# Patient Record
Sex: MALE | Race: WHITE | Employment: OTHER | URBAN - METROPOLITAN AREA
[De-identification: names, ages, dates, MRNs, and addresses within clinical notes are randomized per-mention and may not be internally consistent; named-entity substitution may affect disease eponyms.]

---

## 2017-04-10 ENCOUNTER — TELEPHONE (OUTPATIENT)
Dept: PHARMACY | Facility: CLINIC | Age: 82
End: 2017-04-10

## 2018-04-27 ENCOUNTER — TELEPHONE (OUTPATIENT)
Dept: FAMILY MEDICINE CLINIC | Age: 83
End: 2018-04-27

## 2018-04-30 ENCOUNTER — TELEPHONE (OUTPATIENT)
Dept: FAMILY MEDICINE CLINIC | Age: 83
End: 2018-04-30

## 2019-03-20 ENCOUNTER — OFFICE VISIT (OUTPATIENT)
Dept: FAMILY MEDICINE CLINIC | Age: 84
End: 2019-03-20
Payer: MEDICARE

## 2019-03-20 VITALS
WEIGHT: 235.8 LBS | HEART RATE: 70 BPM | DIASTOLIC BLOOD PRESSURE: 70 MMHG | SYSTOLIC BLOOD PRESSURE: 156 MMHG | HEIGHT: 67 IN | BODY MASS INDEX: 37.01 KG/M2 | OXYGEN SATURATION: 96 %

## 2019-03-20 DIAGNOSIS — L97.509 TYPE 2 DIABETES MELLITUS WITH FOOT ULCER, WITHOUT LONG-TERM CURRENT USE OF INSULIN (HCC): ICD-10-CM

## 2019-03-20 DIAGNOSIS — E11.621 TYPE 2 DIABETES MELLITUS WITH FOOT ULCER, WITHOUT LONG-TERM CURRENT USE OF INSULIN (HCC): ICD-10-CM

## 2019-03-20 DIAGNOSIS — R03.0 ELEVATED BLOOD-PRESSURE READING WITHOUT DIAGNOSIS OF HYPERTENSION: ICD-10-CM

## 2019-03-20 DIAGNOSIS — R60.0 LOCALIZED EDEMA: Primary | ICD-10-CM

## 2019-03-20 DIAGNOSIS — L97.511 SKIN ULCER OF RIGHT FOOT, LIMITED TO BREAKDOWN OF SKIN (HCC): ICD-10-CM

## 2019-03-20 LAB
A/G RATIO: 1.3 (ref 1.1–2.2)
ALBUMIN SERPL-MCNC: 4.4 G/DL (ref 3.4–5)
ALP BLD-CCNC: 67 U/L (ref 40–129)
ALT SERPL-CCNC: 22 U/L (ref 10–40)
ANION GAP SERPL CALCULATED.3IONS-SCNC: 13 MMOL/L (ref 3–16)
AST SERPL-CCNC: 24 U/L (ref 15–37)
BILIRUB SERPL-MCNC: 0.3 MG/DL (ref 0–1)
BUN BLDV-MCNC: 14 MG/DL (ref 7–20)
CALCIUM SERPL-MCNC: 9.6 MG/DL (ref 8.3–10.6)
CHLORIDE BLD-SCNC: 101 MMOL/L (ref 99–110)
CHOLESTEROL, TOTAL: 181 MG/DL (ref 0–199)
CO2: 28 MMOL/L (ref 21–32)
CREAT SERPL-MCNC: 0.8 MG/DL (ref 0.8–1.3)
GFR AFRICAN AMERICAN: >60
GFR NON-AFRICAN AMERICAN: >60
GLOBULIN: 3.5 G/DL
GLUCOSE BLD-MCNC: 127 MG/DL (ref 70–99)
HDLC SERPL-MCNC: 32 MG/DL (ref 40–60)
LDL CHOLESTEROL CALCULATED: 105 MG/DL
POTASSIUM SERPL-SCNC: 4.5 MMOL/L (ref 3.5–5.1)
SODIUM BLD-SCNC: 142 MMOL/L (ref 136–145)
TOTAL PROTEIN: 7.9 G/DL (ref 6.4–8.2)
TRIGL SERPL-MCNC: 220 MG/DL (ref 0–150)
VLDLC SERPL CALC-MCNC: 44 MG/DL

## 2019-03-20 PROCEDURE — 36415 COLL VENOUS BLD VENIPUNCTURE: CPT | Performed by: FAMILY MEDICINE

## 2019-03-20 PROCEDURE — 99214 OFFICE O/P EST MOD 30 MIN: CPT | Performed by: FAMILY MEDICINE

## 2019-03-20 RX ORDER — FUROSEMIDE 20 MG/1
20 TABLET ORAL DAILY
COMMUNITY
End: 2019-04-15 | Stop reason: SDUPTHER

## 2019-03-20 RX ORDER — GLIPIZIDE 10 MG/1
10 TABLET ORAL
Qty: 180 TABLET | Refills: 1 | Status: CANCELLED | OUTPATIENT
Start: 2019-03-20

## 2019-03-20 ASSESSMENT — PATIENT HEALTH QUESTIONNAIRE - PHQ9
1. LITTLE INTEREST OR PLEASURE IN DOING THINGS: 0
2. FEELING DOWN, DEPRESSED OR HOPELESS: 0
SUM OF ALL RESPONSES TO PHQ QUESTIONS 1-9: 0
SUM OF ALL RESPONSES TO PHQ QUESTIONS 1-9: 0
SUM OF ALL RESPONSES TO PHQ9 QUESTIONS 1 & 2: 0

## 2019-03-21 LAB
ESTIMATED AVERAGE GLUCOSE: 188.6 MG/DL
HBA1C MFR BLD: 8.2 %

## 2019-03-21 ASSESSMENT — ENCOUNTER SYMPTOMS
EYES NEGATIVE: 1
RESPIRATORY NEGATIVE: 1

## 2019-03-25 RX ORDER — ATORVASTATIN CALCIUM 20 MG/1
20 TABLET, FILM COATED ORAL DAILY
Qty: 30 TABLET | Refills: 3 | Status: SHIPPED | OUTPATIENT
Start: 2019-03-25 | End: 2019-07-18 | Stop reason: SDUPTHER

## 2019-03-25 RX ORDER — LISINOPRIL 5 MG/1
5 TABLET ORAL DAILY
Qty: 30 TABLET | Refills: 3 | Status: SHIPPED | OUTPATIENT
Start: 2019-03-25 | End: 2019-07-18 | Stop reason: SDUPTHER

## 2019-04-15 DIAGNOSIS — R60.0 LOCALIZED EDEMA: ICD-10-CM

## 2019-04-15 RX ORDER — FUROSEMIDE 20 MG/1
20 TABLET ORAL DAILY
Qty: 30 TABLET | Refills: 0 | Status: SHIPPED | OUTPATIENT
Start: 2019-04-15 | End: 2019-04-24 | Stop reason: SDUPTHER

## 2019-04-24 ENCOUNTER — OFFICE VISIT (OUTPATIENT)
Dept: FAMILY MEDICINE CLINIC | Age: 84
End: 2019-04-24
Payer: MEDICARE

## 2019-04-24 VITALS
WEIGHT: 239 LBS | HEART RATE: 74 BPM | OXYGEN SATURATION: 96 % | DIASTOLIC BLOOD PRESSURE: 66 MMHG | BODY MASS INDEX: 37.43 KG/M2 | TEMPERATURE: 97.6 F | SYSTOLIC BLOOD PRESSURE: 130 MMHG

## 2019-04-24 DIAGNOSIS — I10 ESSENTIAL HYPERTENSION: Primary | ICD-10-CM

## 2019-04-24 DIAGNOSIS — R60.0 LOCALIZED EDEMA: ICD-10-CM

## 2019-04-24 DIAGNOSIS — Z23 NEED FOR PROPHYLACTIC VACCINATION AGAINST STREPTOCOCCUS PNEUMONIAE (PNEUMOCOCCUS): ICD-10-CM

## 2019-04-24 PROCEDURE — 99213 OFFICE O/P EST LOW 20 MIN: CPT | Performed by: FAMILY MEDICINE

## 2019-04-24 RX ORDER — FUROSEMIDE 20 MG/1
20 TABLET ORAL DAILY
Qty: 30 TABLET | Refills: 3 | Status: SHIPPED | OUTPATIENT
Start: 2019-04-24 | End: 2019-05-16 | Stop reason: SDUPTHER

## 2019-04-24 ASSESSMENT — ENCOUNTER SYMPTOMS: RESPIRATORY NEGATIVE: 1

## 2019-05-03 ENCOUNTER — TELEPHONE (OUTPATIENT)
Dept: FAMILY MEDICINE CLINIC | Age: 84
End: 2019-05-03

## 2019-05-03 ENCOUNTER — OFFICE VISIT (OUTPATIENT)
Dept: FAMILY MEDICINE CLINIC | Age: 84
End: 2019-05-03
Payer: MEDICARE

## 2019-05-03 DIAGNOSIS — R53.83 OTHER FATIGUE: Primary | ICD-10-CM

## 2019-05-03 PROCEDURE — 99213 OFFICE O/P EST LOW 20 MIN: CPT | Performed by: FAMILY MEDICINE

## 2019-05-03 NOTE — PROGRESS NOTES
Subjective:      Patient ID: Raj Rodriguez is a 81 y.o. male. HPI   Chief Complaint   Patient presents with    Fatigue     states her wife thinks he should be seen because he said one time that he was tired and he has never complained before so she made him come in      Chief complaint and present illness: 81-year-old white male who recently resumed medical care here after switching from the [facility] returns at my request because this examiner received a message from his wife that he was acting fatigued and she thought it was the furosemide. In reviewing the record at that time, the furosemide was not a new medication-he came from the [facility] system on. We had resumed some other medicines, to treat his diabetes. Patient presents today stating that he should've never told his wife that he was a little tired. It was on one occasion after he done some things. He says that otherwise he's been completely functional-doing whatever he wants to do it without any other symptoms to go along with the fatigue-no chest pain, no shortness of breath, no lightheadedness, no abdominal pain, no trouble with urine, no fever sweats chills or weight loss. He states that he has slowed down a little bit over the years but he is also 81. Review of Systems   Constitutional: Negative. Respiratory: Negative. Cardiovascular: Negative. Gastrointestinal: Negative. Endocrine: Negative. Genitourinary: Negative. Neurological: Negative. Psychiatric/Behavioral: Negative. background/entire past medical,social and family history obtained and reviewed/updated today   Objective:   Physical Exam   Constitutional:   Elderly, alert white male who answers all questions promptly and appropriately and accurately   HENT:   Head: Normocephalic. Eyes: Conjunctivae are normal. No scleral icterus. Cardiovascular: Normal rate. Pulmonary/Chest: Effort normal and breath sounds normal. He has no wheezes. He has no rales. Musculoskeletal: He exhibits edema (no change in edema from previous visit). He exhibits no tenderness. Neurological: He is alert. Skin: Skin is warm. No pallor. Psychiatric: He has a normal mood and affect. Nursing note and vitals reviewed. /62   Pulse 74   Wt 235 lb (106.6 kg)   SpO2 97%   BMI 36.81 kg/m²     Assessment:      Lissette Swartz was seen today for fatigue.     Diagnoses and all orders for this visit:    Other fatigue           Plan:        Patient Instructions   Please see me as previously arranged    If he should begin to experience persistent symptoms of fatigue or focal symptoms, please return           Melony Rock MA

## 2019-05-03 NOTE — TELEPHONE ENCOUNTER
I reviewed the chart. He was on furosemide when I first saw him a couple months ago.   I believe the best thing to do, is to have a office visit and evaluate

## 2019-05-03 NOTE — TELEPHONE ENCOUNTER
Patients spouse calling because they believe the patients medication is making him very tired and weak. She wasn't for sure on which medication it was, but states that it might be the furosemide. Wants to know if there is anything else than can be prescribed? Please advise.

## 2019-05-05 VITALS
OXYGEN SATURATION: 97 % | HEART RATE: 74 BPM | SYSTOLIC BLOOD PRESSURE: 136 MMHG | BODY MASS INDEX: 36.81 KG/M2 | WEIGHT: 235 LBS | DIASTOLIC BLOOD PRESSURE: 70 MMHG

## 2019-05-05 ASSESSMENT — ENCOUNTER SYMPTOMS
GASTROINTESTINAL NEGATIVE: 1
RESPIRATORY NEGATIVE: 1

## 2019-05-16 DIAGNOSIS — R60.0 LOCALIZED EDEMA: ICD-10-CM

## 2019-05-16 RX ORDER — FUROSEMIDE 20 MG/1
20 TABLET ORAL DAILY
Qty: 30 TABLET | Refills: 3 | Status: SHIPPED | OUTPATIENT
Start: 2019-05-16 | End: 2019-07-10 | Stop reason: SDUPTHER

## 2019-05-29 ENCOUNTER — OFFICE VISIT (OUTPATIENT)
Dept: FAMILY MEDICINE CLINIC | Age: 84
End: 2019-05-29
Payer: MEDICARE

## 2019-05-29 VITALS
OXYGEN SATURATION: 97 % | HEART RATE: 67 BPM | WEIGHT: 231 LBS | BODY MASS INDEX: 36.18 KG/M2 | DIASTOLIC BLOOD PRESSURE: 82 MMHG | SYSTOLIC BLOOD PRESSURE: 131 MMHG

## 2019-05-29 DIAGNOSIS — I83.811 VARICOSE VEINS OF RIGHT LOWER EXTREMITY WITH PAIN: Primary | ICD-10-CM

## 2019-05-29 PROCEDURE — 99213 OFFICE O/P EST LOW 20 MIN: CPT | Performed by: FAMILY MEDICINE

## 2019-06-06 DIAGNOSIS — L97.509 TYPE 2 DIABETES MELLITUS WITH FOOT ULCER, WITHOUT LONG-TERM CURRENT USE OF INSULIN (HCC): ICD-10-CM

## 2019-06-06 DIAGNOSIS — E11.621 TYPE 2 DIABETES MELLITUS WITH FOOT ULCER, WITHOUT LONG-TERM CURRENT USE OF INSULIN (HCC): ICD-10-CM

## 2019-06-06 RX ORDER — GLIPIZIDE 10 MG/1
10 TABLET ORAL
Qty: 180 TABLET | Refills: 0 | Status: SHIPPED | OUTPATIENT
Start: 2019-06-06 | End: 2019-07-18 | Stop reason: SDUPTHER

## 2019-06-06 NOTE — TELEPHONE ENCOUNTER
Refill Request for glipiZIDE (GLUCOTROL) 10 MG tablet     Last visit- 4/24/19    Told to follow up- 3 months    Pending appointment- None    Additional Comments -

## 2019-07-03 ENCOUNTER — TELEPHONE (OUTPATIENT)
Dept: FAMILY MEDICINE CLINIC | Age: 84
End: 2019-07-03

## 2019-07-10 DIAGNOSIS — R60.0 LOCALIZED EDEMA: ICD-10-CM

## 2019-07-10 RX ORDER — FUROSEMIDE 20 MG/1
20 TABLET ORAL DAILY
Qty: 30 TABLET | Refills: 0 | Status: SHIPPED | OUTPATIENT
Start: 2019-07-10 | End: 2019-07-18 | Stop reason: SDUPTHER

## 2019-07-11 ENCOUNTER — TELEPHONE (OUTPATIENT)
Dept: FAMILY MEDICINE CLINIC | Age: 84
End: 2019-07-11

## 2019-07-18 ENCOUNTER — OFFICE VISIT (OUTPATIENT)
Dept: FAMILY MEDICINE CLINIC | Age: 84
End: 2019-07-18
Payer: MEDICARE

## 2019-07-18 VITALS
HEIGHT: 68 IN | OXYGEN SATURATION: 97 % | SYSTOLIC BLOOD PRESSURE: 138 MMHG | DIASTOLIC BLOOD PRESSURE: 72 MMHG | WEIGHT: 234 LBS | HEART RATE: 71 BPM | BODY MASS INDEX: 35.46 KG/M2

## 2019-07-18 DIAGNOSIS — E11.621 TYPE 2 DIABETES MELLITUS WITH FOOT ULCER, WITHOUT LONG-TERM CURRENT USE OF INSULIN (HCC): ICD-10-CM

## 2019-07-18 DIAGNOSIS — I10 ESSENTIAL HYPERTENSION: Primary | ICD-10-CM

## 2019-07-18 DIAGNOSIS — E78.2 MIXED HYPERLIPIDEMIA: ICD-10-CM

## 2019-07-18 DIAGNOSIS — R60.0 LOCALIZED EDEMA: ICD-10-CM

## 2019-07-18 DIAGNOSIS — L97.509 TYPE 2 DIABETES MELLITUS WITH FOOT ULCER, WITHOUT LONG-TERM CURRENT USE OF INSULIN (HCC): ICD-10-CM

## 2019-07-18 LAB
ALBUMIN SERPL-MCNC: 4.4 G/DL (ref 3.4–5)
ANION GAP SERPL CALCULATED.3IONS-SCNC: 12 MMOL/L (ref 3–16)
BUN BLDV-MCNC: 17 MG/DL (ref 7–20)
CALCIUM SERPL-MCNC: 9.7 MG/DL (ref 8.3–10.6)
CHLORIDE BLD-SCNC: 98 MMOL/L (ref 99–110)
CHOLESTEROL, TOTAL: 165 MG/DL (ref 0–199)
CO2: 28 MMOL/L (ref 21–32)
CREAT SERPL-MCNC: 0.9 MG/DL (ref 0.8–1.3)
GFR AFRICAN AMERICAN: >60
GFR NON-AFRICAN AMERICAN: >60
GLUCOSE BLD-MCNC: 187 MG/DL (ref 70–99)
HDLC SERPL-MCNC: 27 MG/DL (ref 40–60)
LDL CHOLESTEROL CALCULATED: 80 MG/DL
PHOSPHORUS: 3.6 MG/DL (ref 2.5–4.9)
POTASSIUM SERPL-SCNC: 4.8 MMOL/L (ref 3.5–5.1)
SODIUM BLD-SCNC: 138 MMOL/L (ref 136–145)
TRIGL SERPL-MCNC: 289 MG/DL (ref 0–150)
VLDLC SERPL CALC-MCNC: 58 MG/DL

## 2019-07-18 PROCEDURE — 99214 OFFICE O/P EST MOD 30 MIN: CPT | Performed by: FAMILY MEDICINE

## 2019-07-18 PROCEDURE — 36415 COLL VENOUS BLD VENIPUNCTURE: CPT | Performed by: FAMILY MEDICINE

## 2019-07-18 RX ORDER — GLIPIZIDE 10 MG/1
10 TABLET ORAL
Qty: 60 TABLET | Refills: 5 | Status: SHIPPED | OUTPATIENT
Start: 2019-07-18 | End: 2019-09-23 | Stop reason: SDUPTHER

## 2019-07-18 RX ORDER — ATORVASTATIN CALCIUM 20 MG/1
20 TABLET, FILM COATED ORAL DAILY
Qty: 30 TABLET | Refills: 5 | Status: SHIPPED | OUTPATIENT
Start: 2019-07-18 | End: 2019-12-09 | Stop reason: SDUPTHER

## 2019-07-18 RX ORDER — FUROSEMIDE 20 MG/1
20 TABLET ORAL DAILY
Qty: 30 TABLET | Refills: 5 | Status: SHIPPED | OUTPATIENT
Start: 2019-07-18 | End: 2019-12-09 | Stop reason: SDUPTHER

## 2019-07-18 RX ORDER — LISINOPRIL 5 MG/1
5 TABLET ORAL DAILY
Qty: 30 TABLET | Refills: 5 | Status: SHIPPED | OUTPATIENT
Start: 2019-07-18 | End: 2019-12-09 | Stop reason: SDUPTHER

## 2019-07-18 ASSESSMENT — ENCOUNTER SYMPTOMS
RESPIRATORY NEGATIVE: 1
GASTROINTESTINAL NEGATIVE: 1

## 2019-07-19 LAB
ESTIMATED AVERAGE GLUCOSE: 194.4 MG/DL
HBA1C MFR BLD: 8.4 %

## 2019-09-23 DIAGNOSIS — L97.509 TYPE 2 DIABETES MELLITUS WITH FOOT ULCER, WITHOUT LONG-TERM CURRENT USE OF INSULIN (HCC): ICD-10-CM

## 2019-09-23 DIAGNOSIS — E11.621 TYPE 2 DIABETES MELLITUS WITH FOOT ULCER, WITHOUT LONG-TERM CURRENT USE OF INSULIN (HCC): ICD-10-CM

## 2019-09-23 RX ORDER — GLIPIZIDE 10 MG/1
10 TABLET ORAL
Qty: 60 TABLET | Refills: 1 | Status: SHIPPED | OUTPATIENT
Start: 2019-09-23 | End: 2019-12-05 | Stop reason: SDUPTHER

## 2019-09-25 ENCOUNTER — TELEPHONE (OUTPATIENT)
Dept: FAMILY MEDICINE CLINIC | Age: 84
End: 2019-09-25

## 2019-09-27 ENCOUNTER — OFFICE VISIT (OUTPATIENT)
Dept: FAMILY MEDICINE CLINIC | Age: 84
End: 2019-09-27
Payer: MEDICARE

## 2019-09-27 VITALS
WEIGHT: 241 LBS | HEIGHT: 68 IN | TEMPERATURE: 97.7 F | SYSTOLIC BLOOD PRESSURE: 138 MMHG | DIASTOLIC BLOOD PRESSURE: 68 MMHG | BODY MASS INDEX: 36.53 KG/M2 | OXYGEN SATURATION: 94 % | HEART RATE: 96 BPM

## 2019-09-27 DIAGNOSIS — R10.12 LEFT UPPER QUADRANT PAIN: Primary | ICD-10-CM

## 2019-09-27 LAB
A/G RATIO: 1.4 (ref 1.1–2.2)
ALBUMIN SERPL-MCNC: 4.4 G/DL (ref 3.4–5)
ALP BLD-CCNC: 71 U/L (ref 40–129)
ALT SERPL-CCNC: 13 U/L (ref 10–40)
ANION GAP SERPL CALCULATED.3IONS-SCNC: 13 MMOL/L (ref 3–16)
AST SERPL-CCNC: 16 U/L (ref 15–37)
BILIRUB SERPL-MCNC: 0.3 MG/DL (ref 0–1)
BILIRUBIN URINE: NEGATIVE
BLOOD, URINE: NEGATIVE
BUN BLDV-MCNC: 19 MG/DL (ref 7–20)
CALCIUM SERPL-MCNC: 9.8 MG/DL (ref 8.3–10.6)
CHLORIDE BLD-SCNC: 102 MMOL/L (ref 99–110)
CLARITY: CLEAR
CO2: 27 MMOL/L (ref 21–32)
COLOR: YELLOW
CREAT SERPL-MCNC: 0.8 MG/DL (ref 0.8–1.3)
EPITHELIAL CELLS, UA: 0 /HPF (ref 0–5)
GFR AFRICAN AMERICAN: >60
GFR NON-AFRICAN AMERICAN: >60
GLOBULIN: 3.1 G/DL
GLUCOSE BLD-MCNC: 98 MG/DL (ref 70–99)
GLUCOSE URINE: NEGATIVE MG/DL
HYALINE CASTS: 1 /LPF (ref 0–8)
KETONES, URINE: NEGATIVE MG/DL
LEUKOCYTE ESTERASE, URINE: NEGATIVE
MICROSCOPIC EXAMINATION: NORMAL
NITRITE, URINE: NEGATIVE
PH UA: 6.5 (ref 5–8)
POTASSIUM SERPL-SCNC: 5 MMOL/L (ref 3.5–5.1)
PROTEIN UA: NEGATIVE MG/DL
RBC UA: 0 /HPF (ref 0–4)
SODIUM BLD-SCNC: 142 MMOL/L (ref 136–145)
SPECIFIC GRAVITY UA: 1.01 (ref 1–1.03)
TOTAL PROTEIN: 7.5 G/DL (ref 6.4–8.2)
UROBILINOGEN, URINE: 1 E.U./DL
WBC UA: 0 /HPF (ref 0–5)

## 2019-09-27 PROCEDURE — 99214 OFFICE O/P EST MOD 30 MIN: CPT | Performed by: FAMILY MEDICINE

## 2019-09-27 PROCEDURE — 36415 COLL VENOUS BLD VENIPUNCTURE: CPT | Performed by: FAMILY MEDICINE
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
## 2019-09-29 ASSESSMENT — ENCOUNTER SYMPTOMS
CONSTIPATION: 0
ABDOMINAL PAIN: 1
ABDOMINAL DISTENTION: 1
NAUSEA: 0
ANAL BLEEDING: 0
DIARRHEA: 0
RECTAL PAIN: 0
RESPIRATORY NEGATIVE: 1
VOMITING: 0
BLOOD IN STOOL: 0

## 2019-09-30 LAB
ATYPICAL LYMPHOCYTE RELATIVE PERCENT: 24 % (ref 0–6)
BASOPHILS ABSOLUTE: 0 K/UL (ref 0–0.2)
BASOPHILS RELATIVE PERCENT: 0 %
EOSINOPHILS ABSOLUTE: 0.2 K/UL (ref 0–0.6)
EOSINOPHILS RELATIVE PERCENT: 2 %
HAIRY CELLS: PRESENT
HCT VFR BLD CALC: 34.5 % (ref 40.5–52.5)
HEMATOLOGY PATH CONSULT: NORMAL
HEMATOLOGY PATH CONSULT: YES
HEMOGLOBIN: 11.2 G/DL (ref 13.5–17.5)
LYMPHOCYTES ABSOLUTE: 5.9 K/UL (ref 1–5.1)
LYMPHOCYTES RELATIVE PERCENT: 34 %
MCH RBC QN AUTO: 30.4 PG (ref 26–34)
MCHC RBC AUTO-ENTMCNC: 32.6 G/DL (ref 31–36)
MCV RBC AUTO: 93.3 FL (ref 80–100)
METAMYELOCYTES RELATIVE PERCENT: 1 %
MONOCYTES ABSOLUTE: 0.3 K/UL (ref 0–1.3)
MONOCYTES RELATIVE PERCENT: 3 %
NEUTROPHILS ABSOLUTE: 3.8 K/UL (ref 1.7–7.7)
NEUTROPHILS RELATIVE PERCENT: 36 %
NUCLEATED RED BLOOD CELLS: 0 /100 WBC
NUCLEATED RED BLOOD CELLS: 0 /100 WBC
PDW BLD-RTO: 15.7 % (ref 12.4–15.4)
PLATELET # BLD: 194 K/UL (ref 135–450)
PLATELET SLIDE REVIEW: ADEQUATE
PMV BLD AUTO: 8.4 FL (ref 5–10.5)
RBC # BLD: 3.7 M/UL (ref 4.2–5.9)
SLIDE REVIEW: ABNORMAL
WBC # BLD: 10.2 K/UL (ref 4–11)

## 2019-10-01 ENCOUNTER — TELEPHONE (OUTPATIENT)
Dept: FAMILY MEDICINE CLINIC | Age: 84
End: 2019-10-01

## 2019-10-01 DIAGNOSIS — D72.9 ABNORMAL WHITE BLOOD CELL: Primary | ICD-10-CM

## 2019-10-02 ENCOUNTER — TELEPHONE (OUTPATIENT)
Dept: FAMILY MEDICINE CLINIC | Age: 84
End: 2019-10-02

## 2019-10-11 ENCOUNTER — TELEPHONE (OUTPATIENT)
Dept: FAMILY MEDICINE CLINIC | Age: 84
End: 2019-10-11

## 2019-10-16 ENCOUNTER — HOSPITAL ENCOUNTER (OUTPATIENT)
Dept: CT IMAGING | Age: 84
Discharge: HOME | End: 2019-10-16
Payer: MEDICARE

## 2019-10-16 DIAGNOSIS — D72.820 PERSISTENT LYMPHOCYTOSIS: ICD-10-CM

## 2019-10-16 PROCEDURE — 6360000004 HC RX CONTRAST MEDICATION: Performed by: FAMILY MEDICINE

## 2019-10-16 PROCEDURE — 74177 CT ABD & PELVIS W/CONTRAST: CPT

## 2019-10-16 RX ADMIN — IOHEXOL 50 ML: 240 INJECTION, SOLUTION INTRATHECAL; INTRAVASCULAR; INTRAVENOUS; ORAL at 16:54

## 2019-10-16 RX ADMIN — IOPAMIDOL 75 ML: 755 INJECTION, SOLUTION INTRAVENOUS at 16:54

## 2019-12-05 DIAGNOSIS — L97.509 TYPE 2 DIABETES MELLITUS WITH FOOT ULCER, WITHOUT LONG-TERM CURRENT USE OF INSULIN (HCC): ICD-10-CM

## 2019-12-05 DIAGNOSIS — E11.621 TYPE 2 DIABETES MELLITUS WITH FOOT ULCER, WITHOUT LONG-TERM CURRENT USE OF INSULIN (HCC): ICD-10-CM

## 2019-12-05 RX ORDER — GLIPIZIDE 10 MG/1
10 TABLET ORAL
Qty: 60 TABLET | Refills: 0 | Status: SHIPPED | OUTPATIENT
Start: 2019-12-05 | End: 2019-12-09 | Stop reason: SDUPTHER

## 2019-12-09 ENCOUNTER — OFFICE VISIT (OUTPATIENT)
Dept: FAMILY MEDICINE CLINIC | Age: 84
End: 2019-12-09
Payer: MEDICARE

## 2019-12-09 DIAGNOSIS — D72.9 ABNORMAL WHITE BLOOD CELL: ICD-10-CM

## 2019-12-09 DIAGNOSIS — E11.621 TYPE 2 DIABETES MELLITUS WITH FOOT ULCER, WITHOUT LONG-TERM CURRENT USE OF INSULIN (HCC): ICD-10-CM

## 2019-12-09 DIAGNOSIS — R60.0 LOCALIZED EDEMA: ICD-10-CM

## 2019-12-09 DIAGNOSIS — E78.2 MIXED HYPERLIPIDEMIA: ICD-10-CM

## 2019-12-09 DIAGNOSIS — L97.509 TYPE 2 DIABETES MELLITUS WITH FOOT ULCER, WITHOUT LONG-TERM CURRENT USE OF INSULIN (HCC): ICD-10-CM

## 2019-12-09 DIAGNOSIS — I10 ESSENTIAL HYPERTENSION: Primary | ICD-10-CM

## 2019-12-09 PROCEDURE — 36415 COLL VENOUS BLD VENIPUNCTURE: CPT | Performed by: FAMILY MEDICINE

## 2019-12-09 PROCEDURE — 99214 OFFICE O/P EST MOD 30 MIN: CPT | Performed by: FAMILY MEDICINE

## 2019-12-09 RX ORDER — GLIPIZIDE 10 MG/1
10 TABLET ORAL
Qty: 60 TABLET | Refills: 5 | Status: SHIPPED | OUTPATIENT
Start: 2019-12-09 | End: 2020-08-04

## 2019-12-09 RX ORDER — LISINOPRIL 5 MG/1
5 TABLET ORAL DAILY
Qty: 30 TABLET | Refills: 5 | Status: SHIPPED | OUTPATIENT
Start: 2019-12-09 | End: 2020-05-01

## 2019-12-09 RX ORDER — ATORVASTATIN CALCIUM 20 MG/1
20 TABLET, FILM COATED ORAL DAILY
Qty: 30 TABLET | Refills: 5 | Status: SHIPPED | OUTPATIENT
Start: 2019-12-09 | End: 2020-08-04 | Stop reason: SDUPTHER

## 2019-12-09 RX ORDER — FUROSEMIDE 20 MG/1
20 TABLET ORAL DAILY
Qty: 30 TABLET | Refills: 5 | Status: SHIPPED | OUTPATIENT
Start: 2019-12-09 | End: 2020-08-04 | Stop reason: SDUPTHER

## 2019-12-10 ENCOUNTER — TELEPHONE (OUTPATIENT)
Dept: FAMILY MEDICINE CLINIC | Age: 84
End: 2019-12-10

## 2019-12-10 VITALS
SYSTOLIC BLOOD PRESSURE: 140 MMHG | BODY MASS INDEX: 35.58 KG/M2 | HEART RATE: 98 BPM | DIASTOLIC BLOOD PRESSURE: 86 MMHG | OXYGEN SATURATION: 95 % | WEIGHT: 234 LBS

## 2019-12-10 LAB
ESTIMATED AVERAGE GLUCOSE: 185.8 MG/DL
HBA1C MFR BLD: 8.1 %

## 2019-12-10 ASSESSMENT — ENCOUNTER SYMPTOMS
RESPIRATORY NEGATIVE: 1
ABDOMINAL PAIN: 0

## 2020-05-01 RX ORDER — LISINOPRIL 5 MG/1
TABLET ORAL
Qty: 90 TABLET | Refills: 1 | Status: SHIPPED
Start: 2020-05-01 | End: 2020-09-22

## 2020-08-03 NOTE — TELEPHONE ENCOUNTER
Left msg for patient to return call need to know if he has new pcp or if he is going to stay here and see Thor Jackson.   If he is need to get appt set up and then we will send request to Dr Wilson Score

## 2020-08-04 RX ORDER — ATORVASTATIN CALCIUM 20 MG/1
20 TABLET, FILM COATED ORAL DAILY
Qty: 30 TABLET | Refills: 1 | Status: SHIPPED
Start: 2020-08-04 | End: 2020-09-22 | Stop reason: CLARIF

## 2020-08-04 RX ORDER — FUROSEMIDE 20 MG/1
20 TABLET ORAL DAILY
Qty: 30 TABLET | Refills: 1 | Status: SHIPPED
Start: 2020-08-04 | End: 2020-09-22

## 2020-08-04 RX ORDER — GLIPIZIDE 10 MG/1
TABLET ORAL
Qty: 60 TABLET | Refills: 1 | Status: SHIPPED | OUTPATIENT
Start: 2020-08-04 | End: 2020-10-12 | Stop reason: SDUPTHER

## 2020-09-22 ENCOUNTER — OFFICE VISIT (OUTPATIENT)
Dept: FAMILY MEDICINE CLINIC | Age: 85
End: 2020-09-22
Payer: MEDICARE

## 2020-09-22 VITALS
DIASTOLIC BLOOD PRESSURE: 82 MMHG | HEART RATE: 82 BPM | BODY MASS INDEX: 35.28 KG/M2 | OXYGEN SATURATION: 98 % | TEMPERATURE: 97.4 F | WEIGHT: 232 LBS | SYSTOLIC BLOOD PRESSURE: 138 MMHG

## 2020-09-22 PROCEDURE — G8510 SCR DEP NEG, NO PLAN REQD: HCPCS | Performed by: NURSE PRACTITIONER

## 2020-09-22 PROCEDURE — 3288F FALL RISK ASSESSMENT DOCD: CPT | Performed by: NURSE PRACTITIONER

## 2020-09-22 PROCEDURE — 99213 OFFICE O/P EST LOW 20 MIN: CPT | Performed by: NURSE PRACTITIONER

## 2020-09-22 RX ORDER — FUROSEMIDE 20 MG/1
20 TABLET ORAL DAILY
Qty: 30 TABLET | Refills: 5 | Status: CANCELLED | OUTPATIENT
Start: 2020-09-22

## 2020-09-22 RX ORDER — ATORVASTATIN CALCIUM 20 MG/1
20 TABLET, FILM COATED ORAL DAILY
Qty: 30 TABLET | Refills: 5 | Status: CANCELLED | OUTPATIENT
Start: 2020-09-22

## 2020-09-22 RX ORDER — BLOOD SUGAR DIAGNOSTIC
1 STRIP MISCELLANEOUS DAILY
Qty: 100 EACH | Refills: 5 | Status: SHIPPED | OUTPATIENT
Start: 2020-09-22 | End: 2022-03-07

## 2020-09-22 RX ORDER — BLOOD SUGAR DIAGNOSTIC
1 STRIP MISCELLANEOUS DAILY
COMMUNITY
End: 2020-09-22 | Stop reason: SDUPTHER

## 2020-09-22 RX ORDER — LISINOPRIL 5 MG/1
TABLET ORAL
Qty: 90 TABLET | Refills: 1 | Status: CANCELLED | OUTPATIENT
Start: 2020-09-22

## 2020-09-22 RX ORDER — GLIPIZIDE 10 MG/1
TABLET ORAL
Qty: 60 TABLET | Refills: 5 | Status: CANCELLED | OUTPATIENT
Start: 2020-09-22

## 2020-09-22 ASSESSMENT — ENCOUNTER SYMPTOMS: SHORTNESS OF BREATH: 0

## 2020-09-22 ASSESSMENT — PATIENT HEALTH QUESTIONNAIRE - PHQ9
SUM OF ALL RESPONSES TO PHQ QUESTIONS 1-9: 0
SUM OF ALL RESPONSES TO PHQ9 QUESTIONS 1 & 2: 0
1. LITTLE INTEREST OR PLEASURE IN DOING THINGS: 0
SUM OF ALL RESPONSES TO PHQ QUESTIONS 1-9: 0
2. FEELING DOWN, DEPRESSED OR HOPELESS: 0
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
## 2020-10-12 RX ORDER — GLIPIZIDE 10 MG/1
TABLET ORAL
Qty: 60 TABLET | Refills: 2 | Status: SHIPPED | OUTPATIENT
Start: 2020-10-12 | End: 2020-12-30

## 2020-10-12 NOTE — TELEPHONE ENCOUNTER
Glipizide 10 MG, [facility] [address]  [facility]      Last OV- 9/22/20 fu 3m    Next OV-12/21/20

## 2020-10-30 RX ORDER — LISINOPRIL 5 MG/1
TABLET ORAL
Qty: 90 TABLET | Refills: 0 | Status: SHIPPED | OUTPATIENT
Start: 2020-10-30 | End: 2021-01-28

## 2020-11-24 ENCOUNTER — OFFICE VISIT (OUTPATIENT)
Dept: FAMILY MEDICINE CLINIC | Age: 85
End: 2020-11-24
Payer: MEDICARE

## 2020-11-24 VITALS
SYSTOLIC BLOOD PRESSURE: 138 MMHG | DIASTOLIC BLOOD PRESSURE: 60 MMHG | BODY MASS INDEX: 35.88 KG/M2 | TEMPERATURE: 97.3 F | HEART RATE: 73 BPM | WEIGHT: 236 LBS | OXYGEN SATURATION: 98 %

## 2020-11-24 PROBLEM — C85.90 LYMPHOMA (HCC): Status: ACTIVE | Noted: 2020-11-24

## 2020-11-24 LAB — HBA1C MFR BLD: 8.2 %

## 2020-11-24 PROCEDURE — 3052F HG A1C>EQUAL 8.0%<EQUAL 9.0%: CPT | Performed by: NURSE PRACTITIONER

## 2020-11-24 PROCEDURE — 99214 OFFICE O/P EST MOD 30 MIN: CPT | Performed by: NURSE PRACTITIONER

## 2020-11-24 PROCEDURE — 83036 HEMOGLOBIN GLYCOSYLATED A1C: CPT | Performed by: NURSE PRACTITIONER

## 2020-11-24 ASSESSMENT — ENCOUNTER SYMPTOMS
BACK PAIN: 1
SHORTNESS OF BREATH: 0

## 2020-11-24 NOTE — PROGRESS NOTES
11/24/2020    Chief Complaint   Patient presents with    Hip Pain     he fell on a toy and landed on the left side but he said it is better and his wife wanted him to be seen     Foot Ulcer     right foot , Sheyla Shelton wanted to look at it i       Guero Fowler is a 81 y.o. male, presents today for:    HPI   Recent Fall: About 7 days ago was helping wife, who has double AKA, transfer to wheelchair to easy chair when he turned to the side and felt \"pulling\" sensation on right lumbar spine. Initially had difficulty walking and pain with movement. Went to chiropractor who adjusted his back which mostly resolved symptoms. Continues to have mild pain with certain movements. Has not been taking any NSAIDs  DM with chronic venous foot ulcer: Recent improvement in ulcer with Iodosorb. Planning to have Vascular procedure when foot ulcer heals. However, pt has to continue to walk due to his co-morbid issues. No hypoglycemic episodes. No other foot/ eye issues. Taking medications as prescribed. Annual labs due next visit. Review of Systems   Constitutional: Negative. Eyes: Negative for visual disturbance. Respiratory: Negative for shortness of breath. Cardiovascular: Negative for chest pain. Endocrine: Negative for polydipsia, polyphagia and polyuria. Genitourinary: Negative. Musculoskeletal: Positive for back pain (resolving lumbar back pain). Negative for myalgias. Skin:        Chronic right foot skin ulcer         Current Outpatient Medications on File Prior to Visit   Medication Sig Dispense Refill    lisinopril (PRINIVIL;ZESTRIL) 5 MG tablet TAKE ONE TABLET BY MOUTH DAILY 90 tablet 0    glipiZIDE (GLUCOTROL) 10 MG tablet TAKE ONE TABLET BY MOUTH TWICE A DAY BEFORE MEALS 60 tablet 2    blood glucose test strips (ACCU-CHEK DIXON PLUS) strip 1 each by In Vitro route daily As needed.  100 each 5    metFORMIN (GLUCOPHAGE) 1000 MG tablet TAKE ONE TABLET BY MOUTH TWICE A DAY WITH MEALS 60 4+ Edema present. Left lower le+ Edema present. Comments: Lateral lower extremity edema, chronic, 3+ on the right 2+ on the left, skin is red due to chronic circulator changes   Skin:     Comments: Chronic ulcer right great toe   Neurological:      General: No focal deficit present. Mental Status: He is alert. Comments: Easily distracted during interview   Psychiatric:         Mood and Affect: Mood normal.             ASSESSMENT/PLAN:  1. Lumbar strain, initial encounter  Appears to be resolving with chiropractic care    2. Type 2 diabetes mellitus with foot ulcer, without long-term current use of insulin ([facility])  A1C 8.2% in office today  Continue Glyburide/ metformin  Continue intermittently checking BS  Encouraged heart healthy, low sugar diet  Refuses all vaccinations  Eye exam UTD  - POCT glycosylated hemoglobin (Hb A1C)  - Comprehensive Metabolic Panel; Future  - Lipid Panel; Future  - Hemoglobin A1C; Future    3. Skin ulcer of right foot, limited to breakdown of skin ([facility] [address]  Continue Iodosorb per Wound  Continue care with Vascular surgeon for lymphedema    4. Acquired lymphedema of lower extremity  Encouraged leg elevated, compression socks  Continue care with Vascular surgery    5. Lymphoma, unspecified body region, unspecified lymphoma type [facility])  Will attempt to obtain noted from Dr. Oracio Keene to determine when f/u needed and next steps. NO recent notes on file. - CBC Auto Differential; Future          Return in about 3 months (around 2021) for AWV/ HTN/ DM. Patient's questions answered and concerns addressed. Patient agrees to plan of care.           Electronically signed by MALATHI Graff CNP on 2020 at 3:02 PM

## 2020-11-30 ENCOUNTER — TELEPHONE (OUTPATIENT)
Dept: FAMILY MEDICINE CLINIC | Age: 85
End: 2020-11-30

## 2020-11-30 NOTE — TELEPHONE ENCOUNTER
I called Dr Demetris Chung office to see when the last time it was that he was seen, they said it was last Nov 2019 and he does not have any upcoming appts. They will fax over ov note.   You may want to ask Sharri Court why this does not bridge over

## 2020-11-30 NOTE — TELEPHONE ENCOUNTER
----- Message from MALATHI Zuniga - CNP sent at 11/24/2020  3:06 PM EST -----  Regarding: Oncology notes- Dr. Rhonda Tavarez- status? Do we need to talk to [employer] about this?

## 2020-12-14 NOTE — TELEPHONE ENCOUNTER
Future appt scheduled 12/21/2020          Last appt 11/24/2020      Last Written 08/04/2020    metFORMIN (GLUCOPHAGE) 1000 MG tablet  #60  1 RF

## 2020-12-30 RX ORDER — GLIPIZIDE 10 MG/1
TABLET ORAL
Qty: 60 TABLET | Refills: 1 | Status: SHIPPED | OUTPATIENT
Start: 2020-12-30 | End: 2021-01-21 | Stop reason: SDUPTHER

## 2021-01-11 ENCOUNTER — TELEPHONE (OUTPATIENT)
Dept: FAMILY MEDICINE CLINIC | Age: 86
End: 2021-01-11

## 2021-01-11 DIAGNOSIS — L97.509 TYPE 2 DIABETES MELLITUS WITH FOOT ULCER, WITHOUT LONG-TERM CURRENT USE OF INSULIN (HCC): ICD-10-CM

## 2021-01-11 DIAGNOSIS — E11.621 TYPE 2 DIABETES MELLITUS WITH FOOT ULCER, WITHOUT LONG-TERM CURRENT USE OF INSULIN (HCC): ICD-10-CM

## 2021-01-11 NOTE — TELEPHONE ENCOUNTER
Spoke to [facility] to make sure that they got the new rx's for the metformin   I told the patients wife that we sent it to [facility], patient informed wife he will not pay out of pocket for it.   States he is just not going to take it till he can get the refill on 1/24

## 2021-01-11 NOTE — TELEPHONE ENCOUNTER
Pharmacy stated they gave him a 40 day supply on 12/14, which they were confused why you did 40 day instead of 30, but they filled it like it was ordered. I spoke to the patients wife and she was very adament that her  did not lose any and did not take any extra. So the patient wants you to order the amount he needs and he will pay out of pocket for it. So he needs 20 pills. Is it ok to order the 20 pills for him.  They say the pharmacy had to mess this up because patient is too carefull to mess it up

## 2021-01-11 NOTE — TELEPHONE ENCOUNTER
Sent in 2 scripts. One for 10 day supply. One to replace 40 day supply of meds due to error. Please notify patient and pharmacy.   Thanks

## 2021-01-21 DIAGNOSIS — E11.621 TYPE 2 DIABETES MELLITUS WITH FOOT ULCER, WITHOUT LONG-TERM CURRENT USE OF INSULIN (HCC): ICD-10-CM

## 2021-01-21 DIAGNOSIS — L97.509 TYPE 2 DIABETES MELLITUS WITH FOOT ULCER, WITHOUT LONG-TERM CURRENT USE OF INSULIN (HCC): ICD-10-CM

## 2021-01-21 RX ORDER — GLIPIZIDE 10 MG/1
10 TABLET ORAL
Qty: 60 TABLET | Refills: 2 | Status: ON HOLD | OUTPATIENT
Start: 2021-01-21 | End: 2021-02-24 | Stop reason: SDUPTHER

## 2021-01-28 DIAGNOSIS — I10 ESSENTIAL HYPERTENSION: ICD-10-CM

## 2021-01-28 RX ORDER — LISINOPRIL 5 MG/1
TABLET ORAL
Qty: 90 TABLET | Refills: 0 | Status: ON HOLD | OUTPATIENT
Start: 2021-01-28 | End: 2021-02-24 | Stop reason: SDUPTHER

## 2021-02-11 ENCOUNTER — TELEPHONE (OUTPATIENT)
Dept: FAMILY MEDICINE CLINIC | Age: 86
End: 2021-02-11

## 2021-02-11 NOTE — TELEPHONE ENCOUNTER
----- Message from Ignacio Concepcion sent at 2/11/2021 11:45 AM EST -----  Subject: Message to Provider    QUESTIONS  Information for Provider? pt would like to know the name of dr who took a   vein out of his leg.  ---------------------------------------------------------------------------  --------------  CALL BACK INFO  What is the best way for the office to contact you? OK to leave message on   voicemail  Preferred Call Back Phone Number? [phone]  ---------------------------------------------------------------------------  --------------  SCRIPT ANSWERS  Relationship to Patient?  Self

## 2021-02-16 ENCOUNTER — HOSPITAL ENCOUNTER (OUTPATIENT)
Dept: WOUND CARE | Age: 86
Discharge: HOME | End: 2021-02-16
Payer: MEDICARE

## 2021-02-18 ENCOUNTER — HOSPITAL ENCOUNTER (INPATIENT)
Age: 86
LOS: 6 days | Discharge: HOME | DRG: 616 | End: 2021-02-24
Attending: INTERNAL MEDICINE | Admitting: INTERNAL MEDICINE
Payer: MEDICARE

## 2021-02-18 ENCOUNTER — HOSPITAL ENCOUNTER (OUTPATIENT)
Dept: WOUND CARE | Age: 86
Discharge: HOME | DRG: 616 | End: 2021-02-18
Payer: MEDICARE

## 2021-02-18 ENCOUNTER — APPOINTMENT (OUTPATIENT)
Dept: GENERAL RADIOLOGY | Age: 86
DRG: 616 | End: 2021-02-18
Attending: INTERNAL MEDICINE
Payer: MEDICARE

## 2021-02-18 ENCOUNTER — APPOINTMENT (OUTPATIENT)
Dept: MRI IMAGING | Age: 86
DRG: 616 | End: 2021-02-18
Attending: INTERNAL MEDICINE
Payer: MEDICARE

## 2021-02-18 VITALS
BODY MASS INDEX: 34.25 KG/M2 | HEART RATE: 91 BPM | RESPIRATION RATE: 18 BRPM | TEMPERATURE: 98.3 F | HEIGHT: 68 IN | WEIGHT: 226 LBS | DIASTOLIC BLOOD PRESSURE: 67 MMHG | SYSTOLIC BLOOD PRESSURE: 147 MMHG

## 2021-02-18 DIAGNOSIS — E11.621 TYPE 2 DIABETES MELLITUS WITH FOOT ULCER, WITHOUT LONG-TERM CURRENT USE OF INSULIN (HCC): ICD-10-CM

## 2021-02-18 DIAGNOSIS — I10 ESSENTIAL HYPERTENSION: ICD-10-CM

## 2021-02-18 DIAGNOSIS — L97.509 TYPE 2 DIABETES MELLITUS WITH FOOT ULCER, WITHOUT LONG-TERM CURRENT USE OF INSULIN (HCC): ICD-10-CM

## 2021-02-18 PROBLEM — I96 NECROTIC TOES (HCC): Status: ACTIVE | Noted: 2021-02-18

## 2021-02-18 LAB
A/G RATIO: 1 (ref 1.1–2.2)
ALBUMIN SERPL-MCNC: 3.5 G/DL (ref 3.4–5)
ALP BLD-CCNC: 89 U/L (ref 40–129)
ALT SERPL-CCNC: 12 U/L (ref 10–40)
ANION GAP SERPL CALCULATED.3IONS-SCNC: 8 MMOL/L (ref 3–16)
ANISOCYTOSIS: ABNORMAL
AST SERPL-CCNC: 14 U/L (ref 15–37)
ATYPICAL LYMPHOCYTE RELATIVE PERCENT: 7 % (ref 0–6)
BASOPHILS ABSOLUTE: 0 K/UL (ref 0–0.2)
BASOPHILS RELATIVE PERCENT: 0 %
BILIRUB SERPL-MCNC: 0.3 MG/DL (ref 0–1)
BUN BLDV-MCNC: 20 MG/DL (ref 7–20)
C-REACTIVE PROTEIN: 191.1 MG/L (ref 0–5.1)
CALCIUM SERPL-MCNC: 9.3 MG/DL (ref 8.3–10.6)
CHLORIDE BLD-SCNC: 99 MMOL/L (ref 99–110)
CO2: 29 MMOL/L (ref 21–32)
CREAT SERPL-MCNC: 1.1 MG/DL (ref 0.8–1.3)
EOSINOPHILS ABSOLUTE: 0.1 K/UL (ref 0–0.6)
EOSINOPHILS RELATIVE PERCENT: 1 %
GFR AFRICAN AMERICAN: >60
GFR NON-AFRICAN AMERICAN: >60
GLOBULIN: 3.6 G/DL
GLUCOSE BLD-MCNC: 115 MG/DL (ref 70–99)
GLUCOSE BLD-MCNC: 181 MG/DL (ref 70–99)
GLUCOSE BLD-MCNC: 98 MG/DL (ref 70–99)
HCT VFR BLD CALC: 30.9 % (ref 40.5–52.5)
HEMATOLOGY PATH CONSULT: YES
HEMOGLOBIN: 10.1 G/DL (ref 13.5–17.5)
LYMPHOCYTES ABSOLUTE: 5.3 K/UL (ref 1–5.1)
LYMPHOCYTES RELATIVE PERCENT: 29 %
MCH RBC QN AUTO: 31.6 PG (ref 26–34)
MCHC RBC AUTO-ENTMCNC: 32.9 G/DL (ref 31–36)
MCV RBC AUTO: 96.2 FL (ref 80–100)
MONOCYTES ABSOLUTE: 1 K/UL (ref 0–1.3)
MONOCYTES RELATIVE PERCENT: 7 %
NEUTROPHILS ABSOLUTE: 8.3 K/UL (ref 1.7–7.7)
NEUTROPHILS RELATIVE PERCENT: 56 %
PDW BLD-RTO: 13.2 % (ref 12.4–15.4)
PERFORMED ON: ABNORMAL
PERFORMED ON: NORMAL
PLATELET # BLD: 230 K/UL (ref 135–450)
PLATELET SLIDE REVIEW: ADEQUATE
PMV BLD AUTO: 7.7 FL (ref 5–10.5)
POTASSIUM REFLEX MAGNESIUM: 4.7 MMOL/L (ref 3.5–5.1)
RBC # BLD: 3.21 M/UL (ref 4.2–5.9)
SARS-COV-2, NAAT: NOT DETECTED
SEDIMENTATION RATE, ERYTHROCYTE: 109 MM/HR (ref 0–20)
SLIDE REVIEW: ABNORMAL
SODIUM BLD-SCNC: 136 MMOL/L (ref 136–145)
TOTAL PROTEIN: 7.1 G/DL (ref 6.4–8.2)
WBC # BLD: 14.8 K/UL (ref 4–11)

## 2021-02-18 PROCEDURE — 36415 COLL VENOUS BLD VENIPUNCTURE: CPT

## 2021-02-18 PROCEDURE — 99213 OFFICE O/P EST LOW 20 MIN: CPT

## 2021-02-18 PROCEDURE — 73620 X-RAY EXAM OF FOOT: CPT

## 2021-02-18 PROCEDURE — 6360000002 HC RX W HCPCS: Performed by: INTERNAL MEDICINE

## 2021-02-18 PROCEDURE — 2060000000 HC ICU INTERMEDIATE R&B

## 2021-02-18 PROCEDURE — 87186 SC STD MICRODIL/AGAR DIL: CPT

## 2021-02-18 PROCEDURE — 2580000003 HC RX 258: Performed by: INTERNAL MEDICINE

## 2021-02-18 PROCEDURE — 87077 CULTURE AEROBIC IDENTIFY: CPT

## 2021-02-18 PROCEDURE — 2580000003 HC RX 258

## 2021-02-18 PROCEDURE — 85652 RBC SED RATE AUTOMATED: CPT

## 2021-02-18 PROCEDURE — 85025 COMPLETE CBC W/AUTO DIFF WBC: CPT

## 2021-02-18 PROCEDURE — 99221 1ST HOSP IP/OBS SF/LOW 40: CPT | Performed by: NURSE PRACTITIONER

## 2021-02-18 PROCEDURE — 87205 SMEAR GRAM STAIN: CPT

## 2021-02-18 PROCEDURE — 87536 HIV-1 QUANT&REVRSE TRNSCRPJ: CPT

## 2021-02-18 PROCEDURE — 86140 C-REACTIVE PROTEIN: CPT

## 2021-02-18 PROCEDURE — 80053 COMPREHEN METABOLIC PANEL: CPT

## 2021-02-18 PROCEDURE — 87070 CULTURE OTHR SPECIMN AEROBIC: CPT

## 2021-02-18 PROCEDURE — 73718 MRI LOWER EXTREMITY W/O DYE: CPT

## 2021-02-18 PROCEDURE — 11043 DBRDMT MUSC&/FSCA 1ST 20/<: CPT

## 2021-02-18 PROCEDURE — 83036 HEMOGLOBIN GLYCOSYLATED A1C: CPT

## 2021-02-18 PROCEDURE — 6370000000 HC RX 637 (ALT 250 FOR IP): Performed by: INTERNAL MEDICINE

## 2021-02-18 RX ORDER — LISINOPRIL 5 MG/1
5 TABLET ORAL DAILY
Status: DISCONTINUED | OUTPATIENT
Start: 2021-02-18 | End: 2021-02-24

## 2021-02-18 RX ORDER — DEXTROSE MONOHYDRATE 50 MG/ML
100 INJECTION, SOLUTION INTRAVENOUS PRN
Status: DISCONTINUED | OUTPATIENT
Start: 2021-02-18 | End: 2021-02-24 | Stop reason: HOSPADM

## 2021-02-18 RX ORDER — SODIUM CHLORIDE 0.9 % (FLUSH) 0.9 %
10 SYRINGE (ML) INJECTION EVERY 12 HOURS SCHEDULED
Status: DISCONTINUED | OUTPATIENT
Start: 2021-02-18 | End: 2021-02-24 | Stop reason: HOSPADM

## 2021-02-18 RX ORDER — ONDANSETRON 2 MG/ML
4 INJECTION INTRAMUSCULAR; INTRAVENOUS EVERY 6 HOURS PRN
Status: DISCONTINUED | OUTPATIENT
Start: 2021-02-18 | End: 2021-02-24 | Stop reason: HOSPADM

## 2021-02-18 RX ORDER — ACETAMINOPHEN 650 MG/1
650 SUPPOSITORY RECTAL EVERY 6 HOURS PRN
Status: DISCONTINUED | OUTPATIENT
Start: 2021-02-18 | End: 2021-02-24 | Stop reason: HOSPADM

## 2021-02-18 RX ORDER — POLYETHYLENE GLYCOL 3350 17 G/17G
17 POWDER, FOR SOLUTION ORAL DAILY PRN
Status: DISCONTINUED | OUTPATIENT
Start: 2021-02-18 | End: 2021-02-24 | Stop reason: HOSPADM

## 2021-02-18 RX ORDER — POTASSIUM CHLORIDE 20 MEQ/1
40 TABLET, EXTENDED RELEASE ORAL PRN
Status: DISCONTINUED | OUTPATIENT
Start: 2021-02-18 | End: 2021-02-24 | Stop reason: HOSPADM

## 2021-02-18 RX ORDER — LIDOCAINE 40 MG/G
CREAM TOPICAL ONCE
Status: DISCONTINUED | OUTPATIENT
Start: 2021-02-18 | End: 2021-02-19 | Stop reason: HOSPADM

## 2021-02-18 RX ORDER — SODIUM CHLORIDE 0.9 % (FLUSH) 0.9 %
10 SYRINGE (ML) INJECTION PRN
Status: DISCONTINUED | OUTPATIENT
Start: 2021-02-18 | End: 2021-02-24 | Stop reason: HOSPADM

## 2021-02-18 RX ORDER — POTASSIUM CHLORIDE 7.45 MG/ML
10 INJECTION INTRAVENOUS PRN
Status: DISCONTINUED | OUTPATIENT
Start: 2021-02-18 | End: 2021-02-24 | Stop reason: HOSPADM

## 2021-02-18 RX ORDER — MAGNESIUM SULFATE IN WATER 40 MG/ML
2 INJECTION, SOLUTION INTRAVENOUS PRN
Status: DISCONTINUED | OUTPATIENT
Start: 2021-02-18 | End: 2021-02-24 | Stop reason: HOSPADM

## 2021-02-18 RX ORDER — FERROUS SULFATE 325(65) MG
325 TABLET ORAL
Status: DISCONTINUED | OUTPATIENT
Start: 2021-02-19 | End: 2021-02-24 | Stop reason: HOSPADM

## 2021-02-18 RX ORDER — DEXTROSE MONOHYDRATE 25 G/50ML
12.5 INJECTION, SOLUTION INTRAVENOUS PRN
Status: DISCONTINUED | OUTPATIENT
Start: 2021-02-18 | End: 2021-02-24 | Stop reason: HOSPADM

## 2021-02-18 RX ORDER — SODIUM CHLORIDE 9 MG/ML
INJECTION, SOLUTION INTRAVENOUS CONTINUOUS
Status: DISCONTINUED | OUTPATIENT
Start: 2021-02-18 | End: 2021-02-21

## 2021-02-18 RX ORDER — GLIPIZIDE 5 MG/1
10 TABLET ORAL
Status: DISCONTINUED | OUTPATIENT
Start: 2021-02-18 | End: 2021-02-19

## 2021-02-18 RX ORDER — FERROUS SULFATE 325(65) MG
325 TABLET ORAL
COMMUNITY
End: 2022-09-29 | Stop reason: CLARIF

## 2021-02-18 RX ORDER — ACETAMINOPHEN 325 MG/1
650 TABLET ORAL EVERY 6 HOURS PRN
Status: DISCONTINUED | OUTPATIENT
Start: 2021-02-18 | End: 2021-02-24 | Stop reason: HOSPADM

## 2021-02-18 RX ORDER — SODIUM CHLORIDE 9 MG/ML
INJECTION, SOLUTION INTRAVENOUS
Status: COMPLETED
Start: 2021-02-18 | End: 2021-02-18

## 2021-02-18 RX ORDER — PROMETHAZINE HYDROCHLORIDE 25 MG/1
12.5 TABLET ORAL EVERY 6 HOURS PRN
Status: DISCONTINUED | OUTPATIENT
Start: 2021-02-18 | End: 2021-02-24 | Stop reason: HOSPADM

## 2021-02-18 RX ORDER — NICOTINE POLACRILEX 4 MG
15 LOZENGE BUCCAL PRN
Status: DISCONTINUED | OUTPATIENT
Start: 2021-02-18 | End: 2021-02-24 | Stop reason: HOSPADM

## 2021-02-18 RX ADMIN — GLIPIZIDE 10 MG: 5 TABLET ORAL at 17:05

## 2021-02-18 RX ADMIN — SODIUM CHLORIDE: 9 INJECTION, SOLUTION INTRAVENOUS at 21:59

## 2021-02-18 RX ADMIN — SODIUM CHLORIDE 250 ML: 9 INJECTION, SOLUTION INTRAVENOUS at 14:07

## 2021-02-18 RX ADMIN — METFORMIN HYDROCHLORIDE 1000 MG: 500 TABLET ORAL at 17:05

## 2021-02-18 RX ADMIN — PIPERACILLIN SODIUM AND TAZOBACTAM SODIUM 3375 MG: 3; .375 INJECTION, POWDER, LYOPHILIZED, FOR SOLUTION INTRAVENOUS at 14:07

## 2021-02-18 RX ADMIN — Medication 1500 MG: at 17:05

## 2021-02-18 RX ADMIN — PIPERACILLIN SODIUM AND TAZOBACTAM SODIUM 3375 MG: 3; .375 INJECTION, POWDER, LYOPHILIZED, FOR SOLUTION INTRAVENOUS at 21:59

## 2021-02-18 RX ADMIN — SODIUM CHLORIDE: 9 INJECTION, SOLUTION INTRAVENOUS at 14:07

## 2021-02-18 ASSESSMENT — PAIN SCALES - GENERAL: PAINLEVEL_OUTOF10: 0

## 2021-02-18 NOTE — PLAN OF CARE
Patient and son here today for new returning wound care visit. Patient is a reported diabetic. At the beginning of this month, his daughter was washing his feet and noticed a right 4th toe wound with odor present. Also plantar surface wound present. Patient will have Betadine to toe wound, Gentamicin to foot wound, he will be directly admitted to [facility] today for surgery per Dr. Kenadll Book tomorrow. F/u x 1 week, lunch provided during wait for bed. MD orders/D/C instructions reviewed with patient, all questions answered; copy of instructions given to patient  Patient gives his verbal permission for our wound care staff to discuss his PMI with his son, Marbella Zuniga.
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
## 2021-02-18 NOTE — PROGRESS NOTES
Call to [phone], spoke with nurse Lupe. Requested a 2west bed for diabetic foot wound of right plantar foot and right 4th toe.   1140am:  2nd call to call center, spoke with nurse Elidia. Dr. Beto Jansen has spoken to hospitalist.  She will phone Thayne to check on the status of this request.  She phones back, patient to go to room 234-1. Call to [facility], spoke with nurse Neida Jesus. Report given to her. Transport informed of need for transfer.

## 2021-02-18 NOTE — PROGRESS NOTES
Called 2 times and left a voice message for the consult  For this patient at [phone] for dr Gautam Hippo

## 2021-02-18 NOTE — PROGRESS NOTES
Consult has been called to Dr. Elis Coronado  on 2/18/21. Spoke with lvm on  [employer] phone .  2:34 PM    Damien Galvan  2/18/2021

## 2021-02-19 ENCOUNTER — ANESTHESIA EVENT (OUTPATIENT)
Dept: OPERATING ROOM | Age: 86
DRG: 616 | End: 2021-02-19
Payer: MEDICARE

## 2021-02-19 ENCOUNTER — ANESTHESIA (OUTPATIENT)
Dept: OPERATING ROOM | Age: 86
DRG: 616 | End: 2021-02-19
Payer: MEDICARE

## 2021-02-19 VITALS
DIASTOLIC BLOOD PRESSURE: 70 MMHG | SYSTOLIC BLOOD PRESSURE: 148 MMHG | OXYGEN SATURATION: 100 % | RESPIRATION RATE: 22 BRPM

## 2021-02-19 LAB
A/G RATIO: 0.8 (ref 1.1–2.2)
ALBUMIN SERPL-MCNC: 3 G/DL (ref 3.4–5)
ALP BLD-CCNC: 90 U/L (ref 40–129)
ALT SERPL-CCNC: 14 U/L (ref 10–40)
ANION GAP SERPL CALCULATED.3IONS-SCNC: 9 MMOL/L (ref 3–16)
AST SERPL-CCNC: 17 U/L (ref 15–37)
BASOPHILS ABSOLUTE: 0 K/UL (ref 0–0.2)
BASOPHILS RELATIVE PERCENT: 0 %
BILIRUB SERPL-MCNC: 0.3 MG/DL (ref 0–1)
BUN BLDV-MCNC: 16 MG/DL (ref 7–20)
CALCIUM SERPL-MCNC: 8.6 MG/DL (ref 8.3–10.6)
CHLORIDE BLD-SCNC: 105 MMOL/L (ref 99–110)
CO2: 25 MMOL/L (ref 21–32)
CREAT SERPL-MCNC: 0.9 MG/DL (ref 0.8–1.3)
EOSINOPHILS ABSOLUTE: 0.4 K/UL (ref 0–0.6)
EOSINOPHILS RELATIVE PERCENT: 3 %
ESTIMATED AVERAGE GLUCOSE: 188.6 MG/DL
GFR AFRICAN AMERICAN: >60
GFR NON-AFRICAN AMERICAN: >60
GLOBULIN: 3.8 G/DL
GLUCOSE BLD-MCNC: 112 MG/DL (ref 70–99)
GLUCOSE BLD-MCNC: 172 MG/DL (ref 70–99)
GLUCOSE BLD-MCNC: 83 MG/DL (ref 70–99)
GLUCOSE BLD-MCNC: 84 MG/DL (ref 70–99)
GLUCOSE BLD-MCNC: 90 MG/DL (ref 70–99)
GLUCOSE BLD-MCNC: 96 MG/DL (ref 70–99)
GLUCOSE BLD-MCNC: 96 MG/DL (ref 70–99)
HBA1C MFR BLD: 8.2 %
HCT VFR BLD CALC: 27.8 % (ref 40.5–52.5)
HEMATOLOGY PATH CONSULT: NO
HEMATOLOGY PATH CONSULT: NORMAL
HEMOGLOBIN: 9.1 G/DL (ref 13.5–17.5)
LACTIC ACID: 0.6 MMOL/L (ref 0.4–2)
LYMPHOCYTES ABSOLUTE: 4.6 K/UL (ref 1–5.1)
LYMPHOCYTES RELATIVE PERCENT: 36 %
MCH RBC QN AUTO: 31.7 PG (ref 26–34)
MCHC RBC AUTO-ENTMCNC: 32.9 G/DL (ref 31–36)
MCV RBC AUTO: 96.4 FL (ref 80–100)
MONOCYTES ABSOLUTE: 0.3 K/UL (ref 0–1.3)
MONOCYTES RELATIVE PERCENT: 2 %
NEUTROPHILS ABSOLUTE: 7.6 K/UL (ref 1.7–7.7)
NEUTROPHILS RELATIVE PERCENT: 59 %
PDW BLD-RTO: 13.1 % (ref 12.4–15.4)
PERFORMED ON: ABNORMAL
PERFORMED ON: ABNORMAL
PERFORMED ON: NORMAL
PLATELET # BLD: 240 K/UL (ref 135–450)
PLATELET SLIDE REVIEW: ADEQUATE
PMV BLD AUTO: 8.2 FL (ref 5–10.5)
POTASSIUM REFLEX MAGNESIUM: 3.9 MMOL/L (ref 3.5–5.1)
RBC # BLD: 2.88 M/UL (ref 4.2–5.9)
SLIDE REVIEW: ABNORMAL
SODIUM BLD-SCNC: 139 MMOL/L (ref 136–145)
TOTAL PROTEIN: 6.8 G/DL (ref 6.4–8.2)
WBC # BLD: 12.8 K/UL (ref 4–11)

## 2021-02-19 PROCEDURE — 87077 CULTURE AEROBIC IDENTIFY: CPT

## 2021-02-19 PROCEDURE — 6360000002 HC RX W HCPCS: Performed by: NURSE ANESTHETIST, CERTIFIED REGISTERED

## 2021-02-19 PROCEDURE — 2580000003 HC RX 258: Performed by: INTERNAL MEDICINE

## 2021-02-19 PROCEDURE — 88311 DECALCIFY TISSUE: CPT

## 2021-02-19 PROCEDURE — 87186 SC STD MICRODIL/AGAR DIL: CPT

## 2021-02-19 PROCEDURE — 7100000011 HC PHASE II RECOVERY - ADDTL 15 MIN: Performed by: PODIATRIST

## 2021-02-19 PROCEDURE — 87116 MYCOBACTERIA CULTURE: CPT

## 2021-02-19 PROCEDURE — 87206 SMEAR FLUORESCENT/ACID STAI: CPT

## 2021-02-19 PROCEDURE — 80053 COMPREHEN METABOLIC PANEL: CPT

## 2021-02-19 PROCEDURE — 6370000000 HC RX 637 (ALT 250 FOR IP): Performed by: INTERNAL MEDICINE

## 2021-02-19 PROCEDURE — 2500000003 HC RX 250 WO HCPCS: Performed by: PODIATRIST

## 2021-02-19 PROCEDURE — 83605 ASSAY OF LACTIC ACID: CPT

## 2021-02-19 PROCEDURE — 87070 CULTURE OTHR SPECIMN AEROBIC: CPT

## 2021-02-19 PROCEDURE — 87015 SPECIMEN INFECT AGNT CONCNTJ: CPT

## 2021-02-19 PROCEDURE — 36415 COLL VENOUS BLD VENIPUNCTURE: CPT

## 2021-02-19 PROCEDURE — 87075 CULTR BACTERIA EXCEPT BLOOD: CPT

## 2021-02-19 PROCEDURE — 7100000010 HC PHASE II RECOVERY - FIRST 15 MIN: Performed by: PODIATRIST

## 2021-02-19 PROCEDURE — 3600000002 HC SURGERY LEVEL 2 BASE: Performed by: PODIATRIST

## 2021-02-19 PROCEDURE — 6360000002 HC RX W HCPCS: Performed by: INTERNAL MEDICINE

## 2021-02-19 PROCEDURE — 2580000003 HC RX 258: Performed by: NURSE ANESTHETIST, CERTIFIED REGISTERED

## 2021-02-19 PROCEDURE — 3700000001 HC ADD 15 MINUTES (ANESTHESIA): Performed by: PODIATRIST

## 2021-02-19 PROCEDURE — 87102 FUNGUS ISOLATION CULTURE: CPT

## 2021-02-19 PROCEDURE — 99232 SBSQ HOSP IP/OBS MODERATE 35: CPT | Performed by: INTERNAL MEDICINE

## 2021-02-19 PROCEDURE — 87205 SMEAR GRAM STAIN: CPT

## 2021-02-19 PROCEDURE — 85025 COMPLETE CBC W/AUTO DIFF WBC: CPT

## 2021-02-19 PROCEDURE — 2060000000 HC ICU INTERMEDIATE R&B

## 2021-02-19 PROCEDURE — 3600000012 HC SURGERY LEVEL 2 ADDTL 15MIN: Performed by: PODIATRIST

## 2021-02-19 PROCEDURE — 3700000000 HC ANESTHESIA ATTENDED CARE: Performed by: PODIATRIST

## 2021-02-19 PROCEDURE — 88305 TISSUE EXAM BY PATHOLOGIST: CPT

## 2021-02-19 PROCEDURE — 87040 BLOOD CULTURE FOR BACTERIA: CPT

## 2021-02-19 PROCEDURE — 2709999900 HC NON-CHARGEABLE SUPPLY: Performed by: PODIATRIST

## 2021-02-19 PROCEDURE — 2580000003 HC RX 258: Performed by: PODIATRIST

## 2021-02-19 PROCEDURE — 0Y6V0Z1 DETACHMENT AT RIGHT 4TH TOE, HIGH, OPEN APPROACH: ICD-10-PCS | Performed by: PODIATRIST

## 2021-02-19 RX ORDER — HYDRALAZINE HYDROCHLORIDE 20 MG/ML
5 INJECTION INTRAMUSCULAR; INTRAVENOUS EVERY 10 MIN PRN
Status: DISCONTINUED | OUTPATIENT
Start: 2021-02-19 | End: 2021-02-19

## 2021-02-19 RX ORDER — FENTANYL CITRATE 50 UG/ML
INJECTION, SOLUTION INTRAMUSCULAR; INTRAVENOUS PRN
Status: DISCONTINUED | OUTPATIENT
Start: 2021-02-19 | End: 2021-02-19 | Stop reason: SDUPTHER

## 2021-02-19 RX ORDER — DIPHENHYDRAMINE HYDROCHLORIDE 50 MG/ML
12.5 INJECTION INTRAMUSCULAR; INTRAVENOUS
Status: DISCONTINUED | OUTPATIENT
Start: 2021-02-19 | End: 2021-02-19

## 2021-02-19 RX ORDER — ONDANSETRON 2 MG/ML
4 INJECTION INTRAMUSCULAR; INTRAVENOUS PRN
Status: DISCONTINUED | OUTPATIENT
Start: 2021-02-19 | End: 2021-02-19

## 2021-02-19 RX ORDER — MORPHINE SULFATE 10 MG/ML
2 INJECTION, SOLUTION INTRAMUSCULAR; INTRAVENOUS EVERY 5 MIN PRN
Status: DISCONTINUED | OUTPATIENT
Start: 2021-02-19 | End: 2021-02-19

## 2021-02-19 RX ORDER — MEPERIDINE HYDROCHLORIDE 25 MG/ML
12.5 INJECTION INTRAMUSCULAR; INTRAVENOUS; SUBCUTANEOUS EVERY 5 MIN PRN
Status: DISCONTINUED | OUTPATIENT
Start: 2021-02-19 | End: 2021-02-19

## 2021-02-19 RX ORDER — PROMETHAZINE HYDROCHLORIDE 25 MG/ML
6.25 INJECTION, SOLUTION INTRAMUSCULAR; INTRAVENOUS
Status: DISCONTINUED | OUTPATIENT
Start: 2021-02-19 | End: 2021-02-19

## 2021-02-19 RX ORDER — OXYCODONE HYDROCHLORIDE AND ACETAMINOPHEN 5; 325 MG/1; MG/1
2 TABLET ORAL PRN
Status: DISCONTINUED | OUTPATIENT
Start: 2021-02-19 | End: 2021-02-19

## 2021-02-19 RX ORDER — MORPHINE SULFATE 10 MG/ML
1 INJECTION, SOLUTION INTRAMUSCULAR; INTRAVENOUS EVERY 5 MIN PRN
Status: DISCONTINUED | OUTPATIENT
Start: 2021-02-19 | End: 2021-02-19

## 2021-02-19 RX ORDER — MAGNESIUM HYDROXIDE 1200 MG/15ML
LIQUID ORAL CONTINUOUS PRN
Status: COMPLETED | OUTPATIENT
Start: 2021-02-19 | End: 2021-02-19

## 2021-02-19 RX ORDER — PROPOFOL 10 MG/ML
INJECTION, EMULSION INTRAVENOUS PRN
Status: DISCONTINUED | OUTPATIENT
Start: 2021-02-19 | End: 2021-02-19 | Stop reason: SDUPTHER

## 2021-02-19 RX ORDER — LABETALOL HYDROCHLORIDE 5 MG/ML
5 INJECTION, SOLUTION INTRAVENOUS EVERY 10 MIN PRN
Status: DISCONTINUED | OUTPATIENT
Start: 2021-02-19 | End: 2021-02-19

## 2021-02-19 RX ORDER — OXYCODONE HYDROCHLORIDE AND ACETAMINOPHEN 5; 325 MG/1; MG/1
1 TABLET ORAL PRN
Status: DISCONTINUED | OUTPATIENT
Start: 2021-02-19 | End: 2021-02-19

## 2021-02-19 RX ORDER — SODIUM CHLORIDE, SODIUM LACTATE, POTASSIUM CHLORIDE, CALCIUM CHLORIDE 600; 310; 30; 20 MG/100ML; MG/100ML; MG/100ML; MG/100ML
INJECTION, SOLUTION INTRAVENOUS CONTINUOUS PRN
Status: DISCONTINUED | OUTPATIENT
Start: 2021-02-19 | End: 2021-02-19 | Stop reason: SDUPTHER

## 2021-02-19 RX ADMIN — PIPERACILLIN SODIUM AND TAZOBACTAM SODIUM 3375 MG: 3; .375 INJECTION, POWDER, LYOPHILIZED, FOR SOLUTION INTRAVENOUS at 05:42

## 2021-02-19 RX ADMIN — SODIUM CHLORIDE: 9 INJECTION, SOLUTION INTRAVENOUS at 21:06

## 2021-02-19 RX ADMIN — SODIUM CHLORIDE, POTASSIUM CHLORIDE, SODIUM LACTATE AND CALCIUM CHLORIDE: 600; 310; 30; 20 INJECTION, SOLUTION INTRAVENOUS at 13:50

## 2021-02-19 RX ADMIN — PIPERACILLIN SODIUM AND TAZOBACTAM SODIUM 3375 MG: 3; .375 INJECTION, POWDER, LYOPHILIZED, FOR SOLUTION INTRAVENOUS at 12:52

## 2021-02-19 RX ADMIN — Medication 1500 MG: at 16:26

## 2021-02-19 RX ADMIN — PROPOFOL 40 MG: 10 INJECTION, EMULSION INTRAVENOUS at 13:54

## 2021-02-19 RX ADMIN — ACETAMINOPHEN 650 MG: 325 TABLET ORAL at 19:34

## 2021-02-19 RX ADMIN — FENTANYL CITRATE 25 MCG: 50 INJECTION INTRAMUSCULAR; INTRAVENOUS at 13:54

## 2021-02-19 RX ADMIN — PIPERACILLIN SODIUM AND TAZOBACTAM SODIUM 3375 MG: 3; .375 INJECTION, POWDER, LYOPHILIZED, FOR SOLUTION INTRAVENOUS at 21:00

## 2021-02-19 ASSESSMENT — PULMONARY FUNCTION TESTS
PIF_VALUE: 0

## 2021-02-19 ASSESSMENT — PAIN SCALES - GENERAL
PAINLEVEL_OUTOF10: 0

## 2021-02-19 NOTE — PROGRESS NOTES
Patient scored high on the kelly fall risk scale. Interventions taken; verified bed alarm is activated, yellow socks placed on patient, and stay with me sign posted outside patients room.

## 2021-02-19 NOTE — FLOWSHEET NOTE
Zosyn infusing.        02/19/21 0545   Vital Signs   Temp 97.7 °F (36.5 °C)   Temp Source Oral   Pulse 75   Heart Rate Source Monitor   Resp 17   BP (!) 107/52   BP Location Left upper arm   Level of Consciousness Alert (0)   MEWS Score 1   Patient Currently in Pain Denies   Pain Assessment   Pain Assessment 0-10   Pain Level 0   Oxygen Therapy   SpO2 95 %   O2 Device None (Room air)

## 2021-02-19 NOTE — OP NOTE
Operative Note  Patient: Faye Garcia  YOB: 1935  MRN: [id number]    Date of Procedure: 2/19/2021    Pre-Op Diagnosis: DIABETIC FOOT INFECTION, wet gangrene    Post-Op Diagnosis: Same       Procedure(s):  RIGHT FOOT INCISION AND DRAINAGE WITH FOURTH TOE AMPUTATION    Surgeon(s):  Yamilex Cho DPM    Assistant:  Surgical Assistant: Glen Escobedo    Anesthesia: General    Estimated Blood Loss (mL): Minimal    Complications: Other: Moderate necrosis of the plantar foot pad sub fourth MTPJ    Specimens:   ID Type Source Tests Collected by Time Destination   1 :  Specimen Toe CULTURE, SURGICAL Yamilex Cho, YANG 2/19/2021 1412    2 :  Specimen Toe CULTURE, FUNGUS, CULTURE, SURGICAL, CULTURE WITH SMEAR, ACID FAST BACILLIUS Yamilex Cho DPM 2/19/2021 1417    A :  Specimen Toe SURGICAL PATHOLOGY Yamilex Cho, YANG 2/19/2021 1415        Implants:  * No implants in log *      Drains: * No LDAs found *    Findings: Fetid necrosis right foot subfourth MTPJ  Indication for Procedure: This is a 81year old Male here for an non-elective procedure due to a foot infection of the right foot. The anticipated procedure, expected post-operative course and all benefits, risks, and complications were explained to the patient in detail. The patient's questions were answered to their satisfaction. Informed and written consent were obtained and placed in the chart. Procedure:  Under mild sedation the patient was brought back to the OR and placed on the operating table in the supine position. Following the induction of IV anesthesia a local anesthetic block was then infiltrated proximal to and circumferentially around the planned operative area. The right extremity was then scrubbed prepped and draped. Details of procedure #1: Incision and drainage to bone with fourth toe amputation  At this time attention was directed to the right foot at the level of the fourth MTPJ.   A 4 cm linear incision was made from the distal shaft of the fourth metatarsal to the base of the fourth toe. Next a circumferential incision was made around the fourth toe just proximal to the necrotic wound. Next using a scissor the toe was amputated through the pathologic fracture site. Next attention was directed to removing the base of the proximal phalanx. Using a 15 blade and a curved Metzenbaum the base of the proximal phalanx was sharply excised at the joint capsule. The toe and bone was sent for pathology and culture. Next the site was pulse lavaged with 3000 cc of normal saline. The site was then cultured for aerobic, anaerobic, acid-fast and fungal elements. Next the site was reinspected and further necrosis was noted plantar to the fourth metatarsal head. Using curved Metzenbaums and pickups the necrotic tissue was sharply excised. The site was then copiously irrigated. Next the site was packed open using half-inch iodoform. The site was then dressed with 4 x 4's, Kerlix, Ace bandage. The patient tolerated the procedure and anesthesia well and was transported from the operating room to the PACU with vital signs stable and vascular status intact to all digits  of the patient's right lower extremity. Following a short period of post-operative monitoring, the patient will be readmitted to the hospital.  He will need further debridement early next week.   Electronically signed by Cherry Casiano DPM on 2/19/2021 at 2:41 PM
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
## 2021-02-19 NOTE — PROGRESS NOTES
Vancomycin Day: 2    Patient's labs, cultures, vitals, and vancomycin regimen reviewed. No changes today.   Trough due on 21st at [address] 2/19/202112:58 PM  .

## 2021-02-19 NOTE — ANESTHESIA POSTPROCEDURE EVALUATION
Department of Anesthesiology  Postprocedure Note    Patient: Christian Mendez  MRN: [id number]  YOB: 1935  Date of evaluation: 2/19/2021  Time:  2:52 PM     Procedure Summary     Date: 02/19/21 Room / Location: [address] [place] [address] / [facility]    Anesthesia Start: 1350 Anesthesia Stop: 1690    Procedure: RIGHT FOOT INCISION AND DRAINAGE WITH FOURTH TOE AMPUTATION (Right Foot) Diagnosis: (DIABETIC FOOT INFECTION)    Surgeons: Sarah Barreto DPM Responsible Provider: Karolyn Daniel MD    Anesthesia Type: MAC ASA Status: 3          Anesthesia Type: MAC    Opal Phase I:      Opal Phase II:      Last vitals: Reviewed and per EMR flowsheets.        Anesthesia Post Evaluation    Patient location during evaluation: PACU  Patient participation: complete - patient participated  Level of consciousness: awake and alert  Pain score: 0  Airway patency: patent  Nausea & Vomiting: no nausea and no vomiting  Complications: no  Cardiovascular status: blood pressure returned to baseline  Respiratory status: acceptable  Hydration status: stable

## 2021-02-19 NOTE — FLOWSHEET NOTE
02/19/21 0845   Vital Signs   Temp 98.4 °F (36.9 °C)   Temp Source Oral   Pulse 74   Heart Rate Source Radial   Resp 18   /68   BP Location Left upper arm   Patient Position Semi fowlers   Level of Consciousness Alert (0)   MEWS Score 1   Patient Currently in Pain Denies   Oxygen Therapy   SpO2 94 %   O2 Device None (Room air)      AM assessment completed. See flowsheet. Patient is currently NPO for surgery today. Dressing intact with old drainage to R foot. Odor noted. Patient to have surgery to R foot wound this afternoon. No c/o nausea. No c/o pain/discomfort at this time. Bed in lowest position and locked. Call light in reach.

## 2021-02-19 NOTE — BRIEF OP NOTE
Brief Postoperative Note      Patient: Malorie Jonas  YOB: 1935  MRN: [id number]    Date of Procedure: 2/19/2021    Pre-Op Diagnosis: DIABETIC FOOT INFECTION, wet gangrene    Post-Op Diagnosis: Same       Procedure(s):  RIGHT FOOT INCISION AND DRAINAGE WITH FOURTH TOE AMPUTATION    Surgeon(s):  Cooper Rangel DPM    Assistant:  Surgical Assistant: Isidoro Molina    Anesthesia: General    Estimated Blood Loss (mL): Minimal    Complications:  Other: Moderate necrosis of the plantar foot pad sub fourth MTPJ    Specimens:   ID Type Source Tests Collected by Time Destination   1 :  Specimen Toe CULTURE, SURGICAL Cooper Rangel DPM 2/19/2021 1412    2 :  Specimen Toe CULTURE, FUNGUS, CULTURE, SURGICAL, CULTURE WITH SMEAR, ACID FAST BACILLIUS Cooper Rangel DPM 2/19/2021 1417    A :  Specimen Toe SURGICAL PATHOLOGY Cooper Rangel DPM 2/19/2021 1415        Implants:  * No implants in log *      Drains: * No LDAs found *    Findings: Fetid necrosis right foot subfourth MTPJ    Electronically signed by Cooper Rangel DPM on 2/19/2021 at 2:40 PM

## 2021-02-19 NOTE — FLOWSHEET NOTE
02/19/21 1730   Vital Signs   Temp 98.6 °F (37 °C)   Temp Source Axillary   Pulse 102  (patient eating)   Heart Rate Source Monitor   Resp 18   BP (!) 170/80   BP Location Left upper arm   Patient Position Semi fowlers   Level of Consciousness Alert (0)   MEWS Score 2   Patient Currently in Pain Denies     MD notified of elevated BP.

## 2021-02-19 NOTE — PROGRESS NOTES
Pt A/Ox4. VSS. BS 98, humalog not given. Pt unlabored, respirations even & easy. No distress noted. Shift assessment complete. See flowsheet. PM meds given. See MAR. Denies needs at this time. Bed in low position. Call light within reach. Will continue to monitor.

## 2021-02-19 NOTE — PROGRESS NOTES
Patient sipping soda. Dressing to right foot intact with brisk capillary refill noted. Right foot elevated.   Waiting for transport to take patient to room

## 2021-02-19 NOTE — PROGRESS NOTES
RN asked patient if he had the flu vaccine. Patient denies and states that he does not ever get the flu vaccine and he does not want it at this time.

## 2021-02-20 LAB
A/G RATIO: 0.8 (ref 1.1–2.2)
ALBUMIN SERPL-MCNC: 3 G/DL (ref 3.4–5)
ALP BLD-CCNC: 111 U/L (ref 40–129)
ALT SERPL-CCNC: 15 U/L (ref 10–40)
ANION GAP SERPL CALCULATED.3IONS-SCNC: 10 MMOL/L (ref 3–16)
AST SERPL-CCNC: 17 U/L (ref 15–37)
BASOPHILS ABSOLUTE: 0.1 K/UL (ref 0–0.2)
BASOPHILS RELATIVE PERCENT: 1 %
BILIRUB SERPL-MCNC: 0.3 MG/DL (ref 0–1)
BUN BLDV-MCNC: 11 MG/DL (ref 7–20)
CALCIUM SERPL-MCNC: 8.4 MG/DL (ref 8.3–10.6)
CHLORIDE BLD-SCNC: 102 MMOL/L (ref 99–110)
CO2: 24 MMOL/L (ref 21–32)
CREAT SERPL-MCNC: 0.8 MG/DL (ref 0.8–1.3)
EOSINOPHILS ABSOLUTE: 0.6 K/UL (ref 0–0.6)
EOSINOPHILS RELATIVE PERCENT: 5 %
GFR AFRICAN AMERICAN: >60
GFR NON-AFRICAN AMERICAN: >60
GLOBULIN: 3.8 G/DL
GLUCOSE BLD-MCNC: 115 MG/DL (ref 70–99)
GLUCOSE BLD-MCNC: 121 MG/DL (ref 70–99)
GLUCOSE BLD-MCNC: 133 MG/DL (ref 70–99)
GLUCOSE BLD-MCNC: 155 MG/DL (ref 70–99)
GLUCOSE BLD-MCNC: 158 MG/DL (ref 70–99)
HCT VFR BLD CALC: 28.1 % (ref 40.5–52.5)
HEMATOLOGY PATH CONSULT: NO
HEMOGLOBIN: 9.3 G/DL (ref 13.5–17.5)
LYMPHOCYTES ABSOLUTE: 4.6 K/UL (ref 1–5.1)
LYMPHOCYTES RELATIVE PERCENT: 42 %
MCH RBC QN AUTO: 31.8 PG (ref 26–34)
MCHC RBC AUTO-ENTMCNC: 33.2 G/DL (ref 31–36)
MCV RBC AUTO: 95.8 FL (ref 80–100)
MONOCYTES ABSOLUTE: 0.1 K/UL (ref 0–1.3)
MONOCYTES RELATIVE PERCENT: 1 %
NEUTROPHILS ABSOLUTE: 5.6 K/UL (ref 1.7–7.7)
NEUTROPHILS RELATIVE PERCENT: 51 %
PDW BLD-RTO: 13.4 % (ref 12.4–15.4)
PERFORMED ON: ABNORMAL
PLATELET # BLD: 266 K/UL (ref 135–450)
PLATELET SLIDE REVIEW: ADEQUATE
PMV BLD AUTO: 7.4 FL (ref 5–10.5)
POTASSIUM REFLEX MAGNESIUM: 3.9 MMOL/L (ref 3.5–5.1)
RBC # BLD: 2.93 M/UL (ref 4.2–5.9)
SLIDE REVIEW: ABNORMAL
SODIUM BLD-SCNC: 136 MMOL/L (ref 136–145)
TOTAL PROTEIN: 6.8 G/DL (ref 6.4–8.2)
WBC # BLD: 11 K/UL (ref 4–11)

## 2021-02-20 PROCEDURE — 36415 COLL VENOUS BLD VENIPUNCTURE: CPT

## 2021-02-20 PROCEDURE — 97166 OT EVAL MOD COMPLEX 45 MIN: CPT

## 2021-02-20 PROCEDURE — 99232 SBSQ HOSP IP/OBS MODERATE 35: CPT | Performed by: INTERNAL MEDICINE

## 2021-02-20 PROCEDURE — 2580000003 HC RX 258: Performed by: INTERNAL MEDICINE

## 2021-02-20 PROCEDURE — 6360000002 HC RX W HCPCS: Performed by: INTERNAL MEDICINE

## 2021-02-20 PROCEDURE — 2060000000 HC ICU INTERMEDIATE R&B

## 2021-02-20 PROCEDURE — 97535 SELF CARE MNGMENT TRAINING: CPT

## 2021-02-20 PROCEDURE — 6370000000 HC RX 637 (ALT 250 FOR IP): Performed by: INTERNAL MEDICINE

## 2021-02-20 PROCEDURE — 85025 COMPLETE CBC W/AUTO DIFF WBC: CPT

## 2021-02-20 PROCEDURE — 97530 THERAPEUTIC ACTIVITIES: CPT

## 2021-02-20 PROCEDURE — 97162 PT EVAL MOD COMPLEX 30 MIN: CPT

## 2021-02-20 PROCEDURE — 6360000002 HC RX W HCPCS: Performed by: PODIATRIST

## 2021-02-20 PROCEDURE — 80053 COMPREHEN METABOLIC PANEL: CPT

## 2021-02-20 RX ORDER — GLIPIZIDE 5 MG/1
5 TABLET ORAL
Status: DISCONTINUED | OUTPATIENT
Start: 2021-02-20 | End: 2021-02-24 | Stop reason: HOSPADM

## 2021-02-20 RX ADMIN — FERROUS SULFATE TAB 325 MG (65 MG ELEMENTAL FE) 325 MG: 325 (65 FE) TAB at 08:26

## 2021-02-20 RX ADMIN — LISINOPRIL 5 MG: 5 TABLET ORAL at 08:27

## 2021-02-20 RX ADMIN — Medication 1500 MG: at 16:32

## 2021-02-20 RX ADMIN — GLIPIZIDE 5 MG: 5 TABLET ORAL at 13:20

## 2021-02-20 RX ADMIN — PIPERACILLIN SODIUM AND TAZOBACTAM SODIUM 3375 MG: 3; .375 INJECTION, POWDER, LYOPHILIZED, FOR SOLUTION INTRAVENOUS at 22:19

## 2021-02-20 RX ADMIN — SODIUM CHLORIDE: 9 INJECTION, SOLUTION INTRAVENOUS at 12:12

## 2021-02-20 RX ADMIN — ACETAMINOPHEN 650 MG: 325 TABLET ORAL at 18:31

## 2021-02-20 RX ADMIN — ENOXAPARIN SODIUM 40 MG: 40 INJECTION SUBCUTANEOUS at 12:07

## 2021-02-20 RX ADMIN — METFORMIN HYDROCHLORIDE 500 MG: 500 TABLET ORAL at 18:31

## 2021-02-20 RX ADMIN — PIPERACILLIN SODIUM AND TAZOBACTAM SODIUM 3375 MG: 3; .375 INJECTION, POWDER, LYOPHILIZED, FOR SOLUTION INTRAVENOUS at 15:00

## 2021-02-20 RX ADMIN — PIPERACILLIN SODIUM AND TAZOBACTAM SODIUM 3375 MG: 3; .375 INJECTION, POWDER, LYOPHILIZED, FOR SOLUTION INTRAVENOUS at 05:59

## 2021-02-20 ASSESSMENT — PAIN SCALES - GENERAL
PAINLEVEL_OUTOF10: 0

## 2021-02-20 NOTE — PROGRESS NOTES
Podiatric Surgery      Subjective:     Patient seen at bedside this morning. He denies any current pain. He admits he is struggling with nonweightbearing.       Objective:   Scheduled Meds:   lisinopril  5 mg Oral Daily    ferrous sulfate  325 mg Oral Daily with breakfast    insulin lispro  0-6 Units Subcutaneous TID WC    insulin lispro  0-3 Units Subcutaneous Nightly    sodium chloride flush  10 mL Intravenous 2 times per day    [Held by provider] enoxaparin  40 mg Subcutaneous Daily    piperacillin-tazobactam  3,375 mg Intravenous Q8H    vancomycin  1,500 mg Intravenous Q24H     Continuous Infusions:   dextrose      sodium chloride 75 mL/hr at 02/19/21 2106     PRN Meds:.glucose, dextrose, glucagon (rDNA), dextrose, sodium chloride flush, potassium chloride **OR** potassium alternative oral replacement **OR** potassium chloride, magnesium sulfate, promethazine **OR** ondansetron, polyethylene glycol, acetaminophen **OR** acetaminophen    CBC with Differential:    Lab Results   Component Value Date    WBC 11.0 02/20/2021    RBC 2.93 02/20/2021    HGB 9.3 02/20/2021    HCT 28.1 02/20/2021     02/20/2021    MCV 95.8 02/20/2021    MCH 31.8 02/20/2021    MCHC 33.2 02/20/2021    RDW 13.4 02/20/2021    NRBC 0 09/27/2019    NRBC 0 09/27/2019    SEGSPCT 58.0 07/28/2011    BANDSPCT 1 07/15/2016    METASPCT 1 09/27/2019    LYMPHOPCT 42.0 02/20/2021    MONOPCT 1.0 02/20/2021    EOSPCT 3.0 07/28/2011    BASOPCT 1.0 02/20/2021    MONOSABS 0.1 02/20/2021    LYMPHSABS 4.6 02/20/2021    EOSABS 0.6 02/20/2021    BASOSABS 0.1 02/20/2021    DIFFTYPE Manual 07/28/2011     BMP:    Lab Results   Component Value Date     02/20/2021    K 3.9 02/20/2021     02/20/2021    CO2 24 02/20/2021    BUN 11 02/20/2021    LABALBU 3.0 02/20/2021    CREATININE 0.8 02/20/2021    CALCIUM 8.4 02/20/2021    GFRAA >60 02/20/2021    GFRAA >60 07/28/2011    LABGLOM >60 02/20/2021    GLUCOSE 121 02/20/2021     HgBA1c:    Lab Results   Component Value Date    LABA1C 8.2 02/18/2021       VITALS:  /60   Pulse 77   Temp 97.7 °F (36.5 °C) (Axillary)   Resp 18   Wt 222 lb 8 oz (100.9 kg) Comment: Bed scale  SpO2 95%   BMI 33.83 kg/m²   24HR INTAKE/OUTPUT:    Intake/Output Summary (Last 24 hours) at 2/20/2021 1106  Last data filed at 2/20/2021 1032  Gross per 24 hour   Intake 1599 ml   Output 1250 ml   Net 349 ml       Surgical site clean with no malodor. Some demarcation of tissue on the plantar aspect of the foot just distal to the fourth metatarsal head is occurring. Assessment:     Active Problems:    Controlled type 2 diabetes mellitus with complication, without long-term current use of insulin (HCC)    Hypertension, essential    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HCC)    Diabetic ulcer of toe 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 mellitus, with fat layer exposed ([address]  Resolved Problems:    * No resolved hospital problems.  *      Plan:   -  Dressing : Daily dressing changes with iodoform packing and dry sterile dressing  -  PWB heel weightbearing with transfers into the bathroom  -  Continue antibiotics   -He will need further surgical debridement and delayed primary closure possibly Tuesday we need to await demarcation of the plantar tissues to plan for appropriate closure      Majo Vines DPM

## 2021-02-20 NOTE — FLOWSHEET NOTE
02/20/21 0830   Vital Signs   Temp 97.7 °F (36.5 °C)   Temp Source Axillary   Pulse 77   Heart Rate Source Monitor   Resp 18   /60   BP Location Left upper arm   Patient Position Semi fowlers   Level of Consciousness Alert (0)   MEWS Score 1   Patient Currently in Pain Denies   Pain Assessment   Pain Assessment 0-10   Pain Level 0   Oxygen Therapy   SpO2 95 %   O2 Device None (Room air)     AM assessment completed. See flowsheet. Medications taken without difficulty. No c/o pain or discomfort. BS active x4. Patient educated on importance of following Non-weight bearing to R foot. Patient verbalizes understanding. ACE bandage to R LE in c/d/i. RN assisted patient with urinal and he is voiding without difficulty. PT and OT in to assess patient at this time.

## 2021-02-20 NOTE — PROGRESS NOTES
IV to L forearm has infiltrated. Angiocath removed without difficulty. Skin bruised and swollen around insertion site. Ice pack offered and patient refused. Charge nurse notified and attempted x2. Clinical nurse notified and attempted x1 with 20g angiocath to R AC with success. Positive blood return and flushes easily. Patient tolerated well.

## 2021-02-20 NOTE — PROGRESS NOTES
Progress Note    Admit Date:  2/18/2021    Admitted for R foot cellulitis and osteomyelitis    S.p right 4th toe amputation       Subjective:  Mr. Gui Travis denies any acute complaints. Pain is well controlled   Family in room  Pt very [place]    Objective:   Patient Vitals for the past 4 hrs:   BP Temp Temp src Pulse Resp SpO2   02/20/21 0830 129/60 97.7 °F (36.5 °C) Axillary 77 18 95 %        Intake/Output Summary (Last 24 hours) at 2/20/2021 1153  Last data filed at 2/20/2021 1032  Gross per 24 hour   Intake 1599 ml   Output 1250 ml   Net 349 ml     Physical Exam:    General:elderly male, severely [place]    Awake, alert and oriented. Appears to be not in any distress  Mucous Membranes:  Pink , anicteric  Neck: No JVD, no carotid bruit, no thyromegaly  Chest:  Clear to auscultation bilaterally, no added sounds  Cardiovascular:  RRR S1S2 heard, +ASM  Abdomen:  Soft, undistended, non tender, no organomegaly, BS present  Extremities  - right foot in dressing, see podiatry notes  Chronic lymphedema 1 + to right LE    No edema or cyanosis.  Distal pulses well felt  Neurological : grossly normal              Scheduled Meds:   enoxaparin  40 mg Subcutaneous Daily    lisinopril  5 mg Oral Daily    ferrous sulfate  325 mg Oral Daily with breakfast    insulin lispro  0-6 Units Subcutaneous TID WC    insulin lispro  0-3 Units Subcutaneous Nightly    sodium chloride flush  10 mL Intravenous 2 times per day    piperacillin-tazobactam  3,375 mg Intravenous Q8H    vancomycin  1,500 mg Intravenous Q24H       Continuous Infusions:   dextrose      sodium chloride 75 mL/hr at 02/19/21 2106       PRN Meds:  glucose, dextrose, glucagon (rDNA), dextrose, sodium chloride flush, potassium chloride **OR** potassium alternative oral replacement **OR** potassium chloride, magnesium sulfate, promethazine **OR** ondansetron, polyethylene glycol, acetaminophen **OR** acetaminophen      Data:  CBC:   Recent Labs     02/18/21  1347 02/19/21  0541 02/20/21  0553   WBC 14.8* 12.8* 11.0   HGB 10.1* 9.1* 9.3*   HCT 30.9* 27.8* 28.1*   MCV 96.2 96.4 95.8    240 266     BMP:   Recent Labs     02/18/21  1347 02/19/21  0541 02/20/21  0553    139 136   K 4.7 3.9 3.9   CL 99 105 102   CO2 29 25 24   BUN 20 16 11   CREATININE 1.1 0.9 0.8     LIVER PROFILE:   Recent Labs     02/18/21  1347 02/19/21  0541 02/20/21  0553   AST 14* 17 17   ALT 12 14 15   BILITOT 0.3 0.3 0.3   ALKPHOS 89 90 111     CULTURES  SARS-CoV-2, NAAT Not Detected      Blood Cx: pending      RADIOLOGY  MRI FOOT RIGHT WO CONTRAST   Final Result   1. Diffuse subcutaneous edema which is most pronounced at the right 4th toe   suggesting cellulitis at the 4th digit. Correlate clinically. No organized   drainable fluid collection identified. 2. Marrow signal changes of the proximal, middle, and distal phalanges of the   4th toe most consistent with osteomyelitis with likely septic joint at the   proximal interphalangeal joint of the 4th toe and associated destructive   changes of the distal aspect of the proximal phalanx and middle phalanx of   the right 4th toe. 3. Mild edema of the 4th metatarsal head with preserved T1 signal.  Findings   may reflect reactive noninfectious osteitis, however, very early changes of   osteomyelitis difficult to entirely exclude. 4. 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 XR FOOT RIGHT (2 VIEWS)   Final Result   Moderate destructive change most likely representing osteomyelitis involving   the 4th digit with fragmentation and extensive soft tissue edema. Assessment/Plan:    Necrotic toe 4th digit right foot, wet gangrene  Diabetic foot ulcer   - admitted to med-surg.    - Podiatry consulted. - Esha Wilson#3  - MRI foot with R 4th digit with cellulitis, osteo and likely septic joint  - s/p  4th digit amputation right foot. - repeat I and D next week       DM type 2  - monitor glucose.   - resume home oral Rx   - SSI coverage.      Hypertension  - BP stable. Continue Lisinopril.      Iron deficiency anemia  - on ferrous sulfate.     Obesity  - Complicating assessment and treatment. Placing patient at risk for multiple co-morbidities as well as early death and contributing to the patient's presentation.   - Counseled on weight loss.     DVT Prophylaxis: Lovenox   Diet: DIET CARB CONTROL; Carb Control: 4 carbs/meal (approximate 1800 kcals/day)  Code Status: Full Code      Nikki Panda MD 2/20/2021 11:53 AM

## 2021-02-20 NOTE — PROGRESS NOTES
IV to RAC noted to be leaking. RN removed RAC IV without difficulty. Patient tolerated well. 20g angiocath inserted to L posterior forearm x1 attempt with success. Flushes easily. Patient tolerated well.

## 2021-02-20 NOTE — PROGRESS NOTES
Physician Progress Note      PATIENT:               Shalini Law  CSN #:                  [id number]  :                       1935  ADMIT DATE:       2021 12:41 PM  [address] DATE:  RESPONDING  PROVIDER #:        Kevin Germain MD          QUERY TEXT:    Pt admitted with DM Necrotic toe 4th digit right foot, wet gangrene. Pt noted   to have elevated leuks and CRP. If possible, please document in the progress   notes and discharge summary if you are evaluating and /or treating any of the   following: The medical record reflects the following:  Risk Factors: advanced age, DM, htn, gangrene  Clinical Indicators: wbc-14.8, CRP-191.1, P-94, He had a fever yesterday  Treatment: blood cultures, lactic, crp, vancomycin, Zosyn, podiatry   consult-plan for OR    Thank you for your assistance  Marianela Sullivan RN,BSN,CCDS,CRCR  Options provided:  -- Sepsis, present on admission  -- Sepsis, present on admission, now resolved  -- Sepsis, not present on admission  -- No Sepsis  -- Other - I will add my own diagnosis  -- Disagree - Not applicable / Not valid  -- Disagree - Clinically unable to determine / Unknown  -- Refer to Clinical Documentation Reviewer    PROVIDER RESPONSE TEXT:    This patient has sepsis that was not present on admission.     Query created by: Soni Porter on 2021 11:23 AM      Electronically signed by:  Kevin Germain MD 2021 11:52 AM
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
## 2021-02-20 NOTE — PROGRESS NOTES
PM assessment completed, see flow sheet. Pt is alert and oriented. Vital signs are stable. At beginning of shift patient required tylenol for fever of 101.6, tylenol did resolve the fever. Respirations are even & easy. No complaints voiced. Surgical dressing to right foot is clean, dry and intact. SR up x 2, and bed in low position. Call light is within reach.

## 2021-02-20 NOTE — PROGRESS NOTES
Patient rested throughout the night. Patient has stated that he wants to get out of his bed and apply weight to his foot, patient was educated that it would be best to wait until PT was able to work with him and the  Had orders for him to be NWB to his right foot. Call light within reach, bed in lowest position.

## 2021-02-20 NOTE — PLAN OF CARE
Problem: Falls - Risk of:  Goal: Will remain free from falls  Description: Will remain free from falls  2/20/2021 0024 by Willie Guzmán RN  Outcome: Ongoing  2/19/2021 1114 by Major Finnegan RN  Outcome: Ongoing  Goal: Absence of physical injury  Description: Absence of physical injury  2/20/2021 0024 by Willie Guzmán RN  Outcome: Ongoing  2/19/2021 1114 by Major Finnegan RN  Outcome: Ongoing     Problem: Skin Integrity:  Goal: Will show no infection signs and symptoms  Description: Will show no infection signs and symptoms  Outcome: Ongoing  Goal: Absence of new skin breakdown  Description: Absence of new skin breakdown  Outcome: Ongoing

## 2021-02-20 NOTE — PROGRESS NOTES
Vancomycin Day: 3    Patient's labs, cultures, vitals, and vancomycin regimen reviewed. No changes today. Nikki Dunbar, Pharm. D. 2/20/2021 6:43 AM

## 2021-02-21 ENCOUNTER — APPOINTMENT (OUTPATIENT)
Dept: CT IMAGING | Age: 86
DRG: 616 | End: 2021-02-21
Attending: INTERNAL MEDICINE
Payer: MEDICARE

## 2021-02-21 LAB
A/G RATIO: 0.9 (ref 1.1–2.2)
ALBUMIN SERPL-MCNC: 3.1 G/DL (ref 3.4–5)
ALP BLD-CCNC: 130 U/L (ref 40–129)
ALT SERPL-CCNC: 17 U/L (ref 10–40)
ANION GAP SERPL CALCULATED.3IONS-SCNC: 7 MMOL/L (ref 3–16)
ANISOCYTOSIS: ABNORMAL
AST SERPL-CCNC: 18 U/L (ref 15–37)
BASOPHILS ABSOLUTE: 0.1 K/UL (ref 0–0.2)
BASOPHILS RELATIVE PERCENT: 1 %
BILIRUB SERPL-MCNC: <0.2 MG/DL (ref 0–1)
BUN BLDV-MCNC: 10 MG/DL (ref 7–20)
CALCIUM SERPL-MCNC: 8.7 MG/DL (ref 8.3–10.6)
CHLORIDE BLD-SCNC: 105 MMOL/L (ref 99–110)
CO2: 27 MMOL/L (ref 21–32)
CREAT SERPL-MCNC: 0.8 MG/DL (ref 0.8–1.3)
EOSINOPHILS ABSOLUTE: 0.4 K/UL (ref 0–0.6)
EOSINOPHILS RELATIVE PERCENT: 4 %
GFR AFRICAN AMERICAN: >60
GFR NON-AFRICAN AMERICAN: >60
GLOBULIN: 3.6 G/DL
GLUCOSE BLD-MCNC: 104 MG/DL (ref 70–99)
GLUCOSE BLD-MCNC: 138 MG/DL (ref 70–99)
GLUCOSE BLD-MCNC: 166 MG/DL (ref 70–99)
GLUCOSE BLD-MCNC: 93 MG/DL (ref 70–99)
GLUCOSE BLD-MCNC: 98 MG/DL (ref 70–99)
GLUCOSE BLD-MCNC: 99 MG/DL (ref 70–99)
GRAM STAIN RESULT: ABNORMAL
HCT VFR BLD CALC: 29.5 % (ref 40.5–52.5)
HEMATOLOGY PATH CONSULT: NO
HEMOGLOBIN: 9.9 G/DL (ref 13.5–17.5)
LYMPHOCYTES ABSOLUTE: 2.6 K/UL (ref 1–5.1)
LYMPHOCYTES RELATIVE PERCENT: 29 %
MCH RBC QN AUTO: 32.2 PG (ref 26–34)
MCHC RBC AUTO-ENTMCNC: 33.7 G/DL (ref 31–36)
MCV RBC AUTO: 95.4 FL (ref 80–100)
MONOCYTES ABSOLUTE: 0.5 K/UL (ref 0–1.3)
MONOCYTES RELATIVE PERCENT: 5 %
NEUTROPHILS ABSOLUTE: 5.6 K/UL (ref 1.7–7.7)
NEUTROPHILS RELATIVE PERCENT: 61 %
ORGANISM: ABNORMAL
PDW BLD-RTO: 13.3 % (ref 12.4–15.4)
PERFORMED ON: ABNORMAL
PERFORMED ON: ABNORMAL
PERFORMED ON: NORMAL
PLATELET # BLD: 291 K/UL (ref 135–450)
PLATELET SLIDE REVIEW: ADEQUATE
PMV BLD AUTO: 7.7 FL (ref 5–10.5)
POTASSIUM REFLEX MAGNESIUM: 4 MMOL/L (ref 3.5–5.1)
RBC # BLD: 3.09 M/UL (ref 4.2–5.9)
SLIDE REVIEW: ABNORMAL
SODIUM BLD-SCNC: 139 MMOL/L (ref 136–145)
TOTAL PROTEIN: 6.7 G/DL (ref 6.4–8.2)
VANCOMYCIN TROUGH: 7.8 UG/ML (ref 10–20)
WBC # BLD: 9.1 K/UL (ref 4–11)
WOUND/ABSCESS: ABNORMAL
WOUND/ABSCESS: ABNORMAL

## 2021-02-21 PROCEDURE — 2580000003 HC RX 258: Performed by: INTERNAL MEDICINE

## 2021-02-21 PROCEDURE — 6360000002 HC RX W HCPCS: Performed by: INTERNAL MEDICINE

## 2021-02-21 PROCEDURE — 85025 COMPLETE CBC W/AUTO DIFF WBC: CPT

## 2021-02-21 PROCEDURE — 6370000000 HC RX 637 (ALT 250 FOR IP): Performed by: INTERNAL MEDICINE

## 2021-02-21 PROCEDURE — 36415 COLL VENOUS BLD VENIPUNCTURE: CPT

## 2021-02-21 PROCEDURE — 6360000002 HC RX W HCPCS: Performed by: PODIATRIST

## 2021-02-21 PROCEDURE — 80202 ASSAY OF VANCOMYCIN: CPT

## 2021-02-21 PROCEDURE — 1200000000 HC SEMI PRIVATE

## 2021-02-21 PROCEDURE — 70450 CT HEAD/BRAIN W/O DYE: CPT

## 2021-02-21 PROCEDURE — 97116 GAIT TRAINING THERAPY: CPT

## 2021-02-21 PROCEDURE — 99232 SBSQ HOSP IP/OBS MODERATE 35: CPT | Performed by: INTERNAL MEDICINE

## 2021-02-21 PROCEDURE — 97530 THERAPEUTIC ACTIVITIES: CPT

## 2021-02-21 PROCEDURE — 97535 SELF CARE MNGMENT TRAINING: CPT

## 2021-02-21 PROCEDURE — 80053 COMPREHEN METABOLIC PANEL: CPT

## 2021-02-21 RX ORDER — LIDOCAINE 4 G/G
1 PATCH TOPICAL DAILY
Status: DISCONTINUED | OUTPATIENT
Start: 2021-02-21 | End: 2021-02-24 | Stop reason: HOSPADM

## 2021-02-21 RX ORDER — SODIUM CHLORIDE 9 MG/ML
INJECTION, SOLUTION INTRAVENOUS
Status: DISPENSED
Start: 2021-02-21 | End: 2021-02-22

## 2021-02-21 RX ADMIN — PIPERACILLIN SODIUM AND TAZOBACTAM SODIUM 3375 MG: 3; .375 INJECTION, POWDER, LYOPHILIZED, FOR SOLUTION INTRAVENOUS at 06:12

## 2021-02-21 RX ADMIN — Medication 10 ML: at 23:59

## 2021-02-21 RX ADMIN — VANCOMYCIN HYDROCHLORIDE 1750 MG: 10 INJECTION, POWDER, LYOPHILIZED, FOR SOLUTION INTRAVENOUS at 17:58

## 2021-02-21 RX ADMIN — PIPERACILLIN SODIUM AND TAZOBACTAM SODIUM 3375 MG: 3; .375 INJECTION, POWDER, LYOPHILIZED, FOR SOLUTION INTRAVENOUS at 13:18

## 2021-02-21 RX ADMIN — METFORMIN HYDROCHLORIDE 500 MG: 500 TABLET ORAL at 08:35

## 2021-02-21 RX ADMIN — GLIPIZIDE 5 MG: 5 TABLET ORAL at 06:24

## 2021-02-21 RX ADMIN — ENOXAPARIN SODIUM 40 MG: 40 INJECTION SUBCUTANEOUS at 08:35

## 2021-02-21 RX ADMIN — PIPERACILLIN SODIUM AND TAZOBACTAM SODIUM 3375 MG: 3; .375 INJECTION, POWDER, LYOPHILIZED, FOR SOLUTION INTRAVENOUS at 23:59

## 2021-02-21 RX ADMIN — FERROUS SULFATE TAB 325 MG (65 MG ELEMENTAL FE) 325 MG: 325 (65 FE) TAB at 08:35

## 2021-02-21 RX ADMIN — POLYETHYLENE GLYCOL (3350) 17 G: 17 POWDER, FOR SOLUTION ORAL at 08:36

## 2021-02-21 RX ADMIN — METFORMIN HYDROCHLORIDE 500 MG: 500 TABLET ORAL at 17:14

## 2021-02-21 RX ADMIN — LISINOPRIL 5 MG: 5 TABLET ORAL at 08:35

## 2021-02-21 ASSESSMENT — PAIN SCALES - GENERAL
PAINLEVEL_OUTOF10: 0
PAINLEVEL_OUTOF10: 0

## 2021-02-21 NOTE — PROGRESS NOTES
Podiatric Surgery      Subjective:     Patient seen at bedside this afternoon. He denies any current pain. He admits he is struggling with nonweightbearing. He had a fall last night. Negative CT head.        Objective:   Scheduled Meds:   lidocaine  1 patch Transdermal Daily    enoxaparin  40 mg Subcutaneous Daily    metFORMIN  500 mg Oral BID WC    glipiZIDE  5 mg Oral QAM AC    lisinopril  5 mg Oral Daily    ferrous sulfate  325 mg Oral Daily with breakfast    insulin lispro  0-6 Units Subcutaneous TID WC    insulin lispro  0-3 Units Subcutaneous Nightly    sodium chloride flush  10 mL Intravenous 2 times per day    piperacillin-tazobactam  3,375 mg Intravenous Q8H    vancomycin  1,500 mg Intravenous Q24H     Continuous Infusions:   sodium chloride      dextrose       PRN Meds:.glucose, dextrose, glucagon (rDNA), dextrose, sodium chloride flush, potassium chloride **OR** potassium alternative oral replacement **OR** potassium chloride, magnesium sulfate, promethazine **OR** ondansetron, polyethylene glycol, acetaminophen **OR** acetaminophen    CBC with Differential:    Lab Results   Component Value Date    WBC 9.1 02/21/2021    RBC 3.09 02/21/2021    HGB 9.9 02/21/2021    HCT 29.5 02/21/2021     02/21/2021    MCV 95.4 02/21/2021    MCH 32.2 02/21/2021    MCHC 33.7 02/21/2021    RDW 13.3 02/21/2021    NRBC 0 09/27/2019    NRBC 0 09/27/2019    SEGSPCT 58.0 07/28/2011    BANDSPCT 1 07/15/2016    METASPCT 1 09/27/2019    LYMPHOPCT 29.0 02/21/2021    MONOPCT 5.0 02/21/2021    EOSPCT 3.0 07/28/2011    BASOPCT 1.0 02/21/2021    MONOSABS 0.5 02/21/2021    LYMPHSABS 2.6 02/21/2021    EOSABS 0.4 02/21/2021    BASOSABS 0.1 02/21/2021    DIFFTYPE Manual 07/28/2011     BMP:    Lab Results   Component Value Date     02/21/2021    K 4.0 02/21/2021     02/21/2021    CO2 27 02/21/2021    BUN 10 02/21/2021    LABALBU 3.1 02/21/2021    CREATININE 0.8 02/21/2021    CALCIUM 8.7 02/21/2021    GFRAA >60 02/21/2021    GFRAA >60 07/28/2011    LABGLOM >60 02/21/2021    GLUCOSE 104 02/21/2021     HgBA1c:    Lab Results   Component Value Date    LABA1C 8.2 02/18/2021       VITALS:  BP (!) 140/72   Pulse 72   Temp 97.2 °F (36.2 °C) (Oral)   Resp 18   Wt 223 lb 12.8 oz (101.5 kg)   SpO2 95%   BMI 34.03 kg/m²   24HR INTAKE/OUTPUT:      Intake/Output Summary (Last 24 hours) at 2/21/2021 1354  Last data filed at 2/21/2021 1150  Gross per 24 hour   Intake 3505 ml   Output 1000 ml   Net 2505 ml       Surgical site clean with no malodor. Some demarcation of tissue on the plantar aspect of the foot just distal to the fourth metatarsal head is occurring. Assessment:     Active Problems:    Controlled type 2 diabetes mellitus with complication, without long-term current use of insulin (HCC)    Hypertension, essential    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HCC)    Diabetic ulcer of toe 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 mellitus, with fat layer exposed ([address]  Resolved Problems:    * No resolved hospital problems.  *      Plan:   -  Dressing : Daily dressing changes with iodoform packing and dry sterile dressing  -  PWB heel weightbearing with transfers into the bathroom  -  Continue antibiotics   -He will need further surgical debridement and delayed primary closure possibly Tuesday we need to await demarcation of the plantar tissues to plan for appropriate closure      Freddie Lopez DPM

## 2021-02-21 NOTE — FLOWSHEET NOTE
02/21/21 0702   Vital Signs   Temp 97.6 °F (36.4 °C)   Temp Source Oral   Pulse 74   Heart Rate Source Monitor   Resp 18   /67   BP Location Left upper arm   Patient Position Semi fowlers   Level of Consciousness Alert (0)   MEWS Score 1   Patient Currently in Pain Denies   Oxygen Therapy   SpO2 94 %   O2 Device None (Room air)     AM assessment complete. See flowsheet. Dressing to R arm in c/d/i. IV fluids infusing per order. Telesitter placed in room. Bed alarm on. Patient c/o pain to R shoulder. Dr. Miguel Suarez notified. New order received for Lidocaine Patch. Patient aware. Bed locked and in low position. Call light in reach.

## 2021-02-21 NOTE — PROGRESS NOTES
Mobility:   Supine to Sit:  Not Tested  Sit to Supine:  Supervision  Rolling:           Not Tested  Scooting:        Min A of 2 to [facility]     Transfer Training:   Sit to stand:   SBA  Stand to sit:  SBA  Standard toilet:   SBA - great strength pulling on stedy   Toilet to bed:   Total assist via stedy     Activity Tolerance   Pt completed therapy session with No adverse symptoms noted w/activity    ADL Training:   Lower body dressing: Mod A - Pt unable to thread depends in stedy frame. Did pull up from knees in standing, with R unilateral support on frame. Toileting:   SBA - Completed iliana care in stedy frame. Grooming/Hygiene:  Independent to clean hands with wipes and hand  in the bed. Therapeutic Exercise:   N/A    Patient Education:   Role of OT  Recommendations for DC    Positioning Needs: In bed, call light and needs in reach. Alarm Set    Family Present:  Yes - Son arrived at conclusion of the session. Assessment: Pt with good progress, meeting 1 goal this date. Pt may benefit from continued skilled occupational therapy while in the hospital and upon d/c in order to progress to a safe and more indpt level of functioning. Pt is not agreeable to SNF at this time and would like to continue working toward a safe d/c home. Will continue to educate and assess as pt progresses. Pt is due to OR for RLE closure on Tuesday (2/23/2021). GOALS  To be met in 3 Visits:  1). Bed to chair/BSC: Supervision     To be met in 5 Visits:  1). Supine to/from Sit:             Supervision  (Goal met 2/21/2021)    New goal:  Indpt   2). Upper Body Bathing:         Supervision  3). Lower Body Bathing:         Min A  4). Upper Body Dressing:       Supervision  5). Lower Body Dressing:       Min A  6).  Pt to demonstrate UE exs x 15 reps with minimal cues      Plan: cont with POC      Tushar Erm, MOT, OTR/L   [id number]       If patient discharges from this facility prior to next visit, this note will serve as

## 2021-02-21 NOTE — PROGRESS NOTES
Inpatient Physical Therapy Daily Treatment Note    Unit: [address]  Date:  2/21/2021  Patient Name:    Chemo Denise  Admitting diagnosis:  Necrotic toes [facility]) Madina Al  Admit Date:  2/18/2021  Precautions/Restrictions:  Fall risk, Bed/chair alarm, Lines -IV, [place] (hard of hearing) and WB Restrictions (Heel WB R foot), [place]-bilat hearing aids, telesitter      Discharge Recommendations: SNF  DME needs for discharge: defer to facility       Therapy recommendation for EMS Transport: can transport by wheelchair    Therapy recommendations for staff:   Assist of 1 with use of BENTLEY STEDY and gait belt for all transfers to/from [facility]  to/from chair, heel WB R in post-op shoe    History of Present Illness: H&P, 2/18/2021, Johnnie CRUZN-CNP:   Alphonse.Gut y. o. male with DM type 2, hyperlipidemia who presents to [facility] the [facility] for infection to 4th digit on right foot.  He states ongoing for a month.  He f/w Dr. Blayne Soriano and the [facility]. Judy Sutherland had a fever yesterday.  Admitted to med-surg.  Podiatry consulted. 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 Cleared by Dr. Blayne Soriano and RN to participate. WB status changed to heel WB (order reads FWB in orders, heel WB in note). RN and patient think patient may have been sleep walking    Home Health S4 Level Recommendation: NA  AM-PAC Mobility Score   AM-PAC Inpatient Mobility Raw Score : 13       Treatment Time:  13:50-14:35  Treatment number: 2  Timed Code Treatment Minutes: 45 minutes  Total Treatment Minutes:  45  minutes    Cognition    A&O orientation not directly assessed. Able to follow 2 step commands, very [place]    Subjective  Patient lying supine in bed with MD and RN x2 present   Pt agreeable to this PT tx.      Pain   No    Bed Mobility   Supine to Sit:    SBA, HOB elevated, used handrail  Sit to Supine:   Not Tested  Rolling:   Not Tested  Scooting:   Independent    Transfer Training  Post op shoes obtained  And  donned for bilat feet    Sit to stand:   Min A , VC for hand placement, from EOB, and from chair x 3  Stand to sit:   Min A ,VC for safety and hand placement to chair x 3  Bed to Chair:   Min A  with use of gait belt and rolling walker (RW), added waffle cushion for comfort    Gait Training gait completed as indicated below  Distance:      3ft  Deviations (firm surface/linoleum):  decreased abimael  Assistive Device Used:    gait belt and rolling walker (RW)  Level of Assist:    Min A   Comment: freq VC to maintain heel WB    Stair Training deferred, pt does not have stairs in the home environment    Therapeutic Exercise Shaquille deferred secondary to treatment focus on functional mobility    Balance  Sitting:  Normal; Supervision  Comments:     Standing: Fair ; CGA  Comments: with RW    Patient Education      Role of PT, POC, Discharge recommendations, DC recommendations, safety awareness, transfer techniques and calling for assist with mobility. Positioning Needs       Pt reclined in chair, alarm set, positioned in proper neutral alignment and pressure relief provided. Call light provided and all needs within reach  Patient was able to recline chair independently per protocol     Activity Tolerance   Pt completed therapy session with No adverse symptoms noted w/activity. Other  Per Dr. Elis Coronado, patient  May return to OR for further debridement this week. Assessment :  Patient continues to have difficulty maintaning WB for transfers. Continue to recommend skilled PT intervention while in acute care to improve mobility and reinforce safety.   Recommending SNF upon discharge as patient functioning well below baseline, demonstrates good rehab potential and unable to return home due to burden of care beyond caregiver ability and home environment not conducive to patient recovery. Goals : To be met in 3 visits:  1). Independent with LE Ex x 10 reps     To be met in 6 visits:  1). Supine to/from sit: Supervision  2). Sit to/from stand: SBA  3). Bed to chair: Min A   4). Gait: to be determined  5). Tolerate B LE exercises 3 sets of 10-15 reps    Plan   Continue with plan of care. Signature: Efrain Kitchen, PT #[id number]    If patient discharges from this facility prior to next visit, this note will serve as the Discharge Summary.

## 2021-02-21 NOTE — PROGRESS NOTES
Patient had a fall, although patients bed alarm was on and working and patient had been educated to call out to staff before trying to get up. Patient did have a head CT completed and  Was notified. Dr. Luke Half stated to keep a close eye on the skin tear and to make sure bleeding is under control.

## 2021-02-21 NOTE — PROGRESS NOTES
Patient scored high on the kelly fall risk scale and had a fall over night. The following interventions were put into place; Bed alarm is activated,Tele- sitter, placed in pt's room and stay with me sign and alarm sign posted outside patients room.  Patient educated on using call light before getting out of bed

## 2021-02-21 NOTE — PROGRESS NOTES
PM assessment completed. See flowsheet No c/o pain or discomfort. ACE bandage to R LE in c/d/i. Patient is voiding without difficulty. No complaints at this time. Call light within reach, chair alarm in use.

## 2021-02-21 NOTE — PROGRESS NOTES
Progress Note    Admit Date:  2/18/2021    Admitted for R foot cellulitis and osteomyelitis    S.p right 4th toe amputation       Subjective:  Mr. Erika Bolaños denies any acute complaints. Very [place]    Pain is well controlled   Oriented today but unsure why his room was moved last night and now has tele sitter    Pt very [place]    Objective:   Patient Vitals for the past 4 hrs:   BP Temp Temp src Pulse Resp SpO2 Weight   02/21/21 0702 136/67 97.6 °F (36.4 °C) Oral 74 18 94 % --   02/21/21 0600 -- -- -- -- -- -- 223 lb 12.8 oz (101.5 kg)   02/21/21 0500 133/62 98.4 °F (36.9 °C) Oral 77 18 94 % --        Intake/Output Summary (Last 24 hours) at 2/21/2021 0859  Last data filed at 2/20/2021 1859  Gross per 24 hour   Intake 2469 ml   Output 400 ml   Net 2069 ml     Physical Exam:    General:elderly male, severely [place]    Awake, alert and oriented. Appears to be not in any distress  Mucous Membranes:  Pink , anicteric  Neck: No JVD, no carotid bruit, no thyromegaly  Chest:  Clear to auscultation bilaterally, no added sounds  Cardiovascular:  RRR S1S2 heard, +ASM  Abdomen:  Soft, undistended, non tender, no organomegaly, BS present  Extremities  - right foot in dressing, see podiatry notes  Chronic lymphedema 1 + to right LE    No edema or cyanosis.  Distal pulses well felt  Neurological : grossly normal              Scheduled Meds:   enoxaparin  40 mg Subcutaneous Daily    metFORMIN  500 mg Oral BID WC    glipiZIDE  5 mg Oral QAM AC    lisinopril  5 mg Oral Daily    ferrous sulfate  325 mg Oral Daily with breakfast    insulin lispro  0-6 Units Subcutaneous TID WC    insulin lispro  0-3 Units Subcutaneous Nightly    sodium chloride flush  10 mL Intravenous 2 times per day    piperacillin-tazobactam  3,375 mg Intravenous Q8H    vancomycin  1,500 mg Intravenous Q24H       Continuous Infusions:   dextrose      sodium chloride 75 mL/hr at 02/20/21 1212       PRN Meds:  glucose, dextrose, glucagon (rDNA), dextrose, sodium chloride flush, potassium chloride **OR** potassium alternative oral replacement **OR** potassium chloride, magnesium sulfate, promethazine **OR** ondansetron, polyethylene glycol, acetaminophen **OR** acetaminophen      Data:  CBC:   Recent Labs     02/19/21  0541 02/20/21  0553 02/21/21  0634   WBC 12.8* 11.0 9.1   HGB 9.1* 9.3* 9.9*   HCT 27.8* 28.1* 29.5*   MCV 96.4 95.8 95.4    266 291     BMP:   Recent Labs     02/19/21  0541 02/20/21  0553 02/21/21  0634    136 139   K 3.9 3.9 4.0    102 105   CO2 25 24 27   BUN 16 11 10   CREATININE 0.9 0.8 0.8     LIVER PROFILE:   Recent Labs     02/19/21  0541 02/20/21  0553 02/21/21  0634   AST 17 17 18   ALT 14 15 17   BILITOT 0.3 0.3 <0.2   ALKPHOS 90 111 130*     CULTURES  SARS-CoV-2, NAAT Not Detected      Blood Cx: pending      RADIOLOGY  CT HEAD WO CONTRAST   Final Result   Mild atrophy and mild chronic microischemic changes scattered in the deep   white matter with no acute abnormality seen. MRI FOOT RIGHT WO CONTRAST   Final Result   1. Diffuse subcutaneous edema which is most pronounced at the right 4th toe   suggesting cellulitis at the 4th digit. Correlate clinically. No organized   drainable fluid collection identified. 2. Marrow signal changes of the proximal, middle, and distal phalanges of the   4th toe most consistent with osteomyelitis with likely septic joint at the   proximal interphalangeal joint of the 4th toe and associated destructive   changes of the distal aspect of the proximal phalanx and middle phalanx of   the right 4th toe. 3. Mild edema of the 4th metatarsal head with preserved T1 signal.  Findings   may reflect reactive noninfectious osteitis, however, very early changes of   osteomyelitis difficult to entirely exclude. 4. 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          XR FOOT RIGHT (2 VIEWS)   Final Result   Moderate destructive change most likely representing

## 2021-02-21 NOTE — PLAN OF CARE
Problem: Falls - Risk of:  Goal: Will remain free from falls  Description: Will remain free from falls  2/21/2021 1209 by Galindo Mendez RN  Outcome: Ongoing  2/20/2021 2246 by Andie Daniel RN  Outcome: Ongoing  Goal: Absence of physical injury  Description: Absence of physical injury  2/21/2021 1209 by Galindo Mednez RN  Outcome: Ongoing  2/20/2021 2246 by Andie Daniel RN  Outcome: Ongoing     Problem: Skin Integrity:  Goal: Will show no infection signs and symptoms  Description: Will show no infection signs and symptoms  2/21/2021 1209 by Galindo Mendez RN  Outcome: Ongoing  2/20/2021 2246 by Andie Daniel RN  Outcome: Ongoing  Goal: Absence of new skin breakdown  Description: Absence of new skin breakdown  2/21/2021 1209 by Galindo Mendez RN  Outcome: Ongoing  2/20/2021 2246 by Andie Daniel RN  Outcome: Ongoing

## 2021-02-21 NOTE — PLAN OF CARE
Problem: Falls - Risk of:  Goal: Will remain free from falls  Description: Will remain free from falls  2/20/2021 2246 by Jazzy Rooney RN  Outcome: Ongoing  2/20/2021 0913 by Emiliano Miller RN  Outcome: Ongoing  2/20/2021 0912 by Emiliano Miller RN  Outcome: Ongoing  Goal: Absence of physical injury  Description: Absence of physical injury  2/20/2021 2246 by Jazzy Rooney RN  Outcome: Ongoing  2/20/2021 0913 by Emiliano Miller RN  Outcome: Ongoing  2/20/2021 0912 by Emiliano Miller RN  Outcome: Ongoing     Problem: Skin Integrity:  Goal: Will show no infection signs and symptoms  Description: Will show no infection signs and symptoms  2/20/2021 2246 by Jazzy Rooney RN  Outcome: Ongoing  2/20/2021 0913 by Emiliano Miller RN  Outcome: Ongoing  2/20/2021 0912 by Emiliano Miller RN  Outcome: Ongoing  Goal: Absence of new skin breakdown  Description: Absence of new skin breakdown  2/20/2021 2246 by Jazzy Rooney RN  Outcome: Ongoing  2/20/2021 0913 by Emiliano Miller RN  Outcome: Ongoing  2/20/2021 0912 by Emiliano Miller RN  Outcome: Ongoing

## 2021-02-22 ENCOUNTER — ANESTHESIA EVENT (OUTPATIENT)
Dept: OPERATING ROOM | Age: 86
DRG: 616 | End: 2021-02-22
Payer: MEDICARE

## 2021-02-22 LAB
ANION GAP SERPL CALCULATED.3IONS-SCNC: 8 MMOL/L (ref 3–16)
BUN BLDV-MCNC: 10 MG/DL (ref 7–20)
CALCIUM SERPL-MCNC: 8.9 MG/DL (ref 8.3–10.6)
CHLORIDE BLD-SCNC: 105 MMOL/L (ref 99–110)
CO2: 27 MMOL/L (ref 21–32)
CREAT SERPL-MCNC: 0.8 MG/DL (ref 0.8–1.3)
EKG ATRIAL RATE: 68 BPM
EKG DIAGNOSIS: NORMAL
EKG P AXIS: 37 DEGREES
EKG P-R INTERVAL: 176 MS
EKG Q-T INTERVAL: 456 MS
EKG QRS DURATION: 150 MS
EKG QTC CALCULATION (BAZETT): 484 MS
EKG R AXIS: -16 DEGREES
EKG T AXIS: 40 DEGREES
EKG VENTRICULAR RATE: 68 BPM
GFR AFRICAN AMERICAN: >60
GFR NON-AFRICAN AMERICAN: >60
GLUCOSE BLD-MCNC: 117 MG/DL (ref 70–99)
GLUCOSE BLD-MCNC: 120 MG/DL (ref 70–99)
GLUCOSE BLD-MCNC: 121 MG/DL (ref 70–99)
GLUCOSE BLD-MCNC: 127 MG/DL (ref 70–99)
GLUCOSE BLD-MCNC: 134 MG/DL (ref 70–99)
GLUCOSE BLD-MCNC: 98 MG/DL (ref 70–99)
PERFORMED ON: ABNORMAL
PERFORMED ON: NORMAL
PERFORMED ON: NORMAL
POTASSIUM SERPL-SCNC: 3.9 MMOL/L (ref 3.5–5.1)
SODIUM BLD-SCNC: 140 MMOL/L (ref 136–145)

## 2021-02-22 PROCEDURE — 97530 THERAPEUTIC ACTIVITIES: CPT

## 2021-02-22 PROCEDURE — 6370000000 HC RX 637 (ALT 250 FOR IP): Performed by: INTERNAL MEDICINE

## 2021-02-22 PROCEDURE — 2580000003 HC RX 258: Performed by: INTERNAL MEDICINE

## 2021-02-22 PROCEDURE — 97110 THERAPEUTIC EXERCISES: CPT

## 2021-02-22 PROCEDURE — 6360000002 HC RX W HCPCS: Performed by: INTERNAL MEDICINE

## 2021-02-22 PROCEDURE — 36415 COLL VENOUS BLD VENIPUNCTURE: CPT

## 2021-02-22 PROCEDURE — 93005 ELECTROCARDIOGRAM TRACING: CPT | Performed by: ANESTHESIOLOGY

## 2021-02-22 PROCEDURE — 6360000002 HC RX W HCPCS: Performed by: PODIATRIST

## 2021-02-22 PROCEDURE — 99232 SBSQ HOSP IP/OBS MODERATE 35: CPT | Performed by: INTERNAL MEDICINE

## 2021-02-22 PROCEDURE — 97116 GAIT TRAINING THERAPY: CPT

## 2021-02-22 PROCEDURE — 93010 ELECTROCARDIOGRAM REPORT: CPT | Performed by: INTERNAL MEDICINE

## 2021-02-22 PROCEDURE — 97535 SELF CARE MNGMENT TRAINING: CPT

## 2021-02-22 PROCEDURE — 1200000000 HC SEMI PRIVATE

## 2021-02-22 PROCEDURE — 80048 BASIC METABOLIC PNL TOTAL CA: CPT

## 2021-02-22 RX ADMIN — METFORMIN HYDROCHLORIDE 500 MG: 500 TABLET ORAL at 16:52

## 2021-02-22 RX ADMIN — METFORMIN HYDROCHLORIDE 500 MG: 500 TABLET ORAL at 08:32

## 2021-02-22 RX ADMIN — ENOXAPARIN SODIUM 40 MG: 40 INJECTION SUBCUTANEOUS at 08:32

## 2021-02-22 RX ADMIN — LISINOPRIL 5 MG: 5 TABLET ORAL at 08:32

## 2021-02-22 RX ADMIN — GLIPIZIDE 5 MG: 5 TABLET ORAL at 06:18

## 2021-02-22 RX ADMIN — FERROUS SULFATE TAB 325 MG (65 MG ELEMENTAL FE) 325 MG: 325 (65 FE) TAB at 08:32

## 2021-02-22 RX ADMIN — Medication 10 ML: at 08:32

## 2021-02-22 RX ADMIN — Medication 10 ML: at 19:53

## 2021-02-22 RX ADMIN — CEFTRIAXONE SODIUM 1000 MG: 1 INJECTION, POWDER, FOR SOLUTION INTRAMUSCULAR; INTRAVENOUS at 10:14

## 2021-02-22 RX ADMIN — PIPERACILLIN SODIUM AND TAZOBACTAM SODIUM 3375 MG: 3; .375 INJECTION, POWDER, LYOPHILIZED, FOR SOLUTION INTRAVENOUS at 08:32

## 2021-02-22 NOTE — ADT AUTH CERT
SAM KEE RECOMMENDATION by Melida Rucker RN       Review Status Review Entered   In Primary 2021 09:27      Criteria Review   e recommend that the following pt's current hospitalization under INPATIENT  status  is APPROPRIATE  .         Name: Lian Real   : 1935   CSN: [id number]   INSURANCE: [employer]           Clinical summary           ASSESSMENT/PLAN:  Necrotic toe 4th digit right foot, wet gangrene  - admitted to med-surg. Podiatry consulted. - Esha Wilson#1  - OR tomorrow afternoon for 4th digit amputation right foot.      DM type 2  - monitor glucose. - continue Metformin, Glipizide  - SSI coverage.      Hypertension  - BP stable. Continue Lisinopril.      Iron deficiency anemia  - on ferrous sulfate.   Vitals                           vss  Labs and Imaging       EXAMINATION:   MRI OF THE RIGHT FOOT WITHOUT CONTRAST, 2021 3:51 pm      TECHNIQUE:   Multiplanar multisequence MRI of the right foot was performed without the   administration of intravenous contrast.      COMPARISON:   Right foot radiograph same day      HISTORY:   ORDERING SYSTEM PROVIDED HISTORY: diabetic foot infection   TECHNOLOGIST PROVIDED HISTORY:   Evaluate for osteomyelitis of the fourth metatarsal and abscess   Reason for exam:->diabetic foot infection   What is the area of interest?->Forefoot   Reason for Exam: Evaluate for osteomyelitis of the fourth metatarsal and   abscess   Acuity: Acute   Type of Exam: Initial      FINDINGS:   LISFRANC JOINT:  Lisfranc ligament is visualized and is intact.  The   alignment of the tarsal-metatarsal joint is anatomic.      BONE MARROW: Prominent marrow edema and confluent decreased T1 signal present   throughout the proximal, middle, and distal phalanges of the 4th toe with   associated destructive change of the middle phalanx of the 4th toe and distal   aspect of the proximal 4th phalanx consistent with osteomyelitis.  Mild edema   of the 4th metatarsal head with [facility]   CELL : [phone]      Wound and Skin Management [facility] - Care Day 2 (2/19/2021) by Bonita Fortune LPN       Review Status Review Entered   Completed 2/19/2021 16:42      Criteria Review      Care Day: 2 Care Date: 2/19/2021 Level of Care: Inpatient Floor    Guideline Day 2    Clinical Status    ( ) No ICU or intermediate care needs    2/19/2021 4:42 PM EST by Prakash Martinez      OR 2/19 afternoon for 4th digit amputation right foot    Interventions    (X) Inpatient interventions continue    2/19/2021 4:42 PM EST by Prakash Martinez      IV Zosyn  IV Vacomycin  IV NS 75mL/hr    Milestone   Additional Notes   2/19/2021   Vitals: 167/73 87 18 96.7 98% RA      Brief Postoperative Note          Date of Procedure: 2/19/2021       Pre-Op Diagnosis: DIABETIC FOOT INFECTION, wet gangrene       Post-Op Diagnosis: Same         Procedure(s):   RIGHT FOOT INCISION AND DRAINAGE WITH FOURTH TOE AMPUTATION       Surgeon(s):   Shree Puente DPM       Assistant:   Surgical Assistant: Saurav Segundo       Anesthesia: General       Estimated Blood Loss (mL): Minimal       Complications: Other: Moderate necrosis of the plantar foot pad sub fourth MTPJ      IM Progress Note   Admitted for R foot cellulitis and osteomyelitis       Subjective:   Mr. Mary Jerez denies any acute complaints. Pain is well controlled        Physical Exam:   Gen: Obese, elderly male, No distress. Alert. Eyes: No conjunctival injection. ENT: No discharge. Pharynx clear. [place]    Neck: Trachea midline. Resp: No accessory muscle use. No crackles. No wheezes. No rhonchi. CV: Regular rate. Regular rhythm. No murmur.  No rub. ++ edema. GI: Non-tender. Non-distended.  Normal bowel sounds. No hernia. Skin: Warm and dry. Dressing over R foot, did not remove for evaluation of wounds today    M/S: significant edema of RLE > LLE, dressing place over R foot    Neuro: Awake.  Grossly nonfocal , [place], generalized weakness    Psych:  No anxiety or agitation.       Assessment/Plan:   Necrotic toe 4th digit right foot, wet gangrene   - admitted to med-surg.     - Podiatry consulted. - Yon Toussaint D#2   - MRI foot with R 4th digit with cellulitis, osteo and likely septic joint   - OR 2/19 afternoon for 4th digit amputation right foot.        DM type 2   - monitor glucose. - Hold home oral Rx    - SSI coverage.        Hypertension   - BP stable. Continue Lisinopril.        Iron deficiency anemia   - on ferrous sulfate.       Obesity   - Complicating assessment and treatment. Placing patient at risk for multiple co-morbidities as well as early death and contributing to the patient's presentation.    - Counseled on weight loss.       DVT Prophylaxis: Lovenox    Diet: Diet NPO, After Midnight   Code Status: Full Code      Labs:    Albumin: 3.0 (L)   Albumin/Globulin Ratio: 0.8 (L)   WBC: 12.8 (H)   RBC: 2.88 (L)   Hemoglobin Quant: 9.1 (L)   Hematocrit: 27.8 (L)      Scheduled Meds:   ·  lisinopril, 5 mg, Oral, Daily   ·  ferrous sulfate, 325 mg, Oral, Daily with breakfast   ·  insulin lispro, 0-6 Units, Subcutaneous, TID WC   ·  insulin lispro, 0-3 Units, Subcutaneous, Nightly   ·  sodium chloride flush, 10 mL, Intravenous, 2 times per day   ·  [Held by provider] enoxaparin, 40 mg, Subcutaneous, Daily   ·  piperacillin-tazobactam, 3,375 mg, Intravenous, Q8H   ·  vancomycin, 1,500 mg, Intravenous, Q24H         Continuous Infusions:·  dextrose   ·  sodium chloride Last Rate: 75 mL/hr at 02/18/21 9263

## 2021-02-22 NOTE — PROGRESS NOTES
Removed dressing on LLE, cleansed, repacked and dressed. Pt tolerated well, no pain noted. Changed linens.

## 2021-02-22 NOTE — FLOWSHEET NOTE
02/21/21 2350   Vital Signs   Temp 97.4 °F (36.3 °C)   Temp Source Oral   Pulse 69   Heart Rate Source Monitor   Resp 18   /69   BP Location Left upper arm   Patient Position Semi fowlers   Level of Consciousness Alert (0)   MEWS Score 1   Patient Currently in Pain Denies   Pain Assessment   Pain Assessment 0-10   Pain Level 0   Oxygen Therapy   SpO2 93 %   O2 Device None (Room air)     Pt A/Ox4. VSS. Pt unlabored, respirations even & easy. No distress noted. Shift assessment complete. See flowsheet. PM meds given. See MAR. IV zosyn infusing. Removed R arm drsg, cleansed skin tears with normal saline. Applied new drsg using xeroform, 4x4's and kerlix. Pt tolerated well. Denies needs at this time. Bed in low position. Bed alarm on. Call light within reach. Will continue to monitor.

## 2021-02-22 NOTE — PROGRESS NOTES
Progress Note    Admit Date:  2/18/2021    Admitted for R foot cellulitis and osteomyelitis    S.p right 4th toe amputation       Subjective:  Mr. Kimani Mckenna denies any acute complaints. Very [place]    Pain is well controlled   Oriented today but unsure why his room was moved last night and now has tele sitter    Pt very [place]    Objective:   Patient Vitals for the past 4 hrs:   BP Temp Temp src Pulse Resp SpO2   02/22/21 0815 (!) 145/66 97.4 °F (36.3 °C) Oral 86 18 93 %        Intake/Output Summary (Last 24 hours) at 2/22/2021 0845  Last data filed at 2/22/2021 5994  Gross per 24 hour   Intake 1374 ml   Output 2150 ml   Net -776 ml     Physical Exam:    General:elderly male, severely [place]    Awake, alert and oriented. Appears to be not in any distress  Mucous Membranes:  Pink , anicteric  Neck: No JVD, no carotid bruit, no thyromegaly  Chest:  Clear to auscultation bilaterally, no added sounds  Cardiovascular:  RRR S1S2 heard, +ASM  Abdomen:  Soft, undistended, non tender, no organomegaly, BS present  Extremities  - right foot in dressing, see podiatry notes  Chronic lymphedema 1 + to right LE    No edema or cyanosis.  Distal pulses well felt  Neurological : grossly normal              Scheduled Meds:   lidocaine  1 patch Transdermal Daily    vancomycin  1,750 mg Intravenous Q24H    enoxaparin  40 mg Subcutaneous Daily    metFORMIN  500 mg Oral BID WC    glipiZIDE  5 mg Oral QAM AC    lisinopril  5 mg Oral Daily    ferrous sulfate  325 mg Oral Daily with breakfast    insulin lispro  0-6 Units Subcutaneous TID WC    insulin lispro  0-3 Units Subcutaneous Nightly    sodium chloride flush  10 mL Intravenous 2 times per day    piperacillin-tazobactam  3,375 mg Intravenous Q8H       Continuous Infusions:   dextrose         PRN Meds:  glucose, dextrose, glucagon (rDNA), dextrose, sodium chloride flush, potassium chloride **OR** potassium alternative oral replacement **OR** potassium chloride, magnesium sulfate, gangrene  Diabetic foot ulcer   Osteomyelitis toe  - admitted to med-surg.    - Podiatry consulted. - Esha Wilson D#4  - MRI foot with R 4th digit with cellulitis, osteo and likely septic joint  - s/p  4th digit amputation right foot POD 3  - repeat I and D  And debridement this week  , wound culture- strep anginosus   Change abx to rocephin as it is the preferred agent for this organism     DM type 2  - monitor glucose. - resume home oral Rx   - SSI coverage.      Hypertension  - BP stable. Continue Lisinopril.      Iron deficiency anemia  - on ferrous sulfate.     Obesity  - Complicating assessment and treatment. Placing patient at risk for multiple co-morbidities as well as early death and contributing to the patient's presentation.   - Counseled on weight loss.     DVT Prophylaxis: Lovenox   Diet: DIET CARB CONTROL; Carb Control: 4 carbs/meal (approximate 1800 kcals/day)  Code Status: Full Code     Fall precautions      Fartun Nicole MD 2/22/2021 8:45 AM
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
## 2021-02-22 NOTE — PROGRESS NOTES
Occupational Therapy Daily Treatment Note    Unit: [facility]  Date:  2/22/2021  Patient Name:    Jag Ledbetter  Admitting diagnosis:  Necrotic toes [facility]) Althea Amato  Admit Date:  2/18/2021  Precautions/Restrictions:  fall risk, IV, bed/chair alarm, WB restrictions (Heel WB, RLE) , telemetry, telesitter and [place] (hard of hearing)        Discharge Recommendations: SNF  DME needs for discharge: defer to facility       Therapy recommendations for staff:   Assist of 1 (contact guard assist) with use of rolling walker (RW) for all ambulation to/from bathroom    AM-PAC Score: AM-PAC Inpatient Daily Activity Raw Score: 18  Home Health S4 Level: NA       Treatment Time: 8:43- 9:15  Treatment number:  3  Total Treatment Time:  32 minutes    History of Present Illness: H&P, 2/18/2021, Johnnie APRN-CNP:   Adriana.Angel[age] y. o. male with DM type 2, hyperlipidemia who presents to [facility] the [facility] for infection to 4th digit on right foot.  He states ongoing for a month.  He f/w Dr. Christie Carmen and the [facility]. Brooke Foss had a fever yesterday.  Admitted to med-surg.  Podiatry consulted. 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 Cleared by Dr. Christie Carmen and RN to participate. WB status changed to heel WB (order reads FWB in orders, heel WB in note). RN and patient think patient may have been sleep walking    Subjective:  Pt in STEDY transferring to bathroom with staff upon therapist arrival. Pt agreeable to work with therapy this date. Pt brought up fall overnight with writer. Reports he is just not sure what he was doing. That all he really remembers is he had the urge to pee but was not fully awake. States he does not remember standing up. Pt states he has many falls previously, \"I am a good jayant. \"     Pain   None reported throughout session     Bed Mobility:   Supine to Sit:  Supervision  Sit to Supine:  Not Tested  Rolling:           Not Tested  Scooting:        Supervision to [facility]     Transfer Training:   Sit to stand:   SBA  Stand to sit:  SBA  Standard toilet:   CGA  Bed to toilet to chair:  CGA with RW     Activity Tolerance   Pt completed therapy session with No adverse symptoms noted w/activity    ADL Training:   Lower body dressing: Mod A to don post-op boots   Toileting:   SBA   Grooming/Hygiene:  Independent to clean hands with wipes and hand  in the bed. Therapeutic Exercise:   N/A    Patient Education:   Role of OT  Recommendations for DC    Positioning Needs:   Up in chair, call light and needs in reach. Alarm Set    Family Present:  No    Assessment: Pt with good progress. Pt may benefit from continued skilled occupational therapy while in the hospital and upon d/c in order to progress to a safe and more indpt level of functioning. GOALS  To be met in 3 Visits:  1). Bed to chair/BSC: Supervision     To be met in 5 Visits:  1). Supine to/from Sit:             Supervision  (Goal met 2/21/2021)    New goal:  Indpt   2). Upper Body Bathing:         Supervision  3). Lower Body Bathing:         Min A  4). Upper Body Dressing:       Supervision  5). Lower Body Dressing:       Min A  6).  Pt to demonstrate UE exs x 15 reps with minimal cues      Plan: cont with [facility], OTR/L #[id number]        If patient discharges from this facility prior to next visit, this note will serve as the Discharge Summary

## 2021-02-22 NOTE — PLAN OF CARE
Problem: Falls - Risk of:  Goal: Will remain free from falls  Description: Will remain free from falls  2/22/2021 1005 by Kanchan Wilde RN  Outcome: Ongoing     Problem: Skin Integrity:  Goal: Will show no infection signs and symptoms  Description: Will show no infection signs and symptoms  2/22/2021 1005 by Kanchan Wilde RN  Outcome: Ongoing

## 2021-02-22 NOTE — FLOWSHEET NOTE
02/22/21 0815   Vital Signs   Temp 97.4 °F (36.3 °C)   Temp Source Oral   Pulse 86   Heart Rate Source Monitor   Resp 18   BP (!) 145/66   BP Location Left upper arm   Patient Position Semi fowlers   Level of Consciousness Alert (0)   MEWS Score 1   Oxygen Therapy   SpO2 93 %   O2 Device None (Room air)   AM assessment completed, see flow sheet. Pt is alert and oriented. BP elevated. Respirations are even & easy. No complaints voiced. Pt denies needs at this time. SR up x 2, and bed in low position. Call light is within reach.

## 2021-02-23 ENCOUNTER — ANESTHESIA (OUTPATIENT)
Dept: OPERATING ROOM | Age: 86
DRG: 616 | End: 2021-02-23
Payer: MEDICARE

## 2021-02-23 VITALS — OXYGEN SATURATION: 100 % | SYSTOLIC BLOOD PRESSURE: 129 MMHG | DIASTOLIC BLOOD PRESSURE: 60 MMHG

## 2021-02-23 LAB
ANION GAP SERPL CALCULATED.3IONS-SCNC: 9 MMOL/L (ref 3–16)
BLOOD CULTURE, ROUTINE: NORMAL
BUN BLDV-MCNC: 11 MG/DL (ref 7–20)
CALCIUM SERPL-MCNC: 9.2 MG/DL (ref 8.3–10.6)
CHLORIDE BLD-SCNC: 104 MMOL/L (ref 99–110)
CO2: 24 MMOL/L (ref 21–32)
CREAT SERPL-MCNC: 0.7 MG/DL (ref 0.8–1.3)
CULTURE, BLOOD 2: NORMAL
GFR AFRICAN AMERICAN: >60
GFR NON-AFRICAN AMERICAN: >60
GLUCOSE BLD-MCNC: 114 MG/DL (ref 70–99)
GLUCOSE BLD-MCNC: 119 MG/DL (ref 70–99)
GLUCOSE BLD-MCNC: 127 MG/DL (ref 70–99)
GLUCOSE BLD-MCNC: 132 MG/DL (ref 70–99)
GLUCOSE BLD-MCNC: 132 MG/DL (ref 70–99)
GLUCOSE BLD-MCNC: 154 MG/DL (ref 70–99)
GLUCOSE BLD-MCNC: 157 MG/DL (ref 70–99)
PERFORMED ON: ABNORMAL
POTASSIUM SERPL-SCNC: 4.6 MMOL/L (ref 3.5–5.1)
SODIUM BLD-SCNC: 137 MMOL/L (ref 136–145)

## 2021-02-23 PROCEDURE — 3700000001 HC ADD 15 MINUTES (ANESTHESIA): Performed by: PODIATRIST

## 2021-02-23 PROCEDURE — 36415 COLL VENOUS BLD VENIPUNCTURE: CPT

## 2021-02-23 PROCEDURE — 7100000011 HC PHASE II RECOVERY - ADDTL 15 MIN: Performed by: PODIATRIST

## 2021-02-23 PROCEDURE — 97116 GAIT TRAINING THERAPY: CPT

## 2021-02-23 PROCEDURE — 2580000003 HC RX 258: Performed by: INTERNAL MEDICINE

## 2021-02-23 PROCEDURE — 97530 THERAPEUTIC ACTIVITIES: CPT

## 2021-02-23 PROCEDURE — 87116 MYCOBACTERIA CULTURE: CPT

## 2021-02-23 PROCEDURE — 87102 FUNGUS ISOLATION CULTURE: CPT

## 2021-02-23 PROCEDURE — 87205 SMEAR GRAM STAIN: CPT

## 2021-02-23 PROCEDURE — 2500000003 HC RX 250 WO HCPCS: Performed by: PODIATRIST

## 2021-02-23 PROCEDURE — 2580000003 HC RX 258: Performed by: ANESTHESIOLOGY

## 2021-02-23 PROCEDURE — 87206 SMEAR FLUORESCENT/ACID STAI: CPT

## 2021-02-23 PROCEDURE — 1200000000 HC SEMI PRIVATE

## 2021-02-23 PROCEDURE — 88311 DECALCIFY TISSUE: CPT

## 2021-02-23 PROCEDURE — 0QBN0ZZ EXCISION OF RIGHT METATARSAL, OPEN APPROACH: ICD-10-PCS | Performed by: PODIATRIST

## 2021-02-23 PROCEDURE — 6370000000 HC RX 637 (ALT 250 FOR IP): Performed by: INTERNAL MEDICINE

## 2021-02-23 PROCEDURE — 87070 CULTURE OTHR SPECIMN AEROBIC: CPT

## 2021-02-23 PROCEDURE — 2709999900 HC NON-CHARGEABLE SUPPLY: Performed by: PODIATRIST

## 2021-02-23 PROCEDURE — 3700000000 HC ANESTHESIA ATTENDED CARE: Performed by: PODIATRIST

## 2021-02-23 PROCEDURE — 6360000002 HC RX W HCPCS: Performed by: NURSE ANESTHETIST, CERTIFIED REGISTERED

## 2021-02-23 PROCEDURE — 87015 SPECIMEN INFECT AGNT CONCNTJ: CPT

## 2021-02-23 PROCEDURE — 80048 BASIC METABOLIC PNL TOTAL CA: CPT

## 2021-02-23 PROCEDURE — 88305 TISSUE EXAM BY PATHOLOGIST: CPT

## 2021-02-23 PROCEDURE — 6360000002 HC RX W HCPCS: Performed by: INTERNAL MEDICINE

## 2021-02-23 PROCEDURE — 2500000003 HC RX 250 WO HCPCS: Performed by: ANESTHESIOLOGY

## 2021-02-23 PROCEDURE — 7100000010 HC PHASE II RECOVERY - FIRST 15 MIN: Performed by: PODIATRIST

## 2021-02-23 PROCEDURE — 3600000002 HC SURGERY LEVEL 2 BASE: Performed by: PODIATRIST

## 2021-02-23 PROCEDURE — 3600000012 HC SURGERY LEVEL 2 ADDTL 15MIN: Performed by: PODIATRIST

## 2021-02-23 PROCEDURE — 99232 SBSQ HOSP IP/OBS MODERATE 35: CPT | Performed by: INTERNAL MEDICINE

## 2021-02-23 RX ORDER — SODIUM CHLORIDE 9 MG/ML
INJECTION, SOLUTION INTRAVENOUS
Status: DISPENSED
Start: 2021-02-23 | End: 2021-02-23

## 2021-02-23 RX ORDER — MORPHINE SULFATE 10 MG/ML
2 INJECTION, SOLUTION INTRAMUSCULAR; INTRAVENOUS EVERY 5 MIN PRN
Status: DISCONTINUED | OUTPATIENT
Start: 2021-02-23 | End: 2021-02-23 | Stop reason: HOSPADM

## 2021-02-23 RX ORDER — PROPOFOL 10 MG/ML
INJECTION, EMULSION INTRAVENOUS CONTINUOUS PRN
Status: DISCONTINUED | OUTPATIENT
Start: 2021-02-23 | End: 2021-02-23 | Stop reason: SDUPTHER

## 2021-02-23 RX ORDER — ONDANSETRON 2 MG/ML
4 INJECTION INTRAMUSCULAR; INTRAVENOUS PRN
Status: DISCONTINUED | OUTPATIENT
Start: 2021-02-23 | End: 2021-02-23 | Stop reason: HOSPADM

## 2021-02-23 RX ORDER — LABETALOL HYDROCHLORIDE 5 MG/ML
5 INJECTION, SOLUTION INTRAVENOUS EVERY 10 MIN PRN
Status: DISCONTINUED | OUTPATIENT
Start: 2021-02-23 | End: 2021-02-23 | Stop reason: HOSPADM

## 2021-02-23 RX ORDER — OXYCODONE HYDROCHLORIDE AND ACETAMINOPHEN 5; 325 MG/1; MG/1
1 TABLET ORAL PRN
Status: DISCONTINUED | OUTPATIENT
Start: 2021-02-23 | End: 2021-02-23 | Stop reason: HOSPADM

## 2021-02-23 RX ORDER — MEPERIDINE HYDROCHLORIDE 25 MG/ML
12.5 INJECTION INTRAMUSCULAR; INTRAVENOUS; SUBCUTANEOUS EVERY 5 MIN PRN
Status: DISCONTINUED | OUTPATIENT
Start: 2021-02-23 | End: 2021-02-23 | Stop reason: HOSPADM

## 2021-02-23 RX ORDER — CEFTRIAXONE 1 G/1
INJECTION, POWDER, FOR SOLUTION INTRAMUSCULAR; INTRAVENOUS
Status: DISPENSED
Start: 2021-02-23 | End: 2021-02-23

## 2021-02-23 RX ORDER — OXYCODONE HYDROCHLORIDE AND ACETAMINOPHEN 5; 325 MG/1; MG/1
2 TABLET ORAL PRN
Status: DISCONTINUED | OUTPATIENT
Start: 2021-02-23 | End: 2021-02-23 | Stop reason: HOSPADM

## 2021-02-23 RX ORDER — SODIUM CHLORIDE 0.9 % (FLUSH) 0.9 %
10 SYRINGE (ML) INJECTION EVERY 12 HOURS SCHEDULED
Status: DISCONTINUED | OUTPATIENT
Start: 2021-02-23 | End: 2021-02-23 | Stop reason: HOSPADM

## 2021-02-23 RX ORDER — SODIUM CHLORIDE 0.9 % (FLUSH) 0.9 %
10 SYRINGE (ML) INJECTION PRN
Status: DISCONTINUED | OUTPATIENT
Start: 2021-02-23 | End: 2021-02-23 | Stop reason: HOSPADM

## 2021-02-23 RX ORDER — SODIUM CHLORIDE, SODIUM LACTATE, POTASSIUM CHLORIDE, CALCIUM CHLORIDE 600; 310; 30; 20 MG/100ML; MG/100ML; MG/100ML; MG/100ML
INJECTION, SOLUTION INTRAVENOUS CONTINUOUS
Status: DISCONTINUED | OUTPATIENT
Start: 2021-02-23 | End: 2021-02-24

## 2021-02-23 RX ORDER — MORPHINE SULFATE 10 MG/ML
1 INJECTION, SOLUTION INTRAMUSCULAR; INTRAVENOUS EVERY 5 MIN PRN
Status: DISCONTINUED | OUTPATIENT
Start: 2021-02-23 | End: 2021-02-23 | Stop reason: HOSPADM

## 2021-02-23 RX ORDER — DIPHENHYDRAMINE HYDROCHLORIDE 50 MG/ML
12.5 INJECTION INTRAMUSCULAR; INTRAVENOUS
Status: DISCONTINUED | OUTPATIENT
Start: 2021-02-23 | End: 2021-02-23 | Stop reason: HOSPADM

## 2021-02-23 RX ORDER — HYDRALAZINE HYDROCHLORIDE 20 MG/ML
5 INJECTION INTRAMUSCULAR; INTRAVENOUS EVERY 10 MIN PRN
Status: DISCONTINUED | OUTPATIENT
Start: 2021-02-23 | End: 2021-02-23 | Stop reason: HOSPADM

## 2021-02-23 RX ORDER — PROMETHAZINE HYDROCHLORIDE 25 MG/ML
6.25 INJECTION, SOLUTION INTRAMUSCULAR; INTRAVENOUS
Status: DISCONTINUED | OUTPATIENT
Start: 2021-02-23 | End: 2021-02-23 | Stop reason: HOSPADM

## 2021-02-23 RX ADMIN — Medication 10 ML: at 19:56

## 2021-02-23 RX ADMIN — METFORMIN HYDROCHLORIDE 500 MG: 500 TABLET ORAL at 17:20

## 2021-02-23 RX ADMIN — CEFTRIAXONE SODIUM 1 G: 1 INJECTION, POWDER, FOR SOLUTION INTRAMUSCULAR; INTRAVENOUS at 08:30

## 2021-02-23 RX ADMIN — FAMOTIDINE 20 MG: 10 INJECTION, SOLUTION INTRAVENOUS at 08:06

## 2021-02-23 RX ADMIN — PROPOFOL 100 MCG/KG/MIN: 10 INJECTION, EMULSION INTRAVENOUS at 08:31

## 2021-02-23 RX ADMIN — LISINOPRIL 5 MG: 5 TABLET ORAL at 07:35

## 2021-02-23 RX ADMIN — SODIUM CHLORIDE, POTASSIUM CHLORIDE, SODIUM LACTATE AND CALCIUM CHLORIDE: 600; 310; 30; 20 INJECTION, SOLUTION INTRAVENOUS at 08:29

## 2021-02-23 RX ADMIN — SODIUM CHLORIDE, POTASSIUM CHLORIDE, SODIUM LACTATE AND CALCIUM CHLORIDE: 600; 310; 30; 20 INJECTION, SOLUTION INTRAVENOUS at 19:55

## 2021-02-23 ASSESSMENT — PAIN SCALES - GENERAL
PAINLEVEL_OUTOF10: 0

## 2021-02-23 ASSESSMENT — PULMONARY FUNCTION TESTS
PIF_VALUE: 0
PIF_VALUE: 1

## 2021-02-23 NOTE — PROGRESS NOTES
Inpatient Physical Therapy Daily Treatment Note    Unit: [address]  Date:  2/23/2021  Patient Name:    Matias Choudhary  Admitting diagnosis:  Necrotic toes [facility]) Luis Gannon  Admit Date:  2/18/2021  Precautions/Restrictions:  Fall risk, Bed/chair alarm, Lines -IV, [place] (hard of hearing with bilateral hearing aids), Telemetry, WB Restrictions (heel touch weigth bearing during transfers with boot on the R foot)  S/P RIGHT FOOT INCISION AND DRAINAGE WITH FOURTH TOE AMPUTATION (2/19/2021)  RIGHT FOOT INCISION AND DRAINAGE WITH DELAYED CLOSURE WITH PARTIAL METATARSAL RESECTION (2/23/2021)    Discharge Recommendations: Home 24 hr assist and with home PT   DME needs for discharge: RW       Therapy recommendation for EMS Transport: can transport by wheelchair    Therapy recommendations for staff:   Assist of 1 with use of rolling walker (RW) and bilateral feet boots with heel weight bearing on the RLE for all transfers and ambulation to/from chair  to/from bathroom    History of Present Illness: H&P, 2/18/2021, Johnnie CRUZN-CNP:   \" 23 y. o. male with DM type 2, hyperlipidemia who presents to [facility] the [address] [place] [address] for infection to 4th digit on right foot.  He states ongoing for a month.  He f/w Dr. Helena Kelley and the [address] [place] [address]. Sunni Vogel had a fever yesterday.  Admitted to med-surg.  Podiatry consulted. \"   PMHx: abscess of toe of R foot, DM, hyperlipidemia, ulcer of other part of foot   2/19     OR: R foot incision and drainage with 4th toe amputation   2/20-fell overnight, head CT negative, skin tear on R upper arm currently dressed. Cleared by Dr. Helena Kelley and RN to participate. WB status changed to heel WB (order reads FWB in orders, heel WB in note).  RN and patient think patient may have been sleep walking  2/23: R foot incision and drainage with delayed closure with partial metatarsal resection    Home Health S4 Level Recommendation: Level 3 Safety  AM-PAC Mobility Score   AM-PAC Inpatient Mobility Raw Score : 19  AM-PAC Inpatient

## 2021-02-23 NOTE — ANESTHESIA PRE PROCEDURE
Department of Anesthesiology  Preprocedure Note       Name:  Ahmet Sickle   Age:  81 y.o.  :  1935                                          MRN:  [id number]         Date:  2021      Surgeon: Jaydon Cobos):  Shree Puente DPM    Procedure: Procedure(s):  RIGHT FOOT INCISION AND DRAINAGE WITH DELAYED CLOSURE WITH PARTIAL METATARSAL RESECTION    Medications prior to admission:   Prior to Admission medications    Medication Sig Start Date End Date Taking? Authorizing Provider   ferrous sulfate (IRON 325) 325 (65 Fe) MG tablet Take 325 mg by mouth daily (with breakfast)   Yes Historical Provider, MD   lisinopril (PRINIVIL;ZESTRIL) 5 MG tablet TAKE ONE TABLET BY MOUTH DAILY 21  Yes MALATHI Plaza CNP   glipiZIDE (GLUCOTROL) 10 MG tablet Take 1 tablet by mouth 2 times daily (before meals) 21  Yes MALATHI Plaza CNP   metFORMIN (GLUCOPHAGE) 1000 MG tablet Take 1 tablet by mouth 2 times daily (with meals) For diabetes 21  Yes MALATHI Plaza CNP   Multiple Vitamins-Minerals (MULTIVITAMIN PO) Take 1 tablet by mouth daily. Yes Historical Provider, MD   blood glucose test strips (ACCU-CHEK IDXON PLUS) strip 1 each by In Vitro route daily As needed.  20   MALATHI Plaza CNP       Current medications:    Current Facility-Administered Medications   Medication Dose Route Frequency Provider Last Rate Last Admin    lactated ringers infusion   Intravenous Continuous Jessica George MD        sodium chloride flush 0.9 % injection 10 mL  10 mL Intravenous 2 times per day Jessica George MD        sodium chloride flush 0.9 % injection 10 mL  10 mL Intravenous PRN Jessica George MD        cefTRIAXone (ROCEPHIN) 1000 mg IVPB in 50 mL D5W minibag  1,000 mg Intravenous Q24H Obdulio Quinn MD   Stopped at 21 1117    [START ON 2021] enoxaparin (LOVENOX) injection 40 mg  40 mg Subcutaneous Daily Shree Puente DPM Smokeless tobacco: Never Used    Tobacco comment: Quit over 30 years ago   Substance Use Topics    Alcohol use: No     Alcohol/week: 0.0 standard drinks                                Counseling given: Not Answered  Comment: Quit over 30 years ago      Vital Signs (Current):   Vitals:    02/22/21 0815 02/22/21 1345 02/22/21 1932 02/23/21 0414   BP: (!) 145/66 (!) 145/69 (!) 144/76 139/68   Pulse: 86 74 74 71   Resp: 18 18 16 16   Temp: 97.4 °F (36.3 °C) 97.3 °F (36.3 °C) 98.8 °F (37.1 °C) 97 °F (36.1 °C)   TempSrc: Oral Oral Axillary Oral   SpO2: 93% 96% 96% 93%   Weight:                                                  BP Readings from Last 3 Encounters:   02/23/21 139/68   02/19/21 (!) 148/70   02/18/21 (!) 147/67       NPO Status: Time of last liquid consumption: 1700                        Time of last solid consumption: 1700                        Date of last liquid consumption: 02/22/21                        Date of last solid food consumption: 02/22/21    BMI:   Wt Readings from Last 3 Encounters:   02/22/21 221 lb 11.2 oz (100.6 kg)   02/18/21 226 lb (102.5 kg)   11/24/20 236 lb (107 kg)     Body mass index is 33.71 kg/m².     CBC:   Lab Results   Component Value Date    WBC 9.1 02/21/2021    RBC 3.09 02/21/2021    HGB 9.9 02/21/2021    HCT 29.5 02/21/2021    MCV 95.4 02/21/2021    RDW 13.3 02/21/2021     02/21/2021       CMP:   Lab Results   Component Value Date     02/23/2021    K 4.6 02/23/2021    K 4.0 02/21/2021     02/23/2021    CO2 24 02/23/2021    BUN 11 02/23/2021    CREATININE 0.7 02/23/2021    GFRAA >60 02/23/2021    GFRAA >60 07/28/2011    AGRATIO 0.9 02/21/2021    LABGLOM >60 02/23/2021    GLUCOSE 132 02/23/2021    PROT 6.7 02/21/2021    PROT 7.3 07/27/2011    CALCIUM 9.2 02/23/2021    BILITOT <0.2 02/21/2021    ALKPHOS 130 02/21/2021    AST 18 02/21/2021    ALT 17 02/21/2021       POC Tests:   Recent Labs     02/23/21  0745   POCGLU 127*       Coags:   Lab Results Component Value Date    PROTIME 11.4 07/14/2016    INR 1.00 07/14/2016    APTT 32.6 07/14/2016       HCG (If Applicable): No results found for: PREGTESTUR, PREGSERUM, HCG, HCGQUANT     ABGs: No results found for: PHART, PO2ART, CLN3IES, NNN4JWA, BEART, M9ONNTJS     Type & Screen (If Applicable):  No results found for: LABABO, LABRH    Drug/Infectious Status (If Applicable):  No results found for: HIV, HEPCAB    COVID-19 Screening (If Applicable):   Lab Results   Component Value Date    COVID19 Not Detected 02/18/2021         Anesthesia Evaluation  Patient summary reviewed and Nursing notes reviewed  Airway: Mallampati: I  TM distance: >3 FB   Neck ROM: full  Mouth opening: > = 3 FB Dental:    (+) edentulous      Pulmonary:Negative Pulmonary ROS and normal exam  breath sounds clear to auscultation                             Cardiovascular:    (+) hypertension:, CAD:, murmur, hyperlipidemia        Rhythm: regular  Rate: normal                    Neuro/Psych:   Negative Neuro/Psych ROS              GI/Hepatic/Renal: Neg GI/Hepatic/Renal ROS            Endo/Other:    (+) DiabetesType II DM, , malignancy/cancer. Abdominal:   (+) obese,         Vascular:   + PVD, aortic or cerebral, . Anesthesia Plan      MAC     ASA 3       Induction: intravenous. Anesthetic plan and risks discussed with patient. Plan discussed with CRNA.                   Marla Funez MD   2/23/2021

## 2021-02-23 NOTE — PROGRESS NOTES
Progress Note    Admit Date:  2/18/2021    Admitted for R foot cellulitis and osteomyelitis    S.p right 4th toe amputation       Subjective:  Mr. Chloe Goldman denies any acute complaints. Very [place]    Pain is well controlled     Pt very [place]    Objective:   Patient Vitals for the past 4 hrs:   BP Temp Temp src Pulse Resp SpO2   02/23/21 0414 139/68 97 °F (36.1 °C) Oral 71 16 93 %        Intake/Output Summary (Last 24 hours) at 2/23/2021 0740  Last data filed at 2/23/2021 0684  Gross per 24 hour   Intake 300 ml   Output 850 ml   Net -550 ml     Physical Exam:    General:elderly male, severely [place]    Awake, alert and oriented. Appears to be not in any distress  Mucous Membranes:  Pink , anicteric  Neck: No JVD, no carotid bruit, no thyromegaly  Chest:  Clear to auscultation bilaterally, no added sounds  Cardiovascular:  RRR S1S2 heard, +ASM  Abdomen:  Soft, undistended, non tender, no organomegaly, BS present  Extremities  - right foot in dressing, see podiatry notes  Chronic lymphedema 1 + to right LE    No edema or cyanosis.  Distal pulses well felt  Neurological : grossly normal              Scheduled Meds:   cefTRIAXone (ROCEPHIN) IV  1,000 mg Intravenous Q24H    [START ON 2/24/2021] enoxaparin  40 mg Subcutaneous Daily    lidocaine  1 patch Transdermal Daily    metFORMIN  500 mg Oral BID WC    glipiZIDE  5 mg Oral QAM AC    lisinopril  5 mg Oral Daily    ferrous sulfate  325 mg Oral Daily with breakfast    insulin lispro  0-6 Units Subcutaneous TID WC    insulin lispro  0-3 Units Subcutaneous Nightly    sodium chloride flush  10 mL Intravenous 2 times per day       Continuous Infusions:   dextrose         PRN Meds:  glucose, dextrose, glucagon (rDNA), dextrose, sodium chloride flush, potassium chloride **OR** potassium alternative oral replacement **OR** potassium chloride, magnesium sulfate, promethazine **OR** ondansetron, polyethylene glycol, acetaminophen **OR** acetaminophen      Data:  CBC:   Recent Labs 02/21/21  0634   WBC 9.1   HGB 9.9*   HCT 29.5*   MCV 95.4        BMP:   Recent Labs     02/21/21  0634 02/22/21  0555 02/23/21  0550    140 137   K 4.0 3.9 4.6    105 104   CO2 27 27 24   BUN 10 10 11   CREATININE 0.8 0.8 0.7*     LIVER PROFILE:   Recent Labs     02/21/21  0634   AST 18   ALT 17   BILITOT <0.2   ALKPHOS 130*     CULTURES  SARS-CoV-2, NAAT Not Detected      Blood Cx: pending      RADIOLOGY  CT HEAD WO CONTRAST   Final Result   Mild atrophy and mild chronic microischemic changes scattered in the deep   white matter with no acute abnormality seen. MRI FOOT RIGHT WO CONTRAST   Final Result   1. Diffuse subcutaneous edema which is most pronounced at the right 4th toe   suggesting cellulitis at the 4th digit. Correlate clinically. No organized   drainable fluid collection identified. 2. Marrow signal changes of the proximal, middle, and distal phalanges of the   4th toe most consistent with osteomyelitis with likely septic joint at the   proximal interphalangeal joint of the 4th toe and associated destructive   changes of the distal aspect of the proximal phalanx and middle phalanx of   the right 4th toe. 3. Mild edema of the 4th metatarsal head with preserved T1 signal.  Findings   may reflect reactive noninfectious osteitis, however, very early changes of   osteomyelitis difficult to entirely exclude. 4. 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 XR FOOT RIGHT (2 VIEWS)   Final Result   Moderate destructive change most likely representing osteomyelitis involving   the 4th digit with fragmentation and extensive soft tissue edema. Wound cx - strep anginosus      Assessment/Plan:    Necrotic toe 4th digit right foot, wet gangrene  Diabetic foot ulcer   Osteomyelitis toe  - admitted to med-surg.    - Podiatry consulted.    - recd 4 days of Vanc, Zosyn  - MRI foot with R 4th digit with cellulitis, osteo and likely septic joint  - s/p  4th digit amputation right foot POD 4  - repeat I and D  And debridement today   wound culture- strep anginosus   Change abx to rocephin day 2 as it is the preferred agent for this organism     DM type 2  - monitor glucose. - resume home oral Rx   - SSI coverage.      Hypertension  - BP stable. Continue Lisinopril.      Iron deficiency anemia  - on ferrous sulfate.     Obesity  - Complicating assessment and treatment. Placing patient at risk for multiple co-morbidities as well as early death and contributing to the patient's presentation.   - Counseled on weight loss.     DVT Prophylaxis: Lovenox   Diet: Diet NPO, After Midnight  Code Status: Full Code         Zoltan Rees MD 2/23/2021 7:40 AM

## 2021-02-23 NOTE — PROGRESS NOTES
Shift assessment complete. See doc flow. Nightly medications given see MAR. Patient resting in bed comfortably, no concerns noted at this time. Dressing intact to rt foot. . No insulin coverage required at this time. Dressing to RUE reinforced. Call light and bedside table within easy reach. Will continue to monitor.

## 2021-02-23 NOTE — ADT AUTH CERT
Wound and Skin Management GRG - Care Day 3 (2/20/2021) by Sheila Lam RN       Review Status Review Entered   Completed 2/23/2021 12:45      Criteria Review      Care Day: 3 Care Date: 2/20/2021 Level of Care: Inpatient Floor    Guideline Day 2    Level Of Care    (X) Floor    2/23/2021 12:45 PM EST by Cyrli Bean      med surg    Clinical Status    (X) * No ICU or intermediate care needs    2/23/2021 12:45 PM EST by Cyril Bean      med surg    Interventions    (X) Inpatient interventions continue    2/23/2021 12:45 PM EST by Cyril Bean      iv zosyn 3.375 mg q 8 ,    * Milestone   Additional Notes   2-20-21      98.7  18  74  115/50  98 % ra       Labs stable        iv zosyn 3.375 mg q 8      Per phy- General:elderly male, severely [facility]     Awake, alert and oriented. Appears to be not in any distress   Mucous Membranes:  Pink , anicteric   Neck: No JVD, no carotid bruit, no thyromegaly   Chest:  Clear to auscultation bilaterally, no added sounds   Cardiovascular:  RRR S1S2 heard, +ASM   Abdomen:  Soft, undistended, non tender, no organomegaly, BS present   Extremities  - right foot in dressing, see podiatry notes   Chronic lymphedema 1 + to right LE     No edema or cyanosis. Distal pulses well felt   Neurological : grossly normal   Assessment/Plan:       Necrotic toe 4th digit right foot, wet gangrene   Diabetic foot ulcer    - admitted to med-surg.     - Podiatry consulted. - Esha Wilson D#3   - MRI foot with R 4th digit with cellulitis, osteo and likely septic joint   - s/p  4th digit amputation right foot. - repeat I and D next week         DM type 2   - monitor glucose. - resume home oral Rx    - SSI coverage.        Hypertension   - BP stable. Continue Lisinopril.        Iron deficiency anemia   - on ferrous sulfate.       Obesity   - Complicating assessment and treatment.  Placing patient at risk for multiple co-morbidities as well as early death and contributing to the patient's presentation.    - Counseled on weight loss.       DVT Prophylaxis: Lovenox    Diet: DIET CARB CONTROL; Carb Control: 4 carbs/meal (approximate 1800 kcals/day)   Code Status: Full Code   Assessment:       Active Problems:     Controlled type 2 diabetes mellitus with complication, without long-term current use of insulin (HCC)     Hypertension, essential     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HCC)     Diabetic ulcer of toe 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 mellitus, with fat layer exposed ([address]   Resolved Problems:     * No resolved hospital problems. *           Plan:   -  Dressing : Daily dressing changes with iodoform packing and dry sterile dressing   -  PWB heel weightbearing with transfers into the bathroom   -  Continue antibiotics    -He will need further surgical debridement and delayed primary closure possibly Tuesday we need to await demarcation of the plantar tissues to plan for appropriate closure              SLR- PA RECOMMENDATION by Rk Burdick RN       Review Status Review Entered   In Primary 2021 09:27      Criteria Review   e recommend that the following pt's current hospitalization under INPATIENT  status  is APPROPRIATE  .         Name: Trinh Jennings   : 1935   CSN: [id number]   INSURANCE: [employer]           Clinical summary           ASSESSMENT/PLAN:  Necrotic toe 4th digit right foot, wet gangrene  - admitted to med-surg. Podiatry consulted. - Esha Wilson D#1  - OR tomorrow afternoon for 4th digit amputation right foot.      DM type 2  - monitor glucose. - continue Metformin, Glipizide  - SSI coverage.      Hypertension  - BP stable. Continue Lisinopril.      Iron deficiency anemia  - on ferrous sulfate.   Vitals                           vss  Labs and Imaging       EXAMINATION:   MRI OF THE RIGHT FOOT WITHOUT CONTRAST, 2021 3:51 pm      TECHNIQUE:   Multiplanar multisequence MRI of the right foot was performed without the   administration of intravenous contrast.    COMPARISON:   Right foot radiograph same day      HISTORY:   ORDERING SYSTEM PROVIDED HISTORY: diabetic foot infection   TECHNOLOGIST PROVIDED HISTORY:   Evaluate for osteomyelitis of the fourth metatarsal and abscess   Reason for exam:->diabetic foot infection   What is the area of interest?->Forefoot   Reason for Exam: Evaluate for osteomyelitis of the fourth metatarsal and   abscess   Acuity: Acute   Type of Exam: Initial      FINDINGS:   LISFRANC JOINT:  Lisfranc ligament is visualized and is intact.  The   alignment of the tarsal-metatarsal joint is anatomic.      BONE MARROW: Prominent marrow edema and confluent decreased T1 signal present   throughout the proximal, middle, and distal phalanges of the 4th toe with   associated destructive change of the middle phalanx of the 4th toe and distal   aspect of the proximal 4th phalanx consistent with osteomyelitis.  Mild edema   of the 4th metatarsal head with grossly preserved T1 signal.  Findings may   reflect reactive noninfectious osteitis, however, very early changes of   osteomyelitis difficult to exclude.  No suspicious marrow occupying lesion. Degenerative marrow signal changes of the midfoot articulations.      JOINTS: Indistinct appearance of the proximal interphalangeal joint of the   4th toe with likely joint effusion present and septic joint given surrounding   soft tissue and osseous findings.  Mild-to-moderate osteoarthritic change of   the 1st MTP joint.  Moderate to severe degenerative changes of the midfoot   articulations.  Associated subchondral sclerosis and subchondral cystic   change throughout the midfoot.      SOFT TISSUES: Diffuse subcutaneous edema of the soft tissues with more   prominent edema of the 4th toe.  No organized drainable fluid collection   identified.      TENDONS: The flexor and extensor tendons appear grossly intact within limits   of the exam.   Impression:   1.  Diffuse subcutaneous edema which is most pronounced at the right 4th toe   suggesting cellulitis at the 4th digit.  Correlate clinically.  No organized   drainable fluid collection identified. 2. Marrow signal changes of the proximal, middle, and distal phalanges of the   4th toe most consistent with osteomyelitis with likely septic joint at the   proximal interphalangeal joint of the 4th toe and associated destructive   changes of the distal aspect of the proximal phalanx and middle phalanx of   the right 4th toe. 3. Mild edema of the 4th metatarsal head with preserved T1 signal.  Findings   may reflect reactive noninfectious osteitis, however, very early changes of   osteomyelitis difficult to entirely exclude.    4. 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         MCG criteria applies   yes,   Comments       Inpt kristofer        This chart was reviewed at 8:20 AM 2/20/2021     [address] [place] [address]   CELL : [phone]      Wound and Skin Management GRG - Care Day 2 (2/19/2021) by Nikita French LPN       Review Status Review Entered   Completed 2/19/2021 16:42      Criteria Review      Care Day: 2 Care Date: 2/19/2021 Level of Care: Inpatient Floor    Guideline Day 2    Clinical Status    ( )  No ICU or intermediate care needs    2/19/2021 4:42 PM EST by Marisol Coates      OR 2/19 afternoon for 4th digit amputation right foot    Interventions    (X) Inpatient interventions continue    2/19/2021 4:42 PM EST by Marisol Coates      IV Zosyn  IV Vacomycin  IV NS 75mL/hr     Milestone   Additional Notes   2/19/2021   Vitals: 167/73 87 18 96.7 98% RA      Brief Postoperative Note          Date of Procedure: 2/19/2021       Pre-Op Diagnosis: DIABETIC FOOT INFECTION, wet gangrene       Post-Op Diagnosis: Same         Procedure(s):   RIGHT FOOT INCISION AND DRAINAGE WITH FOURTH TOE AMPUTATION       Surgeon(s):   Clarisse Pedraza DPM       Assistant:   Surgical Assistant: Humera Chen     Anesthesia: General       Estimated Blood Loss (mL): Minimal       Complications: Other: Moderate necrosis of the plantar foot pad sub fourth MTPJ      IM Progress Note   Admitted for R foot cellulitis and osteomyelitis       Subjective:   Mr. Nitin Meneses denies any acute complaints. Pain is well controlled        Physical Exam:   Gen: Obese, elderly male, No distress. Alert. Eyes: No conjunctival injection. ENT: No discharge. Pharynx clear. [place]    Neck: Trachea midline. Resp: No accessory muscle use. No crackles. No wheezes. No rhonchi. CV: Regular rate. Regular rhythm. No murmur.  No rub. ++ edema. GI: Non-tender. Non-distended.  Normal bowel sounds. No hernia. Skin: Warm and dry. Dressing over R foot, did not remove for evaluation of wounds today    M/S: significant edema of RLE > LLE, dressing place over R foot    Neuro: Awake. Grossly nonfocal , [place], generalized weakness    Psych:  No anxiety or agitation.       Assessment/Plan:   Necrotic toe 4th digit right foot, wet gangrene   - admitted to med-surg.     - Podiatry consulted. - Juan F RODARTE#2   - MRI foot with R 4th digit with cellulitis, osteo and likely septic joint   - OR 2/19 afternoon for 4th digit amputation right foot.        DM type 2   - monitor glucose. - Hold home oral Rx    - SSI coverage.        Hypertension   - BP stable. Continue Lisinopril.        Iron deficiency anemia   - on ferrous sulfate.       Obesity   - Complicating assessment and treatment. Placing patient at risk for multiple co-morbidities as well as early death and contributing to the patient's presentation.    - Counseled on weight loss.       DVT Prophylaxis: Lovenox    Diet: Diet NPO, After Midnight   Code Status: Full Code      Labs:    Albumin: 3.0 (L)   Albumin/Globulin Ratio: 0.8 (L)   WBC: 12.8 (H)   RBC: 2.88 (L)   Hemoglobin Quant: 9.1 (L)   Hematocrit: 27.8 (L)      Scheduled Meds:   ·  lisinopril, 5 mg, Oral, Daily   ·  ferrous sulfate, 325 mg, Oral,

## 2021-02-23 NOTE — BRIEF OP NOTE
Brief Postoperative Note      Patient: Marce Vick  YOB: 1935  MRN: [id number]    Date of Procedure: 2/23/2021    Pre-Op Diagnosis: Diabetic foot infection with osteomyelitis    Post-Op Diagnosis: Same       Procedure(s):  RIGHT FOOT INCISION AND DRAINAGE WITH DELAYED CLOSURE WITH PARTIAL METATARSAL RESECTION    Surgeon(s):  May Litten, DPM    Assistant:  Surgical Assistant: Jarret Gardner    Anesthesia: Monitor Anesthesia Care    Estimated Blood Loss (mL): Minimal    Complications: None    Specimens:   ID Type Source Tests Collected by Time Destination   1 : right foot  Body Fluid Fluid BODY FLUID CELL COUNT WITH DIFFERENTIAL, CULTURE, FUNGUS, CULTURE, BODY FLUID, CULTURE WITH SMEAR, ACID FAST [address], [place] 2/23/2021 1848    2 : right foot Bone Bone CULTURE, SURGICAL May Litten, DPM 2/23/2021 1424    A : right foot  Bone Bone SURGICAL PATHOLOGY May Litten, DPM 2/23/2021 0835        Implants:  * No implants in log *      Drains: * No LDAs found *    Findings: No new necrotic tissue all remaining nonviable tissue sharply excised distal aspect of the fourth metatarsal sent to pathology for evaluation and microbiology    Electronically signed by May Litten, DPM on 2/23/2021 at 9:31 AM

## 2021-02-23 NOTE — OP NOTE
Operative Note      Patient: Jazzy Wang  YOB: 1935  MRN: [id number]    Date of Procedure: 2/23/2021    Pre-Op Diagnosis: Diabetic foot infection with osteomyelitis    Post-Op Diagnosis: Same       Procedure(s):  RIGHT FOOT INCISION AND DRAINAGE WITH DELAYED CLOSURE WITH PARTIAL METATARSAL RESECTION    Surgeon(s):  Abi Mann DPM    Assistant:  Surgical Assistant: Melina Styles    Anesthesia: Monitor Anesthesia Care    Estimated Blood Loss (mL): Minimal    Complications: None    Specimens:   ID Type Source Tests Collected by Time Destination   1 : right foot  Body Fluid Fluid BODY FLUID CELL COUNT WITH DIFFERENTIAL, CULTURE, FUNGUS, CULTURE, BODY FLUID, CULTURE WITH SMEAR, ACID FAST [facility], [place] 2/23/2021 0889    2 : right foot Bone Bone CULTURE, SURGICAL Abi Mann DPM 2/23/2021 9120    A : right foot  Bone Bone SURGICAL PATHOLOGY Abi Mann DPM 2/23/2021 0835        Implants:  * No implants in log *      Drains: * No LDAs found *    Findings: No new necrotic tissue all remaining nonviable tissue sharply excised distal aspect of the fourth metatarsal sent to pathology for evaluation and microbiology. Indication for Procedure: This is a 81year old Male here for an non-elective procedure due to a foot infection of the right foot. The anticipated procedure, expected post-operative course and all benefits, risks, and complications were explained to the patient in detail. The patient's questions were answered to their satisfaction. Informed and written consent were obtained and placed in the chart. Procedure:  Under mild sedation the patient was brought back to the OR and placed on the operating table in the supine position. Following the induction of IV anesthesia a local anesthetic block was then infiltrated proximal to and circumferentially around the planned operative area. The right extremity was then scrubbed prepped and draped.     Details of procedure #1 and 2: Delayed primary closure with partial metatarsal resection right foot fourth metatarsal  At this time attention was directed to the previous surgical site. Using a curette, pickups and scissors all nonviable tissue was sharply excised. Next using a bone saw the distal aspect of the fourth metatarsal was transected in a dorsal to plantar fashion. The bone was then sent for pathology and culture. Next attention was directed to the surgical site and any remaining nonviable or necrotic tissue was sharply removed that was beneath metatarsal head. The site was then pulse lavaged with 3000 cc of normal saline. The site was then reexamined for any remaining necrotic tissue and was noted to be clean. The bone edge was hand rasped to remove any sharp edges. The site was then flushed with 50 cc of normal saline. Next the skin edges were unrolled and excess tissue was sharply excised. The site was then sutured closed using simple and double simple 3-0 nylon sutures. The site was then dressed with Betadine ointment, 4 x 4's, Kerlix and Ace bandage. The patient tolerated the procedure and anesthesia well and was transported from the operating room to the PACU with vital signs stable and vascular status intact to all digits  of the patient's right lower extremity. Following a short period of post-operative monitoring, the patient will be readmitted to the hospital. The patient is to follow-up with  Dr. Kalpesh Canseco in the wound care center within 1 week after discharge. The patient is to keep dressing clean, dry and intact at all times. Should the patient develop any post-op complications or have any questions prior to the first post-operative visit they can contact  at the number provided in the chart.       Electronically signed by Francisca Murphy DPM on 2/23/2021 at 9:31 AM

## 2021-02-23 NOTE — PROGRESS NOTES
Occupational Therapy Daily Treatment Note    Unit: [facility]  Date:  2/23/2021  Patient Name:    Vivien Blair  Admitting diagnosis:  Necrotic toes [facility]) Rashmi Jaquez  Admit Date:  2/18/2021  Precautions/Restrictions:  fall risk, IV, bed/chair alarm, WB restrictions (Heel WB, RLE) , telemetry, telesitter and [place] (hard of hearing)        Discharge Recommendations: Home with 24/7 assist and home therapy  DME needs for discharge: RW       Therapy recommendations for staff:   Assist of 1 (contact guard assist) with use of rolling walker (RW) for all ambulation to/from bathroom    AM-PAC Score: AM-PAC Inpatient Daily Activity Raw Score: 18  Home Health S4 Level: NA       Treatment Time: 14:42-15:10  Treatment number:  4  Total Treatment Time: 28 minutes    History of Present Illness: H&P, 2/18/2021, Johnnie APRN-CNP:   Sabrina.Umberto y. o. male with DM type 2, hyperlipidemia who presents to [facility] the [facility] for infection to 4th digit on right foot.  He states ongoing for a month.  He f/w Dr. Payam Park and the [facility]. Jessica Wick had a fever yesterday.  Admitted to med-surg.  Podiatry consulted. 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 Cleared by Dr. Payam Park and RN to participate. WB status changed to heel WB (order reads FWB in orders, heel WB in note).  RN and patient think patient may have been sleep walking    2/23: RIGHT FOOT INCISION AND DRAINAGE WITH DELAYED CLOSURE WITH PARTIAL METATARSAL RESECTION      Subjective:  Patient sitting on side of bed and using urinal upon therapist arrival.     Pain   None reported throughout session     Bed Mobility:   Supine to Sit:  Not Tested  Sit to Supine:  Not Tested  Rolling:           Not Tested  Scooting:        SBA     Transfer Training:   Sit to stand:   SBA  Stand to sit:  SBA  Standard toilet:   Not Tested  Bed to to bathroom to chair:  CGA with RW     Activity Tolerance   Pt completed therapy session with No adverse symptoms noted w/activity    ADL Training:   Lower body dressing:  Not Tested   Toileting:   SBA    Grooming/Hygiene:  Independent to clean hands with wipes and hand  in the bed. Therapeutic Exercise:   N/A    Patient Education:   Role of OT  Recommendations for DC    Positioning Needs:   Up in chair, call light and needs in reach. Alarm Set    Family Present:  No    Assessment: Pt with good progress. Pt may benefit from continued skilled occupational therapy while in the hospital and upon d/c in order to progress to a safe and more indpt level of functioning. GOALS  To be met in 3 Visits:  1). Bed to chair/BSC: Supervision     To be met in 5 Visits:  1). Supine to/from Sit:             Supervision  (Goal met 2/21/2021)    New goal:  Indpt   2). Upper Body Bathing:         Supervision  3). Lower Body Bathing:         Min A  4). Upper Body Dressing:       Supervision  5). Lower Body Dressing:       Min A  6).  Pt to demonstrate UE exs x 15 reps with minimal cues      Plan: cont with [facility], OTR/L #[id number]        If patient discharges from this facility prior to next visit, this note will serve as the Discharge Summary

## 2021-02-23 NOTE — PROGRESS NOTES
AM assessment done at this time. Alert and oriented. Medications held due to surgery today. Needs are met. No pain noted at this time. Finger stick done; no coverage needed. In bed, call light within reach.

## 2021-02-23 NOTE — ANESTHESIA POSTPROCEDURE EVALUATION
Department of Anesthesiology  Postprocedure Note    Patient: Vivien Blair  MRN: [id number]  YOB: 1935  Date of evaluation: 2/23/2021  Time:  10:48 AM     Procedure Summary     Date: 02/23/21 Room / Location: [facility]    Anesthesia Start: 6701 Anesthesia Stop: 8033    Procedure: RIGHT FOOT INCISION AND DRAINAGE WITH DELAYED CLOSURE WITH PARTIAL METATARSAL RESECTION (Right Foot) Diagnosis: (X)    Surgeons: Clarisse Pedraza DPM Responsible Provider: King Harris MD    Anesthesia Type: MAC ASA Status: 3          Anesthesia Type: MAC    Opal Phase I:      Opal Phase II: Opal Score: 10    Last vitals: Reviewed and per EMR flowsheets.        Anesthesia Post Evaluation    Patient location during evaluation: PACU  Patient participation: complete - patient participated  Level of consciousness: awake and alert  Pain score: 0  Airway patency: patent  Nausea & Vomiting: no nausea and no vomiting  Complications: no  Cardiovascular status: blood pressure returned to baseline  Respiratory status: acceptable  Hydration status: stable

## 2021-02-24 VITALS
OXYGEN SATURATION: 96 % | WEIGHT: 221.7 LBS | DIASTOLIC BLOOD PRESSURE: 80 MMHG | RESPIRATION RATE: 16 BRPM | TEMPERATURE: 97.1 F | BODY MASS INDEX: 33.71 KG/M2 | HEART RATE: 73 BPM | SYSTOLIC BLOOD PRESSURE: 173 MMHG

## 2021-02-24 LAB
ANAEROBIC CULTURE: ABNORMAL
ANION GAP SERPL CALCULATED.3IONS-SCNC: 8 MMOL/L (ref 3–16)
BUN BLDV-MCNC: 9 MG/DL (ref 7–20)
CALCIUM SERPL-MCNC: 9.4 MG/DL (ref 8.3–10.6)
CHLORIDE BLD-SCNC: 106 MMOL/L (ref 99–110)
CO2: 27 MMOL/L (ref 21–32)
CREAT SERPL-MCNC: 0.6 MG/DL (ref 0.8–1.3)
CULTURE SURGICAL: ABNORMAL
GFR AFRICAN AMERICAN: >60
GFR NON-AFRICAN AMERICAN: >60
GLUCOSE BLD-MCNC: 121 MG/DL (ref 70–99)
GLUCOSE BLD-MCNC: 126 MG/DL (ref 70–99)
GLUCOSE BLD-MCNC: 130 MG/DL (ref 70–99)
GRAM STAIN RESULT: ABNORMAL
ORGANISM: ABNORMAL
PERFORMED ON: ABNORMAL
PERFORMED ON: ABNORMAL
POTASSIUM SERPL-SCNC: 3.9 MMOL/L (ref 3.5–5.1)
SODIUM BLD-SCNC: 141 MMOL/L (ref 136–145)

## 2021-02-24 PROCEDURE — 6370000000 HC RX 637 (ALT 250 FOR IP): Performed by: INTERNAL MEDICINE

## 2021-02-24 PROCEDURE — 99239 HOSP IP/OBS DSCHRG MGMT >30: CPT | Performed by: INTERNAL MEDICINE

## 2021-02-24 PROCEDURE — 36415 COLL VENOUS BLD VENIPUNCTURE: CPT

## 2021-02-24 PROCEDURE — 97530 THERAPEUTIC ACTIVITIES: CPT

## 2021-02-24 PROCEDURE — 6360000002 HC RX W HCPCS: Performed by: INTERNAL MEDICINE

## 2021-02-24 PROCEDURE — 6360000002 HC RX W HCPCS: Performed by: PODIATRIST

## 2021-02-24 PROCEDURE — 2580000003 HC RX 258: Performed by: INTERNAL MEDICINE

## 2021-02-24 PROCEDURE — 97535 SELF CARE MNGMENT TRAINING: CPT

## 2021-02-24 PROCEDURE — 80048 BASIC METABOLIC PNL TOTAL CA: CPT

## 2021-02-24 RX ORDER — LISINOPRIL 10 MG/1
10 TABLET ORAL DAILY
Qty: 30 TABLET | Refills: 0 | Status: SHIPPED | OUTPATIENT
Start: 2021-02-24 | End: 2021-03-25 | Stop reason: SDUPTHER

## 2021-02-24 RX ORDER — LISINOPRIL 10 MG/1
10 TABLET ORAL ONCE
Status: COMPLETED | OUTPATIENT
Start: 2021-02-24 | End: 2021-02-24

## 2021-02-24 RX ORDER — GLIPIZIDE 5 MG/1
5 TABLET ORAL
Qty: 60 TABLET | Refills: 0 | Status: SHIPPED | OUTPATIENT
Start: 2021-02-24 | End: 2021-03-25 | Stop reason: SDUPTHER

## 2021-02-24 RX ORDER — CEFDINIR 300 MG/1
300 CAPSULE ORAL 2 TIMES DAILY
Qty: 14 CAPSULE | Refills: 0 | Status: SHIPPED | OUTPATIENT
Start: 2021-02-24 | End: 2021-03-09

## 2021-02-24 RX ADMIN — ENOXAPARIN SODIUM 40 MG: 40 INJECTION SUBCUTANEOUS at 08:08

## 2021-02-24 RX ADMIN — ACETAMINOPHEN 650 MG: 325 TABLET ORAL at 02:02

## 2021-02-24 RX ADMIN — CEFTRIAXONE SODIUM 1000 MG: 1 INJECTION, POWDER, FOR SOLUTION INTRAMUSCULAR; INTRAVENOUS at 11:14

## 2021-02-24 RX ADMIN — LISINOPRIL 10 MG: 10 TABLET ORAL at 09:33

## 2021-02-24 RX ADMIN — FERROUS SULFATE TAB 325 MG (65 MG ELEMENTAL FE) 325 MG: 325 (65 FE) TAB at 08:09

## 2021-02-24 RX ADMIN — GLIPIZIDE 5 MG: 5 TABLET ORAL at 08:16

## 2021-02-24 RX ADMIN — METFORMIN HYDROCHLORIDE 500 MG: 500 TABLET ORAL at 08:09

## 2021-02-24 RX ADMIN — LISINOPRIL 5 MG: 5 TABLET ORAL at 08:09

## 2021-02-24 ASSESSMENT — PAIN DESCRIPTION - PAIN TYPE: TYPE: CHRONIC PAIN

## 2021-02-24 ASSESSMENT — PAIN SCALES - GENERAL: PAINLEVEL_OUTOF10: 6

## 2021-02-24 NOTE — PROGRESS NOTES
report given and pt care transferred to [facility]. Pt denies needs at this time. Call light within reach.

## 2021-02-24 NOTE — PROGRESS NOTES
Discharge instructions gone over at this time with patient and son. Questions answered. Verbalizes understanding. Prescriptions sent to nina montalvo [facility]; cefdinir, glipizide, lisinopril. Transport placed for pt.

## 2021-02-24 NOTE — CARE COORDINATION
[facility]  Notified [facility] of pt plan to dc home today. Per Michelle ANTONIO, no wound care orders. Pt to follow up with podiatry, Dr. Cruzito Carpenter, 3/2. Attempt to follow up with pt and no answer in room. Telephone call to home and spoke with pt's spouse, Neymar Awan. Agreeable to [facility] for SN, PT/OT. Verified demographics. Sent referral with orders to [address] [place] [address] for [facility].     Electronically signed by Galindo Shea RN on 2/24/2021 at 1:09 PM
DISCHARGE ORDER  Date/Time 2021 10:37 AM  Completed by: Alessia Moffett, Case Management    Patient Name: Faye Garcia      : 1935  Admitting Diagnosis: Necrotic toes ([address] Dillon Fuelling order Date and Status: 21 inpt  (verify MD's last order for status of admission)      Noted discharge order. If applicable PT/OT recommendation at Discharge: home 24 h assist and home therapy  DME recommendation by PT/OT:RW  Confirmed discharge plan  : Yes  with whom patient  If pt confirmed DC plan does family need to be contacted by CM No   Discharge Plan: Order for dc noted. Spoke with pt at bedside and plan remains for home with wife and gianna. States will have  assist.  Discussed [facility] and pt is agreeable and chooses [facility]. Spoke with Collin Padilla at [facility] who will arrange for [facility] needs. Noted PT recommendation for RW pt declines stating has one at home already. Chart reviewed and no other dc needs identified. Reviewed chart. Role of discharge planner explained and patient verbalized understanding. Discharge order is noted. Has Home O2 in place on admit:  No  Informed of need to bring portable home O2 tank on day of discharge for nursing to connect prior to leaving:   Not Indicated  Verbalized agreement/Understanding:   Not Indicated  Pt is being d/c'd to home today. Pt's O2 sats are 95% on RA. Discharge timeout done with  Nsg, CM and pt. All discharge needs and concerns addressed.
[facility]    Referral received from CM to follow for home care services. I will follow for needs, and speak with patient to verify demos.       [facility] approved            Danny Padilla  Work mobile: [phone]  [facility] office: [phone]
· Fax: Other Community Services: (ex:PT/OT,Mental Health,Wound Clinic, Cardio/Pul [address]) Active [facility] [address]    DISCHARGE PLAN: Explained Case Management role/services. Reviewed chart and met with pt at bedside. Role of CM explained.  lives home with wife and is IPTA with all care. [facility] is active with [address] [facility]. Noted pt will go to surgery today for toe amputation. Will follow for poss HHC needs at [facility].

## 2021-02-24 NOTE — PROGRESS NOTES
AM assessment done at this time. Alert and oriented. No needs noted. Denies pain. Medications given; refer to mar. Vitals done; stable. Right foot wrapped, blood return noted in toes. In bed, call light within reach.

## 2021-02-24 NOTE — PROGRESS NOTES
Patient c/o chronic rt hip pain 6/10 from laying in bed. Patient repositioned. PRN Tylenol given. Call light within reach.

## 2021-02-24 NOTE — PROGRESS NOTES
Occupational Therapy Daily Treatment Note    [facility]  Date:  2/24/2021  Patient Name:    Valerio Perez  Admitting diagnosis:  Necrotic toes [facility]) Hue Skinner  Admit Date:  2/18/2021  Precautions/Restrictions:  fall risk, IV, bed/chair alarm, WB restrictions (Heel WB, RLE) , telemetry, telesitter and [place] (hard of hearing)        Discharge Recommendations: Home with 24/7 assist and home therapy  DME needs for discharge: RW (patient declined, wants to use his 3 wheeled walker)       Therapy recommendations for staff:   Assist of 1 (contact guard assist) with use of rolling walker (RW) for all ambulation to/from bathroom    AM-PAC Score: AM-PAC Inpatient Daily Activity Raw Score: 18  Home Health S4 Level: NA       Treatment Time: 2624-4961  Treatment number:  5  Total Treatment Time:  60 minutes    History of Present Illness: H&P, 2/18/2021, Johnnie APRN-CNP:   Nicole danielle. o. male with DM type 2, hyperlipidemia who presents to [facility] the [facility] for infection to 4th digit on right foot.  He states ongoing for a month.  He f/w Dr. Mario Duke and the [facility]. Brandon Marley had a fever yesterday.  Admitted to med-surg.  Podiatry consulted. 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 Cleared by Dr. Mario Duke and RN to participate. WB status changed to heel WB (order reads FWB in orders, heel WB in note). RN and patient think patient may have been sleep walking    2/23: RIGHT FOOT INCISION AND DRAINAGE WITH DELAYED CLOSURE WITH PARTIAL METATARSAL RESECTION    Subjective:  Patient up in bathroom with PCA upon arrival, agreeable to therapy.     Pain   None reported throughout session     Bed Mobility:   Supine to Sit:  Not Tested  Sit to Supine:  Not Tested  Rolling:           Not Tested  Scooting:        SBA seated scoot    Transfer Training:   Sit to stand:   SBA from chair, toilet  Stand to sit:  SBA   Standard toilet:   CGA and cues for hand placement  Bed to to bathroom to chair:  SBA with RW and with personal 3 wheeled walker    Demonstrates good understanding of use of brakes on 3 wheeled walker for safety. Education provided regarding increased safety allowed by RW use (reduced risk of bumping R toes, increased ability to bear weight through UEs during foot healing) but patient declined. Prefers to use three wheeled walker. Activity Tolerance   Pt completed therapy session with No adverse symptoms noted w/activity   Returned to bathroom x2 to move bowels this session. ADL Training:   Lower body dressing:  Not Tested   Toileting:   SBA  Seated hygiene  Grooming/Hygiene:  Independent to clean hands with wipes and hand      Therapeutic Exercise:   N/A    Patient Education:   Role of OT  Recommendations for DC  Safe RW use/hand placement    Positioning Needs:   Up in chair, call light and needs in reach. Alarm Set    Family Present:  No    Assessment: Pt with good progress. Reports family will provide 24 hour assist/supervision at d/c. Pt may benefit from continued skilled occupational therapy while in the hospital and upon d/c in order to progress to a safe and more indpt level of functioning. GOALS  To be met in 3 Visits:  1). Bed to chair/BSC: Supervision     To be met in 5 Visits:  1). Supine to/from Sit:             Supervision  (Goal met 2/21/2021)    New goal:  Indpt   2). Upper Body Bathing:         Supervision  3). Lower Body Bathing:         Min A  4). Upper Body Dressing:       Supervision  5). Lower Body Dressing:       Min A  6).  Pt to demonstrate UE exs x 15 reps with minimal cues      Plan: cont with [facility], OTR/L 4030          If patient discharges from this facility prior to next visit, this note will serve as the Discharge Summary
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
## 2021-02-24 NOTE — PROGRESS NOTES
Progress Note    Admit Date:  2/18/2021    Admitted for R foot cellulitis and osteomyelitis    S.p right 4th toe amputation       Subjective:  Mr. Daryl Alex denies any acute complaints. Very [place]    Pain is well controlled     Pt very [place]    Objective:   Patient Vitals for the past 4 hrs:   BP Temp Temp src Pulse Resp SpO2   02/24/21 0800 (!) 173/80 97.1 °F (36.2 °C) Oral 73 16 96 %   02/24/21 0722 (!) 170/74 97.3 °F (36.3 °C) Oral 69 16 95 %        Intake/Output Summary (Last 24 hours) at 2/24/2021 0857  Last data filed at 2/24/2021 0203  Gross per 24 hour   Intake 580 ml   Output 2100 ml   Net -1520 ml     Physical Exam:    General:elderly male, severely [place]    Awake, alert and oriented. Appears to be not in any distress  Mucous Membranes:  Pink , anicteric  Neck: No JVD, no carotid bruit, no thyromegaly  Chest:  Clear to auscultation bilaterally, no added sounds  Cardiovascular:  RRR S1S2 heard, +ASM  Abdomen:  Soft, undistended, non tender, no organomegaly, BS present  Extremities  - right foot in dressing, see podiatry notes  Chronic lymphedema 1 + to right LE    No edema or cyanosis.  Distal pulses well felt  Neurological : grossly normal              Scheduled Meds:   cefTRIAXone (ROCEPHIN) IV  1,000 mg Intravenous Q24H    enoxaparin  40 mg Subcutaneous Daily    lidocaine  1 patch Transdermal Daily    metFORMIN  500 mg Oral BID WC    glipiZIDE  5 mg Oral QAM AC    lisinopril  5 mg Oral Daily    ferrous sulfate  325 mg Oral Daily with breakfast    insulin lispro  0-6 Units Subcutaneous TID WC    insulin lispro  0-3 Units Subcutaneous Nightly    sodium chloride flush  10 mL Intravenous 2 times per day       Continuous Infusions:   dextrose         PRN Meds:  glucose, dextrose, glucagon (rDNA), dextrose, sodium chloride flush, potassium chloride **OR** potassium alternative oral replacement **OR** potassium chloride, magnesium sulfate, promethazine **OR** ondansetron, polyethylene glycol, acetaminophen **OR** acetaminophen      Data:  CBC:   No results for input(s): WBC, HGB, HCT, MCV, PLT in the last 72 hours. BMP:   Recent Labs     02/22/21  0555 02/23/21  0550 02/24/21  0624    137 141   K 3.9 4.6 3.9    104 106   CO2 27 24 27   BUN 10 11 9   CREATININE 0.8 0.7* 0.6*     LIVER PROFILE:   No results for input(s): AST, ALT, LIPASE, BILIDIR, BILITOT, ALKPHOS in the last 72 hours. Invalid input(s): AMYLASE,  ALB  CULTURES  SARS-CoV-2, NAAT Not Detected      Blood Cx: pending      RADIOLOGY  CT HEAD WO CONTRAST   Final Result   Mild atrophy and mild chronic microischemic changes scattered in the deep   white matter with no acute abnormality seen. MRI FOOT RIGHT WO CONTRAST   Final Result   1. Diffuse subcutaneous edema which is most pronounced at the right 4th toe   suggesting cellulitis at the 4th digit. Correlate clinically. No organized   drainable fluid collection identified. 2. Marrow signal changes of the proximal, middle, and distal phalanges of the   4th toe most consistent with osteomyelitis with likely septic joint at the   proximal interphalangeal joint of the 4th toe and associated destructive   changes of the distal aspect of the proximal phalanx and middle phalanx of   the right 4th toe. 3. Mild edema of the 4th metatarsal head with preserved T1 signal.  Findings   may reflect reactive noninfectious osteitis, however, very early changes of   osteomyelitis difficult to entirely exclude. 4. 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 XR FOOT RIGHT (2 VIEWS)   Final Result   Moderate destructive change most likely representing osteomyelitis involving   the 4th digit with fragmentation and extensive soft tissue edema. Wound cx - strep anginosus      Assessment/Plan:    Necrotic toe 4th digit right foot, wet gangrene  Diabetic foot ulcer   Osteomyelitis toe  - admitted to med-surg.    - Podiatry consulted.    - recd 4

## 2021-02-24 NOTE — DISCHARGE SUMMARY
General:elderly male, severely [place]    Awake, alert and oriented. Appears to be not in any distress  Mucous Membranes:  Pink , anicteric  Neck: No JVD, no carotid bruit, no thyromegaly  Chest:  Clear to auscultation bilaterally, no added sounds  Cardiovascular:  RRR S1S2 heard, +ASM  Abdomen:  Soft, undistended, non tender, no organomegaly, BS present  Extremities  - right foot in dressing, see podiatry notes  Chronic lymphedema 1 + to right LE    No edema or cyanosis. Distal pulses well felt  Neurological : grossly normal    BMP:   Recent Labs     02/22/21  0555 02/23/21  0550 02/24/21  0624    137 141   K 3.9 4.6 3.9    104 106   CO2 27 24 27   BUN 10 11 9   CREATININE 0.8 0.7* 0.6*     CULTURES    Acid Fast Cx: No AFB     Fungal cx: pending    Blood cx x2: NGTD     Surgical cx: moderate growth Strep anginosus    RAPID COVID: Not detected    RADIOLOGY    CT HEAD WO CONTRAST   Final Result   Mild atrophy and mild chronic microischemic changes scattered in the deep   white matter with no acute abnormality seen. MRI FOOT RIGHT WO CONTRAST   Final Result   1. Diffuse subcutaneous edema which is most pronounced at the right 4th toe   suggesting cellulitis at the 4th digit. Correlate clinically. No organized   drainable fluid collection identified. 2. Marrow signal changes of the proximal, middle, and distal phalanges of the   4th toe most consistent with osteomyelitis with likely septic joint at the   proximal interphalangeal joint of the 4th toe and associated destructive   changes of the distal aspect of the proximal phalanx and middle phalanx of   the right 4th toe. 3. Mild edema of the 4th metatarsal head with preserved T1 signal.  Findings   may reflect reactive noninfectious osteitis, however, very early changes of   osteomyelitis difficult to entirely exclude. 4. 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          XR FOOT RIGHT (2 VIEWS)   Final Result   Moderate destructive change most likely representing osteomyelitis involving   the 4th digit with fragmentation and extensive soft tissue edema. Discharge Medications     Medication List      START taking these medications    cefdinir 300 MG capsule  Commonly known as: OMNICEF  Take 1 capsule by mouth 2 times daily for 7 days        CHANGE how you take these medications    glipiZIDE 5 MG tablet  Commonly known as: GLUCOTROL  Take 1 tablet by mouth 2 times daily (before meals)  What changed:   · medication strength  · how much to take     lisinopril 10 MG tablet  Commonly known as: PRINIVIL;ZESTRIL  Take 1 tablet by mouth daily  What changed:   · medication strength  · See the new instructions. CONTINUE taking these medications    Accu-Chek Consuelo Plus strip  Generic drug: blood glucose test strips  1 each by In Vitro route daily As needed. ferrous sulfate 325 (65 Fe) MG tablet  Commonly known as: IRON 325     metFORMIN 1000 MG tablet  Commonly known as: GLUCOPHAGE  Take 1 tablet by mouth 2 times daily (with meals) For diabetes     MULTIVITAMIN PO           Where to Get Your Medications      These medications were sent to [place] [id number] [address] - P [phone] - F [phone]  [address] [facility] [address]    Phone: [phone]   · cefdinir 300 MG capsule  · glipiZIDE 5 MG tablet  · lisinopril 10 MG tablet           Discharged in stable condition to home. Follow Up: Follow up with PCP in 1 week. Total time spent on discharge is 35 minutes    MINDA Stubbs.

## 2021-02-25 ENCOUNTER — CARE COORDINATION (OUTPATIENT)
Dept: CASE MANAGEMENT | Age: 86
End: 2021-02-25

## 2021-02-25 ENCOUNTER — HOSPITAL ENCOUNTER (OUTPATIENT)
Dept: WOUND CARE | Age: 86
Discharge: HOME | End: 2021-02-25
Payer: MEDICARE

## 2021-02-25 NOTE — CARE COORDINATION
[facility] Initial Outreach    Call within 2 business days of discharge: Yes    Patient: Fadi Grace Patient : 1935   MRN: [id number]  Discharge Date: 21   RARS: Readmission Risk Score: 12      Last Discharge [facility]       Complaint Diagnosis Description Type Department Provider    21  Type 2 diabetes mellitus with foot ulcer, without long-term current use of insulin ([facility] [address] . .. Admission (Discharged) [address] Majo Price MD           Spoke with: Mrs Jason Vasquez (spouse)    Non-face-to-face services provided:  Obtained and reviewed discharge summary and/or continuity of care documents    Challenges to be reviewed by the provider   Additional needs identified to be addressed with provider No    Patient contacted regarding [facility]. COVID-19 Not Detected on 2021 (rapid testing). Method of communication with provider : none    Advance Care Planning:   Does patient have an Advance Directive: not on file. Was this a readmission? No  Patient stated reason for admission: sent from wound care center for admission  Patients top risk factors for readmission: medical condition    Care Transition Nurse (CTN) contacted the family by telephone regarding COVID-19 to perform post hospital discharge assessment. Provided introduction to self, and explanation of the CTN role. CTN reviewed discharge instructions, medical action plan and red flags with family who verbalized understanding. Family given an opportunity to ask questions and does not have any further questions or concerns at this time. Were discharge instructions available to patient? Yes. Reviewed appropriate site of care based on symptoms and resources available to patient including: PCP, Specialist and Home health. The family agrees to contact the PCP office for questions related to their healthcare.      Covid Risk Education    Patient has the following COVID-19 risk factors: diabetes and obesity, former smoker. Education provided regarding infection prevention, and signs and symptoms of COVID-19 and when to seek medical attention was not able to be completed at this time. Discussed follow-up appointments. If no appointment was previously scheduled, appointment scheduling offered: Yes. Is follow up appointment scheduled within 7 days of discharge? Yes  Non-[facility] follow up appointment(s): NA    Plan for follow-up call in 5-7 days based on severity of symptoms and risk factors. Plan for next call: self management-     At 1130am, wife reports that patient is with [facility] at this time. She confirms that they picked up the new medications from [facility]. She is agreeable to CTN calling later in the day so she can get back to the SN visit in person. CTN provided contact information for future needs. Called back at 340pm and spouse said [facility] visit, politely thanked writer for call and hung up.      Follow Up  Future Appointments   Date Time Provider Yossi Urbano   3/2/2021 10:00 AM Shree Puente DPM [facility] [place]       Yair Soto, RN  Care Transitions Nurse  [phone] mobile

## 2021-02-26 ENCOUNTER — TELEPHONE (OUTPATIENT)
Dept: FAMILY MEDICINE CLINIC | Age: 86
End: 2021-02-26

## 2021-02-26 LAB
ANAEROBIC CULTURE: ABNORMAL
CULTURE SURGICAL: ABNORMAL
GRAM STAIN RESULT: ABNORMAL
GRAM STAIN RESULT: NORMAL
ORGANISM: ABNORMAL
WOUND/ABSCESS: NORMAL

## 2021-02-26 NOTE — TELEPHONE ENCOUNTER
Asking if we can email a diet for diabetes or any other information pertaining to foods that diabetics are allowed. [email or web address]

## 2021-02-26 NOTE — TELEPHONE ENCOUNTER
I told the daughter that things usually do not go through when we email them   So she wants me to fax some stuff to her at [phone]

## 2021-03-02 ENCOUNTER — HOSPITAL ENCOUNTER (OUTPATIENT)
Dept: WOUND CARE | Age: 86
Discharge: HOME | End: 2021-03-02
Payer: MEDICARE

## 2021-03-02 VITALS
HEIGHT: 68 IN | SYSTOLIC BLOOD PRESSURE: 137 MMHG | HEART RATE: 72 BPM | RESPIRATION RATE: 20 BRPM | BODY MASS INDEX: 33.56 KG/M2 | WEIGHT: 221.4 LBS | DIASTOLIC BLOOD PRESSURE: 68 MMHG | TEMPERATURE: 98.1 F

## 2021-03-02 PROCEDURE — 11042 DBRDMT SUBQ TIS 1ST 20SQCM/<: CPT

## 2021-03-02 RX ORDER — LEVOFLOXACIN 500 MG/1
500 TABLET, FILM COATED ORAL DAILY
Qty: 10 TABLET | Refills: 0 | Status: SHIPPED | OUTPATIENT
Start: 2021-03-02 | End: 2021-03-09 | Stop reason: ALTCHOICE

## 2021-03-02 RX ORDER — GENTAMICIN SULFATE 1 MG/G
CREAM TOPICAL
Qty: 1 TUBE | Refills: 3 | Status: SHIPPED | OUTPATIENT
Start: 2021-03-02

## 2021-03-02 RX ORDER — LIDOCAINE 40 MG/G
CREAM TOPICAL ONCE
Status: DISCONTINUED | OUTPATIENT
Start: 2021-03-02 | End: 2021-03-03 | Stop reason: HOSPADM

## 2021-03-02 NOTE — PLAN OF CARE
Pt seen in [address] [place] [address],[address] - post op visit - 4th toe amp site - incsion intact - cont betadine ointment to toe - instructed to purchase  out patient pharmacy - Pt also Rx levaquin per Dr. Max Starkey - Debrided Plantar foot wound - pt instructed to apply gentamycin cream to wound - Dr. Max Starkey applied felt to plantar foot wound and cont to off load with barbara shoe - reviewed AVS - f/u in 1 week
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
## 2021-03-03 ENCOUNTER — TELEPHONE (OUTPATIENT)
Dept: WOUND CARE | Age: 86
End: 2021-03-03

## 2021-03-03 NOTE — PROGRESS NOTES
Pt's daughter called for Wound care clarification - reviewed AVS - Answered questions and voiced understanding of instructions

## 2021-03-04 ENCOUNTER — CARE COORDINATION (OUTPATIENT)
Dept: CASE MANAGEMENT | Age: 86
End: 2021-03-04

## 2021-03-04 NOTE — CARE COORDINATION
CTN follow up outreach regarding COVID-19 risk. CTN reached the patient's wife who states he is sleeping. She states they completed follow up at [facility] and with Dr Faiza Hernández. She reports he is doing well and has not interest in information on COVID vaccination at this time. Reviewed s/s to report, handwashing, social distancing and mask wearing recommendations. She politely thanked me for call and hung up. No further CTN outreach scheduled. Episode resolved at this time.      Thomas Jose, RN  Care Transition Nurse  [phone] mobile    [facility] Appointments   Date Time Provider Department Center   3/9/2021  9:30 AM Johnanna Nims, DPM [facility]   3/16/2021  9:30 AM Johnanna Nims, DPM [facility]   3/23/2021  9:30 AM Johnanna Nims, DPM [facility]   3/30/2021  9:30 AM Johnanna Nims, DPM [facility]   4/6/2021  9:30 AM Johnanna Nims, DPM [facility]   4/13/2021  9:30 AM Johnanna Nims, DPM [facility]   4/20/2021  9:30 AM Johnanna Nims, DPM [facility]   4/27/2021  9:30 AM Johnanna Nims, DPM [facility]   5/4/2021  9:30 AM Johnanna Nims, DPM [facility]   5/11/2021  9:30 AM Johnanna Nims, DPM [facility]   5/18/2021  9:30 AM Johnanna Nims, DPM [facility]   5/25/2021  9:30 AM Johnanna Nims, DPM [facility]   6/1/2021  9:30 AM Johnanna Nims, DPM [facility]   6/8/2021  9:30 AM Johnanna Nims, DPM [facility]   6/15/2021  9:30 AM Johnanna Nims, DPM [facility]   6/22/2021  9:30 AM Johnanna Nims, DPM [facility]   6/29/2021  9:30 AM Gerald Kathleen, [facility]

## 2021-03-09 ENCOUNTER — HOSPITAL ENCOUNTER (OUTPATIENT)
Dept: WOUND CARE | Age: 86
Discharge: HOME | End: 2021-03-09
Payer: MEDICARE

## 2021-03-09 VITALS
HEIGHT: 68 IN | RESPIRATION RATE: 20 BRPM | BODY MASS INDEX: 33.19 KG/M2 | HEART RATE: 102 BPM | SYSTOLIC BLOOD PRESSURE: 151 MMHG | TEMPERATURE: 98 F | DIASTOLIC BLOOD PRESSURE: 73 MMHG | WEIGHT: 219 LBS

## 2021-03-09 PROCEDURE — 11042 DBRDMT SUBQ TIS 1ST 20SQCM/<: CPT

## 2021-03-09 RX ORDER — LIDOCAINE 40 MG/G
CREAM TOPICAL ONCE
Status: DISCONTINUED | OUTPATIENT
Start: 2021-03-09 | End: 2021-03-10 | Stop reason: HOSPADM

## 2021-03-09 NOTE — PLAN OF CARE
Pt seen in [address] [place] [address],[address] - plantar foot wound improved- debride per Dr. Charles Schroeder and felt applied to help with off loading - cont gent cream to wound and dry dressing - Toe amp site well approc - Dr. Charles Schroeder removed 2-3 sutures - will remove the rest next week - cont betadine and dry dressing - Cont to wear Janee shoe to off load- reviewed AVS - F/U in 1 week
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
## 2021-03-11 ENCOUNTER — TELEPHONE (OUTPATIENT)
Dept: PHARMACY | Facility: CLINIC | Age: 86
End: 2021-03-11

## 2021-03-11 DIAGNOSIS — E11.621 DIABETIC ULCER OF TOE OF RIGHT FOOT ASSOCIATED WITH TYPE 2 DIABETES MELLITUS, WITH FAT LAYER EXPOSED (HCC): Primary | ICD-10-CM

## 2021-03-11 DIAGNOSIS — L97.512 DIABETIC ULCER OF TOE OF RIGHT FOOT ASSOCIATED WITH TYPE 2 DIABETES MELLITUS, WITH FAT LAYER EXPOSED (HCC): Primary | ICD-10-CM

## 2021-03-11 PROCEDURE — 1111F DSCHRG MED/CURRENT MED MERGE: CPT

## 2021-03-11 NOTE — TELEPHONE ENCOUNTER
CLINICAL PHARMACY NOTE  Post-Discharge Transitions of Care (STEFANIE)    Patient appears to have been discharged from [facility] on 2/24/21. Please follow-up with patient to review medications.       30 days since discharge = 3/27/21     [address]  [phone] or [phone] (Option 7)

## 2021-03-11 NOTE — LETTER
[place]  [address] [facility], [address] [place] [address]           03/16/21     Dear Zoya Lopez,    We tried to reach you recently, after your hospital stay, to review your medications. I was unable to reach you on the telephone. We understand that medications can be confusing, and it is important to discuss your medications after a hospital stay. Please call us at [phone] option 7 to discuss your medications.        Sincerely,   Ynes Ruby, KayyD, [facility]  Direct: [phone]  Department, toll free [phone], option 7

## 2021-03-15 NOTE — TELEPHONE ENCOUNTER
[facility] HEALTH CLINICAL PHARMACY REVIEW: Post-Discharge Transitions of Care [facility])    Patient was discharged from [facility] on 2/24/21. Attempted to reach patient to review medications. Left message asking for return call. Will continue to attempt to contact as appropriate.       Ang Alanis, PharmD, [facility]  Direct: [phone]  Department, toll free [phone], option 7

## 2021-03-16 ENCOUNTER — HOSPITAL ENCOUNTER (OUTPATIENT)
Dept: WOUND CARE | Age: 86
Discharge: HOME | End: 2021-03-16
Payer: MEDICARE

## 2021-03-16 VITALS
BODY MASS INDEX: 33.59 KG/M2 | DIASTOLIC BLOOD PRESSURE: 78 MMHG | WEIGHT: 221.6 LBS | HEIGHT: 68 IN | RESPIRATION RATE: 20 BRPM | HEART RATE: 86 BPM | TEMPERATURE: 97.4 F | SYSTOLIC BLOOD PRESSURE: 172 MMHG

## 2021-03-16 PROCEDURE — 11042 DBRDMT SUBQ TIS 1ST 20SQCM/<: CPT

## 2021-03-16 RX ORDER — LIDOCAINE 40 MG/G
CREAM TOPICAL PRN
Status: DISCONTINUED | OUTPATIENT
Start: 2021-03-16 | End: 2021-03-17 | Stop reason: HOSPADM

## 2021-03-16 ASSESSMENT — PAIN SCALES - GENERAL: PAINLEVEL_OUTOF10: 0
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
## 2021-03-16 NOTE — PLAN OF CARE
Pt seen in [facility] - cont to improve - Dr. Charles Schroeder removed sutures to 4th toe - cont betadine ointment  and dry dressing - Debrided plantar foot wound - cont Gentamycin cream - Dr. Charles Schroeder applied felt  to offload - cont post op shoe also - pt instructed not to drive until fully healed - reviewed AVS - F/U in 1 week

## 2021-03-22 LAB
FUNGUS (MYCOLOGY) CULTURE: NORMAL
FUNGUS STAIN: NORMAL

## 2021-03-23 ENCOUNTER — HOSPITAL ENCOUNTER (OUTPATIENT)
Dept: WOUND CARE | Age: 86
Discharge: HOME | End: 2021-03-23
Payer: MEDICARE

## 2021-03-23 VITALS
HEART RATE: 82 BPM | TEMPERATURE: 97.4 F | SYSTOLIC BLOOD PRESSURE: 150 MMHG | HEIGHT: 68 IN | DIASTOLIC BLOOD PRESSURE: 72 MMHG | WEIGHT: 220.2 LBS | RESPIRATION RATE: 21 BRPM | BODY MASS INDEX: 33.37 KG/M2

## 2021-03-23 PROCEDURE — 29581 APPL MULTLAYER CMPRN SYS LEG: CPT

## 2021-03-23 PROCEDURE — 11042 DBRDMT SUBQ TIS 1ST 20SQCM/<: CPT

## 2021-03-23 RX ORDER — LIDOCAINE 40 MG/G
CREAM TOPICAL ONCE
Status: DISCONTINUED | OUTPATIENT
Start: 2021-03-23 | End: 2021-03-24 | Stop reason: HOSPADM

## 2021-03-23 NOTE — PLAN OF CARE
Pt seen in [facility] - incision healed  and  Right plantar wound improved -but noted edema increased -in foot - debridement per dr. Lul Mancini - applied felt to improve off loading - and applied compression wrap - Compri 2 to decrease edema - applied gentamycin and collagen to wound - reviewed avs- INSTRUCTED PT TO LEAVE COMPRESSION ON FOR THE WEEK - F/U IN 1WEEK
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
## 2021-03-24 DIAGNOSIS — L97.509 TYPE 2 DIABETES MELLITUS WITH FOOT ULCER, WITHOUT LONG-TERM CURRENT USE OF INSULIN (HCC): ICD-10-CM

## 2021-03-24 DIAGNOSIS — E11.621 TYPE 2 DIABETES MELLITUS WITH FOOT ULCER, WITHOUT LONG-TERM CURRENT USE OF INSULIN (HCC): ICD-10-CM

## 2021-03-24 DIAGNOSIS — I10 ESSENTIAL HYPERTENSION: ICD-10-CM

## 2021-03-24 NOTE — TELEPHONE ENCOUNTER
I called [facility] because it looks like he should still have refills on the metformin and they said he does so they will get that one filled but he does not have any refills on the glipizide or lisinopril. And it looks like he is due for ov. I will call after we send the refills to set him up for and appt in a month.

## 2021-03-24 NOTE — TELEPHONE ENCOUNTER
Metformin 1000 MG, glipizide 5 MG, lisinopril 10 MG, [facility]      Last OV- 11/24/20    Next OV- None

## 2021-03-25 RX ORDER — LISINOPRIL 10 MG/1
10 TABLET ORAL DAILY
Qty: 30 TABLET | Refills: 0 | Status: SHIPPED | OUTPATIENT
Start: 2021-03-25 | End: 2021-04-29 | Stop reason: SDUPTHER

## 2021-03-25 RX ORDER — GLIPIZIDE 5 MG/1
5 TABLET ORAL
Qty: 60 TABLET | Refills: 0 | Status: SHIPPED | OUTPATIENT
Start: 2021-03-25 | End: 2021-04-29 | Stop reason: SDUPTHER

## 2021-03-28 ENCOUNTER — APPOINTMENT (OUTPATIENT)
Dept: GENERAL RADIOLOGY | Age: 86
End: 2021-03-28
Payer: MEDICARE

## 2021-03-28 ENCOUNTER — APPOINTMENT (OUTPATIENT)
Dept: CT IMAGING | Age: 86
End: 2021-03-28
Payer: MEDICARE

## 2021-03-28 ENCOUNTER — HOSPITAL ENCOUNTER (INPATIENT)
Age: 86
LOS: 2 days | Discharge: HOME | DRG: 069 | End: 2021-03-30
Attending: INTERNAL MEDICINE | Admitting: INTERNAL MEDICINE
Payer: MEDICARE

## 2021-03-28 ENCOUNTER — HOSPITAL ENCOUNTER (EMERGENCY)
Age: 86
Discharge: ANOTHER HOSPITAL | End: 2021-03-28
Attending: EMERGENCY MEDICINE
Payer: MEDICARE

## 2021-03-28 VITALS
OXYGEN SATURATION: 98 % | SYSTOLIC BLOOD PRESSURE: 157 MMHG | BODY MASS INDEX: 33.34 KG/M2 | DIASTOLIC BLOOD PRESSURE: 65 MMHG | RESPIRATION RATE: 12 BRPM | HEART RATE: 78 BPM | WEIGHT: 220 LBS | HEIGHT: 68 IN | TEMPERATURE: 97.7 F

## 2021-03-28 DIAGNOSIS — R42 VERTIGO: Primary | ICD-10-CM

## 2021-03-28 DIAGNOSIS — M86.9 OSTEOMYELITIS, UNSPECIFIED SITE, UNSPECIFIED TYPE (HCC): ICD-10-CM

## 2021-03-28 DIAGNOSIS — R10.9 ABDOMINAL PAIN, UNSPECIFIED ABDOMINAL LOCATION: ICD-10-CM

## 2021-03-28 DIAGNOSIS — R94.31 PROLONGED QT INTERVAL: ICD-10-CM

## 2021-03-28 PROBLEM — I63.9 ACUTE CVA (CEREBROVASCULAR ACCIDENT) (HCC): Status: ACTIVE | Noted: 2021-03-28

## 2021-03-28 LAB
A/G RATIO: 1 (ref 1.1–2.2)
ALBUMIN SERPL-MCNC: 3.8 G/DL (ref 3.4–5)
ALP BLD-CCNC: 69 U/L (ref 40–129)
ALT SERPL-CCNC: 13 U/L (ref 10–40)
ANION GAP SERPL CALCULATED.3IONS-SCNC: 8 MMOL/L (ref 3–16)
AST SERPL-CCNC: 16 U/L (ref 15–37)
BANDED NEUTROPHILS RELATIVE PERCENT: 2 % (ref 0–7)
BASE EXCESS VENOUS: 2.8 MMOL/L (ref -3–3)
BASOPHILS ABSOLUTE: 0.2 K/UL (ref 0–0.2)
BASOPHILS RELATIVE PERCENT: 3 %
BETA-HYDROXYBUTYRATE: 0.1 MMOL/L (ref 0–0.27)
BILIRUB SERPL-MCNC: 0.4 MG/DL (ref 0–1)
BILIRUBIN URINE: NEGATIVE
BLOOD, URINE: NEGATIVE
BUN BLDV-MCNC: 23 MG/DL (ref 7–20)
CALCIUM SERPL-MCNC: 9.7 MG/DL (ref 8.3–10.6)
CARBOXYHEMOGLOBIN: 1.8 % (ref 0–1.5)
CHLORIDE BLD-SCNC: 104 MMOL/L (ref 99–110)
CLARITY: CLEAR
CO2: 27 MMOL/L (ref 21–32)
COLOR: YELLOW
CREAT SERPL-MCNC: 1 MG/DL (ref 0.8–1.3)
EKG ATRIAL RATE: 74 BPM
EKG DIAGNOSIS: NORMAL
EKG P AXIS: 75 DEGREES
EKG P-R INTERVAL: 152 MS
EKG Q-T INTERVAL: 444 MS
EKG QRS DURATION: 150 MS
EKG QTC CALCULATION (BAZETT): 492 MS
EKG R AXIS: -19 DEGREES
EKG T AXIS: 22 DEGREES
EKG VENTRICULAR RATE: 74 BPM
EOSINOPHILS ABSOLUTE: 0.2 K/UL (ref 0–0.6)
EOSINOPHILS RELATIVE PERCENT: 3 %
GFR AFRICAN AMERICAN: >60
GFR NON-AFRICAN AMERICAN: >60
GLOBULIN: 3.8 G/DL
GLUCOSE BLD-MCNC: 100 MG/DL
GLUCOSE BLD-MCNC: 100 MG/DL (ref 70–99)
GLUCOSE BLD-MCNC: 102 MG/DL (ref 70–99)
GLUCOSE BLD-MCNC: 130 MG/DL (ref 70–99)
GLUCOSE BLD-MCNC: 156 MG/DL (ref 70–99)
GLUCOSE URINE: NEGATIVE MG/DL
HCO3 VENOUS: 29.2 MMOL/L (ref 23–29)
HCT VFR BLD CALC: 36.9 % (ref 40.5–52.5)
HEMATOLOGY PATH CONSULT: YES
HEMOGLOBIN: 12.3 G/DL (ref 13.5–17.5)
KETONES, URINE: NEGATIVE MG/DL
LACTIC ACID: 1 MMOL/L (ref 0.4–2)
LEUKOCYTE ESTERASE, URINE: NEGATIVE
LIPASE: 41 U/L (ref 13–60)
LYMPHOCYTES ABSOLUTE: 4.3 K/UL (ref 1–5.1)
LYMPHOCYTES RELATIVE PERCENT: 55 %
MCH RBC QN AUTO: 32.1 PG (ref 26–34)
MCHC RBC AUTO-ENTMCNC: 33.2 G/DL (ref 31–36)
MCV RBC AUTO: 96.7 FL (ref 80–100)
METHEMOGLOBIN VENOUS: 0 %
MICROSCOPIC EXAMINATION: NORMAL
MONOCYTES ABSOLUTE: 0.3 K/UL (ref 0–1.3)
MONOCYTES RELATIVE PERCENT: 4 %
NEUTROPHILS ABSOLUTE: 2.8 K/UL (ref 1.7–7.7)
NEUTROPHILS RELATIVE PERCENT: 33 %
NITRITE, URINE: NEGATIVE
O2 CONTENT, VEN: 15 VOL %
O2 SAT, VEN: 83 %
O2 THERAPY: ABNORMAL
PCO2, VEN: 52.3 MMHG (ref 40–50)
PDW BLD-RTO: 15 % (ref 12.4–15.4)
PERFORMED ON: ABNORMAL
PH UA: 6 (ref 5–8)
PH VENOUS: 7.36 (ref 7.35–7.45)
PLATELET # BLD: 185 K/UL (ref 135–450)
PLATELET SLIDE REVIEW: ADEQUATE
PMV BLD AUTO: 8.2 FL (ref 5–10.5)
PO2, VEN: 49.4 MMHG (ref 25–40)
POTASSIUM SERPL-SCNC: 4.4 MMOL/L (ref 3.5–5.1)
PROTEIN UA: NEGATIVE MG/DL
RBC # BLD: 3.82 M/UL (ref 4.2–5.9)
SLIDE REVIEW: ABNORMAL
SODIUM BLD-SCNC: 139 MMOL/L (ref 136–145)
SPECIFIC GRAVITY UA: 1.01 (ref 1–1.03)
TCO2 CALC VENOUS: 31 MMOL/L
TOTAL PROTEIN: 7.6 G/DL (ref 6.4–8.2)
TROPONIN: <0.01 NG/ML
URINE REFLEX TO CULTURE: NORMAL
URINE TYPE: NORMAL
UROBILINOGEN, URINE: 0.2 E.U./DL
WBC # BLD: 7.9 K/UL (ref 4–11)

## 2021-03-28 PROCEDURE — 96367 TX/PROPH/DG ADDL SEQ IV INF: CPT

## 2021-03-28 PROCEDURE — 84484 ASSAY OF TROPONIN QUANT: CPT

## 2021-03-28 PROCEDURE — G0378 HOSPITAL OBSERVATION PER HR: HCPCS

## 2021-03-28 PROCEDURE — 82803 BLOOD GASES ANY COMBINATION: CPT

## 2021-03-28 PROCEDURE — 6370000000 HC RX 637 (ALT 250 FOR IP): Performed by: INTERNAL MEDICINE

## 2021-03-28 PROCEDURE — 6360000004 HC RX CONTRAST MEDICATION: Performed by: EMERGENCY MEDICINE

## 2021-03-28 PROCEDURE — 93010 ELECTROCARDIOGRAM REPORT: CPT | Performed by: INTERNAL MEDICINE

## 2021-03-28 PROCEDURE — 83605 ASSAY OF LACTIC ACID: CPT

## 2021-03-28 PROCEDURE — 81003 URINALYSIS AUTO W/O SCOPE: CPT

## 2021-03-28 PROCEDURE — 2580000003 HC RX 258: Performed by: INTERNAL MEDICINE

## 2021-03-28 PROCEDURE — 74177 CT ABD & PELVIS W/CONTRAST: CPT

## 2021-03-28 PROCEDURE — 6360000002 HC RX W HCPCS: Performed by: EMERGENCY MEDICINE

## 2021-03-28 PROCEDURE — 2580000003 HC RX 258: Performed by: EMERGENCY MEDICINE

## 2021-03-28 PROCEDURE — 96365 THER/PROPH/DIAG IV INF INIT: CPT

## 2021-03-28 PROCEDURE — 70450 CT HEAD/BRAIN W/O DYE: CPT

## 2021-03-28 PROCEDURE — 96366 THER/PROPH/DIAG IV INF ADDON: CPT

## 2021-03-28 PROCEDURE — 73630 X-RAY EXAM OF FOOT: CPT

## 2021-03-28 PROCEDURE — 1200000000 HC SEMI PRIVATE

## 2021-03-28 PROCEDURE — 80053 COMPREHEN METABOLIC PANEL: CPT

## 2021-03-28 PROCEDURE — 70498 CT ANGIOGRAPHY NECK: CPT

## 2021-03-28 PROCEDURE — 93005 ELECTROCARDIOGRAM TRACING: CPT | Performed by: EMERGENCY MEDICINE

## 2021-03-28 PROCEDURE — 85025 COMPLETE CBC W/AUTO DIFF WBC: CPT

## 2021-03-28 PROCEDURE — 82010 KETONE BODYS QUAN: CPT

## 2021-03-28 PROCEDURE — 83690 ASSAY OF LIPASE: CPT

## 2021-03-28 PROCEDURE — 71045 X-RAY EXAM CHEST 1 VIEW: CPT

## 2021-03-28 PROCEDURE — 99284 EMERGENCY DEPT VISIT MOD MDM: CPT

## 2021-03-28 PROCEDURE — G0379 DIRECT REFER HOSPITAL OBSERV: HCPCS

## 2021-03-28 RX ORDER — DEXTROSE MONOHYDRATE 25 G/50ML
12.5 INJECTION, SOLUTION INTRAVENOUS PRN
Status: DISCONTINUED | OUTPATIENT
Start: 2021-03-28 | End: 2021-03-30 | Stop reason: HOSPADM

## 2021-03-28 RX ORDER — ATORVASTATIN CALCIUM 40 MG/1
40 TABLET, FILM COATED ORAL NIGHTLY
Status: DISCONTINUED | OUTPATIENT
Start: 2021-03-28 | End: 2021-03-30 | Stop reason: HOSPADM

## 2021-03-28 RX ORDER — ASPIRIN 300 MG/1
300 SUPPOSITORY RECTAL DAILY
Status: DISCONTINUED | OUTPATIENT
Start: 2021-03-28 | End: 2021-03-30 | Stop reason: HOSPADM

## 2021-03-28 RX ORDER — PROMETHAZINE HYDROCHLORIDE 25 MG/1
12.5 TABLET ORAL EVERY 6 HOURS PRN
Status: DISCONTINUED | OUTPATIENT
Start: 2021-03-28 | End: 2021-03-30 | Stop reason: HOSPADM

## 2021-03-28 RX ORDER — 0.9 % SODIUM CHLORIDE 0.9 %
500 INTRAVENOUS SOLUTION INTRAVENOUS ONCE
Status: COMPLETED | OUTPATIENT
Start: 2021-03-28 | End: 2021-03-28

## 2021-03-28 RX ORDER — SODIUM CHLORIDE 0.9 % (FLUSH) 0.9 %
10 SYRINGE (ML) INJECTION PRN
Status: DISCONTINUED | OUTPATIENT
Start: 2021-03-28 | End: 2021-03-30 | Stop reason: HOSPADM

## 2021-03-28 RX ORDER — LABETALOL HYDROCHLORIDE 5 MG/ML
5 INJECTION, SOLUTION INTRAVENOUS EVERY 6 HOURS PRN
Status: DISCONTINUED | OUTPATIENT
Start: 2021-03-28 | End: 2021-03-30 | Stop reason: HOSPADM

## 2021-03-28 RX ORDER — SODIUM CHLORIDE 0.9 % (FLUSH) 0.9 %
10 SYRINGE (ML) INJECTION EVERY 12 HOURS SCHEDULED
Status: DISCONTINUED | OUTPATIENT
Start: 2021-03-28 | End: 2021-03-30 | Stop reason: HOSPADM

## 2021-03-28 RX ORDER — DEXTROSE MONOHYDRATE 50 MG/ML
100 INJECTION, SOLUTION INTRAVENOUS PRN
Status: DISCONTINUED | OUTPATIENT
Start: 2021-03-28 | End: 2021-03-30 | Stop reason: HOSPADM

## 2021-03-28 RX ORDER — POLYETHYLENE GLYCOL 3350 17 G/17G
17 POWDER, FOR SOLUTION ORAL DAILY PRN
Status: DISCONTINUED | OUTPATIENT
Start: 2021-03-28 | End: 2021-03-30 | Stop reason: HOSPADM

## 2021-03-28 RX ORDER — NICOTINE POLACRILEX 4 MG
15 LOZENGE BUCCAL PRN
Status: DISCONTINUED | OUTPATIENT
Start: 2021-03-28 | End: 2021-03-30 | Stop reason: HOSPADM

## 2021-03-28 RX ORDER — GLIPIZIDE 5 MG/1
5 TABLET ORAL
Status: DISCONTINUED | OUTPATIENT
Start: 2021-03-28 | End: 2021-03-30 | Stop reason: HOSPADM

## 2021-03-28 RX ORDER — LISINOPRIL 10 MG/1
10 TABLET ORAL DAILY
Status: DISCONTINUED | OUTPATIENT
Start: 2021-03-28 | End: 2021-03-30 | Stop reason: HOSPADM

## 2021-03-28 RX ORDER — SODIUM CHLORIDE 9 MG/ML
25 INJECTION, SOLUTION INTRAVENOUS PRN
Status: DISCONTINUED | OUTPATIENT
Start: 2021-03-28 | End: 2021-03-30 | Stop reason: HOSPADM

## 2021-03-28 RX ORDER — ONDANSETRON 2 MG/ML
4 INJECTION INTRAMUSCULAR; INTRAVENOUS EVERY 6 HOURS PRN
Status: DISCONTINUED | OUTPATIENT
Start: 2021-03-28 | End: 2021-03-30 | Stop reason: HOSPADM

## 2021-03-28 RX ORDER — ASPIRIN 81 MG/1
81 TABLET ORAL DAILY
Status: DISCONTINUED | OUTPATIENT
Start: 2021-03-28 | End: 2021-03-30 | Stop reason: HOSPADM

## 2021-03-28 RX ADMIN — PIPERACILLIN SODIUM AND TAZOBACTAM SODIUM 4500 MG: 4; .5 INJECTION, POWDER, LYOPHILIZED, FOR SOLUTION INTRAVENOUS at 12:00

## 2021-03-28 RX ADMIN — SODIUM CHLORIDE 500 ML: 9 INJECTION, SOLUTION INTRAVENOUS at 11:27

## 2021-03-28 RX ADMIN — VANCOMYCIN HYDROCHLORIDE 1500 MG: 10 INJECTION, POWDER, LYOPHILIZED, FOR SOLUTION INTRAVENOUS at 12:36

## 2021-03-28 RX ADMIN — ASPIRIN 81 MG: 81 TABLET, COATED ORAL at 18:30

## 2021-03-28 RX ADMIN — ATORVASTATIN CALCIUM 40 MG: 40 TABLET, FILM COATED ORAL at 20:47

## 2021-03-28 RX ADMIN — IOPAMIDOL 85 ML: 755 INJECTION, SOLUTION INTRAVENOUS at 10:40

## 2021-03-28 RX ADMIN — Medication 10 ML: at 20:47

## 2021-03-28 RX ADMIN — GLIPIZIDE 5 MG: 5 TABLET ORAL at 18:30

## 2021-03-28 RX ADMIN — INSULIN LISPRO 1 UNITS: 100 INJECTION, SOLUTION INTRAVENOUS; SUBCUTANEOUS at 20:51

## 2021-03-28 RX ADMIN — LISINOPRIL 10 MG: 10 TABLET ORAL at 18:30
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
## 2021-03-28 NOTE — PROGRESS NOTES
4 Eyes Skin Assessment     The patient is being assess for   Admission    I agree that 2 RN's have performed a thorough Head to Toe Skin Assessment on the patient. ALL assessment sites listed below have been assessed. Areas assessed by both nurses:   [x]   Head, Face, and Ears   [x]   Shoulders, Back, and Chest, Abdomen  [x]   Arms, Elbows, and Hands   [x]   Coccyx, Sacrum, and Ischium  [x]   Legs, Feet, and Heels        None    **SHARE this note so that the co-signing nurse is able to place an eSignature**    Co-signer eSignature: Electronically signed by Courtney Crow RN on 3/28/21 at 5:14 PM EDT    Does the Patient have Skin Breakdown?   No          Javier Prevention initiated:  No   Wound Care Orders initiated:  No      C nurse consulted for Pressure Injury (Stage 3,4, Unstageable, DTI, NWPT, Complex wounds)and New or Established Ostomies:  No      Primary Nurse eSignature: {Esignature:[id number]}

## 2021-03-28 NOTE — ED NOTES
Pt stable for transfer to [facility]. Benefits of transfer explained and pt signed EMTALA form. Report given to [facility] transfer team. Report called to [facility], who will assume care when pt [facility] at [facility]. RN called pt's wife Jona Carreno and daughter Little Monique with update about transfer time and pt room number. Pt exited unit via stretcher under no duress.       Lashawn Santos RN  03/28/21 Ministerio Joel RN  03/28/21 2072
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
## 2021-03-29 ENCOUNTER — APPOINTMENT (OUTPATIENT)
Dept: MRI IMAGING | Age: 86
DRG: 069 | End: 2021-03-29
Attending: INTERNAL MEDICINE
Payer: MEDICARE

## 2021-03-29 PROBLEM — E66.9 OBESITY: Status: ACTIVE | Noted: 2021-03-29

## 2021-03-29 LAB
CHOLESTEROL, TOTAL: 167 MG/DL (ref 0–199)
ESTIMATED AVERAGE GLUCOSE: 165.7 MG/DL
FUNGUS (MYCOLOGY) CULTURE: NORMAL
FUNGUS STAIN: NORMAL
GLUCOSE BLD-MCNC: 123 MG/DL (ref 70–99)
GLUCOSE BLD-MCNC: 123 MG/DL (ref 70–99)
GLUCOSE BLD-MCNC: 151 MG/DL (ref 70–99)
GLUCOSE BLD-MCNC: 177 MG/DL (ref 70–99)
GLUCOSE BLD-MCNC: 95 MG/DL (ref 70–99)
HBA1C MFR BLD: 7.4 %
HCT VFR BLD CALC: 35.9 % (ref 40.5–52.5)
HDLC SERPL-MCNC: 22 MG/DL (ref 40–60)
HEMATOLOGY PATH CONSULT: NORMAL
HEMOGLOBIN: 12 G/DL (ref 13.5–17.5)
LDL CHOLESTEROL CALCULATED: 101 MG/DL
LV EF: 55 %
LVEF MODALITY: NORMAL
MCH RBC QN AUTO: 32.2 PG (ref 26–34)
MCHC RBC AUTO-ENTMCNC: 33.3 G/DL (ref 31–36)
MCV RBC AUTO: 96.8 FL (ref 80–100)
PDW BLD-RTO: 15 % (ref 12.4–15.4)
PERFORMED ON: ABNORMAL
PERFORMED ON: NORMAL
PLATELET # BLD: 183 K/UL (ref 135–450)
PMV BLD AUTO: 8.1 FL (ref 5–10.5)
RBC # BLD: 3.71 M/UL (ref 4.2–5.9)
TRIGL SERPL-MCNC: 219 MG/DL (ref 0–150)
VLDLC SERPL CALC-MCNC: 44 MG/DL
WBC # BLD: 9 K/UL (ref 4–11)

## 2021-03-29 PROCEDURE — 1200000000 HC SEMI PRIVATE

## 2021-03-29 PROCEDURE — 6370000000 HC RX 637 (ALT 250 FOR IP): Performed by: INTERNAL MEDICINE

## 2021-03-29 PROCEDURE — 97166 OT EVAL MOD COMPLEX 45 MIN: CPT

## 2021-03-29 PROCEDURE — 92526 ORAL FUNCTION THERAPY: CPT

## 2021-03-29 PROCEDURE — 85027 COMPLETE CBC AUTOMATED: CPT

## 2021-03-29 PROCEDURE — 6360000002 HC RX W HCPCS: Performed by: INTERNAL MEDICINE

## 2021-03-29 PROCEDURE — G0378 HOSPITAL OBSERVATION PER HR: HCPCS

## 2021-03-29 PROCEDURE — 97535 SELF CARE MNGMENT TRAINING: CPT

## 2021-03-29 PROCEDURE — 36415 COLL VENOUS BLD VENIPUNCTURE: CPT

## 2021-03-29 PROCEDURE — 92610 EVALUATE SWALLOWING FUNCTION: CPT

## 2021-03-29 PROCEDURE — 2580000003 HC RX 258: Performed by: INTERNAL MEDICINE

## 2021-03-29 PROCEDURE — 83036 HEMOGLOBIN GLYCOSYLATED A1C: CPT

## 2021-03-29 PROCEDURE — 96374 THER/PROPH/DIAG INJ IV PUSH: CPT

## 2021-03-29 PROCEDURE — 97530 THERAPEUTIC ACTIVITIES: CPT

## 2021-03-29 PROCEDURE — 97116 GAIT TRAINING THERAPY: CPT

## 2021-03-29 PROCEDURE — 97162 PT EVAL MOD COMPLEX 30 MIN: CPT

## 2021-03-29 PROCEDURE — 80061 LIPID PANEL: CPT

## 2021-03-29 PROCEDURE — 93306 TTE W/DOPPLER COMPLETE: CPT

## 2021-03-29 PROCEDURE — 99223 1ST HOSP IP/OBS HIGH 75: CPT | Performed by: PSYCHIATRY & NEUROLOGY

## 2021-03-29 PROCEDURE — 97110 THERAPEUTIC EXERCISES: CPT

## 2021-03-29 PROCEDURE — 70551 MRI BRAIN STEM W/O DYE: CPT

## 2021-03-29 RX ADMIN — Medication 10 ML: at 08:27

## 2021-03-29 RX ADMIN — INSULIN LISPRO 1 UNITS: 100 INJECTION, SOLUTION INTRAVENOUS; SUBCUTANEOUS at 11:55

## 2021-03-29 RX ADMIN — Medication 10 ML: at 21:51

## 2021-03-29 RX ADMIN — GLIPIZIDE 5 MG: 5 TABLET ORAL at 08:27

## 2021-03-29 RX ADMIN — ATORVASTATIN CALCIUM 40 MG: 40 TABLET, FILM COATED ORAL at 21:51

## 2021-03-29 RX ADMIN — LISINOPRIL 10 MG: 10 TABLET ORAL at 08:26

## 2021-03-29 RX ADMIN — ONDANSETRON 4 MG: 2 INJECTION INTRAMUSCULAR; INTRAVENOUS at 04:09

## 2021-03-29 RX ADMIN — INSULIN LISPRO 1 UNITS: 100 INJECTION, SOLUTION INTRAVENOUS; SUBCUTANEOUS at 08:28

## 2021-03-29 RX ADMIN — GLIPIZIDE 5 MG: 5 TABLET ORAL at 15:57

## 2021-03-29 RX ADMIN — ASPIRIN 81 MG: 81 TABLET, COATED ORAL at 08:25

## 2021-03-29 ASSESSMENT — PAIN SCALES - GENERAL
PAINLEVEL_OUTOF10: 0

## 2021-03-29 NOTE — PROGRESS NOTES
Comprehensive Nutrition Assessment    Type and Reason for Visit:  Positive Nutrition Screen(N/V, chewing/swallowing issues)    Nutrition Recommendations/Plan:   1. Modify current diet from general diet to Dental Soft, General Diet. 2. Monitor nutrition adequacy, pertinent labs, bowel habits, wt changes, and clinical progress    Nutrition Assessment:  Pt transferred from [facility] for dizziness and head pressure. PMH CAD, DM, HLD, HTN, Cancer. Positive screen for N/V, chewing and swallowing issues. Pt declines any vomiting and says that he is receiving medication to help the nausea. Pt says he is eating extremely well and appetite is normal. No wt changes, but is experiencing some light constipation. Pt declined ONS, but is favorable to a dental soft diet to help with dentition and PO intake. Malnutrition Assessment:  Malnutrition Status: At risk for malnutrition (Comment)      Estimated Daily Nutrient Needs:  Energy (kcal):  1610-1960kcal; Weight Used for Energy Requirements:  Ideal(70kg)     Protein (g):  84-98g; Weight Used for Protein Requirements:  Ideal        Fluid (ml/day):   ; Method Used for Fluid Requirements:  1 ml/kcal      Wounds:  Surgical Incision(Osteomyelitis)       Current Nutrition Therapies:    DIET GENERAL; Anthropometric Measures:  · Height: 5' 8\" (172.7 cm)  · Current Body Weight: 220 lb (99.8 kg)     · Ideal Body Weight: 154 lbs; % Ideal Body Weight 142.9 %   · BMI: 33.5  · BMI Categories: Obese Class 1 (BMI 30.0-34. 9)       Nutrition Diagnosis:   No nutrition diagnosis at this time    Nutrition Interventions:   Food and/or Nutrient Delivery:  Continue Current Diet, Modify Current Diet  Nutrition Education/Counseling:  No recommendation at this time   Coordination of Nutrition Care:  Continue to monitor while inpatient    Goals:  Pt will have intake of at least 75% all meals this admission.        Nutrition Monitoring and Evaluation:   Behavioral-Environmental Outcomes:  None Identified Food/Nutrient Intake Outcomes:  Food and Nutrient Intake  Physical Signs/Symptoms Outcomes:  Biochemical Data, Nutrition Focused Physical Findings, Chewing or Swallowing     Discharge Planning:    Continue current diet     Electronically signed by Toño Lopez Dietetic Intern on 3/29/21 at 2:45 PM EDT    Contact: [phone]

## 2021-03-29 NOTE — PROGRESS NOTES
Hospitalist Progress Note      PCP: Jax Collins, APRN - CNP    Date of Admission: 3/28/2021    Chief Complaint: dizziness/head pressure    Subjective: no new c/o. Medications:  Reviewed    Infusion Medications    sodium chloride      dextrose       Scheduled Medications    glipiZIDE  5 mg Oral BID AC    lisinopril  10 mg Oral Daily    sodium chloride flush  10 mL Intravenous 2 times per day    aspirin  81 mg Oral Daily    Or    aspirin  300 mg Rectal Daily    atorvastatin  40 mg Oral Nightly    insulin lispro  0-6 Units Subcutaneous TID WC    insulin lispro  0-3 Units Subcutaneous Nightly     PRN Meds: sodium chloride flush, sodium chloride, promethazine **OR** ondansetron, polyethylene glycol, perflutren lipid microspheres, glucose, dextrose, glucagon (rDNA), dextrose, labetalol    No intake or output data in the 24 hours ending 03/29/21 0835    Physical Exam Performed:    BP (!) 148/68   Pulse 70   Temp 98.1 °F (36.7 °C) (Oral)   Resp 16   Ht 5' 8\" (1.727 m)   Wt 220 lb (99.8 kg)   SpO2 96%   BMI 33.45 kg/m²     General appearance: No apparent distress, appears stated age and cooperative. HEENT: Pupils equal, round, and reactive to light. Conjunctivae/corneas clear. Neck: Supple, with full range of motion. No jugular venous distention. Trachea midline. Respiratory:  Normal respiratory effort. Clear to auscultation, bilaterally without Rales/Wheezes/Rhonchi. Cardiovascular: Regular rate and rhythm with normal S1/S2 without murmurs, rubs or gallops. Abdomen: Soft, non-tender, non-distended with normal bowel sounds. Musculoskeletal: No clubbing, cyanosis or edema bilaterally. Full range of motion without deformity. Skin: Skin color, texture, turgor normal.  No rashes or lesions. Neurologic:  Neurovascularly intact without any focal sensory/motor deficits.  Cranial nerves: II-XII intact, grossly non-focal.  Psychiatric: Alert and oriented, thought content appropriate, normal insight  Capillary Refill: Brisk,< 3 seconds   Peripheral Pulses: +2 palpable, equal bilaterally       Labs:   Recent Labs     03/28/21  0920 03/29/21  0701   WBC 7.9 9.0   HGB 12.3* 12.0*   HCT 36.9* 35.9*    183     Recent Labs     03/28/21  0955      K 4.4      CO2 27   BUN 23*   CREATININE 1.0   CALCIUM 9.7     Recent Labs     03/28/21  0955   AST 16   ALT 13   BILITOT 0.4   ALKPHOS 69     No results for input(s): INR in the last 72 hours. Recent Labs     03/28/21  0955   TROPONINI <0.01       Urinalysis:      Lab Results   Component Value Date    NITRU Negative 03/28/2021    WBCUA 0 09/27/2019    RBCUA 0 09/27/2019    BLOODU Negative 03/28/2021    SPECGRAV 1.015 03/28/2021    GLUCOSEU Negative 03/28/2021       Consults:    IP CONSULT TO NEUROLOGY      Assessment/Plan:    Active Hospital Problems    Diagnosis    Hyperlipemia [E78.5]     Priority: Medium    Obesity [E66.9]    Acute CVA (cerebrovascular accident) ([facility] [address] [I63.9]    Hypertension, essential [I10]    DM (diabetes mellitus) ([facility] [address] [E11.9]       CVA - acute thromboembolic likely. MRI ordered and pending. Echo ordered and pending. Neurology consulted and appreciated in advance. Continue ASA/Statin for 2nd prevention and risk reduction. Patient has been evaluated in 2018 for similar symptoms, seen Dr. Rupa Lundberg at [facility].  Thoughts at that time dizziness was 2nd to vertebrobasilar insufficiency, flow related versus TIA. HTN - w/out known CAD and no evidence of active signs/sxs of ischemia/failure. Currently controlled on home meds w/ vitals reviewed and documented as above. HyperLipidemia - controlled on home Statin. Continue, w/ f/u and med adjustment w/ PCP    DM2 - controlled on home oral antiGlycemics - Metformin held/Glipizide continued. Follow FSBS/SSI low regimen. Last HbA1c 8.2% dated Feb 2021. Anticipate resuming/continuing home regimen at discharge. Obesity -  With Body mass index is 33.45 kg/m². Complicating assessment and treatment. Placing patient at risk for multiple co-morbidities as well as early death and contributing to the patient's presentation. Counseled on weight loss.       Rt foot: ? Osteo 4th metatarsal   Osteomyelitis and bone destruction changes at the fourth metatarsal noted in February on an MRI and plan xray. 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       Patient being seen by PDM, Dr. Adilia Choudhary. He is being seen status post a fourth toe amputation and osteomyelitis diagnosed in February. He had office visits on March 2, March 9, March 16 and March 23. Following the right fourth toe amputation patient completed a 7-day course of Omnicef. Next appointment Tuesday, March 30  [address] [facility] started in ED  Right foot clinical exam is unremarkable with no evidence of cellulitis, erythema or pain. Laboratory studies do not reveal any evidence of leukocytosis or lactic acidosis. X-ray is also negative for any evidence of soft tissue gas or fluid collection. I do not feel at this point in time there is no clinical indication of acute osteomyelitis. I believe that the x-rays that have been obtained are evidence of a chronic scenario from February. The patient has been treated. I do not feel that broad-spectrum IV antibiotics are indicated. I feel that this can be followed and addressed by the podiatrist on Tuesday. If the patient's hospital stay is going to be continued beyond that point we could certainly consult Dr. Marnell Krabbe for recommendations regarding any additional antibiotic therapy.       DVT Prophylaxis: Not ordered. Diet: DIET GENERAL;  Code Status: Prior      PT/OT Eval Status: ordered and pending. Dispo - possibly Tues/Wed 30/31 March pending clinical course and subspecialty recs.      Opal Ryan MD

## 2021-03-29 NOTE — PLAN OF CARE
Bed in lowest position, wheels locked, 2/4 side rails up, nonskid footwear on. Bed/ chair check alarm in place, call light within reach. Pt instructed to call out when needing assistance. Pt stated understanding. Nurse will continue to monitor.         Problem: Falls - Risk of:  Goal: Will remain free from falls  Description: Will remain free from falls  Outcome: Ongoing  Goal: Absence of physical injury  Description: Absence of physical injury  Outcome: Ongoing

## 2021-03-29 NOTE — PROGRESS NOTES
history (02/19/2021); Foot Debridement (Right, 2/19/2021); other surgical history; and Foot Debridement (Right, 2/23/2021). Restrictions  Restrictions/Precautions  Restrictions/Precautions: General Precautions, Up as Tolerated  Required Braces or Orthoses?: Yes  Position Activity Restriction  Other position/activity restrictions: Pt has been wearing R surgical shoe d/t partial metatarsal resection since Feb 2021. Also wears one on L side for even \"footing\".     Subjective   General  Chart Reviewed: Yes  Patient assessed for rehabilitation services?: Yes  Additional Pertinent Hx: New onset of dizziness and ataxia, nystagmus  Family / Caregiver Present: No  Referring Practitioner: EMILY Trejo  Diagnosis: acute CVA  Subjective  Subjective: no c/o dizziness during OT session  General Comment  Comments: RN approved therapy  Vital Signs  Temp: 97.7 °F (36.5 °C)  Temp Source: Oral  Pulse: 69  Heart Rate Source: Monitor  Resp: 16  BP: 123/69  BP Location: Right upper arm  MAP (mmHg): 87  Patient Position: Semi fowlers  Height and Weight  Height: 5' 8\" (172.7 cm)  Oxygen Therapy  SpO2: 99 %  O2 Device: None (Room air)  Social/Functional History  Social/Functional History  Lives With: Spouse(wife in WC)  Type of Home: House  Home Layout: One level  Home Access: Ramped entrance  Bathroom Shower/Tub: Tub/Shower unit  Bathroom Equipment: Grab bars in shower, Tub transfer bench(tub transfer bench for wife, pt does not use it)  Home Equipment: (3 wheeled walker)  ADL Assistance: [address] [place] [address] Avenue: Independent  Homemaking Responsibilities: Yes  Meal Prep Responsibility: (shares with wife)  Laundry Responsibility: Secondary  Cleaning Responsibility: (shares with wife)  Shopping Responsibility: No(daughter takes wife)  Ambulation Assistance: Independent  Transfer Assistance: Independent  Active : Yes  Occupation: Retired  Type of occupation: [employer],   Leisure & Hobbies: hunt and fish Objective   Vision: Impaired  Vision Exceptions: Wears glasses for reading;Wears glasses for distance  Hearing: Exceptions to [facility]  Hearing Exceptions: Hard of hearing/hearing concerns;Bilateral hearing aid    Orientation  Overall Orientation Status: Within Functional Limits     Balance  Sitting Balance: Supervision  Standing Balance: Contact guard assistance(rw)  Functional Mobility  Functional - Mobility Device: Rolling Walker  Activity: To/from bathroom  Assist Level: Contact guard assistance  Toilet Transfers  Toilet - Technique: Ambulating(RW)  Equipment Used: Grab bars  Toilet Transfer: Contact guard assistance  ADL  Feeding: Independent  Grooming: Contact guard assistance(wash hands at sink)  LE Dressing: Minimal assistance(for L surgical shoe (pt donned R surgical shoe with increased time))  Toileting: Contact guard assistance(vc's to pull up underwear in back)  Tone RUE  RUE Tone: Normotonic  Tone LUE  LUE Tone: Normotonic  Coordination  Movements Are Fluid And Coordinated: Yes     Bed mobility  Supine to Sit: Stand by assistance(HOB elevated)  Transfers  Stand Pivot Transfers: Contact guard assistance(RW)  Sit to stand: Stand by assistance  Stand to sit: Contact guard assistance     Cognition  Overall Cognitive Status: Exceptions  Arousal/Alertness: Appropriate responses to stimuli  Following Commands:  Follows one step commands consistently  Attention Span: Attends with cues to redirect  Insights: Decreased awareness of deficits  Cognition Comment: vc's for attention       LUE AROM (degrees)  LUE AROM : WFL  RUE AROM (degrees)  RUE AROM : WFL  LUE Strength  Gross LUE Strength: WFL  RUE Strength  Gross RUE Strength: WFL      Plan   Plan  Times per week: 3-5x  Current Treatment Recommendations: Self-Care / ADL, Functional Mobility Training, Balance Training, Endurance Training, Safety Education & Training, Equipment Evaluation, Education, & procurement    AM-PAC Score      AM-PAC Inpatient Daily Activity Raw Score: 20 (03/29/21 1319)  AM-PAC Inpatient ADL T-Scale Score : 42.03 (03/29/21 1319)  ADL Inpatient CMS 0-100% Score: 38.32 (03/29/21 1319)  ADL Inpatient CMS G-Code Modifier : CJ (03/29/21 1319)    Goals  Short term goals  Time Frame for Short term goals: 1 week (4/5) unless noted  Short term goal 1: Perform functional transfers with supervision with RW  Short term goal 2: Perform bathroom mobility with supervision with RW  Short term goal 3: Stand at sink for 2 ADL tasks with SBA by 3/31  Patient Goals   Patient goals : \"Be able to walk and drive\"     Therapy Time   Individual Concurrent Group Co-treatment   Time In 1143         Time Out 1217         Minutes 34         Timed Code Treatment Minutes: 24 Minutes(10 min eval)    If pt is discharged prior to next OT session, this note will serve as the discharge summary.   Adeline Medina OT
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
## 2021-03-29 NOTE — PLAN OF CARE
Problem: Nutrition  Intervention: Swallowing evaluation  Note: SLP completed evaluation. Please refer to notes in EMR. Intervention: Aspiration precautions  Note: SLP completed evaluation. Please refer to notes in EMR.        Marely Ribeiro MA [address] [facility]  Speech Language Pathologist

## 2021-03-29 NOTE — PROGRESS NOTES
Speech Language Pathology  Facility/Department: [facility] C5 - MED SURG/ORTHO   CLINICAL BEDSIDE SWALLOW EVALUATION    Recommended Diet   Diet Solids Recommendation: Regular  Liquid Consistency Recommendation: Thin  Recommended Form of Meds: PO  Risk Management: Small bites/sips, slow rate, alternate liquids and solids, sit fully upright    NAME: Taj Sylvester  : 1935  MRN: [id number]    ADMISSION DATE: 3/28/2021  ADMITTING DIAGNOSIS: has Hyperlipemia; DM (diabetes mellitus) ([address] Vertigo, benign paroxysmal; Hypertension, essential; Syncope; Lymphoma ([address] 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[address] Diabetic ulcer of toe 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 mellitus, with fat layer exposed ([address] Acute CVA (cerebrovascular accident) ([address] and Obesity on their problem list.  ONSET DATE: 2021    Recent Chest Xray/CT of Chest: 2021  Impression   No acute process.       Bibasilar hypoaeration       Date of Eval: 3/29/2021  Evaluating Therapist: Dee Abbasi    Current Diet level:  Current Diet : Regular  Current Liquid Diet : Thin    Primary Complaint  Patient Complaint: Per MD H&P: \"The patient is a 81 y.o. male who presents to [facility] with PMHx: DM, HLD, HTN, CAD, CVA, dizziness, chronic right foot ulcer. Patient presented to the emergency department at [facility] today for reports of dizziness and unsteady gait. He had woke up in the middle the night to go to the bathroom, and while abusing the bathroom he turned to go back to bed and felt this unsteadiness. He described it as a spinning sensation and needing to hold onto the walls or his assist device in order to return to bed  He denied difficulty speaking, seeing. He denied chest pain, nausea, near syncope. Denied any numbness or tingling of the upper or lower extremities. Patient wears hearing aids. He is [employer]  .  He reports that he has had vertigo in the past but to him \"this was different than what he is ever experienced in the past.\"    Pain:  Pain Assessment  Pain Assessment: 0-10  Pain Level: 0     Vitals/labs:   SpO2: 99% on RA  RR: 16  BP: 168/74  HR: 66    Reason for Referral  Charles Vegas was referred for a bedside swallow evaluation to assess the efficiency of his swallow function, identify signs and symptoms of aspiration and make recommendations regarding safe dietary consistencies, effective compensatory strategies, and safe eating environment. Impression  Dysphagia Diagnosis: Mild oral stage dysphagia;Swallow function appears grossly intact  Dysphagia Outcome Severity Scale: Level 6: Within functional limits/Modified independence     Dysphagia Impression : Pt is a 81year old male admitted to [facility] on 03/28/2021 due to dizziness and unsteady gait. PMHx includes DM, HTL, HTN, CVA, dizziness. Pt has not previously been seen by . Pt currently on room air. RN ok'd entry of SLP. Pt alert and cooperative, agreeable to BSE. When asked about previous s/s of dysphagia, Pt reported that about 2 week ago, he had a cough with PO but has not resolved. Pt also with complaints of occ globus sensation. Oral mech exam indicated generalized oral weakness. Pt is edentulous, but does have top and bottom dentures at home that he typically does wear for PO. Thin liquid via cup and straw, puree, and regular solid presented. Slow and effortful mastication with regular solids (likely due to pt not having dentures), with decreased A-P propulsion. Pt able to clear and tolerate regular solid with increased time. Pt with adequate mastication, A-P propulsion, and oral clearance. Timely trigger of pharyngeal swallow, suspect aduquate laryngeal elevation upon palpation of anterior neck. No overt s/s of aspiration observed - no coughing, throat clearing, or wet vocal quality. SLP recommends regular solids, thin liquids, meds PO. Pt did not have dentures, so would benefit from having for PO intake.  Pt tolerated regular solids without dentures for BSE. SLP edu pt re: Safe swallow strategies - pt verbalized understanding. SLP to continue to follow for diet tolerance monitoring. Hold PO and contact SLP if s/s of aspiration develop. Treatment Plan  Requires SLP Intervention: Yes  Duration/Frequency of Treatment: 2-4x/week  D/C Recommendations: To be determined       Recommended Diet and Intervention  Diet Solids Recommendation: Regular  Liquid Consistency Recommendation: Thin  Recommended Form of Meds: PO  Risk Management: Small bites/sips, slow rate, alternate liquids and solids, sit fully upright  Recommendations: Dysphagia treatment  Therapeutic Interventions: Diet tolerance monitoring;Patient/Family education    Compensatory Swallowing Strategies  Compensatory Swallowing Strategies: Alternate solids and liquids;Eat/Feed slowly;Upright as possible for all oral intake;Remain upright for 30-45 minutes after meals;Small bites/sips    Treatment/Goals  Short-term Goals  Timeframe for Short-term Goals: 5 days (04/02/2021)  Long-term Goals  Timeframe for Long-term Goals: 7 days (04/05/2021)  Goal 1: Pt will tolerate safest and least restrictive diet with no overt s/s of aspiration  Dysphagia Goals: The patient will tolerate recommended diet without observed clinical signs of aspiration; The patient/caregiver will demonstrate understanding of compensatory strategies for improved swallowing safety. General  Chart Reviewed: Yes  Comments: Chart reviewed for completion of BSE. Subjective  Subjective: RN ok'd entry of SLP  Behavior/Cognition: Alert; Cooperative;Pleasant mood  Respiratory Status: Room air  O2 Device: None (Room air)  Communication Observation: Functional  Follows Directions: Simple  Dentition: Edentulous  Patient Positioning: Upright in bed  Baseline Vocal Quality: Normal  Volitional Cough: Strong  Prior Dysphagia History: None per EMR. Pt reports \"things getting stuck\"  Consistencies Administered: Reg solid; Dysphagia Pureed (Dysphagia I); Thin - cup; Thin - straw    Vision/Hearing  Vision  Vision: Impaired  Vision Exceptions: Wears glasses for reading;Wears glasses for distance  Hearing  Hearing: Exceptions to [facility]  Hearing Exceptions: Hard of hearing/hearing concerns;Bilateral hearing aid    Oral Motor Deficits  Oral/Motor  Oral Motor: Exceptions to [facility]  Labial ROM: Reduced left; Reduced right  Labial Strength: Reduced  Labial Coordination: Reduced  Lingual ROM: Reduced left; Reduced right  Lingual Strength: Reduced  Lingual Coordination: Reduced    Oral Phase Dysfunction  Oral Phase  Oral Phase: Exceptions  Oral Phase Dysfunction  Impaired Mastication: Reg Solid  Decreased Anterior to Posterior Transit: Reg solid  Oral Phase  Oral Phase - Comment: Slow and effortful mastication with regular solids (likely due to pt not having dentures), with decreased A-P propulsion. Pt able to clear and tolerate regular solid with increased time. Indicators of Pharyngeal Phase Dysfunction   Pharyngeal Phase  Pharyngeal Phase: WNL  Pharyngeal Phase   Pharyngeal: Timely trigger of pharyngeal swallow, suspect aduquate laryngeal elevation upon palpation of anterior neck. No overt s/s of aspiration observed. Prognosis  Prognosis  Prognosis for safe diet advancement: good  Individuals consulted  Consulted and agree with results and recommendations: Patient    Education  Patient Education: SLP edu pt re: role of SLP, rationale of BSE, risk of aspiration, safe swallow strategies, diet recommendation, POC  Patient Education Response: Verbalizes understanding  Safety Devices in place: Yes  Type of devices: All fall risk precautions in place; Left in bed;Bed alarm in place;Call light within reach       Therapy Time  SLP Individual Minutes  Time In: 0211  Time Out: Chris Lundberg  Minutes: [address] Betty Christianson  Speech Language Pathologist

## 2021-03-29 NOTE — PROGRESS NOTES
esophagus; skin biopsy; other surgical history (02/19/2021); Foot Debridement (Right, 2/19/2021); other surgical history; and Foot Debridement (Right, 2/23/2021). Restrictions  Restrictions/Precautions  Restrictions/Precautions: General Precautions, Up as Tolerated  Required Braces or Orthoses?: Yes  Position Activity Restriction  Other position/activity restrictions: Pt has been wearing R surgical shoe d/t partial metatarsal resection since Feb 2021. Also wears one on L side for even \"footing\".   Vision/Hearing  Vision: Impaired(no nystagmus noted)  Vision Exceptions: Wears glasses for reading;Wears glasses for distance  Hearing: Exceptions to [facility]  Hearing Exceptions: Hard of hearing/hearing concerns;Bilateral hearing aid     Subjective  General  Chart Reviewed: Yes  Patient assessed for rehabilitation services?: Yes  Response To Previous Treatment: Not applicable  Family / Caregiver Present: No  Referring Practitioner: Janis Estrella MD  Referral Date : 03/28/21  Diagnosis: acute CVA  Follows Commands: Within Functional Limits  General Comment  Comments: Pt resting in bed upon entry, RN cleared pt for therapy  Subjective  Subjective: PT agreeable to PT  Pain Screening  Patient Currently in Pain: Denies  Vital Signs  Patient Currently in Pain: Denies  Pre Treatment Pain Screening  Intervention List: Patient able to continue with treatment    Orientation  Orientation  Overall Orientation Status: Within Normal Limits  Social/Functional History  Social/Functional History  Lives With: Spouse(wife in [facility])  Type of Home: House  Home Layout: One level  Home Access: Ramped entrance  Bathroom Shower/Tub: Tub/Shower unit  Bathroom Equipment: Grab bars in shower, Tub transfer bench(tub transfer bench for wife, pt does not use it)  Home Equipment: (3 wheeled walker)  ADL Assistance: Independent  Homemaking Assistance: Independent  Homemaking Responsibilities: Yes  Meal Prep Responsibility: (shares with wife)  Laundry Responsibility: Secondary  Cleaning Responsibility: (shares with wife)  Shopping Responsibility: No(daughter takes wife)  Ambulation Assistance: Independent  Transfer Assistance: Independent  Active : Yes  Occupation: Retired  Type of occupation: [employer],   Leisure & Hobbies: hunt and fish         Objective          AROM RLE (degrees)  RLE AROM: WFL  AROM LLE (degrees)  LLE AROM : WFL  Strength RLE  Strength RLE: WNL  Strength LLE  Strength LLE: WNL        Bed mobility  Supine to Sit: Stand by assistance(HOB elevated)  Sit to Supine: Unable to assess(up in chair at EOS, lunch arrived)  Scooting: Stand by assistance(to EOB)  Transfers  Sit to Stand: Stand by assistance  Stand to sit: Stand by assistance  Bed to Chair: Contact guard assistance(without AD)  Ambulation  Ambulation?: Yes  Ambulation 1  Surface: level tile  Device: Rolling Walker  Assistance: Contact guard assistance  Quality of Gait: normal abimael and pace, mildly unsteady with no LOB using RW  Distance: 80 ft plus 10 ft, hallway, then bathroom, then to chair in room     Balance  Posture: Fair  Sitting - Static: Good  Sitting - Dynamic: Good  Standing - Static: Fair  Standing - Dynamic: Fair;+  Comments: Dynamic balance with RW at CGA, static standing balance without AD, pt demonstrated forward/backward postural sway, in quick ataxic fashion, as if attempting to find his balance constantly from forward to backward  Exercises  Straight Leg Raise: x 10 B  Quad Sets: x 10 B  Heelslides: x 10 B  Gluteal Sets: x 10 B  Knee Short Arc Quad: x 10 B  Ankle Pumps: x 10 B     Plan   Plan  Times per week: 3-5  Current Treatment Recommendations: Strengthening, Neuromuscular Re-education, Home Exercise Program, ROM, Balance Training, Endurance Training, Functional Mobility Training, Transfer Training, Gait Training  Safety Devices  Type of devices:  All fall risk precautions in place, Left in chair, Call light within reach, Chair alarm in place, Nurse notified, Gait belt, Patient at risk for falls      AM-PAC Score     AM-PAC Inpatient Mobility without Stair Climbing Raw Score : 17 (03/29/21 1549)  AM-PAC Inpatient without Stair Climbing T-Scale Score : 48.47 (03/29/21 1549)  Mobility Inpatient CMS 0-100% Score: 32.72 (03/29/21 1549)  Mobility Inpatient without Stair CMS G-Code Modifier : Jo Ann Kirk (03/29/21 1549)       Goals  Short term goals  Time Frame for Short term goals: 4/4/21 unless noted  Short term goal 1: Pt will perform bed mobility with mod I by 4/3/21  Short term goal 2: Pt will perform transfers with supervision  Short term goal 3: Pt will ambulate 100 ft with RW and SBA  Patient Goals   Patient goals : \"to go home\"       Therapy Time   Individual Concurrent Group Co-treatment   Time In 1143         Time Out 1218         Minutes 35         Timed Code Treatment Minutes: 25 Minutes    If pt is discharged prior to next therapy session, this note will serve as discharge summary.     Orpah Schirmer, PT

## 2021-03-30 ENCOUNTER — HOSPITAL ENCOUNTER (OUTPATIENT)
Dept: WOUND CARE | Age: 86
Discharge: HOME | End: 2021-03-30
Payer: MEDICARE

## 2021-03-30 VITALS
BODY MASS INDEX: 33.34 KG/M2 | RESPIRATION RATE: 18 BRPM | HEART RATE: 76 BPM | OXYGEN SATURATION: 97 % | SYSTOLIC BLOOD PRESSURE: 127 MMHG | TEMPERATURE: 97.6 F | DIASTOLIC BLOOD PRESSURE: 75 MMHG | WEIGHT: 220 LBS | HEIGHT: 68 IN

## 2021-03-30 LAB
ALBUMIN SERPL-MCNC: 4 G/DL (ref 3.4–5)
ANION GAP SERPL CALCULATED.3IONS-SCNC: 7 MMOL/L (ref 3–16)
BUN BLDV-MCNC: 23 MG/DL (ref 7–20)
CALCIUM SERPL-MCNC: 9.6 MG/DL (ref 8.3–10.6)
CHLORIDE BLD-SCNC: 103 MMOL/L (ref 99–110)
CO2: 27 MMOL/L (ref 21–32)
CREAT SERPL-MCNC: 0.9 MG/DL (ref 0.8–1.3)
GFR AFRICAN AMERICAN: >60
GFR NON-AFRICAN AMERICAN: >60
GLUCOSE BLD-MCNC: 124 MG/DL (ref 70–99)
GLUCOSE BLD-MCNC: 127 MG/DL (ref 70–99)
GLUCOSE BLD-MCNC: 161 MG/DL (ref 70–99)
HCT VFR BLD CALC: 37 % (ref 40.5–52.5)
HEMOGLOBIN: 12.3 G/DL (ref 13.5–17.5)
MCH RBC QN AUTO: 32.1 PG (ref 26–34)
MCHC RBC AUTO-ENTMCNC: 33.4 G/DL (ref 31–36)
MCV RBC AUTO: 96.2 FL (ref 80–100)
PDW BLD-RTO: 15.1 % (ref 12.4–15.4)
PERFORMED ON: ABNORMAL
PERFORMED ON: ABNORMAL
PHOSPHORUS: 3.7 MG/DL (ref 2.5–4.9)
PLATELET # BLD: 190 K/UL (ref 135–450)
PMV BLD AUTO: 7.9 FL (ref 5–10.5)
POTASSIUM SERPL-SCNC: 4.5 MMOL/L (ref 3.5–5.1)
RBC # BLD: 3.84 M/UL (ref 4.2–5.9)
SODIUM BLD-SCNC: 137 MMOL/L (ref 136–145)
WBC # BLD: 10.7 K/UL (ref 4–11)

## 2021-03-30 PROCEDURE — 97110 THERAPEUTIC EXERCISES: CPT

## 2021-03-30 PROCEDURE — 99232 SBSQ HOSP IP/OBS MODERATE 35: CPT | Performed by: PSYCHIATRY & NEUROLOGY

## 2021-03-30 PROCEDURE — 80069 RENAL FUNCTION PANEL: CPT

## 2021-03-30 PROCEDURE — 36415 COLL VENOUS BLD VENIPUNCTURE: CPT

## 2021-03-30 PROCEDURE — 6370000000 HC RX 637 (ALT 250 FOR IP): Performed by: INTERNAL MEDICINE

## 2021-03-30 PROCEDURE — 85027 COMPLETE CBC AUTOMATED: CPT

## 2021-03-30 PROCEDURE — G0378 HOSPITAL OBSERVATION PER HR: HCPCS

## 2021-03-30 PROCEDURE — 97116 GAIT TRAINING THERAPY: CPT

## 2021-03-30 PROCEDURE — 97530 THERAPEUTIC ACTIVITIES: CPT

## 2021-03-30 PROCEDURE — 2580000003 HC RX 258: Performed by: INTERNAL MEDICINE

## 2021-03-30 PROCEDURE — 97535 SELF CARE MNGMENT TRAINING: CPT

## 2021-03-30 RX ORDER — ASPIRIN 81 MG/1
81 TABLET ORAL DAILY
Qty: 30 TABLET | Refills: 0 | Status: SHIPPED | OUTPATIENT
Start: 2021-03-31 | End: 2022-04-15 | Stop reason: CLARIF

## 2021-03-30 RX ADMIN — ASPIRIN 81 MG: 81 TABLET, COATED ORAL at 07:54

## 2021-03-30 RX ADMIN — Medication 10 ML: at 07:54

## 2021-03-30 RX ADMIN — INSULIN LISPRO 1 UNITS: 100 INJECTION, SOLUTION INTRAVENOUS; SUBCUTANEOUS at 11:45

## 2021-03-30 RX ADMIN — GLIPIZIDE 5 MG: 5 TABLET ORAL at 07:54

## 2021-03-30 RX ADMIN — LISINOPRIL 10 MG: 10 TABLET ORAL at 07:54

## 2021-03-30 ASSESSMENT — PAIN SCALES - GENERAL: PAINLEVEL_OUTOF10: 0

## 2021-03-30 NOTE — DISCHARGE INSTR - COC
Immunization History:   Immunization History   Administered Date(s) Administered    Influenza, High Dose (Fluzone 65 yrs and older) 09/30/2016    Pneumococcal Polysaccharide ([id number]) 06/29/2015    Tdap (Boostrix, Adacel) 06/29/2015       Active Problems:  Patient Active Problem List   Diagnosis Code    Dyslipidemia E78.5    DM (diabetes mellitus), type 2, uncontrolled with complications ([facility] F16.0, E11.65    Vertigo, benign paroxysmal H81.10    HTN (hypertension), benign I10    Syncope R55    Lymphoma ([facility] C85.90    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[facility] I96    Diabetic ulcer of toe 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 mellitus, with fat layer exposed ([facility] E11.621, L97.512    Acute CVA (cerebrovascular accident) ([facility] I63.9    Obesity E66.9    Dizziness R42       Isolation/Infection:   Isolation            No Isolation          Patient Infection Status       Infection Onset Added Last Indicated Last Indicated By Review Planned Expiration Resolved Resolved By    None active    Resolved    COVID-19 Rule Out 02/18/21 02/18/21 02/18/21 COVID-19, Rapid (Ordered)   02/18/21 Rule-Out Test Resulted            Nurse Assessment:  Last Vital Signs: /75   Pulse 76   Temp 97.6 °F (36.4 °C) (Oral)   Resp 18   Ht 5' 8\" (1.727 m)   Wt 220 lb (99.8 kg)   SpO2 97%   BMI 33.45 kg/m²     Last documented pain score (0-10 scale): Pain Level: 0  Last Weight:   Wt Readings from Last 1 Encounters:   03/28/21 220 lb (99.8 kg)     Mental Status:  oriented and alert    IV Access:  - None    Nursing Mobility/ADLs:  Walking   Independent  Transfer  Independent  Bathing  Independent  Dressing  Independent  Toileting  Independent  Feeding  Independent  Med Admin  Independent  Med Delivery   none    Wound Care Documentation and Therapy:  Diabetic Ulcer 07/14/16 Foot Plantar Mcgrath, 1 x 1cm with 0.2 cm depth (Active)   Number of days: 1719       Wound 02/18/21 #1, right plantar foot, DFU, mehta 1, onset 2/1/2021 (Active)   Wound Image   03/09/21 0944   Wound Etiology Diabetic Ambrose 1 03/30/21 0754   Dressing Status Clean;Dry; Intact 03/29/21 1910   Wound Cleansed Irrigated with saline; Soap and water 03/23/21 0942   Dressing/Treatment Foam 03/30/21 0754   Offloading for Diabetic Foot Ulcers Felt or foam 03/30/21 0754   Wound Length (cm) 0.2 cm 03/23/21 0942   Wound Width (cm) 0.6 cm 03/23/21 0942   Wound Depth (cm) 0.2 cm 03/23/21 0942   Wound Surface Area (cm^2) 0.12 cm^2 03/23/21 0942   Change in Wound Size % (l*w) 89.74 03/23/21 0942   Wound Volume (cm^3) 0.02 cm^3 03/23/21 0942   Wound Healing % 94 03/23/21 0942   Post-Procedure Length (cm) 0.2 cm 03/23/21 1036   Post-Procedure Width (cm) 0.6 cm 03/23/21 1036   Post-Procedure Depth (cm) 0.2 cm 03/23/21 1036   Post-Procedure Surface Area (cm^2) 0.12 cm^2 03/23/21 1036   Post-Procedure Volume (cm^3) 0.02 cm^3 03/23/21 1036   Wound Assessment Pink/red 03/30/21 0754   Drainage Amount Scant 03/30/21 0754   Drainage Description Serosanguinous 03/30/21 0754   Odor None 03/30/21 0754   Va-wound Assessment Hyperkeratosis (callous) 03/30/21 0754   Margins Attached edges 03/30/21 0754   Number of days: 40        Elimination:  Continence:   · Bowel: Yes  · Bladder: Yes  Urinary Catheter: None   Colostomy/Ileostomy/Ileal Conduit: No       Date of Last BM:   No intake or output data in the 24 hours ending 03/30/21 1438  No intake/output data recorded. Safety Concerns: At Risk for Falls    Impairments/Disabilities:      None    Nutrition Therapy:  Current Nutrition Therapy:   - Oral Diet:  General    Routes of Feeding: Oral  Liquids: Thin Liquids  Daily Fluid Restriction: no  Last Modified Barium Swallow with Video (Video Swallowing Test): not done    Treatments at the Time of Hospital Discharge:   Respiratory Treatments: na  Oxygen Therapy:  is not on home oxygen therapy.   Ventilator:    - No ventilator support    Rehab Therapies: wound care / Nurse/ PT/OT  Weight Bearing Discharge: None    Physician Certification: I certify the above information and transfer of Brandon Chu  is necessary for the continuing treatment of the diagnosis listed and that he requires Home Care for less 30 days.      Update Admission H&P: No change in H&P    PHYSICIAN SIGNATURE:  Electronically signed by Kavin Hilario MD on 3/30/21 at 4:14 PM EDT

## 2021-03-30 NOTE — PROGRESS NOTES
Physical Therapy  Facility/Department: [place] [address] - MED SURG/ORTHO  Daily Treatment Note  NAME: Demian Dye  : 1935  MRN: [id number]    Date of Service: 3/30/2021    Discharge Recommendations:  Home with assist PRN, Home with Home health PT   PT Equipment Recommendations  Equipment Needed: No    Assessment   Body structures, Functions, Activity limitations: Decreased functional mobility ; Decreased balance  Assessment: Pt is requiring SBA for bed mobility and CGA for transfers and gait with RW. Pt is mildly unsteady, especially with turns, but able to use walker correctly ambulation with cues. Pt would benefit from skilled acute PT to address deficits. Recommend home with PRN assist and HHPT at DC from acute setting  Treatment Diagnosis: impaired balance  Prognosis: Good  Decision Making: Medium Complexity  Barriers to Learning: no  REQUIRES PT FOLLOW UP: Yes  Activity Tolerance  Activity Tolerance: Patient Tolerated treatment well  Activity Tolerance: /82  HR79  O2 98% on RA     Patient Diagnosis(es): There were no encounter diagnoses. has a past medical history of Abscess of toe of right foot, Acute CVA (cerebrovascular accident) ([facility] [address] CAD (coronary artery disease), Cancer ([facility] [address] Diabetes mellitus ([facility] [address] History of blood transfusion, Hyperlipemia, Hypertension, and Ulcer of other part of foot. has a past surgical history that includes Arm Debridement; Foot Debridement (???); Abdomen surgery; Colonoscopy; Dilatation, esophagus; skin biopsy; other surgical history (2021); Foot Debridement (Right, 2021); other surgical history; and Foot Debridement (Right, 2021). Restrictions  Restrictions/Precautions  Restrictions/Precautions: General Precautions, Up as Tolerated  Required Braces or Orthoses?: Yes  Position Activity Restriction  Other position/activity restrictions: Pt has been wearing R surgical shoe d/t partial metatarsal resection since 2021.  Also wears one on L side for even \"footing\". Subjective   General  Chart Reviewed: Yes  Response To Previous Treatment: Patient with no complaints from previous session.   Family / Caregiver Present: No  Referring Practitioner: Blaine Soulier, MD  Subjective  Subjective: PT agreeable to PT  General Comment  Comments: Pt resting in bed upon entry, RN cleared pt for therapy  Pain Screening  Patient Currently in Pain: Denies  Vital Signs  Patient Currently in Pain: Denies       Orientation  Orientation  Overall Orientation Status: Within Normal Limits  Cognition      Objective   Bed mobility  Supine to Sit: Stand by assistance  Transfers  Sit to Stand: Stand by assistance  Stand to sit: Stand by assistance;Contact guard assistance  Ambulation  Ambulation?: Yes  Ambulation 1  Surface: level tile  Device: Rolling Walker  Assistance: Contact guard assistance  Quality of Gait: normal abimael and pace, mildly unsteady on turns with no LOB using RW; cues to aoiding moving rw too far beyond him on turns  Distance: 200' x 2     Balance  Posture: Fair  Sitting - Static: Good  Sitting - Dynamic: Good  Standing - Static: Fair;+  Standing - Dynamic: Fair  Exercises  Straight Leg Raise: x 10 B  Quad Sets: x 10 B  Heelslides: x 10 B  Gluteal Sets: x 10 B  Hip Flexion: x 10 B  Knee Long Arc Quad: x 10 B  Knee Short Arc Quad: x 10 B             AM-PAC Score     AM-PAC Inpatient Mobility without Stair Climbing Raw Score : 17 (03/30/21 1023)  AM-PAC Inpatient without Stair Climbing T-Scale Score : 48.47 (03/30/21 1023)  Mobility Inpatient CMS 0-100% Score: 32.72 (03/30/21 1023)  Mobility Inpatient without Stair CMS G-Code Modifier : Agustin Herman (03/30/21 1023)       Goals  Short term goals  Time Frame for Short term goals: 4/4/21 unless noted  Short term goal 1: Pt will perform bed mobility with mod I by 4/3/21  -3/30 sba  Short term goal 2: Pt will perform transfers with supervision   -3/30 sba/ cga  Short term goal 3: Pt will ambulate 100 ft with RW and SBA   -3/30 cga rw 200'  Patient Goals   Patient goals : \"to go home\"    Plan    Plan  Times per week: 3-5  Current Treatment Recommendations: Strengthening, Neuromuscular Re-education, Home Exercise Program, ROM, Balance Training, Endurance Training, Functional Mobility Training, Transfer Training, Gait Training  Safety Devices  Type of devices:  All fall risk precautions in place, Left in chair, Call light within reach, Chair alarm in place, Nurse notified, Gait belt, Patient at risk for falls     Therapy Time   Individual Concurrent Group Co-treatment   Time In 0902         Time Out 0941         Minutes 39         Timed Code Treatment Minutes: 12[facility]

## 2021-03-30 NOTE — CARE COORDINATION
CASE MANAGEMENT DISCHARGE SUMMARY      Discharge to: Home with family with no needs. New Durable Medical Equipment ordered/agency: None    Transportation: family    Confirmed discharge plan with: Discussed with pt and son at bedside prior day. Patient: yes       RN, name: Eloisa Shonna.     Nahomi Murry RN

## 2021-03-30 NOTE — PROGRESS NOTES
Guanakito Lazarus  Neurology Follow-up  [facility] Neurology    Date of Service: 3/30/2021    Subjective:   CC: Follow up today regarding: new onset dizziness and possible stroke. Events noted. Chart and lab reviewed. The patient denies any new sx today. MRI brain showed no acute stroke. He denies any HA, SOB, weakness or severe dizziness. No visual changes. Other ROS was negative. ROS : A 10-12 system review obtained and updated today and is unremarkable except as mentioned  in my interval history. family history includes Cancer in his brother; No Known Problems in his brother, sister, and sister; Other in his father and mother.     Past Medical History:   Diagnosis Date    Abscess of toe of right foot 7/27/2011    [facility]A [facility]cerebrovascular [facility]) [facility] [address] 3/28/2021    CAD (coronary artery disease)     Cancer ([facility] [facility]     Diabetes mellitus ([facility] [address]     History of blood transfusion     Hyperlipemia     Hypertension     Ulcer of other part of foot 8/2/2011     Current Facility-Administered Medications   Medication Dose Route Frequency Provider Last Rate Last Admin    glipiZIDE (GLUCOTROL) tablet 5 mg  5 mg Oral BID AC Luz Elena Mattson MD   5 mg at 03/30/21 0754    lisinopril (PRINIVIL;ZESTRIL) tablet 10 mg  10 mg Oral Daily Luz Elena Mattson MD   10 mg at 03/30/21 0754    sodium chloride flush 0.9 % injection 10 mL  10 mL Intravenous 2 times per day Luz Elena Mattson MD   10 mL at 03/30/21 0754    sodium chloride flush 0.9 % injection 10 mL  10 mL Intravenous PRN Luz Elena Mattson MD        0.9 % sodium chloride infusion  25 mL Intravenous PRN Luz Elena Mattson MD        promethazine (PHENERGAN) tablet 12.5 mg  12.5 mg Oral Q6H PRN Luz Elena Mattson MD        Or    ondansetron (ZOFRAN) injection 4 mg  4 mg Intravenous Q6H PRN Luz Elena Mattson MD   4 mg at 03/29/21 0409    polyethylene glycol (GLYCOLAX) packet 17 g  17 g Oral Daily PRN Luz Elena Mattson MD        aspirin EC tablet 81 mg 81 mg Oral Daily Carol Ann Paula MD   81 mg at 03/30/21 0889    Or    aspirin suppository 300 mg  300 mg Rectal Daily Carol Ann Paula MD        perflutren lipid microspheres (DEFINITY) injection 1.65 mg  1.5 mL Intravenous ONCE PRN Carol Ann Paula MD        atorvastatin (LIPITOR) tablet 40 mg  40 mg Oral Nightly Carol Ann Paula MD   40 mg at 03/29/21 2151    insulin lispro (HUMALOG) injection vial 0-6 Units  0-6 Units Subcutaneous TID WC Carol Ann Paula MD   1 Units at 03/30/21 1145    insulin lispro (HUMALOG) injection vial 0-3 Units  0-3 Units Subcutaneous Nightly Carol Ann Paula MD   1 Units at 03/28/21 2051    glucose (GLUTOSE) 40 % oral gel 15 g  15 g Oral PRN Carol Ann Paula MD        dextrose 50 % IV solution  12.5 g Intravenous PRN Carol Ann Paula MD        glucagon (rDNA) injection 1 mg  1 mg Intramuscular PRN Carol Ann Paula MD        dextrose 5 % solution  100 mL/hr Intravenous PRN Carol Ann Paula MD        labetalol (NORMODYNE;TRANDATE) injection 5 mg  5 mg Intravenous Q6H PRN MALATHI Raman - CNP         No Known Allergies   reports that he has quit smoking. He has never used smokeless tobacco. He reports that he does not drink alcohol or use drugs. Objective:  Exam:   Constitutional:   Vitals:    03/29/21 1434 03/29/21 2145 03/30/21 0441 03/30/21 0754   BP:  136/66 123/69 (!) 150/79   Pulse:  65 66 65   Resp:  18  18   Temp:  97.8 °F (36.6 °C) 97.7 °F (36.5 °C) 97.6 °F (36.4 °C)   TempSrc:  Oral Axillary Oral   SpO2:  95%  96%   Weight:       Height: 5' 8\" (1.727 m)        General appearance:  Normal development and appear in no acute distress. Mental Status:   Oriented to person, place, problem, and time. Memory: Poor immediate recall. Intact remote memory  Normal attention span and concentration. Language: intact naming, repeating and fluency   Poor fund of Knowledge. Cranial Nerves:   II: Visual fields: Full.    Pupils: equal, round, reactive to light  III,IV,VI: Extra Ocular Movements are intact. No nystagmus  V: Facial sensation is intact  VII: Facial strength and movements: intact and symmetric  XII: Tongue movements are normal  Musculoskeletal: 5/5 in all 4 extremities. Tone: Normal tone. Coordination: no pronator drift, no dysmetria with FNF  Sensation: normal to all modalities in both arms and legs. Gait/Posture: steady gait with assistance         Data:  LABS:   Lab Results   Component Value Date     03/30/2021    K 4.5 03/30/2021    K 4.0 02/21/2021     03/30/2021    CO2 27 03/30/2021    BUN 23 03/30/2021    CREATININE 0.9 03/30/2021    GFRAA >60 03/30/2021    GFRAA >60 07/28/2011    LABGLOM >60 03/30/2021    GLUCOSE 124 03/30/2021    PHOS 3.7 03/30/2021    CALCIUM 9.6 03/30/2021     Lab Results   Component Value Date    WBC 10.7 03/30/2021    RBC 3.84 03/30/2021    HGB 12.3 03/30/2021    HCT 37.0 03/30/2021    MCV 96.2 03/30/2021    RDW 15.1 03/30/2021     03/30/2021     Lab Results   Component Value Date    INR 1.00 07/14/2016    PROTIME 11.4 07/14/2016       Neuroimaging was independently reviewed by me and discussed results with the patient  I reviewed blood testing and other test results and discussed results with the patient      Impression:  New onset dizziness with ataxia. Likely acute vertigo, benign proximal vertigo or vestibular neuritis. MRI showed no acute stroke  Hypertension, not controlled  Diabetes, not controlled  Hyperlipidemia  Hearing impairment  Chronic cognitive impairment      Recommendation  MRI results discussed with the patient  Aspirin  Statin  PT and OT  Telemetry  DVT and GI prophylaxis  Continue current blood pressure medications  Insulin sliding scale  Blood pressure control and monitor at home  Discussed risk of falling and risk of recurrence  Can be discharged from neurology once medically stable  Nothing to add from neurology  We will follow PRN.   Please call for questions           Felipe Joan Frank MD   [phone]      This dictation was generated by voice recognition computer software. Although all attempts are made to edit the dictation for accuracy, there may be errors in the transcription that are not intended.

## 2021-03-30 NOTE — PROGRESS NOTES
Hospitalist Progress Note      PCP: MALATHI Tyler - CNP    Date of Admission: 3/28/2021    Chief Complaint: dizziness/head pressure    Subjective: no new c/o. Medications:  Reviewed    Infusion Medications    sodium chloride      dextrose       Scheduled Medications    glipiZIDE  5 mg Oral BID AC    lisinopril  10 mg Oral Daily    sodium chloride flush  10 mL Intravenous 2 times per day    aspirin  81 mg Oral Daily    Or    aspirin  300 mg Rectal Daily    atorvastatin  40 mg Oral Nightly    insulin lispro  0-6 Units Subcutaneous TID WC    insulin lispro  0-3 Units Subcutaneous Nightly     PRN Meds: sodium chloride flush, sodium chloride, promethazine **OR** ondansetron, polyethylene glycol, perflutren lipid microspheres, glucose, dextrose, glucagon (rDNA), dextrose, labetalol    No intake or output data in the 24 hours ending 03/30/21 0849    Physical Exam Performed:    /69   Pulse 66   Temp 97.7 °F (36.5 °C) (Axillary)   Resp 18   Ht 5' 8\" (1.727 m)   Wt 220 lb (99.8 kg)   SpO2 95%   BMI 33.45 kg/m²     General appearance: No apparent distress, appears stated age and cooperative. HEENT: Pupils equal, round, and reactive to light. Conjunctivae/corneas clear. Neck: Supple, with full range of motion. No jugular venous distention. Trachea midline. Respiratory:  Normal respiratory effort. Clear to auscultation, bilaterally without Rales/Wheezes/Rhonchi. Cardiovascular: Regular rate and rhythm with normal S1/S2 without murmurs, rubs or gallops. Abdomen: Soft, non-tender, non-distended with normal bowel sounds. Musculoskeletal: No clubbing, cyanosis or edema bilaterally. Full range of motion without deformity. Skin: Skin color, texture, turgor normal.  No rashes or lesions. Neurologic:  Neurovascularly intact without any focal sensory/motor deficits.  Cranial nerves: II-XII intact, grossly non-focal.  Psychiatric: Alert and oriented, thought content appropriate, normal Metformin held/Glipizide continued. Follow FSBS/SSI low regimen. Last HbA1c 8.2% dated Feb 2021. Anticipate resuming/continuing home regimen at discharge. Obesity -  With Body mass index is 33.45 kg/m². Complicating assessment and treatment. Placing patient at risk for multiple co-morbidities as well as early death and contributing to the patient's presentation. Counseled on weight loss.     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 Patient being seen by PDM, Dr. Geraldean Hatchet. Next appointment Tuesday, March 30. [facility] started in ED but R foot clinical exam was unremarkable with no evidence of cellulitis, erythema or pain.        DVT Prophylaxis: Not ordered. Diet: DIET GENERAL; Dental Soft  Code Status: Prior      PT/OT Eval Status: seen w/ recs for home w/ assist.    Jurgen Scherer - likely Tues/Wed 30/31 March pending clinical course and subspecialty recs.      Rogelio Santa MD

## 2021-03-30 NOTE — PROGRESS NOTES
Perfect serve Dr. Autumn Galindo:    FYI: Patients daughter was wondering if a carotid ultrasound could be ordered for the patient either inpatient or outpatient? Awaiting response.

## 2021-03-30 NOTE — PROGRESS NOTES
Occupational Therapy  Facility/Department: [facility] C5 - MED SURG/ORTHO  Daily Treatment Note  NAME: Bhupendra Spencer  : 1935  MRN: [id number]    Date of Service: 3/30/2021    Discharge Recommendations:  24 hour supervision or assist, Home with Home health OT     Assessment   Performance deficits / Impairments: Decreased functional mobility ; Decreased ADL status; Decreased balance;Decreased endurance;Decreased safe awareness  Assessment: Pt is progressing towards goals with OT treatment. Pt completed grooming tasks while standing at sink with SBA. Pt CGA for functional mobility/transfers using RW. Pt performed 25reps of BUE exercises while seated in chair. Pt performed static stand for ~4 minutes with CGA to help increase balance. Pt would benefit from 24 hr assist and HHOT to continue to address the above noted occupational performance deficits. Prognosis: Good  OT Education: OT Role;Plan of Care;Transfer Training;ADL Adaptive Strategies; Home Exercise Program  Patient Education: disease specific ed: role of OT, therex and transfers  REQUIRES OT FOLLOW UP: Yes    Activity Tolerance  Activity Tolerance: Patient Tolerated treatment well  Activity Tolerance: Vitals: RI=382/75, HR=75, O2=97% RA    Safety Devices  Safety Devices in place: Yes  Type of devices: Left in chair;Call light within reach; Chair alarm in place;Gait belt;Nurse notified       Patient Diagnosis(es): There were no encounter diagnoses. has a past medical history of Abscess of toe of right foot, Acute CVA (cerebrovascular accident) ([facility] [address] CAD (coronary artery disease), Cancer ([facility] [address] Diabetes mellitus ([facility] [address] History of blood transfusion, Hyperlipemia, Hypertension, and Ulcer of other part of foot. has a past surgical history that includes Arm Debridement; Foot Debridement (???); Abdomen surgery; Colonoscopy; Dilatation, esophagus; skin biopsy; other surgical history (2021);  Foot Debridement (Right, 2021); other surgical history; and Foot Debridement (Right, 2/23/2021). Restrictions  Restrictions/Precautions  Restrictions/Precautions: General Precautions, Up as Tolerated  Required Braces or Orthoses?: Yes  Position Activity Restriction  Other position/activity restrictions: Pt has been wearing R surgical shoe d/t partial metatarsal resection since Feb 2021. Also wears one on L side for even \"footing\". Subjective   General  Chart Reviewed: Yes  Patient assessed for rehabilitation services?: Yes  Additional Pertinent Hx: New onset of dizziness and ataxia, nystagmus  Response to previous treatment: Patient with no complaints from previous session  Family / Caregiver Present: No  Referring Practitioner: EMILY Trejo  Diagnosis: acute CVA    Subjective  Subjective: Pt sitting up in chair, pleasant and agreeable to OT treatment. Orientation  Orientation  Overall Orientation Status: Within Functional Limits    Objective    ADL  Grooming: Stand by assistance(standing at sink to wash hands/ face and comb hair)     Balance  Sitting Balance: Supervision  Standing Balance: Contact guard assistance(using RW)  Standing Balance  Activity: to/from bathroom using RW, standing at sink to perform grooming tasks, static stand for ~4 minutes using RW    Functional Mobility  Functional - Mobility Device: Rolling Walker  Activity: To/from bathroom  Assist Level: Contact guard assistance  Functional Mobility Comments: needed VC's to walk inside of the walker     Transfers  Sit to stand: Stand by assistance (using RW)  Stand to sit: Stand by assistance    Cognition  Overall Cognitive Status: Exceptions  Following Commands:  Follows one step commands consistently  Attention Span: Attends with cues to redirect  Problem Solving: Assistance required to correct errors made  Insights: Decreased awareness of deficits  Cognition Comment: VC's needed for attention    Type of ROM/Therapeutic Exercise  Type of ROM/Therapeutic Exercise: AROM  Comment: GERTRUDE, seated in chair  Exercises  Shoulder Elevation: 25x  Shoulder Flexion: 25x  Horizontal ABduction: 25x  Horizontal ADduction: 25x  Elbow Flexion: 25x  Elbow Extension: 25x  Supination: 25x  Pronation: 25x  Wrist Flexion: 25x  Wrist Extension: 25x  Finger Flexion: 25x  Finger Extension: 25x    Plan   Plan  Times per week: 3-5x  Current Treatment Recommendations: Self-Care / ADL, Functional Mobility Training, Balance Training, Endurance Training, Safety Education & Training, Equipment Evaluation, Education, & procurement    AM-PAC Score  AM-PAC Inpatient Daily Activity Raw Score: 20 (03/30/21 1204)  AM-PAC Inpatient ADL T-Scale Score : 42.03 (03/30/21 1204)  ADL Inpatient CMS 0-100% Score: 38.32 (03/30/21 1204)  ADL Inpatient CMS G-Code Modifier : Cherylene Brasher (03/30/21 1204)    Goals  Short term goals  Time Frame for Short term goals: 1 week (4/5) unless noted  Short term goal 1: Perform functional transfers with supervision with RW--ongoing 3/30/21  Short term goal 2: Perform bathroom mobility with supervision with RW--ongoing 3/30/21  Short term goal 3: Stand at sink for 2 ADL tasks with SBA by 3/31--GOAL MET, pt performed grooming tasks with SBA while standing at sink 3/30/21  Patient Goals   Patient goals : \"Be able to walk and drive\"       Therapy Time   Individual Concurrent Group Co-treatment   Time In 1109         Time Out 1148         Minutes [facility], S/LARS    Agree with above. All information reviewed by Kristel Wu COTA/KRISTA

## 2021-03-30 NOTE — PLAN OF CARE
Protect patient from fall injury by keeping bed in low position, use of non skid socks and bed alarm system. Keep belongings and call light with reach at all times and following fall protocol. Monitor Neuro checks, check for signs of stroke or increasing unsteadiness.

## 2021-03-31 ENCOUNTER — CARE COORDINATION (OUTPATIENT)
Dept: CASE MANAGEMENT | Age: 86
End: 2021-03-31

## 2021-03-31 DIAGNOSIS — I63.9 ACUTE CVA (CEREBROVASCULAR ACCIDENT) (HCC): Primary | ICD-10-CM

## 2021-03-31 PROCEDURE — 1111F DSCHRG MED/CURRENT MED MERGE: CPT | Performed by: NURSE PRACTITIONER

## 2021-03-31 NOTE — CARE COORDINATION
[facility]    DC order noted, all docs needed have been faxed to [facility] for home care services.     Home care to see patient within 24-48 hrs    Su Mendez RN, BSN CTN  [facility] [phone]

## 2021-03-31 NOTE — CARE COORDINATION
[facility] Transitions Initial Follow Up Call    Call within 2 business days of discharge: Yes    Patient: Monik Sickle Patient : 1935   MRN: [id number]  Reason for Admission: CVA   Discharge Date: 3/30/21 RARS: Readmission Risk Score: 17      Last Discharge [facility]       Complaint Diagnosis Description Type Department Provider    3/28/21   Admission (Discharged) [facility] Wan Oh MD    3/28/21 Dizziness Vertigo . .. ED (TRANSFER) [address] [facility] ED Matilde Harris MD           Spoke with: Emily Alcaraz patient's spouse verified HIPAA form       Facility: [facility]       Was this an external facility discharge? No Discharge Facility: n/a    Challenges to be reviewed by the provider   Additional needs identified to be addressed with provider No  none             Method of communication with provider : none    Discussed COVID-19 related testing which was not done at this time. Test results were not done. Patient informed of results, if available? Yes    Advance Care Planning:   Does patient have an Advance Directive:  not on file. Was this a readmission? No  Patient stated reason for admission: dizziness   Patients top risk factors for readmission: medical condition    Care Transition Nurse (CTN) contacted the patient by telephone to perform post hospital discharge assessment. Verified name and  with patient as identifiers. Provided introduction to self, and explanation of the CTN role. CTN reviewed discharge instructions, medical action plan and red flags with patient who verbalized understanding. Patient given an opportunity to ask questions and does not have any further questions or concerns at this time. Were discharge instructions available to patient? Yes. Reviewed appropriate site of care based on symptoms and resources available to patient including: PCP, Specialist, Home health and When to call 911. The patient agrees to contact the PCP office for questions related to their healthcare. Medication reconciliation was performed with patient, who verbalizes understanding of administration of home medications. Advised obtaining a 90-day supply of all daily and as-needed medications. Covid Risk Education    Patient has following risk factors of: diabetes. Education provided regarding infection prevention, and signs and symptoms of COVID-19 and when to seek medical attention with patient who verbalized understanding. Discussed exposure protocols and quarantine From [facility]: Are you at higher risk for severe illness?   and given an opportunity for questions and concerns. The patient agrees to contact the COVID-19 hotline [phone] or PCP office for questions related to COVID-19. For more information on steps you can take to protect yourself, see CDC's How to Protect Yourself     Was patient discharged with a pulse oximeter? No Discussed and confirmed pulse oximeter discharge instructions and when to notify provider or seek emergency care. Discussed follow-up appointments. If no appointment was previously scheduled, appointment scheduling offered: Yes. Is follow up appointment scheduled within 7 days of discharge? Yes  Non-[facility] follow up appointment(s):     Plan for follow-up call in 5-7 days based on severity of symptoms and risk factors. Spoke with patient's spouse Jeremy Minaya. Jeremy Minaya reported patient is doing well and denied any dizziness or weakness. Jeremy Minaya reported [facility] is supposed to come out today and assess patient's foot and discuss wound care. CTN reviewed all medications with spouse who reported patient is taking all as prescribed. Patient  has apt with wound center on 4/6. Denies any acute needs at present time. Agreeable to f/u calls. Educated on the use of urgent care or physicians 24 hr access line if assistance is needed after hours. CTN provided contact information for future needs.         Care Transitions 24 Hour Call    Do you have any ongoing symptoms?: No  Do you have a copy of your discharge instructions?: Yes  Do you have all of your prescriptions and are they filled?: Yes  Have you been contacted by a [address] [place] [address] Avenue?: No  Have you scheduled your follow up appointment?: Yes  How are you going to get to your appointment?: Car - family or friend to transport  Were you discharged with any Home Care or Post Acute Services: Yes  Post Acute Services: [address] (Comment: [facility])  Do you feel like you have everything you need to keep you well at home?: Yes  Care Transitions Interventions         Follow Up  Future Appointments   Date Time Provider Yossi Urbano   4/6/2021  9:00 AM Wanda Bautista MD [address] [place] [address]       Gurinder Alcocer RN

## 2021-03-31 NOTE — DISCHARGE SUMMARY
fourth metatarsal noted in February on an MRI. 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  Patient being seen by PDM, Dr. Lisha Cade. Next appointment Tuesday, March 30. [address] started in ED but R foot clinical exam was unremarkable with no evidence of cellulitis, erythema or pain.        Labs: For convenience and continuity at follow-up the following most recent labs are provided:      CBC:    Lab Results   Component Value Date    WBC 10.7 03/30/2021    HGB 12.3 03/30/2021    HCT 37.0 03/30/2021     03/30/2021       Renal:    Lab Results   Component Value Date     03/30/2021    K 4.5 03/30/2021    K 4.0 02/21/2021     03/30/2021    CO2 27 03/30/2021    BUN 23 03/30/2021    CREATININE 0.9 03/30/2021    CALCIUM 9.6 03/30/2021    PHOS 3.7 03/30/2021         Significant Diagnostic Studies    Radiology:   MRI brain without contrast   Final Result   1. No acute intracranial abnormality. Specifically, no acute infarction. 2. Mild parenchymal volume loss. Sequela of mild chronic microvascular   ischemic changes. Consults:     IP CONSULT TO NEUROLOGY  IP CONSULT TO HOME CARE NEEDS    Disposition: Home w/ C    Condition at Discharge: Stable    Discharge Instructions/Follow-up:  w/ PCP 1-2 weeks and subspecialists as arranged. Code Status:  Full Code    Activity: activity as tolerated    Diet: regular diet      Discharge Medications:     Discharge Medication List as of 3/30/2021  2:40 PM           Details   aspirin 81 MG EC tablet Take 1 tablet by mouth daily, Disp-30 tablet, R-0Print              Details   glipiZIDE (GLUCOTROL) 5 MG tablet Take 1 tablet by mouth 2 times daily (before meals), Disp-60 tablet, R-0Normal      lisinopril (PRINIVIL;ZESTRIL) 10 MG tablet Take 1 tablet by mouth daily, Disp-30 tablet, R-0Normal      gentamicin (GARAMYCIN) 0.1 % cream Apply topically daily wtih dressing changes. , Disp-1 Tube, R-3, Normal      ferrous sulfate (IRON 325) 325 (65 Fe) MG tablet Take 325 mg by mouth daily (with breakfast)Historical Med      metFORMIN (GLUCOPHAGE) 1000 MG tablet Take 1 tablet by mouth 2 times daily (with meals) For diabetes, Disp-60 tablet, R-4Please cancel 40 day supply with refills. Thanks. Normal      blood glucose test strips (ACCU-CHEK DIXON PLUS) strip 1 each by In Vitro route daily As needed. , Disp-100 each,R-5Normal      Multiple Vitamins-Minerals (MULTIVITAMIN PO) Take 1 tablet by mouth daily. Time Spent on discharge is more than 30 minutes in the examination, evaluation, counseling and review of medications and discharge plan. Signed:    Asif Marsh MD   3/31/2021      Thank you MALATHI Davenport - VIJAY for the opportunity to be involved in this patient's care. If you have any questions or concerns please feel free to contact me at [phone].

## 2021-04-06 ENCOUNTER — HOSPITAL ENCOUNTER (OUTPATIENT)
Dept: WOUND CARE | Age: 86
Discharge: HOME | End: 2021-04-06
Payer: MEDICARE

## 2021-04-06 VITALS
BODY MASS INDEX: 33.28 KG/M2 | HEART RATE: 79 BPM | DIASTOLIC BLOOD PRESSURE: 73 MMHG | RESPIRATION RATE: 18 BRPM | WEIGHT: 219.6 LBS | SYSTOLIC BLOOD PRESSURE: 163 MMHG | HEIGHT: 68 IN | TEMPERATURE: 97.9 F

## 2021-04-06 LAB
AFB CULTURE (MYCOBACTERIA): NORMAL
AFB SMEAR: NORMAL

## 2021-04-06 PROCEDURE — 29581 APPL MULTLAYER CMPRN SYS LEG: CPT

## 2021-04-06 PROCEDURE — 11042 DBRDMT SUBQ TIS 1ST 20SQCM/<: CPT

## 2021-04-06 RX ORDER — LIDOCAINE 40 MG/G
CREAM TOPICAL ONCE
Status: DISCONTINUED | OUTPATIENT
Start: 2021-04-06 | End: 2021-04-07 | Stop reason: HOSPADM

## 2021-04-06 ASSESSMENT — PAIN SCALES - GENERAL: PAINLEVEL_OUTOF10: 0
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
## 2021-04-06 NOTE — PLAN OF CARE
Pt seen in [facility] - pt was not offloading - debride per Dr. Ernesto Batista  a- cont current treatment with gent and collagen - felt to iliana wound and applied copression wrap to decrease edema - instructed to elevate legs , decrease ambulatation until healed - them will f/u with Dr. Ernesto Batista  and get diabetic shoes in future

## 2021-04-07 ENCOUNTER — CARE COORDINATION (OUTPATIENT)
Dept: CASE MANAGEMENT | Age: 86
End: 2021-04-07

## 2021-04-07 NOTE — CARE COORDINATION
[address] Transitions Follow Up Call    2021    Patient: Ai Pretty  Patient : 1935   MRN: [id number]  Reason for Admission: CVA  Discharge Date: 3/30/21 RARS: Readmission Risk Score: 17         Attempted to reach patient via phone for care transition. VM left stating purpose of call along with my contact information requesting a return call.             Follow Up  Future Appointments   Date Time Provider Yossi Urbano   2021 10:00 AM Armando Khalil DPM [facility] [place] HOD       Lizzeth Bosch, [place]

## 2021-04-13 ENCOUNTER — HOSPITAL ENCOUNTER (OUTPATIENT)
Dept: WOUND CARE | Age: 86
Discharge: HOME | End: 2021-04-13
Payer: MEDICARE

## 2021-04-13 VITALS
HEIGHT: 68 IN | TEMPERATURE: 97 F | RESPIRATION RATE: 16 BRPM | HEART RATE: 80 BPM | BODY MASS INDEX: 33.19 KG/M2 | WEIGHT: 219 LBS | SYSTOLIC BLOOD PRESSURE: 156 MMHG | DIASTOLIC BLOOD PRESSURE: 69 MMHG

## 2021-04-13 LAB
AFB CULTURE (MYCOBACTERIA): NORMAL
AFB SMEAR: NORMAL

## 2021-04-13 PROCEDURE — 11042 DBRDMT SUBQ TIS 1ST 20SQCM/<: CPT

## 2021-04-13 PROCEDURE — 29581 APPL MULTLAYER CMPRN SYS LEG: CPT

## 2021-04-13 RX ORDER — LIDOCAINE 40 MG/G
CREAM TOPICAL PRN
Status: DISCONTINUED | OUTPATIENT
Start: 2021-04-13 | End: 2021-04-14 | Stop reason: HOSPADM

## 2021-04-13 ASSESSMENT — PAIN SCALES - GENERAL: PAINLEVEL_OUTOF10: 0
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
## 2021-04-13 NOTE — PLAN OF CARE
Pt seen in [facility] - heel wound healed - felt applied per Dr. Brantley Arm to off load- Plantar foot wound improving - debride per dr. Brantley Arm - cont with gentamycin cream - and compri 2 for compression - reviewed AVS - stressed elevation - f/u in 1 week

## 2021-04-14 ENCOUNTER — CARE COORDINATION (OUTPATIENT)
Dept: CASE MANAGEMENT | Age: 86
End: 2021-04-14

## 2021-04-14 NOTE — CARE COORDINATION
[address] Transitions Follow Up Call    2021    Patient: Cheyenne Sandoval  Patient : 1935   MRN: [id number]  Reason for Admission: TIA, HTN, HLD, DM, Obesity, s/p 4th metatarsal amputation, osteomyelitis  Discharge Date: 3/30/21 RARS: Readmission Risk Score: 17         Spoke with: Shazia cornelius (spouse-ok per HIPAA)    Reports that Baljinder Gao is not home at this time, but reports he is doing well. Reviewed notes from wound care visit including stretching and offloading. She confirms and states he is complying. She states he does not have home care services at this time - states he was discharged. Denies needs at this time. States he will continue to follow at wound clinic as scheduled. CTN contact info provided. No further CTN outreach scheduled at this time.      Follow Up  Future Appointments   Date Time Provider Department Center   2021  9:30 AM Unique Pries, DPM [facility]   2021  9:30 AM Unique Pries, DPM [facility]   2021  9:30 AM Unique Pries, DPM [facility]   2021  9:30 AM Unique Pries, DPM [facility]   2021  9:30 AM Unique Pries, DPM [facility]   2021  9:30 AM Unique Pries, DPM [facility] [facility] [facility]   2021  9:30 AM Unique Pries, DPM [facility] [facility] [facility]   2021  9:30 AM Unique Pries, DPM [facility]   6/15/2021  9:30 AM Unique Pries, DPM [facility]   2021  9:30 AM Unique Pries, DPM [facility]   2021  9:30 AM Unique Pries, DPM [facility]       Talha Price RN

## 2021-04-20 ENCOUNTER — HOSPITAL ENCOUNTER (OUTPATIENT)
Dept: WOUND CARE | Age: 86
Discharge: HOME | End: 2021-04-20
Payer: MEDICARE

## 2021-04-20 VITALS
TEMPERATURE: 97.2 F | SYSTOLIC BLOOD PRESSURE: 174 MMHG | BODY MASS INDEX: 33.56 KG/M2 | DIASTOLIC BLOOD PRESSURE: 78 MMHG | WEIGHT: 221.4 LBS | RESPIRATION RATE: 20 BRPM | HEIGHT: 68 IN | HEART RATE: 82 BPM

## 2021-04-20 PROCEDURE — 29581 APPL MULTLAYER CMPRN SYS LEG: CPT

## 2021-04-20 ASSESSMENT — PAIN SCALES - GENERAL: PAINLEVEL_OUTOF10: 0

## 2021-04-20 NOTE — PLAN OF CARE
Pt seen in [address] [facility]  [address] - FELT  to iliana wound - cont compression with compri 2 for another week - pt to call Dr. Brook Christopher office to make appt  to get Diabetic shoes - reviewed AVS - f/u in 1 week
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
## 2021-04-26 NOTE — TELEPHONE ENCOUNTER
Requesting a refill of lisinopril and glipizide.  Send to [facility]

## 2021-04-27 ENCOUNTER — HOSPITAL ENCOUNTER (OUTPATIENT)
Dept: WOUND CARE | Age: 86
Discharge: HOME | End: 2021-04-27
Payer: MEDICARE

## 2021-04-27 VITALS
HEART RATE: 79 BPM | HEIGHT: 68 IN | DIASTOLIC BLOOD PRESSURE: 71 MMHG | WEIGHT: 220.8 LBS | TEMPERATURE: 97.9 F | BODY MASS INDEX: 33.46 KG/M2 | RESPIRATION RATE: 18 BRPM | SYSTOLIC BLOOD PRESSURE: 164 MMHG

## 2021-04-27 PROCEDURE — 99212 OFFICE O/P EST SF 10 MIN: CPT

## 2021-04-27 ASSESSMENT — PAIN SCALES - GENERAL: PAINLEVEL_OUTOF10: 0

## 2021-04-27 NOTE — PLAN OF CARE
Pt healed - Dr. Prabha Marie applied felt to asst with offloading - pt has appt with Dr. Prabha Marie in his office to get diabetic shoes/inserts May 10th - reviewed AVS - treatment completed in [facility]

## 2021-04-27 NOTE — PROGRESS NOTES
subfirst MTPJ healed. Pedal pulses 1/4 both DP and PT. Moderate edema bilateral legs right greater than left. Incision site remains healed. Assessment:     Patient Active Problem List   Diagnosis    Dyslipidemia    DM (diabetes mellitus), type 2, uncontrolled with complications ([address]    Vertigo, benign paroxysmal    HTN (hypertension), benign    Syncope    Lymphoma ([address]    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[address]    Diabetic ulcer of toe 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 mellitus, with fat layer exposed ([address]    Acute CVA (cerebrovascular accident) ([address]    Obesity    Dizziness     Wound Care Documentation:  Diabetic Ulcer 07/14/16 Foot Plantar [place], 1 x 1cm with 0.2 cm depth (Active)   Number of days: 2337       Wound 02/18/21 #1, right plantar foot, DFU, ambrose 1, onset 2/1/2021 (Active)   Wound Image   04/20/21 0946   Wound Etiology Diabetic Ambrose 1 04/27/21 1020   Dressing Status New dressing applied;Clean;Dry; Intact 04/27/21 1020   Wound Cleansed Soap and water 04/27/21 0932   Dressing/Treatment Other (comment) 04/27/21 1020   Offloading for Diabetic Foot Ulcers Yes (type); Felt or foam 04/27/21 1020   Wound Length (cm) 0 cm 04/27/21 0932   Wound Width (cm) 0 cm 04/27/21 0932   Wound Depth (cm) 0 cm 04/27/21 0932   Wound Surface Area (cm^2) 0 cm^2 04/27/21 0932   Change in Wound Size % (l*w) 100 04/27/21 0932   Wound Volume (cm^3) 0 cm^3 04/27/21 0932   Wound Healing % 100 04/27/21 0932   Post-Procedure Length (cm) 0.2 cm 04/13/21 1030   Post-Procedure Width (cm) 0.2 cm 04/13/21 1030   Post-Procedure Depth (cm) 0.2 cm 04/13/21 1030   Post-Procedure Surface Area (cm^2) 0.04 cm^2 04/13/21 1030   Post-Procedure Volume (cm^3) 0.01 cm^3 04/13/21 1030   Distance Tunneling (cm) 0 cm 04/06/21 0903   Tunneling Position ___ O'Clock 0 02/18/21 1006   Undermining Starts ___ O'Clock 0 02/18/21 1006   Undermining Ends___ O'Clock 0 02/18/21 1006   Undermining Maxium Distance (cm) 0 04/06/21 0903   Wound Assessment Pink/red 04/13/21 1028   Drainage Amount Scant 04/20/21 0946   Drainage Description Serosanguinous 04/20/21 0946   Odor None 04/20/21 0946   Va-wound Assessment Hyperkeratosis (callous) 04/13/21 1028   Margins Attached edges 03/30/21 0754   Number of days: 68         Plan:   Surgical site well coapted   Wound healed  Raymond applied continue stretching for equinus, stretches again demonstrated to the patient  Use leg compression daily   He is to follow-up in my private office in the next 1 to 2 weeks  Felt given to the patient to reapply if it comes off or gets wet. Follow-up in 1 week  The nature of the patient's condition was explained in depth.  The patient was informed that their compliance to the treatment plan is paramount to successful healing and prevention of further ulceration and/or infection     Discharge Treatment Wound 02/18/21 #1, right plantar foot, DFU, mehta 1, onset 2/1/2021-Dressing/Treatment: Other (comment)(Aquaphor, 2x2, medium spandagrip x2)Dressing changes every other day    Written Patient Discharge Instructions Given            Electronically signed by Meeta Harvey DPM on 4/27/2021 at 11:41 AM

## 2021-04-29 ENCOUNTER — OFFICE VISIT (OUTPATIENT)
Dept: FAMILY MEDICINE CLINIC | Age: 86
End: 2021-04-29
Payer: MEDICARE

## 2021-04-29 VITALS
SYSTOLIC BLOOD PRESSURE: 120 MMHG | WEIGHT: 221 LBS | OXYGEN SATURATION: 98 % | DIASTOLIC BLOOD PRESSURE: 58 MMHG | HEART RATE: 71 BPM | BODY MASS INDEX: 33.6 KG/M2 | TEMPERATURE: 97.5 F

## 2021-04-29 DIAGNOSIS — I10 ESSENTIAL HYPERTENSION: ICD-10-CM

## 2021-04-29 DIAGNOSIS — L97.509 TYPE 2 DIABETES MELLITUS WITH FOOT ULCER, WITHOUT LONG-TERM CURRENT USE OF INSULIN (HCC): ICD-10-CM

## 2021-04-29 DIAGNOSIS — E78.5 DYSLIPIDEMIA: ICD-10-CM

## 2021-04-29 DIAGNOSIS — C85.90 LYMPHOMA, UNSPECIFIED BODY REGION, UNSPECIFIED LYMPHOMA TYPE (HCC): Primary | ICD-10-CM

## 2021-04-29 DIAGNOSIS — E11.621 TYPE 2 DIABETES MELLITUS WITH FOOT ULCER, WITHOUT LONG-TERM CURRENT USE OF INSULIN (HCC): ICD-10-CM

## 2021-04-29 PROBLEM — I96 WET GANGRENE (HCC): Status: RESOLVED | Noted: 2021-02-18 | Resolved: 2021-04-29

## 2021-04-29 PROBLEM — I63.9 ACUTE CVA (CEREBROVASCULAR ACCIDENT) (HCC): Status: RESOLVED | Noted: 2021-03-28 | Resolved: 2021-04-29

## 2021-04-29 PROCEDURE — 3051F HG A1C>EQUAL 7.0%<8.0%: CPT | Performed by: NURSE PRACTITIONER

## 2021-04-29 PROCEDURE — 99214 OFFICE O/P EST MOD 30 MIN: CPT | Performed by: NURSE PRACTITIONER

## 2021-04-29 RX ORDER — GLIPIZIDE 5 MG/1
5 TABLET ORAL
Qty: 180 TABLET | Refills: 3 | Status: SHIPPED
Start: 2021-04-29 | End: 2022-01-14 | Stop reason: DRUGHIGH

## 2021-04-29 RX ORDER — LISINOPRIL 10 MG/1
10 TABLET ORAL DAILY
Qty: 90 TABLET | Refills: 3 | Status: SHIPPED
Start: 2021-04-29 | End: 2021-06-01 | Stop reason: DRUGHIGH

## 2021-04-29 ASSESSMENT — PATIENT HEALTH QUESTIONNAIRE - PHQ9: SUM OF ALL RESPONSES TO PHQ QUESTIONS 1-9: 0
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
## 2021-05-03 ENCOUNTER — TELEPHONE (OUTPATIENT)
Dept: FAMILY MEDICINE CLINIC | Age: 86
End: 2021-05-03

## 2021-05-03 NOTE — TELEPHONE ENCOUNTER
Left message for patient or spouse to return call. Looks like medication in question was changed from 5MG to 10MG and filled during OV on 4/29/21, sent to [facility].  [address]

## 2021-05-13 ASSESSMENT — ENCOUNTER SYMPTOMS: SHORTNESS OF BREATH: 0

## 2021-10-15 ENCOUNTER — OFFICE VISIT (OUTPATIENT)
Dept: FAMILY MEDICINE CLINIC | Age: 86
End: 2021-10-15
Payer: MEDICARE

## 2021-10-15 VITALS
HEIGHT: 68 IN | SYSTOLIC BLOOD PRESSURE: 134 MMHG | RESPIRATION RATE: 16 BRPM | WEIGHT: 231 LBS | BODY MASS INDEX: 35.01 KG/M2 | OXYGEN SATURATION: 97 % | DIASTOLIC BLOOD PRESSURE: 64 MMHG | HEART RATE: 86 BPM

## 2021-10-15 DIAGNOSIS — Z71.89 ACP (ADVANCE CARE PLANNING): ICD-10-CM

## 2021-10-15 DIAGNOSIS — Z86.73 HISTORY OF CVA (CEREBROVASCULAR ACCIDENT): ICD-10-CM

## 2021-10-15 DIAGNOSIS — L97.512 DIABETIC ULCER OF TOE OF RIGHT FOOT ASSOCIATED WITH TYPE 2 DIABETES MELLITUS, WITH FAT LAYER EXPOSED (HCC): ICD-10-CM

## 2021-10-15 DIAGNOSIS — Z00.00 ROUTINE GENERAL MEDICAL EXAMINATION AT A HEALTH CARE FACILITY: ICD-10-CM

## 2021-10-15 DIAGNOSIS — I89.0 ACQUIRED LYMPHEDEMA OF LOWER EXTREMITY: ICD-10-CM

## 2021-10-15 DIAGNOSIS — E11.621 DIABETIC ULCER OF TOE OF RIGHT FOOT ASSOCIATED WITH TYPE 2 DIABETES MELLITUS, WITH FAT LAYER EXPOSED (HCC): ICD-10-CM

## 2021-10-15 DIAGNOSIS — E78.5 DYSLIPIDEMIA: ICD-10-CM

## 2021-10-15 DIAGNOSIS — I10 ESSENTIAL HYPERTENSION: Primary | ICD-10-CM

## 2021-10-15 DIAGNOSIS — C85.90 LYMPHOMA, UNSPECIFIED BODY REGION, UNSPECIFIED LYMPHOMA TYPE (HCC): ICD-10-CM

## 2021-10-15 PROBLEM — Z82.3 FAMILY HISTORY OF CEREBROVASCULAR ACCIDENT (CVA): Status: ACTIVE | Noted: 2021-10-15

## 2021-10-15 PROCEDURE — 36415 COLL VENOUS BLD VENIPUNCTURE: CPT | Performed by: NURSE PRACTITIONER

## 2021-10-15 PROCEDURE — G0438 PPPS, INITIAL VISIT: HCPCS | Performed by: NURSE PRACTITIONER

## 2021-10-15 PROCEDURE — 3051F HG A1C>EQUAL 7.0%<8.0%: CPT | Performed by: NURSE PRACTITIONER

## 2021-10-15 RX ORDER — LISINOPRIL 5 MG/1
TABLET ORAL
Qty: 90 TABLET | Refills: 1 | Status: SHIPPED | OUTPATIENT
Start: 2021-10-15 | End: 2022-04-15 | Stop reason: SDUPTHER

## 2021-10-15 ASSESSMENT — PATIENT HEALTH QUESTIONNAIRE - PHQ9
SUM OF ALL RESPONSES TO PHQ QUESTIONS 1-9: 0
2. FEELING DOWN, DEPRESSED OR HOPELESS: 0
SUM OF ALL RESPONSES TO PHQ QUESTIONS 1-9: 0
1. LITTLE INTEREST OR PLEASURE IN DOING THINGS: 0
SUM OF ALL RESPONSES TO PHQ QUESTIONS 1-9: 0
SUM OF ALL RESPONSES TO PHQ9 QUESTIONS 1 & 2: 0

## 2021-10-15 ASSESSMENT — ENCOUNTER SYMPTOMS: SHORTNESS OF BREATH: 0

## 2021-10-15 ASSESSMENT — LIFESTYLE VARIABLES: HOW OFTEN DO YOU HAVE A DRINK CONTAINING ALCOHOL: 0

## 2021-10-15 NOTE — ACP (ADVANCE CARE PLANNING)
Advance Care Planning     Advance Care Planning (ACP) Physician/NP/PA Conversation    Date of Conversation: 10/15/2021  Conducted with: Patient with Decision Making Capacity    Healthcare Decision Maker:      Primary Decision Maker: Dave Land Spouse - [phone]    Secondary Decision Maker: Karla Banuelos - [phone]    Secondary Decision Maker: Ruby Bell - Child - [phone]    Supplemental (Other) Decision Maker: Raghav Romo - [phone]    Supplemental (Other) Decision Maker: William Kerr Child - [phone]    Click here to complete Healthcare Decision Makers including selection of the Healthcare Decision Maker Relationship (ie \"Primary\")  Today we documented Decision Maker(s) consistent with Legal Next of Kin hierarchy. Care Preferences:    Hospitalization: \"If your health worsens and it becomes clear that your chance of recovery is unlikely, what would be your preference regarding hospitalization? \"  The patient would prefer hospitalization. Ventilation: \"If you were unable to breath on your own and your chance of recovery was unlikely, what would be your preference about the use of a ventilator (breathing machine) if it was available to you? \"  The patient would desire the use of a ventilator. Resuscitation: \"In the event your heart stopped as a result of an underlying serious health condition, would you want attempts made to restart your heart, or would you prefer a natural death? \"  Yes, attempt to resuscitate.     treatment goals, benefit/burden of treatment options, artificial nutrition, ventilation preferences, hospitalization preferences, resuscitation preferences, end of life care preferences (vegetative state/imminent death) and hospice care    Conversation Outcomes / Follow-Up Plan:  ACP complete - no further action today  Reviewed DNR/DNI and patient elects Full Code (Attempt Resuscitation)    Length of Voluntary ACP Conversation in minutes:  <16 minutes (Non-Billable)    Art Marcin, APRN - CNP

## 2021-10-15 NOTE — PATIENT INSTRUCTIONS
Advance Directives: Care Instructions  Overview  An advance directive is a legal way to state your wishes at the end of your life. It tells your family and your doctor what to do if you can't say what you want. There are two main types of advance directives. You can change them any time your wishes change. Living will. This form tells your family and your doctor your wishes about life support and other treatment. The form is also called a declaration. Medical power of . This form lets you name a person to make treatment decisions for you when you can't speak for yourself. This person is called a health care agent (health care proxy, health care surrogate). The form is also called a durable power of  for health care. If you do not have an advance directive, decisions about your medical care may be made by a family member, or by a doctor or a  who doesn't know you. It may help to think of an advance directive as a gift to the people who care for you. If you have one, they won't have to make tough decisions by themselves. Follow-up care is a key part of your treatment and safety. Be sure to make and go to all appointments, and call your doctor if you are having problems. It's also a good idea to know your test results and keep a list of the medicines you take. What should you include in an advance directive? Many states have a unique advance directive form. (It may ask you to address specific issues.) Or you might use a universal form that's approved by many states. If your form doesn't tell you what to address, it may be hard to know what to include in your advance directive. Use the questions below to help you get started. · Who do you want to make decisions about your medical care if you are not able to? · What life-support measures do you want if you have a serious illness that gets worse over time or can't be cured? · What are you most afraid of that might happen? (Maybe you're afraid of having pain, losing your independence, or being kept alive by machines.)  · Where would you prefer to die? (Your home? A hospital? A nursing home?)  · Do you want to donate your organs when you die? · Do you want certain Jewish practices performed before you die? When should you call for help? Be sure to contact your doctor if you have any questions. Where can you learn more? Go to https://[email or web address].5211game. org and sign in to your Two[employer] account. Enter R264 in the [employer] box to learn more about \"Advance Directives: Care Instructions. \"     If you do not have an account, please click on the \"Sign Up Now\" link. Current as of: March 17, 2021               Content Version: 13.0  © 2006-2021 Healthwise, [employer]. Care instructions adapted under license by [facility] ([facility]). If you have questions about a medical condition or this instruction, always ask your healthcare professional. [place] [address] any warranty or liability for your use of this information. Learning About Medical Power of   What is a medical power of ? A medical power of , also called a durable power of  for health care, is one type of the legal forms called advance directives. It lets you name the person you want to make treatment decisions for you if you can't speak or decide for yourself. The person you choose is called your health care agent. This person is also called a health care proxy or health care surrogate. A medical power of  may be called something else in your state. How do you choose a health care agent? Choose your health care agent carefully. This person may or may not be a family member. Talk to the person before you make your final decision. Make sure he or she is comfortable with this responsibility. It's a good idea to choose someone who:  · Is at least 25years old.   · Knows you well and understands what makes life meaningful for you. · Understands your Congregational and moral values. · Will do what you want, not what he or she wants. · Will be able to make difficult choices at a stressful time. · Will be able to refuse or stop treatment, if that is what you would want, even if you could die. · Will be firm and confident with health professionals if needed. · Will ask questions to get needed information. · Lives near you or agrees to travel to you if needed. Your family may help you make medical decisions while you can still be part of that process. But it's important to choose one person to be your health care agent in case you aren't able to make decisions for yourself. If you don't fill out the legal form and name a health care agent, the decisions your family can make may be limited. A health care agent may be called something else in your state. Who will make decisions for you if you don't have a health care agent? If you don't have a health care agent or a living will, you may not get the care you want. Decisions may be made by family members who disagree about your medical care. Or decisions may be made by a medical professional who doesn't know you well. In some cases, a  makes the decisions. When you name a health care agent, it is very clear who has the power to make health decisions for you. How do you name a health care agent? You name your health care agent on a legal form. This form is usually called a medical power of . Ask your hospital, state bar association, or office on aging where to find these forms. You must sign the form to make it legal. Some states require you to get the form notarized. This means that a person called a  watches you sign the form and then he or she signs the form. Some states also require that two or more witnesses sign the form. Be sure to tell your family members and doctors who your health care agent is.   Where can you learn more? Go to https://[email or web address].[email or web address] [email or web address] org and sign in to your [employer] account. Enter [id number] in the [facility] box to learn more about \"Learning About [address]. \"     If you do not have an account, please click on the \"Sign Up Now\" link. Current as of: March 17, 2021               Content Version: 13.0  © 2006-2021 Healthwise, [employer]. Care instructions adapted under license by [facility] ([facility]). If you have questions about a medical condition or this instruction, always ask your healthcare professional. [address] any warranty or liability for your use of this information. Learning About Living Lamin Smith  What is a living will? A living will, also called a declaration, is a legal form. It tells your family and your doctor your wishes when you can't speak for yourself. It's used by the health professionals who will treat you as you near the end of your life or if you get seriously hurt or ill. If you put your wishes in writing, your loved ones and others will know what kind of care you want. They won't need to guess. This can ease your mind and be helpful to others. And you can change or cancel your living will at any time. A living will is not the same as an estate or property will. An estate will explains what you want to happen with your money and property after you die. How do you use it? A living will is used to describe the kinds of treatment or life support you want as you near the end of your life or if you get seriously hurt or ill. Keep these facts in mind about living roth. · Your living will is used only if you can't speak or make decisions for yourself. Most often, one or more doctors must certify that you can't speak or decide for yourself before your living will takes effect. · If you get better and can speak for yourself again, you can accept or refuse any treatment.  It doesn't matter what you said in your living will. · Some states may limit your right to refuse treatment in certain cases. For example, you may need to clearly state in your living will that you don't want artificial hydration and nutrition, such as being fed through a tube. Is a living will a legal document? A living will is a legal document. Each state has its own laws about living roth. And a living will may be called something else in your state. Here are some things to know about living roth. · You don't need an  to complete a living will. But legal advice can be helpful if your state's laws are unclear. It can also help if your health history is complicated or your family can't agree on what should be in your living will. · You can change your living will at any time. Some people find that their wishes about end-of-life care change as their health changes. If you make big changes to your living will, complete a new form. · If you move to another state, make sure that your living will is legal in the state where you now live. In most cases, doctors will respect your wishes even if you have a form from a different state. · You might use a universal form that has been approved by many states. This kind of form can sometimes be filled out and stored online. Your digital copy will then be available wherever you have a connection to the internet. The doctors and nurses who need to treat you can find it right away. · Your state may offer an online registry. This is another place where you can store your living will online. · It's a good idea to get your living will notarized. This means using a person called a  to watch two people sign, or witness, your living will. What should you know when you create a living will? Here are some questions to ask yourself as you make your living will:  · Do you know enough about life support methods that might be used?  If not, talk to your doctor so you know what might be done if you can't breathe on your own, your heart stops, or you can't swallow. · What things would you still want to be able to do after you receive life-support methods? Would you want to be able to walk? To speak? To eat on your own? To live without the help of machines? · Do you want certain Yazdanism practices performed if you become very ill? · If you have a choice, where do you want to be cared for? In your home? At a hospital or nursing home? · If you have a choice at the end of your life, where would you prefer to die? At home? In a hospital or nursing home? Somewhere else? · Would you prefer to be buried or cremated? · Do you want your organs to be donated after you die? What should you do with your living will? · Make sure that your family members and your health care agent have copies of your living will (also called a declaration). · Give your doctor a copy of your living will. Ask him or her to keep it as part of your medical record. If you have more than one doctor, make sure that each one has a copy. · Put a copy of your living will where it can be easily found. For example, some people may put a copy on their refrigerator door. If you are using a digital copy, be sure your doctor, family members, and health care agent know how to find and access it. Where can you learn more? Go to https://Scrybepepiceweb.Janalakshmi. org and sign in to your [employer] account. Enter G009 in the [facility] box to learn more about \"Learning About Living [place]. \"     If you do not have an account, please click on the \"Sign Up Now\" link. Current as of: March 17, 2021               Content Version: 13.0  © 5924-3926 Healthwise, Incorporated. Care instructions adapted under license by [facility] ([facility]). If you have questions about a medical condition or this instruction, always ask your healthcare professional. [address] any warranty or liability for your use of this information. Eating Healthy Foods: Care Instructions  Your Care Instructions     Eating healthy foods can help lower your risk for disease. Healthy food gives you energy and keeps your heart strong, your brain active, your muscles working, and your bones strong. A healthy diet includes a variety of foods from the basic food groups: grains, vegetables, fruits, milk and milk products, and meat and beans. Some people may eat more of their favorite foods from only one food group and, as a result, miss getting the nutrients they need. So, it is important to pay attention not only to what you eat but also to what you are missing from your diet. You can eat a healthy, balanced diet by making a few small changes. Follow-up care is a key part of your treatment and safety. Be sure to make and go to all appointments, and call your doctor if you are having problems. It's also a good idea to know your test results and keep a list of the medicines you take. How can you care for yourself at home? Look at what you eat  · Keep a food diary for a week or two and record everything you eat or drink. Track the number of servings you eat from each food group. · For a balanced diet every day, eat a variety of:  ? 6 or more ounce-equivalents of grains, such as cereals, breads, crackers, rice, or pasta, every day. An ounce-equivalent is 1 slice of bread, 1 cup of ready-to-eat cereal, or ½ cup of cooked rice, cooked pasta, or cooked cereal.  ? 2½ cups of vegetables, especially:  § Dark-green vegetables such as broccoli and spinach. § Orange vegetables such as carrots and sweet potatoes. § Dry beans (such as palacios and kidney beans) and peas (such as lentils). ? 2 cups of fresh, frozen, or canned fruit. A small apple or 1 banana or orange equals 1 cup. ? 3 cups of nonfat or low-fat milk, yogurt, or other milk products. ? 5½ ounces of meat and beans, such as chicken, fish, lean meat, beans, nuts, and seeds.  One egg, 1 tablespoon of peanut butter, ½ ounce nuts or seeds, or ¼ cup of cooked beans equals 1 ounce of meat. · Learn how to read food labels for serving sizes and ingredients. Fast-food and convenience-food meals often contain few or no fruits or vegetables. Make sure you eat some fruits and vegetables to make the meal more nutritious. · Look at your food diary. For each food group, add up what you have eaten and then divide the total by the number of days. This will give you an idea of how much you are eating from each food group. See if you can find some ways to change your diet to make it more healthy. Start small  · Do not try to make dramatic changes to your diet all at once. You might feel that you are missing out on your favorite foods and then be more likely to fail. · Start slowly, and gradually change your habits. Try some of the following:  ? Use whole wheat bread instead of white bread. ? Use nonfat or low-fat milk instead of whole milk. ? Eat brown rice instead of white rice, and eat whole wheat pasta instead of white-flour pasta. ? Try low-fat cheeses and low-fat yogurt. ? Add more fruits and vegetables to meals and have them for snacks. ? Add lettuce, tomato, cucumber, and onion to sandwiches. ? Add fruit to yogurt and cereal.  Enjoy food  · You can still eat your favorite foods. You just may need to eat less of them. If your favorite foods are high in fat, salt, and sugar, limit how often you eat them, but do not cut them out entirely. · Eat a wide variety of foods. Make healthy choices when eating out  · The type of restaurant you choose can help you make healthy choices. Even fast-food chains are now offering more low-fat or healthier choices on the menu. · Choose smaller portions, or take half of your meal home. · When eating out, try:  ? A veggie pizza with a whole wheat crust or grilled chicken (instead of sausage or pepperoni).   ? Pasta with roasted vegetables, grilled chicken, or marinara sauce instead of cream sauce. ? A vegetable wrap or grilled chicken wrap. ? Broiled or poached food instead of fried or breaded items. Make healthy choices easy  · Buy packaged, prewashed, ready-to-eat fresh vegetables and fruits, such as baby carrots, salad mixes, and chopped or shredded broccoli and cauliflower. · Buy packaged, presliced fruits, such as melon or pineapple. · Choose 100% fruit or vegetable juice instead of soda. Limit juice intake to 4 to 6 oz (½ to ¾ cup) a day. · Blend low-fat yogurt, fruit juice, and canned or frozen fruit to make a smoothie for breakfast or a snack. Where can you learn more? Go to https://Future FleetpeBlack Tie Ventures.[email or web address] org and sign in to your [employer]z account. Enter Z986 in the [employer] box to learn more about \"Eating Healthy Foods: Care Instructions. \"     If you do not have an account, please click on the \"Sign Up Now\" link. Current as of: December 17, 2020               Content Version: 13.0  © 9753-0744 [employer]. Care instructions adapted under license by [facility] ([facility]). If you have questions about a medical condition or this instruction, always ask your healthcare professional. [address] any warranty or liability for your use of this information. Personalized Preventive Plan for Mariah Franklin - 10/15/2021  Medicare offers a range of preventive health benefits. Some of the tests and screenings are paid in full while other may be subject to a deductible, co-insurance, and/or copay. Some of these benefits include a comprehensive review of your medical history including lifestyle, illnesses that may run in your family, and various assessments and screenings as appropriate. After reviewing your medical record and screening and assessments performed today your provider may have ordered immunizations, labs, imaging, and/or referrals for you.   A list of these orders (if applicable) as well as your Preventive Care list are included within your After Visit Summary for your review. Other Preventive Recommendations:    · A preventive eye exam performed by an eye specialist is recommended every 1-2 years to screen for glaucoma; cataracts, macular degeneration, and other eye disorders. · A preventive dental visit is recommended every 6 months. · Try to get at least 150 minutes of exercise per week or 10,000 steps per day on a pedometer . · Order or download the FREE \"Exercise & Physical Activity: Your Everyday Guide\" from The "[employer] Data on Aging. Call 8-852.539.6846 or search The "[employer] Data on Aging online. · You need 6756-5723 mg of calcium and 3225-4090 IU of vitamin D per day. It is possible to meet your calcium requirement with diet alone, but a vitamin D supplement is usually necessary to meet this goal.  · When exposed to the sun, use a sunscreen that protects against both UVA and UVB radiation with an SPF of 30 or greater. Reapply every 2 to 3 hours or after sweating, drying off with a towel, or swimming. · Always wear a seat belt when traveling in a car. Always wear a helmet when riding a bicycle or motorcycle.
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
## 2021-10-16 LAB
ALBUMIN SERPL-MCNC: 4.2 G/DL (ref 3.4–5)
ANION GAP SERPL CALCULATED.3IONS-SCNC: 16 MMOL/L (ref 3–16)
BUN BLDV-MCNC: 19 MG/DL (ref 7–20)
CALCIUM SERPL-MCNC: 9.3 MG/DL (ref 8.3–10.6)
CHLORIDE BLD-SCNC: 103 MMOL/L (ref 99–110)
CO2: 23 MMOL/L (ref 21–32)
CREAT SERPL-MCNC: 1.1 MG/DL (ref 0.8–1.3)
ESTIMATED AVERAGE GLUCOSE: 157.1 MG/DL
GFR AFRICAN AMERICAN: >60
GFR NON-AFRICAN AMERICAN: >60
GLUCOSE BLD-MCNC: 143 MG/DL (ref 70–99)
HBA1C MFR BLD: 7.1 %
PHOSPHORUS: 3.7 MG/DL (ref 2.5–4.9)
POTASSIUM SERPL-SCNC: 4.9 MMOL/L (ref 3.5–5.1)
SODIUM BLD-SCNC: 142 MMOL/L (ref 136–145)

## 2021-10-18 NOTE — RESULT ENCOUNTER NOTE
Diabetes control has improved since last visit- A1C is now 7.1% from 7.4%. Normal kidney function. Please call if you have additional questions and/ or concerns. Please keep your next appt. Thank you.

## 2021-11-05 ENCOUNTER — OFFICE VISIT (OUTPATIENT)
Dept: FAMILY MEDICINE CLINIC | Age: 86
End: 2021-11-05
Payer: MEDICARE

## 2021-11-05 VITALS
HEIGHT: 68 IN | OXYGEN SATURATION: 98 % | DIASTOLIC BLOOD PRESSURE: 70 MMHG | HEART RATE: 71 BPM | SYSTOLIC BLOOD PRESSURE: 148 MMHG | WEIGHT: 224 LBS | BODY MASS INDEX: 33.95 KG/M2

## 2021-11-05 DIAGNOSIS — R10.84 GENERALIZED ABDOMINAL PAIN: Primary | ICD-10-CM

## 2021-11-05 PROCEDURE — 99213 OFFICE O/P EST LOW 20 MIN: CPT

## 2021-11-05 RX ORDER — FAMOTIDINE 20 MG/1
20 TABLET, FILM COATED ORAL DAILY
Qty: 30 TABLET | Refills: 3 | Status: SHIPPED | OUTPATIENT
Start: 2021-11-05 | End: 2022-04-14

## 2021-11-05 ASSESSMENT — ENCOUNTER SYMPTOMS
DIARRHEA: 1
FLATUS: 0
EYES NEGATIVE: 1
NAUSEA: 0
CONSTIPATION: 0
RESPIRATORY NEGATIVE: 1
BELCHING: 1
ABDOMINAL PAIN: 1
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
## 2021-11-08 ENCOUNTER — TELEPHONE (OUTPATIENT)
Dept: FAMILY MEDICINE CLINIC | Age: 86
End: 2021-11-08

## 2021-11-08 DIAGNOSIS — R10.84 GENERALIZED ABDOMINAL PAIN: Primary | ICD-10-CM

## 2021-11-08 NOTE — TELEPHONE ENCOUNTER
Patient was seen by Sumanth Velasco NP on 11-5-21 for abdominal pain. Patient was instructed to call and let us know how he is feeling. Patient says he is about the same, no changes.

## 2021-11-09 NOTE — TELEPHONE ENCOUNTER
Patient calls reporting that he still is having some abdominal pain after eating and is still having cramping sensation across the belt line that does radiate to right back. I did discuss this with his PCP Robin Sanchez CNP and I agree that we will do a CT of the abdomen pelvis.

## 2021-11-09 NOTE — TELEPHONE ENCOUNTER
Patient calls reporting that he still is having some abdominal pain after eating and is still having cramping sensation across the belt line that does radiate to right back. I did discuss this with his PCP Sophie Lenz CNP and I agree that we will do a CT of the abdomen pelvis.

## 2021-11-09 NOTE — TELEPHONE ENCOUNTER
Patients wife informed. Asked if we could schedule it for her. Quang Zamora orab- Saturday 830AM       Fast after midnight.

## 2021-11-09 NOTE — TELEPHONE ENCOUNTER
Patient returned call. He says that yesterday afternoon he was having abdominal pain from middle of stomach and traveled to right side and hip. When he eats something he has some discomfort for a little while.

## 2021-11-13 ENCOUNTER — HOSPITAL ENCOUNTER (OUTPATIENT)
Dept: CT IMAGING | Age: 86
Discharge: HOME | End: 2021-11-13
Payer: MEDICARE

## 2021-11-13 DIAGNOSIS — R10.84 GENERALIZED ABDOMINAL PAIN: ICD-10-CM

## 2021-11-13 PROCEDURE — 74176 CT ABD & PELVIS W/O CONTRAST: CPT

## 2021-11-15 NOTE — RESULT ENCOUNTER NOTE
The only way for us to be able to tell if he has an arterial blockage is if we do a CTA of his abdomen. We can do that but it may be expensive.

## 2021-11-16 ENCOUNTER — TELEPHONE (OUTPATIENT)
Dept: FAMILY MEDICINE CLINIC | Age: 86
End: 2021-11-16

## 2021-11-16 NOTE — TELEPHONE ENCOUNTER
Kristen Artis says she spoke to patient about the CTA of his abdomen. Patient told her that he is not interested in having it done.

## 2021-11-19 NOTE — PROGRESS NOTES
Subjective:      Patient ID: Reba Halsted is a 81 y.o. male. HPI  Chief Complaint   Patient presents with    Hypertension     on lisinopril; here for follow-up on hypertension-denies problems with cardiovascular, CNS or peripheral vascular symptoms. Still has a foot ulcer    Immunizations     prevnar 15      Chief complaint and present illness: 81-year-old white male presents unaccompanied for follow-up on hypertension. Review of Systems   Constitutional: Negative. Respiratory: Negative. background/entire past medical,social and family history obtained and reviewed/updated today   Objective:   Physical Exam   /66   Pulse 74   Temp 97.6 °F (36.4 °C) (Oral)   Wt 239 lb (108.4 kg)   SpO2 96%   BMI 37.43 kg/m²   chest/card: Clear lungs, regular rhythm    Assessment:      Juanito Patel was seen today for hypertension and immunizations. Diagnoses and all orders for this visit:    Essential hypertension    Need for prophylactic vaccination against Streptococcus pneumoniae (pneumococcus)  -     Cancel: PREVNAR 13 IM (Pneumococcal conjugate vaccine 13-valent)    Localized edema  -     furosemide (LASIX) 20 MG tablet; Take 1 tablet by mouth daily           Plan:      Return in about 3 months (around 7/24/2019). There are no Patient Instructions on file for this visit.          Guanakito Olsen MA Hemostasis: [employer]

## 2022-01-14 DIAGNOSIS — L97.509 TYPE 2 DIABETES MELLITUS WITH FOOT ULCER, WITHOUT LONG-TERM CURRENT USE OF INSULIN (HCC): ICD-10-CM

## 2022-01-14 DIAGNOSIS — E11.621 TYPE 2 DIABETES MELLITUS WITH FOOT ULCER, WITHOUT LONG-TERM CURRENT USE OF INSULIN (HCC): ICD-10-CM

## 2022-01-14 RX ORDER — GLIPIZIDE 10 MG/1
TABLET ORAL
Qty: 60 TABLET | Refills: 2 | Status: SHIPPED | OUTPATIENT
Start: 2022-01-14 | End: 2022-03-10 | Stop reason: SDUPTHER

## 2022-01-14 NOTE — TELEPHONE ENCOUNTER
Future appt scheduled 0 appt scheduled- Return in about 3 months (around 2/5/2022)                Last appt 11/05/2021      Last Written  04/29/2021    glipiZIDE (GLUCOTROL) 5 MG tablet  #180  3 RF

## 2022-03-04 DIAGNOSIS — L97.509 TYPE 2 DIABETES MELLITUS WITH FOOT ULCER, WITHOUT LONG-TERM CURRENT USE OF INSULIN (HCC): ICD-10-CM

## 2022-03-04 DIAGNOSIS — E11.621 TYPE 2 DIABETES MELLITUS WITH FOOT ULCER, WITHOUT LONG-TERM CURRENT USE OF INSULIN (HCC): ICD-10-CM

## 2022-03-04 NOTE — TELEPHONE ENCOUNTER
Formerly named [facility] CLINICAL PHARMACY: [facility]HERENCE RE[facility]  Identified care gap per [place]: fills at [facility]: Diabetes adherence    Last Visit: 11/05/2021    ASSESSMENT  ACE/ARB ADHERENCE    Per Reconciled Dispense Report:  Lisinopril 5mg last filled on 1/14/22 for 90 day supply. BP Readings from Last 3 Encounters:   11/05/21 (!) 148/70   10/15/21 134/64   04/29/21 (!) 120/58     CrCl cannot be calculated (Patient's most recent lab result is older than the maximum 120 days allowed. ). DIABETES ADHERENCE    Insurance Records claims through 2.14.22 30 day fill 2022   GLIPIZIDE 10 MG  Next refill due: 2/13/2    Metformin 1000mg 90DS  Per Reconciled Dispense Report:   last filled on 1/14/22 for 90 day supply. Per [facility]: last script written for 30DS   GLIPIZIDE 10 MG last picked up on 10 days ago (has refills remaining) billed to insurance. Lab Results   Component Value Date    LABA1C 7.1 10/15/2021    LABA1C 7.4 03/29/2021    LABA1C 8.2 02/18/2021     NOTE A1c not yet completed this calendar year       PLAN  The following are interventions that have been identified:   - Patient eligible for 90 day supply of glipizide 10mg    Attempting to reach patient to review changing prescription from a 30-day supply to a 90-day supply.  Left message asking for return call    No future appointments. Varsha Bartholomew CPhT.    [address] [facility] free: [phone]

## 2022-03-07 ENCOUNTER — TELEPHONE (OUTPATIENT)
Dept: FAMILY MEDICINE CLINIC | Age: 87
End: 2022-03-07

## 2022-03-07 DIAGNOSIS — L97.509 TYPE 2 DIABETES MELLITUS WITH FOOT ULCER, WITHOUT LONG-TERM CURRENT USE OF INSULIN (HCC): ICD-10-CM

## 2022-03-07 DIAGNOSIS — E11.621 TYPE 2 DIABETES MELLITUS WITH FOOT ULCER, WITHOUT LONG-TERM CURRENT USE OF INSULIN (HCC): ICD-10-CM

## 2022-03-07 RX ORDER — BLOOD SUGAR DIAGNOSTIC
STRIP MISCELLANEOUS
Qty: 100 STRIP | Refills: 5 | Status: SHIPPED | OUTPATIENT
Start: 2022-03-07

## 2022-03-07 NOTE — TELEPHONE ENCOUNTER
----- Message from Cee Calhoun sent at 3/7/2022  2:34 PM EST -----  Subject: Refill Request    QUESTIONS  Name of Medication? ACCU-CHEK DIXON PLUS strip  Patient-reported dosage and instructions? 1 a day  How many days do you have left? 4  Preferred Pharmacy? [facility]  Pharmacy phone number (if available)? [phone]  Additional Information for Provider? Please refill he has no test strips   available  ---------------------------------------------------------------------------  --------------  CALL BACK INFO  What is the best way for the office to contact you? OK to leave message on   voicemail  Preferred Call Back Phone Number?  [phone]

## 2022-03-10 NOTE — TELEPHONE ENCOUNTER
Adelien Jimenez, APRN - CNP    Patient is out of refills for glipizide. I have pended a refill request for your convenience. Last OV on 11/5/21.     Thank you,   [place], PharmD, [facility] // Department, toll free [phone], Option 1

## 2022-03-10 NOTE — TELEPHONE ENCOUNTER
2nd Attempt Documentation:  2nd attempt to contact this patient regarding the previous message  CLINICAL PHARMACY: [facility]  Patient unavailable at the time of call. Left following message on home TAD: please call back at toll-free [phone] to retrieve previous message. Patient receives 90ds on metformin & lisinopril. Will route to PharmD to facilitate refill auth on glipizide. Will follow up with patient/pharmacy to inactive if necessary. .

## 2022-03-11 RX ORDER — GLIPIZIDE 10 MG/1
TABLET ORAL
Qty: 180 TABLET | Refills: 0 | Status: SHIPPED | OUTPATIENT
Start: 2022-03-11 | End: 2022-04-15 | Stop reason: SDUPTHER

## 2022-03-15 NOTE — TELEPHONE ENCOUNTER
Staff will inactivate 30 day script. Verified receipt of new 90 day. Too soon to refill today.     For Nick Smiths in place:  No   Recommendation Provided To: Provider: 1 via Note to Provider, Patient/Caregiver: 1 via Telephone and Pharmacy: 1   Intervention Detail: Adherence Monitorin and New Rx: 1, reason: Patient Preference   Gap Closed?: Yes    Intervention Accepted By: Provider: 1, Patient/Caregiver: 0 and Pharmacy: 1   Time Spent (min): 15

## 2022-04-14 DIAGNOSIS — R10.84 GENERALIZED ABDOMINAL PAIN: ICD-10-CM

## 2022-04-14 RX ORDER — FAMOTIDINE 20 MG/1
TABLET, FILM COATED ORAL
Qty: 30 TABLET | Refills: 0 | Status: SHIPPED | OUTPATIENT
Start: 2022-04-14 | End: 2022-04-15 | Stop reason: SDUPTHER

## 2022-04-15 ENCOUNTER — OFFICE VISIT (OUTPATIENT)
Dept: FAMILY MEDICINE CLINIC | Age: 87
End: 2022-04-15
Payer: MEDICARE

## 2022-04-15 VITALS
OXYGEN SATURATION: 97 % | SYSTOLIC BLOOD PRESSURE: 122 MMHG | HEART RATE: 73 BPM | TEMPERATURE: 97.8 F | WEIGHT: 229 LBS | BODY MASS INDEX: 34.82 KG/M2 | DIASTOLIC BLOOD PRESSURE: 62 MMHG

## 2022-04-15 DIAGNOSIS — R10.84 GENERALIZED ABDOMINAL PAIN: ICD-10-CM

## 2022-04-15 DIAGNOSIS — E78.5 DYSLIPIDEMIA: ICD-10-CM

## 2022-04-15 DIAGNOSIS — E11.620 TYPE 2 DIABETES MELLITUS WITH DIABETIC DERMATITIS, WITHOUT LONG-TERM CURRENT USE OF INSULIN (HCC): ICD-10-CM

## 2022-04-15 DIAGNOSIS — E66.09 CLASS 1 OBESITY DUE TO EXCESS CALORIES WITH SERIOUS COMORBIDITY AND BODY MASS INDEX (BMI) OF 34.0 TO 34.9 IN ADULT: ICD-10-CM

## 2022-04-15 DIAGNOSIS — C85.90 LYMPHOMA, UNSPECIFIED BODY REGION, UNSPECIFIED LYMPHOMA TYPE (HCC): ICD-10-CM

## 2022-04-15 DIAGNOSIS — I10 ESSENTIAL HYPERTENSION: Primary | ICD-10-CM

## 2022-04-15 LAB
A/G RATIO: 1.5 (ref 1.1–2.2)
ALBUMIN SERPL-MCNC: 4.3 G/DL (ref 3.4–5)
ALP BLD-CCNC: 77 U/L (ref 40–129)
ALT SERPL-CCNC: 12 U/L (ref 10–40)
ANION GAP SERPL CALCULATED.3IONS-SCNC: 12 MMOL/L (ref 3–16)
AST SERPL-CCNC: 15 U/L (ref 15–37)
BILIRUB SERPL-MCNC: <0.2 MG/DL (ref 0–1)
BUN BLDV-MCNC: 22 MG/DL (ref 7–20)
CALCIUM SERPL-MCNC: 9.4 MG/DL (ref 8.3–10.6)
CHLORIDE BLD-SCNC: 104 MMOL/L (ref 99–110)
CHOLESTEROL, TOTAL: 173 MG/DL (ref 0–199)
CO2: 25 MMOL/L (ref 21–32)
CREAT SERPL-MCNC: 1 MG/DL (ref 0.8–1.3)
GFR AFRICAN AMERICAN: >60
GFR NON-AFRICAN AMERICAN: >60
GLUCOSE BLD-MCNC: 118 MG/DL (ref 70–99)
HBA1C MFR BLD: 7.2 %
HDLC SERPL-MCNC: 30 MG/DL (ref 40–60)
LDL CHOLESTEROL CALCULATED: 96 MG/DL
POTASSIUM SERPL-SCNC: 5.5 MMOL/L (ref 3.5–5.1)
SODIUM BLD-SCNC: 141 MMOL/L (ref 136–145)
TOTAL PROTEIN: 7.2 G/DL (ref 6.4–8.2)
TRIGL SERPL-MCNC: 237 MG/DL (ref 0–150)
VLDLC SERPL CALC-MCNC: 47 MG/DL

## 2022-04-15 PROCEDURE — 83036 HEMOGLOBIN GLYCOSYLATED A1C: CPT | Performed by: NURSE PRACTITIONER

## 2022-04-15 PROCEDURE — 99214 OFFICE O/P EST MOD 30 MIN: CPT | Performed by: NURSE PRACTITIONER

## 2022-04-15 PROCEDURE — 3051F HG A1C>EQUAL 7.0%<8.0%: CPT | Performed by: NURSE PRACTITIONER

## 2022-04-15 PROCEDURE — 36415 COLL VENOUS BLD VENIPUNCTURE: CPT | Performed by: NURSE PRACTITIONER

## 2022-04-15 RX ORDER — GLIPIZIDE 10 MG/1
TABLET ORAL
Qty: 180 TABLET | Refills: 3 | Status: SHIPPED | OUTPATIENT
Start: 2022-04-15

## 2022-04-15 RX ORDER — FAMOTIDINE 20 MG/1
TABLET, FILM COATED ORAL
Qty: 90 TABLET | Refills: 3 | Status: SHIPPED | OUTPATIENT
Start: 2022-04-15

## 2022-04-15 RX ORDER — LISINOPRIL 5 MG/1
TABLET ORAL
Qty: 90 TABLET | Refills: 3 | Status: SHIPPED | OUTPATIENT
Start: 2022-04-15 | End: 2022-09-08 | Stop reason: SDUPTHER

## 2022-04-15 ASSESSMENT — ENCOUNTER SYMPTOMS: SHORTNESS OF BREATH: 0
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
## 2022-04-16 LAB
BANDED NEUTROPHILS RELATIVE PERCENT: 1 % (ref 0–7)
BASOPHILS ABSOLUTE: 0.2 K/UL (ref 0–0.2)
BASOPHILS RELATIVE PERCENT: 2 %
EOSINOPHILS ABSOLUTE: 0.4 K/UL (ref 0–0.6)
EOSINOPHILS RELATIVE PERCENT: 4 %
HCT VFR BLD CALC: 36.3 % (ref 40.5–52.5)
HEMATOLOGY PATH CONSULT: YES
HEMOGLOBIN: 11.9 G/DL (ref 13.5–17.5)
LYMPHOCYTES ABSOLUTE: 5.3 K/UL (ref 1–5.1)
LYMPHOCYTES RELATIVE PERCENT: 59 %
MCH RBC QN AUTO: 31.6 PG (ref 26–34)
MCHC RBC AUTO-ENTMCNC: 32.7 G/DL (ref 31–36)
MCV RBC AUTO: 96.6 FL (ref 80–100)
MONOCYTES ABSOLUTE: 0.9 K/UL (ref 0–1.3)
MONOCYTES RELATIVE PERCENT: 10 %
NEUTROPHILS ABSOLUTE: 2.3 K/UL (ref 1.7–7.7)
NEUTROPHILS RELATIVE PERCENT: 24 %
PDW BLD-RTO: 14.9 % (ref 12.4–15.4)
PLATELET # BLD: ABNORMAL K/UL (ref 135–450)
PLATELET SLIDE REVIEW: ABNORMAL
PMV BLD AUTO: ABNORMAL FL (ref 5–10.5)
RBC # BLD: 3.76 M/UL (ref 4.2–5.9)
SLIDE REVIEW: ABNORMAL
WBC # BLD: 9 K/UL (ref 4–11)

## 2022-04-18 LAB — HEMATOLOGY PATH CONSULT: NORMAL

## 2022-04-18 NOTE — RESULT ENCOUNTER NOTE
Continue to be anemic, slightly lower than prior but we will watch this. Normal kidney/ liver function. Triglycerides are slightly elevated- can try to eat more fish to lower this level. Please call if you have additional questions and/ or concerns. Please keep your next appt. Thank you.

## 2022-04-28 ENCOUNTER — PROCEDURE VISIT (OUTPATIENT)
Dept: FAMILY MEDICINE CLINIC | Age: 87
End: 2022-04-28
Payer: MEDICARE

## 2022-04-28 DIAGNOSIS — L91.9 HYPERTROPHIC CONDITION OF SKIN: ICD-10-CM

## 2022-04-28 DIAGNOSIS — D23.4 OTHER BENIGN NEOPLASM OF SKIN OF SCALP AND NECK: Primary | ICD-10-CM

## 2022-04-28 PROCEDURE — 11102 TANGNTL BX SKIN SINGLE LES: CPT | Performed by: NURSE PRACTITIONER

## 2022-04-28 NOTE — PATIENT INSTRUCTIONS
Biopsy Wound Care Instructions    Keep the bandage in place for 24 hours.  Cleanse the wound with mild soapy water daily   Gently dry the area.  Apply Vaseline or petroleum jelly to the wound using a cotton tipped applicator.  Cover with a clean bandage.  Repeat this process until the biopsy site is healed.  If you had stitches placed, continue treating the site until the stitches are removed. Remember to make an appointment to return to have your stitches removed by our staff.     You may shower and bathe as usual.    ** Biopsy results generally take around 7 business days

## 2022-04-28 NOTE — PROGRESS NOTES
4/28/2022    Chief Complaint   Patient presents with    Mole     removal of 2 moles on right side of neck        Rosas Leung is a 81 y.o. male, presents today for:      ASSESSMENT/PLAN:  1. Other benign neoplasm of skin of scalp and neck   Lesion successfully removed with hemostasis achieved. Sent for pathology  Discussed with patient results will be sent back in around 5 days. - 13468 - MS SHAV SKIN LES <5MM FACE,FACIAL    2. Hypertrophic condition of skin  See above  - SURGICAL PATHOLOGY  - 76909 - MS SHAV SKIN LES <5MM FACE,FACIAL    Return for keep october. Shave Biopsy Procedure Note    Pre-operative Diagnosis: Suspicious lesion, Skin tag    Post-operative Diagnosis: same    Locations:right neck    Indications: Abnormal growth, frequent lacerations due to placement of lesion, biopsy for upcoming Dermatology appt    Anesthesia: Lidocaine 2% with epinephrine without added sodium bicarbonate    Procedure Details   History of allergy to iodine: no    Patient informed of the risks (including bleeding and infection) and benefits of the   procedure and Verbal informed consent obtained. The lesion and surrounding area were given a sterile prep using betadyne and draped in the usual sterile fashion. A scalpel was used to shave an area of skin approximately 1cm by 0.5 cm. Hemostasis achieved with Silver Nitrate sticks. Antibiotic ointment and a sterile dressing applied. The specimen was sent for pathologic examination. The patient tolerated the procedure well. EBL: 1 ml    Findings:  Hypertrophic Skin lesion    Condition:  Stable    Complications:  none. Plan:  1. Instructed to keep the wound dry and covered for 24-48h and clean thereafter. 2. Warning signs of infection were reviewed. 3. Recommended that the patient use OTC acetaminophen as needed for pain.    4. Return in PRN

## 2022-05-03 NOTE — RESULT ENCOUNTER NOTE
Up to him. I would because of all of the other lesions he has that are potentially cancerous. We removed that one because it was preventing him from being able to do some of his daily activities.

## 2022-05-04 DIAGNOSIS — L91.9 HYPERTROPHIC CONDITION OF SKIN: Primary | ICD-10-CM

## 2022-05-04 DIAGNOSIS — D23.4 OTHER BENIGN NEOPLASM OF SKIN OF SCALP AND NECK: ICD-10-CM

## 2022-05-26 ENCOUNTER — HOSPITAL ENCOUNTER (OUTPATIENT)
Age: 87
Discharge: HOME | End: 2022-05-26
Payer: MEDICARE

## 2022-05-26 ENCOUNTER — OFFICE VISIT (OUTPATIENT)
Dept: FAMILY MEDICINE CLINIC | Age: 87
End: 2022-05-26
Payer: MEDICARE

## 2022-05-26 ENCOUNTER — TELEPHONE (OUTPATIENT)
Dept: FAMILY MEDICINE CLINIC | Age: 87
End: 2022-05-26

## 2022-05-26 ENCOUNTER — HOSPITAL ENCOUNTER (OUTPATIENT)
Dept: GENERAL RADIOLOGY | Age: 87
Discharge: HOME | End: 2022-05-26
Payer: MEDICARE

## 2022-05-26 ENCOUNTER — HOSPITAL ENCOUNTER (OUTPATIENT)
Dept: VASCULAR LAB | Age: 87
Discharge: HOME | End: 2022-05-26
Payer: MEDICARE

## 2022-05-26 VITALS
HEART RATE: 80 BPM | DIASTOLIC BLOOD PRESSURE: 56 MMHG | BODY MASS INDEX: 35.28 KG/M2 | WEIGHT: 232 LBS | SYSTOLIC BLOOD PRESSURE: 118 MMHG | OXYGEN SATURATION: 97 % | TEMPERATURE: 97.1 F

## 2022-05-26 DIAGNOSIS — L97.512 ULCER OF RIGHT FOOT WITH FAT LAYER EXPOSED (HCC): ICD-10-CM

## 2022-05-26 DIAGNOSIS — I83.215: Primary | ICD-10-CM

## 2022-05-26 DIAGNOSIS — I10 ESSENTIAL HYPERTENSION: ICD-10-CM

## 2022-05-26 DIAGNOSIS — I89.0 ACQUIRED LYMPHEDEMA OF LOWER EXTREMITY: ICD-10-CM

## 2022-05-26 DIAGNOSIS — E11.620 TYPE 2 DIABETES MELLITUS WITH DIABETIC DERMATITIS, WITHOUT LONG-TERM CURRENT USE OF INSULIN (HCC): ICD-10-CM

## 2022-05-26 DIAGNOSIS — I83.215: ICD-10-CM

## 2022-05-26 LAB
A/G RATIO: 1.2 (ref 1.1–2.2)
ALBUMIN SERPL-MCNC: 3.8 G/DL (ref 3.4–5)
ALP BLD-CCNC: 82 U/L (ref 40–129)
ALT SERPL-CCNC: 10 U/L (ref 10–40)
ANION GAP SERPL CALCULATED.3IONS-SCNC: 16 MMOL/L (ref 3–16)
AST SERPL-CCNC: 10 U/L (ref 15–37)
BASOPHILS ABSOLUTE: 0 K/UL (ref 0–0.2)
BASOPHILS RELATIVE PERCENT: 0 %
BILIRUB SERPL-MCNC: 0.3 MG/DL (ref 0–1)
BUN BLDV-MCNC: 22 MG/DL (ref 7–20)
BURR CELLS: ABNORMAL
CALCIUM SERPL-MCNC: 8.7 MG/DL (ref 8.3–10.6)
CHLORIDE BLD-SCNC: 100 MMOL/L (ref 99–110)
CO2: 22 MMOL/L (ref 21–32)
CREAT SERPL-MCNC: 1.2 MG/DL (ref 0.8–1.3)
EOSINOPHILS ABSOLUTE: 0.4 K/UL (ref 0–0.6)
EOSINOPHILS RELATIVE PERCENT: 4 %
GFR AFRICAN AMERICAN: >60
GFR NON-AFRICAN AMERICAN: 57
GLUCOSE BLD-MCNC: 246 MG/DL (ref 70–99)
HCT VFR BLD CALC: 30.9 % (ref 40.5–52.5)
HEMATOLOGY PATH CONSULT: NO
HEMOGLOBIN: 10.2 G/DL (ref 13.5–17.5)
LYMPHOCYTES ABSOLUTE: 5.2 K/UL (ref 1–5.1)
LYMPHOCYTES RELATIVE PERCENT: 47 %
MCH RBC QN AUTO: 32.1 PG (ref 26–34)
MCHC RBC AUTO-ENTMCNC: 33.1 G/DL (ref 31–36)
MCV RBC AUTO: 96.9 FL (ref 80–100)
MONOCYTES ABSOLUTE: 1 K/UL (ref 0–1.3)
MONOCYTES RELATIVE PERCENT: 9 %
NEUTROPHILS ABSOLUTE: 4.4 K/UL (ref 1.7–7.7)
NEUTROPHILS RELATIVE PERCENT: 40 %
PDW BLD-RTO: 14.2 % (ref 12.4–15.4)
PLATELET # BLD: 240 K/UL (ref 135–450)
PMV BLD AUTO: 8 FL (ref 5–10.5)
POTASSIUM SERPL-SCNC: 5.4 MMOL/L (ref 3.5–5.1)
RBC # BLD: 3.19 M/UL (ref 4.2–5.9)
SODIUM BLD-SCNC: 138 MMOL/L (ref 136–145)
TOTAL PROTEIN: 7 G/DL (ref 6.4–8.2)
WBC # BLD: 11.1 K/UL (ref 4–11)

## 2022-05-26 PROCEDURE — 99214 OFFICE O/P EST MOD 30 MIN: CPT | Performed by: NURSE PRACTITIONER

## 2022-05-26 PROCEDURE — 73630 X-RAY EXAM OF FOOT: CPT

## 2022-05-26 PROCEDURE — 36415 COLL VENOUS BLD VENIPUNCTURE: CPT | Performed by: NURSE PRACTITIONER

## 2022-05-26 PROCEDURE — 1123F ACP DISCUSS/DSCN MKR DOCD: CPT | Performed by: NURSE PRACTITIONER

## 2022-05-26 PROCEDURE — 3051F HG A1C>EQUAL 7.0%<8.0%: CPT | Performed by: NURSE PRACTITIONER

## 2022-05-26 PROCEDURE — 93971 EXTREMITY STUDY: CPT

## 2022-05-26 SDOH — ECONOMIC STABILITY: FOOD INSECURITY: WITHIN THE PAST 12 MONTHS, THE FOOD YOU BOUGHT JUST DIDN'T LAST AND YOU DIDN'T HAVE MONEY TO GET MORE.: NEVER TRUE

## 2022-05-26 SDOH — ECONOMIC STABILITY: FOOD INSECURITY: WITHIN THE PAST 12 MONTHS, YOU WORRIED THAT YOUR FOOD WOULD RUN OUT BEFORE YOU GOT MONEY TO BUY MORE.: NEVER TRUE

## 2022-05-26 ASSESSMENT — ENCOUNTER SYMPTOMS
CONSTIPATION: 0
COUGH: 0
BLOOD IN STOOL: 0
CHEST TIGHTNESS: 0
DIARRHEA: 0
SHORTNESS OF BREATH: 0

## 2022-05-26 ASSESSMENT — PATIENT HEALTH QUESTIONNAIRE - PHQ9
6. FEELING BAD ABOUT YOURSELF - OR THAT YOU ARE A FAILURE OR HAVE LET YOURSELF OR YOUR FAMILY DOWN: 0
2. FEELING DOWN, DEPRESSED OR HOPELESS: 0
1. LITTLE INTEREST OR PLEASURE IN DOING THINGS: 0
8. MOVING OR SPEAKING SO SLOWLY THAT OTHER PEOPLE COULD HAVE NOTICED. OR THE OPPOSITE, BEING SO FIGETY OR RESTLESS THAT YOU HAVE BEEN MOVING AROUND A LOT MORE THAN USUAL: 0
3. TROUBLE FALLING OR STAYING ASLEEP: 0
5. POOR APPETITE OR OVEREATING: 0
7. TROUBLE CONCENTRATING ON THINGS, SUCH AS READING THE NEWSPAPER OR WATCHING TELEVISION: 0
10. IF YOU CHECKED OFF ANY PROBLEMS, HOW DIFFICULT HAVE THESE PROBLEMS MADE IT FOR YOU TO DO YOUR WORK, TAKE CARE OF THINGS AT HOME, OR GET ALONG WITH OTHER PEOPLE: 0
4. FEELING TIRED OR HAVING LITTLE ENERGY: 0
SUM OF ALL RESPONSES TO PHQ QUESTIONS 1-9: 0
9. THOUGHTS THAT YOU WOULD BE BETTER OFF DEAD, OR OF HURTING YOURSELF: 0
SUM OF ALL RESPONSES TO PHQ QUESTIONS 1-9: 0
SUM OF ALL RESPONSES TO PHQ9 QUESTIONS 1 & 2: 0

## 2022-05-26 ASSESSMENT — SOCIAL DETERMINANTS OF HEALTH (SDOH): HOW HARD IS IT FOR YOU TO PAY FOR THE VERY BASICS LIKE FOOD, HOUSING, MEDICAL CARE, AND HEATING?: NOT HARD AT ALL

## 2022-05-26 NOTE — TELEPHONE ENCOUNTER
Need to let patient know that his STAT US is for today (5/26) he is to be at [facility] at 12:30 check in at registration   I had to leave a msg for patient to return call
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
## 2022-05-27 RX ORDER — AMOXICILLIN AND CLAVULANATE POTASSIUM 875; 125 MG/1; MG/1
1 TABLET, FILM COATED ORAL 2 TIMES DAILY
Qty: 20 TABLET | Refills: 0 | Status: SHIPPED | OUTPATIENT
Start: 2022-05-27 | End: 2022-06-06

## 2022-05-27 NOTE — RESULT ENCOUNTER NOTE
Mildly elevated white blood cell count at 11 (normal is <10). Anemia slightly worse than normal.  Kidney function looks ok but please watch intake of potassium rich foods like tomatoes, potatoes, sweet potatoes, and bananas. Please see other note regarding Xray findings and plan for medication.

## 2022-05-28 LAB
ANAEROBIC CULTURE: ABNORMAL
GRAM STAIN RESULT: ABNORMAL
ORGANISM: ABNORMAL
WOUND/ABSCESS: ABNORMAL
WOUND/ABSCESS: ABNORMAL

## 2022-05-31 ENCOUNTER — TELEPHONE (OUTPATIENT)
Dept: FAMILY MEDICINE CLINIC | Age: 87
End: 2022-05-31

## 2022-05-31 DIAGNOSIS — L97.512 ULCER OF RIGHT FOOT WITH FAT LAYER EXPOSED (HCC): Primary | ICD-10-CM

## 2022-05-31 DIAGNOSIS — M86.9 OSTEOMYELITIS OF RIGHT FOOT, UNSPECIFIED TYPE (HCC): ICD-10-CM

## 2022-05-31 NOTE — TELEPHONE ENCOUNTER
Reviewed lab/ imaging findings with daughter, Nimisha Doran. Reviewed concerns for osteomyelitis. Reporting her dad now has decreased energy, night sweats, had fever of 101-102 over the weekend. Leg Swelling is the same if not slightly worse than when last evaluated. Taking Antibiotics as prescribed. Will order updated CBC, CMP Blood cultures to assess for possible systemic infection.

## 2022-05-31 NOTE — RESULT ENCOUNTER NOTE
No.  My concern would be for a worsening infection. Is it possible to get him in with Podiatry sooner? Would he be willing to go to the hospital for labs? He needs blood cultures and we cannot do that here.

## 2022-05-31 NOTE — TELEPHONE ENCOUNTER
Pt daughter would like to talk to [facility] in how this infection came about   She said she very confused and worried and asked if [facility] has a moment to talk she would love to talk to her

## 2022-05-31 NOTE — TELEPHONE ENCOUNTER
Patient's daughter, Candice Reyna, has questions regarding wound culture and instructions that were given to his wife about blood work.  Please call her

## 2022-05-31 NOTE — RESULT ENCOUNTER NOTE
As suspected, wound culture showing an infection which is susceptible to the antibiotic I gave you. How are you feeling? How is the leg swelling.

## 2022-06-01 ENCOUNTER — HOSPITAL ENCOUNTER (OUTPATIENT)
Age: 87
Discharge: HOME | End: 2022-06-01
Payer: MEDICARE

## 2022-06-01 DIAGNOSIS — M86.9 OSTEOMYELITIS OF RIGHT FOOT, UNSPECIFIED TYPE (HCC): ICD-10-CM

## 2022-06-01 LAB
A/G RATIO: 0.8 (ref 1.1–2.2)
ALBUMIN SERPL-MCNC: 3.3 G/DL (ref 3.4–5)
ALP BLD-CCNC: 88 U/L (ref 40–129)
ALT SERPL-CCNC: 14 U/L (ref 10–40)
ANION GAP SERPL CALCULATED.3IONS-SCNC: 17 MMOL/L (ref 3–16)
AST SERPL-CCNC: 20 U/L (ref 15–37)
BASOPHILS ABSOLUTE: 0.2 K/UL (ref 0–0.2)
BASOPHILS RELATIVE PERCENT: 1.5 %
BILIRUB SERPL-MCNC: <0.2 MG/DL (ref 0–1)
BUN BLDV-MCNC: 26 MG/DL (ref 7–20)
CALCIUM SERPL-MCNC: 9.2 MG/DL (ref 8.3–10.6)
CHLORIDE BLD-SCNC: 102 MMOL/L (ref 99–110)
CO2: 19 MMOL/L (ref 21–32)
CREAT SERPL-MCNC: 1.1 MG/DL (ref 0.8–1.3)
EOSINOPHILS ABSOLUTE: 0.3 K/UL (ref 0–0.6)
EOSINOPHILS RELATIVE PERCENT: 2.9 %
GFR AFRICAN AMERICAN: >60
GFR NON-AFRICAN AMERICAN: >60
GLUCOSE BLD-MCNC: 184 MG/DL (ref 70–99)
HCT VFR BLD CALC: 28.6 % (ref 40.5–52.5)
HEMATOLOGY PATH CONSULT: NO
HEMOGLOBIN: 9.5 G/DL (ref 13.5–17.5)
LYMPHOCYTES ABSOLUTE: 1.8 K/UL (ref 1–5.1)
LYMPHOCYTES RELATIVE PERCENT: 16.3 %
MCH RBC QN AUTO: 31.7 PG (ref 26–34)
MCHC RBC AUTO-ENTMCNC: 33.1 G/DL (ref 31–36)
MCV RBC AUTO: 95.7 FL (ref 80–100)
MONOCYTES ABSOLUTE: 2.8 K/UL (ref 0–1.3)
MONOCYTES RELATIVE PERCENT: 24.9 %
NEUTROPHILS ABSOLUTE: 6.1 K/UL (ref 1.7–7.7)
NEUTROPHILS RELATIVE PERCENT: 54.4 %
PDW BLD-RTO: 13.8 % (ref 12.4–15.4)
PLATELET # BLD: 336 K/UL (ref 135–450)
PMV BLD AUTO: 7.6 FL (ref 5–10.5)
POTASSIUM SERPL-SCNC: 4.9 MMOL/L (ref 3.5–5.1)
RBC # BLD: 2.99 M/UL (ref 4.2–5.9)
SODIUM BLD-SCNC: 138 MMOL/L (ref 136–145)
TOTAL PROTEIN: 7.3 G/DL (ref 6.4–8.2)
WBC # BLD: 11.2 K/UL (ref 4–11)

## 2022-06-01 PROCEDURE — 36415 COLL VENOUS BLD VENIPUNCTURE: CPT

## 2022-06-01 PROCEDURE — 87040 BLOOD CULTURE FOR BACTERIA: CPT

## 2022-06-01 PROCEDURE — 80053 COMPREHEN METABOLIC PANEL: CPT

## 2022-06-01 PROCEDURE — 85025 COMPLETE CBC W/AUTO DIFF WBC: CPT

## 2022-06-02 ENCOUNTER — TELEPHONE (OUTPATIENT)
Dept: FAMILY MEDICINE CLINIC | Age: 87
End: 2022-06-02

## 2022-06-02 NOTE — TELEPHONE ENCOUNTER
Silas Kruse is asking for urine culture results. She called the wound care to try to schedule him an appointment to be seen before 67/22 as instructed by Braeden Wilson, they said they do not have anything sooner, can we call them to get him in sooner?

## 2022-06-03 NOTE — TELEPHONE ENCOUNTER
I don't think we can get him in sooner. I will look at the labs and discuss results there. I think we have already tried to call and see?

## 2022-06-03 NOTE — RESULT ENCOUNTER NOTE
White blood cell count the same as earlier. Unclear cause- will need to see wound. Blood cultures are thankfully negative. Slightly more anemic than last week- any blood in the urine or stool? Can also be a symptom of an inflammation which we know is already present. Next steps: Feeling better after antibiotic for a week?

## 2022-06-03 NOTE — TELEPHONE ENCOUNTER
Pt wife called and  would like to see the blood culture results in my chart  Asking if you could release them in   Pt informed of the earlier [facility]

## 2022-06-05 LAB
CULTURE, BLOOD 2: NORMAL
CULTURE, BLOOD 3: NORMAL

## 2022-06-07 ENCOUNTER — HOSPITAL ENCOUNTER (OUTPATIENT)
Dept: WOUND CARE | Age: 87
Discharge: HOME | End: 2022-06-07
Payer: MEDICARE

## 2022-06-07 VITALS
HEART RATE: 89 BPM | SYSTOLIC BLOOD PRESSURE: 140 MMHG | DIASTOLIC BLOOD PRESSURE: 64 MMHG | TEMPERATURE: 97.7 F | HEIGHT: 68 IN | WEIGHT: 221.44 LBS | RESPIRATION RATE: 20 BRPM | BODY MASS INDEX: 33.56 KG/M2

## 2022-06-07 DIAGNOSIS — R60.0 LOCALIZED EDEMA: ICD-10-CM

## 2022-06-07 DIAGNOSIS — L97.414 ULCER OF MIDFOOT, RIGHT, WITH NECROSIS OF BONE (HCC): ICD-10-CM

## 2022-06-07 DIAGNOSIS — E11.69 DIABETIC FOOT ULCER WITH OSTEOMYELITIS (HCC): Primary | ICD-10-CM

## 2022-06-07 DIAGNOSIS — L97.509 DIABETIC FOOT ULCER WITH OSTEOMYELITIS (HCC): Primary | ICD-10-CM

## 2022-06-07 DIAGNOSIS — M86.9 DIABETIC FOOT ULCER WITH OSTEOMYELITIS (HCC): Primary | ICD-10-CM

## 2022-06-07 DIAGNOSIS — E11.621 DIABETIC FOOT ULCER WITH OSTEOMYELITIS (HCC): Primary | ICD-10-CM

## 2022-06-07 PROCEDURE — 87186 SC STD MICRODIL/AGAR DIL: CPT

## 2022-06-07 PROCEDURE — 11044 DBRDMT BONE 1ST 20 SQ CM/<: CPT

## 2022-06-07 PROCEDURE — 87205 SMEAR GRAM STAIN: CPT

## 2022-06-07 PROCEDURE — 87077 CULTURE AEROBIC IDENTIFY: CPT

## 2022-06-07 PROCEDURE — 87070 CULTURE OTHR SPECIMN AEROBIC: CPT

## 2022-06-07 PROCEDURE — 99213 OFFICE O/P EST LOW 20 MIN: CPT

## 2022-06-07 PROCEDURE — 87181 SC STD AGAR DILUTION PER AGT: CPT

## 2022-06-07 RX ORDER — LIDOCAINE 40 MG/G
CREAM TOPICAL ONCE
Status: CANCELLED | OUTPATIENT
Start: 2022-06-07 | End: 2022-06-07

## 2022-06-07 RX ORDER — LIDOCAINE HYDROCHLORIDE 40 MG/ML
SOLUTION TOPICAL ONCE
Status: CANCELLED | OUTPATIENT
Start: 2022-06-07 | End: 2022-06-07

## 2022-06-07 RX ORDER — GINSENG 100 MG
CAPSULE ORAL ONCE
Status: CANCELLED | OUTPATIENT
Start: 2022-06-07 | End: 2022-06-07

## 2022-06-07 RX ORDER — LIDOCAINE HYDROCHLORIDE 20 MG/ML
JELLY TOPICAL ONCE
Status: CANCELLED | OUTPATIENT
Start: 2022-06-07 | End: 2022-06-07

## 2022-06-07 RX ORDER — BACITRACIN ZINC AND POLYMYXIN B SULFATE 500; 1000 [USP'U]/G; [USP'U]/G
OINTMENT TOPICAL ONCE
Status: CANCELLED | OUTPATIENT
Start: 2022-06-07 | End: 2022-06-07

## 2022-06-07 RX ORDER — BACITRACIN, NEOMYCIN, POLYMYXIN B 400; 3.5; 5 [USP'U]/G; MG/G; [USP'U]/G
OINTMENT TOPICAL ONCE
Status: CANCELLED | OUTPATIENT
Start: 2022-06-07 | End: 2022-06-07

## 2022-06-07 RX ORDER — BETAMETHASONE DIPROPIONATE 0.05 %
OINTMENT (GRAM) TOPICAL ONCE
Status: CANCELLED | OUTPATIENT
Start: 2022-06-07 | End: 2022-06-07

## 2022-06-07 RX ORDER — LIDOCAINE 50 MG/G
OINTMENT TOPICAL ONCE
Status: CANCELLED | OUTPATIENT
Start: 2022-06-07 | End: 2022-06-07

## 2022-06-07 RX ORDER — GENTAMICIN SULFATE 1 MG/G
OINTMENT TOPICAL ONCE
Status: CANCELLED | OUTPATIENT
Start: 2022-06-07 | End: 2022-06-07

## 2022-06-07 RX ORDER — CLOBETASOL PROPIONATE 0.5 MG/G
OINTMENT TOPICAL ONCE
Status: CANCELLED | OUTPATIENT
Start: 2022-06-07 | End: 2022-06-07

## 2022-06-07 RX ORDER — DOXYCYCLINE HYCLATE 100 MG
100 TABLET ORAL 2 TIMES DAILY
Qty: 56 TABLET | Refills: 0 | Status: ON HOLD
Start: 2022-06-07 | End: 2022-06-21 | Stop reason: HOSPADM

## 2022-06-07 RX ORDER — LIDOCAINE 40 MG/G
CREAM TOPICAL ONCE
Status: DISCONTINUED | OUTPATIENT
Start: 2022-06-07 | End: 2022-06-08 | Stop reason: HOSPADM

## 2022-06-07 ASSESSMENT — PAIN DESCRIPTION - PAIN TYPE: TYPE: ACUTE PAIN

## 2022-06-07 ASSESSMENT — PAIN DESCRIPTION - DESCRIPTORS: DESCRIPTORS: NUMBNESS;NAGGING

## 2022-06-07 ASSESSMENT — PAIN DESCRIPTION - LOCATION: LOCATION: LEG

## 2022-06-07 ASSESSMENT — PAIN SCALES - GENERAL: PAINLEVEL_OUTOF10: 3

## 2022-06-07 ASSESSMENT — PAIN DESCRIPTION - FREQUENCY: FREQUENCY: INTERMITTENT

## 2022-06-07 ASSESSMENT — PAIN - FUNCTIONAL ASSESSMENT: PAIN_FUNCTIONAL_ASSESSMENT: PREVENTS OR INTERFERES SOME ACTIVE ACTIVITIES AND ADLS

## 2022-06-07 ASSESSMENT — PAIN DESCRIPTION - ORIENTATION: ORIENTATION: RIGHT

## 2022-06-07 NOTE — PLAN OF CARE
Pt seen in [address] - New return pt -has new Right plantar foot wound - probes to bone - currently on Augment in - cx showed Staph - Recultured wound today - also had recent X-ray - plan to get MRI next Monday and f/ollow up in [address] next Tuesday - cont wound care as follows   RIGHT PLANTAR FOOT  Felt applied per Dr. Jono Jones- do not remove     Betadine ointment - can purchase in out pt pharmacy today  Gauze  jason  Change daily  Medium spandigrip - apply in am and remove pm      Please provide darco shoe with peg asst for offloading-Do NOT drive with this shoe in place!!   Reviewed AVS - f/u in 1 week
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
## 2022-06-10 LAB
GRAM STAIN RESULT: ABNORMAL
ORGANISM: ABNORMAL
WOUND/ABSCESS: ABNORMAL

## 2022-06-13 ENCOUNTER — HOSPITAL ENCOUNTER (OUTPATIENT)
Dept: MRI IMAGING | Age: 87
Discharge: HOME | DRG: 617 | End: 2022-06-13
Payer: MEDICARE

## 2022-06-13 DIAGNOSIS — E11.621 DIABETIC FOOT ULCER WITH OSTEOMYELITIS (HCC): ICD-10-CM

## 2022-06-13 DIAGNOSIS — L97.414 ULCER OF MIDFOOT, RIGHT, WITH NECROSIS OF BONE (HCC): ICD-10-CM

## 2022-06-13 DIAGNOSIS — L97.509 DIABETIC FOOT ULCER WITH OSTEOMYELITIS (HCC): ICD-10-CM

## 2022-06-13 DIAGNOSIS — R60.0 LOCALIZED EDEMA: ICD-10-CM

## 2022-06-13 DIAGNOSIS — E11.69 DIABETIC FOOT ULCER WITH OSTEOMYELITIS (HCC): ICD-10-CM

## 2022-06-13 DIAGNOSIS — M86.9 DIABETIC FOOT ULCER WITH OSTEOMYELITIS (HCC): ICD-10-CM

## 2022-06-13 PROCEDURE — 73718 MRI LOWER EXTREMITY W/O DYE: CPT

## 2022-06-14 ENCOUNTER — HOSPITAL ENCOUNTER (OUTPATIENT)
Dept: WOUND CARE | Age: 87
Discharge: HOME | End: 2022-06-14

## 2022-06-14 DIAGNOSIS — M86.9 DIABETIC FOOT ULCER WITH OSTEOMYELITIS (HCC): ICD-10-CM

## 2022-06-14 DIAGNOSIS — E11.621 DIABETIC FOOT ULCER WITH OSTEOMYELITIS (HCC): ICD-10-CM

## 2022-06-14 DIAGNOSIS — E11.69 DIABETIC FOOT ULCER WITH OSTEOMYELITIS (HCC): ICD-10-CM

## 2022-06-14 DIAGNOSIS — L97.509 DIABETIC FOOT ULCER WITH OSTEOMYELITIS (HCC): ICD-10-CM

## 2022-06-15 PROCEDURE — 87636 SARSCOV2 & INF A&B AMP PRB: CPT

## 2022-06-16 ENCOUNTER — HOSPITAL ENCOUNTER (OUTPATIENT)
Dept: WOUND CARE | Age: 87
Discharge: HOME | DRG: 617 | End: 2022-06-16
Payer: MEDICARE

## 2022-06-16 ENCOUNTER — HOSPITAL ENCOUNTER (INPATIENT)
Age: 87
LOS: 5 days | Discharge: NURSING, REHAB OR LONG-TERM CARE | DRG: 617 | End: 2022-06-21
Attending: INTERNAL MEDICINE | Admitting: INTERNAL MEDICINE
Payer: MEDICARE

## 2022-06-16 VITALS
SYSTOLIC BLOOD PRESSURE: 144 MMHG | RESPIRATION RATE: 20 BRPM | DIASTOLIC BLOOD PRESSURE: 64 MMHG | HEIGHT: 68 IN | BODY MASS INDEX: 33.6 KG/M2 | HEART RATE: 77 BPM | TEMPERATURE: 98 F

## 2022-06-16 DIAGNOSIS — E11.628 DIABETIC FOOT INFECTION (HCC): ICD-10-CM

## 2022-06-16 DIAGNOSIS — M86.9 DIABETIC FOOT ULCER WITH OSTEOMYELITIS (HCC): Primary | ICD-10-CM

## 2022-06-16 DIAGNOSIS — L08.9 DIABETIC FOOT INFECTION (HCC): ICD-10-CM

## 2022-06-16 DIAGNOSIS — E11.69 DIABETIC FOOT ULCER WITH OSTEOMYELITIS (HCC): Primary | ICD-10-CM

## 2022-06-16 DIAGNOSIS — L97.509 DIABETIC FOOT ULCER WITH OSTEOMYELITIS (HCC): Primary | ICD-10-CM

## 2022-06-16 DIAGNOSIS — E11.621 DIABETIC FOOT ULCER WITH OSTEOMYELITIS (HCC): Primary | ICD-10-CM

## 2022-06-16 PROBLEM — H90.3 SENSORINEURAL HEARING LOSS, BILATERAL: Status: ACTIVE | Noted: 2022-06-16

## 2022-06-16 PROBLEM — L97.414: Status: RESOLVED | Noted: 2022-06-07 | Resolved: 2022-06-16

## 2022-06-16 PROBLEM — D50.9 IRON DEFICIENCY ANEMIA: Status: ACTIVE | Noted: 2022-06-16

## 2022-06-16 PROBLEM — D64.9 NORMOCYTIC NORMOCHROMIC ANEMIA: Status: ACTIVE | Noted: 2022-06-16

## 2022-06-16 PROBLEM — Z82.3 FAMILY HISTORY OF CEREBROVASCULAR ACCIDENT (CVA): Status: RESOLVED | Noted: 2021-10-15 | Resolved: 2022-06-16

## 2022-06-16 PROBLEM — R60.0 LOCALIZED EDEMA: Status: RESOLVED | Noted: 2022-06-07 | Resolved: 2022-06-16

## 2022-06-16 PROBLEM — L97.514 DIABETIC ULCER OF RIGHT FOOT ASSOCIATED WITH TYPE 2 DIABETES MELLITUS, WITH NECROSIS OF BONE (HCC): Status: ACTIVE | Noted: 2022-06-07

## 2022-06-16 PROBLEM — K90.9 MALABSORPTION SYNDROME: Status: ACTIVE | Noted: 2022-06-16

## 2022-06-16 PROBLEM — I25.10 CAD (CORONARY ARTERY DISEASE): Status: ACTIVE | Noted: 2022-06-16

## 2022-06-16 PROBLEM — A49.8 PSEUDOMONAS INFECTION: Status: ACTIVE | Noted: 2022-06-16

## 2022-06-16 PROBLEM — M86.171 ACUTE OSTEOMYELITIS OF METATARSAL BONE OF RIGHT FOOT (HCC): Status: ACTIVE | Noted: 2022-06-16

## 2022-06-16 PROBLEM — D50.9 IRON DEFICIENCY ANEMIA: Status: RESOLVED | Noted: 2022-06-16 | Resolved: 2022-06-16

## 2022-06-16 PROBLEM — A49.02 MRSA INFECTION: Status: ACTIVE | Noted: 2022-06-16

## 2022-06-16 LAB
A/G RATIO: 1.1 (ref 1.1–2.2)
ALBUMIN SERPL-MCNC: 3.9 G/DL (ref 3.4–5)
ALP BLD-CCNC: 85 U/L (ref 40–129)
ALT SERPL-CCNC: 10 U/L (ref 10–40)
ANION GAP SERPL CALCULATED.3IONS-SCNC: 12 MMOL/L (ref 3–16)
AST SERPL-CCNC: 11 U/L (ref 15–37)
BASOPHILS ABSOLUTE: 0.1 K/UL (ref 0–0.2)
BASOPHILS RELATIVE PERCENT: 1.3 %
BILIRUB SERPL-MCNC: 0.3 MG/DL (ref 0–1)
BUN BLDV-MCNC: 26 MG/DL (ref 7–20)
C-REACTIVE PROTEIN: 85.4 MG/L (ref 0–5.1)
CALCIUM SERPL-MCNC: 9.2 MG/DL (ref 8.3–10.6)
CHLORIDE BLD-SCNC: 101 MMOL/L (ref 99–110)
CO2: 24 MMOL/L (ref 21–32)
CREAT SERPL-MCNC: 1 MG/DL (ref 0.8–1.3)
EOSINOPHILS ABSOLUTE: 0.3 K/UL (ref 0–0.6)
EOSINOPHILS RELATIVE PERCENT: 3.6 %
GFR AFRICAN AMERICAN: >60
GFR NON-AFRICAN AMERICAN: >60
GLUCOSE BLD-MCNC: 102 MG/DL (ref 70–99)
GLUCOSE BLD-MCNC: 134 MG/DL (ref 70–99)
GLUCOSE BLD-MCNC: 140 MG/DL (ref 70–99)
HCT VFR BLD CALC: 30.4 % (ref 40.5–52.5)
HEMOGLOBIN: 9.9 G/DL (ref 13.5–17.5)
INFLUENZA A: NOT DETECTED
INFLUENZA B: NOT DETECTED
INR BLD: 1.24 (ref 0.87–1.14)
LACTIC ACID: 1 MMOL/L (ref 0.4–2)
LYMPHOCYTES ABSOLUTE: 2.7 K/UL (ref 1–5.1)
LYMPHOCYTES RELATIVE PERCENT: 33.4 %
MCH RBC QN AUTO: 30.4 PG (ref 26–34)
MCHC RBC AUTO-ENTMCNC: 32.4 G/DL (ref 31–36)
MCV RBC AUTO: 93.7 FL (ref 80–100)
MONOCYTES ABSOLUTE: 0.4 K/UL (ref 0–1.3)
MONOCYTES RELATIVE PERCENT: 4.3 %
NEUTROPHILS ABSOLUTE: 4.7 K/UL (ref 1.7–7.7)
NEUTROPHILS RELATIVE PERCENT: 57.4 %
PDW BLD-RTO: 14.4 % (ref 12.4–15.4)
PERFORMED ON: ABNORMAL
PERFORMED ON: ABNORMAL
PLATELET # BLD: 301 K/UL (ref 135–450)
PMV BLD AUTO: 7.4 FL (ref 5–10.5)
POTASSIUM REFLEX MAGNESIUM: 5 MMOL/L (ref 3.5–5.1)
PROTHROMBIN TIME: 15.4 SEC (ref 11.7–14.5)
RBC # BLD: 3.25 M/UL (ref 4.2–5.9)
SARS-COV-2 RNA, RT PCR: NOT DETECTED
SEDIMENTATION RATE, ERYTHROCYTE: 120 MM/HR (ref 0–20)
SODIUM BLD-SCNC: 137 MMOL/L (ref 136–145)
TOTAL PROTEIN: 7.4 G/DL (ref 6.4–8.2)
WBC # BLD: 8.2 K/UL (ref 4–11)

## 2022-06-16 PROCEDURE — 2060000000 HC ICU INTERMEDIATE R&B

## 2022-06-16 PROCEDURE — 83605 ASSAY OF LACTIC ACID: CPT

## 2022-06-16 PROCEDURE — 99222 1ST HOSP IP/OBS MODERATE 55: CPT

## 2022-06-16 PROCEDURE — 87077 CULTURE AEROBIC IDENTIFY: CPT

## 2022-06-16 PROCEDURE — 85652 RBC SED RATE AUTOMATED: CPT

## 2022-06-16 PROCEDURE — 6360000002 HC RX W HCPCS: Performed by: INTERNAL MEDICINE

## 2022-06-16 PROCEDURE — 85610 PROTHROMBIN TIME: CPT

## 2022-06-16 PROCEDURE — 36415 COLL VENOUS BLD VENIPUNCTURE: CPT

## 2022-06-16 PROCEDURE — 99222 1ST HOSP IP/OBS MODERATE 55: CPT | Performed by: INTERNAL MEDICINE

## 2022-06-16 PROCEDURE — 86140 C-REACTIVE PROTEIN: CPT

## 2022-06-16 PROCEDURE — 2580000003 HC RX 258: Performed by: NURSE PRACTITIONER

## 2022-06-16 PROCEDURE — 6370000000 HC RX 637 (ALT 250 FOR IP): Performed by: NURSE PRACTITIONER

## 2022-06-16 PROCEDURE — 87040 BLOOD CULTURE FOR BACTERIA: CPT

## 2022-06-16 PROCEDURE — 87070 CULTURE OTHR SPECIMN AEROBIC: CPT

## 2022-06-16 PROCEDURE — 80053 COMPREHEN METABOLIC PANEL: CPT

## 2022-06-16 PROCEDURE — 85025 COMPLETE CBC W/AUTO DIFF WBC: CPT

## 2022-06-16 PROCEDURE — 99214 OFFICE O/P EST MOD 30 MIN: CPT

## 2022-06-16 PROCEDURE — 87205 SMEAR GRAM STAIN: CPT

## 2022-06-16 PROCEDURE — 2580000003 HC RX 258: Performed by: INTERNAL MEDICINE

## 2022-06-16 RX ORDER — LIDOCAINE 50 MG/G
OINTMENT TOPICAL ONCE
Status: CANCELLED | OUTPATIENT
Start: 2022-06-16 | End: 2022-06-16

## 2022-06-16 RX ORDER — SODIUM CHLORIDE 0.9 % (FLUSH) 0.9 %
5-40 SYRINGE (ML) INJECTION PRN
Status: DISCONTINUED | OUTPATIENT
Start: 2022-06-16 | End: 2022-06-21 | Stop reason: HOSPADM

## 2022-06-16 RX ORDER — SODIUM CHLORIDE 0.9 % (FLUSH) 0.9 %
5-40 SYRINGE (ML) INJECTION EVERY 12 HOURS SCHEDULED
Status: DISCONTINUED | OUTPATIENT
Start: 2022-06-16 | End: 2022-06-21 | Stop reason: HOSPADM

## 2022-06-16 RX ORDER — CLOBETASOL PROPIONATE 0.5 MG/G
OINTMENT TOPICAL ONCE
Status: CANCELLED | OUTPATIENT
Start: 2022-06-16 | End: 2022-06-16

## 2022-06-16 RX ORDER — LIDOCAINE 40 MG/G
CREAM TOPICAL ONCE
Status: CANCELLED | OUTPATIENT
Start: 2022-06-16 | End: 2022-06-16

## 2022-06-16 RX ORDER — DEXTROSE MONOHYDRATE 50 MG/ML
100 INJECTION, SOLUTION INTRAVENOUS PRN
Status: DISCONTINUED | OUTPATIENT
Start: 2022-06-16 | End: 2022-06-21 | Stop reason: HOSPADM

## 2022-06-16 RX ORDER — GLIPIZIDE 5 MG/1
10 TABLET ORAL
Status: DISCONTINUED | OUTPATIENT
Start: 2022-06-16 | End: 2022-06-19

## 2022-06-16 RX ORDER — BETAMETHASONE DIPROPIONATE 0.05 %
OINTMENT (GRAM) TOPICAL ONCE
Status: CANCELLED | OUTPATIENT
Start: 2022-06-16 | End: 2022-06-16

## 2022-06-16 RX ORDER — POLYETHYLENE GLYCOL 3350 17 G/17G
17 POWDER, FOR SOLUTION ORAL DAILY PRN
Status: DISCONTINUED | OUTPATIENT
Start: 2022-06-16 | End: 2022-06-21 | Stop reason: HOSPADM

## 2022-06-16 RX ORDER — INSULIN LISPRO 100 [IU]/ML
0-3 INJECTION, SOLUTION INTRAVENOUS; SUBCUTANEOUS NIGHTLY
Status: DISCONTINUED | OUTPATIENT
Start: 2022-06-16 | End: 2022-06-21 | Stop reason: HOSPADM

## 2022-06-16 RX ORDER — LIDOCAINE 40 MG/G
CREAM TOPICAL ONCE
Status: DISCONTINUED | OUTPATIENT
Start: 2022-06-16 | End: 2022-06-17 | Stop reason: HOSPADM

## 2022-06-16 RX ORDER — GENTAMICIN SULFATE 1 MG/G
OINTMENT TOPICAL ONCE
Status: CANCELLED | OUTPATIENT
Start: 2022-06-16 | End: 2022-06-16

## 2022-06-16 RX ORDER — SODIUM CHLORIDE 9 MG/ML
INJECTION, SOLUTION INTRAVENOUS PRN
Status: DISCONTINUED | OUTPATIENT
Start: 2022-06-16 | End: 2022-06-21 | Stop reason: HOSPADM

## 2022-06-16 RX ORDER — FERROUS SULFATE 325(65) MG
325 TABLET ORAL
Status: DISCONTINUED | OUTPATIENT
Start: 2022-06-17 | End: 2022-06-21 | Stop reason: HOSPADM

## 2022-06-16 RX ORDER — ENOXAPARIN SODIUM 100 MG/ML
40 INJECTION SUBCUTANEOUS EVERY 24 HOURS
Status: DISCONTINUED | OUTPATIENT
Start: 2022-06-18 | End: 2022-06-21 | Stop reason: HOSPADM

## 2022-06-16 RX ORDER — ONDANSETRON 4 MG/1
4 TABLET, ORALLY DISINTEGRATING ORAL EVERY 8 HOURS PRN
Status: DISCONTINUED | OUTPATIENT
Start: 2022-06-16 | End: 2022-06-21 | Stop reason: HOSPADM

## 2022-06-16 RX ORDER — GINSENG 100 MG
CAPSULE ORAL ONCE
Status: CANCELLED | OUTPATIENT
Start: 2022-06-16 | End: 2022-06-16

## 2022-06-16 RX ORDER — LISINOPRIL 5 MG/1
5 TABLET ORAL DAILY
Status: DISCONTINUED | OUTPATIENT
Start: 2022-06-16 | End: 2022-06-21 | Stop reason: HOSPADM

## 2022-06-16 RX ORDER — FAMOTIDINE 20 MG/1
20 TABLET, FILM COATED ORAL DAILY
Status: DISCONTINUED | OUTPATIENT
Start: 2022-06-16 | End: 2022-06-21 | Stop reason: HOSPADM

## 2022-06-16 RX ORDER — ACETAMINOPHEN 325 MG/1
650 TABLET ORAL EVERY 6 HOURS PRN
Status: DISCONTINUED | OUTPATIENT
Start: 2022-06-16 | End: 2022-06-21 | Stop reason: HOSPADM

## 2022-06-16 RX ORDER — LIDOCAINE HYDROCHLORIDE 20 MG/ML
JELLY TOPICAL ONCE
Status: CANCELLED | OUTPATIENT
Start: 2022-06-16 | End: 2022-06-16

## 2022-06-16 RX ORDER — BACITRACIN ZINC AND POLYMYXIN B SULFATE 500; 1000 [USP'U]/G; [USP'U]/G
OINTMENT TOPICAL ONCE
Status: CANCELLED | OUTPATIENT
Start: 2022-06-16 | End: 2022-06-16

## 2022-06-16 RX ORDER — BACITRACIN, NEOMYCIN, POLYMYXIN B 400; 3.5; 5 [USP'U]/G; MG/G; [USP'U]/G
OINTMENT TOPICAL ONCE
Status: CANCELLED | OUTPATIENT
Start: 2022-06-16 | End: 2022-06-16

## 2022-06-16 RX ORDER — ACETAMINOPHEN 650 MG/1
650 SUPPOSITORY RECTAL EVERY 6 HOURS PRN
Status: DISCONTINUED | OUTPATIENT
Start: 2022-06-16 | End: 2022-06-21 | Stop reason: HOSPADM

## 2022-06-16 RX ORDER — ONDANSETRON 2 MG/ML
4 INJECTION INTRAMUSCULAR; INTRAVENOUS EVERY 6 HOURS PRN
Status: DISCONTINUED | OUTPATIENT
Start: 2022-06-16 | End: 2022-06-21 | Stop reason: HOSPADM

## 2022-06-16 RX ORDER — ENOXAPARIN SODIUM 100 MG/ML
40 INJECTION SUBCUTANEOUS EVERY 24 HOURS
Status: DISCONTINUED | OUTPATIENT
Start: 2022-06-16 | End: 2022-06-16

## 2022-06-16 RX ORDER — LIDOCAINE HYDROCHLORIDE 40 MG/ML
SOLUTION TOPICAL ONCE
Status: CANCELLED | OUTPATIENT
Start: 2022-06-16 | End: 2022-06-16

## 2022-06-16 RX ORDER — INSULIN LISPRO 100 [IU]/ML
0-6 INJECTION, SOLUTION INTRAVENOUS; SUBCUTANEOUS
Status: DISCONTINUED | OUTPATIENT
Start: 2022-06-16 | End: 2022-06-21 | Stop reason: HOSPADM

## 2022-06-16 RX ADMIN — METFORMIN HYDROCHLORIDE 1000 MG: 500 TABLET ORAL at 17:51

## 2022-06-16 RX ADMIN — VANCOMYCIN HYDROCHLORIDE 1500 MG: 10 INJECTION, POWDER, LYOPHILIZED, FOR SOLUTION INTRAVENOUS at 19:40

## 2022-06-16 RX ADMIN — SODIUM CHLORIDE: 9 INJECTION, SOLUTION INTRAVENOUS at 17:59

## 2022-06-16 RX ADMIN — GLIPIZIDE 10 MG: 5 TABLET ORAL at 17:51

## 2022-06-16 RX ADMIN — SODIUM CHLORIDE: 9 INJECTION, SOLUTION INTRAVENOUS at 19:39

## 2022-06-16 RX ADMIN — PIPERACILLIN AND TAZOBACTAM 3375 MG: 3; .375 INJECTION, POWDER, LYOPHILIZED, FOR SOLUTION INTRAVENOUS at 18:03

## 2022-06-16 ASSESSMENT — LIFESTYLE VARIABLES: HOW OFTEN DO YOU HAVE A DRINK CONTAINING ALCOHOL: NEVER

## 2022-06-16 ASSESSMENT — PAIN SCALES - GENERAL: PAINLEVEL_OUTOF10: 0

## 2022-06-16 NOTE — CONSULTS
Pharmacy Note  Vancomycin Consult    Steve Schaffer is a 81 y.o. male started on Vancomycin for Osteomyelitis; consult received from Dr. Charly Enciso to manage therapy. Also receiving the following antibiotics: Zosyn. Allergies:  Patient has no known allergies. Recent Labs     06/16/22  1601   CREATININE 1.0       Recent Labs     06/16/22  1601   WBC 8.2       Estimated Creatinine Clearance: 62 mL/min (based on SCr of 1 mg/dL). No intake or output data in the 24 hours ending 06/16/22 1638    Wt Readings from Last 1 Encounters:   06/07/22 221 lb 7 oz (100.4 kg)         There is no height or weight on file to calculate BMI. Culture Date      Source                       Results      Loading dose (critically ill or in ICU, require dialysis or renal replacement therapy): Vancomycin 25 mg/kg IVPB x 1 (maximum 2500 mg). Maintenance dose: 15 mg/kg (maximum: 2000 mg/dose and 4500 mg/day) starting at the next dosing interval determined by renal function  Pulse dose: fluctuating renal function, RHIANNON, ESRD   Goal Vancomycin trough: 10-15 mcg/mL or 15-20 mcg/mL   Goal Vancomycin AUC: 400-600     Assessment/Plan:  Will start Vancomycin with a loading dose of 1500 mg x1, followed by 750 mg IV every 12 hours. Calculated . Vancomycin trough ordered for 6/17 @ 1630. Timing of trough level will be determined based on culture results, renal function, and clinical response. Thank you for the consult.

## 2022-06-16 NOTE — CONSULTS
[facility] Infectious Disease Consult Note      Finn Castro     : 1935    DATE OF VISIT:  2022  DATE OF ADMISSION:  2022       Subjective: Finn Castro is a 81 y.o. male whom I've been asked to see by Dr. Shereen Ladd for osteomyelitis of the right foot. HPI:  History directly from the patient was somewhat limited because of some cognitive decline, but from speaking with him and Dr. Stacy Correa, and reviewing his chart, he has a Hx of DM2, neuropathy, lymphedema, and a right plantar diabetic ulcer that has reportedly been present for a couple of years. It sounds like he recently went to the ER when the foot was more swollen, and there was more malodor and drainage. He was evaluated there, had a wound culture, some basic labs, XRay, and was given antibiotics and discharged. Came here to see Dr. Stacy Correa for the first time a little over a week ago, when he was also having some chills. At that time he had exposed bone, signs of pathologic fracture, and some ongoing moist wound necrosis and malodor. He had a sharp debridement then, repeat culture taken, additional antibiotics given, and he was scheduled for an MRI. He presented back to wound-care today for follow-up, and with the severity of his infection, and the findings of his MRI, is being recommended for admission to the hospital, in advance of planned surgery tomorrow. He tells me that he feels relatively well today. No F/C/D, tolerated Abx well (no sore throat or mouth, N/V/D, rash, pruritus). Foot doesn't have much pain, apart from sometimes with contact (but he's pretty densely neuropathic).     Mr. Thomas Mandel has a past medical history of Abscess of toe of right foot, Acute CVA (cerebrovascular accident) [facility]), CAD (coronary artery disease), Cancer ([facility] [address] Cushing's syndrome ([facility] [address] Diabetes mellitus ([facility] [address] Diabetic ulcer of toe 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 mellitus, with fat layer exposed ([facility] [address] History of blood transfusion, Hyperlipemia, Hypertension, Lymphedema, Osteomyelitis of left foot ([facility] [address] Tinnitus, Ulcer of other part of foot, and 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[facility] [address] He has a past surgical history that includes Arm Debridement; Foot Debridement (2011); Abdomen surgery; Colonoscopy; Dilatation, esophagus; skin biopsy; Foot Debridement (Right, 02/19/2021); and Foot Debridement (Right, 02/23/2021). His family history includes Cancer in his brother; No Known Problems in his brother, sister, and sister; Other in his father and mother. Mr. Joey Miller reports that he has quit smoking. He has never used smokeless tobacco. He reports that he does not drink alcohol and does not use drugs.     Current Facility-Administered Medications: piperacillin-tazobactam (ZOSYN) 3,375 mg in sodium chloride 0.9 % 100 mL IVPB extended infusion (mini-bag), 3,375 mg, IntraVENous, Q8H  famotidine (PEPCID) tablet 20 mg, 20 mg, Oral, Daily  [START ON 6/17/2022] ferrous sulfate (IRON 325) tablet 325 mg, 325 mg, Oral, Daily with breakfast  glipiZIDE (GLUCOTROL) tablet 10 mg, 10 mg, Oral, BID AC  lisinopril (PRINIVIL;ZESTRIL) tablet 5 mg, 5 mg, Oral, Daily  metFORMIN (GLUCOPHAGE) tablet 1,000 mg, 1,000 mg, Oral, BID WC  insulin lispro (HUMALOG) injection vial 0-6 Units, 0-6 Units, SubCUTAneous, TID WC  insulin lispro (HUMALOG) injection vial 0-3 Units, 0-3 Units, SubCUTAneous, Nightly  glucose chewable tablet 16 g, 4 tablet, Oral, PRN  dextrose bolus 10% 125 mL, 125 mL, IntraVENous, PRN **OR** dextrose bolus 10% 250 mL, 250 mL, IntraVENous, PRN  glucagon (rDNA) injection 1 mg, 1 mg, IntraMUSCular, PRN  dextrose 5 % solution, 100 mL/hr, IntraVENous, PRN  sodium chloride flush 0.9 % injection 5-40 mL, 5-40 mL, IntraVENous, 2 times per day  sodium chloride flush 0.9 % injection 5-40 mL, 5-40 mL, IntraVENous, PRN  0.9 % sodium chloride infusion, , IntraVENous, PRN  ondansetron (ZOFRAN-ODT) disintegrating tablet 4 mg, 4 mg, Oral, Q8H PRN **OR** ondansetron (ZOFRAN) injection 4 mg, 4 mg, IntraVENous, Q6H PRN  polyethylene glycol (GLYCOLAX) packet 17 g, 17 g, Oral, Daily PRN  acetaminophen (TYLENOL) tablet 650 mg, 650 mg, Oral, Q6H PRN **OR** acetaminophen (TYLENOL) suppository 650 mg, 650 mg, Rectal, Q6H PRN  enoxaparin (LOVENOX) injection 40 mg, 40 mg, SubCUTAneous, Q24H  Facility-Administered Medications Ordered in Other Encounters: lidocaine (LMX) 4 % cream, , Topical, Once     In terms of recent Abx --    -- Augmentin from May 27 to , after the ER visit. -- Doxycycline from  to present, after his initial visit here. Allergies: Patient has no known allergies. Pertinent items from the review of systems are discussed in the HPI; the remainder of the ROS was reviewed and is negative. Objective:     Vital signs over the last 24 hours:  Temp  Av.8 °F (36.6 °C)  Min: 97.6 °F (36.4 °C)  Max: 98 °F (36.7 °C)  Pulse  Av  Min: 77  Max: 89  Systolic (14QDY), HZO:989 , Min:144 , WAP:124   Diastolic (69JMH), LZE:31, Min:64, Max:73  Resp  Av  Min: 18  Max: 20  SpO2  Av %  Min: 97 %  Max: 97 %     Recent right GERMAN 3.96 (arm systolics 799 & 832, right ankle 120 & 130).     Constitutional:  well-developed, well-nourished, NAD, conversant  Psychiatric:  oriented to person, place and some details of time, but with some obvious features of cognitive decline / poor memory; mood and affect appropriate for the situation   Eyes:  pupils equal, round and reactive to light; sclerae anicteric, conjunctivae not pale  ENT:  atraumatic; oral mucosa moist, no thrush or ulcers  Resp:  lungs clear to auscultation BL; no use of accessory muscles or other signs of resp distress  Cardiovascular:  heart regular, no gallop, no murmur; moderate right lower extremity lymphedema, milder on the left  GI:  abdomen soft, NT, ND, normal bowel sounds, no palpable masses or organomegaly  Neuro: no allodynia at the wound; loss of pedal protective sensation at 8/9 right and 9/10 left foot sites tested with a 5.07 monofilament. Musculoskeletal:  no clubbing, cyanosis or petechiae; right foot with significant swelling around the medial forefoot and hallux, and with clear hypermobility on ROM around the 1st MPJ, with palpable and audible crepitus   Skin: warm, dry, normal turgor; no rash; ulcer at the plantar aspect rather small, some healthy red tissue, but it probes in deeply toward the MPJ itself, has a bit of undermining, friable tissues, no strong malodor today  ______________________________    Recent Labs     06/16/22  1601 06/01/22  1125 05/26/22  0813   WBC 8.2 11.2* 11.1*   HGB 9.9* 9.5* 10.2*   HCT 30.4* 28.6* 30.9*   MCV 93.7 95.7 96.9    336 240     Lab Results   Component Value Date    CREATININE 1.1 06/01/2022     Lab Results   Component Value Date    LABALBU 3.3 (L) 06/01/2022     Lab Results   Component Value Date    ALT 14 06/01/2022    AST 20 06/01/2022    ALKPHOS 88 06/01/2022    BILITOT <0.2 06/01/2022      Lab Results   Component Value Date    LABA1C 7.2 04/15/2022       Recent pertinent micro results:  May 26 ER WCx -- MSSA and mixed skin mary, plus GNR on Gram stain, presumably anaerobes. June 7 [address] [place] [address],[address] [place] [address] WCx -- MRSA, MRCONS, and a pan-(S) Pseudomonas.   ______________________________    Recent imaging results (last 7 days): MRI FOOT RIGHT WO CONTRAST    Result Date: 6/14/2022  1. Plantar ulcer at the 1st MTP joint with surrounding cellulitis. 2. Extensive osteomyelitis in the proximal phalanx 1st digit and throughout the 1st metatarsal.  Superimposed pathologic fracture has developed at the 1st metatarsal neck. 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 3. Small amount of fluid in the 1st interspace extending into the dorsal soft tissues with a couple punctate foci of gas. Findings likely relate to the plantar ulcer and extension of the infection into the 1st interspace.    ___________________    May 26 XR had shown: Prior amputation of the 4th toe.  Possible small area of erosion along the distal aspect of the 4th metatarsal head best seen on the frontal projection. [place] [sic] arthropathy which is similar to prior.  Plantar calcaneal spur. Diffuse edema of the lower leg, ankle, and foot which appears worsened.  The site of soft tissue ulceration is not definitive radiographically. May 26 RLE venous Doppler -- There is no evidence of deep or superficial venous thrombosis seen involving the right lower extremity. There is no evidence of deep vein thrombosis seen involving the left common femoral vein. 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 No strong signs of PAD. -- Longstanding right plantar forefoot ulcer, recently with symptoms and signs of acute infection, and then with MRI evidence of extensive osteo and septic arthritis in the MPJ; I think we need to assume that the MSSA, MRSA, Pseudo and anaerobes could all be possible pathogens.      -- Not systemically ill from this now, but a limb-threatening infection, and in need of urgent IV Abx and surgical management. Treatment recs:     Agree with hospital admission. Local wound care per Dr. Rosa Elena Wilkerson. Vancomycin (doing and levels per pharmacy) and Zosyn for now. I understand the plan is for partial or complete first ray resection tomorrow, and then hopefully delayed primary closure early next week. If he removes the entire 1st metatarsal, then osteomyelitis will effectively be cured in the OR, and he might only need a short course of oral ABx when discharged. If he leaves the base of the 1st met, however (for structural reasons), then I'll get a baseline cRP and ESR in the coming days, and we would need to plan a course of weeks of post-op IV Abx. No plans for PICC just yet - let's see how the OR goes first. If he gets some significant compression therapy after that first surgery, that will probably make things more amenable to successful closure a few days later also. D/W Dr. Rosa Elena Wilkerson; I'll see him upstairs again tomorrow.     Electronically signed by Allegra Robin MD on 6/16/2022 at 4:26 PM.

## 2022-06-16 NOTE — PROGRESS NOTES
Consult has been called to Dr. Jose Steele on 6/16/22. Spoke with dr Jose Steele  via perfect serve.  4:07 PM    Court Mark  6/16/2022
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
## 2022-06-16 NOTE — PLAN OF CARE
Pt reports that he has not been feeling well this week. MRI and Culture results for right foot reviewed. Surgery discussed. Pt states that he wishes to be hospitalized. Son present at visit. Direct admit to inpatient med-surg floor ordered per Dr. Ej Sauceda with plan for surgery tomorrow 6/17/22. Follow up in [address] [place] [address] in 1 week as ordered. Pt. Aware to call sooner with any problems or questions/concerns. MD orders/D/C instructions reviewed with patient, all questions answered; copy of instructions given to patient.

## 2022-06-17 ENCOUNTER — ANESTHESIA (OUTPATIENT)
Dept: OPERATING ROOM | Age: 87
DRG: 617 | End: 2022-06-17
Payer: MEDICARE

## 2022-06-17 ENCOUNTER — ANESTHESIA EVENT (OUTPATIENT)
Dept: OPERATING ROOM | Age: 87
DRG: 617 | End: 2022-06-17
Payer: MEDICARE

## 2022-06-17 ENCOUNTER — APPOINTMENT (OUTPATIENT)
Dept: GENERAL RADIOLOGY | Age: 87
DRG: 617 | End: 2022-06-17
Attending: INTERNAL MEDICINE
Payer: MEDICARE

## 2022-06-17 LAB
A/G RATIO: 1.1 (ref 1.1–2.2)
ALBUMIN SERPL-MCNC: 3.5 G/DL (ref 3.4–5)
ALP BLD-CCNC: 75 U/L (ref 40–129)
ALT SERPL-CCNC: 9 U/L (ref 10–40)
ANION GAP SERPL CALCULATED.3IONS-SCNC: 11 MMOL/L (ref 3–16)
AST SERPL-CCNC: 10 U/L (ref 15–37)
ATYPICAL LYMPHOCYTE RELATIVE PERCENT: 1 % (ref 0–6)
BASOPHILS ABSOLUTE: 0.2 K/UL (ref 0–0.2)
BASOPHILS RELATIVE PERCENT: 3 %
BILIRUB SERPL-MCNC: 0.3 MG/DL (ref 0–1)
BUN BLDV-MCNC: 18 MG/DL (ref 7–20)
CALCIUM SERPL-MCNC: 9 MG/DL (ref 8.3–10.6)
CHLORIDE BLD-SCNC: 104 MMOL/L (ref 99–110)
CO2: 25 MMOL/L (ref 21–32)
CREAT SERPL-MCNC: 0.9 MG/DL (ref 0.8–1.3)
EOSINOPHILS ABSOLUTE: 0.3 K/UL (ref 0–0.6)
EOSINOPHILS RELATIVE PERCENT: 4 %
GFR AFRICAN AMERICAN: >60
GFR NON-AFRICAN AMERICAN: >60
GLUCOSE BLD-MCNC: 105 MG/DL (ref 70–99)
GLUCOSE BLD-MCNC: 120 MG/DL (ref 70–99)
GLUCOSE BLD-MCNC: 71 MG/DL (ref 70–99)
GLUCOSE BLD-MCNC: 75 MG/DL (ref 70–99)
GLUCOSE BLD-MCNC: 75 MG/DL (ref 70–99)
GLUCOSE BLD-MCNC: 85 MG/DL (ref 70–99)
GLUCOSE BLD-MCNC: 89 MG/DL (ref 70–99)
GLUCOSE BLD-MCNC: 96 MG/DL (ref 70–99)
HCT VFR BLD CALC: 28.5 % (ref 40.5–52.5)
HEMOGLOBIN: 9.3 G/DL (ref 13.5–17.5)
LYMPHOCYTES ABSOLUTE: 2.7 K/UL (ref 1–5.1)
LYMPHOCYTES RELATIVE PERCENT: 34 %
MCH RBC QN AUTO: 30.3 PG (ref 26–34)
MCHC RBC AUTO-ENTMCNC: 32.6 G/DL (ref 31–36)
MCV RBC AUTO: 93 FL (ref 80–100)
MONOCYTES ABSOLUTE: 0.3 K/UL (ref 0–1.3)
MONOCYTES RELATIVE PERCENT: 4 %
NEUTROPHILS ABSOLUTE: 4.2 K/UL (ref 1.7–7.7)
NEUTROPHILS RELATIVE PERCENT: 54 %
PDW BLD-RTO: 14.3 % (ref 12.4–15.4)
PERFORMED ON: ABNORMAL
PERFORMED ON: ABNORMAL
PERFORMED ON: NORMAL
PLATELET # BLD: 287 K/UL (ref 135–450)
PLATELET SLIDE REVIEW: ADEQUATE
PMV BLD AUTO: 7.3 FL (ref 5–10.5)
POTASSIUM REFLEX MAGNESIUM: 4.6 MMOL/L (ref 3.5–5.1)
RBC # BLD: 3.07 M/UL (ref 4.2–5.9)
SLIDE REVIEW: ABNORMAL
SODIUM BLD-SCNC: 140 MMOL/L (ref 136–145)
TOTAL PROTEIN: 6.6 G/DL (ref 6.4–8.2)
VANCOMYCIN TROUGH: 12 UG/ML (ref 10–20)
WBC # BLD: 7.8 K/UL (ref 4–11)

## 2022-06-17 PROCEDURE — 88311 DECALCIFY TISSUE: CPT

## 2022-06-17 PROCEDURE — 6370000000 HC RX 637 (ALT 250 FOR IP): Performed by: NURSE PRACTITIONER

## 2022-06-17 PROCEDURE — 3700000001 HC ADD 15 MINUTES (ANESTHESIA): Performed by: PODIATRIST

## 2022-06-17 PROCEDURE — 87186 SC STD MICRODIL/AGAR DIL: CPT

## 2022-06-17 PROCEDURE — 85025 COMPLETE CBC W/AUTO DIFF WBC: CPT

## 2022-06-17 PROCEDURE — 80053 COMPREHEN METABOLIC PANEL: CPT

## 2022-06-17 PROCEDURE — 87070 CULTURE OTHR SPECIMN AEROBIC: CPT

## 2022-06-17 PROCEDURE — 87116 MYCOBACTERIA CULTURE: CPT

## 2022-06-17 PROCEDURE — 99232 SBSQ HOSP IP/OBS MODERATE 35: CPT | Performed by: INTERNAL MEDICINE

## 2022-06-17 PROCEDURE — 2580000003 HC RX 258: Performed by: NURSE ANESTHETIST, CERTIFIED REGISTERED

## 2022-06-17 PROCEDURE — 87075 CULTR BACTERIA EXCEPT BLOOD: CPT

## 2022-06-17 PROCEDURE — 36415 COLL VENOUS BLD VENIPUNCTURE: CPT

## 2022-06-17 PROCEDURE — 2500000003 HC RX 250 WO HCPCS: Performed by: PODIATRIST

## 2022-06-17 PROCEDURE — 80202 ASSAY OF VANCOMYCIN: CPT

## 2022-06-17 PROCEDURE — 7100000001 HC PACU RECOVERY - ADDTL 15 MIN: Performed by: PODIATRIST

## 2022-06-17 PROCEDURE — 87077 CULTURE AEROBIC IDENTIFY: CPT

## 2022-06-17 PROCEDURE — 1200000000 HC SEMI PRIVATE

## 2022-06-17 PROCEDURE — 87206 SMEAR FLUORESCENT/ACID STAI: CPT

## 2022-06-17 PROCEDURE — 6360000002 HC RX W HCPCS: Performed by: NURSE ANESTHETIST, CERTIFIED REGISTERED

## 2022-06-17 PROCEDURE — 0Y6P0Z0 DETACHMENT AT RIGHT 1ST TOE, COMPLETE, OPEN APPROACH: ICD-10-PCS | Performed by: PODIATRIST

## 2022-06-17 PROCEDURE — 3600000002 HC SURGERY LEVEL 2 BASE: Performed by: PODIATRIST

## 2022-06-17 PROCEDURE — 7100000000 HC PACU RECOVERY - FIRST 15 MIN: Performed by: PODIATRIST

## 2022-06-17 PROCEDURE — 3700000000 HC ANESTHESIA ATTENDED CARE: Performed by: PODIATRIST

## 2022-06-17 PROCEDURE — 2580000003 HC RX 258: Performed by: INTERNAL MEDICINE

## 2022-06-17 PROCEDURE — 6360000002 HC RX W HCPCS: Performed by: INTERNAL MEDICINE

## 2022-06-17 PROCEDURE — 2580000003 HC RX 258: Performed by: NURSE PRACTITIONER

## 2022-06-17 PROCEDURE — 3600000012 HC SURGERY LEVEL 2 ADDTL 15MIN: Performed by: PODIATRIST

## 2022-06-17 PROCEDURE — 87015 SPECIMEN INFECT AGNT CONCNTJ: CPT

## 2022-06-17 PROCEDURE — 88305 TISSUE EXAM BY PATHOLOGIST: CPT

## 2022-06-17 PROCEDURE — 73630 X-RAY EXAM OF FOOT: CPT

## 2022-06-17 PROCEDURE — 87181 SC STD AGAR DILUTION PER AGT: CPT

## 2022-06-17 PROCEDURE — 87102 FUNGUS ISOLATION CULTURE: CPT

## 2022-06-17 PROCEDURE — 2709999900 HC NON-CHARGEABLE SUPPLY: Performed by: PODIATRIST

## 2022-06-17 PROCEDURE — 87205 SMEAR GRAM STAIN: CPT

## 2022-06-17 RX ORDER — SODIUM CHLORIDE, SODIUM LACTATE, POTASSIUM CHLORIDE, CALCIUM CHLORIDE 600; 310; 30; 20 MG/100ML; MG/100ML; MG/100ML; MG/100ML
INJECTION, SOLUTION INTRAVENOUS CONTINUOUS PRN
Status: DISCONTINUED | OUTPATIENT
Start: 2022-06-17 | End: 2022-06-17 | Stop reason: SDUPTHER

## 2022-06-17 RX ORDER — PROPOFOL 10 MG/ML
INJECTION, EMULSION INTRAVENOUS PRN
Status: DISCONTINUED | OUTPATIENT
Start: 2022-06-17 | End: 2022-06-17 | Stop reason: SDUPTHER

## 2022-06-17 RX ADMIN — LISINOPRIL 5 MG: 5 TABLET ORAL at 17:44

## 2022-06-17 RX ADMIN — PIPERACILLIN AND TAZOBACTAM 3375 MG: 3; .375 INJECTION, POWDER, LYOPHILIZED, FOR SOLUTION INTRAVENOUS at 17:39

## 2022-06-17 RX ADMIN — SODIUM CHLORIDE: 9 INJECTION, SOLUTION INTRAVENOUS at 18:02

## 2022-06-17 RX ADMIN — VANCOMYCIN HYDROCHLORIDE 750 MG: 750 INJECTION, POWDER, LYOPHILIZED, FOR SOLUTION INTRAVENOUS at 06:59

## 2022-06-17 RX ADMIN — PIPERACILLIN AND TAZOBACTAM 3375 MG: 3; .375 INJECTION, POWDER, LYOPHILIZED, FOR SOLUTION INTRAVENOUS at 01:50

## 2022-06-17 RX ADMIN — SODIUM CHLORIDE, POTASSIUM CHLORIDE, SODIUM LACTATE AND CALCIUM CHLORIDE: 600; 310; 30; 20 INJECTION, SOLUTION INTRAVENOUS at 14:33

## 2022-06-17 RX ADMIN — PROPOFOL 50 MG: 10 INJECTION, EMULSION INTRAVENOUS at 14:47

## 2022-06-17 RX ADMIN — PROPOFOL 80 MG: 10 INJECTION, EMULSION INTRAVENOUS at 14:35

## 2022-06-17 RX ADMIN — VANCOMYCIN HYDROCHLORIDE 750 MG: 750 INJECTION, POWDER, LYOPHILIZED, FOR SOLUTION INTRAVENOUS at 18:02

## 2022-06-17 RX ADMIN — PIPERACILLIN AND TAZOBACTAM 3375 MG: 3; .375 INJECTION, POWDER, LYOPHILIZED, FOR SOLUTION INTRAVENOUS at 09:24

## 2022-06-17 RX ADMIN — PROPOFOL 20 MG: 10 INJECTION, EMULSION INTRAVENOUS at 14:39

## 2022-06-17 RX ADMIN — PROPOFOL 50 MG: 10 INJECTION, EMULSION INTRAVENOUS at 14:57

## 2022-06-17 RX ADMIN — GLIPIZIDE 10 MG: 5 TABLET ORAL at 17:48

## 2022-06-17 RX ADMIN — SODIUM CHLORIDE, PRESERVATIVE FREE 10 ML: 5 INJECTION INTRAVENOUS at 01:30

## 2022-06-17 RX ADMIN — METFORMIN HYDROCHLORIDE 1000 MG: 500 TABLET ORAL at 17:44

## 2022-06-17 RX ADMIN — SODIUM CHLORIDE: 9 INJECTION, SOLUTION INTRAVENOUS at 17:38

## 2022-06-17 ASSESSMENT — LIFESTYLE VARIABLES: SMOKING_STATUS: 0

## 2022-06-17 NOTE — BRIEF OP NOTE
Brief Postoperative Note      Patient: Gabriel Martines  YOB: 1935  MRN: [id number]    Date of Procedure: 6/17/2022    Pre-Op Diagnosis: Diabetic foot infection ([facility] [E11.628, L08.9]    Post-Op Diagnosis: Same       Procedure(s):  INCISION AND DRAINAGE RIGHT FOOT WITH FIRST RAY AMPUTATION    Surgeon(s):  Floresita Servin DPM    Assistant:  Surgical Assistant: Jonas Ramírez    Anesthesia: Caudal    Estimated Blood Loss (mL): less than 757     Complications: None    Specimens:   ID Type Source Tests Collected by Time Destination   1 :  Specimen Foot CULTURE, FUNGUS, CULTURE, SURGICAL, CULTURE WITH SMEAR, ACID FAST BACILLIUS Floresita Servin DPM 6/17/2022 1450    A :  Specimen Foot SURGICAL PATHOLOGY Floresita Servin DPM 6/17/2022 1454        Implants:  * No implants in log *      Drains: * No LDAs found *    Findings: Entire first metatarsal soft and brittle with pathologic fracture    Electronically signed by Floresita Servin DPM on 6/17/2022 at 3:15 PM
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
## 2022-06-17 NOTE — FLOWSHEET NOTE
06/17/22 1641   Vital Signs   Temp 97.8 °F (36.6 °C)   Temp Source Oral   Heart Rate 72   Heart Rate Source Monitor   Resp 20   BP (!) 172/72   BP Location Left upper arm   BP Method Automatic   MAP (Calculated) 105.33   Patient Position Semi fowlers   Level of Consciousness Alert (0)   MEWS Score 1   Oxygen Therapy   SpO2 96 %   O2 Device None (Room air)     Patient returned from procedure. Daily lisinopril given for HTN.

## 2022-06-17 NOTE — OP NOTE
blunt dissection the incision was deepened through the subcutaneous layer with care being taken to identify and retract all vital neurovascular structures. All venous tributaries were electrocoagulated as encountered. Sharp and blunt dissection was continued in to the infected tissue. At this point all nonviable soft tissue, hallux and first metatarsal were removed using sharp and blunt dissection including sharp transection at the first metatarsocuneiform joint. Significant chronic granulation tissue noted surrounding the first MTPJ. Site pulse lavaged with 3000 cc of normal saline. Any remaining bleeders were electrocoagulated fibrillar placed. The site was then packed open with iodoform packing and dressed with 4 x 4's, Kerlix, ABD and Ace bandage    The patient tolerated the procedure and anesthesia well and was transported from the operating room to the PACU with vital signs stable and vascular status intact to all remaining digits  of the patient's right lower extremity. Following a short period of post-operative monitoring, the patient will be readmitted to the hospital.  Will need further debridement and delayed primary closure before discharge.   Electronically signed by Lm Yang DPM on 6/17/2022 at 3:17 PM

## 2022-06-17 NOTE — CARE COORDINATION
Case Management Assessment  Initial Evaluation      Patient Name: Mariah Franklin  YOB: 1935  Diagnosis: Acute osteomyelitis of metatarsal bone of right foot [facility]) Madi Munoz  Date / Time: 6/16/2022  3:45 PM    Admission status/Date: IP 06/16/2022  Chart Reviewed: Yes      Patient Alphonso Dennis: Yes, very [place] (no hearing aides- wife to bring batteries)  Family Interviewed:  YES    Hospitalization in the last 30 days:  No      Health Care Decision Maker :   Primary Decision Maker: [place] - [phone]    Secondary Decision Maker: Rema Hollins - Child - [phone]    Secondary Decision Maker: Enzo Rai - Child - [phone]    Supplemental (Other) Decision Maker: [place], [address] [facility] - [phone]    Supplemental (Other) Decision Maker: Brice Del Rosario - Child - [phone]    (CM - must 1st enter selection under Navigator - emergency contact[facility] and pick relationship)   Who do you trust or have selected to make healthcare decisions for you    Current PCP:  Robin Estrada required for SNF : YES         3 night stay required - NA    ADLS  Support Systems/Care Needs: Spouse/Significant Other,Children  Transportation: family    Meal Preparation: self/family    Housing  Living Arrangements: Lives in single story house w/sp  Steps: ramp  Intent for return to present living arrangements: Yes  Identified Issues: Family assists PRN    [address] with [address] [place] [address] Way : No Agency:(Services)     Passport/Waiver : No  :                      Phone Number:    Passport/Waiver Services: NA          Durable Medical Equiptment   DME Provider: KAREN  Equipment:   Walker_X__Cane___RTS___ BSC___Shower Chair___Hospital Bed___W/C____Other_heaing aides_______  02 at ____Liter(s)---wears(frequency)_______ HHN ___ CPAP___ BiPap___   N/A____    Home O2 Use :  No      Community Service Affiliation  Dialysis:  No    · Agency:  · Location:  · Dialysis Schedule:  · Phone:   · Fax: Other Community Services:     DISCHARGE PLAN: Explained Case Management role/services. Chart reviewed. Met with pt at bedside and explained the role of the CM. He is very hard of hearing and requests that CM call his wife. CM called spouse to obtain the above information. She will bring hearing aide batteries in today. Pt will have surgery later today. Wife states that pt was having weekly dressing changes with a home health agency but was discharged and family then assisted with WC. +CM will follow and reassess for [facility] needs.

## 2022-06-17 NOTE — PROGRESS NOTES
[facility] Infectious Disease Progress Note      Itzel Burleson     : 1935    DATE OF VISIT:  2022  DATE OF ADMISSION:  2022       Subjective: Itzel Burleson is a 81 y.o. male whom I've been seeing for right foot diabetic osteomyelitis and associated soft tissue infection. Since I last saw him, he's just come back from the OR this afternoon, with an open 1st ray resection of the right foot; it sounds like the entirety of the 1st met was removed, as it was so diffusely unhealthy, with a pathologic fracture. He's feeling well overall, looks stronger than yesterday. No F/C/D, no RLE pain, no sore throat or mouth, no N/V/D, no rash or pruritus or IV pain. Mr. Jessica Anderson has a past medical history of Abscess of toe of right foot, Acute CVA (cerebrovascular accident) ([address] CAD (coronary artery disease), Cancer ([address] Cushing's syndrome ([address] Diabetes mellitus ([address] Diabetic ulcer of toe 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 mellitus, with fat layer exposed ([address] History of blood transfusion, Hyperlipemia, Hypertension, Lymphedema, Osteomyelitis of left foot ([address] Tinnitus, Ulcer of other part of foot, and 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[address]     Current Facility-Administered Medications: piperacillin-tazobactam (ZOSYN) 3,375 mg in sodium chloride 0.9 % 100 mL IVPB extended infusion (mini-bag), 3,375 mg, IntraVENous, Q8H  famotidine (PEPCID) tablet 20 mg, 20 mg, Oral, Daily  ferrous sulfate (IRON 325) tablet 325 mg, 325 mg, Oral, Daily with breakfast  glipiZIDE (GLUCOTROL) tablet 10 mg, 10 mg, Oral, BID AC  lisinopril (PRINIVIL;ZESTRIL) tablet 5 mg, 5 mg, Oral, Daily  metFORMIN (GLUCOPHAGE) tablet 1,000 mg, 1,000 mg, Oral, BID WC  insulin lispro (HUMALOG) injection vial 0-6 Units, 0-6 Units, SubCUTAneous, TID WC  insulin lispro (HUMALOG) injection vial 0-3 Units, 0-3 Units, SubCUTAneous, Nightly  glucose chewable tablet 16 g, 4 tablet, Oral, PRN  dextrose bolus 10% 125 mL, 125 mL, IntraVENous, PRN **OR** dextrose bolus 10% 250 mL, 250 mL, IntraVENous, PRN  glucagon (rDNA) injection 1 mg, 1 mg, IntraMUSCular, PRN  dextrose 5 % solution, 100 mL/hr, IntraVENous, PRN  sodium chloride flush 0.9 % injection 5-40 mL, 5-40 mL, IntraVENous, 2 times per day  sodium chloride flush 0.9 % injection 5-40 mL, 5-40 mL, IntraVENous, PRN  0.9 % sodium chloride infusion, , IntraVENous, PRN  ondansetron (ZOFRAN-ODT) disintegrating tablet 4 mg, 4 mg, Oral, Q8H PRN **OR** ondansetron (ZOFRAN) injection 4 mg, 4 mg, IntraVENous, Q6H PRN  polyethylene glycol (GLYCOLAX) packet 17 g, 17 g, Oral, Daily PRN  acetaminophen (TYLENOL) tablet 650 mg, 650 mg, Oral, Q6H PRN **OR** acetaminophen (TYLENOL) suppository 650 mg, 650 mg, Rectal, Q6H PRN  vancomycin (VANCOCIN) 750 mg in dextrose 5 % 250 mL IVPB, 750 mg, IntraVENous, Q12H  [START ON 2022] enoxaparin (LOVENOX) injection 40 mg, 40 mg, SubCUTAneous, Q24H     This is day 2 of vanco-Zosyn, POD 1, after recent Augmentin and then doxy. Allergies: Patient has no known allergies. Pertinent items from the review of systems are discussed in the HPI; the remainder of the ROS was reviewed and is negative.      Objective:     Vital signs over the last 24 hours:  Temp  Av.4 °F (36.3 °C)  Min: 97 °F (36.1 °C)  Max: 97.8 °F (36.6 °C)  Pulse  Av.4  Min: 65  Max: 87  Systolic (93HVD), VP , Min:119 , THL:084   Diastolic (70KCK), CRW:49, Min:57, Max:81  Resp  Av.1  Min: 16  Max: 26  SpO2  Av.3 %  Min: 93 %  Max: 99 %    Constitutional:  well-developed, well-nourished, NAD, conversant  Psychiatric:  oriented to person, place and some details of time; mood and affect appropriate for the situation   Eyes:  pupils equal, round and reactive to light; sclerae anicteric, conjunctivae not pale  ENT:  atraumatic; oral mucosa moist, no thrush or ulcers  Cardiovascular:  heart regular, no gallop, no murmur; moderate right lower extremity lymphedema, milder on the left; the two exposed right lesser toes are warm, good cap refill  GI:  abdomen soft, NT, ND, normal bowel sounds, no palpable masses or organomegaly  Musculoskeletal:  no clubbing, cyanosis or petechiae   Skin: warm, dry, normal turgor; no rash; right foot with OR dressing in place, and lower leg wrapped for some compression, up to the knee. ______________________________    Recent Labs     06/17/22  0621 06/16/22  1601 06/01/22  1125   WBC 7.8 8.2 11.2*   HGB 9.3* 9.9* 9.5*   HCT 28.5* 30.4* 28.6*   MCV 93.0 93.7 95.7    301 336     Lab Results   Component Value Date    CREATININE 0.9 06/17/2022     Lab Results   Component Value Date    LABALBU 3.5 06/17/2022     Lab Results   Component Value Date    ALT 9 (L) 06/17/2022    AST 10 (L) 06/17/2022    ALKPHOS 75 06/17/2022    BILITOT 0.3 06/17/2022      Lab Results   Component Value Date    LABA1C 7.2 04/15/2022     Other recent pertinent labs: Anion gap 11. Glucoses mostly around 100. WBC diff normal.        cRP 85.4, ESR > 120.   ______________________________    Recent pertinent micro results:  May 26 ER WCx -- MSSA and mixed skin mary, plus GNR on Gram stain, presumably anaerobes. June 7 [facility] WCx -- MRSA, MRCONS, and a pan-(S) Pseudomonas. June 16 BCx negative so far. June 16 new WCx collected; GNR on Gram stain, no growth to date (on doxy). ______________________________    Recent imaging results (last 7 days): MRI FOOT RIGHT WO CONTRAST    Result Date: 6/17/2022  1. Plantar ulcer at the 1st MTP joint with surrounding cellulitis. 2. Extensive osteomyelitis in the proximal phalanx 1st digit and throughout the 1st metatarsal.  Superimposed pathologic fracture has developed at the 1st metatarsal neck. 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  3. Small amount of fluid in the 1st interspace extending into the dorsal soft tissues with a couple punctate foci of gas. Findings likely relate to the plantar ulcer and extension of the infection into the 1st interspace. 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 No strong signs of PAD.     --          Longstanding right plantar forefoot ulcer, recently with symptoms and signs of acute infection, and then with MRI evidence of extensive osteo and septic arthritis in the MPJ; I think we need to assume that the MSSA, MRSA, Pseudo and anaerobes could all be possible pathogens.      --          Not systemically ill from this now, but a limb-threatening infection, and directly admitted yesterday for urgent IV Abx and surgical management. Now POD 0 from open first ray amputation, no immediate periop complications noted. Treatment recs:     Continue IV vancomycin and Zosyn for now. Watch for thrush, Cdiff, allergy, IV phlebitis, etc.     Keep leg elevated; compression bandages for his edema noted. Weightbearing restrictions per Dr. Martinez Beckwith.     Await repeat surgery Monday - Tuesday (debridement and delayed primary closure, I believe?). With the entire first ray resected today, I think after that second surgery we'll be able to simplify things to a relatively short course of post-op oral antibiotics for any residual soft tissue infection (a week of doxy and Cipro, for example). D/W his wife and daughter at bedside. ----------------------------    I was not necessarily going to be in the hospital over the weekend to see Mr. Estefany Monique. Will keep an eye on Epic from home.  Please call or text if there are any urgent concerns over the weekend with his clinical course, test results, etc.    Electronically signed by Chente Carrasco MD on 6/17/2022 at 4:58 PM.

## 2022-06-17 NOTE — ACP (ADVANCE CARE PLANNING)
Advance Care Planning     General Advance Care Planning (ACP) Conversation    Date of Conversation: 6/16/2022  Conducted with: Patient with Decision Making Capacity    Healthcare Decision Maker:    Primary Decision Maker: Immanuel Wang Spouse - [phone]    Secondary Decision Maker: Hoang Jairo - [phone]    Secondary Decision Maker: Sarahanatoliy Rolle - [phone]    Supplemental (Other) Decision Maker: Rusty Boswell - [phone]    Supplemental (Other) Decision Maker: Joon Rosenberg - [facility] - [phone]  Click here to complete Healthcare Decision Makers including selection of the Healthcare Decision Maker Relationship (ie \"Primary\"). Today we documented Decision Maker(s) consistent with Legal Next of Kin hierarchy.     Content/Action Overview:    Reviewed DNR/DNI and patient elects Full Code (Attempt Resuscitation)      Length of Voluntary ACP Conversation in minutes:  12 minutes    Linda Lake RN

## 2022-06-17 NOTE — PROGRESS NOTES
Progress Note    Admit Date:  6/16/2022    81 y.o. male with pmhx of CVA, DM, chronic ulcers, HTN, HLD, osteomyelitis  who presented to [facility] for direct admission from wound clinic. Subjective:  Mr. Maddie Mccormack denies complaints    Objective:   Patient Vitals for the past 4 hrs:   BP Temp Temp src Pulse Resp SpO2   06/17/22 0719 (!) 155/72 97 °F (36.1 °C) Oral 72 18 96 %            Intake/Output Summary (Last 24 hours) at 6/17/2022 0955  Last data filed at 6/17/2022 0356  Gross per 24 hour   Intake --   Output 1275 ml   Net -1275 ml       Physical Exam:    Gen: No distress. Alert. Pleasant elderly male   Eyes: PERRL. No sclera icterus. No conjunctival injection. Neck: No JVD. No Carotid Bruit. Trachea midline. Resp: No accessory muscle use. No crackles. No wheezes. No rhonchi. CV: Regular rate. Regular rhythm. No murmur. No rub. No edema. Peripheral Pulses: +2 palpable left foot   GI: Non-tender. Non-distended. No masses. No organomegaly. Normal bowel sounds. No hernia. Skin: Warm and dry. No nodule on exposed extremities. No rash on exposed extremities. + right lower extremity is wrapped with ace bandage and wound on the plantar surface is wrapped with gauze, did not examine, left lower extremity with edema and decreased sensation, pulses intact   M/S: No cyanosis. No joint deformity. No clubbing. Neuro: Awake. Grossly nonfocal    Psych: Oriented x 3. No anxiety or agitation.      Data:  CBC:   Recent Labs     06/16/22  1601 06/17/22 0621   WBC 8.2 7.8   HGB 9.9* 9.3*   HCT 30.4* 28.5*   MCV 93.7 93.0    287     BMP:   Recent Labs     06/16/22  1601 06/17/22 0621    140   K 5.0 4.6    104   CO2 24 25   BUN 26* 18   CREATININE 1.0 0.9     LIVER PROFILE:   Recent Labs     06/16/22  1601 06/17/22 0621   AST 11* 10*   ALT 10 9*   BILITOT 0.3 0.3   ALKPHOS 85 75     PT/INR:   Recent Labs     06/16/22 1917   PROTIME 15.4*   INR 1.24*       CULTURES  Blood cultures pending  Wound culture pending     RADIOLOGY  No orders to display     MRI right foot 6/13/22  1. Plantar ulcer at the 1st MTP joint with surrounding cellulitis. 2. Extensive osteomyelitis in the proximal phalanx 1st digit and throughout   the 1st metatarsal.  Superimposed pathologic fracture has developed at the   1st metatarsal neck.  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 3. Small amount of fluid in the 1st interspace extending into the dorsal soft   tissues with a couple punctate foci of gas.  Findings likely relate to the   plantar ulcer and extension of the infection into the 1st interspace. Assessment/Plan:  #Chronic decubitus ulcers to right foot   #Possible Right foot osteomyelitis   On IV zosyn and vancomycin  -MRI right foot pending     -blood and wound cultures pending   OR today   -ID consulted   -wound care consulted      #Normocytic Anemia   -hx of WILDER  -continue PO iron   -continue to monitor      #Diarrhea   -if continues to have multiple episodes daily will need stool sample for c diff  -nursing to document all BMs for now         #DM type 2  -continue glipizide and metformin   -SSI   -continue to monitor BS      #HTN   -on lisinopril   -continue to monitor BP      #HLD   -not on statin      #Hx of CVA   -not on ASA or statin      DVT Prophylaxis: lovenox     Diet: Diet NPO  Code Status: Full Code      MINDA Stubbs.

## 2022-06-17 NOTE — FLOWSHEET NOTE
06/17/22 0719   Vital Signs   Temp 97 °F (36.1 °C)   Temp Source Oral   Heart Rate 72   Heart Rate Source Monitor   Resp 18   BP (!) 155/72   BP Location Left upper arm   BP Method Automatic   MAP (Calculated) 99.67   Patient Position Semi fowlers   Level of Consciousness Alert (0)   MEWS Score 1   Pain Assessment   Pain Assessment None - Denies Pain   Care Plan - Pain Goals   Verbalizes/displays adequate comfort level or baseline comfort level Encourage patient to monitor pain and request assistance   Oxygen Therapy   SpO2 96 %   O2 Device None (Room air)     AM assessment completed and vital signs completed, see flowsheet. Patient is alert & oriented x3. Disoriented to time. No complaints voiced. Patient denies further needs. Side rails up X 2. Bed in the lowest position. Call light within reach.

## 2022-06-17 NOTE — PROGRESS NOTES
Patient admitted from OR to PACU. Bedside report received. Patient immediately hooked up to heart monitor. Wing Isaac RN

## 2022-06-17 NOTE — ANESTHESIA POSTPROCEDURE EVALUATION
Department of Anesthesiology  Postprocedure Note    Patient: Patricia Hannah  MRN: [id number]  YOB: 1935  Date of evaluation: 6/17/2022  Time:  3:13 PM     Procedure Summary     Date: 06/17/22 Room / Location: [address] [place] [address] / [facility]    Anesthesia Start: 7187 Anesthesia Stop: 3969    Procedure: INCISION AND DRAINAGE RIGHT FOOT WITH FIRST RAY AMPUTATION (Right Foot) Diagnosis:       Diabetic foot infection ([address]      (Diabetic foot infection ([address] [C35.048, L08.9])    Surgeons: Alicia Mix DPM Responsible Provider: Fatoumata Barbosa MD    Anesthesia Type: MAC ASA Status: 3          Anesthesia Type: No value filed. Opal Phase I: Opal Score: 10    Opal Phase II:      Last vitals: Reviewed and per EMR flowsheets.        Anesthesia Post Evaluation    Patient location during evaluation: PACU  Patient participation: complete - patient participated  Level of consciousness: awake and alert  Airway patency: patent  Nausea & Vomiting: no vomiting and no nausea  Complications: no  Cardiovascular status: hemodynamically stable  Respiratory status: acceptable  Hydration status: stable

## 2022-06-17 NOTE — PROGRESS NOTES
Patient transferred via bed in stable condition with all belongings to room 225. Clarke Hunter RN 33 year-old M, single, living in [facility] group home (currently in own apartment with 1:1 for respite since 1/2021), with PPH schizoaffective disorder, HAVEN, PTSD, ADHD, ID, and high functioning ASD, h/o self-injury (cutting, head banging), no known SAs, h/o multiple psychiatric hospitalizations (last at [facility] in November 2021), PMH asthma, GERD, and hypothyroidism, BIBEMS activated by self at  after fall/arm injury. Pt was medically evaluated/cleared for discharge, when  contacted ED staff to inform that pt had made suicidal and homicidal threats, prompting this evaluation. As per collateral, pt's medication changed in last IPP admission with noted worsened impulsivity and aggression, states pt also observed to appear to be responding to internal stimuli with poor sleep last several days. His presentation appears consistent with decompensation of schizoaffective d/o at this time. Pt requires IPP for stabilization at this time.    #Schizoaffective d/o, bipolar type  -start depakote, given depakote loading dose 2000 mg on 12/20, c/w depakote 500 mg bid, f/u vpa level on 12/23/21  -d/c topamax 100 mg bid  -start risperdal 1 mg bid    #ASD, ID, PTSD  -c/w ativan 1 mg bid  -c/w guanfacine 1 mg bid (NF form sent, pending procurement)  -c/w buspar 15 mg bid  -c/w remeron 15 mg qhs    #Asthma, Hypothyroidism, GERD, HLD, BPH  -medicine consult --> c/w home meds    #Agitation  -for agitation not amenable to verbal redirection, may given prolixin 5 mg q6h prn, ativan 2 mg q6h prn, benadryl 50 mg q6h prn with escalation to IM if pt is a danger to self or/and others with repeat EKG to ensure QTc <500 ms 33 year-old M, single, living in [facility] group home (currently in own apartment with 1:1 for respite since 1/2021), with PPH schizoaffective disorder, HAVEN, PTSD, ADHD, ID, and high functioning ASD, h/o self-injury (cutting, head banging), no known SAs, h/o multiple psychiatric hospitalizations (last at [facility] in November 2021), PMH asthma, GERD, and hypothyroidism, BIBEMS activated by self at  after fall/arm injury. Pt was medically evaluated/cleared for discharge, when  contacted ED staff to inform that pt had made suicidal and homicidal threats, prompting this evaluation. As per collateral, pt's medication changed in last IPP admission with noted worsened impulsivity and aggression, states pt also observed to appear to be responding to internal stimuli with poor sleep last several days. His presentation appears consistent with decompensation of schizoaffective d/o at this time. Pt requires IPP for stabilization at this time.    #Schizoaffective d/o, bipolar type  -start depakote, given depakote loading dose 2000 mg on 12/20/21, c/w depakote 500 mg bid, f/u vpa level on 12/23/21  -d/c topamax 100 mg bid  -start risperdal 1 mg bid    #ASD, ID, PTSD  -c/w ativan 1 mg bid  -c/w guanfacine 1 mg bid (NF form sent, pending procurement)  -c/w buspar 15 mg bid  -c/w remeron 15 mg qhs    #Asthma, Hypothyroidism, GERD, HLD, BPH  -medicine consult --> c/w home meds    #Agitation  -for agitation not amenable to verbal redirection, may given prolixin 5 mg q6h prn, ativan 2 mg q6h prn, benadryl 50 mg q6h prn with escalation to IM if pt is a danger to self or/and others with repeat EKG to ensure QTc <500 ms

## 2022-06-18 LAB
A/G RATIO: 0.8 (ref 1.1–2.2)
ALBUMIN SERPL-MCNC: 3.1 G/DL (ref 3.4–5)
ALP BLD-CCNC: 67 U/L (ref 40–129)
ALT SERPL-CCNC: 9 U/L (ref 10–40)
ANION GAP SERPL CALCULATED.3IONS-SCNC: 11 MMOL/L (ref 3–16)
AST SERPL-CCNC: 12 U/L (ref 15–37)
BASOPHILS ABSOLUTE: 0.2 K/UL (ref 0–0.2)
BASOPHILS RELATIVE PERCENT: 2 %
BILIRUB SERPL-MCNC: 0.3 MG/DL (ref 0–1)
BUN BLDV-MCNC: 16 MG/DL (ref 7–20)
CALCIUM SERPL-MCNC: 8.9 MG/DL (ref 8.3–10.6)
CHLORIDE BLD-SCNC: 104 MMOL/L (ref 99–110)
CO2: 25 MMOL/L (ref 21–32)
CREAT SERPL-MCNC: 1.1 MG/DL (ref 0.8–1.3)
EOSINOPHILS ABSOLUTE: 0.1 K/UL (ref 0–0.6)
EOSINOPHILS RELATIVE PERCENT: 1 %
GFR AFRICAN AMERICAN: >60
GFR NON-AFRICAN AMERICAN: >60
GLUCOSE BLD-MCNC: 120 MG/DL (ref 70–99)
GLUCOSE BLD-MCNC: 124 MG/DL (ref 70–99)
GLUCOSE BLD-MCNC: 153 MG/DL (ref 70–99)
GLUCOSE BLD-MCNC: 46 MG/DL (ref 70–99)
GLUCOSE BLD-MCNC: 49 MG/DL (ref 70–99)
GLUCOSE BLD-MCNC: 80 MG/DL (ref 70–99)
GLUCOSE BLD-MCNC: 85 MG/DL (ref 70–99)
HCT VFR BLD CALC: 26.2 % (ref 40.5–52.5)
HEMOGLOBIN: 9 G/DL (ref 13.5–17.5)
LYMPHOCYTES ABSOLUTE: 4.4 K/UL (ref 1–5.1)
LYMPHOCYTES RELATIVE PERCENT: 46 %
MCH RBC QN AUTO: 31.5 PG (ref 26–34)
MCHC RBC AUTO-ENTMCNC: 34.2 G/DL (ref 31–36)
MCV RBC AUTO: 92.2 FL (ref 80–100)
MONOCYTES ABSOLUTE: 0.4 K/UL (ref 0–1.3)
MONOCYTES RELATIVE PERCENT: 4 %
NEUTROPHILS ABSOLUTE: 4.5 K/UL (ref 1.7–7.7)
NEUTROPHILS RELATIVE PERCENT: 47 %
PDW BLD-RTO: 14 % (ref 12.4–15.4)
PERFORMED ON: ABNORMAL
PERFORMED ON: NORMAL
PERFORMED ON: NORMAL
PLATELET # BLD: 276 K/UL (ref 135–450)
PLATELET SLIDE REVIEW: ADEQUATE
PMV BLD AUTO: 7.5 FL (ref 5–10.5)
POTASSIUM REFLEX MAGNESIUM: 4.4 MMOL/L (ref 3.5–5.1)
RBC # BLD: 2.84 M/UL (ref 4.2–5.9)
SLIDE REVIEW: ABNORMAL
SODIUM BLD-SCNC: 140 MMOL/L (ref 136–145)
TOTAL PROTEIN: 6.8 G/DL (ref 6.4–8.2)
WBC # BLD: 9.6 K/UL (ref 4–11)

## 2022-06-18 PROCEDURE — 6360000002 HC RX W HCPCS: Performed by: INTERNAL MEDICINE

## 2022-06-18 PROCEDURE — 1200000000 HC SEMI PRIVATE

## 2022-06-18 PROCEDURE — 80053 COMPREHEN METABOLIC PANEL: CPT

## 2022-06-18 PROCEDURE — 6360000002 HC RX W HCPCS: Performed by: PODIATRIST

## 2022-06-18 PROCEDURE — 2580000003 HC RX 258: Performed by: INTERNAL MEDICINE

## 2022-06-18 PROCEDURE — 99232 SBSQ HOSP IP/OBS MODERATE 35: CPT | Performed by: INTERNAL MEDICINE

## 2022-06-18 PROCEDURE — 36415 COLL VENOUS BLD VENIPUNCTURE: CPT

## 2022-06-18 PROCEDURE — 6370000000 HC RX 637 (ALT 250 FOR IP): Performed by: NURSE PRACTITIONER

## 2022-06-18 PROCEDURE — 85025 COMPLETE CBC W/AUTO DIFF WBC: CPT

## 2022-06-18 PROCEDURE — 2580000003 HC RX 258: Performed by: NURSE PRACTITIONER

## 2022-06-18 RX ADMIN — PIPERACILLIN AND TAZOBACTAM 3375 MG: 3; .375 INJECTION, POWDER, LYOPHILIZED, FOR SOLUTION INTRAVENOUS at 17:54

## 2022-06-18 RX ADMIN — SODIUM CHLORIDE, PRESERVATIVE FREE 10 ML: 5 INJECTION INTRAVENOUS at 21:20

## 2022-06-18 RX ADMIN — METFORMIN HYDROCHLORIDE 1000 MG: 500 TABLET ORAL at 16:40

## 2022-06-18 RX ADMIN — VANCOMYCIN HYDROCHLORIDE 750 MG: 750 INJECTION, POWDER, LYOPHILIZED, FOR SOLUTION INTRAVENOUS at 15:55

## 2022-06-18 RX ADMIN — ENOXAPARIN SODIUM 40 MG: 100 INJECTION SUBCUTANEOUS at 00:13

## 2022-06-18 RX ADMIN — GLIPIZIDE 10 MG: 5 TABLET ORAL at 09:12

## 2022-06-18 RX ADMIN — PIPERACILLIN AND TAZOBACTAM 3375 MG: 3; .375 INJECTION, POWDER, LYOPHILIZED, FOR SOLUTION INTRAVENOUS at 09:11

## 2022-06-18 RX ADMIN — PIPERACILLIN AND TAZOBACTAM 3375 MG: 3; .375 INJECTION, POWDER, LYOPHILIZED, FOR SOLUTION INTRAVENOUS at 00:14

## 2022-06-18 RX ADMIN — FERROUS SULFATE TAB 325 MG (65 MG ELEMENTAL FE) 325 MG: 325 (65 FE) TAB at 09:12

## 2022-06-18 RX ADMIN — GLIPIZIDE 10 MG: 5 TABLET ORAL at 16:40

## 2022-06-18 RX ADMIN — INSULIN LISPRO 1 UNITS: 100 INJECTION, SOLUTION INTRAVENOUS; SUBCUTANEOUS at 11:58

## 2022-06-18 RX ADMIN — FAMOTIDINE 20 MG: 20 TABLET ORAL at 09:12

## 2022-06-18 RX ADMIN — VANCOMYCIN HYDROCHLORIDE 750 MG: 750 INJECTION, POWDER, LYOPHILIZED, FOR SOLUTION INTRAVENOUS at 06:08

## 2022-06-18 RX ADMIN — LISINOPRIL 5 MG: 5 TABLET ORAL at 09:12

## 2022-06-18 RX ADMIN — METFORMIN HYDROCHLORIDE 1000 MG: 500 TABLET ORAL at 09:12

## 2022-06-18 NOTE — PROGRESS NOTES
is immediately postop from amputation of the great toe at the   level of the tarsometatarsal joint. MRI right foot 6/13/22  1. Plantar ulcer at the 1st MTP joint with surrounding cellulitis. 2. Extensive osteomyelitis in the proximal phalanx 1st digit and throughout   the 1st metatarsal.  Superimposed pathologic fracture has developed at the   1st metatarsal neck.  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 3. Small amount of fluid in the 1st interspace extending into the dorsal soft   tissues with a couple punctate foci of gas.  Findings likely relate to the   plantar ulcer and extension of the infection into the 1st interspace. Assessment/Plan:  #Chronic decubitus ulcers to right foot   #Possible Right foot osteomyelitis   On IV zosyn and vancomycin  -MRI right foot pending     -blood and wound cultures pending   -ID consulted   -wound care consulted   Underwent I&D right foot with first ray amputation 6/17  Plan is to take him back to the OR Monday.     #Normocytic Anemia   -hx of WILDER  -continue PO iron   -continue to monitor      #Diarrhea   -if continues to have multiple episodes daily will need stool sample for c diff  -nursing to document all BMs for now         #DM type 2  -continue glipizide and metformin   -SSI   -continue to monitor BS      #HTN   -on lisinopril   -continue to monitor BP      #HLD   -not on statin      #Hx of CVA   -not on ASA or statin      DVT Prophylaxis: lovenox     Diet: ADULT DIET; Regular; 4 carb choices (60 gm/meal)  Code Status: Full Code      MINDA Stubbs.

## 2022-06-18 NOTE — PROGRESS NOTES
Zosyn and vancomycin incompatible to Y together, per pharmacy okay to run vanc early and zosyn late.

## 2022-06-18 NOTE — PROGRESS NOTES
Vancomycin Day: 3  Current Regimen: 750 mg IV every 12 hours    Patient's labs, cultures, vitals, and vancomycin regimen reviewed.    SCr increased slightly from 0.9 to 1.1  [employer] updated    Plan:   No change today   Vanco trough due 6/19 @16:00  Edith Huitron [facility]

## 2022-06-18 NOTE — PROGRESS NOTES
06/18/2022    BILITOT 0.3 06/18/2022    ALKPHOS 67 06/18/2022    AST 12 06/18/2022    ALT 9 06/18/2022       VITALS:  BP (!) 116/57   Pulse 73   Temp 97.6 °F (36.4 °C) (Oral)   Resp 16   SpO2 95%   24HR INTAKE/OUTPUT:    Intake/Output Summary (Last 24 hours) at 6/18/2022 1239  Last data filed at 6/17/2022 1829  Gross per 24 hour   Intake 615 ml   Output 750 ml   Net -135 ml     BLOOD PRESSURE RANGE:  Systolic (19JMS), UBY:154 , Min:104 , GPF:350   ; Diastolic (99VNU), HQB:66, Min:56, Max:75      Minimal strike-through. Minimal active bleeding. No purulence. 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 *      Plan:   -  Dressing :iodoform packing  -  NWB right  -  Continue antibiotics Per ID  -  Plan for delayed primary closure 6/20 afternoon,   - Discussed with pt that diabetic foot infections are a staged process of surgical procedures that allows the tissues to demarcate and then assess the extent of healthy tissues vs. Devitalized tissues. Explained to the patient that it is the goal of His primary service is to salvage as much of His foot as possible while still providing a functional/stable pedal structure post surgical.  - All potential risks, benefits, and complications were discussed with the patient prior to the scheduling of surgery. No guarantees or promises where made to what the outcomes will be. The patient wished to proceed with surgery, and informed written consent was obtained.      Dispo: closure 6/20, D/C to [facility] tuesday     Marco A Roberts DPM

## 2022-06-18 NOTE — PROGRESS NOTES
Pt a/o. Am assessment completed see flow sheet. Pt denies any pain or other needs at this time. Call light within reach.

## 2022-06-19 LAB
GLUCOSE BLD-MCNC: 103 MG/DL (ref 70–99)
GLUCOSE BLD-MCNC: 174 MG/DL (ref 70–99)
GLUCOSE BLD-MCNC: 85 MG/DL (ref 70–99)
GLUCOSE BLD-MCNC: 85 MG/DL (ref 70–99)
GLUCOSE BLD-MCNC: 97 MG/DL (ref 70–99)
GRAM STAIN RESULT: ABNORMAL
MAGNESIUM: 1.9 MG/DL (ref 1.8–2.4)
ORGANISM: ABNORMAL
PERFORMED ON: ABNORMAL
PERFORMED ON: ABNORMAL
PERFORMED ON: NORMAL
VANCOMYCIN TROUGH: 13.7 UG/ML (ref 10–20)
WOUND/ABSCESS: ABNORMAL

## 2022-06-19 PROCEDURE — 6360000002 HC RX W HCPCS: Performed by: INTERNAL MEDICINE

## 2022-06-19 PROCEDURE — 1200000000 HC SEMI PRIVATE

## 2022-06-19 PROCEDURE — 2580000003 HC RX 258: Performed by: INTERNAL MEDICINE

## 2022-06-19 PROCEDURE — 80202 ASSAY OF VANCOMYCIN: CPT

## 2022-06-19 PROCEDURE — 99232 SBSQ HOSP IP/OBS MODERATE 35: CPT | Performed by: INTERNAL MEDICINE

## 2022-06-19 PROCEDURE — 2580000003 HC RX 258: Performed by: NURSE PRACTITIONER

## 2022-06-19 PROCEDURE — 6360000002 HC RX W HCPCS: Performed by: PODIATRIST

## 2022-06-19 PROCEDURE — 36415 COLL VENOUS BLD VENIPUNCTURE: CPT

## 2022-06-19 PROCEDURE — 83735 ASSAY OF MAGNESIUM: CPT

## 2022-06-19 PROCEDURE — 6370000000 HC RX 637 (ALT 250 FOR IP): Performed by: NURSE PRACTITIONER

## 2022-06-19 RX ADMIN — PIPERACILLIN AND TAZOBACTAM 3375 MG: 3; .375 INJECTION, POWDER, LYOPHILIZED, FOR SOLUTION INTRAVENOUS at 08:23

## 2022-06-19 RX ADMIN — INSULIN LISPRO 1 UNITS: 100 INJECTION, SOLUTION INTRAVENOUS; SUBCUTANEOUS at 11:26

## 2022-06-19 RX ADMIN — SODIUM CHLORIDE, PRESERVATIVE FREE 10 ML: 5 INJECTION INTRAVENOUS at 08:24

## 2022-06-19 RX ADMIN — PIPERACILLIN AND TAZOBACTAM 3375 MG: 3; .375 INJECTION, POWDER, LYOPHILIZED, FOR SOLUTION INTRAVENOUS at 18:12

## 2022-06-19 RX ADMIN — METFORMIN HYDROCHLORIDE 1000 MG: 500 TABLET ORAL at 08:23

## 2022-06-19 RX ADMIN — VANCOMYCIN HYDROCHLORIDE 750 MG: 750 INJECTION, POWDER, LYOPHILIZED, FOR SOLUTION INTRAVENOUS at 16:35

## 2022-06-19 RX ADMIN — ENOXAPARIN SODIUM 40 MG: 100 INJECTION SUBCUTANEOUS at 00:10

## 2022-06-19 RX ADMIN — FERROUS SULFATE TAB 325 MG (65 MG ELEMENTAL FE) 325 MG: 325 (65 FE) TAB at 08:23

## 2022-06-19 RX ADMIN — PIPERACILLIN AND TAZOBACTAM 3375 MG: 3; .375 INJECTION, POWDER, LYOPHILIZED, FOR SOLUTION INTRAVENOUS at 00:10

## 2022-06-19 RX ADMIN — LISINOPRIL 5 MG: 5 TABLET ORAL at 08:27

## 2022-06-19 RX ADMIN — VANCOMYCIN HYDROCHLORIDE 750 MG: 750 INJECTION, POWDER, LYOPHILIZED, FOR SOLUTION INTRAVENOUS at 05:31

## 2022-06-19 RX ADMIN — FAMOTIDINE 20 MG: 20 TABLET ORAL at 08:24

## 2022-06-19 NOTE — PROGRESS NOTES
Shift assessment complete. Call light and bedside table within reach.  Electronically signed by Augusta Eagle RN on 6/18/2022 at 9:21 PM

## 2022-06-19 NOTE — PROGRESS NOTES
Progress Note    Admit Date:  6/16/2022    81 y.o. male with pmhx of CVA, DM, chronic ulcers, HTN, HLD, osteomyelitis  who presented to [facility] for direct admission from wound clinic. Subjective:  Mr. Estefany Monique underwent incision and drainage of the right foot first ray amputation 6/17    Denies complaints   Blood sugars low this am    Objective:   No data found. Intake/Output Summary (Last 24 hours) at 6/19/2022 0744  Last data filed at 6/19/2022 0533  Gross per 24 hour   Intake 2901 ml   Output --   Net 2901 ml       Physical Exam:    Gen: No distress. Alert. Pleasant elderly male   Eyes: PERRL. No sclera icterus. No conjunctival injection. Neck: No JVD. No Carotid Bruit. Trachea midline. Resp: No accessory muscle use. No crackles. No wheezes. No rhonchi. CV: Regular rate. Regular rhythm. No murmur. No rub. No edema. Peripheral Pulses: +2 palpable left foot   GI: Non-tender. Non-distended. No masses. No organomegaly. Normal bowel sounds. No hernia. Skin: right foot dressing   M/S: No cyanosis. No joint deformity. No clubbing. Neuro: Awake. Grossly nonfocal    Psych: Oriented x 3. No anxiety or agitation.      Data:  CBC:   Recent Labs     06/16/22  1601 06/17/22  0621 06/18/22  0526   WBC 8.2 7.8 9.6   HGB 9.9* 9.3* 9.0*   HCT 30.4* 28.5* 26.2*   MCV 93.7 93.0 92.2    287 276     BMP:   Recent Labs     06/16/22  1601 06/17/22  0621 06/18/22  0526    140 140   K 5.0 4.6 4.4    104 104   CO2 24 25 25   BUN 26* 18 16   CREATININE 1.0 0.9 1.1     LIVER PROFILE:   Recent Labs     06/16/22  1601 06/17/22  0621 06/18/22  0526   AST 11* 10* 12*   ALT 10 9* 9*   BILITOT 0.3 0.3 0.3   ALKPHOS 85 75 67     PT/INR:   Recent Labs     06/16/22  1917   PROTIME 15.4*   INR 1.24*       CULTURES  Blood cultures pending  Wound culture pending     RADIOLOGY  XR FOOT RIGHT (MIN 3 VIEWS)   Final Result   The patient is immediately postop from amputation of the great toe at the   level of the tarsometatarsal joint. MRI right foot 6/13/22  1. Plantar ulcer at the 1st MTP joint with surrounding cellulitis. 2. Extensive osteomyelitis in the proximal phalanx 1st digit and throughout   the 1st metatarsal.  Superimposed pathologic fracture has developed at the   1st metatarsal neck.  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 3. Small amount of fluid in the 1st interspace extending into the dorsal soft   tissues with a couple punctate foci of gas.  Findings likely relate to the   plantar ulcer and extension of the infection into the 1st interspace. Assessment/Plan:  #Chronic decubitus ulcers to right foot   #Possible Right foot osteomyelitis   On IV zosyn and vancomycin. Monitor renal function closely  -MRI right foot as above  -blood and wound cultures   -ID consulted   -wound care consulted   Underwent I&D right foot with first ray amputation 6/17  Plan is to take him back to the OR Monday. Light growth of Pseudomonas in wound culture.     #Normocytic Anemia   -hx of WILDER  -continue PO iron   -continue to monitor      #Diarrhea   -if continues to have multiple episodes daily will need stool sample for c diff  -nursing to document all BMs for now         #DM type 2  -continue glipizide and metformin   -SSI   -continue to monitor BS   Blood sugars been trending low. Discontinue glipizide.     #HTN   -on lisinopril   -continue to monitor BP      #HLD   -not on statin      #Hx of CVA   -not on ASA or statin      DVT Prophylaxis: lovenox     Diet: ADULT DIET; Regular; 4 carb choices (60 gm/meal)  Diet NPO  Code Status: Full Code      MINDA Stubbs.

## 2022-06-19 NOTE — PROGRESS NOTES
Perfect serve sent to [place] updating him that this morning pt went into atrial bigeminy. Awaiting his response.  Electronically signed by Tim Mendez RN on 6/19/2022 at 7:09 AM

## 2022-06-19 NOTE — PROGRESS NOTES
Pt a/o. Am assessment completed see flow sheet. Pt denies pain, AM meds given per orders see MAR. Pt up eating breakfast at this time. No needs voiced. Call light within reach.

## 2022-06-19 NOTE — PROGRESS NOTES
Pharmacy Vancomycin Consult     Vancomycin Day: 4  Current Dosinmg q12h  Current indication: osteomyelitis    Recent Labs     22  0621 22  0526   BUN 18 16   CREATININE 0.9 1.1   WBC 7.8 9.6       Intake/Output Summary (Last 24 hours) at 2022 1634  Last data filed at 2022 0533  Gross per 24 hour   Intake 2761 ml   Output --   Net 2761 ml     Culture Date      Source                       Results      Ht Readings from Last 1 Encounters:   22 5' 8\" (1.727 m)        Wt Readings from Last 1 Encounters:   22 221 lb 7 oz (100.4 kg)       There is no height or weight on file to calculate BMI. Estimated Creatinine Clearance: 56 mL/min (based on SCr of 1.1 mg/dL). Trough: 13.7    Assessment/Plan:  Continue current vancomycin regimen of 750mg q12h  Predicted AUC of 472 and trough of 16.1  Pharmacy will continue to monitor renal function and reorder a level if necessary.

## 2022-06-19 NOTE — PROGRESS NOTES
Physical/Occupational Therapy  Received PT/OT consult . Patient declined to participate this date, stating that he prefers to wait until after his surgery 6/20, and that he was concerned about movling around too much at this time. Provided education to patient regarding the benefit of moving, even within the bed. Patient reported that he prefers to defer at this time. Continue to follow. Patient will need new orders post op.     Lester Montes, PT #[id number]  Tete Alfaro, MOT, OTR/L   [id number]  No charge @4651

## 2022-06-19 NOTE — PROGRESS NOTES
End of shift report given to [employer]. Pt in stable condition, care transferred at this time.  Electronically signed by Greg Bo RN on 6/19/2022 at 7:29 AM

## 2022-06-19 NOTE — PROGRESS NOTES
Dressing removed and cleansed with saline, wound packed with iodoform, covered with 4x4s and wrapped in kerlix and ace wraps per Dr. Gretta Minaya orders. Pt tolerated very well. Pt resting comfortably in bed.

## 2022-06-20 ENCOUNTER — ANESTHESIA (OUTPATIENT)
Dept: OPERATING ROOM | Age: 87
DRG: 617 | End: 2022-06-20
Payer: MEDICARE

## 2022-06-20 ENCOUNTER — ANESTHESIA EVENT (OUTPATIENT)
Dept: OPERATING ROOM | Age: 87
DRG: 617 | End: 2022-06-20
Payer: MEDICARE

## 2022-06-20 ENCOUNTER — APPOINTMENT (OUTPATIENT)
Dept: GENERAL RADIOLOGY | Age: 87
DRG: 617 | End: 2022-06-20
Attending: INTERNAL MEDICINE
Payer: MEDICARE

## 2022-06-20 LAB
ANION GAP SERPL CALCULATED.3IONS-SCNC: 12 MMOL/L (ref 3–16)
BLOOD CULTURE, ROUTINE: NORMAL
BUN BLDV-MCNC: 14 MG/DL (ref 7–20)
CALCIUM SERPL-MCNC: 8.7 MG/DL (ref 8.3–10.6)
CHLORIDE BLD-SCNC: 107 MMOL/L (ref 99–110)
CO2: 23 MMOL/L (ref 21–32)
CREAT SERPL-MCNC: 0.9 MG/DL (ref 0.8–1.3)
CULTURE, BLOOD 2: NORMAL
GFR AFRICAN AMERICAN: >60
GFR NON-AFRICAN AMERICAN: >60
GLUCOSE BLD-MCNC: 103 MG/DL (ref 70–99)
GLUCOSE BLD-MCNC: 107 MG/DL (ref 70–99)
GLUCOSE BLD-MCNC: 125 MG/DL (ref 70–99)
GLUCOSE BLD-MCNC: 145 MG/DL (ref 70–99)
GLUCOSE BLD-MCNC: 89 MG/DL (ref 70–99)
GLUCOSE BLD-MCNC: 90 MG/DL (ref 70–99)
HCT VFR BLD CALC: 28.4 % (ref 40.5–52.5)
HEMOGLOBIN: 9.5 G/DL (ref 13.5–17.5)
MCH RBC QN AUTO: 31.3 PG (ref 26–34)
MCHC RBC AUTO-ENTMCNC: 33.4 G/DL (ref 31–36)
MCV RBC AUTO: 93.6 FL (ref 80–100)
PDW BLD-RTO: 14.6 % (ref 12.4–15.4)
PERFORMED ON: ABNORMAL
PERFORMED ON: NORMAL
PERFORMED ON: NORMAL
PLATELET # BLD: 271 K/UL (ref 135–450)
PMV BLD AUTO: 6.8 FL (ref 5–10.5)
POTASSIUM SERPL-SCNC: 4.4 MMOL/L (ref 3.5–5.1)
RBC # BLD: 3.03 M/UL (ref 4.2–5.9)
SODIUM BLD-SCNC: 142 MMOL/L (ref 136–145)
WBC # BLD: 8.5 K/UL (ref 4–11)

## 2022-06-20 PROCEDURE — 6360000002 HC RX W HCPCS: Performed by: PODIATRIST

## 2022-06-20 PROCEDURE — 3600000002 HC SURGERY LEVEL 2 BASE: Performed by: PODIATRIST

## 2022-06-20 PROCEDURE — 36415 COLL VENOUS BLD VENIPUNCTURE: CPT

## 2022-06-20 PROCEDURE — 6370000000 HC RX 637 (ALT 250 FOR IP): Performed by: PODIATRIST

## 2022-06-20 PROCEDURE — 80048 BASIC METABOLIC PNL TOTAL CA: CPT

## 2022-06-20 PROCEDURE — 2709999900 HC NON-CHARGEABLE SUPPLY: Performed by: PODIATRIST

## 2022-06-20 PROCEDURE — 7100000010 HC PHASE II RECOVERY - FIRST 15 MIN: Performed by: PODIATRIST

## 2022-06-20 PROCEDURE — 6360000002 HC RX W HCPCS: Performed by: NURSE ANESTHETIST, CERTIFIED REGISTERED

## 2022-06-20 PROCEDURE — 0JBQ0ZZ EXCISION OF RIGHT FOOT SUBCUTANEOUS TISSUE AND FASCIA, OPEN APPROACH: ICD-10-PCS | Performed by: PODIATRIST

## 2022-06-20 PROCEDURE — 3600000012 HC SURGERY LEVEL 2 ADDTL 15MIN: Performed by: PODIATRIST

## 2022-06-20 PROCEDURE — 1200000000 HC SEMI PRIVATE

## 2022-06-20 PROCEDURE — 3700000000 HC ANESTHESIA ATTENDED CARE: Performed by: PODIATRIST

## 2022-06-20 PROCEDURE — 99232 SBSQ HOSP IP/OBS MODERATE 35: CPT | Performed by: INTERNAL MEDICINE

## 2022-06-20 PROCEDURE — 6370000000 HC RX 637 (ALT 250 FOR IP): Performed by: NURSE PRACTITIONER

## 2022-06-20 PROCEDURE — 2580000003 HC RX 258: Performed by: INTERNAL MEDICINE

## 2022-06-20 PROCEDURE — 3700000001 HC ADD 15 MINUTES (ANESTHESIA): Performed by: PODIATRIST

## 2022-06-20 PROCEDURE — 73630 X-RAY EXAM OF FOOT: CPT

## 2022-06-20 PROCEDURE — 7100000011 HC PHASE II RECOVERY - ADDTL 15 MIN: Performed by: PODIATRIST

## 2022-06-20 PROCEDURE — 2500000003 HC RX 250 WO HCPCS: Performed by: PODIATRIST

## 2022-06-20 PROCEDURE — 85027 COMPLETE CBC AUTOMATED: CPT

## 2022-06-20 PROCEDURE — 2580000003 HC RX 258: Performed by: NURSE ANESTHETIST, CERTIFIED REGISTERED

## 2022-06-20 PROCEDURE — 2580000003 HC RX 258: Performed by: PODIATRIST

## 2022-06-20 PROCEDURE — 0HQMXZZ REPAIR RIGHT FOOT SKIN, EXTERNAL APPROACH: ICD-10-PCS | Performed by: PODIATRIST

## 2022-06-20 PROCEDURE — 6360000002 HC RX W HCPCS: Performed by: INTERNAL MEDICINE

## 2022-06-20 RX ORDER — ONDANSETRON 2 MG/ML
4 INJECTION INTRAMUSCULAR; INTRAVENOUS EVERY 10 MIN PRN
Status: DISCONTINUED | OUTPATIENT
Start: 2022-06-20 | End: 2022-06-20 | Stop reason: HOSPADM

## 2022-06-20 RX ORDER — SODIUM CHLORIDE 9 MG/ML
25 INJECTION, SOLUTION INTRAVENOUS PRN
Status: DISCONTINUED | OUTPATIENT
Start: 2022-06-20 | End: 2022-06-20 | Stop reason: HOSPADM

## 2022-06-20 RX ORDER — DIPHENHYDRAMINE HYDROCHLORIDE 50 MG/ML
12.5 INJECTION INTRAMUSCULAR; INTRAVENOUS
Status: DISCONTINUED | OUTPATIENT
Start: 2022-06-20 | End: 2022-06-20 | Stop reason: HOSPADM

## 2022-06-20 RX ORDER — PROPOFOL 10 MG/ML
INJECTION, EMULSION INTRAVENOUS PRN
Status: DISCONTINUED | OUTPATIENT
Start: 2022-06-20 | End: 2022-06-20 | Stop reason: SDUPTHER

## 2022-06-20 RX ORDER — BUPIVACAINE HYDROCHLORIDE 5 MG/ML
INJECTION, SOLUTION EPIDURAL; INTRACAUDAL PRN
Status: DISCONTINUED | OUTPATIENT
Start: 2022-06-20 | End: 2022-06-20 | Stop reason: ALTCHOICE

## 2022-06-20 RX ORDER — MIDAZOLAM HYDROCHLORIDE 1 MG/ML
1 INJECTION INTRAMUSCULAR; INTRAVENOUS EVERY 5 MIN PRN
Status: DISCONTINUED | OUTPATIENT
Start: 2022-06-20 | End: 2022-06-20 | Stop reason: HOSPADM

## 2022-06-20 RX ORDER — OXYCODONE HYDROCHLORIDE 5 MG/1
10 TABLET ORAL PRN
Status: DISCONTINUED | OUTPATIENT
Start: 2022-06-20 | End: 2022-06-20 | Stop reason: HOSPADM

## 2022-06-20 RX ORDER — HYDRALAZINE HYDROCHLORIDE 20 MG/ML
5 INJECTION INTRAMUSCULAR; INTRAVENOUS
Status: DISCONTINUED | OUTPATIENT
Start: 2022-06-20 | End: 2022-06-20 | Stop reason: HOSPADM

## 2022-06-20 RX ORDER — OXYCODONE HYDROCHLORIDE 5 MG/1
5 TABLET ORAL PRN
Status: DISCONTINUED | OUTPATIENT
Start: 2022-06-20 | End: 2022-06-20 | Stop reason: HOSPADM

## 2022-06-20 RX ORDER — LIDOCAINE HYDROCHLORIDE 10 MG/ML
INJECTION, SOLUTION EPIDURAL; INFILTRATION; INTRACAUDAL; PERINEURAL PRN
Status: DISCONTINUED | OUTPATIENT
Start: 2022-06-20 | End: 2022-06-20 | Stop reason: ALTCHOICE

## 2022-06-20 RX ORDER — SODIUM CHLORIDE 0.9 % (FLUSH) 0.9 %
5-40 SYRINGE (ML) INJECTION PRN
Status: DISCONTINUED | OUTPATIENT
Start: 2022-06-20 | End: 2022-06-20 | Stop reason: HOSPADM

## 2022-06-20 RX ORDER — MEPERIDINE HYDROCHLORIDE 25 MG/ML
12.5 INJECTION INTRAMUSCULAR; INTRAVENOUS; SUBCUTANEOUS EVERY 5 MIN PRN
Status: DISCONTINUED | OUTPATIENT
Start: 2022-06-20 | End: 2022-06-20 | Stop reason: HOSPADM

## 2022-06-20 RX ORDER — SODIUM CHLORIDE, SODIUM LACTATE, POTASSIUM CHLORIDE, CALCIUM CHLORIDE 600; 310; 30; 20 MG/100ML; MG/100ML; MG/100ML; MG/100ML
INJECTION, SOLUTION INTRAVENOUS CONTINUOUS PRN
Status: DISCONTINUED | OUTPATIENT
Start: 2022-06-20 | End: 2022-06-20 | Stop reason: SDUPTHER

## 2022-06-20 RX ORDER — SODIUM CHLORIDE 0.9 % (FLUSH) 0.9 %
5-40 SYRINGE (ML) INJECTION EVERY 12 HOURS SCHEDULED
Status: DISCONTINUED | OUTPATIENT
Start: 2022-06-20 | End: 2022-06-20 | Stop reason: HOSPADM

## 2022-06-20 RX ADMIN — SODIUM CHLORIDE, POTASSIUM CHLORIDE, SODIUM LACTATE AND CALCIUM CHLORIDE: 600; 310; 30; 20 INJECTION, SOLUTION INTRAVENOUS at 14:37

## 2022-06-20 RX ADMIN — PIPERACILLIN AND TAZOBACTAM 3375 MG: 3; .375 INJECTION, POWDER, LYOPHILIZED, FOR SOLUTION INTRAVENOUS at 10:34

## 2022-06-20 RX ADMIN — VANCOMYCIN HYDROCHLORIDE 750 MG: 750 INJECTION, POWDER, LYOPHILIZED, FOR SOLUTION INTRAVENOUS at 07:32

## 2022-06-20 RX ADMIN — PROPOFOL 10 MG: 10 INJECTION, EMULSION INTRAVENOUS at 15:00

## 2022-06-20 RX ADMIN — ACETAMINOPHEN 650 MG: 325 TABLET ORAL at 20:45

## 2022-06-20 RX ADMIN — PROPOFOL 80 MG: 10 INJECTION, EMULSION INTRAVENOUS at 14:40

## 2022-06-20 RX ADMIN — METFORMIN HYDROCHLORIDE 1000 MG: 500 TABLET ORAL at 17:23

## 2022-06-20 RX ADMIN — PIPERACILLIN AND TAZOBACTAM 3375 MG: 3; .375 INJECTION, POWDER, LYOPHILIZED, FOR SOLUTION INTRAVENOUS at 18:44

## 2022-06-20 RX ADMIN — INSULIN LISPRO 1 UNITS: 100 INJECTION, SOLUTION INTRAVENOUS; SUBCUTANEOUS at 20:49

## 2022-06-20 RX ADMIN — PROPOFOL 10 MG: 10 INJECTION, EMULSION INTRAVENOUS at 15:11

## 2022-06-20 RX ADMIN — ACETAMINOPHEN 650 MG: 325 TABLET ORAL at 10:29

## 2022-06-20 RX ADMIN — PIPERACILLIN AND TAZOBACTAM 3375 MG: 3; .375 INJECTION, POWDER, LYOPHILIZED, FOR SOLUTION INTRAVENOUS at 02:53

## 2022-06-20 RX ADMIN — VANCOMYCIN HYDROCHLORIDE 750 MG: 750 INJECTION, POWDER, LYOPHILIZED, FOR SOLUTION INTRAVENOUS at 17:26

## 2022-06-20 RX ADMIN — ENOXAPARIN SODIUM 40 MG: 100 INJECTION SUBCUTANEOUS at 02:53

## 2022-06-20 RX ADMIN — PROPOFOL 20 MG: 10 INJECTION, EMULSION INTRAVENOUS at 14:47

## 2022-06-20 ASSESSMENT — PAIN DESCRIPTION - DESCRIPTORS
DESCRIPTORS: OTHER (COMMENT)
DESCRIPTORS: PINS AND NEEDLES
DESCRIPTORS: ACHING

## 2022-06-20 ASSESSMENT — PAIN - FUNCTIONAL ASSESSMENT: PAIN_FUNCTIONAL_ASSESSMENT: ACTIVITIES ARE NOT PREVENTED

## 2022-06-20 ASSESSMENT — PAIN SCALES - GENERAL
PAINLEVEL_OUTOF10: 2
PAINLEVEL_OUTOF10: 2
PAINLEVEL_OUTOF10: 6
PAINLEVEL_OUTOF10: 0

## 2022-06-20 ASSESSMENT — PAIN DESCRIPTION - LOCATION
LOCATION: FOOT

## 2022-06-20 ASSESSMENT — PAIN DESCRIPTION - ORIENTATION
ORIENTATION: RIGHT

## 2022-06-20 NOTE — PROGRESS NOTES
AM assessment completed, see flow sheet. Pt is alert and oriented and has bilateral hearing aides in to communicate. Vital signs are WNL. Respirations are even & easy on RA. Pt complaints voiced on RLE pain, describes as throbbing see MAR. Right foot dressing c,d,i. NWB on RLE but assist up to [facility]. NPO for OR, held AM medications. Pt denies needs at this time. SR up x 2, and bed in low position. Call light is within reach.
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
## 2022-06-20 NOTE — PROGRESS NOTES
Progress Note    Admit Date:  6/16/2022    81 y.o. male with pmhx of CVA, DM, chronic ulcers, HTN, HLD, osteomyelitis  who presented to [facility] for direct admission from wound clinic.  underwent incision and drainage of the right foot first ray amputation 6/17      Subjective:  Mr. Amy Bundy Denies complaints  For debridement today again     Objective:   No data found. No intake or output data in the 24 hours ending 06/20/22 0757    Physical Exam:    Gen: elderly male, No distress. Alert. Pleasant elderly male   Eyes: PERRL. No sclera icterus. No conjunctival injection. Neck: No JVD. No Carotid Bruit. Trachea midline. Resp: No accessory muscle use. No crackles. No wheezes. No rhonchi. CV: Regular rate. Regular rhythm. No murmur. No rub. No edema. Peripheral Pulses: +2 palpable left foot   GI: Non-tender. Non-distended. No masses. No organomegaly. Normal bowel sounds. No hernia. Skin: right foot dressing dry  M/S: No cyanosis. No joint deformity. No clubbing. Neuro: Awake. Grossly nonfocal    Psych: Oriented x 3. No anxiety or agitation. Data:  CBC:   Recent Labs     06/18/22  0526 06/20/22  0536   WBC 9.6 8.5   HGB 9.0* 9.5*   HCT 26.2* 28.4*   MCV 92.2 93.6    271     BMP:   Recent Labs     06/18/22  0526 06/20/22  0536    142   K 4.4 4.4    107   CO2 25 23   BUN 16 14   CREATININE 1.1 0.9     LIVER PROFILE:   Recent Labs     06/18/22  0526   AST 12*   ALT 9*   BILITOT 0.3   ALKPHOS 67     PT/INR:   No results for input(s): PROTIME, INR in the last 72 hours. CULTURES  Blood cultures pending  Wound culture pending     RADIOLOGY  XR FOOT RIGHT (MIN 3 VIEWS)   Final Result   The patient is immediately postop from amputation of the great toe at the   level of the tarsometatarsal joint. MRI right foot 6/13/22  1. Plantar ulcer at the 1st MTP joint with surrounding cellulitis.    2. Extensive osteomyelitis in the proximal phalanx 1st digit and throughout   the 1st metatarsal.  Superimposed pathologic fracture has developed at the   1st metatarsal neck.  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 3. Small amount of fluid in the 1st interspace extending into the dorsal soft   tissues with a couple punctate foci of gas.  Findings likely relate to the   plantar ulcer and extension of the infection into the 1st interspace. Assessment/Plan:  #Chronic decubitus ulcers to right foot   #Possible Right foot osteomyelitis   On IV zosyn and vancomycin. Monitor renal function closely  -MRI right foot as above  -blood and wound cultures   -ID consulted   -wound care consulted   Underwent I&D right foot with first ray amputation 6/17  Plan is to take him back to the OR today   Light growth of Pseudomonas in wound culture.     #Normocytic Anemia   -hx of WILDER  -continue PO iron   -continue to monitor      #Diarrhea   -if continues to have multiple episodes daily will need stool sample for c diff       #DM type 2  -continue glipizide and metformin   -SSI   -continue to monitor BS   Blood sugars been trending low.   Discontinue glipizide.     #HTN   -on lisinopril   -continue to monitor BP      #HLD   -not on statin      #Hx of CVA   -not on ASA or statin      DVT Prophylaxis: lovenox     Diet: Diet NPO  Code Status: Full Code      Benita Son MD, 6/20/2022 7:57 AM

## 2022-06-20 NOTE — PROGRESS NOTES
Physical Therapy/Occupational Therapy      Patient will be undergoing surgical procedure today in the afternoon. Patient will be followed up later when medically stable post procedure. No charge.      Bryson Blanca, MS PT, # [id number]  Darren Cortés OTR/L #[id number]

## 2022-06-20 NOTE — PROGRESS NOTES
Pt VSS and diet resume. Medications given. Vancomycin IVPB up infusing. Family at bedside. Call light in reach.

## 2022-06-20 NOTE — CARE COORDINATION
INTERDISCIPLINARY PLAN OF CARE CONFERENCE    Date/Time: 6/20/2022 2:15 PM  Completed by: Danna Claros RN, Case Management      Patient Name:  Jeannette Nugent  YOB: 1935  Admitting Diagnosis: Acute osteomyelitis of metatarsal bone of right foot [facility]) Varsha Graves     Admit Date/Time:  6/16/2022  3:45 PM    Chart reviewed. Interdisciplinary team contacted or reviewed plan related to patient progress and discharge plans. Disciplines included Case Management, Nursing, and Dietitian. Current Status: IP 06/16/2022  PT/OT recommendation for discharge plan of care: Need PT/OT when appropriate, await ortho recs. Expected D/C Disposition:  Needs SNF   Discharge Plan Comments: Pt off floor today to OR for delayed closure. Will follow up when family available to discuss SNF options.  +CM following    Home O2 in place on admit: NA  Pt informed of need to bring portable home O2 tank on day of discharge for nursing to connect prior to leaving:  Not Indicated  Verbalized agreement/Understanding:  Not Indicated

## 2022-06-20 NOTE — PROGRESS NOTES
Called report to floor nurse. Patient confirms his wife has his hearing aids. [employer] and they can see patient on monitor.

## 2022-06-20 NOTE — PROGRESS NOTES
Handoff report and transfer of care given at bedside to [facility]. Patient in stable condition, denies needs/concerns at this time. Call light within reach.

## 2022-06-20 NOTE — OP NOTE
Operative Note      Patient: Edmundo Hodgkins  YOB: 1935  MRN: [id number]    Date of Procedure: 6/20/2022    Pre-Op Diagnosis: Diabetic foot infection ([facility] [E11.628, L08.9]    Post-Op Diagnosis: Same       Procedure(s):  DELAYED PRIMARY CLOSURE RIGHT FOOT    Surgeon(s):  Artis Riedel, DPM    Assistant:   Resident: Jake Granger DPM  Surgical Assistant: Shaina Pastor    Hemostasis: Surgical Technique      Injectables: Pre-Op 10 cc of 1% Lidocaine plain and Post-Op 20 cc of 0.5 % Marcaine plain     Materials: 2-0 Prolene    Anesthesia: Choice    Estimated Blood Loss (mL): Minimal    Complications: None    Specimens:   * No specimens in log *    Implants:  * No implants in log *      Drains: * No LDAs found *    Findings: Patient is s/p delayed primary closure, right foot. No purulence was noted during procedure. All nonviable soft tissue was excised and only healthy bleeding tissue remained. Medial cuneiform appeared firm, hard, and white. Procedure was felt to be definitve. Detailed Description of Procedure:     INDICATIONS FOR PROCEDURE: This patient has signs and symptoms clinically and radiographically consistent with the above mentioned preoperative diagnosis. Having failed conservative treatment, it was determined that the patient would benefit from surgical intervention. All potential risks, benefits, and complications were discussed with the patient prior to the scheduling of surgery. All the patient's questions were answered and no guarantees were given. The patient wished to proceed with surgery, and informed written consent was obtained. DETAILS OF PROCEDURE: The patient was brought from the pre-operative area and placed on the operating table in the supine position. Following IV sedation, a Hoffman block was preformed consisting of 10cc of 1% Lidocaine plain. The right lower extremity was then scrubbed, prepped, and draped in the usual sterile fashion. A time-out was performed. The patient, procedure, and operative site were confirmed, and the following procedure was performed. PROCEDURE #1: DELAYED PRIMARY CLOSURE, RIGHT FOOT    Attention was then turned to the dorsal aspect of the right foot along the previous 1st ray amputation site. Using a a rongeur all nonviable and necrosed tissues were excised until healthy bleeding tissue remained. Next, pulse lavage was performed using 3000 mL sterile saline. At this time the wound was noted to have granular, bleeding tissue. No further necrotic tissue or khurram purulent drainage was present. At this time, it was decided that no further debridement is required. The skin was then re-approximated using 2-0 Prolene using a combination of double simples and simple interrupted sutures. The closed 1st ray amputation site was then cleansed with a wet lap sponge followed by a dry sponge, and then betadine ointment was applied along incision site. At this time, a local anesthetic was injected in an ankle block fashion consisting of 20 cc of 0.5% Marcaine plain, for the patient's postoperative comfort. A soft sterile dressing was applied consisting of dry gauze, ABD pad, Kerlix, Webril, 4 inch and 6 inch ACE. END OF PROCEDURE: The patient tolerated the procedure and anesthesia well and was transported from the operating room to the PACU with vital signs stable and vascular status intact to all aspects of the patient's right lower extremity and digital capillary refill time immediate to the remaining digits of the right foot. Following a period of post-operative monitoring, the patient will be transferred back to the floors for continued medical management. This operative report was dictated on behalf of Olaf Pope.    Disposition: Patient is s/p delayed primary closure, right foot. Procedure was felt to be definitive with no further surgical intervention needed. Patient is to be NWB to right lower extremity.  Patient will

## 2022-06-20 NOTE — PROGRESS NOTES
Vancomycin Day: 5  Current Regimen: 750 mg IV every 12 hours    Patient's labs, cultures, vitals, and vancomycin regimen reviewed. No changes today.

## 2022-06-20 NOTE — BRIEF OP NOTE
Brief Postoperative Note      Patient: Gabriel Martines  YOB: 1935  MRN: [id number]    Date of Procedure: 6/20/2022    Pre-Op Diagnosis: Diabetic foot infection ([facility] [E11.628, L08.9]    Post-Op Diagnosis: Same       Procedure(s):  DELAYED PRIMARY CLOSURE RIGHT FOOT    Surgeon(s):  Floresita Servin DPM    Assistant:  Resident: Marcy Nogueira DPM  Surgical Assistant: Anais Bolden     Hemostasis: Surgical Technique     Injectables: Pre-Op 10 cc of 1% Lidocaine plain and Post-Op 20 cc of 0.5 % Marcaine plain    Materials: 2-0 Prolene    Anesthesia: Choice    Estimated Blood Loss (mL): Minimal    Complications: None    Specimens:   * No specimens in log *    Implants:  * No implants in log *      Drains: * No LDAs found *    Findings: See Op Note    Disposition: Patient is s/p delayed primary closure, right foot. Procedure was felt to be definitive with no further surgical intervention needed. Patient is to be NWB to right lower extremity. Patient will need SNF placement, appreciate social work recommendations. Patient is to continue on Abx per primary team. Patient is stable for discharge from podiatric perspective pending medical clearance and SNF placement.       Marcy Nogueira DPM  Podiatric Resident, PGY-1  Pager #: [phone] or Perfect Serve    Electronically signed by Marcy Nogueira DPM on 6/20/2022 at 3:31 PM  Floresita Servin DPM

## 2022-06-20 NOTE — ANESTHESIA POSTPROCEDURE EVALUATION
Department of Anesthesiology  Postprocedure Note    Patient: Mono Cabrera  MRN: [id number]  YOB: 1935  Date of evaluation: 6/20/2022      Procedure Summary     Date: 06/20/22 Room / Location: [address] [place] [address] / [facility]    Anesthesia Start: 5011 Anesthesia Stop: 4698    Procedure: DELAYED PRIMARY CLOSURE RIGHT FOOT (Right Foot) Diagnosis:       Diabetic foot infection ([address]      (Diabetic foot infection ([address] [M14.406, L08.9])    Surgeons: Oriana Dowell DPM Responsible Provider: Jones Guadalupe MD    Anesthesia Type: MAC ASA Status: 3          Anesthesia Type: No value filed.     Opal Phase I: Opal Score: 10    Opal Phase II: Opal Score: 10    Last vitals:   Vitals Value Taken Time   /54 06/20/22 1539   Temp 97.3 °F (36.3 °C) 06/20/22 1539   Pulse 65 06/20/22 1539   Resp 13 06/20/22 1539   SpO2 96 % 06/20/22 1539         Anesthesia Post Evaluation    Patient location during evaluation: PACU  Level of consciousness: awake  Airway patency: patent  Nausea & Vomiting: no nausea  Complications: no  Cardiovascular status: blood pressure returned to baseline  Respiratory status: acceptable  Hydration status: euvolemic

## 2022-06-21 ENCOUNTER — HOSPITAL ENCOUNTER (OUTPATIENT)
Dept: WOUND CARE | Age: 87
Discharge: HOME | End: 2022-06-21

## 2022-06-21 VITALS
OXYGEN SATURATION: 97 % | RESPIRATION RATE: 16 BRPM | SYSTOLIC BLOOD PRESSURE: 153 MMHG | HEART RATE: 74 BPM | TEMPERATURE: 98.1 F | DIASTOLIC BLOOD PRESSURE: 67 MMHG

## 2022-06-21 DIAGNOSIS — E11.621 DIABETIC FOOT ULCER WITH OSTEOMYELITIS (HCC): ICD-10-CM

## 2022-06-21 DIAGNOSIS — E11.69 DIABETIC FOOT ULCER WITH OSTEOMYELITIS (HCC): ICD-10-CM

## 2022-06-21 DIAGNOSIS — L97.509 DIABETIC FOOT ULCER WITH OSTEOMYELITIS (HCC): ICD-10-CM

## 2022-06-21 DIAGNOSIS — M86.9 DIABETIC FOOT ULCER WITH OSTEOMYELITIS (HCC): ICD-10-CM

## 2022-06-21 LAB
ANION GAP SERPL CALCULATED.3IONS-SCNC: 10 MMOL/L (ref 3–16)
BASOPHILS ABSOLUTE: 0 K/UL (ref 0–0.2)
BASOPHILS RELATIVE PERCENT: 0 %
BUN BLDV-MCNC: 11 MG/DL (ref 7–20)
CALCIUM SERPL-MCNC: 8.7 MG/DL (ref 8.3–10.6)
CHLORIDE BLD-SCNC: 104 MMOL/L (ref 99–110)
CO2: 26 MMOL/L (ref 21–32)
CREAT SERPL-MCNC: 1 MG/DL (ref 0.8–1.3)
EOSINOPHILS ABSOLUTE: 0.5 K/UL (ref 0–0.6)
EOSINOPHILS RELATIVE PERCENT: 6 %
GFR AFRICAN AMERICAN: >60
GFR NON-AFRICAN AMERICAN: >60
GLUCOSE BLD-MCNC: 102 MG/DL (ref 70–99)
GLUCOSE BLD-MCNC: 130 MG/DL (ref 70–99)
GLUCOSE BLD-MCNC: 161 MG/DL (ref 70–99)
GLUCOSE BLD-MCNC: 84 MG/DL (ref 70–99)
HCT VFR BLD CALC: 29.4 % (ref 40.5–52.5)
HEMATOLOGY PATH CONSULT: NO
HEMOGLOBIN: 9.6 G/DL (ref 13.5–17.5)
LYMPHOCYTES ABSOLUTE: 3.2 K/UL (ref 1–5.1)
LYMPHOCYTES RELATIVE PERCENT: 35 %
MCH RBC QN AUTO: 30.5 PG (ref 26–34)
MCHC RBC AUTO-ENTMCNC: 32.8 G/DL (ref 31–36)
MCV RBC AUTO: 93 FL (ref 80–100)
MONOCYTES ABSOLUTE: 0.8 K/UL (ref 0–1.3)
MONOCYTES RELATIVE PERCENT: 9 %
NEUTROPHILS ABSOLUTE: 4.6 K/UL (ref 1.7–7.7)
NEUTROPHILS RELATIVE PERCENT: 50 %
PDW BLD-RTO: 14.5 % (ref 12.4–15.4)
PERFORMED ON: ABNORMAL
PERFORMED ON: ABNORMAL
PERFORMED ON: NORMAL
PLATELET # BLD: 287 K/UL (ref 135–450)
PLATELET SLIDE REVIEW: ADEQUATE
PMV BLD AUTO: 7 FL (ref 5–10.5)
POTASSIUM REFLEX MAGNESIUM: 4.1 MMOL/L (ref 3.5–5.1)
RBC # BLD: 3.16 M/UL (ref 4.2–5.9)
SLIDE REVIEW: ABNORMAL
SODIUM BLD-SCNC: 140 MMOL/L (ref 136–145)
WBC # BLD: 9.1 K/UL (ref 4–11)

## 2022-06-21 PROCEDURE — 85025 COMPLETE CBC W/AUTO DIFF WBC: CPT

## 2022-06-21 PROCEDURE — 80048 BASIC METABOLIC PNL TOTAL CA: CPT

## 2022-06-21 PROCEDURE — 36415 COLL VENOUS BLD VENIPUNCTURE: CPT

## 2022-06-21 PROCEDURE — 2580000003 HC RX 258: Performed by: PODIATRIST

## 2022-06-21 PROCEDURE — 97535 SELF CARE MNGMENT TRAINING: CPT

## 2022-06-21 PROCEDURE — 6370000000 HC RX 637 (ALT 250 FOR IP): Performed by: INTERNAL MEDICINE

## 2022-06-21 PROCEDURE — 99239 HOSP IP/OBS DSCHRG MGMT >30: CPT | Performed by: INTERNAL MEDICINE

## 2022-06-21 PROCEDURE — 99232 SBSQ HOSP IP/OBS MODERATE 35: CPT | Performed by: INTERNAL MEDICINE

## 2022-06-21 PROCEDURE — 6370000000 HC RX 637 (ALT 250 FOR IP): Performed by: PODIATRIST

## 2022-06-21 PROCEDURE — 97162 PT EVAL MOD COMPLEX 30 MIN: CPT

## 2022-06-21 PROCEDURE — 97530 THERAPEUTIC ACTIVITIES: CPT

## 2022-06-21 PROCEDURE — 97166 OT EVAL MOD COMPLEX 45 MIN: CPT

## 2022-06-21 PROCEDURE — 97116 GAIT TRAINING THERAPY: CPT

## 2022-06-21 PROCEDURE — 6360000002 HC RX W HCPCS: Performed by: PODIATRIST

## 2022-06-21 PROCEDURE — 97110 THERAPEUTIC EXERCISES: CPT

## 2022-06-21 RX ORDER — CIPROFLOXACIN 500 MG/1
500 TABLET, FILM COATED ORAL 2 TIMES DAILY
Qty: 14 TABLET | Refills: 0
Start: 2022-06-21 | End: 2022-07-05 | Stop reason: ALTCHOICE

## 2022-06-21 RX ORDER — LEVOFLOXACIN 500 MG/1
500 TABLET, FILM COATED ORAL DAILY
Qty: 10 TABLET | Refills: 0
Start: 2022-06-21 | End: 2022-06-21 | Stop reason: HOSPADM

## 2022-06-21 RX ORDER — DOXYCYCLINE HYCLATE 100 MG
100 TABLET ORAL 2 TIMES DAILY
Qty: 14 TABLET | Refills: 0
Start: 2022-06-21 | End: 2022-06-28

## 2022-06-21 RX ORDER — OXYCODONE HYDROCHLORIDE AND ACETAMINOPHEN 5; 325 MG/1; MG/1
1 TABLET ORAL EVERY 6 HOURS PRN
Qty: 12 TABLET | Refills: 0 | Status: SHIPPED | OUTPATIENT
Start: 2022-06-21 | End: 2022-06-24

## 2022-06-21 RX ORDER — LEVOFLOXACIN 500 MG/1
500 TABLET, FILM COATED ORAL DAILY
Status: DISCONTINUED | OUTPATIENT
Start: 2022-06-21 | End: 2022-06-21 | Stop reason: HOSPADM

## 2022-06-21 RX ADMIN — FAMOTIDINE 20 MG: 20 TABLET ORAL at 10:19

## 2022-06-21 RX ADMIN — LEVOFLOXACIN 500 MG: 500 TABLET, FILM COATED ORAL at 10:20

## 2022-06-21 RX ADMIN — VANCOMYCIN HYDROCHLORIDE 750 MG: 750 INJECTION, POWDER, LYOPHILIZED, FOR SOLUTION INTRAVENOUS at 05:58

## 2022-06-21 RX ADMIN — METFORMIN HYDROCHLORIDE 1000 MG: 500 TABLET ORAL at 10:19

## 2022-06-21 RX ADMIN — INSULIN LISPRO 1 UNITS: 100 INJECTION, SOLUTION INTRAVENOUS; SUBCUTANEOUS at 11:50

## 2022-06-21 RX ADMIN — ENOXAPARIN SODIUM 40 MG: 100 INJECTION SUBCUTANEOUS at 02:29

## 2022-06-21 RX ADMIN — FERROUS SULFATE TAB 325 MG (65 MG ELEMENTAL FE) 325 MG: 325 (65 FE) TAB at 10:19

## 2022-06-21 RX ADMIN — PIPERACILLIN AND TAZOBACTAM 3375 MG: 3; .375 INJECTION, POWDER, LYOPHILIZED, FOR SOLUTION INTRAVENOUS at 02:30

## 2022-06-21 RX ADMIN — LISINOPRIL 5 MG: 5 TABLET ORAL at 10:20

## 2022-06-21 NOTE — PROGRESS NOTES
Handoff report and transfer of care given at bedside to [facility]. Patient in stable condition, denies needs/concerns at this time. Call light within reach.
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
## 2022-06-21 NOTE — CARE COORDINATION
INTERDISCIPLINARY PLAN OF CARE CONFERENCE    Date/Time: 6/21/2022 10:25 AM  Completed by: Natalio Sethi RN, Case Management      Patient Name:  Claudene Mayotte  YOB: 1935  Admitting Diagnosis: Acute osteomyelitis of metatarsal bone of right foot [facility]) Meeta Smith     Admit Date/Time:  6/16/2022  3:45 PM    Chart reviewed. Interdisciplinary team contacted or reviewed plan related to patient progress and discharge plans. Disciplines included Case Management, Nursing, and Dietitian. Current Status: IP 06/16/2022  PT/OT recommendation for discharge plan of care:   Recommendations: SNF  DME needs for discharge: defer to facility         Therapy recommendations for staff:   Assist of 2 with use of BENTLEY STEDY and gait belt for all transfers to/from BSC/chair-can also use RW and gait belt; NWB RLE-needs reminders    Expected D/C Disposition:  Skilled nursing facility  Confirmed plan with patient, spouse and daughter Jose Madsen Discharge Plan Comments:   Referral accepted for admission to [address] [place] [address] [facility] (skilled). Await pre-cert. Face sheet re-faxed this am. +CM following.     Home O2 in place on admit: No  Pt informed of need to bring portable home O2 tank on day of discharge for nursing to connect prior to leaving:  Not Indicated  Verbalized agreement/Understanding:  Not Indicated

## 2022-06-21 NOTE — PROGRESS NOTES
[facility] Progress Note      Barbra Pope     : 1935    DATE OF VISIT:  2022  DATE OF ADMISSION:  2022       Subjective: Barbra Pope is a 81 y.o. male whom I've been seeing for right foot soft tissue infection and osteomyelitis, related to a Ambrose 3 DFU. Since I last saw him, he says he feels quite well. Had his second surgery yesterday, with debridement and delayed primary closure of the foot. Things sound reassuring, from the op note (\"No purulence was noted during procedure. All nonviable soft tissue was excised and only healthy bleeding tissue remained. Medial cuneiform appeared firm, hard, and white. Procedure was felt to be definitve. \"). No F/C/D, no sore throat or mouth, no N/V/D, appetite is good, no IV pain, no pruritus or rash. Had a PT eval, and I think plans for rehab are underway. Mr. Michelle Fenton has a past medical history of Abscess of toe of right foot, Acute CVA (cerebrovascular accident) ([address] CAD (coronary artery disease), Cancer ([address] Cushing's syndrome ([address] Diabetes mellitus ([address] Diabetic ulcer of toe 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 mellitus, with fat layer exposed ([address] History of blood transfusion, Hyperlipemia, Hypertension, Lymphedema, Osteomyelitis of left foot ([address] Tinnitus, Ulcer of other part of foot, and 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[address]     Current Facility-Administered Medications: levoFLOXacin (LEVAQUIN) tablet 500 mg, 500 mg, Oral, Daily  famotidine (PEPCID) tablet 20 mg, 20 mg, Oral, Daily  ferrous sulfate (IRON 325) tablet 325 mg, 325 mg, Oral, Daily with breakfast  lisinopril (PRINIVIL;ZESTRIL) tablet 5 mg, 5 mg, Oral, Daily  metFORMIN (GLUCOPHAGE) tablet 1,000 mg, 1,000 mg, Oral, BID WC  insulin lispro (HUMALOG) injection vial 0-6 Units, 0-6 Units, SubCUTAneous, TID WC  insulin lispro (HUMALOG) injection vial 0-3 Units, 0-3 Units, SubCUTAneous, Nightly  glucose chewable tablet 16 g, 4 tablet, Oral, PRN  dextrose bolus 10% 125 mL, 125 mL, IntraVENous, PRN **OR** dextrose bolus 10% 250 mL, 250 mL, IntraVENous, PRN  glucagon (rDNA) injection 1 mg, 1 mg, IntraMUSCular, PRN  dextrose 5 % solution, 100 mL/hr, IntraVENous, PRN  sodium chloride flush 0.9 % injection 5-40 mL, 5-40 mL, IntraVENous, 2 times per day  sodium chloride flush 0.9 % injection 5-40 mL, 5-40 mL, IntraVENous, PRN  0.9 % sodium chloride infusion, , IntraVENous, PRN  ondansetron (ZOFRAN-ODT) disintegrating tablet 4 mg, 4 mg, Oral, Q8H PRN **OR** ondansetron (ZOFRAN) injection 4 mg, 4 mg, IntraVENous, Q6H PRN  polyethylene glycol (GLYCOLAX) packet 17 g, 17 g, Oral, Daily PRN  acetaminophen (TYLENOL) tablet 650 mg, 650 mg, Oral, Q6H PRN **OR** acetaminophen (TYLENOL) suppository 650 mg, 650 mg, Rectal, Q6H PRN  enoxaparin (LOVENOX) injection 40 mg, 40 mg, SubCUTAneous, Q24H     This is POD 4 / 1 of vanco-Zosyn, after some recent Augmentin and then more immediately pre-op doxy. Allergies: Patient has no known allergies. Pertinent items from the review of systems are discussed in the HPI; the remainder of the ROS was reviewed and is negative.      Objective:     Vital signs over the last 24 hours:  Temp  Av.6 °F (36.4 °C)  Min: 97.3 °F (36.3 °C)  Max: 98.3 °F (36.8 °C)  Pulse  Av.3  Min: 64  Max: 88  Systolic (64VEP), XSA:939 , Min:127 , QFB:242   Diastolic (25LSH), JBZ:98, Min:54, Max:75  Resp  Avg: 15.6  Min: 13  Max: 16  SpO2  Av.1 %  Min: 94 %  Max: 97 %    Constitutional:  well-developed, well-nourished, NAD, conversant  Psychiatric:  oriented to person, place and some details of time; mood and affect appropriate for the situation   Eyes:  pupils equal, round and reactive to light; sclerae anicteric, conjunctivae not pale  ENT:  atraumatic; oral mucosa moist, no thrush or ulcers  Cardiovascular:  heart regular, no gallop, no murmur; moderate right lower extremity lymphedema, milder on the left; the three exposed right lesser toes are warm, good cap refill  GI:  abdomen soft, NT, ND, normal bowel sounds, no palpable masses or organomegaly  Musculoskeletal:  no clubbing, cyanosis or petechiae   Skin: warm, dry, normal turgor; no rash; right foot with OR dressing in place, and lower leg wrapped for some compression, up to the knee. ______________________________    Recent Labs     06/21/22  0610 06/20/22  0536 06/18/22  0526   WBC 9.1 8.5 9.6   HGB 9.6* 9.5* 9.0*   HCT 29.4* 28.4* 26.2*   MCV 93.0 93.6 92.2    271 276     Lab Results   Component Value Date    CREATININE 1.0 06/21/2022     Lab Results   Component Value Date    LABALBU 3.1 (L) 06/18/2022     Lab Results   Component Value Date    ALT 9 (L) 06/18/2022    AST 12 (L) 06/18/2022    ALKPHOS 67 06/18/2022    BILITOT 0.3 06/18/2022      Lab Results   Component Value Date    LABA1C 7.2 04/15/2022     Other recent pertinent labs: Anion gap 10. WBC diff normal.   ______________________________    Recent pertinent micro results:  May 26 ER WCx -- MSSA and mixed skin mary, plus GNR on Gram stain, presumably anaerobes.                                                             June 7 WCC WCx -- MRSA, MRCONS, and a pan-(S) Pseudomonas.                                                                  June 16 BCx negative. June 16 new WCx with Pseudomonas only (while on doxy). June 17 OR Cx also with Pseudomonas only (anaerobic pending). June 17 OR path -- Acute osteomyelitis with osteonecrosis. The inflammatory changes are seen in sections of both the bony proximal margin and separately submitted portion of bone.  ______________________________    Recent imaging results (last 7 days):     XR FOOT RIGHT (MIN 3 VIEWS)    Result Date: 6/20/2022  Status post amputations as described above. There are some lucencies within the soft tissues of the great toe amputation surgical bed.   This most likely represent sequela of recent surgery. However, in the appropriate clinical setting a soft tissue infection cannot be fully excluded. XR FOOT RIGHT (MIN 3 VIEWS)    Result Date: 6/17/2022  The patient is immediately postop from amputation of the great toe at the level of the tarsometatarsal joint. 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, with necrosis of bone ([facility]) E11.621, L97.514    Acute osteomyelitis of metatarsal bone of right foot ([facility]) M86.171    MRSA infection A49.02    Pseudomonas infection A49.8    CAD (coronary artery disease) I25.10    Malabsorption syndrome K90.9    Normocytic normochromic anemia D64.9    Sensorineural hearing loss, bilateral H90.3    Diabetic foot infection ([facility]) E11.628, L08.9     Assessment of today's active condition(s):      --          Background of relatively well controlled DM2, neuropathy, prior toe amp, some midfoot arthropathy, also chronic LE lymphedema. No strong signs of PAD.     --          Longstanding right plantar forefoot ulcer, recently with symptoms and signs of acute infection, and then with MRI evidence of extensive osteo and septic arthritis in the MPJ; I think we need to assume that the MSSA, MRSA, Pseudo and anaerobes could all be possible pathogens.      --          Not systemically ill from this now, but a limb-threatening infection, and directly admitted yesterday for urgent IV Abx and surgical management. Now POD 4 from open first ray amputation, POD 1 from debridement and delayed primary closure.  Osteo would have been cured surgically, but a degree of soft tissue infection could certainly remain. Tolerating ABx well so far. Treatment recs:     Await plans for rehab. Can change to oral Cipro and doxycycline at the time of discharge. Anticipate he might only need a week or so of additional Abx, for any soft tissue infection that remained after surgery. Watch for Cdiff, allergy, Candidiasis, etc. Keep leg elevated to keep swelling down. PT / weightbearing per Dr. Cooper Vivas. I would imagine Dr. Cooper Vivas would want to see him next week in follow-up. I'll be sure that's coordinated with the wound-care center. D/W Dr. Brit Byrd.      Electronically signed by Sergio Masters MD on 6/21/2022 at 10:52 AM.

## 2022-06-21 NOTE — PROGRESS NOTES
perfect serve sent to MD  to confirm before D/C  If Pt needs   Script for pain med's perfect serve sent to MD.

## 2022-06-21 NOTE — PLAN OF CARE
Problem: Discharge Planning  Goal: Discharge to home or other facility with appropriate resources  Outcome: Progressing     Problem: Safety - Adult  Goal: Free from fall injury  Outcome: Progressing
Problem: Discharge Planning  Goal: Discharge to home or other facility with appropriate resources  Outcome: Progressing     Problem: Safety - Adult  Goal: Free from fall injury  Outcome: Progressing     Problem: ABCDS Injury Assessment  Goal: Absence of physical injury  Outcome: Progressing     Problem: Chronic Conditions and Co-morbidities  Goal: Patient's chronic conditions and co-morbidity symptoms are monitored and maintained or improved  Outcome: Progressing
Problem: Discharge Planning  Goal: Discharge to home or other facility with appropriate resources  Outcome: Progressing     Problem: Safety - Adult  Goal: Free from fall injury  Outcome: Progressing     Problem: ABCDS Injury Assessment  Goal: Absence of physical injury  Outcome: Progressing     Problem: Chronic Conditions and Co-morbidities  Goal: Patient's chronic conditions and co-morbidity symptoms are monitored and maintained or improved  Outcome: Progressing     Problem: Pain  Goal: Verbalizes/displays adequate comfort level or baseline comfort level  Outcome: Progressing
Problem: Safety - Adult  Goal: Free from fall injury  Outcome: Progressing     Problem: Chronic Conditions and Co-morbidities  Goal: Patient's chronic conditions and co-morbidity symptoms are monitored and maintained or improved  Outcome: Progressing
and Phase II process. 23.  Patient pain level is established preoperatively using age appropriate pain scale. 24.  The patient will move to fall risk upon sedation- during and through the recovery phase. Interventions- orient the patient to the environment, especially the location of the bathroom; provide treaded socks/non-skid footwear; demonstrate and teach back use of the nurse's call system; instruct the patient to call for help before getting out of bed; lock all movable equipment before transferring patient; keep bed in lowest position possible.  25.  Other:

## 2022-06-21 NOTE — PROGRESS NOTES
Discharge order received. Patient informed of discharge. Copy of discharge instructions/ Signed prescriptions  given to EMT's. IV and telemetry removed.  All patient belongings packed and sent with patient upon

## 2022-06-21 NOTE — DISCHARGE SUMMARY
Name:  Christopher Armstrong  Room:  3517/4403-42  MRN:    [id number]    Discharge Summary      This discharge summary is in conjunction with a complete physical exam done on the day of discharge. Discharging Physician: Dr. Wilman Randall MD     Admit: 6/16/2022  Discharge:  6/21/2022     HPI taken from admission H&P:    The patient is a 81 y.o. male with pmhx of CVA, DM, chronic ulcers, HTN, HLD, osteomyelitis  who presented to [facility] for direct admission from wound clinic. Pt is not the best historian, he has no complaints himself. Pt has had chronic wound under right fist metatarsophalangeal joint for years, he had been following with wound clinic but stopped going because he thought the wound had healed up. It recently started having malodorous discharge and area became swollen over last couple of weeks according to his daughter. He went to the ER and had xrays and was given oral antibiotics. He got set back up with wound care and had a wound check today. The wound was debrided and had exposed bone. He has still been able to ambulate without difficulty, does endorse some loss of sensation in his feet bilaterally and has had some chills off and on. He also has had some diarrhea since starting his PO antibiotics at home but reports that has resolved. He does endorse slight decrease in appetite. Pt denies fever, chest pain, dyspnea, nausea, vomiting, dizziness, syncope. Diagnoses this Admission and Hospital Course   #Chronic decubitus ulcers to right foot   #Right foot osteomyelitis of first ray   -Started on IV zosyn and vancomycin.   Monitored renal function closely  -MRI right foot with osteomyelitis of  proximal 1st phalynx to 1st metatarsal of first toe   -ID consulted   -Underwent I&D right foot with first ray amputation 6/17  -Previous wound cx with MRSA , repeat wound cx with light growth of Pseudomonas cipro sensitive  -S.p wound closure on 6/20   -No fevers, wbc normal   -Dc on oral cipro x 7 days  -F/w wound care      #Normocytic Anemia   -hx of WILDER  -continued PO iron      #DM type 2  -continue glipizide and metformin   -A1c at 7.2  -SSI   -continue to monitor BS   -Blood sugars been trending low. Discontinued glipizide.     #HTN   -on lisinopril   -monitored BP; stable     #HLD   -not on statin      #Hx of CVA   -not on ASA or statin     Procedures (Please Review Full Report for Details)  INCISION AND DRAINAGE RIGHT FOOT WITH FIRST RAY AMPUTATION 6/17  DELAYED PRIMARY CLOSURE RIGHT FOOT 6/20    Consults    ID  Podiatry    Physical Exam at Discharge:    BP (!) 153/67   Pulse 74   Temp 98.1 °F (36.7 °C) (Oral)   Resp 16   SpO2 97%     Gen: elderly male, No distress. Alert. Pleasant elderly male   Eyes: PERRL. No sclera icterus. No conjunctival injection. Neck: No JVD.  No Carotid Bruit. Trachea midline. Resp: No accessory muscle use. No crackles. No wheezes. No rhonchi. CV: Regular rate. Regular rhythm. No murmur.  No rub. No edema. Peripheral Pulses: +2 palpable left foot   GI: Non-tender. Non-distended. No masses. No organomegaly. Normal bowel sounds. No hernia. Skin: right foot dressing dry  M/S: No cyanosis. No joint deformity. No clubbing. Neuro: Awake alert and oriented . Grossly nonfocal    Hearing def noted     CBC:   Recent Labs     06/20/22  0536 06/21/22  0610   WBC 8.5 9.1   HGB 9.5* 9.6*   HCT 28.4* 29.4*   MCV 93.6 93.0    287     BMP:   Recent Labs     06/20/22  0536 06/21/22  0610    140   K 4.4 4.1    104   CO2 23 26   BUN 14 11   CREATININE 0.9 1.0       RADIOLOGY  XR FOOT RIGHT (MIN 3 VIEWS)   Final Result   Status post amputations as described above. There are some lucencies within   the soft tissues of the great toe amputation surgical bed. This most likely   represent sequela of recent surgery. However, in the appropriate clinical   setting a soft tissue infection cannot be fully excluded.          XR FOOT RIGHT (MIN 3 VIEWS)   Final Result The patient is immediately postop from amputation of the great toe at the   level of the tarsometatarsal joint. MRI right foot 6/13/22     1. Plantar ulcer at the 1st MTP joint with surrounding cellulitis. 2. Extensive osteomyelitis in the proximal phalanx 1st digit and throughout   the 1st metatarsal.  Superimposed pathologic fracture has developed at the   1st metatarsal neck.  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 3. Small amount of fluid in the 1st interspace extending into the dorsal soft   tissues with a couple punctate foci of gas.  Findings likely relate to the   plantar ulcer and extension of the infection into the 1st interspace.         Wound cx - pseudomonas - cipro /levaquin sensitive     Recent wound cx showed MRSA        Discharge Medications     Medication List      START taking these medications    ciprofloxacin 500 MG tablet  Commonly known as: Cipro  Take 1 tablet by mouth 2 times daily for 7 days        CONTINUE taking these medications    Accu-Chek Consuelo Plus strip  Generic drug: blood glucose test strips  TEST DAILY AS NEEDED     doxycycline hyclate 100 MG tablet  Commonly known as: VIBRA-TABS  Take 1 tablet by mouth 2 times daily for 7 days     famotidine 20 MG tablet  Commonly known as: PEPCID  TAKE ONE TABLET BY MOUTH DAILY     ferrous sulfate 325 (65 Fe) MG tablet  Commonly known as: IRON 325     gentamicin 0.1 % cream  Commonly known as: GARAMYCIN  Apply topically daily wtih dressing changes.      glipiZIDE 10 MG tablet  Commonly known as: GLUCOTROL  TAKE ONE TABLET BY MOUTH TWICE A DAY BEFORE MEALS     lisinopril 5 MG tablet  Commonly known as: PRINIVIL;ZESTRIL  TAKE ONE TABLET BY MOUTH DAILY     metFORMIN 1000 MG tablet  Commonly known as: GLUCOPHAGE  Take 1 tablet by mouth 2 times daily (with meals) For diabetes     MULTIVITAMIN PO           Where to Get Your Medications      Information about where to get these medications is not yet available    Ask your

## 2022-06-21 NOTE — FLOWSHEET NOTE
06/21/22 1016   Vitals   Temp 97.3 °F (36.3 °C)   Temp Source Oral   Heart Rate 88   Heart Rate Source Monitor   Resp 16   /63   BP Location Left upper arm   BP Upper/Lower Upper   BP Method Automatic   Level of Consciousness Alert (0)   MEWS Score 1   Oxygen Therapy   SpO2 97 %   O2 Device None (Room air)

## 2022-06-21 NOTE — PROGRESS NOTES
Inpatient Physical Therapy Evaluation and Treatment    Unit: 2 [address]  Date:  6/21/2022  Patient Name:    Brianna Neely  Admitting diagnosis:  Acute osteomyelitis of metatarsal bone of right foot [facility]) Brenton Carmona  Admit Date:  6/16/2022  Precautions/Restrictions/WB Status/ Lines/ Wounds/ Oxygen: Fall risk, Bed/chair alarm, Lines -IV, [place] (hard of hearing), Telemetry, WB Restrictions (NWB on the RLE) and Isolation Precautions: Contact    Treatment Time:  0900 - 0950  Treatment Number:  1   Timed Code Treatment Minutes: 40 minutes  Total Treatment Minutes:  50  minutes    Patient Goals for Therapy: \" Go home \"          Discharge Recommendations: SNF  DME needs for discharge: defer to facility       Therapy recommendation for EMS Transport: requires transport by cot due to need of lift equipment    Therapy recommendations for staff:   Assist of 1 with use of BENTLEY STEDY with NWB on the RLE for all transfers to/from [facility]  to/from chair   Assist of 2 with use of RW and gait belt with NWB on the RLE for all transfers to/from [facility]  to/from chair    History of Present Illness: H & P as per VIDHYA Johns's note dated 6/16/2022  The patient is a 81 y.o. male with pmhx of CVA, DM, chronic ulcers, HTN, HLD, osteomyelitis  who presented to [facility] [place] for direct admission from wound clinic. Pt is not the best historian, he has no complaints himself. Pt has had chronic wound under right fist metatarsophalangeal joint for years, he had been following with wound clinic but stopped going because he thought the wound had healed up. It recently started having malodorous discharge and area became swollen over last couple of weeks according to his daughter. He went to the ER and had xrays and was given oral antibiotics. He got set back up with wound care and had a wound check today. The wound was debrided and had exposed bone.  He has still been able to ambulate without difficulty, does endorse some loss of sensation in his feet bilaterally and has had some chills off and on. He also has had some diarrhea since starting his PO antibiotics at home but reports that has resolved. He does endorse slight decrease in appetite. Pt denies fever, chest pain, dyspnea, nausea, vomiting, dizziness, syncope. 6/17: underwent incision and drainage of the right foot first ray amputation   6/20: DELAYED PRIMARY CLOSURE RIGHT FOOT (Right Foot)    Home Health S4 Level Recommendation:  Level 3 Safety  AM-PAC Mobility Score    AM-PAC Inpatient Mobility Raw Score : 12       Preadmission Environment    Pt. Etta Cano spouse  (wife is a bilateral amputee, uses w/c for mobility)  Home environment:    one story home  Steps to enter first floor:   ramp            Steps to second floor: N/A  Bathroom:       Tub/Shower unit, Grab bars and Shower Bench   Equipment owned:     Three wheeled Rollator       Preadmission Status / PLOF:  History of falls             No   Pt. Able to drive          Yes  Pt Fully independent with ADL's         Yes  Pt. Required assistance from family for:  Independent PTA  - wife does most of the cooking and patient does vacuuming/laundry  Pt. Fully independent for transfers and gait and walked with: Rollator - recently began using again due to the foot infection, had been walking without AD prior to foot infection worsening. 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     Pain   Yes  Location: R anterior thigh and L knee and calf with standing activities  Rating: moderate /10  Pain Medicine Status: RN notified, pain was resolved after sitting down. Cognition    A&O Person, Place and Time - able to state \"hospital\" for place but unable to specify which one, stated \"June 2020\" for time and \"I don't follow all that\" when reoriented  Able to follow 1 step commands    Subjective  Patient lying supine in bed with no family present. Pt agreeable to this PT eval & tx.      Upper Extremity ROM/Strength  Please see OT evaluation. Lower Extremity ROM / Strength   AROM WFL: No  ROM limitations: deferred to assess AROM on the R ankle and foot due to surgical precautions. Strength Assessment (measured on a 0-5 scale):  R LE   Quad   3   Ant Tib  Deferred    Hamstring 3-   Iliopsoas 3  L LE  Quad   4-   Ant Tib  4-   Hamstring 3+   Iliopsoas 3+    Lower Extremity Sensation    WFL    Lower Extremity Proprioception:   WFL    Coordination and Tone  WFL    Balance  Sitting:  Fair +; CGA  Comments:     Standing: Fair -; Min A   Comments: ~3 min with RW. Bed Mobility   Supine to Sit:    SBA  Sit to Supine:   Not Tested  Rolling:   Not Tested  Scooting in sitting: CGA  Scooting in supine:  Not Tested    Transfer Training     Sit to stand:   Min A with RW and STEDY  Stand to sit:   CGA  Bed to Chair:   Mod A  and 2 persons with use of gait belt and rolling walker (RW), Total A x 1 with STEDY; patient was attempted with stand pivot transfers but patient had difficulty to maintain NWB precautions on the RLE and was unable to pivot hence transfer was completed with STEDY. Gait gait deferred due to difficulty with transfers; pt ambulated 0 ft. Stair Training deferred, pt does not have stairs in home environment    Activity Tolerance   Pt completed therapy session with Pain noted with bilateral LEs which resolved after sitting down   Supine:  SpO2: 94% on RA  HR: 80  BP: 146/65    Positioning Needs   Pt reclined in chair, alarm set, positioned in proper neutral alignment and pressure relief provided. Call light provided and all needs within reach    Exercises Initiated  all completed bilaterally unless indicated  Ankle Pumps x 20 reps only on the LLE  LAQ x 20 reps  SLR x 20 reps  marching x 20 reps  Hip abduction: x 20 reps    Other  None.      Patient/Family Education   Pt educated on role of inpatient PT, POC, importance of continued activity, DC recommendations, safety awareness, transfer techniques, HEP and calling for assist with mobility. Assessment  Pt seen for Physical Therapy evaluation in acute care setting. Pt demonstrated decreased Activity tolerance, Balance, ROM, Safety and Strength as well as decreased independence with Ambulation, Bed Mobility  and Transfers. Recommending SNF upon discharge as patient functioning well below baseline, demonstrates good rehab potential and unable to return home due to limited or no family support, burden of care beyond caregiver ability, home environment not conducive to patient recovery and limited safety awareness. Goals : To be met in 3 visits:  1). Independent with LE Ex x 10 reps    To be met in 6 visits:  1). Supine to/from sit: SBA  2). Sit to/from stand: CGA  3). Bed to chair: Min A   4). Gait: Ambulate 5 ft.   with  Mod A  and use of LRAD (least restrictive assistive device)  5). Tolerate B LE exercises 3 sets of 10-15 reps      Rehabilitation Potential: Good  Strengths for achieving goals include:   Pt motivated, PLOF and Pt cooperative   Barriers to achieving goals include:    Pain    Plan    To be seen 3-5 x / week  while in acute care setting for therapeutic exercises, bed mobility, transfers, progressive gait training, balance training, and family/patient education. Signature: Nico Rodriguez MS PT, # [id number]    If patient discharges from this facility prior to next visit, this note will serve as the Discharge Summary.

## 2022-06-21 NOTE — PROGRESS NOTES
Progress Note    Admit Date:  6/16/2022    81 y.o. male with pmhx of CVA, DM, chronic ulcers, HTN, HLD, osteomyelitis  who presented to [facility] for direct admission from wound clinic.  underwent incision and drainage of the right foot first ray amputation 6/17  S.p delayed wound closure on 6/20     Subjective:  Mr. Stefan Najjar Denies complaints, pain controlled in right foot  No fevers    No diarrhea any more         Objective:   No data found. Intake/Output Summary (Last 24 hours) at 6/21/2022 0752  Last data filed at 6/21/2022 0258  Gross per 24 hour   Intake 240 ml   Output 1500 ml   Net -1260 ml       Physical Exam:    Gen: elderly male, No distress. Alert. Pleasant elderly male   Eyes: PERRL. No sclera icterus. No conjunctival injection. Neck: No JVD. No Carotid Bruit. Trachea midline. Resp: No accessory muscle use. No crackles. No wheezes. No rhonchi. CV: Regular rate. Regular rhythm. No murmur. No rub. No edema. Peripheral Pulses: +2 palpable left foot   GI: Non-tender. Non-distended. No masses. No organomegaly. Normal bowel sounds. No hernia. Skin: right foot dressing dry  M/S: No cyanosis. No joint deformity. No clubbing. Neuro: Awake alert and oriented . Grossly nonfocal    Hearing def noted       Data:  CBC:   Recent Labs     06/20/22  0536 06/21/22  0610   WBC 8.5 9.1   HGB 9.5* 9.6*   HCT 28.4* 29.4*   MCV 93.6 93.0    287     BMP:   Recent Labs     06/20/22  0536 06/21/22  0610    140   K 4.4 4.1    104   CO2 23 26   BUN 14 11   CREATININE 0.9 1.0     LIVER PROFILE:   No results for input(s): AST, ALT, LIPASE, BILIDIR, BILITOT, ALKPHOS in the last 72 hours. Invalid input(s): AMYLASE,  ALB  PT/INR:   No results for input(s): PROTIME, INR in the last 72 hours. RADIOLOGY  XR FOOT RIGHT (MIN 3 VIEWS)   Final Result   Status post amputations as described above. There are some lucencies within   the soft tissues of the great toe amputation surgical bed. This most likely   represent sequela of recent surgery. However, in the appropriate clinical   setting a soft tissue infection cannot be fully excluded. XR FOOT RIGHT (MIN 3 VIEWS)   Final Result   The patient is immediately postop from amputation of the great toe at the   level of the tarsometatarsal joint. MRI right foot 6/13/22    1. Plantar ulcer at the 1st MTP joint with surrounding cellulitis. 2. Extensive osteomyelitis in the proximal phalanx 1st digit and throughout   the 1st metatarsal.  Superimposed pathologic fracture has developed at the   1st metatarsal neck.  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 3. Small amount of fluid in the 1st interspace extending into the dorsal soft   tissues with a couple punctate foci of gas.  Findings likely relate to the   plantar ulcer and extension of the infection into the 1st interspace. Wound cx - pseudomonas - cipro /levaquin sensitive    Recent wound cx showed MRSA    Assessment/Plan:    #Chronic decubitus ulcers to right foot   #  Right foot osteomyelitis of first ray   Started on IV zosyn and vancomycin. Monitor renal function closely  -MRI right foot with osteomyelitis of  proximal 1st phalynx to 1st metatarsal of first toe   -ID consulted   Underwent I&D right foot with first ray amputation 6/17  Previous wound cx with MRSA , repeat wound cx with  Light growth of Pseudomonas cipro sensitive  S.p wound closure on 6/20   No fevers, wbc normal   Dc on oral levaquin   F/w wound care      #Normocytic Anemia   -hx of WILDER  -continue PO iron   -continue to monitor         #DM type 2  -continue glipizide and metformin   - A1c at 7.2  -SSI   -continue to monitor BS   Blood sugars been trending low. Discontinue glipizide.     #HTN   -on lisinopril   -continue to monitor BP      #HLD   -not on statin      #Hx of CVA   -not on ASA or statin      DVT Prophylaxis: lovenox     Diet: ADULT DIET;  Regular; 4 carb choices (60 gm/meal)  Code Status: Full Code    Dc planning to SNF    Veronica Bhandari MD, 6/21/2022 7:52 AM

## 2022-06-21 NOTE — CARE COORDINATION
DISCHARGE ORDER  Date/Time 2022 1:16 PM  Completed by: Jorge Luis Larsen RN, Case Management    Patient Name: Gregorio Dooley    : 1935      Admit order Date and Status: IP 2022  Noted discharge order. (verify MD's last order for status of admission/Traditional Medicare 3 [place] Inpatient qualifying stay required for SNF)    Confirmed discharge plan with:              Patient:  Yes (at bedside) and spouse (via phone)                                   Discharge to Facility:   · Name: [facility]  · Address: [address] [place] [address]., Clari Foster, [facility] [address]  · Phone: [phone]  · Fax: [phone]    Facility phone number for staff giving report: se above  Pre-certification completed: Floor to SNF [facility] Exemption Notification (HENS) completed: TEGAN  Discharge orders and Continuity of Care faxed to facility:  YES     Transportation:               Medical Transport explained with choice list offered to pt/family. Choice: (no preference)  Agency used: [address] [place] [address] Road up time:   1830    Pt/family/Nursing/Facility aware of  time:   Patient, spouse  Pennie Hall RN  Lupe (RN, )  Brooke ([facility])    Ambulance form completed:  YES     Date Last IMM Given: 2022 10:30 am    Comments:    Pt is being d/c'd to SNF- [facility]. Pt's O2 sats are 97% on RA. Discharge timeout done with Pennie Hall RN. All discharge needs and concerns addressed. Discharging nurse to complete AHMET, reconcile AVS, and place final copy with patient's discharge packet. Discharging RN to ensure that written prescriptions for  Level II medications are sent with patient to the facility as per protocol.

## 2022-06-22 LAB
ANAEROBIC CULTURE: ABNORMAL
CULTURE SURGICAL: ABNORMAL
GRAM STAIN RESULT: ABNORMAL
ORGANISM: ABNORMAL

## 2022-06-24 ENCOUNTER — TELEPHONE (OUTPATIENT)
Dept: FAMILY MEDICINE CLINIC | Age: 87
End: 2022-06-24

## 2022-06-24 NOTE — TELEPHONE ENCOUNTER
Patient spouse requesting to speak with Dalia Choudhary. States patient is now at [facility] rehab facility in [facility]. She is having concerns about patient sweating through his clothes, having nausea, and weakness. Daughter told her it could possibly be that patient was having mini strokes and now she is very worried. States that she voiced concern to staff at the facility and was dismissed. States they told her it was most likely his diabetes, but that blood work was completed and those levels came back fine. She is wanting to know if an MRI or something can be ordered for patient to have to rule out mini strokes. Informed her that Lamar Forrest is out of office until 6/30/22 but that I would send message to covering provider as well as Lamar Forrest. Please advise.

## 2022-06-24 NOTE — TELEPHONE ENCOUNTER
I am unsure what I can do at this time saying I am not overseeing physician at rehab facility.   She is concerned she can request to speak to the DON and or attending provider in the rehab facility

## 2022-06-28 ENCOUNTER — HOSPITAL ENCOUNTER (OUTPATIENT)
Dept: WOUND CARE | Age: 87
Discharge: HOME | End: 2022-06-28
Payer: MEDICARE

## 2022-06-28 VITALS
DIASTOLIC BLOOD PRESSURE: 75 MMHG | SYSTOLIC BLOOD PRESSURE: 188 MMHG | RESPIRATION RATE: 20 BRPM | BODY MASS INDEX: 33.6 KG/M2 | HEART RATE: 95 BPM | TEMPERATURE: 97.5 F | HEIGHT: 68 IN

## 2022-06-28 DIAGNOSIS — L97.514 DIABETIC ULCER OF OTHER PART OF RIGHT FOOT ASSOCIATED WITH TYPE 2 DIABETES MELLITUS, WITH NECROSIS OF BONE (HCC): Primary | ICD-10-CM

## 2022-06-28 DIAGNOSIS — E11.621 DIABETIC ULCER OF OTHER PART OF RIGHT FOOT ASSOCIATED WITH TYPE 2 DIABETES MELLITUS, WITH NECROSIS OF BONE (HCC): Primary | ICD-10-CM

## 2022-06-28 PROCEDURE — 11043 DBRDMT MUSC&/FSCA 1ST 20/<: CPT

## 2022-06-28 RX ORDER — LIDOCAINE HYDROCHLORIDE 20 MG/ML
JELLY TOPICAL ONCE
Status: CANCELLED | OUTPATIENT
Start: 2022-06-28 | End: 2022-06-28

## 2022-06-28 RX ORDER — CLOBETASOL PROPIONATE 0.5 MG/G
OINTMENT TOPICAL ONCE
Status: CANCELLED | OUTPATIENT
Start: 2022-06-28 | End: 2022-06-28

## 2022-06-28 RX ORDER — LIDOCAINE 40 MG/G
CREAM TOPICAL ONCE
Status: DISCONTINUED | OUTPATIENT
Start: 2022-06-28 | End: 2022-06-29 | Stop reason: HOSPADM

## 2022-06-28 RX ORDER — LIDOCAINE HYDROCHLORIDE 40 MG/ML
SOLUTION TOPICAL ONCE
Status: CANCELLED | OUTPATIENT
Start: 2022-06-28 | End: 2022-06-28

## 2022-06-28 RX ORDER — LIDOCAINE 40 MG/G
CREAM TOPICAL ONCE
Status: CANCELLED | OUTPATIENT
Start: 2022-06-28 | End: 2022-06-28

## 2022-06-28 RX ORDER — CHOLECALCIFEROL (VITAMIN D3) 125 MCG
5 CAPSULE ORAL NIGHTLY PRN
COMMUNITY
End: 2022-09-29 | Stop reason: CLARIF

## 2022-06-28 RX ORDER — GINSENG 100 MG
CAPSULE ORAL ONCE
Status: CANCELLED | OUTPATIENT
Start: 2022-06-28 | End: 2022-06-28

## 2022-06-28 RX ORDER — BETAMETHASONE DIPROPIONATE 0.05 %
OINTMENT (GRAM) TOPICAL ONCE
Status: CANCELLED | OUTPATIENT
Start: 2022-06-28 | End: 2022-06-28

## 2022-06-28 RX ORDER — ACETAMINOPHEN 325 MG/1
650 TABLET ORAL EVERY 4 HOURS PRN
COMMUNITY

## 2022-06-28 RX ORDER — GENTAMICIN SULFATE 1 MG/G
OINTMENT TOPICAL ONCE
Status: CANCELLED | OUTPATIENT
Start: 2022-06-28 | End: 2022-06-28

## 2022-06-28 RX ORDER — BACITRACIN ZINC AND POLYMYXIN B SULFATE 500; 1000 [USP'U]/G; [USP'U]/G
OINTMENT TOPICAL ONCE
Status: CANCELLED | OUTPATIENT
Start: 2022-06-28 | End: 2022-06-28

## 2022-06-28 RX ORDER — LIDOCAINE 50 MG/G
OINTMENT TOPICAL ONCE
Status: CANCELLED | OUTPATIENT
Start: 2022-06-28 | End: 2022-06-28

## 2022-06-28 RX ORDER — BACITRACIN, NEOMYCIN, POLYMYXIN B 400; 3.5; 5 [USP'U]/G; MG/G; [USP'U]/G
OINTMENT TOPICAL ONCE
Status: CANCELLED | OUTPATIENT
Start: 2022-06-28 | End: 2022-06-28

## 2022-06-28 ASSESSMENT — PAIN SCALES - GENERAL: PAINLEVEL_OUTOF10: 0

## 2022-06-28 NOTE — PLAN OF CARE
Pt seen in [facility] as post op visit - currently at [address] ECF for rehab - Right foot incision well aprrox  and Open area to Right met head = plantar aspect - debride per Dr. Good Llanes - - cont wound care as directed   RIGHT  FOOT Amp site- incision  Right Plantar foot-met head- plantar wound  Betadine ointment  Gauze  kerlix  specialsit cast padding toes to knee  Ace wrap toes to knee  Medium spandigrip-  Change daily    Please provide darco shoe with peg asst for offloading-    Reviewed AVS - Son present at visit - discussed long term offloading with inserts - son will look into company to provide inserts - f/u in 1 week
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
## 2022-07-05 ENCOUNTER — HOSPITAL ENCOUNTER (OUTPATIENT)
Dept: WOUND CARE | Age: 87
Discharge: HOME | End: 2022-07-05
Payer: MEDICARE

## 2022-07-05 VITALS
HEIGHT: 68 IN | RESPIRATION RATE: 20 BRPM | WEIGHT: 216.25 LBS | HEART RATE: 82 BPM | SYSTOLIC BLOOD PRESSURE: 147 MMHG | DIASTOLIC BLOOD PRESSURE: 69 MMHG | TEMPERATURE: 97.1 F | BODY MASS INDEX: 32.77 KG/M2

## 2022-07-05 DIAGNOSIS — E11.621 DIABETIC ULCER OF OTHER PART OF RIGHT FOOT ASSOCIATED WITH TYPE 2 DIABETES MELLITUS, WITH NECROSIS OF BONE (HCC): Primary | ICD-10-CM

## 2022-07-05 DIAGNOSIS — L97.514 DIABETIC ULCER OF OTHER PART OF RIGHT FOOT ASSOCIATED WITH TYPE 2 DIABETES MELLITUS, WITH NECROSIS OF BONE (HCC): Primary | ICD-10-CM

## 2022-07-05 PROCEDURE — 11042 DBRDMT SUBQ TIS 1ST 20SQCM/<: CPT

## 2022-07-05 RX ORDER — LIDOCAINE HYDROCHLORIDE 20 MG/ML
JELLY TOPICAL ONCE
Status: CANCELLED | OUTPATIENT
Start: 2022-07-05 | End: 2022-07-05

## 2022-07-05 RX ORDER — BACITRACIN, NEOMYCIN, POLYMYXIN B 400; 3.5; 5 [USP'U]/G; MG/G; [USP'U]/G
OINTMENT TOPICAL ONCE
Status: CANCELLED | OUTPATIENT
Start: 2022-07-05 | End: 2022-07-05

## 2022-07-05 RX ORDER — GENTAMICIN SULFATE 1 MG/G
OINTMENT TOPICAL ONCE
Status: CANCELLED | OUTPATIENT
Start: 2022-07-05 | End: 2022-07-05

## 2022-07-05 RX ORDER — BACITRACIN ZINC AND POLYMYXIN B SULFATE 500; 1000 [USP'U]/G; [USP'U]/G
OINTMENT TOPICAL ONCE
Status: CANCELLED | OUTPATIENT
Start: 2022-07-05 | End: 2022-07-05

## 2022-07-05 RX ORDER — BETAMETHASONE DIPROPIONATE 0.05 %
OINTMENT (GRAM) TOPICAL ONCE
Status: CANCELLED | OUTPATIENT
Start: 2022-07-05 | End: 2022-07-05

## 2022-07-05 RX ORDER — LIDOCAINE 50 MG/G
OINTMENT TOPICAL ONCE
Status: CANCELLED | OUTPATIENT
Start: 2022-07-05 | End: 2022-07-05

## 2022-07-05 RX ORDER — LIDOCAINE 40 MG/G
CREAM TOPICAL ONCE
Status: DISCONTINUED | OUTPATIENT
Start: 2022-07-05 | End: 2022-07-06 | Stop reason: HOSPADM

## 2022-07-05 RX ORDER — CLOBETASOL PROPIONATE 0.5 MG/G
OINTMENT TOPICAL ONCE
Status: CANCELLED | OUTPATIENT
Start: 2022-07-05 | End: 2022-07-05

## 2022-07-05 RX ORDER — LIDOCAINE HYDROCHLORIDE 40 MG/ML
SOLUTION TOPICAL ONCE
Status: CANCELLED | OUTPATIENT
Start: 2022-07-05 | End: 2022-07-05

## 2022-07-05 RX ORDER — LIDOCAINE 40 MG/G
CREAM TOPICAL ONCE
Status: CANCELLED | OUTPATIENT
Start: 2022-07-05 | End: 2022-07-05

## 2022-07-05 RX ORDER — GINSENG 100 MG
CAPSULE ORAL ONCE
Status: CANCELLED | OUTPATIENT
Start: 2022-07-05 | End: 2022-07-05

## 2022-07-05 NOTE — PLAN OF CARE
Pt seen in [address] [place] [address],[address]- Right amp site incision well approc- plantar foot wound improved -= debride per Dr. Dewayne Peck - cont wound care as follows   RIGHT  FOOT Amp site- incision  Right Plantar foot-met head- plantar wound  Betadine ointment  Gauze  kerlix  specialsit cast padding toes to knee  Ace wrap toes to knee  Medium spandigrip-  Change daily    Pt still in ECF for rehab - tolerating well - reviewed AVS - f/u in 1 week
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
## 2022-07-12 ENCOUNTER — HOSPITAL ENCOUNTER (OUTPATIENT)
Dept: WOUND CARE | Age: 87
Discharge: HOME | End: 2022-07-12
Payer: MEDICARE

## 2022-07-12 VITALS
TEMPERATURE: 98.2 F | RESPIRATION RATE: 20 BRPM | SYSTOLIC BLOOD PRESSURE: 125 MMHG | HEART RATE: 73 BPM | DIASTOLIC BLOOD PRESSURE: 65 MMHG

## 2022-07-12 DIAGNOSIS — L97.514 DIABETIC ULCER OF OTHER PART OF RIGHT FOOT ASSOCIATED WITH TYPE 2 DIABETES MELLITUS, WITH NECROSIS OF BONE (HCC): Primary | ICD-10-CM

## 2022-07-12 DIAGNOSIS — E11.621 DIABETIC ULCER OF OTHER PART OF RIGHT FOOT ASSOCIATED WITH TYPE 2 DIABETES MELLITUS, WITH NECROSIS OF BONE (HCC): Primary | ICD-10-CM

## 2022-07-12 PROCEDURE — 29581 APPL MULTLAYER CMPRN SYS LEG: CPT

## 2022-07-12 PROCEDURE — 11043 DBRDMT MUSC&/FSCA 1ST 20/<: CPT

## 2022-07-12 RX ORDER — LIDOCAINE HYDROCHLORIDE 20 MG/ML
JELLY TOPICAL ONCE
Status: CANCELLED | OUTPATIENT
Start: 2022-07-12 | End: 2022-07-12

## 2022-07-12 RX ORDER — LIDOCAINE 40 MG/G
CREAM TOPICAL ONCE
Status: DISCONTINUED | OUTPATIENT
Start: 2022-07-12 | End: 2022-07-13 | Stop reason: HOSPADM

## 2022-07-12 RX ORDER — LIDOCAINE 40 MG/G
CREAM TOPICAL ONCE
Status: CANCELLED | OUTPATIENT
Start: 2022-07-12 | End: 2022-07-12

## 2022-07-12 RX ORDER — BACITRACIN, NEOMYCIN, POLYMYXIN B 400; 3.5; 5 [USP'U]/G; MG/G; [USP'U]/G
OINTMENT TOPICAL ONCE
Status: CANCELLED | OUTPATIENT
Start: 2022-07-12 | End: 2022-07-12

## 2022-07-12 RX ORDER — BACITRACIN ZINC AND POLYMYXIN B SULFATE 500; 1000 [USP'U]/G; [USP'U]/G
OINTMENT TOPICAL ONCE
Status: CANCELLED | OUTPATIENT
Start: 2022-07-12 | End: 2022-07-12

## 2022-07-12 RX ORDER — GENTAMICIN SULFATE 1 MG/G
OINTMENT TOPICAL ONCE
Status: CANCELLED | OUTPATIENT
Start: 2022-07-12 | End: 2022-07-12

## 2022-07-12 RX ORDER — BETAMETHASONE DIPROPIONATE 0.05 %
OINTMENT (GRAM) TOPICAL ONCE
Status: CANCELLED | OUTPATIENT
Start: 2022-07-12 | End: 2022-07-12

## 2022-07-12 RX ORDER — CLOBETASOL PROPIONATE 0.5 MG/G
OINTMENT TOPICAL ONCE
Status: CANCELLED | OUTPATIENT
Start: 2022-07-12 | End: 2022-07-12

## 2022-07-12 RX ORDER — LIDOCAINE HYDROCHLORIDE 40 MG/ML
SOLUTION TOPICAL ONCE
Status: CANCELLED | OUTPATIENT
Start: 2022-07-12 | End: 2022-07-12

## 2022-07-12 RX ORDER — GINSENG 100 MG
CAPSULE ORAL ONCE
Status: CANCELLED | OUTPATIENT
Start: 2022-07-12 | End: 2022-07-12

## 2022-07-12 RX ORDER — LIDOCAINE 50 MG/G
OINTMENT TOPICAL ONCE
Status: CANCELLED | OUTPATIENT
Start: 2022-07-12 | End: 2022-07-12
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
## 2022-07-12 NOTE — PLAN OF CARE
Pt seen in [facility] - Amp site healing well - sutures removed today - steri strips applied per Dr. Cuca Villela- debride per Dr. Cuca Villela also - plantar wound improved - - treatment as follows  RIGHT  FOOT Amp site- incision  Right Plantar foot-met head- plantar wound  Steri strips applied per dr. Radha Huizar Coflex for compression   Leave in place for the week    Ordered sent to [facility] for diabetic shoes and inserts - Also notified Cleveland Soler  with pt info and contact info .  - AVS reviewed F/u in1 week

## 2022-07-13 NOTE — DISCHARGE INSTRUCTIONS
[address] [facility] Physician Orders and Discharge [address], [address]  [place], [facility]  Telephone: [phone]      Fax: [phone] home care company:   N/a . Your wound-care supplies will be provided by:  N/a . NAME:  Brianna Neely   YOB: 1935  PRIMARY DIAGNOSIS FOR WOUND CARE CENTER: Diabetic Foot Ulcer. Wound cleansing:   Do not scrub or use excessive force. Wash hands with soap and water before and after dressing changes. Prior to applying a clean dressing, cleanse wound with normal saline, wound cleanser, or mild soap and water. Ask your physician or nurse before getting the wound(s) wet in the shower. Wound care for home:     RIGHT  FOOT Amp site- incision  Right Plantar foot-met head- plantar wound- healed - foam to healed area  Steri strips applied per dr. Alicia Roman in place for the week     Cont  darco shoe with peg asst for offloading-        Please note, all wounds (unless stated otherwise here) were mechanically debrided at the time of cleansing here in the wound-care center today, so a small amount of pain, drainage or bleeding from that process might be expected, and is normal.      All products for home use, including multiple products for a single wound if applicable, are medically necessary in order to achieve the best chance at timely wound healing. See provider documentation for details if needed. Substituted dressings applied in the [address] [place] [address],[address] today, if applicable:     ***     New orders for this week (labs, imaging, medications, etc.):     Mikayla Smith from [facility] for orthotic shoe- order sent 7-12-22  See above wound care orders    Your physician has ordered Compression for treatment of venous ulcers, venous insufficiency,and/or Lymphedema.         Calamine co flex            Is a     Layer wrap- do not remove until wraps or dressings underneath. If you see anything concerning (redness, excess moisture, etc), please call and let us know right away.      Should you experience any significant changes in your wound(s) (including redness, increased warmth, increased pain, increased drainage, odor, or fever) or have questions about your wound care, please contact the [facility] at [phone] Monday-Thursday from 8:00 am - 4:30 pm, or Friday from 8:00 am - 2:30 pm.  If you need help with your wound outside these hours and cannot wait until we are again available, contact your home-care company (if applicable), your PCP, or go to the nearest emergency room

## 2022-07-18 LAB
FUNGUS (MYCOLOGY) CULTURE: NORMAL
FUNGUS STAIN: NORMAL

## 2022-07-19 ENCOUNTER — HOSPITAL ENCOUNTER (OUTPATIENT)
Dept: WOUND CARE | Age: 87
Discharge: HOME | End: 2022-07-19
Payer: MEDICARE

## 2022-07-19 VITALS
SYSTOLIC BLOOD PRESSURE: 147 MMHG | RESPIRATION RATE: 22 BRPM | TEMPERATURE: 98.4 F | HEART RATE: 90 BPM | DIASTOLIC BLOOD PRESSURE: 76 MMHG

## 2022-07-19 DIAGNOSIS — E11.621 DIABETIC ULCER OF OTHER PART OF RIGHT FOOT ASSOCIATED WITH TYPE 2 DIABETES MELLITUS, WITH NECROSIS OF BONE (HCC): Primary | ICD-10-CM

## 2022-07-19 DIAGNOSIS — L97.514 DIABETIC ULCER OF OTHER PART OF RIGHT FOOT ASSOCIATED WITH TYPE 2 DIABETES MELLITUS, WITH NECROSIS OF BONE (HCC): Primary | ICD-10-CM

## 2022-07-19 PROCEDURE — 11042 DBRDMT SUBQ TIS 1ST 20SQCM/<: CPT

## 2022-07-19 PROCEDURE — 29581 APPL MULTLAYER CMPRN SYS LEG: CPT

## 2022-07-19 RX ORDER — GENTAMICIN SULFATE 1 MG/G
OINTMENT TOPICAL ONCE
Status: CANCELLED | OUTPATIENT
Start: 2022-07-19 | End: 2022-07-19

## 2022-07-19 RX ORDER — BACITRACIN ZINC AND POLYMYXIN B SULFATE 500; 1000 [USP'U]/G; [USP'U]/G
OINTMENT TOPICAL ONCE
Status: CANCELLED | OUTPATIENT
Start: 2022-07-19 | End: 2022-07-19

## 2022-07-19 RX ORDER — LIDOCAINE 40 MG/G
CREAM TOPICAL ONCE
Status: CANCELLED | OUTPATIENT
Start: 2022-07-19 | End: 2022-07-19

## 2022-07-19 RX ORDER — LIDOCAINE 40 MG/G
CREAM TOPICAL ONCE
Status: DISCONTINUED | OUTPATIENT
Start: 2022-07-19 | End: 2022-07-20 | Stop reason: HOSPADM

## 2022-07-19 RX ORDER — LIDOCAINE HYDROCHLORIDE 40 MG/ML
SOLUTION TOPICAL ONCE
Status: CANCELLED | OUTPATIENT
Start: 2022-07-19 | End: 2022-07-19

## 2022-07-19 RX ORDER — LIDOCAINE 50 MG/G
OINTMENT TOPICAL ONCE
Status: CANCELLED | OUTPATIENT
Start: 2022-07-19 | End: 2022-07-19

## 2022-07-19 RX ORDER — BETAMETHASONE DIPROPIONATE 0.05 %
OINTMENT (GRAM) TOPICAL ONCE
Status: CANCELLED | OUTPATIENT
Start: 2022-07-19 | End: 2022-07-19

## 2022-07-19 RX ORDER — GINSENG 100 MG
CAPSULE ORAL ONCE
Status: CANCELLED | OUTPATIENT
Start: 2022-07-19 | End: 2022-07-19

## 2022-07-19 RX ORDER — LIDOCAINE HYDROCHLORIDE 20 MG/ML
JELLY TOPICAL ONCE
Status: CANCELLED | OUTPATIENT
Start: 2022-07-19 | End: 2022-07-19

## 2022-07-19 RX ORDER — CLOBETASOL PROPIONATE 0.5 MG/G
OINTMENT TOPICAL ONCE
Status: CANCELLED | OUTPATIENT
Start: 2022-07-19 | End: 2022-07-19

## 2022-07-19 RX ORDER — BACITRACIN, NEOMYCIN, POLYMYXIN B 400; 3.5; 5 [USP'U]/G; MG/G; [USP'U]/G
OINTMENT TOPICAL ONCE
Status: CANCELLED | OUTPATIENT
Start: 2022-07-19 | End: 2022-07-19

## 2022-07-19 ASSESSMENT — PAIN SCALES - GENERAL: PAINLEVEL_OUTOF10: 0
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
## 2022-07-20 NOTE — DISCHARGE INSTRUCTIONS
215 [facility] ([place], [place]) Physician Orders and Discharge [address]  [address], [address]  [place], [facility]  Telephone: [phone]      Fax: [phone] home care company:   N/a . Your wound-care supplies will be provided by:  N/a . NAME:  Barbra Pope   YOB: 1935  PRIMARY DIAGNOSIS FOR WOUND CARE CENTER: Diabetic Foot Ulcer. Wound cleansing:  Do not scrub or use excessive force. Wash hands with soap and water before and after dressing changes. Prior to applying a clean dressing, cleanse wound with normal saline, wound cleanser, or mild soap and water. Ask your physician or nurse before getting the wound(s) wet in the shower. Wound care for home:     RIGHT  FOOT Amp site- incision  Steri strips applied per dr. Mary Nunez in place for the week     Cont  darco shoe with peg asst for offloading-        Please note, all wounds (unless stated otherwise here) were mechanically debrided at the time of cleansing here in the wound-care center today, so a small amount of pain, drainage or bleeding from that process might be expected, and is normal.     All products for home use, including multiple products for a single wound if applicable, are medically necessary in order to achieve the best chance at timely wound healing. See provider documentation for details if needed. Substituted dressings applied in the [facility] today, if applicable:     ***     New orders for this week (labs, imaging, medications, etc.):   Call Dr Violetta Mcmullen to schedule an appt for 10 weeks from now  [place] [address] [facility], [facility],5Th Floor  Ph# [phone]    Ace Sullivan from [place] for orthotic shoe- order sent 7-12-22  See above wound care orders    Your physician has ordered Compression for treatment of venous ulcers, venous insufficiency,and/or Lymphedema.         Calamine co flex Is a     Layer wrap- do not remove until your next scheduled visit in [facility]- do not get wet. * If your wrap falls down, becomes painful or you have decreased sensation, and/or excessive drainage- please call the [facility] for RN visit to get your compression wrap changed   *You need to exercice as tolerated- walking is good   * Elevate your legs preferrably above level of your heart when sitting as much as possible   * The Goal of this therapy is to reduce Edema and get into long term compression garments to control venous insufficiency, lymphedema and reduce occurrence of venous ulcers           Additional instructions for specific diagnoses:     General comments for diabetic / neuropathic ulcers:  *  Unless you've been instructed not to remove your dressings, be sure to inspect your feet daily, and notify us of any major changes. *  If you do not have a long-term podiatrist, be sure to let us know. *  Moisturize your skin regularly with Vaseline, Aquaphor, Aveeno, CeraVe, Cetaphil, Eucerin, Lubriderm, etc; but keep the skin between your toes dry. *  Always allow your wound-care doctor or podiatrist to cut nails, calluses & corns. *  Be sure to always wear footwear that fits well, and NEVER go without shoes and socks. *  Do you know your Hemoglobin A1c level? You should! Please ask if you have questions. *  Be sure to adhere to any recommendations from your PCP or endocrinologist when it comes to diabetes medications and diet. If you have questions, please ask. *  If you smoke, your wound can not heal properly -- please talk with us when you're ready to quit. *  Do not soak your feet unless specifically instructed to do so by us. F/U Appointment is with Dr Gamboa Comes in 1 week, on                                   at                       .     Your nurse  is eLela Calixto.      If we applied slip-resistant hospital socks today, be sure to remove them at least once a day to inspect your toes

## 2022-07-26 ENCOUNTER — HOSPITAL ENCOUNTER (OUTPATIENT)
Dept: WOUND CARE | Age: 87
Discharge: HOME | End: 2022-07-26
Payer: MEDICARE

## 2022-07-26 VITALS
DIASTOLIC BLOOD PRESSURE: 78 MMHG | SYSTOLIC BLOOD PRESSURE: 189 MMHG | TEMPERATURE: 97.9 F | WEIGHT: 217.5 LBS | HEIGHT: 68 IN | BODY MASS INDEX: 32.96 KG/M2 | RESPIRATION RATE: 20 BRPM

## 2022-07-26 DIAGNOSIS — L97.514 DIABETIC ULCER OF OTHER PART OF RIGHT FOOT ASSOCIATED WITH TYPE 2 DIABETES MELLITUS, WITH NECROSIS OF BONE (HCC): Primary | ICD-10-CM

## 2022-07-26 DIAGNOSIS — E11.621 DIABETIC ULCER OF OTHER PART OF RIGHT FOOT ASSOCIATED WITH TYPE 2 DIABETES MELLITUS, WITH NECROSIS OF BONE (HCC): Primary | ICD-10-CM

## 2022-07-26 PROCEDURE — 29581 APPL MULTLAYER CMPRN SYS LEG: CPT

## 2022-07-26 PROCEDURE — 11042 DBRDMT SUBQ TIS 1ST 20SQCM/<: CPT

## 2022-07-26 RX ORDER — LIDOCAINE HYDROCHLORIDE 20 MG/ML
JELLY TOPICAL ONCE
Status: CANCELLED | OUTPATIENT
Start: 2022-07-26 | End: 2022-07-26

## 2022-07-26 RX ORDER — GENTAMICIN SULFATE 1 MG/G
OINTMENT TOPICAL ONCE
Status: CANCELLED | OUTPATIENT
Start: 2022-07-26 | End: 2022-07-26

## 2022-07-26 RX ORDER — BACITRACIN ZINC AND POLYMYXIN B SULFATE 500; 1000 [USP'U]/G; [USP'U]/G
OINTMENT TOPICAL ONCE
Status: CANCELLED | OUTPATIENT
Start: 2022-07-26 | End: 2022-07-26

## 2022-07-26 RX ORDER — LIDOCAINE 40 MG/G
CREAM TOPICAL ONCE
Status: CANCELLED | OUTPATIENT
Start: 2022-07-26 | End: 2022-07-26

## 2022-07-26 RX ORDER — LIDOCAINE HYDROCHLORIDE 40 MG/ML
SOLUTION TOPICAL ONCE
Status: CANCELLED | OUTPATIENT
Start: 2022-07-26 | End: 2022-07-26

## 2022-07-26 RX ORDER — GINSENG 100 MG
CAPSULE ORAL ONCE
Status: CANCELLED | OUTPATIENT
Start: 2022-07-26 | End: 2022-07-26

## 2022-07-26 RX ORDER — CLOBETASOL PROPIONATE 0.5 MG/G
OINTMENT TOPICAL ONCE
Status: CANCELLED | OUTPATIENT
Start: 2022-07-26 | End: 2022-07-26

## 2022-07-26 RX ORDER — LIDOCAINE 40 MG/G
CREAM TOPICAL ONCE
Status: DISCONTINUED | OUTPATIENT
Start: 2022-07-26 | End: 2022-07-27 | Stop reason: HOSPADM

## 2022-07-26 RX ORDER — BACITRACIN, NEOMYCIN, POLYMYXIN B 400; 3.5; 5 [USP'U]/G; MG/G; [USP'U]/G
OINTMENT TOPICAL ONCE
Status: CANCELLED | OUTPATIENT
Start: 2022-07-26 | End: 2022-07-26

## 2022-07-26 RX ORDER — BETAMETHASONE DIPROPIONATE 0.05 %
OINTMENT (GRAM) TOPICAL ONCE
Status: CANCELLED | OUTPATIENT
Start: 2022-07-26 | End: 2022-07-26

## 2022-07-26 RX ORDER — LIDOCAINE 50 MG/G
OINTMENT TOPICAL ONCE
Status: CANCELLED | OUTPATIENT
Start: 2022-07-26 | End: 2022-07-26

## 2022-07-26 NOTE — PLAN OF CARE
Pt seen in [address] [place] [address],[address] - Foot incision close to healed - debride per DR. Mansi Drake -  cont treatment as follows   RIGHT  FOOT Amp site- incision  Betadine ointment  Mepitel  Gauze  Calamine Coflex  Leave in place for the week     Cont  darco shoe with peg asst for offloading-Pt to f/u with Rohit Christina for off loading shoe / insert- AVS reviewed F/u in 1 week
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
## 2022-07-27 NOTE — DISCHARGE INSTRUCTIONS
215 [facility] Physician Orders and Discharge [address] [facility]  [address], [address]  [place], [facility]  Telephone: [phone]      Fax: [phone] home care company:   N/a . Your wound-care supplies will be provided by:  N/a . NAME:  Adi Davalos   YOB: 1935  PRIMARY DIAGNOSIS FOR WOUND CARE CENTER: Diabetic Foot Ulcer. Wound cleansing:  Do not scrub or use excessive force. Wash hands with soap and water before and after dressing changes. Prior to applying a clean dressing, cleanse wound with normal saline, wound cleanser, or mild soap and water. Ask your physician or nurse before getting the wound(s) wet in the shower. Wound care for home:     RIGHT  FOOT Amp site- incision  triad  Gauze  Calamine Coflex  Leave in place for the week     Cont  darco shoe with peg asst for offloading-        Please note, all wounds (unless stated otherwise here) were mechanically debrided at the time of cleansing here in the wound-care center today, so a small amount of pain, drainage or bleeding from that process might be expected, and is normal.     All products for home use, including multiple products for a single wound if applicable, are medically necessary in order to achieve the best chance at timely wound healing. See provider documentation for details if needed. Substituted dressings applied in the [facility] today, if applicable:     ***     New orders for this week (labs, imaging, medications, etc.):   Call Dr Jaciel Ramirez to schedule an appt for 10 weeks from now  [place] [address] [facility], [address] [facility],5Th Floor  Ph# [phone]    Minnie Morton from [place] for orthotic shoe- order sent 7-12-22  See above wound care orders    Your physician has ordered Compression for treatment of venous ulcers, venous insufficiency,and/or Lymphedema.         Calamine co flex            Is a     Layer wrap- do not remove until your next scheduled visit in [address] [place] [address],[address]- do not get wet. * If your wrap falls down, becomes painful or you have decreased sensation, and/or excessive drainage- please call the [address] [place] [address],[facility] Floor for RN visit to get your compression wrap changed   *You need to exercice as tolerated- walking is good   * Elevate your legs preferrably above level of your heart when sitting as much as possible   * The Goal of this therapy is to reduce Edema and get into long term compression garments to control venous insufficiency, lymphedema and reduce occurrence of venous ulcers           Additional instructions for specific diagnoses:     General comments for diabetic / neuropathic ulcers:  *  Unless you've been instructed not to remove your dressings, be sure to inspect your feet daily, and notify us of any major changes. *  If you do not have a long-term podiatrist, be sure to let us know. *  Moisturize your skin regularly with Vaseline, Aquaphor, Aveeno, CeraVe, Cetaphil, Eucerin, Lubriderm, etc; but keep the skin between your toes dry. *  Always allow your wound-care doctor or podiatrist to cut nails, calluses & corns. *  Be sure to always wear footwear that fits well, and NEVER go without shoes and socks. *  Do you know your Hemoglobin A1c level? You should! Please ask if you have questions. *  Be sure to adhere to any recommendations from your PCP or endocrinologist when it comes to diabetes medications and diet. If you have questions, please ask. *  If you smoke, your wound can not heal properly -- please talk with us when you're ready to quit. *  Do not soak your feet unless specifically instructed to do so by us. F/U Appointment is with Dr Piyush Amato in 1 week, on         8-9-22                          at      0800                 . Your nurse  is Leela Calixto.      If we applied slip-resistant hospital socks today, be sure to remove them at least once a day to inspect your toes or feet, even if you're not changing the wraps or dressings underneath. If you see anything concerning (redness, excess moisture, etc), please call and let us know right away.      Should you experience any significant changes in your wound(s) (including redness, increased warmth, increased pain, increased drainage, odor, or fever) or have questions about your wound care, please contact the [address] [place] [address] at [phone] Monday-Thursday from 8:00 am - 4:30 pm, or Friday from 8:00 am - 2:30 pm.  If you need help with your wound outside these hours and cannot wait until we are again available, contact your home-care company (if applicable), your PCP, or go to the nearest emergency room

## 2022-08-02 ENCOUNTER — HOSPITAL ENCOUNTER (OUTPATIENT)
Dept: WOUND CARE | Age: 87
Discharge: HOME | End: 2022-08-02
Payer: MEDICARE

## 2022-08-02 VITALS
BODY MASS INDEX: 32.89 KG/M2 | HEART RATE: 76 BPM | WEIGHT: 217 LBS | TEMPERATURE: 99.2 F | HEIGHT: 68 IN | RESPIRATION RATE: 18 BRPM | SYSTOLIC BLOOD PRESSURE: 155 MMHG | DIASTOLIC BLOOD PRESSURE: 71 MMHG

## 2022-08-02 DIAGNOSIS — E11.621 DIABETIC ULCER OF OTHER PART OF RIGHT FOOT ASSOCIATED WITH TYPE 2 DIABETES MELLITUS, WITH NECROSIS OF BONE (HCC): Primary | ICD-10-CM

## 2022-08-02 DIAGNOSIS — L97.514 DIABETIC ULCER OF OTHER PART OF RIGHT FOOT ASSOCIATED WITH TYPE 2 DIABETES MELLITUS, WITH NECROSIS OF BONE (HCC): Primary | ICD-10-CM

## 2022-08-02 LAB
AFB CULTURE (MYCOBACTERIA): NORMAL
AFB SMEAR: NORMAL

## 2022-08-02 PROCEDURE — 29581 APPL MULTLAYER CMPRN SYS LEG: CPT

## 2022-08-02 PROCEDURE — 17250 CHEM CAUT OF GRANLTJ TISSUE: CPT

## 2022-08-02 RX ORDER — LIDOCAINE 40 MG/G
CREAM TOPICAL ONCE
Status: DISCONTINUED | OUTPATIENT
Start: 2022-08-02 | End: 2022-08-03 | Stop reason: HOSPADM

## 2022-08-02 RX ORDER — BACITRACIN ZINC AND POLYMYXIN B SULFATE 500; 1000 [USP'U]/G; [USP'U]/G
OINTMENT TOPICAL ONCE
Status: CANCELLED | OUTPATIENT
Start: 2022-08-02 | End: 2022-08-02

## 2022-08-02 RX ORDER — CLOBETASOL PROPIONATE 0.5 MG/G
OINTMENT TOPICAL ONCE
Status: CANCELLED | OUTPATIENT
Start: 2022-08-02 | End: 2022-08-02

## 2022-08-02 RX ORDER — LIDOCAINE HYDROCHLORIDE 20 MG/ML
JELLY TOPICAL ONCE
Status: CANCELLED | OUTPATIENT
Start: 2022-08-02 | End: 2022-08-02

## 2022-08-02 RX ORDER — LIDOCAINE 40 MG/G
CREAM TOPICAL ONCE
Status: CANCELLED | OUTPATIENT
Start: 2022-08-02 | End: 2022-08-02

## 2022-08-02 RX ORDER — LIDOCAINE 50 MG/G
OINTMENT TOPICAL ONCE
Status: CANCELLED | OUTPATIENT
Start: 2022-08-02 | End: 2022-08-02

## 2022-08-02 RX ORDER — BACITRACIN, NEOMYCIN, POLYMYXIN B 400; 3.5; 5 [USP'U]/G; MG/G; [USP'U]/G
OINTMENT TOPICAL ONCE
Status: CANCELLED | OUTPATIENT
Start: 2022-08-02 | End: 2022-08-02

## 2022-08-02 RX ORDER — GINSENG 100 MG
CAPSULE ORAL ONCE
Status: CANCELLED | OUTPATIENT
Start: 2022-08-02 | End: 2022-08-02

## 2022-08-02 RX ORDER — GENTAMICIN SULFATE 1 MG/G
OINTMENT TOPICAL ONCE
Status: CANCELLED | OUTPATIENT
Start: 2022-08-02 | End: 2022-08-02

## 2022-08-02 RX ORDER — BETAMETHASONE DIPROPIONATE 0.05 %
OINTMENT (GRAM) TOPICAL ONCE
Status: CANCELLED | OUTPATIENT
Start: 2022-08-02 | End: 2022-08-02

## 2022-08-02 RX ORDER — LIDOCAINE HYDROCHLORIDE 40 MG/ML
SOLUTION TOPICAL ONCE
Status: CANCELLED | OUTPATIENT
Start: 2022-08-02 | End: 2022-08-02

## 2022-08-02 NOTE — PLAN OF CARE
Pt seen in [facility] - closed to healed - Dr. Syeda Garnett cauterized sm granuloma  on foot where incision was - treat changed as follows   RIGHT  FOOT Amp site- incision  triad  Gauze  Calamine Coflex  Leave in place for the week     Cont  darco shoe with peg asst for offloading-    [facility] - [facility] AVS -f/u in 1 week
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
## 2022-08-03 NOTE — DISCHARGE INSTRUCTIONS
[address] [facility] Physician Orders and Discharge 800 [facility]  [address], [address]  [place], [facility]  Telephone: [phone]      Fax: [phone] home care company:   N/a . Your wound-care supplies will be provided by:  N/a . NAME:  Meaghan Desouza   YOB: 1935  PRIMARY DIAGNOSIS FOR WOUND CARE CENTER: Diabetic Foot Ulcer. Wound cleansing:  Do not scrub or use excessive force. Wash hands with soap and water before and after dressing changes. Prior to applying a clean dressing, cleanse wound with normal saline, wound cleanser, or mild soap and water. Ask your physician or nurse before getting the wound(s) wet in the shower. Wound care for home:     RIGHT  FOOT Amp site- incision- Healed   Spandigrip - medium- apply in am and remove pm- give pt extra today  Cont  darco shoe with peg asst for offloading-        Please note, all wounds (unless stated otherwise here) were mechanically debrided at the time of cleansing here in the wound-care center today, so a small amount of pain, drainage or bleeding from that process might be expected, and is normal.     All products for home use, including multiple products for a single wound if applicable, are medically necessary in order to achieve the best chance at timely wound healing. See provider documentation for details if needed. New orders for this week (labs, imaging, medications, etc.):     Call Dr Micehlle Adair to schedule an appt for 8 weeks from now  [place] [address] [facility], [facility],[address]  Ph# [phone]    Bernardino Salter from [place] for orthotic shoe- order sent 7-12-22  See above wound care orders    Your physician has ordered Compression for treatment of venous ulcers, venous insufficiency,and/or Lymphedema. Calamine co flex            Is a     Layer wrap- do not remove until your next scheduled visit in [facility]- do not get wet.        * If your wrap falls down, becomes painful or you have decreased sensation, and/or excessive drainage- please call the [address] [place] [address],[address] for RN visit to get your compression wrap changed   *You need to exercice as tolerated- walking is good   * Elevate your legs preferrably above level of your heart when sitting as much as possible   * The Goal of this therapy is to reduce Edema and get into long term compression garments to control venous insufficiency, lymphedema and reduce occurrence of venous ulcers           Additional instructions for specific diagnoses:     General comments for diabetic / neuropathic ulcers:  *  Unless you've been instructed not to remove your dressings, be sure to inspect your feet daily, and notify us of any major changes. *  If you do not have a long-term podiatrist, be sure to let us know. *  Moisturize your skin regularly with Vaseline, Aquaphor, Aveeno, CeraVe, Cetaphil, Eucerin, Lubriderm, etc; but keep the skin between your toes dry. *  Always allow your wound-care doctor or podiatrist to cut nails, calluses & corns. *  Be sure to always wear footwear that fits well, and NEVER go without shoes and socks. *  Do you know your Hemoglobin A1c level? You should! Please ask if you have questions. *  Be sure to adhere to any recommendations from your PCP or endocrinologist when it comes to diabetes medications and diet. If you have questions, please ask. *  If you smoke, your wound can not heal properly -- please talk with us when you're ready to quit. *  Do not soak your feet unless specifically instructed to do so by us. F/U Appointment is with Dr Dewayne Peck - needs f/u in private office for routine foot care     Your nurse  is Leela Calixto. If we applied slip-resistant hospital socks today, be sure to remove them at least once a day to inspect your toes or feet, even if you're not changing the wraps or dressings underneath.  If you see anything concerning (redness, excess moisture, etc), please call and let us know right away.      Should you experience any significant changes in your wound(s) (including redness, increased warmth, increased pain, increased drainage, odor, or fever) or have questions about your wound care, please contact the [place] [address] at [phone] Monday-Thursday from 8:00 am - 4:30 pm, or Friday from 8:00 am - 2:30 pm.  If you need help with your wound outside these hours and cannot wait until we are again available, contact your home-care company (if applicable), your PCP, or go to the nearest emergency room

## 2022-08-09 ENCOUNTER — HOSPITAL ENCOUNTER (OUTPATIENT)
Dept: WOUND CARE | Age: 87
Discharge: HOME | End: 2022-08-09
Payer: MEDICARE

## 2022-08-09 VITALS
SYSTOLIC BLOOD PRESSURE: 154 MMHG | HEART RATE: 89 BPM | DIASTOLIC BLOOD PRESSURE: 79 MMHG | RESPIRATION RATE: 20 BRPM | TEMPERATURE: 97.7 F

## 2022-08-09 DIAGNOSIS — E11.621 DIABETIC ULCER OF OTHER PART OF RIGHT FOOT ASSOCIATED WITH TYPE 2 DIABETES MELLITUS, WITH NECROSIS OF BONE (HCC): Primary | ICD-10-CM

## 2022-08-09 DIAGNOSIS — L97.514 DIABETIC ULCER OF OTHER PART OF RIGHT FOOT ASSOCIATED WITH TYPE 2 DIABETES MELLITUS, WITH NECROSIS OF BONE (HCC): Primary | ICD-10-CM

## 2022-08-09 PROCEDURE — 99212 OFFICE O/P EST SF 10 MIN: CPT

## 2022-08-09 RX ORDER — BACITRACIN, NEOMYCIN, POLYMYXIN B 400; 3.5; 5 [USP'U]/G; MG/G; [USP'U]/G
OINTMENT TOPICAL ONCE
Status: CANCELLED | OUTPATIENT
Start: 2022-08-09 | End: 2022-08-09

## 2022-08-09 RX ORDER — LIDOCAINE 40 MG/G
CREAM TOPICAL ONCE
Status: CANCELLED | OUTPATIENT
Start: 2022-08-09 | End: 2022-08-09

## 2022-08-09 RX ORDER — GINSENG 100 MG
CAPSULE ORAL ONCE
Status: CANCELLED | OUTPATIENT
Start: 2022-08-09 | End: 2022-08-09

## 2022-08-09 RX ORDER — LIDOCAINE HYDROCHLORIDE 20 MG/ML
JELLY TOPICAL ONCE
Status: CANCELLED | OUTPATIENT
Start: 2022-08-09 | End: 2022-08-09

## 2022-08-09 RX ORDER — LIDOCAINE 50 MG/G
OINTMENT TOPICAL ONCE
Status: CANCELLED | OUTPATIENT
Start: 2022-08-09 | End: 2022-08-09

## 2022-08-09 RX ORDER — LIDOCAINE HYDROCHLORIDE 40 MG/ML
SOLUTION TOPICAL ONCE
Status: CANCELLED | OUTPATIENT
Start: 2022-08-09 | End: 2022-08-09

## 2022-08-09 RX ORDER — BACITRACIN ZINC AND POLYMYXIN B SULFATE 500; 1000 [USP'U]/G; [USP'U]/G
OINTMENT TOPICAL ONCE
Status: CANCELLED | OUTPATIENT
Start: 2022-08-09 | End: 2022-08-09

## 2022-08-09 RX ORDER — LIDOCAINE 40 MG/G
CREAM TOPICAL ONCE
Status: DISCONTINUED | OUTPATIENT
Start: 2022-08-09 | End: 2022-08-10 | Stop reason: HOSPADM

## 2022-08-09 RX ORDER — CLOBETASOL PROPIONATE 0.5 MG/G
OINTMENT TOPICAL ONCE
Status: CANCELLED | OUTPATIENT
Start: 2022-08-09 | End: 2022-08-09

## 2022-08-09 RX ORDER — BETAMETHASONE DIPROPIONATE 0.05 %
OINTMENT (GRAM) TOPICAL ONCE
Status: CANCELLED | OUTPATIENT
Start: 2022-08-09 | End: 2022-08-09

## 2022-08-09 RX ORDER — GENTAMICIN SULFATE 1 MG/G
OINTMENT TOPICAL ONCE
Status: CANCELLED | OUTPATIENT
Start: 2022-08-09 | End: 2022-08-09

## 2022-08-09 ASSESSMENT — PAIN SCALES - GENERAL: PAINLEVEL_OUTOF10: 0
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
## 2022-08-09 NOTE — PLAN OF CARE
Pt seen in [address] [place] [address],[address]- foot incision healed -   RIGHT  FOOT Amp site- incision- Healed   Spandigrip - medium- apply in am and remove pm- give pt extra today  Cont  darco shoe with peg asst for offloading-  Pt instructed to f/u with Dr. Amada Fuentes for routine foot care in 2 months - Pt also [facility] with [facility] Os[facility] for diabetic shoes- treatment in [address] [facility],[address]  completed

## 2022-09-07 ENCOUNTER — TELEPHONE (OUTPATIENT)
Dept: FAMILY MEDICINE CLINIC | Age: 87
End: 2022-09-07

## 2022-09-07 DIAGNOSIS — E11.620 TYPE 2 DIABETES MELLITUS WITH DIABETIC DERMATITIS, WITHOUT LONG-TERM CURRENT USE OF INSULIN (HCC): Primary | ICD-10-CM

## 2022-09-07 DIAGNOSIS — I10 ESSENTIAL HYPERTENSION: ICD-10-CM

## 2022-09-07 NOTE — TELEPHONE ENCOUNTER
Patient signed up with [place] and talked to a doctor there. He was told that he should not be taking lisinopril, that it had been discontinued. He started monitoring his blood pressure 2 weeks ago. The readings are:142/69 heart rate 79, 140/71 heart rate 81, 138/67 heart rate 87, 144/70 heart rate 74, 138/62 heart rate 80, 147/71 heart rate 73, 152/70 heart rate 77, 153/61 heart rate 76, 145/74 heart rate 91. Now the doctor at [place] is telling him that he should be taking the lisinopril. Please advise.  Marie magaña orab

## 2022-09-08 RX ORDER — LISINOPRIL 5 MG/1
5 TABLET ORAL DAILY
Qty: 90 TABLET | Refills: 0 | Status: SHIPPED | OUTPATIENT
Start: 2022-09-08 | End: 2022-12-07

## 2022-09-08 NOTE — TELEPHONE ENCOUNTER
Is [facility] now managing his chronic conditions? If so, they will be the ones to tell him about what to do with his Lisinopril. We have had this discussion with the [facility] [place] [facility] before. What happens is there are too many people managing chronic conditions and then miscommunication occurs and then medications get missed/ overprescribed/ wrong dosages for different things. If [facility] is not managing the Lisinopril let me know and I can advise further.

## 2022-09-08 NOTE — TELEPHONE ENCOUNTER
Spoke with pt wife and she said he only seen the [place] for his toe amputation and shoe inserts. During that visit he reviewed his medication with them and in their records it showed his Lisinopril was discontinued so he went home and threw them all in the trash. Few days later the [place] called him back and told him with the elevated readings he has been getting he needs to be back on Lisinopril. Wife states the [place] will not be managing his chronic conditions this will all be covered by you.

## 2022-09-09 NOTE — TELEPHONE ENCOUNTER
Left detailed msg that med was sent and to take it daily.   I will call later to make sure they got the [facility]

## 2022-09-21 ENCOUNTER — OFFICE VISIT (OUTPATIENT)
Dept: FAMILY MEDICINE CLINIC | Age: 87
End: 2022-09-21
Payer: MEDICARE

## 2022-09-21 VITALS
WEIGHT: 225 LBS | BODY MASS INDEX: 34.21 KG/M2 | OXYGEN SATURATION: 98 % | HEART RATE: 74 BPM | SYSTOLIC BLOOD PRESSURE: 132 MMHG | DIASTOLIC BLOOD PRESSURE: 64 MMHG

## 2022-09-21 DIAGNOSIS — R42 DIZZINESS: Primary | ICD-10-CM

## 2022-09-21 DIAGNOSIS — H81.13 BENIGN PAROXYSMAL VERTIGO OF BOTH EARS: ICD-10-CM

## 2022-09-21 PROCEDURE — 1124F ACP DISCUSS-NO DSCNMKR DOCD: CPT

## 2022-09-21 PROCEDURE — 99213 OFFICE O/P EST LOW 20 MIN: CPT

## 2022-09-21 RX ORDER — MECLIZINE HCL 12.5 MG/1
12.5 TABLET ORAL 3 TIMES DAILY PRN
Qty: 15 TABLET | Refills: 0 | Status: SHIPPED | OUTPATIENT
Start: 2022-09-21 | End: 2022-10-01

## 2022-09-21 ASSESSMENT — PATIENT HEALTH QUESTIONNAIRE - PHQ9
3. TROUBLE FALLING OR STAYING ASLEEP: 1
SUM OF ALL RESPONSES TO PHQ QUESTIONS 1-9: 8
6. FEELING BAD ABOUT YOURSELF - OR THAT YOU ARE A FAILURE OR HAVE LET YOURSELF OR YOUR FAMILY DOWN: 1
7. TROUBLE CONCENTRATING ON THINGS, SUCH AS READING THE NEWSPAPER OR WATCHING TELEVISION: 1
SUM OF ALL RESPONSES TO PHQ QUESTIONS 1-9: 8
9. THOUGHTS THAT YOU WOULD BE BETTER OFF DEAD, OR OF HURTING YOURSELF: 0
2. FEELING DOWN, DEPRESSED OR HOPELESS: 1
8. MOVING OR SPEAKING SO SLOWLY THAT OTHER PEOPLE COULD HAVE NOTICED. OR THE OPPOSITE, BEING SO FIGETY OR RESTLESS THAT YOU HAVE BEEN MOVING AROUND A LOT MORE THAN USUAL: 1
5. POOR APPETITE OR OVEREATING: 1
SUM OF ALL RESPONSES TO PHQ9 QUESTIONS 1 & 2: 2
4. FEELING TIRED OR HAVING LITTLE ENERGY: 1
SUM OF ALL RESPONSES TO PHQ QUESTIONS 1-9: 8
SUM OF ALL RESPONSES TO PHQ QUESTIONS 1-9: 8
1. LITTLE INTEREST OR PLEASURE IN DOING THINGS: 1
10. IF YOU CHECKED OFF ANY PROBLEMS, HOW DIFFICULT HAVE THESE PROBLEMS MADE IT FOR YOU TO DO YOUR WORK, TAKE CARE OF THINGS AT HOME, OR GET ALONG WITH OTHER PEOPLE: 1

## 2022-09-21 ASSESSMENT — ENCOUNTER SYMPTOMS
GASTROINTESTINAL NEGATIVE: 1
SINUS PAIN: 0
EYES NEGATIVE: 1
ALLERGIC/IMMUNOLOGIC NEGATIVE: 1
RESPIRATORY NEGATIVE: 1
SINUS PRESSURE: 0

## 2022-09-21 NOTE — PROGRESS NOTES
[facility]  2022    Adalid Peres (:  1935) is a 81 y.o. male, here for evaluation of the following medical concerns:    Chief Complaint   Patient presents with    Dizziness     On going for awhile started to get bad a few weeks ago  Effects his vision   Nausea         ASSESSMENT/ PLAN  1. Dizziness  -Dizziness has been an issue that has been ongoing for several years. Apparently, he has been worked up for dizziness in the past with no findings. He does report that he used to take an herbal medication from his chiropractor that made the dizziness stopped. He was encouraged to take this medicine every other day. We will use Antivert as needed for breakthrough dizziness.  -I do have a strong suspicion that his dizziness is likely secondary to dehydration. He reports that he drinks about a pot of coffee daily but only takes in 1 or 2 glasses of water. I suspect that this is dehydration considering that he does have vision changes that are very brief that accompany the dizziness.  -He was encouraged to take in 64 ounces of water daily. He was also encouraged to obtain a water bottle that has hourly increments on him to help him obtain his water goal daily  -We will try to treat conservatively instead of doing other examinations and lab work at this time  -Previous lab work did show that he had an elevated BUN and slightly decreased GFR that is consistent with dehydration  -Follow-up with Cara Mancini regarding this problem 10/17    - meclizine (ANTIVERT) 12.5 MG tablet; Take 1 tablet by mouth 3 times daily as needed for Dizziness  Dispense: 15 tablet; Refill: 0    2. Benign paroxysmal vertigo of both ears  -See #1  - meclizine (ANTIVERT) 12.5 MG tablet; Take 1 tablet by mouth 3 times daily as needed for Dizziness  Dispense: 15 tablet; Refill: 0     Return for Keep appoinment with Cara Mancini in October. Shauna COULTER  Patient is here for chief complaint of dizziness. This is not a new problem. He states that this has been ongoing for several years. Today he is accompanied by his son. His son also reports that it has been ongoing for several years. He states that it can happen while he is sitting but mainly happens when he is moving. He states that it does cause his peripheral vision to become darkened. He states that episodes last a couple minutes and then resolved. Apparently, he did have an herbal medicine prescribed by his chiropractor that he took for 3 days consecutively and that he did not have any episodes of dizziness for 2 weeks. Since then he has not taken any more of the herbal medicine. Apparently, he has been worked up in the past for dizziness with ear nose and throat, cardiology and other primary care provider. Apparently nothing was found. Upon further discussion patient reports that he drinks about a pot of coffee a day and only drinks 1 or 2 glasses of water daily. Otherwise he drinks lemonade and coughing mainly. He denies any low blood sugars. ROS  Review of Systems   Constitutional: Negative. HENT:  Positive for hearing loss. Negative for sinus pressure, sinus pain and tinnitus. Eyes: Negative. Respiratory: Negative. Cardiovascular: Negative. Gastrointestinal: Negative. Allergic/Immunologic: Negative. Neurological:  Positive for dizziness and light-headedness. Hematological: Negative. Psychiatric/Behavioral: Negative.        HISTORIES  Current Outpatient Medications on File Prior to Visit   Medication Sig Dispense Refill    lisinopril (PRINIVIL;ZESTRIL) 5 MG tablet Take 1 tablet by mouth daily 90 tablet 0    acetaminophen (TYLENOL) 325 MG tablet Take 650 mg by mouth every 4 hours as needed for Pain      melatonin 5 MG TABS tablet Take 5 mg by mouth nightly as needed 5-10 mg qhs prn      metFORMIN (GLUCOPHAGE) 1000 MG tablet Take 1 tablet by mouth 2 times daily (with meals) For diabetes 180 tablet 3    glipiZIDE (GLUCOTROL) 10 MG tablet TAKE ONE TABLET BY MOUTH TWICE A DAY BEFORE MEALS 180 tablet 3    famotidine (PEPCID) 20 MG tablet TAKE ONE TABLET BY MOUTH DAILY 90 tablet 3    ACCU-CHEK DIXON PLUS strip TEST DAILY AS NEEDED 100 strip 5    gentamicin (GARAMYCIN) 0.1 % cream Apply topically daily wtih dressing changes. 1 Tube 3    ferrous sulfate (IRON 325) 325 (65 Fe) MG tablet Take 325 mg by mouth daily (with breakfast)      Multiple Vitamins-Minerals (MULTIVITAMIN PO) Take 1 tablet by mouth daily. No current facility-administered medications on file prior to visit.       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 ([address]     History of blood transfusion     Hyperlipemia     Hypertension     Lymphedema     Osteomyelitis of left foot ([address]     1/2021    Tinnitus 3/14/2012    Ulcer of other part of foot 08/02/2011    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     Patient Active Problem List   Diagnosis    Dyslipidemia    Type 2 diabetes mellitus with diabetic polyneuropathy, without long-term current use of insulin (HCC)    Vertigo, benign paroxysmal    HTN (hypertension), benign    Lymphoma ([address]    Obesity    Acquired lymphedema of lower extremity    Diabetic ulcer 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 mellitus, with necrosis of bone (HCC)    Acute osteomyelitis of metatarsal bone of right foot (HCC)    MRSA infection    Pseudomonas infection    CAD (coronary artery disease)    Malabsorption syndrome    Normocytic normochromic anemia    Sensorineural hearing loss, bilateral    Diabetic foot infection ([address]     Past Surgical History:   Procedure Laterality Date    ABDOMEN SURGERY      ARM DEBRIDEMENT      50 yrs of Onset    Other Mother     Other Father     No Known Problems Sister     No Known Problems Sister     Cancer Brother     No Known Problems Brother        PE  Vitals:    09/21/22 1119   BP: 132/64   Site: Left Upper Arm   Position: Sitting   Cuff Size: Large Adult   Pulse: 74   SpO2: 98%   Weight: 225 lb (102.1 kg)     Estimated body mass index is 34.21 kg/m² as calculated from the following:    Height as of 8/2/22: 5' 8\" (1.727 m). Weight as of this encounter: 225 lb (102.1 kg). Physical Exam  Constitutional:       Appearance: Normal appearance. HENT:      Right Ear: Tympanic membrane and external ear normal. There is no impacted cerumen. Left Ear: Tympanic membrane and external ear normal. There is no impacted cerumen. Mouth/Throat:      Mouth: Mucous membranes are moist.      Pharynx: Oropharynx is clear. Cardiovascular:      Rate and Rhythm: Normal rate and regular rhythm. Pulses: Normal pulses. Heart sounds: Normal heart sounds. Pulmonary:      Effort: Pulmonary effort is normal.      Breath sounds: Normal breath sounds. Musculoskeletal:         General: No swelling or tenderness. Skin:     Coloration: Skin is not jaundiced or pale. Neurological:      General: No focal deficit present. Mental Status: He is alert and oriented to person, place, and time. Mental status is at baseline. Psychiatric:         Mood and Affect: Mood normal.         Behavior: Behavior normal.         Thought Content: Thought content normal.         Judgment: Judgment normal.       MALATHI Dodd - CNP    This dictation was generated by voice recognition computer software. Although all attempts are made to edit the dictation for accuracy, there may be errors in the transcription that are not intended.

## 2022-09-29 ENCOUNTER — OFFICE VISIT (OUTPATIENT)
Dept: FAMILY MEDICINE CLINIC | Age: 87
End: 2022-09-29

## 2022-09-29 VITALS
OXYGEN SATURATION: 99 % | BODY MASS INDEX: 34.36 KG/M2 | HEART RATE: 78 BPM | WEIGHT: 226 LBS | SYSTOLIC BLOOD PRESSURE: 122 MMHG | DIASTOLIC BLOOD PRESSURE: 62 MMHG | TEMPERATURE: 97.6 F

## 2022-09-29 DIAGNOSIS — E11.620 TYPE 2 DIABETES MELLITUS WITH DIABETIC DERMATITIS, WITHOUT LONG-TERM CURRENT USE OF INSULIN (HCC): ICD-10-CM

## 2022-09-29 DIAGNOSIS — R42 DIZZINESS: ICD-10-CM

## 2022-09-29 DIAGNOSIS — N40.0 ENLARGED PROSTATE: ICD-10-CM

## 2022-09-29 DIAGNOSIS — N39.41 URGE INCONTINENCE OF URINE: Primary | ICD-10-CM

## 2022-09-29 DIAGNOSIS — I10 HTN (HYPERTENSION), BENIGN: ICD-10-CM

## 2022-09-29 LAB
BILIRUBIN, POC: ABNORMAL
BLOOD URINE, POC: ABNORMAL
CLARITY, POC: ABNORMAL
COLOR, POC: ABNORMAL
GLUCOSE URINE, POC: ABNORMAL
KETONES, POC: ABNORMAL
LEUKOCYTE EST, POC: ABNORMAL
NITRITE, POC: ABNORMAL
PH, POC: 5.5
PROTEIN, POC: 100
SPECIFIC GRAVITY, POC: 1.02
UROBILINOGEN, POC: 0.2

## 2022-09-29 RX ORDER — CIPROFLOXACIN 500 MG/1
500 TABLET, FILM COATED ORAL 2 TIMES DAILY
Qty: 14 TABLET | Refills: 0 | Status: SHIPPED | OUTPATIENT
Start: 2022-09-29 | End: 2022-10-06

## 2022-09-29 ASSESSMENT — PATIENT HEALTH QUESTIONNAIRE - PHQ9
SUM OF ALL RESPONSES TO PHQ QUESTIONS 1-9: 1
2. FEELING DOWN, DEPRESSED OR HOPELESS: 0
1. LITTLE INTEREST OR PLEASURE IN DOING THINGS: 1
SUM OF ALL RESPONSES TO PHQ9 QUESTIONS 1 & 2: 1
SUM OF ALL RESPONSES TO PHQ QUESTIONS 1-9: 1

## 2022-09-29 NOTE — PROGRESS NOTES
Blood drawn per order. Needle size: 23 g butterfly   Site: L Antecubital.  First attempt successful Yes    Second attempt na    Pressure applied until bleeding stopped. Cotton ball and band aid  applied. Patient informed to call office or return if bleeding reoccurs and unable to stop.     Tubes drawn: 1 purple     2 red    1 yellow tube with urine
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
## 2022-09-30 LAB
A/G RATIO: 1.7 (ref 1.1–2.2)
ALBUMIN SERPL-MCNC: 4.5 G/DL (ref 3.4–5)
ALP BLD-CCNC: 80 U/L (ref 40–129)
ALT SERPL-CCNC: 10 U/L (ref 10–40)
ANION GAP SERPL CALCULATED.3IONS-SCNC: 12 MMOL/L (ref 3–16)
ANISOCYTOSIS: ABNORMAL
AST SERPL-CCNC: 15 U/L (ref 15–37)
ATYPICAL LYMPHOCYTE RELATIVE PERCENT: 3 % (ref 0–6)
BASOPHILS ABSOLUTE: 0.1 K/UL (ref 0–0.2)
BASOPHILS RELATIVE PERCENT: 1 %
BILIRUB SERPL-MCNC: <0.2 MG/DL (ref 0–1)
BUN BLDV-MCNC: 23 MG/DL (ref 7–20)
CALCIUM SERPL-MCNC: 8.9 MG/DL (ref 8.3–10.6)
CHLORIDE BLD-SCNC: 102 MMOL/L (ref 99–110)
CO2: 27 MMOL/L (ref 21–32)
CREAT SERPL-MCNC: 1.1 MG/DL (ref 0.8–1.3)
EOSINOPHILS ABSOLUTE: 0.1 K/UL (ref 0–0.6)
EOSINOPHILS RELATIVE PERCENT: 1 %
FOLATE: >20 NG/ML (ref 4.78–24.2)
GFR AFRICAN AMERICAN: >60
GFR NON-AFRICAN AMERICAN: >60
GLUCOSE BLD-MCNC: 60 MG/DL (ref 70–99)
HCT VFR BLD CALC: 33.6 % (ref 40.5–52.5)
HEMATOLOGY PATH CONSULT: NO
HEMOGLOBIN: 10.8 G/DL (ref 13.5–17.5)
LYMPHOCYTES ABSOLUTE: 5.8 K/UL (ref 1–5.1)
LYMPHOCYTES RELATIVE PERCENT: 59 %
MCH RBC QN AUTO: 31.4 PG (ref 26–34)
MCHC RBC AUTO-ENTMCNC: 32.2 G/DL (ref 31–36)
MCV RBC AUTO: 97.6 FL (ref 80–100)
MONOCYTES ABSOLUTE: 0.3 K/UL (ref 0–1.3)
MONOCYTES RELATIVE PERCENT: 3 %
NEUTROPHILS ABSOLUTE: 3.1 K/UL (ref 1.7–7.7)
NEUTROPHILS RELATIVE PERCENT: 33 %
PDW BLD-RTO: 15.2 % (ref 12.4–15.4)
PLATELET # BLD: 205 K/UL (ref 135–450)
PLATELET SLIDE REVIEW: ADEQUATE
PMV BLD AUTO: 8.4 FL (ref 5–10.5)
POTASSIUM SERPL-SCNC: 4.9 MMOL/L (ref 3.5–5.1)
PROSTATE SPECIFIC ANTIGEN: 1.69 NG/ML (ref 0–4)
RBC # BLD: 3.44 M/UL (ref 4.2–5.9)
SLIDE REVIEW: ABNORMAL
SODIUM BLD-SCNC: 141 MMOL/L (ref 136–145)
TOTAL PROTEIN: 7.1 G/DL (ref 6.4–8.2)
TSH REFLEX: 1.4 UIU/ML (ref 0.27–4.2)
VITAMIN B-12: 414 PG/ML (ref 211–911)
WBC # BLD: 9.3 K/UL (ref 4–11)

## 2022-09-30 NOTE — RESULT ENCOUNTER NOTE
Labs overall look good. No clear cause for the urinary issues. Still waiting for the urinary tract infection testing. Attempted to call Real Poe on cell, LMOM.

## 2022-10-01 LAB
ACINETOBACTER BAUMANNII BY PCR: NOT DETECTED
BACTEROIDES FRAGILIS BY PCR: NOT DETECTED
CARBAPENEM RESISTANCE OXA-48 GENE BY PCR: NOT DETECTED
CEPHALOSPORIN RESISTANCE AMPC GENE: NOT DETECTED
CITROBACTER FREUNDII: NOT DETECTED
ENTEROBACTER CLOACAE: NOT DETECTED
ENTEROCOCCUS SPP. (E. FAECALIS, E. FAECIUM): NOT DETECTED
ESBL RESISTANCE: NOT DETECTED
ESCHERICHIA COLI BY PCR: NOT DETECTED
KLEBSIELLA OXYTOCA BY PCR: NOT DETECTED
KLEBSIELLA PNEUMONIAE: NOT DETECTED
MACROLIDE RESISTANCE: ABNORMAL
METHICILLIN RESISTANCE: NOT DETECTED
MORGANELLA MORGANII: NOT DETECTED
PROTEUS SPP (PROTEUS MIRABILIS, PROTEUS VULGARIS): ABNORMAL
PROVIDENCIA STUARTII: NOT DETECTED
PSEUDOMONAS AERUGINOSA BY PCR: NOT DETECTED
QUINOLONE AND FLUOROQUINOLONE RESISTANCE: NOT DETECTED
SERRATIA MARCESCENS BY PCR: NOT DETECTED
STAPHYLOCOCCUS AUREUS BY PCR: NOT DETECTED
STAPHYLOCOCCUS SAPROPHYTICUS: NOT DETECTED
STREPTOCOCCUS AGALACTIAE BY PCR: NOT DETECTED
STREPTOCOCCUS PYOGENES  BY PCR: NOT DETECTED
TETRACYCLINE RESISTANCE: ABNORMAL
TRIMETHOPRIM/SULFONAMIDE RESISTANCE: NOT DETECTED

## 2022-10-05 NOTE — RESULT ENCOUNTER NOTE
Called Dory Campos.  Informed her of results Informed patient to try Prevacid-reviewed diet and fluid intake-she agrees

## 2022-10-13 ASSESSMENT — ENCOUNTER SYMPTOMS
BLOOD IN STOOL: 0
CONSTIPATION: 0
SHORTNESS OF BREATH: 0
COUGH: 0
DIARRHEA: 0
CHEST TIGHTNESS: 0

## 2022-10-17 ENCOUNTER — OFFICE VISIT (OUTPATIENT)
Dept: FAMILY MEDICINE CLINIC | Age: 87
End: 2022-10-17
Payer: MEDICARE

## 2022-10-17 VITALS
WEIGHT: 226 LBS | DIASTOLIC BLOOD PRESSURE: 62 MMHG | BODY MASS INDEX: 34.36 KG/M2 | HEART RATE: 81 BPM | OXYGEN SATURATION: 98 % | SYSTOLIC BLOOD PRESSURE: 138 MMHG

## 2022-10-17 DIAGNOSIS — I35.0 NONRHEUMATIC AORTIC VALVE STENOSIS: ICD-10-CM

## 2022-10-17 DIAGNOSIS — Z00.00 MEDICARE ANNUAL WELLNESS VISIT, SUBSEQUENT: Primary | ICD-10-CM

## 2022-10-17 DIAGNOSIS — E11.620 TYPE 2 DIABETES MELLITUS WITH DIABETIC DERMATITIS, WITHOUT LONG-TERM CURRENT USE OF INSULIN (HCC): ICD-10-CM

## 2022-10-17 DIAGNOSIS — J32.4 CHRONIC PANSINUSITIS: ICD-10-CM

## 2022-10-17 DIAGNOSIS — R42 DIZZINESS: ICD-10-CM

## 2022-10-17 DIAGNOSIS — Z71.89 ACP (ADVANCE CARE PLANNING): ICD-10-CM

## 2022-10-17 PROBLEM — A49.8 PSEUDOMONAS INFECTION: Status: RESOLVED | Noted: 2022-06-16 | Resolved: 2022-10-17

## 2022-10-17 PROBLEM — L97.514 DIABETIC ULCER OF RIGHT FOOT ASSOCIATED WITH TYPE 2 DIABETES MELLITUS, WITH NECROSIS OF BONE (HCC): Status: RESOLVED | Noted: 2022-06-07 | Resolved: 2022-10-17

## 2022-10-17 PROBLEM — M86.171 ACUTE OSTEOMYELITIS OF METATARSAL BONE OF RIGHT FOOT (HCC): Status: RESOLVED | Noted: 2022-06-16 | Resolved: 2022-10-17

## 2022-10-17 PROBLEM — E11.621 DIABETIC ULCER OF RIGHT FOOT ASSOCIATED WITH TYPE 2 DIABETES MELLITUS, WITH NECROSIS OF BONE (HCC): Status: RESOLVED | Noted: 2022-06-07 | Resolved: 2022-10-17

## 2022-10-17 PROBLEM — K90.9 MALABSORPTION SYNDROME: Status: RESOLVED | Noted: 2022-06-16 | Resolved: 2022-10-17

## 2022-10-17 LAB — HBA1C MFR BLD: 6.5 %

## 2022-10-17 PROCEDURE — 3044F HG A1C LEVEL LT 7.0%: CPT | Performed by: NURSE PRACTITIONER

## 2022-10-17 PROCEDURE — 83036 HEMOGLOBIN GLYCOSYLATED A1C: CPT | Performed by: NURSE PRACTITIONER

## 2022-10-17 PROCEDURE — G0439 PPPS, SUBSEQ VISIT: HCPCS | Performed by: NURSE PRACTITIONER

## 2022-10-17 PROCEDURE — 1124F ACP DISCUSS-NO DSCNMKR DOCD: CPT | Performed by: NURSE PRACTITIONER

## 2022-10-17 ASSESSMENT — PATIENT HEALTH QUESTIONNAIRE - PHQ9
SUM OF ALL RESPONSES TO PHQ QUESTIONS 1-9: 0
1. LITTLE INTEREST OR PLEASURE IN DOING THINGS: 0
SUM OF ALL RESPONSES TO PHQ9 QUESTIONS 1 & 2: 0
2. FEELING DOWN, DEPRESSED OR HOPELESS: 0
SUM OF ALL RESPONSES TO PHQ QUESTIONS 1-9: 0

## 2022-10-17 ASSESSMENT — LIFESTYLE VARIABLES
HOW MANY STANDARD DRINKS CONTAINING ALCOHOL DO YOU HAVE ON A TYPICAL DAY: PATIENT DOES NOT DRINK
HOW OFTEN DO YOU HAVE A DRINK CONTAINING ALCOHOL: NEVER
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
## 2022-10-17 NOTE — PATIENT INSTRUCTIONS
Personalized Preventive Plan for Kelle Frankel - 10/17/2022  Medicare offers a range of preventive health benefits. Some of the tests and screenings are paid in full while other may be subject to a deductible, co-insurance, and/or copay. Some of these benefits include a comprehensive review of your medical history including lifestyle, illnesses that may run in your family, and various assessments and screenings as appropriate. After reviewing your medical record and screening and assessments performed today your provider may have ordered immunizations, labs, imaging, and/or referrals for you. A list of these orders (if applicable) as well as your Preventive Care list are included within your After Visit Summary for your review. Other Preventive Recommendations:    A preventive eye exam performed by an eye specialist is recommended every 1-2 years to screen for glaucoma; cataracts, macular degeneration, and other eye disorders. A preventive dental visit is recommended every 6 months. Try to get at least 150 minutes of exercise per week or 10,000 steps per day on a pedometer . Order or download the FREE \"Exercise & Physical Activity: Your Everyday Guide\" from [employer]. Call 9-556.125.5798 or search [employer] online. You need 0901-2206 mg of calcium and 3013-6095 IU of vitamin D per day. It is possible to meet your calcium requirement with diet alone, but a vitamin D supplement is usually necessary to meet this goal.  When exposed to the sun, use a sunscreen that protects against both UVA and UVB radiation with an SPF of 30 or greater. Reapply every 2 to 3 hours or after sweating, drying off with a towel, or swimming. Always wear a seat belt when traveling in a car. Always wear a helmet when riding a bicycle or motorcycle.

## 2022-10-17 NOTE — ACP (ADVANCE CARE PLANNING)
Advance Care Planning     Advance Care Planning (ACP) Physician/NP/PA Conversation    Date of Conversation: 10/17/2022  Conducted with: Patient with Decision Making Capacity    Healthcare Decision Maker:      Primary Decision Maker: Bette Joshi Spouse - [phone]    Secondary Decision Maker: Hyacinth Repress - [phone]    Secondary Decision Maker: Magi Young - Child - [phone]    Supplemental (Other) Decision Maker: Barby Aguilar - [phone]    Supplemental (Other) Decision Maker: Nathalie Kathleen - Child - [phone]    Click here to complete Healthcare Decision Makers including selection of the Healthcare Decision Maker Relationship (ie \"Primary\")  Today we documented Decision Maker(s) consistent with Legal Next of Kin hierarchy. Care Preferences:    Hospitalization: \"If your health worsens and it becomes clear that your chance of recovery is unlikely, what would be your preference regarding hospitalization? \"  The patient would prefer hospitalization. Ventilation: \"If you were unable to breath on your own and your chance of recovery was unlikely, what would be your preference about the use of a ventilator (breathing machine) if it was available to you? \"  The patient would desire the use of a ventilator. Resuscitation: \"In the event your heart stopped as a result of an underlying serious health condition, would you want attempts made to restart your heart, or would you prefer a natural death? \"  Yes, attempt to resuscitate.     treatment goals, benefit/burden of treatment options, artificial nutrition, ventilation preferences, hospitalization preferences, resuscitation preferences, end of life care preferences (vegetative state/imminent death), and hospice care    Conversation Outcomes / Follow-Up Plan:  ACP complete - no further action today  Reviewed DNR/DNI and patient elects Full Code (Attempt Resuscitation)    Length of Voluntary ACP Conversation in minutes:  <16 minutes (Non-Billable)    Duong Garcia, APRN - CNP

## 2022-11-29 ENCOUNTER — OFFICE VISIT (OUTPATIENT)
Dept: FAMILY MEDICINE CLINIC | Age: 87
End: 2022-11-29
Payer: MEDICARE

## 2022-11-29 VITALS
BODY MASS INDEX: 34.82 KG/M2 | TEMPERATURE: 97.4 F | OXYGEN SATURATION: 96 % | HEART RATE: 79 BPM | WEIGHT: 229 LBS | DIASTOLIC BLOOD PRESSURE: 60 MMHG | SYSTOLIC BLOOD PRESSURE: 122 MMHG

## 2022-11-29 DIAGNOSIS — R42 DIZZINESS: ICD-10-CM

## 2022-11-29 DIAGNOSIS — E11.620 TYPE 2 DIABETES MELLITUS WITH DIABETIC DERMATITIS, WITHOUT LONG-TERM CURRENT USE OF INSULIN (HCC): ICD-10-CM

## 2022-11-29 DIAGNOSIS — J01.90 ACUTE BACTERIAL SINUSITIS: Primary | ICD-10-CM

## 2022-11-29 DIAGNOSIS — I35.0 NONRHEUMATIC AORTIC VALVE STENOSIS: ICD-10-CM

## 2022-11-29 DIAGNOSIS — Z86.73 HISTORY OF CVA (CEREBROVASCULAR ACCIDENT): ICD-10-CM

## 2022-11-29 DIAGNOSIS — B96.89 ACUTE BACTERIAL SINUSITIS: Primary | ICD-10-CM

## 2022-11-29 DIAGNOSIS — I25.10 CORONARY ARTERY DISEASE INVOLVING NATIVE CORONARY ARTERY OF NATIVE HEART WITHOUT ANGINA PECTORIS: ICD-10-CM

## 2022-11-29 PROCEDURE — 3044F HG A1C LEVEL LT 7.0%: CPT | Performed by: NURSE PRACTITIONER

## 2022-11-29 PROCEDURE — 1124F ACP DISCUSS-NO DSCNMKR DOCD: CPT | Performed by: NURSE PRACTITIONER

## 2022-11-29 PROCEDURE — 99214 OFFICE O/P EST MOD 30 MIN: CPT | Performed by: NURSE PRACTITIONER

## 2022-11-29 RX ORDER — METHYLPREDNISOLONE 4 MG/1
TABLET ORAL
Qty: 1 KIT | Refills: 0 | Status: SHIPPED | OUTPATIENT
Start: 2022-11-29 | End: 2022-12-05

## 2022-11-29 RX ORDER — ATORVASTATIN CALCIUM 40 MG/1
40 TABLET, FILM COATED ORAL DAILY
Qty: 90 TABLET | Refills: 3 | Status: SHIPPED | OUTPATIENT
Start: 2022-11-29

## 2022-11-29 RX ORDER — AMOXICILLIN AND CLAVULANATE POTASSIUM 875; 125 MG/1; MG/1
1 TABLET, FILM COATED ORAL 2 TIMES DAILY
Qty: 14 TABLET | Refills: 0 | Status: SHIPPED | OUTPATIENT
Start: 2022-11-29 | End: 2022-12-06

## 2022-11-29 ASSESSMENT — PATIENT HEALTH QUESTIONNAIRE - PHQ9
SUM OF ALL RESPONSES TO PHQ QUESTIONS 1-9: 2
8. MOVING OR SPEAKING SO SLOWLY THAT OTHER PEOPLE COULD HAVE NOTICED. OR THE OPPOSITE, BEING SO FIGETY OR RESTLESS THAT YOU HAVE BEEN MOVING AROUND A LOT MORE THAN USUAL: 0
SUM OF ALL RESPONSES TO PHQ QUESTIONS 1-9: 2
SUM OF ALL RESPONSES TO PHQ QUESTIONS 1-9: 2
6. FEELING BAD ABOUT YOURSELF - OR THAT YOU ARE A FAILURE OR HAVE LET YOURSELF OR YOUR FAMILY DOWN: 0
SUM OF ALL RESPONSES TO PHQ9 QUESTIONS 1 & 2: 0
4. FEELING TIRED OR HAVING LITTLE ENERGY: 0
SUM OF ALL RESPONSES TO PHQ QUESTIONS 1-9: 2
10. IF YOU CHECKED OFF ANY PROBLEMS, HOW DIFFICULT HAVE THESE PROBLEMS MADE IT FOR YOU TO DO YOUR WORK, TAKE CARE OF THINGS AT HOME, OR GET ALONG WITH OTHER PEOPLE: 0
5. POOR APPETITE OR OVEREATING: 2
1. LITTLE INTEREST OR PLEASURE IN DOING THINGS: 0
3. TROUBLE FALLING OR STAYING ASLEEP: 0
9. THOUGHTS THAT YOU WOULD BE BETTER OFF DEAD, OR OF HURTING YOURSELF: 0
7. TROUBLE CONCENTRATING ON THINGS, SUCH AS READING THE NEWSPAPER OR WATCHING TELEVISION: 0
2. FEELING DOWN, DEPRESSED OR HOPELESS: 0

## 2022-11-29 ASSESSMENT — ENCOUNTER SYMPTOMS
SORE THROAT: 0
COUGH: 0
HOARSE VOICE: 0
SINUS COMPLAINT: 1
SHORTNESS OF BREATH: 0
SINUS PRESSURE: 1
SWOLLEN GLANDS: 0
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
## 2022-12-12 ENCOUNTER — PATIENT MESSAGE (OUTPATIENT)
Dept: FAMILY MEDICINE CLINIC | Age: 87
End: 2022-12-12

## 2022-12-12 DIAGNOSIS — R42 DIZZINESS: Primary | ICD-10-CM

## 2022-12-12 DIAGNOSIS — I25.10 CORONARY ARTERY DISEASE INVOLVING NATIVE CORONARY ARTERY OF NATIVE HEART WITHOUT ANGINA PECTORIS: ICD-10-CM

## 2022-12-12 DIAGNOSIS — I35.0 NONRHEUMATIC AORTIC VALVE STENOSIS: ICD-10-CM

## 2022-12-12 DIAGNOSIS — Z86.73 HISTORY OF CVA (CEREBROVASCULAR ACCIDENT): ICD-10-CM

## 2022-12-12 NOTE — TELEPHONE ENCOUNTER
From: Kayra Sogn  To: Deyanira Matthews  Sent: 12/12/2022 9:46 AM EST  Subject: Need to schedule test for Sergio Echeverria    This is Kendra Gloria Josue's wife. I know you had a list of things to be tested for him. Am wondering if you could start with testing his carotid artery, as he has been having dizziness, congestion & neck pain that does not go away. Something has to be done and he will tell you that himself. I hope it could be done as an emergency. I am very appreciative of what you do for him and his sister, Cherie Whitt. Thank you.   Kendra Castro

## 2022-12-13 ENCOUNTER — PATIENT MESSAGE (OUTPATIENT)
Dept: FAMILY MEDICINE CLINIC | Age: 87
End: 2022-12-13

## 2022-12-13 ENCOUNTER — TELEPHONE (OUTPATIENT)
Dept: FAMILY MEDICINE CLINIC | Age: 87
End: 2022-12-13

## 2022-12-13 NOTE — TELEPHONE ENCOUNTER
Patient been having dizziness for a long time now.  Asking if Darline Arriola DNP is ordering a test of carotid artery

## 2022-12-13 NOTE — TELEPHONE ENCOUNTER
From: Jignesh Santos  To: Brionna Fatmatanhung  Sent: 12/13/2022 2:51 PM EST  Subject: Covid Ultrasound    Thank you for this. His last visit, my daughter tells me he showed some blockages and aorta valve not working correctly. After the Doppler, will anything be done with what they find? Shouldn't he go to a Cardiologist? to see what they recommend? Please advise and thank you.   Harsha Carrasco

## 2022-12-14 ENCOUNTER — TELEPHONE (OUTPATIENT)
Dept: FAMILY MEDICINE CLINIC | Age: 87
End: 2022-12-14

## 2022-12-14 NOTE — TELEPHONE ENCOUNTER
Patient's wife, Mode Jenkins, says that patient has 2 different bottles of lisinopril. [facility] 20 mg [facility] on 9/13/22. Eloisa Hickey DNP prescribed lisinopril 5 mg dated 9/8/22. Asking what milligram should he be taking? Also patient is scheduled to have a doppler on 12-22-22. Should he see a cardiologist before he has it done?

## 2022-12-16 DIAGNOSIS — E11.620 TYPE 2 DIABETES MELLITUS WITH DIABETIC DERMATITIS, WITHOUT LONG-TERM CURRENT USE OF INSULIN (HCC): ICD-10-CM

## 2022-12-16 DIAGNOSIS — I10 ESSENTIAL HYPERTENSION: ICD-10-CM

## 2022-12-16 RX ORDER — LISINOPRIL 5 MG/1
5 TABLET ORAL DAILY
Qty: 90 TABLET | Refills: 0 | Status: SHIPPED | OUTPATIENT
Start: 2022-12-16 | End: 2023-03-16

## 2022-12-16 NOTE — TELEPHONE ENCOUNTER
Patient spouse states that he has been taking the 5 MG of lisinopril but unknown what the values will been.

## 2022-12-16 NOTE — TELEPHONE ENCOUNTER
Lets continue the Lisinopril 5. That was the one I recently started him on and his blood pressure seemed fine with it.

## 2022-12-21 ENCOUNTER — HOSPITAL ENCOUNTER (OUTPATIENT)
Dept: VASCULAR LAB | Age: 87
Discharge: HOME | End: 2022-12-21
Payer: MEDICARE

## 2022-12-21 DIAGNOSIS — Z86.73 HISTORY OF CVA (CEREBROVASCULAR ACCIDENT): ICD-10-CM

## 2022-12-21 DIAGNOSIS — R42 DIZZINESS: ICD-10-CM

## 2022-12-21 DIAGNOSIS — I35.0 NONRHEUMATIC AORTIC VALVE STENOSIS: ICD-10-CM

## 2022-12-21 DIAGNOSIS — I25.10 CORONARY ARTERY DISEASE INVOLVING NATIVE CORONARY ARTERY OF NATIVE HEART WITHOUT ANGINA PECTORIS: ICD-10-CM

## 2022-12-21 PROCEDURE — 93880 EXTRACRANIAL BILAT STUDY: CPT

## 2022-12-22 ENCOUNTER — TELEPHONE (OUTPATIENT)
Dept: FAMILY MEDICINE CLINIC | Age: 87
End: 2022-12-22

## 2022-12-22 NOTE — RESULT ENCOUNTER NOTE
Overall carotid arteries look the same as last year when they were looked at via CT scan. Next steps for dizziness pending your preference: Cardiology or Neurology. Let me know what you would prefer.

## 2022-12-22 NOTE — TELEPHONE ENCOUNTER
Patient's spouse, Coreen Negro, called concerning the carotid results. She says whatever Darline Casey DNP preference for next step.  Cardiologist or neurologist.

## 2022-12-27 NOTE — TELEPHONE ENCOUNTER
Spouse wife, Kendra Castro, called. Informed her of recommendation.  Scheduled an appointment to see Dr Chandana Pak on 2/8/23

## 2022-12-27 NOTE — TELEPHONE ENCOUNTER
Lets see Cardiology given all of his Vascular issues. Do they have a preference where they go? We have 2 Cardiologists at [place] so that is where most of my patients go.

## 2023-01-04 ENCOUNTER — TELEPHONE (OUTPATIENT)
Dept: CARDIOLOGY CLINIC | Age: 88
End: 2023-01-04

## 2023-01-04 ENCOUNTER — OFFICE VISIT (OUTPATIENT)
Dept: CARDIOLOGY CLINIC | Age: 88
End: 2023-01-04

## 2023-01-04 VITALS
WEIGHT: 230 LBS | HEART RATE: 81 BPM | DIASTOLIC BLOOD PRESSURE: 58 MMHG | BODY MASS INDEX: 34.86 KG/M2 | HEIGHT: 68 IN | OXYGEN SATURATION: 96 % | SYSTOLIC BLOOD PRESSURE: 130 MMHG

## 2023-01-04 DIAGNOSIS — I87.2 VENOUS INSUFFICIENCY: ICD-10-CM

## 2023-01-04 DIAGNOSIS — I73.9 PVD (PERIPHERAL VASCULAR DISEASE) (HCC): ICD-10-CM

## 2023-01-04 DIAGNOSIS — E78.5 DYSLIPIDEMIA: ICD-10-CM

## 2023-01-04 DIAGNOSIS — I35.0 NONRHEUMATIC AORTIC VALVE STENOSIS: ICD-10-CM

## 2023-01-04 DIAGNOSIS — I45.10 RIGHT BUNDLE BRANCH BLOCK: ICD-10-CM

## 2023-01-04 DIAGNOSIS — I10 ESSENTIAL HYPERTENSION: ICD-10-CM

## 2023-01-04 DIAGNOSIS — R42 DIZZINESS: ICD-10-CM

## 2023-01-04 DIAGNOSIS — Z76.89 ESTABLISHING CARE WITH NEW DOCTOR, ENCOUNTER FOR: Primary | ICD-10-CM

## 2023-01-04 DIAGNOSIS — E11.620 TYPE 2 DIABETES MELLITUS WITH DIABETIC DERMATITIS, WITHOUT LONG-TERM CURRENT USE OF INSULIN (HCC): ICD-10-CM

## 2023-01-04 DIAGNOSIS — Z86.73 HISTORY OF CVA (CEREBROVASCULAR ACCIDENT): ICD-10-CM

## 2023-01-04 DIAGNOSIS — I65.23 BILATERAL CAROTID ARTERY STENOSIS: ICD-10-CM

## 2023-01-04 DIAGNOSIS — I70.0 ATHEROSCLEROSIS OF ABDOMINAL AORTA (HCC): ICD-10-CM

## 2023-01-04 DIAGNOSIS — E66.09 CLASS 1 OBESITY DUE TO EXCESS CALORIES WITH SERIOUS COMORBIDITY AND BODY MASS INDEX (BMI) OF 34.0 TO 34.9 IN ADULT: ICD-10-CM

## 2023-01-04 DIAGNOSIS — Z87.891 FORMER TOBACCO USE: ICD-10-CM

## 2023-01-04 DIAGNOSIS — D64.9 CHRONIC ANEMIA: ICD-10-CM

## 2023-01-04 DIAGNOSIS — R06.02 SOB (SHORTNESS OF BREATH): ICD-10-CM

## 2023-01-04 NOTE — PATIENT INSTRUCTIONS
Plan:  Recommend adequate hydration as dizziness appears to be related to dehydration. Discussed vagal response in correlation to symptom complex  EKG reviewed ~ abnormal right bundle branch block  Order cardiac event monitor 2 weeks   Orthostatic blood pressure and HR while in office  Conservative management of orthostasis discussed including utilization of compression stockings, possible use of abdominal binder, generous water intake, counterpressure maneuvers and deliberate changes in position. Start aspirin 81 mg once daily  Order CBC, ferritin, and TIB in one month. Please be fasting for 8 hrs prior to blood work.    Follow up in 3 months

## 2023-01-04 NOTE — PROGRESS NOTES
[address] [facility]  [phone]      Attending Physician: No att. providers found  Reason for Consultation/Chief Complaint:   Aortic stenosis, lightheadedness    Subjective   History of Present Illness: Claudene Mayotte is a 81 y.o. male with a history of CVA, PVD, essential hypertension, hyperlipidemia, type 2 DM, chronic anemia, and chronic venous insufficiency referred for further evaluation of lightheadedness and aortic stenosis. The patient reports that he has been having shortness of breath and lightheadedness. He says that he first noticed the shortness of breath after they sprayed fertilizer around his residence. He also says that he has been having lightheadedness which he thought was related to an ear infection from 6 months ago. He says that the lightheadedness does not seem to be related to changes in position. He son reports that he had an episode while sitting that was associated with diaphoresis and nausea. He says that drinking water during these episodes seems to help. He denies any chest pain, palpitations, lower extremity edema, orthopnea, or paroxysmal nocturnal dyspnea. He says that he smoked 4 years when he was younger. He consumes beer occasionally. He denies any illicit drug use. He says that he had a colonoscopy within the past year that did not reveal any concerning findings. The patient had a TTE performed on 3/29/21 that showed an LVEF of 55% with normal wall motion, mild concentric LVH, grade 1 diastolic dysfunction, normal RV size and systolic function, mild aortic stenosis with maximum gradient 21 mmHg and mean gradient 10 mmHg, and trace mitral and tricuspid regurgitation. He had a carotid artery duplex study performed on 12/21/22 that showed <50% stenosis in the bilateral carotid arteries. He says that he had a stress test previously, but is unable to recall when this was performed.   He wore a 30-day cardiac event monitor in 2016 for further evaluation of syncope which showed predominant sinus rhythm with PACs and brief SVT. A CT abdomen/pelvis w/o contrast from 11/13/21 showed moderate calcification of the abdominal aorta without aneurysm. 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 Surgical History:   has a past surgical history that includes Arm Debridement; Foot Debridement (2011); Abdomen surgery; Colonoscopy; Dilatation, esophagus; skin biopsy; Foot Debridement (Right, 02/19/2021); Foot Debridement (Right, 02/23/2021); Foot Debridement (Right, 6/17/2022); and Foot surgery (Right, 6/20/2022). Social History:   reports that he has quit smoking. He has never used smokeless tobacco. He reports that he does not drink alcohol and does not use drugs. Family History:  family history includes Cancer in his brother; No Known Problems in his brother, sister, and sister; Other in his father and mother. Home Medications:  Were reviewed and are listed in nursing record and/or below  Prior to Admission medications    Medication Sig Start Date End Date Taking?  Authorizing Provider   lisinopril (PRINIVIL;ZESTRIL) 5 MG tablet Take 1 tablet by mouth daily 12/16/22 3/16/23 Yes MALATHI Hernandez CNP   atorvastatin (LIPITOR) 40 MG tablet Take 1 tablet by mouth daily 11/29/22  Yes MALATHI Hernandez CNP   acetaminophen (TYLENOL) 325 MG tablet Take 650 mg by mouth every 4 hours as needed for Pain   Yes Historical Provider, MD   metFORMIN (GLUCOPHAGE) 1000 MG tablet Take 1 tablet by mouth 2 times daily (with meals) For diabetes 4/15/22  Yes MALATHI Buenrostro CNP   glipiZIDE (GLUCOTROL) 10 MG tablet TAKE ONE TABLET BY MOUTH TWICE A DAY BEFORE MEALS 4/15/22  Yes MALATHI Buenrostro CNP   famotidine (PEPCID) 20 MG tablet TAKE ONE TABLET BY MOUTH DAILY 4/15/22  Yes MALATHI Buenrostro CNP   ACCU-CHEK DIXON PLUS strip TEST DAILY AS NEEDED 3/7/22  Yes MALATHI Buenrostro CNP   gentamicin (GARAMYCIN) 0.1 % cream Apply topically daily wtih dressing changes. 3/2/21  Yes Clara Rao DPM   Multiple Vitamins-Minerals (MULTIVITAMIN PO) Take 1 tablet by mouth daily. Yes Historical Provider, MD        CURRENT Medications:  No current facility-administered medications for this visit. Allergies:  Patient has no known allergies. Review of Systems:   Review of Systems   Constitutional:  Negative for chills and fever. HENT:  Negative for congestion, rhinorrhea and sore throat. Eyes:  Negative for photophobia, pain and visual disturbance. Respiratory:  Positive for shortness of breath. Negative for cough. Cardiovascular:  Negative for chest pain, palpitations and leg swelling. Gastrointestinal:  Positive for nausea. Negative for abdominal pain, blood in stool, constipation, diarrhea and vomiting. Endocrine: Negative for cold intolerance and heat intolerance. Genitourinary:  Negative for difficulty urinating, dysuria and hematuria. Musculoskeletal:  Negative for arthralgias, joint swelling and myalgias. Skin:  Negative for rash and wound. Allergic/Immunologic: Negative for environmental allergies and food allergies. Neurological:  Positive for dizziness and light-headedness. Negative for syncope. Hematological:  Negative for adenopathy. Does not bruise/bleed easily. Psychiatric/Behavioral:  Negative for dysphoric mood. The patient is not nervous/anxious.         Objective   PHYSICAL EXAM:    Vitals:    01/04/23 1030 01/04/23 1145 01/04/23 1146   BP: 126/64 132/64 122/60   Position:  Supine Sitting   Pulse: 75 70 71   SpO2: 96%     Weight: 230 lb (104.3 kg)     Height: 5' 8\" (1.727 m)       Orthostatic Vitals:  Lying:  /64 HR 70  Sitting:  /60 HR 71  Standing: /58 HR 81    General: Elderly male in no acute distress. Pleasant and interactive on exam.  HEENT: Normocephalic, atraumatic, non-icteric, hearing intact, nares normal, mucous membranes moist.  Neck: Supple, trachea midline. No adenopathy. No thyromegaly. No carotid bruits. No JVD. Heart: Regular rate and rhythm. Normal S1 and S2. Grade II/VI systolic ejection murmur at RUSB. No rubs or gallops. Lungs: Normal respiratory effort. Clear to auscultation bilaterally. No wheezes, rales, or rhonchi. Abdomen: Soft, non-tender. Normoactive bowel sounds. No masses or organomegaly. Skin: No rashes, wounds, or lesions. Pulses: 2+ and symmetric. Extremities: 1+ bilateral LE edema. No clubbing or cyanosis. Musculoskeletal: Spontaneously moves all four extremities. Psych: Normal mood and affect. Neuro: Alert and oriented to person, place, and time. No focal deficits noted.      Labs   CBC:   Lab Results   Component Value Date/Time    WBC 9.3 09/29/2022 11:42 AM    RBC 3.44 09/29/2022 11:42 AM    HGB 10.8 09/29/2022 11:42 AM    HCT 33.6 09/29/2022 11:42 AM    MCV 97.6 09/29/2022 11:42 AM    RDW 15.2 09/29/2022 11:42 AM     09/29/2022 11:42 AM     CMP:  Lab Results   Component Value Date/Time     09/29/2022 11:42 AM    K 4.9 09/29/2022 11:42 AM    K 4.1 06/21/2022 06:10 AM     09/29/2022 11:42 AM    CO2 27 09/29/2022 11:42 AM    BUN 23 09/29/2022 11:42 AM    CREATININE 1.1 09/29/2022 11:42 AM    GFRAA >60 09/29/2022 11:42 AM    GFRAA >60 07/28/2011 05:10 AM    AGRATIO 1.7 09/29/2022 11:42 AM    LABGLOM >60 09/29/2022 11:42 AM    GLUCOSE 60 09/29/2022 11:42 AM    PROT 7.1 09/29/2022 11:42 AM    PROT 7.3 07/27/2011 12:45 AM    CALCIUM 8.9 09/29/2022 11:42 AM    BILITOT <0.2 09/29/2022 11:42 AM    ALKPHOS 80 09/29/2022 11:42 AM    AST 15 09/29/2022 11:42 AM    ALT 10 09/29/2022 11:42 AM     PT/INR:  No results found for: PTINR  HgBA1c:  Lab Results   Component Value Date    LABA1C 6.5 10/17/2022     Lab Results   Component Value Date    TROPONINI <0.01 03/28/2021         Cardiac Data     Last EKG: Normal sinus rhythm with first degree AV block, RBBB, left axis deviation. Echo:  TTE 7/14/16:   Summary   Normal left ventricle size and systolic function with an estimated ejection   fraction of 55%. No regional wall motion abnormalities are seen. There is mild concentric left ventricular hypertrophy. Diastolic filling parameters suggest grade I diastolic dysfunction. The left atrium is mildly dilated. The right atrium is moderately dilated. There is mild mitral annular calcification present. Aortic valve appears tricuspid and sclerotic but opens adequately. Systolic pulmonary artery pressure (SPAP) is normal and estimated at 30mmHg   (RA pressure 3mmHg). TTE  3/29/21:   Summary   Normal systolic function with an estimated ejection fraction of 55%. There is mild concentric left ventricular hypertrophy. No regional wall motion abnormalities are seen. Grade I diastolic dysfunction with normal filing pressure. The aortic valve is thickened/calcified with decreased leaflet mobility. The aortic valve area is calculated at 1.67 cm2 with max velocity of 2.3   m/sec, a maximum pressure gradient of 21 mmHg and a mean pressure gradient   of 10 mmHg. This is c/w mild aortic stenosis. There is no evidence of aortic regurgitation. Trace mitral and tricuspid regurgitation. Systolic pulmonary artery pressure (SPAP) is normal and estimated at 25 mmHg   (right atrial pressure 3 mmHg).     Stress Test: N/A    Cath: N/A    Cardiac event monitor 8/21-8/31/16:   Predominant sinus rhythm with PACs and brief SVT      Other Studies:   CT Abdomen and Pelvis 3/28/21:  Lower Chest: Calcified granuloma are noted in the lung bases. There is moderate cardiomegaly. Organs: The liver is homogeneous and normal in attenuation. No gallbladder stones are seen. The pancreas and adrenal glands are unremarkable. The spleen is enlarged, measuring 18 x 13 cm, not significantly changed. The kidneys enhance symmetrically. There is no hydronephrosis or obstructing stone. A 9 mm cyst in the superior pole of the right kidney is stable. No follow-up is recommended. GI/Bowel: There is a small hiatal hernia. No dilated loops of small bowel are identified. No focal mural thickening of the colon is seen. The appendix is normal.  Mesenteric vasculature enhances in normal fashion. Pelvis: Urinary bladder and prostate gland are unremarkable. Peritoneum/Retroperitoneum: No enlarged nodes are identified. There is severe aortic iliac calcification, without aneurysm. Bones/Soft Tissues: No acute osseous injury is identified. There is advanced multilevel spondylosis of the lumbar spine. The abdominal wall is unremarkable. CT Abdomen and Pelvis 11/9/21:  Organs: Suboptimal evaluation due to lack of IV contrast.  Liver and splenic granulomas. Splenomegaly measuring 17 cm. Pancreas and adrenal glands appear grossly unremarkable. Gallbladder is grossly unremarkable. Kidneys demonstrate no evidence of stone or hydronephrosis. Abdominal aorta demonstrates moderate calcification without aneurysm. GI/Bowel: Stomach is grossly unremarkable. Small bowel appears nondilated. No acute colonic abnormality. Pelvis: Urinary bladder is grossly unremarkable. Prostate gland is normal in size. Peritoneum/Retroperitoneum: Haziness involving the mesentery. No free air, free fluid or lymphadenopathy. Bones/Soft Tissues: Abdominal wall demonstrates no acute findings. Osseous structures demonstrate degenerative change.       Carotid Doppler 12/13/22:  Summary   < 50% stenosis involving the internal carotid arteries bilaterally, however,  the left internal carotid artery could be underestimated due to calcific  shadowing. The vertebral arteries are patent with antegrade flow bilaterally. Assessment:      1. Dizziness - May primarily be secondary to vasovagal reaction. Periods of dehydration may also be exacerbating symptoms as well, but will arrange for 2-week cardiac event monitor to further evaluate for potential arrhythmias that may be contributing. Orthostatic vitals were negative today in clinic. His aortic stenosis only appeared to be mild on his last TTE from 3/2021. Will repeat TTE to monitor for progression of AS, but this is overall felt to be a less likely source at this time. 2. Mild aortic stenosis - Last TTE from 3/9/21 showed peak gradient 21 mmHg and mean gradient 10 mmHg. 3. Shortness of breath - Could possibly have been exacerbated by reported chemical exposure or secondary to deconditioning. Plan to repeat TTE as above, and pending results and if symptoms become more bothersome, can consider additional testing. 4. Chronic anemia - Reports having had a colonoscopy within the last year without any concerning findings. Will repeat CBC and check iron studies. 5. Essential hypertension - BP currently controlled. 6. Hyperlipidemia  7. RBBB  8. H/o CVA  9. PVD  10. Type 2 DM  11. Chronic venous insufficiency  12. Mild carotid artery disease  13. Former tobacco use  14. Obesity    Plan:     1. Will arrange for 2-week cardiac event monitor to evaluate for possible cardiac arrhythmias. 2. Repeat TTE to re-assess cardiac function and monitor for progression of aortic stenosis. 3. Encouraged patient to make sure that he is staying well hydrated and to always rise slowly from lying and sitting positions and sit or lie down immediately whenever he becomes lightheaded. 4. Educated on counter-pressure maneuvers, including leg crossing, abdominal tensing, and handgrip techniques.   5. May benefit from use of compression stockings. 6. Start aspirin 81 mg daily. 7. Continue atorvastatin 40 mg daily and lisinopril 5 mg daily. 8. Check CBC and iron studies. 9. Follow-up with me in 3 months. Scribe's attestation: This note was scribed in the presence of Garfield Cordoba DO by Dex Nicholson RN       It is a pleasure to assist in the care of Berny Garcia. Please call with any questions. The scribes documentation has been prepared under my direction and personally reviewed by me in its entirety. I confirm that the note above accurately reflects all work, treatment, procedures, and medical decision making performed by me. I, Dr. Zahraa Rock, personally performed the services described in this documentation as scribed by Dex Nicholson RN in my presence, and it is both accurate and complete to the best of our ability. Thank you for allowing us to participate in the care of Breny Garcia. Please call me with any questions [phone]. Zahraa Rock DO   [facility]  [phone] [facility]  1/4/2023 11:18 AM      I will address the patient's cardiac risk factors and adjusted pharmacologic treatment as needed. In addition, I have reinforced the need for patient directed risk factor modification. All questions and concerns were addressed to the patient/family. Alternatives to my treatment were discussed. The note was completed using EMR. Every effort was made to ensure accuracy; however, inadvertent computerized transcription errors may be present.

## 2023-01-29 PROBLEM — I83.215: Status: ACTIVE | Noted: 2023-01-29

## 2023-01-29 ASSESSMENT — ENCOUNTER SYMPTOMS
COUGH: 0
SHORTNESS OF BREATH: 1
BLOOD IN STOOL: 0
CONSTIPATION: 0
EYE PAIN: 0
SORE THROAT: 0
DIARRHEA: 0
NAUSEA: 1
VOMITING: 0
PHOTOPHOBIA: 0
ABDOMINAL PAIN: 0
RHINORRHEA: 0

## 2023-02-13 ENCOUNTER — OFFICE VISIT (OUTPATIENT)
Dept: FAMILY MEDICINE CLINIC | Age: 88
End: 2023-02-13
Payer: MEDICARE

## 2023-02-13 VITALS
OXYGEN SATURATION: 97 % | HEIGHT: 68 IN | DIASTOLIC BLOOD PRESSURE: 64 MMHG | WEIGHT: 224 LBS | SYSTOLIC BLOOD PRESSURE: 132 MMHG | BODY MASS INDEX: 33.95 KG/M2 | HEART RATE: 68 BPM

## 2023-02-13 DIAGNOSIS — M79.604 RIGHT LEG PAIN: ICD-10-CM

## 2023-02-13 DIAGNOSIS — R42 DIZZINESS: ICD-10-CM

## 2023-02-13 DIAGNOSIS — M25.551 ACUTE RIGHT HIP PAIN: Primary | ICD-10-CM

## 2023-02-13 DIAGNOSIS — R01.1 HEART MURMUR: ICD-10-CM

## 2023-02-13 PROCEDURE — 1124F ACP DISCUSS-NO DSCNMKR DOCD: CPT

## 2023-02-13 PROCEDURE — 99214 OFFICE O/P EST MOD 30 MIN: CPT

## 2023-02-13 RX ORDER — TIZANIDINE 2 MG/1
2 TABLET ORAL 2 TIMES DAILY PRN
Qty: 30 TABLET | Refills: 0 | Status: SHIPPED | OUTPATIENT
Start: 2023-02-13

## 2023-02-13 RX ORDER — METHYLPREDNISOLONE 4 MG/1
TABLET ORAL
Qty: 1 KIT | Refills: 0 | Status: SHIPPED | OUTPATIENT
Start: 2023-02-13 | End: 2023-02-19

## 2023-02-13 SDOH — ECONOMIC STABILITY: HOUSING INSECURITY
IN THE LAST 12 MONTHS, WAS THERE A TIME WHEN YOU DID NOT HAVE A STEADY PLACE TO SLEEP OR SLEPT IN A SHELTER (INCLUDING NOW)?: NO

## 2023-02-13 SDOH — ECONOMIC STABILITY: FOOD INSECURITY: WITHIN THE PAST 12 MONTHS, YOU WORRIED THAT YOUR FOOD WOULD RUN OUT BEFORE YOU GOT MONEY TO BUY MORE.: NEVER TRUE

## 2023-02-13 SDOH — ECONOMIC STABILITY: FOOD INSECURITY: WITHIN THE PAST 12 MONTHS, THE FOOD YOU BOUGHT JUST DIDN'T LAST AND YOU DIDN'T HAVE MONEY TO GET MORE.: NEVER TRUE

## 2023-02-13 SDOH — ECONOMIC STABILITY: INCOME INSECURITY: HOW HARD IS IT FOR YOU TO PAY FOR THE VERY BASICS LIKE FOOD, HOUSING, MEDICAL CARE, AND HEATING?: NOT HARD AT ALL

## 2023-02-13 ASSESSMENT — ENCOUNTER SYMPTOMS
GASTROINTESTINAL NEGATIVE: 1
RESPIRATORY NEGATIVE: 1
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
## 2023-02-22 ENCOUNTER — TELEPHONE (OUTPATIENT)
Dept: FAMILY MEDICINE CLINIC | Age: 88
End: 2023-02-22

## 2023-02-22 ENCOUNTER — HOSPITAL ENCOUNTER (OUTPATIENT)
Dept: NON INVASIVE DIAGNOSTICS | Age: 88
Discharge: HOME | End: 2023-02-22
Payer: MEDICARE

## 2023-02-22 ENCOUNTER — HOSPITAL ENCOUNTER (OUTPATIENT)
Dept: CT IMAGING | Age: 88
Discharge: HOME | End: 2023-02-22
Payer: MEDICARE

## 2023-02-22 DIAGNOSIS — R42 DIZZINESS: ICD-10-CM

## 2023-02-22 DIAGNOSIS — M25.551 ACUTE RIGHT HIP PAIN: Primary | ICD-10-CM

## 2023-02-22 DIAGNOSIS — M79.604 RIGHT LEG PAIN: ICD-10-CM

## 2023-02-22 DIAGNOSIS — R01.1 HEART MURMUR: ICD-10-CM

## 2023-02-22 DIAGNOSIS — M25.551 ACUTE RIGHT HIP PAIN: ICD-10-CM

## 2023-02-22 DIAGNOSIS — M51.36 DEGENERATIVE DISC DISEASE, LUMBAR: ICD-10-CM

## 2023-02-22 LAB
LV EF: 55 %
LVEF MODALITY: NORMAL

## 2023-02-22 PROCEDURE — 93306 TTE W/DOPPLER COMPLETE: CPT

## 2023-02-22 PROCEDURE — 73700 CT LOWER EXTREMITY W/O DYE: CPT

## 2023-02-22 RX ORDER — METHYLPREDNISOLONE 4 MG/1
TABLET ORAL
Qty: 1 KIT | Refills: 0 | Status: SHIPPED | OUTPATIENT
Start: 2023-02-22 | End: 2023-02-28

## 2023-02-22 RX ORDER — TRAMADOL HYDROCHLORIDE 50 MG/1
50 TABLET ORAL EVERY 8 HOURS PRN
Qty: 18 TABLET | Refills: 0 | Status: SHIPPED | OUTPATIENT
Start: 2023-02-22 | End: 2023-03-01

## 2023-02-22 NOTE — TELEPHONE ENCOUNTER
Is he still having a hard time walking? If he so can we try to get him in with Ortho this week to look at his hip? I know his CT scan is scheduled for today. Thanks.

## 2023-02-22 NOTE — TELEPHONE ENCOUNTER
I called his wife, Kristan Yo and discussed all of his testing and recommendations. Will do trial of Tramadol/ Medrol Dose Cordell for now. Keep appt with Dr. Pauline Boswell but will consider appt with Dr. Pari Dan Mgmt if no improvement with Medrol Dose Cordell. Staff-- NO NEED TO CALL AS I HAVE ALREADY TAKEN CARE OF IT.

## 2023-02-22 NOTE — RESULT ENCOUNTER NOTE
Dr. Benito Goodwin- sending this to you as well since you saw this patient 1/4/23 and also wanted this completed. Also- are there other options for the Holter monitor that do not cause eczema? Thank you for the assistance. ECHO overall looks similar to ECHO 2 years ago. I have forwarded it on to the Cardiologist who will also look at it to give his recommendations for the dizziness.

## 2023-02-22 NOTE — TELEPHONE ENCOUNTER
That is fine. What pain medications has worked for him in the past?      I also reviewed his CT scan of the hip- no fracture. Looks like the pain is likely coming from his back. Does he want to try some steroids to see if that may help? Thank you for setting up the appointment with Dr. Winnie Clarke.

## 2023-02-22 NOTE — TELEPHONE ENCOUNTER
Patient's daughter called asking if something can be called in for the pain. States her father is in a considerable amount of pain. Please advise.

## 2023-02-22 NOTE — RESULT ENCOUNTER NOTE
No acute fracture of hip. Looks like on CT hip pain is more than likely coming from back. Due to difficulty walking will have Dr. Pauline Boswell evaluate hip but may also need to consider Ortho spine referral.   We have multiple calls to this patient out already so will result this and not send to pool.

## 2023-03-01 ENCOUNTER — OFFICE VISIT (OUTPATIENT)
Dept: ORTHOPEDIC SURGERY | Age: 88
End: 2023-03-01

## 2023-03-01 DIAGNOSIS — M70.61 GREATER TROCHANTERIC BURSITIS OF RIGHT HIP: Primary | ICD-10-CM

## 2023-03-01 DIAGNOSIS — M54.50 LOW BACK PAIN, UNSPECIFIED BACK PAIN LATERALITY, UNSPECIFIED CHRONICITY, UNSPECIFIED WHETHER SCIATICA PRESENT: ICD-10-CM

## 2023-03-01 DIAGNOSIS — M47.816 LUMBAR SPONDYLOSIS: ICD-10-CM

## 2023-03-01 DIAGNOSIS — M25.551 RIGHT HIP PAIN: ICD-10-CM

## 2023-03-01 NOTE — PROGRESS NOTES
Chief Complaint    Hip Pain (Right hip pain & back pain- worse the past 2 weeks 10/10 pain scale)       History of Present Illness: Mariann Malagon is a 81 y.o. male presents for evaluation of right hip pain he has had for the last several weeks. He grades pain 10/10 although he says it is improving and he does have some back pain when he lays on his side. He sleeps in a recliner. He denies radicular symptoms. Medical History:  Patient's medications, allergies, past medical, surgical, social and family histories were reviewed and updated as appropriate. Review of Systems:  Relevant review of systems reviewed and available in the patient's chart    Vital Signs: There were no vitals filed for this visit. General Exam:   Constitutional: Patient is adequately groomed with no evidence of malnutrition  Mental Status: The patient is oriented to time, place and person. The patient's mood and affect are appropriate. Lymphatic: The lymphatic examination bilaterally reveals all areas to be without enlargement or induration. Vascular: Examination reveals no swelling or calf tenderness. Peripheral pulses are palpable and 2+. Neurological: The patient has good coordination. There is no weakness or sensory deficit. Right hip Examination:    Inspection: No visible lesions    Palpation: He has no sciatic notch tenderness but he does have tenderness over the right greater trochanter. Range of Motion: He has good range of motion in his hips without guarding. Strength: Normal and symmetric to both lower extremities    Special Tests: Negative straight leg raising negative Lasegue's    Skin: There are no rashes, ulcerations or lesions.     Gait: He requires a walker for ambulation more for balance than anything else    Reflex uniformly absent    Additional Comments:       Additional Examinations:         Left Lower Extremity: Examination of the left lower extremity does not show any tenderness, deformity or injury. Range of motion is unremarkable. There is no gross instability. There are no rashes, ulcerations or lesions. Strength and tone are normal.    Radiology:     X-rays 2 views right hip reveal grade 2 or less osteoarthritis    X-rays 2 views lumbar spine reveals diffuse lumbar spondylitic change       Assessment : Right hip pain secondary to greater trochanteric bursitis; lumbar spondylosis    Impression:  Encounter Diagnoses   Name Primary? Low back pain, unspecified back pain laterality, unspecified chronicity, unspecified whether sciatica present     Right hip pain     Lumbar spondylosis     Greater trochanteric bursitis of right hip Yes       Office Procedures:  Orders Placed This Encounter   Procedures    XR LUMBAR SPINE (2-3 VIEWS)     Standing Status:   Future     Number of Occurrences:   1     Standing Expiration Date:   2/28/2024    XR HIP RIGHT (2-3 VIEWS)     Standing Status:   Future     Number of Occurrences:   1     Standing Expiration Date:   2/28/2024       Treatment Plan: Since he is improving and since he is diabetic, prefer not to do injections today. I would recommend no and exercise program that he can do on his own at home. If this problem persists he should return to be considered for injection.

## 2023-03-02 ENCOUNTER — TELEPHONE (OUTPATIENT)
Dept: CARDIOLOGY CLINIC | Age: 88
End: 2023-03-02

## 2023-03-02 NOTE — TELEPHONE ENCOUNTER
----- Message from Alfredo Son DO sent at 3/2/2023  8:05 AM EST -----  TTE reviewed. Biventricular systolic function is preserved. There is grade 2 diastolic dysfunction and also mild aortic stenosis which appears stable from his previous exam.    There are monitors available with hypoallergenic adhesives. If the patient has had a reaction to the monitor, we can attempt to get him one with hypoallergenic adhesives.

## 2023-03-02 NOTE — TELEPHONE ENCOUNTER
Called and spoke with patient son Pauline Mondragon per HIPAA his father was also present. Relayed Dr Hardy Dietrich result message regarding the Echocardiogram. Verbally understood results. They stated at this time the patient does not want to try another cardiac monitor with hypoallergenic adhesives. He said he will follow up with PCP if have any further concerns.  Thanks     FYMARLENY only for [facility] and Simonne Saint CNP

## 2023-03-25 DIAGNOSIS — R42 DIZZINESS AND GIDDINESS: Primary | ICD-10-CM

## 2023-03-29 ENCOUNTER — TELEPHONE (OUTPATIENT)
Dept: CARDIOLOGY CLINIC | Age: 88
End: 2023-03-29

## 2023-03-29 NOTE — TELEPHONE ENCOUNTER
----- Message from Daksha Hines DO sent at 3/29/2023  8:37 AM EDT -----  Patient only wore monitor for 6 days. Monitor shows predominant sinus rhythm with rare PACs, rare PVCs, and very brief SVT. Overall, no significant arrhythmias were observed. We will continue current management.

## 2023-04-03 NOTE — TELEPHONE ENCOUNTER
Called and spoke with pt regarding results, he v/u.  Pt stated he lost his wife Jostin Gunderson 2 weeks ago and has been busy, his son is now taking care of him

## 2023-04-06 DIAGNOSIS — R10.84 GENERALIZED ABDOMINAL PAIN: ICD-10-CM

## 2023-04-06 RX ORDER — FAMOTIDINE 20 MG/1
TABLET, FILM COATED ORAL
Qty: 90 TABLET | Refills: 3 | Status: SHIPPED | OUTPATIENT
Start: 2023-04-06

## 2023-04-06 NOTE — TELEPHONE ENCOUNTER
Future appt scheduled 04/17/2023                     Last appt 02/13/2023      Last Written 04/15/2022    famotidine (PEPCID) 20 MG tablet  #90  3 RF

## 2023-04-17 ENCOUNTER — OFFICE VISIT (OUTPATIENT)
Dept: FAMILY MEDICINE CLINIC | Age: 88
End: 2023-04-17
Payer: MEDICARE

## 2023-04-17 VITALS
OXYGEN SATURATION: 96 % | SYSTOLIC BLOOD PRESSURE: 128 MMHG | WEIGHT: 224 LBS | HEART RATE: 62 BPM | TEMPERATURE: 97.2 F | DIASTOLIC BLOOD PRESSURE: 50 MMHG | BODY MASS INDEX: 34.06 KG/M2

## 2023-04-17 DIAGNOSIS — E11.42 TYPE 2 DIABETES MELLITUS WITH DIABETIC POLYNEUROPATHY, WITHOUT LONG-TERM CURRENT USE OF INSULIN (HCC): ICD-10-CM

## 2023-04-17 DIAGNOSIS — I25.10 CORONARY ARTERY DISEASE INVOLVING NATIVE CORONARY ARTERY OF NATIVE HEART WITHOUT ANGINA PECTORIS: Primary | ICD-10-CM

## 2023-04-17 DIAGNOSIS — I70.0 ATHEROSCLEROSIS OF ABDOMINAL AORTA (HCC): ICD-10-CM

## 2023-04-17 DIAGNOSIS — E11.620 TYPE 2 DIABETES MELLITUS WITH DIABETIC DERMATITIS, WITHOUT LONG-TERM CURRENT USE OF INSULIN (HCC): ICD-10-CM

## 2023-04-17 DIAGNOSIS — C85.90 LYMPHOMA, UNSPECIFIED BODY REGION, UNSPECIFIED LYMPHOMA TYPE (HCC): ICD-10-CM

## 2023-04-17 DIAGNOSIS — R42 DIZZINESS: ICD-10-CM

## 2023-04-17 DIAGNOSIS — I73.9 PVD (PERIPHERAL VASCULAR DISEASE) (HCC): ICD-10-CM

## 2023-04-17 DIAGNOSIS — I65.23 BILATERAL CAROTID ARTERY STENOSIS: ICD-10-CM

## 2023-04-17 DIAGNOSIS — H90.3 SENSORINEURAL HEARING LOSS, BILATERAL: ICD-10-CM

## 2023-04-17 DIAGNOSIS — I35.0 NONRHEUMATIC AORTIC VALVE STENOSIS: ICD-10-CM

## 2023-04-17 PROBLEM — I83.215: Status: RESOLVED | Noted: 2023-01-29 | Resolved: 2023-04-17

## 2023-04-17 PROCEDURE — 1124F ACP DISCUSS-NO DSCNMKR DOCD: CPT | Performed by: NURSE PRACTITIONER

## 2023-04-17 PROCEDURE — 99214 OFFICE O/P EST MOD 30 MIN: CPT | Performed by: NURSE PRACTITIONER

## 2023-04-17 ASSESSMENT — PATIENT HEALTH QUESTIONNAIRE - PHQ9
3. TROUBLE FALLING OR STAYING ASLEEP: 1
1. LITTLE INTEREST OR PLEASURE IN DOING THINGS: 1
7. TROUBLE CONCENTRATING ON THINGS, SUCH AS READING THE NEWSPAPER OR WATCHING TELEVISION: 0
10. IF YOU CHECKED OFF ANY PROBLEMS, HOW DIFFICULT HAVE THESE PROBLEMS MADE IT FOR YOU TO DO YOUR WORK, TAKE CARE OF THINGS AT HOME, OR GET ALONG WITH OTHER PEOPLE: 0
SUM OF ALL RESPONSES TO PHQ9 QUESTIONS 1 & 2: 2
5. POOR APPETITE OR OVEREATING: 0
9. THOUGHTS THAT YOU WOULD BE BETTER OFF DEAD, OR OF HURTING YOURSELF: 0
8. MOVING OR SPEAKING SO SLOWLY THAT OTHER PEOPLE COULD HAVE NOTICED. OR THE OPPOSITE, BEING SO FIGETY OR RESTLESS THAT YOU HAVE BEEN MOVING AROUND A LOT MORE THAN USUAL: 0
SUM OF ALL RESPONSES TO PHQ QUESTIONS 1-9: 3
SUM OF ALL RESPONSES TO PHQ QUESTIONS 1-9: 3
4. FEELING TIRED OR HAVING LITTLE ENERGY: 0
SUM OF ALL RESPONSES TO PHQ QUESTIONS 1-9: 3
SUM OF ALL RESPONSES TO PHQ QUESTIONS 1-9: 3
6. FEELING BAD ABOUT YOURSELF - OR THAT YOU ARE A FAILURE OR HAVE LET YOURSELF OR YOUR FAMILY DOWN: 0
2. FEELING DOWN, DEPRESSED OR HOPELESS: 1

## 2023-05-02 ASSESSMENT — ENCOUNTER SYMPTOMS: SHORTNESS OF BREATH: 0

## 2023-07-03 DIAGNOSIS — I10 ESSENTIAL HYPERTENSION: ICD-10-CM

## 2023-07-03 DIAGNOSIS — E11.620 TYPE 2 DIABETES MELLITUS WITH DIABETIC DERMATITIS, WITHOUT LONG-TERM CURRENT USE OF INSULIN (HCC): ICD-10-CM

## 2023-07-03 RX ORDER — LISINOPRIL 5 MG/1
TABLET ORAL
Qty: 90 TABLET | Refills: 3 | Status: SHIPPED | OUTPATIENT
Start: 2023-07-03

## 2023-07-03 RX ORDER — GLIPIZIDE 10 MG/1
TABLET ORAL
Qty: 180 TABLET | Refills: 3 | Status: SHIPPED | OUTPATIENT
Start: 2023-07-03

## 2023-07-08 ENCOUNTER — APPOINTMENT (OUTPATIENT)
Dept: GENERAL RADIOLOGY | Age: 88
DRG: 313 | End: 2023-07-08
Payer: MEDICARE

## 2023-07-08 ENCOUNTER — APPOINTMENT (OUTPATIENT)
Dept: CT IMAGING | Age: 88
DRG: 313 | End: 2023-07-08
Payer: MEDICARE

## 2023-07-08 ENCOUNTER — HOSPITAL ENCOUNTER (EMERGENCY)
Age: 88
Discharge: HOME | DRG: 313 | End: 2023-07-08
Attending: EMERGENCY MEDICINE
Payer: MEDICARE

## 2023-07-08 VITALS
SYSTOLIC BLOOD PRESSURE: 165 MMHG | OXYGEN SATURATION: 95 % | WEIGHT: 215 LBS | HEIGHT: 68 IN | HEART RATE: 75 BPM | BODY MASS INDEX: 32.58 KG/M2 | TEMPERATURE: 97.5 F | DIASTOLIC BLOOD PRESSURE: 75 MMHG | RESPIRATION RATE: 16 BRPM

## 2023-07-08 DIAGNOSIS — R07.9 CHEST PAIN, UNSPECIFIED TYPE: Primary | ICD-10-CM

## 2023-07-08 DIAGNOSIS — R16.1 SPLENOMEGALY: ICD-10-CM

## 2023-07-08 DIAGNOSIS — I25.10 CORONARY ARTERY CALCIFICATION SEEN ON CT SCAN: ICD-10-CM

## 2023-07-08 DIAGNOSIS — E11.620 TYPE 2 DIABETES MELLITUS WITH DIABETIC DERMATITIS, WITHOUT LONG-TERM CURRENT USE OF INSULIN (HCC): ICD-10-CM

## 2023-07-08 DIAGNOSIS — I10 ESSENTIAL HYPERTENSION: ICD-10-CM

## 2023-07-08 DIAGNOSIS — D64.9 ANEMIA, UNSPECIFIED TYPE: ICD-10-CM

## 2023-07-08 DIAGNOSIS — R91.8 PULMONARY NODULES: ICD-10-CM

## 2023-07-08 DIAGNOSIS — E04.1 THYROID NODULE: ICD-10-CM

## 2023-07-08 LAB
ALBUMIN SERPL-MCNC: 3.9 G/DL (ref 3.4–5)
ALBUMIN/GLOB SERPL: 1.2 {RATIO} (ref 1.1–2.2)
ALP SERPL-CCNC: 77 U/L (ref 40–129)
ALT SERPL-CCNC: 9 U/L (ref 10–40)
ANION GAP SERPL CALCULATED.3IONS-SCNC: 10 MMOL/L (ref 3–16)
AST SERPL-CCNC: 15 U/L (ref 15–37)
BASOPHILS # BLD: 0.1 K/UL (ref 0–0.2)
BASOPHILS NFR BLD: 1 %
BILIRUB SERPL-MCNC: 0.4 MG/DL (ref 0–1)
BUN SERPL-MCNC: 29 MG/DL (ref 7–20)
CALCIUM SERPL-MCNC: 9.2 MG/DL (ref 8.3–10.6)
CHLORIDE SERPL-SCNC: 106 MMOL/L (ref 99–110)
CO2 SERPL-SCNC: 25 MMOL/L (ref 21–32)
CREAT SERPL-MCNC: 1.2 MG/DL (ref 0.8–1.3)
D DIMER: 1.17 UG/ML FEU (ref 0–0.6)
DEPRECATED RDW RBC AUTO: 14.3 % (ref 12.4–15.4)
EKG ATRIAL RATE: 73 BPM
EKG DIAGNOSIS: NORMAL
EKG P-R INTERVAL: 184 MS
EKG Q-T INTERVAL: 408 MS
EKG QRS DURATION: 140 MS
EKG QTC CALCULATION (BAZETT): 449 MS
EKG R AXIS: -28 DEGREES
EKG T AXIS: 29 DEGREES
EKG VENTRICULAR RATE: 73 BPM
EOSINOPHIL # BLD: 0.2 K/UL (ref 0–0.6)
EOSINOPHIL NFR BLD: 2.6 %
GFR SERPLBLD CREATININE-BSD FMLA CKD-EPI: 59 ML/MIN/{1.73_M2}
GLUCOSE SERPL-MCNC: 117 MG/DL (ref 70–99)
HCT VFR BLD AUTO: 31.6 % (ref 40.5–52.5)
HGB BLD-MCNC: 10.5 G/DL (ref 13.5–17.5)
LIPASE SERPL-CCNC: 51 U/L (ref 13–60)
LYMPHOCYTES # BLD: 4.2 K/UL (ref 1–5.1)
LYMPHOCYTES NFR BLD: 56.6 %
MCH RBC QN AUTO: 31.3 PG (ref 26–34)
MCHC RBC AUTO-ENTMCNC: 33.2 G/DL (ref 31–36)
MCV RBC AUTO: 94.2 FL (ref 80–100)
MONOCYTES # BLD: 0.4 K/UL (ref 0–1.3)
MONOCYTES NFR BLD: 5.6 %
NEUTROPHILS # BLD: 2.5 K/UL (ref 1.7–7.7)
NEUTROPHILS NFR BLD: 34.2 %
NT-PROBNP SERPL-MCNC: 169 PG/ML (ref 0–449)
PLATELET # BLD AUTO: 188 K/UL (ref 135–450)
PMV BLD AUTO: 7.7 FL (ref 5–10.5)
POTASSIUM SERPL-SCNC: 4.9 MMOL/L (ref 3.5–5.1)
POTASSIUM SERPL-SCNC: 5.2 MMOL/L (ref 3.5–5.1)
PROT SERPL-MCNC: 7.2 G/DL (ref 6.4–8.2)
RBC # BLD AUTO: 3.35 M/UL (ref 4.2–5.9)
SODIUM SERPL-SCNC: 141 MMOL/L (ref 136–145)
TROPONIN, HIGH SENSITIVITY: 17 NG/L (ref 0–22)
TROPONIN, HIGH SENSITIVITY: 19 NG/L (ref 0–22)
WBC # BLD AUTO: 7.4 K/UL (ref 4–11)

## 2023-07-08 PROCEDURE — 6360000004 HC RX CONTRAST MEDICATION: Performed by: EMERGENCY MEDICINE

## 2023-07-08 PROCEDURE — 6370000000 HC RX 637 (ALT 250 FOR IP): Performed by: EMERGENCY MEDICINE

## 2023-07-08 PROCEDURE — 84132 ASSAY OF SERUM POTASSIUM: CPT

## 2023-07-08 PROCEDURE — 83880 ASSAY OF NATRIURETIC PEPTIDE: CPT

## 2023-07-08 PROCEDURE — 99285 EMERGENCY DEPT VISIT HI MDM: CPT

## 2023-07-08 PROCEDURE — 83690 ASSAY OF LIPASE: CPT

## 2023-07-08 PROCEDURE — 2580000003 HC RX 258: Performed by: EMERGENCY MEDICINE

## 2023-07-08 PROCEDURE — 80053 COMPREHEN METABOLIC PANEL: CPT

## 2023-07-08 PROCEDURE — 36415 COLL VENOUS BLD VENIPUNCTURE: CPT

## 2023-07-08 PROCEDURE — 85379 FIBRIN DEGRADATION QUANT: CPT

## 2023-07-08 PROCEDURE — 93005 ELECTROCARDIOGRAM TRACING: CPT | Performed by: EMERGENCY MEDICINE

## 2023-07-08 PROCEDURE — 84484 ASSAY OF TROPONIN QUANT: CPT

## 2023-07-08 PROCEDURE — 71045 X-RAY EXAM CHEST 1 VIEW: CPT

## 2023-07-08 PROCEDURE — 85025 COMPLETE CBC W/AUTO DIFF WBC: CPT

## 2023-07-08 PROCEDURE — 71260 CT THORAX DX C+: CPT

## 2023-07-08 PROCEDURE — 93010 ELECTROCARDIOGRAM REPORT: CPT | Performed by: INTERNAL MEDICINE

## 2023-07-08 RX ORDER — ASPIRIN 81 MG/1
324 TABLET, CHEWABLE ORAL ONCE
Status: COMPLETED | OUTPATIENT
Start: 2023-07-08 | End: 2023-07-08

## 2023-07-08 RX ORDER — 0.9 % SODIUM CHLORIDE 0.9 %
250 INTRAVENOUS SOLUTION INTRAVENOUS ONCE
Status: COMPLETED | OUTPATIENT
Start: 2023-07-08 | End: 2023-07-08

## 2023-07-08 RX ORDER — IPRATROPIUM BROMIDE AND ALBUTEROL SULFATE 2.5; .5 MG/3ML; MG/3ML
1 SOLUTION RESPIRATORY (INHALATION) ONCE
Status: COMPLETED | OUTPATIENT
Start: 2023-07-08 | End: 2023-07-08

## 2023-07-08 RX ORDER — ALBUTEROL SULFATE 90 UG/1
AEROSOL, METERED RESPIRATORY (INHALATION)
Qty: 18 G | Refills: 0 | Status: SHIPPED | OUTPATIENT
Start: 2023-07-08

## 2023-07-08 RX ADMIN — IPRATROPIUM BROMIDE AND ALBUTEROL SULFATE 1 DOSE: .5; 2.5 SOLUTION RESPIRATORY (INHALATION) at 10:16

## 2023-07-08 RX ADMIN — ASPIRIN 324 MG: 81 TABLET, CHEWABLE ORAL at 13:18

## 2023-07-08 RX ADMIN — IOMEPROL INJECTION 75 ML: 714 INJECTION, SOLUTION INTRAVASCULAR at 11:52

## 2023-07-08 RX ADMIN — SODIUM CHLORIDE 250 ML: 9 INJECTION, SOLUTION INTRAVENOUS at 10:55

## 2023-07-08 ASSESSMENT — LIFESTYLE VARIABLES
HOW OFTEN DO YOU HAVE A DRINK CONTAINING ALCOHOL: NEVER
HOW MANY STANDARD DRINKS CONTAINING ALCOHOL DO YOU HAVE ON A TYPICAL DAY: PATIENT DOES NOT DRINK

## 2023-07-08 ASSESSMENT — PAIN SCALES - GENERAL: PAINLEVEL_OUTOF10: 0

## 2023-07-08 ASSESSMENT — PAIN - FUNCTIONAL ASSESSMENT
PAIN_FUNCTIONAL_ASSESSMENT: NONE - DENIES PAIN
PAIN_FUNCTIONAL_ASSESSMENT: NONE - DENIES PAIN
PAIN_FUNCTIONAL_ASSESSMENT: 0-10
PAIN_FUNCTIONAL_ASSESSMENT: NONE - DENIES PAIN

## 2023-07-08 ASSESSMENT — ENCOUNTER SYMPTOMS
COUGH: 1
STRIDOR: 0
BACK PAIN: 1
VOMITING: 0
CHEST TIGHTNESS: 1
ABDOMINAL PAIN: 0
SHORTNESS OF BREATH: 0

## 2023-07-08 ASSESSMENT — PAIN DESCRIPTION - LOCATION: LOCATION: CHEST

## 2023-07-08 ASSESSMENT — PAIN DESCRIPTION - FREQUENCY: FREQUENCY: INTERMITTENT

## 2023-07-08 ASSESSMENT — PAIN DESCRIPTION - DESCRIPTORS: DESCRIPTORS: PRESSURE

## 2023-07-08 NOTE — ED PROVIDER NOTES
[facility]        Pt Name: Maria T Nava  MRN: [id number]  [address] [place] 1935  Date of evaluation: 7/8/2023  Provider: Dania Smith MD  PCP: Tanna Alvarez, APRN - [address]       Chief Complaint   Patient presents with    Chest Pain     Radiates to back. Pt states that it has been intermittent x a few days. Pt states that they have been spraying for weeds around his home and he and his neighbor have had problems since. Per EMS Glucose 113. HISTORY OFPRESENT ILLNESS   (Location/Symptom, Timing/Onset, Context/Setting, Quality, Duration, Modifying Factors,Severity)  Note limiting factors. Maria T Nava is a 81 y.o. male presenting today due to concern for reporting having chest pressure when he woke up this morning around 6 AM that is in the center of his chest.  He reports that they are spraying for weeds around his home and that his neighbor has been having some breathing problems and he wonders if this is causing his discomfort. He does report coughing up phlegm. He denies any sharp or stabbing pain. No fever. No diaphoresis. He denies feeling short of breath. When I asked him if it radiated anywhere, he denied this but did tell the nurse on arrival that it did radiate to the back. He denies any abdominal pain. He reports chronic leg swelling worse in the right leg from a prior injury but denies any new changes with that. He is unsure if he ever had a blood clot or not but did have an amputation to his right toe in the past.  He denied any chest pain last night but also told triage staff that he had intermittent pain over the last couple of days but he did not tell me this. Due to concern for the chest pressure since this morning when he woke up at 6 AM, he came to the ED via EMS for further evaluation. Based on chart review, he has a history of right foot osteomyelitis, diabetes, hypertension, hyperlipidemia. in time range)     Patient was evaluated due to having chest pressure at least since this morning although reportedly having an over the last couple of days as well. He did report coughing up some mucus and therefore I did order a breathing treatment to see if this helps with his discomfort. He was unsure if he ever had a blood clot or not although story not suggestive of pulmonary embolism. I did order a D-dimer since he did have right leg swelling but has chronic issues with this. EKG showed right bundle branch block which has been seen in the past.  No sign of STEMI on initial EKG. He denies any fever and at this time I do not suspect pneumonia. I was the primary provider for the patient. The patient tolerated their visit well. The patient and / or the family were informed of the results of any tests, a time was given to answer questions. FINAL IMPRESSION    No diagnosis found. DISPOSITION/PLAN   DISPOSITION        PATIENT REFERRED TO:  No follow-up provider specified.     DISCHARGEMEDICATIONS:  New Prescriptions    No medications on file       DISCONTINUED MEDICATIONS:  Discontinued Medications    No medications on file              (Please note that portions of this note were completed with a voicerecognition program.  Efforts were made to edit the dictations but occasionally words are mis-transcribed.)    Azul Minor MD (electronically signed)

## 2023-07-08 NOTE — DISCHARGE INSTRUCTIONS
Follow-up with your primary doctor or cardiologist in the next 2 to 4 days for repeat evaluation since we did discuss admission today but at this time you are declining. Return to the emergency department for any return of persistent chest pain with shortness of breath, breaking out into a sweat, or any other concerns. Continue to take a baby aspirin daily. Talk to your primary doctor about follow-up for thyroid nodule and pulmonary nodules. Lung Nodules    Your x-ray or CT scan shows a nodule (\"spot\") on your lung. This will require follow-up for further evaluation. Please call your Primary Care Physician (PCP) if you have already established one and you confirmed your PCP during your ER visit today. If you do not have a PCP, please call the [phone] [facility] scheduling number to schedule your Emergency Department follow up visit. Please mention that you are scheduling an \"ER follow up visit\" for a lung nodule. Learning About Lung Nodules  What is a lung nodule? A lung nodule is a growth in the lung. A single nodule surrounded by lung tissue is called a solitary pulmonary nodule. A lung nodule might not cause any symptoms. Your doctor may have found one or more nodules on your lung when you were having a chest X-ray or CT scan. Or it may have been found during a lung cancer screening. A lung nodule may be caused by an old infection or cancer. It might also be a noncancerous growth. Lung nodules can cause a screening to give an abnormal result. Most nodules do not cause any harm. But without further tests, your doctor can't tell whether an abnormal finding is cancer, a harmless nodule, or something else. What can you expect when you have a lung nodule? Your doctor will look at several risk factors to see how likely it is that the nodule is cancer. He or she will look at: Whether you smoke or have ever smoked. Your age and your family's medical history.   Whether you have ever had 3-6 months then at 9-12 months and 24 months if no change. Nodule size greater than >8 mm  In low-risk and high-risk patients follow-up CT at around 3, 9, and 24 months, contrast-enhanced CT, PET, and/or biopsy. Thyroid Nodule    Your x-ray or CT scan shows a nodule (\"spot\") on your thyroid. This will require follow-up for further evaluation. Please call your Primary Care Physician (PCP) if you have already established one and you confirmed your PCP during your ER visit today. If you do not have a PCP, please call the [phone] [facility] scheduling number to schedule your Emergency Department follow up visit. Please mention that you are scheduling an \"ER follow up visit\" for a thyroid nodule. Learning About Thyroid Nodules  Thyroid nodules are growths or lumps in the thyroid gland. Your thyroid is in the front of your neck. It controls how your body uses energy. You may have tests to see if the nodule is caused by cancer. Most nodules aren't cancerous and don't cause problems. Many don't even need treatment. If you do have cancer, it can usually be cured. Treatment will probably include surgery. You may also get radioactive iodine treatment. If your thyroid can't make thyroid hormone after treatment, you can take a pill every day to replace the hormone. Follow-up care is a key part of your treatment and safety. Be sure to make and go to all appointments, and call your doctor if you are having problems. It's also a good idea to know your test results and keep a list of the medicines you take. What is the next step in evaluation of a thyroid nodule? Below are the recommended guidelines for management of thyroid nodules detected by CT/MRI, you can discuss these recommendations at your follow-up appointment:     Suspicious Findings (example:  you have enlarged lymph nodes or there is local invasion noted)  Arrange for a thyroid ultrasound.   No Suspicious Findings  If you are less than 35 years

## 2023-07-10 ENCOUNTER — HOSPITAL ENCOUNTER (INPATIENT)
Age: 88
LOS: 1 days | Discharge: HOME | DRG: 313 | End: 2023-07-11
Attending: EMERGENCY MEDICINE | Admitting: INTERNAL MEDICINE
Payer: MEDICARE

## 2023-07-10 ENCOUNTER — APPOINTMENT (OUTPATIENT)
Dept: CT IMAGING | Age: 88
DRG: 313 | End: 2023-07-10
Payer: MEDICARE

## 2023-07-10 ENCOUNTER — APPOINTMENT (OUTPATIENT)
Dept: GENERAL RADIOLOGY | Age: 88
DRG: 313 | End: 2023-07-10
Payer: MEDICARE

## 2023-07-10 DIAGNOSIS — R73.9 HYPERGLYCEMIA: ICD-10-CM

## 2023-07-10 DIAGNOSIS — M25.511 ACUTE PAIN OF RIGHT SHOULDER: ICD-10-CM

## 2023-07-10 DIAGNOSIS — R07.9 CHEST PAIN, UNSPECIFIED TYPE: Primary | ICD-10-CM
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
## 2023-07-10 LAB
ALBUMIN SERPL-MCNC: 3.8 G/DL (ref 3.4–5)
ALBUMIN/GLOB SERPL: 1.3 {RATIO} (ref 1.1–2.2)
ALP SERPL-CCNC: 64 U/L (ref 40–129)
ALT SERPL-CCNC: 8 U/L (ref 10–40)
ANION GAP SERPL CALCULATED.3IONS-SCNC: 9 MMOL/L (ref 3–16)
AST SERPL-CCNC: 13 U/L (ref 15–37)
BASOPHILS # BLD: 0.2 K/UL (ref 0–0.2)
BASOPHILS NFR BLD: 2.8 %
BILIRUB SERPL-MCNC: 0.3 MG/DL (ref 0–1)
BUN SERPL-MCNC: 29 MG/DL (ref 7–20)
CALCIUM SERPL-MCNC: 9.1 MG/DL (ref 8.3–10.6)
CHLORIDE SERPL-SCNC: 102 MMOL/L (ref 99–110)
CO2 SERPL-SCNC: 26 MMOL/L (ref 21–32)
CREAT SERPL-MCNC: 1.2 MG/DL (ref 0.8–1.3)
DEPRECATED RDW RBC AUTO: 14.8 % (ref 12.4–15.4)
EKG ATRIAL RATE: 77 BPM
EKG DIAGNOSIS: NORMAL
EKG P AXIS: 59 DEGREES
EKG P-R INTERVAL: 198 MS
EKG Q-T INTERVAL: 402 MS
EKG QRS DURATION: 138 MS
EKG QTC CALCULATION (BAZETT): 454 MS
EKG R AXIS: -30 DEGREES
EKG T AXIS: 18 DEGREES
EKG VENTRICULAR RATE: 77 BPM
EOSINOPHIL # BLD: 0.3 K/UL (ref 0–0.6)
EOSINOPHIL NFR BLD: 4.5 %
GFR SERPLBLD CREATININE-BSD FMLA CKD-EPI: 59 ML/MIN/{1.73_M2}
GLUCOSE BLD-MCNC: 108 MG/DL (ref 70–99)
GLUCOSE BLD-MCNC: 125 MG/DL (ref 70–99)
GLUCOSE BLD-MCNC: 224 MG/DL (ref 70–99)
GLUCOSE BLD-MCNC: 56 MG/DL (ref 70–99)
GLUCOSE SERPL-MCNC: 289 MG/DL (ref 70–99)
HCT VFR BLD AUTO: 29.2 % (ref 40.5–52.5)
HGB BLD-MCNC: 9.5 G/DL (ref 13.5–17.5)
LYMPHOCYTES # BLD: 1.9 K/UL (ref 1–5.1)
LYMPHOCYTES NFR BLD: 25.8 %
MCH RBC QN AUTO: 31.4 PG (ref 26–34)
MCHC RBC AUTO-ENTMCNC: 32.6 G/DL (ref 31–36)
MCV RBC AUTO: 96.4 FL (ref 80–100)
MONOCYTES # BLD: 1.2 K/UL (ref 0–1.3)
MONOCYTES NFR BLD: 16.9 %
NEUTROPHILS # BLD: 3.6 K/UL (ref 1.7–7.7)
NEUTROPHILS NFR BLD: 50 %
NT-PROBNP SERPL-MCNC: 124 PG/ML (ref 0–449)
PERFORMED ON: ABNORMAL
PLATELET # BLD AUTO: 174 K/UL (ref 135–450)
PMV BLD AUTO: 7.5 FL (ref 5–10.5)
POTASSIUM SERPL-SCNC: 4.9 MMOL/L (ref 3.5–5.1)
PROT SERPL-MCNC: 6.8 G/DL (ref 6.4–8.2)
RBC # BLD AUTO: 3.03 M/UL (ref 4.2–5.9)
SODIUM SERPL-SCNC: 137 MMOL/L (ref 136–145)
TROPONIN, HIGH SENSITIVITY: 19 NG/L (ref 0–22)
TROPONIN, HIGH SENSITIVITY: 19 NG/L (ref 0–22)
WBC # BLD AUTO: 7.2 K/UL (ref 4–11)

## 2023-07-10 PROCEDURE — G0378 HOSPITAL OBSERVATION PER HR: HCPCS

## 2023-07-10 PROCEDURE — 36415 COLL VENOUS BLD VENIPUNCTURE: CPT

## 2023-07-10 PROCEDURE — 93010 ELECTROCARDIOGRAM REPORT: CPT | Performed by: INTERNAL MEDICINE

## 2023-07-10 PROCEDURE — 6370000000 HC RX 637 (ALT 250 FOR IP)

## 2023-07-10 PROCEDURE — 96372 THER/PROPH/DIAG INJ SC/IM: CPT

## 2023-07-10 PROCEDURE — 80053 COMPREHEN METABOLIC PANEL: CPT

## 2023-07-10 PROCEDURE — 85025 COMPLETE CBC W/AUTO DIFF WBC: CPT

## 2023-07-10 PROCEDURE — 6360000002 HC RX W HCPCS

## 2023-07-10 PROCEDURE — 70450 CT HEAD/BRAIN W/O DYE: CPT

## 2023-07-10 PROCEDURE — 99222 1ST HOSP IP/OBS MODERATE 55: CPT | Performed by: INTERNAL MEDICINE

## 2023-07-10 PROCEDURE — 93005 ELECTROCARDIOGRAM TRACING: CPT | Performed by: EMERGENCY MEDICINE

## 2023-07-10 PROCEDURE — 99223 1ST HOSP IP/OBS HIGH 75: CPT | Performed by: INTERNAL MEDICINE

## 2023-07-10 PROCEDURE — 2060000000 HC ICU INTERMEDIATE R&B

## 2023-07-10 PROCEDURE — 84484 ASSAY OF TROPONIN QUANT: CPT

## 2023-07-10 PROCEDURE — 83036 HEMOGLOBIN GLYCOSYLATED A1C: CPT

## 2023-07-10 PROCEDURE — 99285 EMERGENCY DEPT VISIT HI MDM: CPT

## 2023-07-10 PROCEDURE — 6370000000 HC RX 637 (ALT 250 FOR IP): Performed by: EMERGENCY MEDICINE

## 2023-07-10 PROCEDURE — 2580000003 HC RX 258

## 2023-07-10 PROCEDURE — 83880 ASSAY OF NATRIURETIC PEPTIDE: CPT

## 2023-07-10 PROCEDURE — 71046 X-RAY EXAM CHEST 2 VIEWS: CPT

## 2023-07-10 RX ORDER — ASPIRIN 81 MG/1
81 TABLET, CHEWABLE ORAL DAILY
Status: DISCONTINUED | OUTPATIENT
Start: 2023-07-11 | End: 2023-07-11 | Stop reason: HOSPADM

## 2023-07-10 RX ORDER — GLIPIZIDE 10 MG/1
TABLET ORAL
Qty: 180 TABLET | Refills: 3 | Status: SHIPPED | OUTPATIENT
Start: 2023-07-10

## 2023-07-10 RX ORDER — FAMOTIDINE 20 MG/1
20 TABLET, FILM COATED ORAL DAILY
Status: DISCONTINUED | OUTPATIENT
Start: 2023-07-10 | End: 2023-07-11 | Stop reason: HOSPADM

## 2023-07-10 RX ORDER — ATORVASTATIN CALCIUM 40 MG/1
40 TABLET, FILM COATED ORAL DAILY
Status: DISCONTINUED | OUTPATIENT
Start: 2023-07-10 | End: 2023-07-11 | Stop reason: HOSPADM

## 2023-07-10 RX ORDER — ASPIRIN 81 MG/1
81 TABLET ORAL DAILY
COMMUNITY

## 2023-07-10 RX ORDER — ONDANSETRON 4 MG/1
4 TABLET, ORALLY DISINTEGRATING ORAL EVERY 8 HOURS PRN
Status: DISCONTINUED | OUTPATIENT
Start: 2023-07-10 | End: 2023-07-11 | Stop reason: HOSPADM

## 2023-07-10 RX ORDER — SODIUM CHLORIDE 9 MG/ML
INJECTION, SOLUTION INTRAVENOUS PRN
Status: DISCONTINUED | OUTPATIENT
Start: 2023-07-10 | End: 2023-07-11 | Stop reason: HOSPADM

## 2023-07-10 RX ORDER — LISINOPRIL 5 MG/1
5 TABLET ORAL DAILY
Status: DISCONTINUED | OUTPATIENT
Start: 2023-07-10 | End: 2023-07-11 | Stop reason: HOSPADM

## 2023-07-10 RX ORDER — ONDANSETRON 2 MG/ML
4 INJECTION INTRAMUSCULAR; INTRAVENOUS EVERY 6 HOURS PRN
Status: DISCONTINUED | OUTPATIENT
Start: 2023-07-10 | End: 2023-07-11 | Stop reason: HOSPADM

## 2023-07-10 RX ORDER — REGADENOSON 0.08 MG/ML
0.4 INJECTION, SOLUTION INTRAVENOUS
Status: COMPLETED | OUTPATIENT
Start: 2023-07-10 | End: 2023-07-11

## 2023-07-10 RX ORDER — DEXTROSE MONOHYDRATE 100 MG/ML
INJECTION, SOLUTION INTRAVENOUS CONTINUOUS PRN
Status: DISCONTINUED | OUTPATIENT
Start: 2023-07-10 | End: 2023-07-11 | Stop reason: HOSPADM

## 2023-07-10 RX ORDER — ASPIRIN 81 MG/1
324 TABLET, CHEWABLE ORAL ONCE
Status: COMPLETED | OUTPATIENT
Start: 2023-07-10 | End: 2023-07-10

## 2023-07-10 RX ORDER — ACETAMINOPHEN 325 MG/1
650 TABLET ORAL EVERY 6 HOURS PRN
Status: DISCONTINUED | OUTPATIENT
Start: 2023-07-10 | End: 2023-07-11 | Stop reason: HOSPADM

## 2023-07-10 RX ORDER — SODIUM CHLORIDE 0.9 % (FLUSH) 0.9 %
5-40 SYRINGE (ML) INJECTION PRN
Status: DISCONTINUED | OUTPATIENT
Start: 2023-07-10 | End: 2023-07-11 | Stop reason: HOSPADM

## 2023-07-10 RX ORDER — ALBUTEROL SULFATE 90 UG/1
2 AEROSOL, METERED RESPIRATORY (INHALATION) EVERY 6 HOURS PRN
Status: DISCONTINUED | OUTPATIENT
Start: 2023-07-10 | End: 2023-07-11 | Stop reason: HOSPADM

## 2023-07-10 RX ORDER — SODIUM CHLORIDE 0.9 % (FLUSH) 0.9 %
5-40 SYRINGE (ML) INJECTION EVERY 12 HOURS SCHEDULED
Status: DISCONTINUED | OUTPATIENT
Start: 2023-07-10 | End: 2023-07-11 | Stop reason: HOSPADM

## 2023-07-10 RX ORDER — INSULIN LISPRO 100 [IU]/ML
0-8 INJECTION, SOLUTION INTRAVENOUS; SUBCUTANEOUS
Status: DISCONTINUED | OUTPATIENT
Start: 2023-07-10 | End: 2023-07-11 | Stop reason: HOSPADM

## 2023-07-10 RX ORDER — LISINOPRIL 5 MG/1
TABLET ORAL
Qty: 90 TABLET | Refills: 3 | Status: SHIPPED | OUTPATIENT
Start: 2023-07-10

## 2023-07-10 RX ORDER — INSULIN LISPRO 100 [IU]/ML
0-4 INJECTION, SOLUTION INTRAVENOUS; SUBCUTANEOUS NIGHTLY
Status: DISCONTINUED | OUTPATIENT
Start: 2023-07-10 | End: 2023-07-11 | Stop reason: HOSPADM

## 2023-07-10 RX ORDER — ACETAMINOPHEN 650 MG/1
650 SUPPOSITORY RECTAL EVERY 6 HOURS PRN
Status: DISCONTINUED | OUTPATIENT
Start: 2023-07-10 | End: 2023-07-11 | Stop reason: HOSPADM

## 2023-07-10 RX ORDER — ENOXAPARIN SODIUM 100 MG/ML
30 INJECTION SUBCUTANEOUS 2 TIMES DAILY
Status: DISCONTINUED | OUTPATIENT
Start: 2023-07-10 | End: 2023-07-11 | Stop reason: HOSPADM

## 2023-07-10 RX ORDER — POLYETHYLENE GLYCOL 3350 17 G/17G
17 POWDER, FOR SOLUTION ORAL DAILY PRN
Status: DISCONTINUED | OUTPATIENT
Start: 2023-07-10 | End: 2023-07-11 | Stop reason: HOSPADM

## 2023-07-10 RX ADMIN — ENOXAPARIN SODIUM 30 MG: 100 INJECTION SUBCUTANEOUS at 16:39

## 2023-07-10 RX ADMIN — ENOXAPARIN SODIUM 30 MG: 100 INJECTION SUBCUTANEOUS at 20:42

## 2023-07-10 RX ADMIN — ASPIRIN 324 MG: 81 TABLET, CHEWABLE ORAL at 10:59

## 2023-07-10 RX ADMIN — SODIUM CHLORIDE, PRESERVATIVE FREE 10 ML: 5 INJECTION INTRAVENOUS at 20:43

## 2023-07-10 RX ADMIN — NITROGLYCERIN 1 INCH: 20 OINTMENT TOPICAL at 10:59

## 2023-07-10 ASSESSMENT — PAIN - FUNCTIONAL ASSESSMENT: PAIN_FUNCTIONAL_ASSESSMENT: 0-10

## 2023-07-10 ASSESSMENT — ENCOUNTER SYMPTOMS
ABDOMINAL PAIN: 0
BACK PAIN: 0
CHEST TIGHTNESS: 1
SHORTNESS OF BREATH: 0

## 2023-07-10 ASSESSMENT — PAIN SCALES - GENERAL
PAINLEVEL_OUTOF10: 10
PAINLEVEL_OUTOF10: 0
PAINLEVEL_OUTOF10: 1
PAINLEVEL_OUTOF10: 0

## 2023-07-10 ASSESSMENT — PAIN DESCRIPTION - PAIN TYPE: TYPE: ACUTE PAIN

## 2023-07-10 ASSESSMENT — PAIN DESCRIPTION - DESCRIPTORS: DESCRIPTORS: SHARP;ACHING

## 2023-07-10 ASSESSMENT — PAIN DESCRIPTION - LOCATION: LOCATION: CHEST

## 2023-07-10 ASSESSMENT — PAIN DESCRIPTION - FREQUENCY: FREQUENCY: INTERMITTENT

## 2023-07-10 NOTE — CONSULTS
Neuro: Awake. Alert. Moves all four extremities. Psych: Oriented x 3. No anxiety. LABS/IMAGING:    CBC:  Lab Results   Component Value Date    WBC 7.2 07/10/2023    HGB 9.5 (L) 07/10/2023    HCT 29.2 (L) 07/10/2023    MCV 96.4 07/10/2023     07/10/2023    LYMPHOPCT 25.8 07/10/2023    RBC 3.03 (L) 07/10/2023    MCH 31.4 07/10/2023    MCHC 32.6 07/10/2023    RDW 14.8 07/10/2023    NEUTOPHILPCT 50.0 07/10/2023    MONOPCT 16.9 07/10/2023    EOSPCT 3.0 07/28/2011    BASOPCT 2.8 07/10/2023    NEUTROABS 3.6 07/10/2023    LYMPHSABS 1.9 07/10/2023    MONOSABS 1.2 07/10/2023    EOSABS 0.3 07/10/2023    BASOSABS 0.2 07/10/2023       Recent Labs     07/10/23  1023 07/08/23  1004   WBC 7.2 7.4   HGB 9.5* 10.5*   HCT 29.2* 31.6*   MCV 96.4 94.2    188       BMP:   Recent Labs     07/08/23  1004 07/08/23  1115 07/10/23  1023     --  137   K 5.2* 4.9 4.9     --  102   CO2 25  --  26   BUN 29*  --  29*   CREATININE 1.2  --  1.2       MG:   Lab Results   Component Value Date/Time    MG 1.90 06/19/2022 07:48 AM     Ca/Phos:   Lab Results   Component Value Date    CALCIUM 9.1 07/10/2023    PHOS 3.7 10/15/2021     LIVER PROFILE:   Recent Labs     07/08/23  1004 07/10/23  1023   AST 15 13*   ALT 9* 8*   LIPASE 51.0  --    BILITOT 0.4 0.3   ALKPHOS 77 64       PT/INR: No results for input(s): PROTIME, INR in the last 72 hours. APTT: No results for input(s): APTT in the last 72 hours.         MV Settings:     / / /     IV:   sodium chloride      dextrose             Medications:  Scheduled Meds:   atorvastatin  40 mg Oral Daily    famotidine  20 mg Oral Daily    sodium chloride flush  5-40 mL IntraVENous 2 times per day    [START ON 7/11/2023] aspirin  81 mg Oral Daily    enoxaparin  30 mg SubCUTAneous BID    [Held by provider] lisinopril  5 mg Oral Daily    insulin lispro  0-8 Units SubCUTAneous TID WC    insulin lispro  0-4 Units SubCUTAneous Nightly       PRN Meds:  albuterol sulfate HFA, sodium

## 2023-07-10 NOTE — PROGRESS NOTES
Stress lab called to determine what time they would be calling down Pt for test. Pt took morning medications at home; inquired if this would interfere with testing. Stress lab is unable to do testing today due to time constraints; MD notified about stress test being tomorrow and if a diet can be put in for the Pt today.     Rocky Guerrero RN

## 2023-07-10 NOTE — ED PROVIDER NOTES
EMERGENCY DEPARTMENT ENCOUNTER        Pt Name: Ana Solorzano  MRN: [id number]  [address] 1935  Date of evaluation: 7/10/2023  Provider: Steffanie Sheldon MD  PCP: MALATHI Whitaker - CNP      CHIEF COMPLAINT       Chief Complaint   Patient presents with    Chest Pain     Pt c/o of chest pain first starting Saturday then again this morning; c/o pain to ribs wrapping around to chest; also had a fall last week hit head with LOC, did not go to ER        HISTORY OFPRESENT ILLNESS   (Location/Symptom, Timing/Onset, Context/Setting, Quality, Duration, Modifying Factors,Severity)  Note limiting factors. Ana Solorzano is a 81 y.o. male presenting today due to concern for having return of chest pain this morning when he woke up at 8 AM associated with pain starting near his left chest radiating across his chest towards his right shoulder and overall not feeling well. He was seen 2 days ago at [facility] by me and at that point I did recommend admission but at that time he wanted to go home since he was feeling better. He was fine yesterday per his son but started having the pain returned this morning upon awakening. He denies feeling short of breath or being diaphoretic. He did reportedly fall 1 week ago which she did not mention to me 2 days ago and hit his head although denies any headache. He reports chronic neck discomfort that has been going on for months and denies any new neck pain since the fall. He reports it is more on the sides when he moves his neck and denies any midline pain. His initial triage complaint mentioned arm numbness but when I asked him about this he denied any numbness in the arms or legs. He denies any weakness. When he was seen a couple days ago he was complaining about a cough and was wondering if it was related to pesticide exposure although denies any significant coughing today.   Due to concern for return of chest pain and not feeling well, he came to the

## 2023-07-10 NOTE — PROGRESS NOTES
Bedside report and transfer of care given to Sivan Eaton and Warren Sullivan RN. Pt currently resting in bed with the call light within reach. Pt denies any other care needs at this time. Pt stable at this time.     Electronically signed by Keyur Kraft RN on 7/10/2023 at 7:21 PM

## 2023-07-10 NOTE — H&P
Hospital Medicine History & Physical      PCP: Maxine Mosher APRN - CNP    Date of Admission: 7/10/2023    Date of Service: Pt seen/examined on 07/10/23     Chief Complaint:    Chief Complaint   Patient presents with    Chest Pain     Pt c/o of chest pain first starting Saturday then again this morning; c/o pain to ribs wrapping around to chest; also had a fall last week hit head with LOC, did not go to ER          History Of Present Illness: The patient is a 81 y.o. male with PMH of CVA, CAD, DM, HTN, and cancer who presented to [facility] ED with complaint of chest pain. Patient states that starting Saturday he has been having intermittent chest pain. He states that on Saturday he was seen at [facility] [place] ED and they advised admission but he preferred to not stay so he went home instead. He states that this morning when he was getting out of bed he had an episode of left sided chest pain that radiated to the right side of his chest. He states that this episode lasted about 20-30 minutes before going away. He denies any SOB, diaphoresis, or N/V with this. He states that previously he has had episodes of CP at rest and episodes of CP that resolve even with activity still occurring. He denies any current CP or SOB.     Past Medical History:        Diagnosis Date    Abscess of toe of right foot 07/27/2011    Acute CVA (cerebrovascular accident) ([address]) 03/28/2021    CAD (coronary artery disease)     Cancer ([address])     Lymphoma    Cushing's syndrome ([address]) 9/22/2010    Diabetes mellitus ([address])     Diabetic ulcer of toe 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 mellitus, with fat layer exposed ([address])     History of blood transfusion     Hyperlipemia     Hypertension     Lumbar spondylosis 3/1/2023    Lymphedema     Osteomyelitis of left foot ([address])     1/2021    Tinnitus 3/14/2012    Ulcer of other part of foot 08/02/2011    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[address]) 02/18/2021       Past Surgical History:        Procedure Laterality Date    ABDOMEN

## 2023-07-10 NOTE — PLAN OF CARE
Admit to PCU for chest pain w/u    Seen at [facility] 2 days ago with same complaint and sent home. Returns due to worsening pain.      Paddy Cole 1 week ago with reported LOC  CT head negative   Chronic neck pain    D-dimer elevated 7/8  CTPA show multiple small lung nodules, no PE  Pulmonary consult     Trop negative x 2   EKG negative for acute ischemic changes   Stress test ordered  NPO  No cardiology consult at this time      MALATHI Ambriz - CNP  07/10/23  12:12 PM

## 2023-07-10 NOTE — PROGRESS NOTES
Blood sugar 56. Patient given orange juice and jello, per order. Blood sugar re-checked, resulted at 125. Patient alert and oriented, no s/s of distress.      Viridiana Asencio RN

## 2023-07-11 ENCOUNTER — TELEPHONE (OUTPATIENT)
Dept: PULMONOLOGY | Age: 88
End: 2023-07-11

## 2023-07-11 ENCOUNTER — APPOINTMENT (OUTPATIENT)
Dept: NUCLEAR MEDICINE | Age: 88
DRG: 313 | End: 2023-07-11
Payer: MEDICARE

## 2023-07-11 VITALS
DIASTOLIC BLOOD PRESSURE: 58 MMHG | SYSTOLIC BLOOD PRESSURE: 160 MMHG | BODY MASS INDEX: 34.08 KG/M2 | OXYGEN SATURATION: 93 % | RESPIRATION RATE: 16 BRPM | WEIGHT: 224.9 LBS | HEART RATE: 80 BPM | HEIGHT: 68 IN | TEMPERATURE: 96.8 F

## 2023-07-11 LAB
ANION GAP SERPL CALCULATED.3IONS-SCNC: 10 MMOL/L (ref 3–16)
ANISOCYTOSIS BLD QL SMEAR: ABNORMAL
BASOPHILS # BLD: 0 K/UL (ref 0–0.2)
BASOPHILS NFR BLD: 0 %
BUN SERPL-MCNC: 23 MG/DL (ref 7–20)
CALCIUM SERPL-MCNC: 9.2 MG/DL (ref 8.3–10.6)
CHLORIDE SERPL-SCNC: 105 MMOL/L (ref 99–110)
CHOLEST SERPL-MCNC: 99 MG/DL (ref 0–199)
CO2 SERPL-SCNC: 26 MMOL/L (ref 21–32)
CREAT SERPL-MCNC: 1.2 MG/DL (ref 0.8–1.3)
DEPRECATED RDW RBC AUTO: 14.2 % (ref 12.4–15.4)
EOSINOPHIL # BLD: 0.2 K/UL (ref 0–0.6)
EOSINOPHIL NFR BLD: 3 %
EST. AVERAGE GLUCOSE BLD GHB EST-MCNC: 174.3 MG/DL
GFR SERPLBLD CREATININE-BSD FMLA CKD-EPI: 59 ML/MIN/{1.73_M2}
GLUCOSE BLD-MCNC: 108 MG/DL (ref 70–99)
GLUCOSE BLD-MCNC: 160 MG/DL (ref 70–99)
GLUCOSE BLD-MCNC: 222 MG/DL (ref 70–99)
GLUCOSE SERPL-MCNC: 105 MG/DL (ref 70–99)
HBA1C MFR BLD: 7.7 %
HCT VFR BLD AUTO: 29.2 % (ref 40.5–52.5)
HDLC SERPL-MCNC: 22 MG/DL (ref 40–60)
HGB BLD-MCNC: 9.9 G/DL (ref 13.5–17.5)
LDLC SERPL CALC-MCNC: 22 MG/DL
LV EF: 65 %
LVEF MODALITY: NORMAL
LYMPHOCYTES # BLD: 4 K/UL (ref 1–5.1)
LYMPHOCYTES NFR BLD: 31 %
MCH RBC QN AUTO: 32.1 PG (ref 26–34)
MCHC RBC AUTO-ENTMCNC: 33.8 G/DL (ref 31–36)
MCV RBC AUTO: 94.9 FL (ref 80–100)
MONOCYTES # BLD: 0.4 K/UL (ref 0–1.3)
MONOCYTES NFR BLD: 6 %
NEUTROPHILS # BLD: 2.6 K/UL (ref 1.7–7.7)
NEUTROPHILS NFR BLD: 36 %
PATH INTERP BLD-IMP: YES
PERFORMED ON: ABNORMAL
PLATELET # BLD AUTO: 178 K/UL (ref 135–450)
PLATELET BLD QL SMEAR: ADEQUATE
PMV BLD AUTO: 8.2 FL (ref 5–10.5)
POIKILOCYTOSIS BLD QL SMEAR: ABNORMAL
POTASSIUM SERPL-SCNC: 4.3 MMOL/L (ref 3.5–5.1)
RBC # BLD AUTO: 3.07 M/UL (ref 4.2–5.9)
SLIDE REVIEW: ABNORMAL
SODIUM SERPL-SCNC: 141 MMOL/L (ref 136–145)
TRIGL SERPL-MCNC: 273 MG/DL (ref 0–150)
VARIANT LYMPHS NFR BLD MANUAL: 24 % (ref 0–6)
VLDLC SERPL CALC-MCNC: 55 MG/DL
WBC # BLD AUTO: 7.3 K/UL (ref 4–11)

## 2023-07-11 PROCEDURE — 6370000000 HC RX 637 (ALT 250 FOR IP)

## 2023-07-11 PROCEDURE — G0378 HOSPITAL OBSERVATION PER HR: HCPCS

## 2023-07-11 PROCEDURE — 80048 BASIC METABOLIC PNL TOTAL CA: CPT

## 2023-07-11 PROCEDURE — 6360000002 HC RX W HCPCS

## 2023-07-11 PROCEDURE — A9502 TC99M TETROFOSMIN: HCPCS

## 2023-07-11 PROCEDURE — 3430000000 HC RX DIAGNOSTIC RADIOPHARMACEUTICAL

## 2023-07-11 PROCEDURE — 80061 LIPID PANEL: CPT

## 2023-07-11 PROCEDURE — 3430000000 HC RX DIAGNOSTIC RADIOPHARMACEUTICAL: Performed by: NURSE PRACTITIONER

## 2023-07-11 PROCEDURE — 78452 HT MUSCLE IMAGE SPECT MULT: CPT

## 2023-07-11 PROCEDURE — 2580000003 HC RX 258

## 2023-07-11 PROCEDURE — 96372 THER/PROPH/DIAG INJ SC/IM: CPT

## 2023-07-11 PROCEDURE — 99238 HOSP IP/OBS DSCHRG MGMT 30/<: CPT | Performed by: INTERNAL MEDICINE

## 2023-07-11 PROCEDURE — 36415 COLL VENOUS BLD VENIPUNCTURE: CPT

## 2023-07-11 PROCEDURE — 85025 COMPLETE CBC W/AUTO DIFF WBC: CPT

## 2023-07-11 PROCEDURE — A9502 TC99M TETROFOSMIN: HCPCS | Performed by: NURSE PRACTITIONER

## 2023-07-11 PROCEDURE — 93017 CV STRESS TEST TRACING ONLY: CPT

## 2023-07-11 RX ORDER — PANTOPRAZOLE SODIUM 40 MG/1
40 TABLET, DELAYED RELEASE ORAL
Qty: 30 TABLET | Refills: 1 | Status: SHIPPED | OUTPATIENT
Start: 2023-07-11

## 2023-07-11 RX ADMIN — TETROFOSMIN 33.4 MILLICURIE: 1.38 INJECTION, POWDER, LYOPHILIZED, FOR SOLUTION INTRAVENOUS at 10:02

## 2023-07-11 RX ADMIN — SODIUM CHLORIDE, PRESERVATIVE FREE 10 ML: 5 INJECTION INTRAVENOUS at 12:47

## 2023-07-11 RX ADMIN — REGADENOSON 0.4 MG: 0.08 INJECTION, SOLUTION INTRAVENOUS at 10:02

## 2023-07-11 RX ADMIN — ATORVASTATIN CALCIUM 40 MG: 40 TABLET, FILM COATED ORAL at 12:34

## 2023-07-11 RX ADMIN — FAMOTIDINE 20 MG: 20 TABLET ORAL at 12:34

## 2023-07-11 RX ADMIN — ASPIRIN 81 MG: 81 TABLET, CHEWABLE ORAL at 12:31

## 2023-07-11 RX ADMIN — TETROFOSMIN 10.5 MILLICURIE: 1.38 INJECTION, POWDER, LYOPHILIZED, FOR SOLUTION INTRAVENOUS at 08:25

## 2023-07-11 NOTE — ACP (ADVANCE CARE PLANNING)
Advance Care Planning     General Advance Care Planning (ACP) Conversation    Date of Conversation: 7/10/2023  Conducted with: Patient with Decision Making Capacity    Healthcare Decision Maker:    Primary Decision Maker: Tricia Soriano Spouse - [phone]    Secondary Decision Maker (Active): Pili Trejo - [phone]    Secondary Decision Maker: Meggan Loza - [phone]    Supplemental (Other) Decision Maker: Meera Zhao - Child - [phone]    Supplemental (Other) Decision Maker: [place]amaya Hurley - Child - [phone]  Click here to complete Healthcare Decision Makers including selection of the Healthcare Decision Maker Relationship (ie \"Primary\"). Today we documented Decision Maker(s) consistent with Legal Next of Kin hierarchy.     Content/Action Overview:  DECLINED ACP Conversation - will revisit periodically  Reviewed DNR/DNI and patient elects Full Code (Attempt Resuscitation)        Length of Voluntary ACP Conversation in minutes:  <16 minutes (Non-Billable)    Phil Gama RN

## 2023-07-11 NOTE — CARE COORDINATION
Case Management Assessment  Initial Evaluation    Date/Time of Evaluation: 7/11/2023 1:42 PM  Assessment Completed by: Jb Gonzalez RN    If patient is discharged prior to next notation, then this note serves as note for discharge by case management. Patient Name: Ezekiel Reese                   YOB: 1935  Diagnosis: Chest pain [R07.9]  Hyperglycemia [R73.9]  Acute pain of right shoulder [M25.511]  Chest pain, unspecified type [R07.9]                   Date / Time: 7/10/2023  9:56 AM    Patient Admission Status: Inpatient   Readmission Risk (Low < 19, Mod (19-27), High > 27): Readmission Risk Score: 15.5    Current PCP: MALATHI Banks CNP  PCP verified by CM? Yes Claudine Duran)    Chart Reviewed: Yes      History Provided by: Patient  Patient Orientation: Alert and Oriented    Patient Cognition: Alert    Hospitalization in the last 30 days (Readmission):  No    If yes, Readmission Assessment in CM Navigator will be completed. Advance Directives:      Code Status: Full Code   Patient's Primary Decision Maker is: Patient Declined (Legal Next of Kin Remains as Decision Maker)    Primary Decision Maker: Stasjuanita Ilan Spouse - [phone]    Secondary Decision Maker (Active): Reza Horn - Child - [phone]    Secondary Decision Maker: Addie Franklin - Child - [phone]    Supplemental (Other) Decision Maker: Codi Melendrez - Child - [phone]    Supplemental (Other) Decision Maker: Yael Mccann Child - [phone]    Discharge Planning:    Patient lives with: Alone Type of Home: House  Primary Care Giver: Self  Patient Support Systems include: Children   Current Financial resources: Medicare,  ([place])  Current community resources: None  Current services prior to admission: Durable Medical Equipment, [facility]            Current DME: Cane, Shower Chair, Walker            Type of Home Care services:  None    ADLS  Prior functional level:  Independent in ADLs/IADLs  Current

## 2023-07-11 NOTE — PLAN OF CARE
----- Message from Clay LEON MD sent at 5/26/2022  6:27 PM CDT -----  Please call patient,  His vitamin-D is quite low, I sent in a prescription for high-dose vitamin-D  tot pharmacy  His magnesium is borderline low 1.9, I sent in a prescription for low-dose magnesium 200 mg a day to the pharmacy for 3 months, he may have to purchase this over-the-counter.  His kidney blood tests are little higher the last few months, than previous, we should recheck this in the next few weeks or 1-2 months, drink plenty of fluids  His testosterone level is on the low end of normal 369, normal is 300-400 or higher;  We should repeat this in the next few weeks or 1-2 months, these blood tests have already been ordered.  Additional tests are still pending.   Problem: Safety - Adult  Goal: Free from fall injury  Outcome: Progressing     Problem: ABCDS Injury Assessment  Goal: Absence of physical injury  Outcome: Progressing     Problem: Skin/Tissue Integrity  Goal: Absence of new skin breakdown  Description: 1. Monitor for areas of redness and/or skin breakdown  2. Assess vascular access sites hourly  3. Every 4-6 hours minimum:  Change oxygen saturation probe site  4. Every 4-6 hours:  If on nasal continuous positive airway pressure, respiratory therapy assess nares and determine need for appliance change or resting period.   Outcome: Progressing

## 2023-07-11 NOTE — PROGRESS NOTES
Report given to [facility]. Pt stable, care transferred at this time.  Eirn Mittal RN

## 2023-07-11 NOTE — PROGRESS NOTES
AM assessment completed. Scheduled medications held for stress test due to patient being npo. VSS room air, A/O x4, patient  denies any needs at this time.  Call light in reach, will monitor,

## 2023-07-11 NOTE — PROGRESS NOTES
Patient arrived to stress lab for lexiscan-myoview stress test.  Patient was educated on procedure, all questions answered, and consent verified/obtained.

## 2023-07-11 NOTE — PLAN OF CARE
Problem: Discharge Planning  Goal: Discharge to home or other facility with appropriate resources  Outcome: Progressing     Problem: Safety - Adult  Goal: Free from fall injury  7/11/2023 1001 by Susanne Payne RN  Outcome: Progressing  7/10/2023 2254 by Loren Arellano RN  Outcome: Progressing     Problem: ABCDS Injury Assessment  Goal: Absence of physical injury  7/11/2023 1001 by Susanne Payne RN  Outcome: Progressing  7/10/2023 2254 by Loren Arellano RN  Outcome: Progressing     Problem: Skin/Tissue Integrity  Goal: Absence of new skin breakdown  Description: 1. Monitor for areas of redness and/or skin breakdown  2. Assess vascular access sites hourly  3. Every 4-6 hours minimum:  Change oxygen saturation probe site  4. Every 4-6 hours:  If on nasal continuous positive airway pressure, respiratory therapy assess nares and determine need for appliance change or resting period.   7/11/2023 1001 by Susanne Payne RN  Outcome: Progressing  7/10/2023 2254 by Loren Arellano RN  Outcome: Progressing

## 2023-07-11 NOTE — PLAN OF CARE
Problem: Discharge Planning  Goal: Discharge to home or other facility with appropriate resources  7/11/2023 1001 by Yovani Velez RN  Outcome: Progressing  7/11/2023 1001 by Yovani Velez RN  Outcome: Progressing     Problem: ABCDS Injury Assessment  Goal: Absence of physical injury  7/11/2023 1001 by Yovani Velez RN  Outcome: Progressing  7/11/2023 1001 by Yovani Velez RN  Outcome: Progressing  7/10/2023 2254 by David Galeano RN  Outcome: Progressing     Problem: Skin/Tissue Integrity  Goal: Absence of new skin breakdown  Description: 1. Monitor for areas of redness and/or skin breakdown  2. Assess vascular access sites hourly  3. Every 4-6 hours minimum:  Change oxygen saturation probe site  4. Every 4-6 hours:  If on nasal continuous positive airway pressure, respiratory therapy assess nares and determine need for appliance change or resting period.   7/11/2023 1001 by Yovani Velez RN  Outcome: Progressing  7/11/2023 1001 by Yovani Velez RN  Outcome: Progressing  7/10/2023 2254 by David Galeano RN  Outcome: Progressing

## 2023-07-11 NOTE — PROGRESS NOTES
Patient educated on discharge instructions as well as new medications use, dosage, administration and possible side effects. Patient verified knowledge. IV removed without difficulty and dry dressing in place. Telemetry monitor removed and returned to [facility]. Pt left facility in stable condition to Home with all of their personal belongings.

## 2023-07-11 NOTE — DISCHARGE SUMMARY
Name:  Anita Moffett  Room:  /4781-48  MRN:    [id number]    IM Discharge Summary    Discharging Physician:  Liliane Swift MD    Admit: 7/10/2023    Discharge:      PCP      MALATHI Ventura - CNP    Diagnoses and hospital course  this Admission      Chest pain - atypical   - EKG without any ischemic changes   - Trend troponins: Negative x3  - stress test neg today for inducible ischemia  - trial of PPI at dc      Elevated d dimer  - 7/8/23 d dimer was 1.17  - CTPA showed multiple small lung nodlues, no PE -> f/u lung nodules outpt with CT chest in 6 months   - pulm consulted     Fall with LOC 1 weeks ago  - CT head showed no acute intracranial abnormality  - has chronic neck pain, denies any new neck pain since fall  - fall precautions     DM type 2 w/hyperglycemia  - SSI   - carb control diet  - monitor BG     HTN  - resume lisinopril      CAD  Hx of CVA  - continue aspirin and statin     Chronic anemia  - monitor h/h, appears stable currently  - transfuse if hgb <76 or symptomatic       HPI   81 y.o. male with PMH of CVA, CAD, DM, HTN, and cancer who presented to [facility] ED with complaint of chest pain. Patient states that starting Saturday he has been having intermittent chest pain. He states that on Saturday he was seen at [facility] [place] [facility] and they advised admission but he preferred to not stay so he went home instead. He states that this morning when he was getting out of bed he had an episode of left sided chest pain that radiated to the right side of his chest. He states that this episode lasted about 20-30 minutes before going away. He denies any SOB, diaphoresis, or N/V with this. He states that previously he has had episodes of CP at rest and episodes of CP that resolve even with activity still occurring. He denies any current CP or SOB. Physical Exam at Discharge:      General: elderly male up in bed   Awake, alert and oriented.  Appears to be not in any distress  Mucous Membranes:  Pink ,

## 2023-07-12 ENCOUNTER — CARE COORDINATION (OUTPATIENT)
Dept: CASE MANAGEMENT | Age: 88
End: 2023-07-12

## 2023-07-12 LAB — PATH INTERP BLD-IMP: NORMAL

## 2023-07-12 NOTE — CARE COORDINATION
[facility] Care Transitions Initial Follow Up Call    Call within 2 business days of discharge: Yes      Patient: Silvia Reynolds Patient : 1935   MRN: [id number]  Reason for Admission: 1 day -> chest pain-atypical, elevated d dimer, fall with LOC 1 week ago, DM2 w/ hyperglycemia, HTN, CAD, hx CVA, chronic anemia -> home no services  Discharge Date: 23 RARS: Readmission Risk Score: 15.6      Last Discharge [address] [facility]       Date Complaint Diagnosis Description Type Department Provider    7/10/23 Chest Pain Chest pain, unspecified type . .. ED to Hosp-Admission (Discharged) (ADMITTED) [facility] PCU Jackie Carrero MD; Yuriy Serra . ..     1st attempt - CTN attempted follow-up outreach to patient. Message left including CTN contact information.      Follow Up  Future Appointments   Date Time Provider [address]   10/20/2023 10:00 AM MALATHI Gardiner - CNP [facility] [address] Juli Forte RN  Care Transition Nurse  [phone] mobile

## 2023-07-13 ENCOUNTER — CARE COORDINATION (OUTPATIENT)
Dept: CASE MANAGEMENT | Age: 88
End: 2023-07-13

## 2023-07-13 NOTE — CARE COORDINATION
[address] [facility] [address] Care Transitions Initial Follow Up Call    Call within 2 business days of discharge: Yes      Patient: Kassie Born Patient : 1935   MRN: [id number]  Reason for Admission: 1 day -> chest pain-atypical, elevated d dimer, fall with LOC 1 week ago, DM2 w/ hyperglycemia, HTN, CAD, hx CVA, chronic anemia -> home no services  Discharge Date: 23 RARS: Readmission Risk Score: 15.6      Last Discharge [address] [facility]       Date Complaint Diagnosis Description Type Department Provider    7/10/23 Chest Pain Chest pain, unspecified type . .. ED to Hosp-Admission (Discharged) (ADMITTED) [address] [facility] PCU Elinor Joseph MD; Gayathri Eduardo . .. 2nd attempt - CTN attempted follow-up outreach to patient. Message left including CTN contact information. No further CTN outreach scheduled at this time.      Follow Up  Future Appointments   Date Time Provider [address] [place] [address]   10/20/2023 10:00 AM MALATHI Kwok - CNP [facility] [address] Juli Ross RN  Care Transition Nurse  [phone] mobile

## 2023-07-19 ENCOUNTER — TELEPHONE (OUTPATIENT)
Dept: CASE MANAGEMENT | Age: 88
End: 2023-07-19

## 2023-07-19 NOTE — TELEPHONE ENCOUNTER
CT Chest 7/8/23 with f/u imaging recommendations     Multiple small noncalcified 0.3-0.4 cm pulmonary nodules bilaterally. COPD. CT chest follow-up recommended in 6 months to document stability    [address] [facility] RICHARD(HERNAN)  [email or web address]

## 2023-08-16 ENCOUNTER — OFFICE VISIT (OUTPATIENT)
Dept: FAMILY MEDICINE CLINIC | Age: 88
End: 2023-08-16
Payer: MEDICARE

## 2023-08-16 VITALS
SYSTOLIC BLOOD PRESSURE: 128 MMHG | HEART RATE: 71 BPM | WEIGHT: 235 LBS | TEMPERATURE: 97.2 F | OXYGEN SATURATION: 95 % | BODY MASS INDEX: 35.73 KG/M2 | DIASTOLIC BLOOD PRESSURE: 60 MMHG

## 2023-08-16 DIAGNOSIS — R91.1 PULMONARY NODULE: ICD-10-CM

## 2023-08-16 DIAGNOSIS — E66.01 SEVERE OBESITY (BMI 35.0-39.9) WITH COMORBIDITY (HCC): ICD-10-CM

## 2023-08-16 DIAGNOSIS — R07.89 ATYPICAL CHEST PAIN: ICD-10-CM

## 2023-08-16 DIAGNOSIS — Z09 HOSPITAL DISCHARGE FOLLOW-UP: Primary | ICD-10-CM

## 2023-08-16 PROCEDURE — 1124F ACP DISCUSS-NO DSCNMKR DOCD: CPT | Performed by: NURSE PRACTITIONER

## 2023-08-16 PROCEDURE — 99214 OFFICE O/P EST MOD 30 MIN: CPT | Performed by: NURSE PRACTITIONER

## 2023-08-16 PROCEDURE — 1111F DSCHRG MED/CURRENT MED MERGE: CPT | Performed by: NURSE PRACTITIONER

## 2023-08-16 NOTE — PROGRESS NOTES
Post-Discharge Transitional Care  Follow Up      Karen Banerjee   YOB: 1935    Date of Office Visit:  8/16/2023  Date of Hospital Admission: 7/10/23  Date of Hospital Discharge: 7/11/23  Risk of hospital readmission (high >=14%. Medium >=10%) :Readmission Risk Score: 15.6      Care management risk score Rising risk (score 2-5) and Complex Care (Scores >=6): No Risk Score On File     Non face to face  following discharge, date last encounter closed (first attempt may have been earlier): *No documented post hospital discharge outreach found in the last 14 days    Call initiated 2 business days of discharge: *No response recorded in the last 14 days    ASSESSMENT/PLAN:   Hospital discharge follow-up  Discharge summary reviewed, medication list updated  -     [place] DISCHARGE MEDS RECONCILED W/ CURRENT OUTPATIENT MED LIST  Atypical chest pain  Resolved with PPI. Plan to continue for 4 weeks then stop  Pulmonary nodule  Will discuss updated CT scan in 6 months  Severe obesity (BMI 35.0-39. 9) with comorbidity ([address])  Encouraged diet/ exercise changes. Medical Decision Making: straightforward and moderate complexity  Return for cancel oct, reschedule for 6 months, awv, htn, hdl, dm. On this date 8/16/2023 I have spent 30 minutes reviewing previous notes, test results and face to face with the patient discussing the diagnosis and importance of compliance with the treatment plan as well as documenting on the day of the visit. Subjective:   HPI:  Follow up of Hospital problems/diagnosis(es): Atypical chest Pain    Inpatient course: Discharge summary reviewed- see chart. Patient states that starting Saturday he has been having intermittent chest pain. He states that on Saturday he was seen at [facility] ED and they advised admission but he preferred to not stay so he went home instead.  He states that this morning when he was getting out of bed he had an episode of left sided chest pain that radiated

## 2023-09-06 RX ORDER — PANTOPRAZOLE SODIUM 40 MG/1
TABLET, DELAYED RELEASE ORAL
Qty: 30 TABLET | Refills: 1 | OUTPATIENT
Start: 2023-09-06

## 2023-09-06 NOTE — PROGRESS NOTES
Direct admission ordered per Dr. Vilma Palacio for osteomyelitis in right foot. Bessy Baird RN Clinical  notified of upcoming admission. Dr. Miguel Barahona contacted for admission orders. MD to [facility] conversation complete at this time. Pt to wait in [facility] until inpatient bed available. Acitretin Counseling:  I discussed with the patient the risks of acitretin including but not limited to hair loss, dry lips/skin/eyes, liver damage, hyperlipidemia, depression/suicidal ideation, photosensitivity.  Serious rare side effects can include but are not limited to pancreatitis, pseudotumor cerebri, bony changes, clot formation/stroke/heart attack.  Patient understands that alcohol is contraindicated since it can result in liver toxicity and significantly prolong the elimination of the drug by many years.

## 2023-11-22 DIAGNOSIS — E11.620 TYPE 2 DIABETES MELLITUS WITH DIABETIC DERMATITIS, WITHOUT LONG-TERM CURRENT USE OF INSULIN (HCC): ICD-10-CM

## 2023-11-22 RX ORDER — ATORVASTATIN CALCIUM 40 MG/1
40 TABLET, FILM COATED ORAL DAILY
Qty: 90 TABLET | Refills: 3 | Status: SHIPPED | OUTPATIENT
Start: 2023-11-22

## 2023-12-18 RX ORDER — PANTOPRAZOLE SODIUM 40 MG/1
40 TABLET, DELAYED RELEASE ORAL
Qty: 30 TABLET | Refills: 1 | OUTPATIENT
Start: 2023-12-18

## 2023-12-20 RX ORDER — PANTOPRAZOLE SODIUM 40 MG/1
40 TABLET, DELAYED RELEASE ORAL
Qty: 30 TABLET | Refills: 1 | OUTPATIENT
Start: 2023-12-20

## 2024-01-10 ENCOUNTER — APPOINTMENT (OUTPATIENT)
Dept: GENERAL RADIOLOGY | Age: 89
DRG: 853 | End: 2024-01-10
Payer: MEDICARE

## 2024-01-10 ENCOUNTER — HOSPITAL ENCOUNTER (INPATIENT)
Age: 89
LOS: 6 days | Discharge: OTHER INSTITUTION | DRG: 853 | End: 2024-01-16
Attending: EMERGENCY MEDICINE | Admitting: INTERNAL MEDICINE
Payer: MEDICARE

## 2024-01-10 ENCOUNTER — APPOINTMENT (OUTPATIENT)
Dept: CT IMAGING | Age: 89
DRG: 853 | End: 2024-01-10
Payer: MEDICARE

## 2024-01-10 DIAGNOSIS — M86.9 DIABETIC FOOT ULCER WITH OSTEOMYELITIS (HCC): ICD-10-CM

## 2024-01-10 DIAGNOSIS — E11.69 DIABETIC FOOT ULCER WITH OSTEOMYELITIS (HCC): ICD-10-CM

## 2024-01-10 DIAGNOSIS — E11.621 DIABETIC FOOT ULCER WITH OSTEOMYELITIS (HCC): ICD-10-CM

## 2024-01-10 DIAGNOSIS — L97.509 DIABETIC FOOT ULCER WITH OSTEOMYELITIS (HCC): ICD-10-CM

## 2024-01-10 DIAGNOSIS — M86.071 ACUTE HEMATOGENOUS OSTEOMYELITIS OF RIGHT FOOT (HCC): Primary | ICD-10-CM

## 2024-01-10 LAB
ALBUMIN SERPL-MCNC: 4 G/DL (ref 3.4–5)
ALBUMIN/GLOB SERPL: 1 {RATIO} (ref 1.1–2.2)
ALP SERPL-CCNC: 85 U/L (ref 40–129)
ALT SERPL-CCNC: 9 U/L (ref 10–40)
ANION GAP SERPL CALCULATED.3IONS-SCNC: 11 MMOL/L (ref 3–16)
AST SERPL-CCNC: 15 U/L (ref 15–37)
BACTERIA URNS QL MICRO: ABNORMAL /HPF
BASE EXCESS BLDV CALC-SCNC: -2.7 MMOL/L (ref -3–3)
BASOPHILS # BLD: 0.1 K/UL (ref 0–0.2)
BASOPHILS NFR BLD: 0.4 %
BILIRUB SERPL-MCNC: 0.7 MG/DL (ref 0–1)
BILIRUB UR QL STRIP.AUTO: NEGATIVE
BUN SERPL-MCNC: 33 MG/DL (ref 7–20)
CALCIUM SERPL-MCNC: 9.7 MG/DL (ref 8.3–10.6)
CHLORIDE SERPL-SCNC: 100 MMOL/L (ref 99–110)
CLARITY UR: CLEAR
CO2 BLDV-SCNC: 23 MMOL/L
CO2 SERPL-SCNC: 26 MMOL/L (ref 21–32)
COHGB MFR BLDV: 1 % (ref 0–1.5)
COLOR UR: YELLOW
CREAT SERPL-MCNC: 1.6 MG/DL (ref 0.8–1.3)
DEPRECATED RDW RBC AUTO: 15.2 % (ref 12.4–15.4)
EOSINOPHIL # BLD: 0 K/UL (ref 0–0.6)
EOSINOPHIL NFR BLD: 0.3 %
EPI CELLS #/AREA URNS HPF: ABNORMAL /HPF (ref 0–5)
ERYTHROCYTE [SEDIMENTATION RATE] IN BLOOD BY WESTERGREN METHOD: 104 MM/HR (ref 0–20)
FLUAV RNA UPPER RESP QL NAA+PROBE: NEGATIVE
FLUBV AG NPH QL: NEGATIVE
GFR SERPLBLD CREATININE-BSD FMLA CKD-EPI: 41 ML/MIN/{1.73_M2}
GLUCOSE SERPL-MCNC: 235 MG/DL (ref 70–99)
GLUCOSE UR STRIP.AUTO-MCNC: NEGATIVE MG/DL
HCO3 BLDV-SCNC: 21.9 MMOL/L (ref 23–29)
HCT VFR BLD AUTO: 30.9 % (ref 40.5–52.5)
HGB BLD-MCNC: 10.1 G/DL (ref 13.5–17.5)
HGB UR QL STRIP.AUTO: NEGATIVE
INR PPP: 1.19 (ref 0.84–1.16)
KETONES UR STRIP.AUTO-MCNC: ABNORMAL MG/DL
LACTATE BLDV-SCNC: 2 MMOL/L (ref 0.4–1.9)
LEUKOCYTE ESTERASE UR QL STRIP.AUTO: NEGATIVE
LYMPHOCYTES # BLD: 3.9 K/UL (ref 1–5.1)
LYMPHOCYTES NFR BLD: 29.4 %
MCH RBC QN AUTO: 30 PG (ref 26–34)
MCHC RBC AUTO-ENTMCNC: 32.5 G/DL (ref 31–36)
MCV RBC AUTO: 92.3 FL (ref 80–100)
METHGB MFR BLDV: 0.3 %
MONOCYTES # BLD: 1.2 K/UL (ref 0–1.3)
MONOCYTES NFR BLD: 9.3 %
MUCOUS THREADS #/AREA URNS LPF: ABNORMAL /LPF
NEUTROPHILS # BLD: 8.1 K/UL (ref 1.7–7.7)
NEUTROPHILS NFR BLD: 60.6 %
NITRITE UR QL STRIP.AUTO: NEGATIVE
O2 THERAPY: ABNORMAL
PCO2 BLDV: 37.4 MMHG (ref 40–50)
PH BLDV: 7.39 [PH] (ref 7.35–7.45)
PH UR STRIP.AUTO: 5.5 [PH] (ref 5–8)
PLATELET # BLD AUTO: 209 K/UL (ref 135–450)
PMV BLD AUTO: 7.5 FL (ref 5–10.5)
PO2 BLDV: 23.9 MMHG (ref 25–40)
POTASSIUM SERPL-SCNC: 4.9 MMOL/L (ref 3.5–5.1)
PROT SERPL-MCNC: 7.9 G/DL (ref 6.4–8.2)
PROT UR STRIP.AUTO-MCNC: 100 MG/DL
PROTHROMBIN TIME: 15.1 SEC (ref 11.5–14.8)
RBC # BLD AUTO: 3.35 M/UL (ref 4.2–5.9)
RBC #/AREA URNS HPF: ABNORMAL /HPF (ref 0–4)
SAO2 % BLDV: 42 %
SARS-COV-2 RDRP RESP QL NAA+PROBE: NOT DETECTED
SODIUM SERPL-SCNC: 137 MMOL/L (ref 136–145)
SP GR UR STRIP.AUTO: 1.02 (ref 1–1.03)
UA COMPLETE W REFLEX CULTURE PNL UR: ABNORMAL
UA DIPSTICK W REFLEX MICRO PNL UR: YES
URATE SERPL-MCNC: 7.9 MG/DL (ref 3.5–7.2)
URN SPEC COLLECT METH UR: ABNORMAL
UROBILINOGEN UR STRIP-ACNC: 0.2 E.U./DL
WBC # BLD AUTO: 13.4 K/UL (ref 4–11)
WBC #/AREA URNS HPF: ABNORMAL /HPF (ref 0–5)

## 2024-01-10 PROCEDURE — 82803 BLOOD GASES ANY COMBINATION: CPT

## 2024-01-10 PROCEDURE — 87635 SARS-COV-2 COVID-19 AMP PRB: CPT

## 2024-01-10 PROCEDURE — 87186 SC STD MICRODIL/AGAR DIL: CPT

## 2024-01-10 PROCEDURE — 85652 RBC SED RATE AUTOMATED: CPT

## 2024-01-10 PROCEDURE — 96365 THER/PROPH/DIAG IV INF INIT: CPT

## 2024-01-10 PROCEDURE — 70450 CT HEAD/BRAIN W/O DYE: CPT

## 2024-01-10 PROCEDURE — 81001 URINALYSIS AUTO W/SCOPE: CPT

## 2024-01-10 PROCEDURE — 87040 BLOOD CULTURE FOR BACTERIA: CPT

## 2024-01-10 PROCEDURE — 73610 X-RAY EXAM OF ANKLE: CPT

## 2024-01-10 PROCEDURE — 6360000002 HC RX W HCPCS: Performed by: EMERGENCY MEDICINE

## 2024-01-10 PROCEDURE — 93005 ELECTROCARDIOGRAM TRACING: CPT | Performed by: EMERGENCY MEDICINE

## 2024-01-10 PROCEDURE — 85610 PROTHROMBIN TIME: CPT

## 2024-01-10 PROCEDURE — 71045 X-RAY EXAM CHEST 1 VIEW: CPT

## 2024-01-10 PROCEDURE — 73630 X-RAY EXAM OF FOOT: CPT

## 2024-01-10 PROCEDURE — 2580000003 HC RX 258: Performed by: EMERGENCY MEDICINE

## 2024-01-10 PROCEDURE — 80053 COMPREHEN METABOLIC PANEL: CPT

## 2024-01-10 PROCEDURE — 85025 COMPLETE CBC W/AUTO DIFF WBC: CPT

## 2024-01-10 PROCEDURE — 36415 COLL VENOUS BLD VENIPUNCTURE: CPT

## 2024-01-10 PROCEDURE — 84550 ASSAY OF BLOOD/URIC ACID: CPT

## 2024-01-10 PROCEDURE — 99285 EMERGENCY DEPT VISIT HI MDM: CPT

## 2024-01-10 PROCEDURE — 96368 THER/DIAG CONCURRENT INF: CPT

## 2024-01-10 PROCEDURE — 1200000000 HC SEMI PRIVATE

## 2024-01-10 PROCEDURE — 87804 INFLUENZA ASSAY W/OPTIC: CPT

## 2024-01-10 PROCEDURE — 83605 ASSAY OF LACTIC ACID: CPT

## 2024-01-10 PROCEDURE — 87150 DNA/RNA AMPLIFIED PROBE: CPT

## 2024-01-10 RX ORDER — 0.9 % SODIUM CHLORIDE 0.9 %
1000 INTRAVENOUS SOLUTION INTRAVENOUS ONCE
Status: COMPLETED | OUTPATIENT
Start: 2024-01-10 | End: 2024-01-10

## 2024-01-10 RX ORDER — PIPERACILLIN SODIUM, TAZOBACTAM SODIUM 4; .5 G/20ML; G/20ML
4500 INJECTION, POWDER, LYOPHILIZED, FOR SOLUTION INTRAVENOUS ONCE
Status: DISCONTINUED | OUTPATIENT
Start: 2024-01-10 | End: 2024-01-10

## 2024-01-10 RX ADMIN — SODIUM CHLORIDE 1000 ML: 9 INJECTION, SOLUTION INTRAVENOUS at 18:22

## 2024-01-10 RX ADMIN — VANCOMYCIN HYDROCHLORIDE 1000 MG: 1 INJECTION, POWDER, LYOPHILIZED, FOR SOLUTION INTRAVENOUS at 19:23

## 2024-01-10 RX ADMIN — PIPERACILLIN AND TAZOBACTAM 4500 MG: 4; .5 INJECTION, POWDER, FOR SOLUTION INTRAVENOUS; PARENTERAL at 20:26

## 2024-01-10 ASSESSMENT — PAIN - FUNCTIONAL ASSESSMENT
PAIN_FUNCTIONAL_ASSESSMENT: NONE - DENIES PAIN

## 2024-01-11 ENCOUNTER — ANESTHESIA EVENT (OUTPATIENT)
Dept: OPERATING ROOM | Age: 89
End: 2024-01-11
Payer: MEDICARE

## 2024-01-11 ENCOUNTER — ANESTHESIA (OUTPATIENT)
Dept: OPERATING ROOM | Age: 89
End: 2024-01-11
Payer: MEDICARE

## 2024-01-11 ENCOUNTER — APPOINTMENT (OUTPATIENT)
Dept: VASCULAR LAB | Age: 89
DRG: 853 | End: 2024-01-11
Payer: MEDICARE

## 2024-01-11 ENCOUNTER — APPOINTMENT (OUTPATIENT)
Dept: GENERAL RADIOLOGY | Age: 89
DRG: 853 | End: 2024-01-11
Payer: MEDICARE

## 2024-01-11 PROBLEM — L03.90 CELLULITIS: Status: ACTIVE | Noted: 2024-01-11

## 2024-01-11 LAB
ALBUMIN SERPL-MCNC: 3.5 G/DL (ref 3.4–5)
ALBUMIN/GLOB SERPL: 1.3 {RATIO} (ref 1.1–2.2)
ALP SERPL-CCNC: 67 U/L (ref 40–129)
ALT SERPL-CCNC: 8 U/L (ref 10–40)
ANION GAP SERPL CALCULATED.3IONS-SCNC: 8 MMOL/L (ref 3–16)
AST SERPL-CCNC: 14 U/L (ref 15–37)
BASOPHILS # BLD: 0 K/UL (ref 0–0.2)
BASOPHILS NFR BLD: 0 %
BILIRUB SERPL-MCNC: 0.7 MG/DL (ref 0–1)
BUN SERPL-MCNC: 27 MG/DL (ref 7–20)
CALCIUM SERPL-MCNC: 8.3 MG/DL (ref 8.3–10.6)
CHLORIDE SERPL-SCNC: 104 MMOL/L (ref 99–110)
CO2 SERPL-SCNC: 25 MMOL/L (ref 21–32)
CREAT SERPL-MCNC: 1.2 MG/DL (ref 0.8–1.3)
DEPRECATED RDW RBC AUTO: 14.9 % (ref 12.4–15.4)
EKG ATRIAL RATE: 97 BPM
EKG DIAGNOSIS: NORMAL
EKG P AXIS: 56 DEGREES
EKG P-R INTERVAL: 216 MS
EKG Q-T INTERVAL: 386 MS
EKG QRS DURATION: 152 MS
EKG QTC CALCULATION (BAZETT): 490 MS
EKG R AXIS: -50 DEGREES
EKG T AXIS: 28 DEGREES
EKG VENTRICULAR RATE: 97 BPM
EOSINOPHIL # BLD: 0 K/UL (ref 0–0.6)
EOSINOPHIL NFR BLD: 0 %
GFR SERPLBLD CREATININE-BSD FMLA CKD-EPI: 58 ML/MIN/{1.73_M2}
GLUCOSE BLD-MCNC: 104 MG/DL (ref 70–99)
GLUCOSE BLD-MCNC: 127 MG/DL (ref 70–99)
GLUCOSE BLD-MCNC: 187 MG/DL (ref 70–99)
GLUCOSE BLD-MCNC: 80 MG/DL (ref 70–99)
GLUCOSE BLD-MCNC: 99 MG/DL (ref 70–99)
GLUCOSE BLD-MCNC: 99 MG/DL (ref 70–99)
GLUCOSE SERPL-MCNC: 91 MG/DL (ref 70–99)
HCT VFR BLD AUTO: 25.4 % (ref 40.5–52.5)
HGB BLD-MCNC: 8.4 G/DL (ref 13.5–17.5)
LYMPHOCYTES # BLD: 3.4 K/UL (ref 1–5.1)
LYMPHOCYTES NFR BLD: 31 %
MCH RBC QN AUTO: 31.2 PG (ref 26–34)
MCHC RBC AUTO-ENTMCNC: 33.2 G/DL (ref 31–36)
MCV RBC AUTO: 93.8 FL (ref 80–100)
MONOCYTES # BLD: 1.7 K/UL (ref 0–1.3)
MONOCYTES NFR BLD: 16 %
NEUTROPHILS # BLD: 5.8 K/UL (ref 1.7–7.7)
NEUTROPHILS NFR BLD: 53 %
PATH INTERP BLD-IMP: YES
PERFORMED ON: ABNORMAL
PERFORMED ON: NORMAL
PLATELET # BLD AUTO: 174 K/UL (ref 135–450)
PLATELET BLD QL SMEAR: ADEQUATE
PMV BLD AUTO: 7.6 FL (ref 5–10.5)
POTASSIUM SERPL-SCNC: 4.2 MMOL/L (ref 3.5–5.1)
PROT SERPL-MCNC: 6.2 G/DL (ref 6.4–8.2)
RBC # BLD AUTO: 2.71 M/UL (ref 4.2–5.9)
REPORT: NORMAL
SLIDE REVIEW: ABNORMAL
SODIUM SERPL-SCNC: 137 MMOL/L (ref 136–145)
WBC # BLD AUTO: 10.9 K/UL (ref 4–11)

## 2024-01-11 PROCEDURE — 2580000003 HC RX 258: Performed by: PODIATRIST

## 2024-01-11 PROCEDURE — 36410 VNPNXR 3YR/> PHY/QHP DX/THER: CPT

## 2024-01-11 PROCEDURE — 88305 TISSUE EXAM BY PATHOLOGIST: CPT

## 2024-01-11 PROCEDURE — 2709999900 HC NON-CHARGEABLE SUPPLY: Performed by: PODIATRIST

## 2024-01-11 PROCEDURE — 6370000000 HC RX 637 (ALT 250 FOR IP): Performed by: PODIATRIST

## 2024-01-11 PROCEDURE — 2580000003 HC RX 258: Performed by: INTERNAL MEDICINE

## 2024-01-11 PROCEDURE — 2580000003 HC RX 258: Performed by: ANESTHESIOLOGY

## 2024-01-11 PROCEDURE — 1200000000 HC SEMI PRIVATE

## 2024-01-11 PROCEDURE — 87205 SMEAR GRAM STAIN: CPT

## 2024-01-11 PROCEDURE — 83036 HEMOGLOBIN GLYCOSYLATED A1C: CPT

## 2024-01-11 PROCEDURE — A4217 STERILE WATER/SALINE, 500 ML: HCPCS | Performed by: PODIATRIST

## 2024-01-11 PROCEDURE — 87077 CULTURE AEROBIC IDENTIFY: CPT

## 2024-01-11 PROCEDURE — 87186 SC STD MICRODIL/AGAR DIL: CPT

## 2024-01-11 PROCEDURE — 7100000010 HC PHASE II RECOVERY - FIRST 15 MIN: Performed by: PODIATRIST

## 2024-01-11 PROCEDURE — 6360000002 HC RX W HCPCS: Performed by: INTERNAL MEDICINE

## 2024-01-11 PROCEDURE — 2500000003 HC RX 250 WO HCPCS: Performed by: NURSE ANESTHETIST, CERTIFIED REGISTERED

## 2024-01-11 PROCEDURE — 36415 COLL VENOUS BLD VENIPUNCTURE: CPT

## 2024-01-11 PROCEDURE — 80053 COMPREHEN METABOLIC PANEL: CPT

## 2024-01-11 PROCEDURE — 85025 COMPLETE CBC W/AUTO DIFF WBC: CPT

## 2024-01-11 PROCEDURE — 73620 X-RAY EXAM OF FOOT: CPT

## 2024-01-11 PROCEDURE — 2580000003 HC RX 258: Performed by: NURSE ANESTHETIST, CERTIFIED REGISTERED

## 2024-01-11 PROCEDURE — 6370000000 HC RX 637 (ALT 250 FOR IP): Performed by: INTERNAL MEDICINE

## 2024-01-11 PROCEDURE — 88311 DECALCIFY TISSUE: CPT

## 2024-01-11 PROCEDURE — 87075 CULTR BACTERIA EXCEPT BLOOD: CPT

## 2024-01-11 PROCEDURE — 6360000002 HC RX W HCPCS: Performed by: PODIATRIST

## 2024-01-11 PROCEDURE — 87070 CULTURE OTHR SPECIMN AEROBIC: CPT

## 2024-01-11 PROCEDURE — 93010 ELECTROCARDIOGRAM REPORT: CPT | Performed by: INTERNAL MEDICINE

## 2024-01-11 PROCEDURE — 93925 LOWER EXTREMITY STUDY: CPT

## 2024-01-11 PROCEDURE — 2500000003 HC RX 250 WO HCPCS: Performed by: PODIATRIST

## 2024-01-11 PROCEDURE — 3700000000 HC ANESTHESIA ATTENDED CARE: Performed by: PODIATRIST

## 2024-01-11 PROCEDURE — 05HD33Z INSERTION OF INFUSION DEVICE INTO RIGHT CEPHALIC VEIN, PERCUTANEOUS APPROACH: ICD-10-PCS | Performed by: INTERNAL MEDICINE

## 2024-01-11 PROCEDURE — 6360000002 HC RX W HCPCS: Performed by: NURSE ANESTHETIST, CERTIFIED REGISTERED

## 2024-01-11 PROCEDURE — 3600000013 HC SURGERY LEVEL 3 ADDTL 15MIN: Performed by: PODIATRIST

## 2024-01-11 PROCEDURE — 3700000001 HC ADD 15 MINUTES (ANESTHESIA): Performed by: PODIATRIST

## 2024-01-11 PROCEDURE — 7100000011 HC PHASE II RECOVERY - ADDTL 15 MIN: Performed by: PODIATRIST

## 2024-01-11 PROCEDURE — 3600000003 HC SURGERY LEVEL 3 BASE: Performed by: PODIATRIST

## 2024-01-11 DEVICE — PATCH AMNION 2 LAYR PROTCT 4 X 4CM STERISHIELD II: Type: IMPLANTABLE DEVICE | Site: FOOT | Status: FUNCTIONAL

## 2024-01-11 RX ORDER — ONDANSETRON 2 MG/ML
4 INJECTION INTRAMUSCULAR; INTRAVENOUS EVERY 10 MIN PRN
Status: DISCONTINUED | OUTPATIENT
Start: 2024-01-11 | End: 2024-01-11 | Stop reason: HOSPADM

## 2024-01-11 RX ORDER — HYDRALAZINE HYDROCHLORIDE 20 MG/ML
5 INJECTION INTRAMUSCULAR; INTRAVENOUS
Status: DISCONTINUED | OUTPATIENT
Start: 2024-01-11 | End: 2024-01-11 | Stop reason: HOSPADM

## 2024-01-11 RX ORDER — OXYCODONE HYDROCHLORIDE 5 MG/1
10 TABLET ORAL EVERY 4 HOURS PRN
Status: DISCONTINUED | OUTPATIENT
Start: 2024-01-11 | End: 2024-01-12

## 2024-01-11 RX ORDER — MIDAZOLAM HYDROCHLORIDE 1 MG/ML
1 INJECTION INTRAMUSCULAR; INTRAVENOUS EVERY 5 MIN PRN
Status: DISCONTINUED | OUTPATIENT
Start: 2024-01-11 | End: 2024-01-11 | Stop reason: HOSPADM

## 2024-01-11 RX ORDER — GLUCAGON 1 MG/ML
1 KIT INJECTION PRN
Status: DISCONTINUED | OUTPATIENT
Start: 2024-01-11 | End: 2024-01-11 | Stop reason: SDUPTHER

## 2024-01-11 RX ORDER — POTASSIUM CHLORIDE 20 MEQ/1
40 TABLET, EXTENDED RELEASE ORAL PRN
Status: DISCONTINUED | OUTPATIENT
Start: 2024-01-11 | End: 2024-01-16 | Stop reason: HOSPADM

## 2024-01-11 RX ORDER — SODIUM CHLORIDE, SODIUM LACTATE, POTASSIUM CHLORIDE, CALCIUM CHLORIDE 600; 310; 30; 20 MG/100ML; MG/100ML; MG/100ML; MG/100ML
INJECTION, SOLUTION INTRAVENOUS CONTINUOUS PRN
Status: DISCONTINUED | OUTPATIENT
Start: 2024-01-11 | End: 2024-01-11 | Stop reason: SDUPTHER

## 2024-01-11 RX ORDER — NICOTINE 21 MG/24HR
1 PATCH, TRANSDERMAL 24 HOURS TRANSDERMAL DAILY
Status: DISCONTINUED | OUTPATIENT
Start: 2024-01-11 | End: 2024-01-12

## 2024-01-11 RX ORDER — SODIUM CHLORIDE 0.9 % (FLUSH) 0.9 %
5-40 SYRINGE (ML) INJECTION PRN
Status: DISCONTINUED | OUTPATIENT
Start: 2024-01-11 | End: 2024-01-11 | Stop reason: HOSPADM

## 2024-01-11 RX ORDER — LIDOCAINE HYDROCHLORIDE 20 MG/ML
INJECTION, SOLUTION INFILTRATION; PERINEURAL PRN
Status: DISCONTINUED | OUTPATIENT
Start: 2024-01-11 | End: 2024-01-11 | Stop reason: SDUPTHER

## 2024-01-11 RX ORDER — SODIUM CHLORIDE 9 MG/ML
INJECTION, SOLUTION INTRAVENOUS PRN
Status: DISCONTINUED | OUTPATIENT
Start: 2024-01-11 | End: 2024-01-11 | Stop reason: HOSPADM

## 2024-01-11 RX ORDER — PROPOFOL 10 MG/ML
INJECTION, EMULSION INTRAVENOUS PRN
Status: DISCONTINUED | OUTPATIENT
Start: 2024-01-11 | End: 2024-01-11 | Stop reason: SDUPTHER

## 2024-01-11 RX ORDER — SODIUM CHLORIDE 0.9 % (FLUSH) 0.9 %
5-40 SYRINGE (ML) INJECTION EVERY 12 HOURS SCHEDULED
Status: DISCONTINUED | OUTPATIENT
Start: 2024-01-11 | End: 2024-01-11 | Stop reason: HOSPADM

## 2024-01-11 RX ORDER — DEXTROSE MONOHYDRATE 100 MG/ML
INJECTION, SOLUTION INTRAVENOUS CONTINUOUS PRN
Status: DISCONTINUED | OUTPATIENT
Start: 2024-01-11 | End: 2024-01-16 | Stop reason: HOSPADM

## 2024-01-11 RX ORDER — ATORVASTATIN CALCIUM 40 MG/1
40 TABLET, FILM COATED ORAL DAILY
Status: DISCONTINUED | OUTPATIENT
Start: 2024-01-11 | End: 2024-01-16 | Stop reason: HOSPADM

## 2024-01-11 RX ORDER — ACETAMINOPHEN 650 MG/1
650 SUPPOSITORY RECTAL EVERY 6 HOURS PRN
Status: DISCONTINUED | OUTPATIENT
Start: 2024-01-11 | End: 2024-01-16 | Stop reason: HOSPADM

## 2024-01-11 RX ORDER — MAGNESIUM SULFATE IN WATER 40 MG/ML
2000 INJECTION, SOLUTION INTRAVENOUS PRN
Status: DISCONTINUED | OUTPATIENT
Start: 2024-01-11 | End: 2024-01-16 | Stop reason: HOSPADM

## 2024-01-11 RX ORDER — SODIUM CHLORIDE 0.9 % (FLUSH) 0.9 %
10 SYRINGE (ML) INJECTION PRN
Status: DISCONTINUED | OUTPATIENT
Start: 2024-01-11 | End: 2024-01-16 | Stop reason: HOSPADM

## 2024-01-11 RX ORDER — ENOXAPARIN SODIUM 100 MG/ML
30 INJECTION SUBCUTANEOUS 2 TIMES DAILY
Status: DISCONTINUED | OUTPATIENT
Start: 2024-01-12 | End: 2024-01-16 | Stop reason: HOSPADM

## 2024-01-11 RX ORDER — DEXTROSE MONOHYDRATE 100 MG/ML
INJECTION, SOLUTION INTRAVENOUS CONTINUOUS PRN
Status: DISCONTINUED | OUTPATIENT
Start: 2024-01-11 | End: 2024-01-11 | Stop reason: SDUPTHER

## 2024-01-11 RX ORDER — INSULIN LISPRO 100 [IU]/ML
0-4 INJECTION, SOLUTION INTRAVENOUS; SUBCUTANEOUS NIGHTLY
Status: DISCONTINUED | OUTPATIENT
Start: 2024-01-11 | End: 2024-01-16 | Stop reason: HOSPADM

## 2024-01-11 RX ORDER — MAGNESIUM HYDROXIDE 1200 MG/15ML
LIQUID ORAL CONTINUOUS PRN
Status: COMPLETED | OUTPATIENT
Start: 2024-01-11 | End: 2024-01-11

## 2024-01-11 RX ORDER — POTASSIUM CHLORIDE 7.45 MG/ML
10 INJECTION INTRAVENOUS PRN
Status: DISCONTINUED | OUTPATIENT
Start: 2024-01-11 | End: 2024-01-11 | Stop reason: SDUPTHER

## 2024-01-11 RX ORDER — OXYCODONE HYDROCHLORIDE 5 MG/1
5 TABLET ORAL EVERY 4 HOURS PRN
Status: DISCONTINUED | OUTPATIENT
Start: 2024-01-11 | End: 2024-01-12

## 2024-01-11 RX ORDER — OXYCODONE HYDROCHLORIDE 5 MG/1
5 TABLET ORAL
Status: DISCONTINUED | OUTPATIENT
Start: 2024-01-11 | End: 2024-01-11 | Stop reason: HOSPADM

## 2024-01-11 RX ORDER — SODIUM CHLORIDE 9 MG/ML
INJECTION, SOLUTION INTRAVENOUS PRN
Status: DISCONTINUED | OUTPATIENT
Start: 2024-01-11 | End: 2024-01-16 | Stop reason: HOSPADM

## 2024-01-11 RX ORDER — POTASSIUM CHLORIDE 7.45 MG/ML
10 INJECTION INTRAVENOUS PRN
Status: DISCONTINUED | OUTPATIENT
Start: 2024-01-11 | End: 2024-01-16 | Stop reason: HOSPADM

## 2024-01-11 RX ORDER — PANTOPRAZOLE SODIUM 40 MG/1
40 TABLET, DELAYED RELEASE ORAL
Status: DISCONTINUED | OUTPATIENT
Start: 2024-01-11 | End: 2024-01-16 | Stop reason: HOSPADM

## 2024-01-11 RX ORDER — DIPHENHYDRAMINE HYDROCHLORIDE 50 MG/ML
12.5 INJECTION INTRAMUSCULAR; INTRAVENOUS
Status: DISCONTINUED | OUTPATIENT
Start: 2024-01-11 | End: 2024-01-11 | Stop reason: HOSPADM

## 2024-01-11 RX ORDER — GLUCAGON 1 MG/ML
1 KIT INJECTION PRN
Status: DISCONTINUED | OUTPATIENT
Start: 2024-01-11 | End: 2024-01-16 | Stop reason: HOSPADM

## 2024-01-11 RX ORDER — INSULIN LISPRO 100 [IU]/ML
0-8 INJECTION, SOLUTION INTRAVENOUS; SUBCUTANEOUS
Status: DISCONTINUED | OUTPATIENT
Start: 2024-01-12 | End: 2024-01-16 | Stop reason: HOSPADM

## 2024-01-11 RX ORDER — ONDANSETRON 2 MG/ML
4 INJECTION INTRAMUSCULAR; INTRAVENOUS EVERY 6 HOURS PRN
Status: DISCONTINUED | OUTPATIENT
Start: 2024-01-11 | End: 2024-01-16 | Stop reason: HOSPADM

## 2024-01-11 RX ORDER — OXYCODONE HYDROCHLORIDE AND ACETAMINOPHEN 5; 325 MG/1; MG/1
2 TABLET ORAL EVERY 4 HOURS PRN
Status: DISCONTINUED | OUTPATIENT
Start: 2024-01-11 | End: 2024-01-16 | Stop reason: HOSPADM

## 2024-01-11 RX ORDER — SODIUM CHLORIDE 0.9 % (FLUSH) 0.9 %
10 SYRINGE (ML) INJECTION EVERY 12 HOURS SCHEDULED
Status: DISCONTINUED | OUTPATIENT
Start: 2024-01-11 | End: 2024-01-16 | Stop reason: HOSPADM

## 2024-01-11 RX ORDER — LANOLIN ALCOHOL/MO/W.PET/CERES
3 CREAM (GRAM) TOPICAL NIGHTLY
Status: DISCONTINUED | OUTPATIENT
Start: 2024-01-11 | End: 2024-01-16 | Stop reason: HOSPADM

## 2024-01-11 RX ORDER — ASPIRIN 81 MG/1
81 TABLET ORAL DAILY
Status: DISCONTINUED | OUTPATIENT
Start: 2024-01-11 | End: 2024-01-16 | Stop reason: HOSPADM

## 2024-01-11 RX ORDER — ACETAMINOPHEN 325 MG/1
650 TABLET ORAL EVERY 6 HOURS PRN
Status: DISCONTINUED | OUTPATIENT
Start: 2024-01-11 | End: 2024-01-16 | Stop reason: HOSPADM

## 2024-01-11 RX ORDER — OXYCODONE HYDROCHLORIDE AND ACETAMINOPHEN 5; 325 MG/1; MG/1
1 TABLET ORAL EVERY 4 HOURS PRN
Status: DISCONTINUED | OUTPATIENT
Start: 2024-01-11 | End: 2024-01-16 | Stop reason: HOSPADM

## 2024-01-11 RX ORDER — LINEZOLID 2 MG/ML
600 INJECTION, SOLUTION INTRAVENOUS EVERY 12 HOURS
Status: DISCONTINUED | OUTPATIENT
Start: 2024-01-11 | End: 2024-01-13

## 2024-01-11 RX ORDER — PROMETHAZINE HYDROCHLORIDE 25 MG/1
12.5 TABLET ORAL EVERY 6 HOURS PRN
Status: DISCONTINUED | OUTPATIENT
Start: 2024-01-11 | End: 2024-01-16 | Stop reason: HOSPADM

## 2024-01-11 RX ORDER — INSULIN LISPRO 100 [IU]/ML
0-8 INJECTION, SOLUTION INTRAVENOUS; SUBCUTANEOUS EVERY 4 HOURS
Status: DISCONTINUED | OUTPATIENT
Start: 2024-01-11 | End: 2024-01-11

## 2024-01-11 RX ADMIN — SODIUM CHLORIDE, PRESERVATIVE FREE 10 ML: 5 INJECTION INTRAVENOUS at 22:42

## 2024-01-11 RX ADMIN — PIPERACILLIN AND TAZOBACTAM 3375 MG: 3; .375 INJECTION, POWDER, FOR SOLUTION INTRAVENOUS at 02:14

## 2024-01-11 RX ADMIN — PROPOFOL 50 MG: 10 INJECTION, EMULSION INTRAVENOUS at 10:38

## 2024-01-11 RX ADMIN — SODIUM CHLORIDE, PRESERVATIVE FREE 10 ML: 5 INJECTION INTRAVENOUS at 08:32

## 2024-01-11 RX ADMIN — LIDOCAINE HYDROCHLORIDE 100 MG: 20 INJECTION, SOLUTION INFILTRATION; PERINEURAL at 10:38

## 2024-01-11 RX ADMIN — LINEZOLID 600 MG: 2 INJECTION, SOLUTION INTRAVENOUS at 14:10

## 2024-01-11 RX ADMIN — Medication 3 MG: at 22:39

## 2024-01-11 RX ADMIN — SODIUM CHLORIDE, PRESERVATIVE FREE 10 ML: 5 INJECTION INTRAVENOUS at 11:45

## 2024-01-11 RX ADMIN — LINEZOLID 600 MG: 2 INJECTION, SOLUTION INTRAVENOUS at 02:14

## 2024-01-11 RX ADMIN — Medication 3 MG: at 02:15

## 2024-01-11 RX ADMIN — SODIUM CHLORIDE, POTASSIUM CHLORIDE, SODIUM LACTATE AND CALCIUM CHLORIDE: 600; 310; 30; 20 INJECTION, SOLUTION INTRAVENOUS at 10:34

## 2024-01-11 RX ADMIN — PIPERACILLIN AND TAZOBACTAM 3375 MG: 3; .375 INJECTION, POWDER, FOR SOLUTION INTRAVENOUS at 10:36

## 2024-01-11 RX ADMIN — OXYCODONE AND ACETAMINOPHEN 2 TABLET: 5; 325 TABLET ORAL at 22:39

## 2024-01-11 ASSESSMENT — PAIN SCALES - GENERAL: PAINLEVEL_OUTOF10: 7

## 2024-01-11 ASSESSMENT — PAIN DESCRIPTION - ORIENTATION: ORIENTATION: RIGHT;LOWER

## 2024-01-11 ASSESSMENT — ENCOUNTER SYMPTOMS: SHORTNESS OF BREATH: 1

## 2024-01-11 ASSESSMENT — PAIN DESCRIPTION - LOCATION: LOCATION: LEG;FOOT

## 2024-01-11 NOTE — ACP (ADVANCE CARE PLANNING)
Advance Care Planning     General Advance Care Planning (ACP) Conversation    Date of Conversation: 1/10/2024  Conducted with: Patient with Decision Making Capacity    Healthcare Decision Maker:    Secondary Decision Maker (Active): Ari Snider Jr - Child - [phone]    Secondary Decision Maker: Dahlia Moon - Child - [phone]    Secondary Decision Maker: TylorAndressa D - Spouse - [phone]    Supplemental (Other) Decision Maker: Bethel Snider - Child - [phone]    Supplemental (Other) Decision Maker: Nidia Peterson - Child - [phone]  Click here to complete Healthcare Decision Makers including selection of the Healthcare Decision Maker Relationship (ie \"Primary\").   Today we documented Decision Maker(s) consistent with Legal Next of Kin hierarchy.    Content/Action Overview:  DECLINED ACP Conversation - will revisit periodically  Reviewed DNR/DNI and patient elects Full Code (Attempt Resuscitation)    Length of Voluntary ACP Conversation in minutes:  <16 minutes (Non-Billable)    Shirin Fajardo RN

## 2024-01-11 NOTE — CONSULTS
No fracture, dislocation or focal bone lesion is   noted.   Impression:  Nonspecific diffuse subcutaneous soft tissue swelling with no other acute   findings.  There is no subcutaneous air or evidence of osteomyelitis.      Xray right foot -   Prior to amputations at the level of the 1st tarsometatarsal joint and 4th   metatarsophalangeal joint are again noted.  There is soft tissue swelling   throughout the foot.  There are advanced degenerative changes at the midfoot.   There are new heterotopic ossifications at the site of amputation of the 1st   metatarsal.  New lucency and loss of cortical definition is noted at the head   of the 3rd metatarsal with flattening and irregularity of the head.  There is   new posterior dislocation of the 2nd metatarsophalangeal joint.  No   subcutaneous air is identified.   Impression:  1. Soft tissue swelling throughout the foot with new lucencies and loss of   cortical definition at the head of the 3rd metatarsal suggesting   osteomyelitis.  There is also flattening and irregularity of the head of the   3rd metatarsal.   2. New posterior dislocation of the 2nd metatarsophalangeal joint.   3. Advanced degenerative changes at the midfoot.      Physical Exam:    DP/PT palpable right foot  Ulcer on the plantar aspect 2nd/3rd MH region which probes to bone of the 2nd and 3rd MH.  Large purulence expressed upon probing to the MHs.  2nd digit dorsally dislocated on the MH.  + erythema and edema of the foot noted. No increase in skin temperature noted. No malodor noted. No crepitus noted. 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         Assessment:  Patient Active Problem List   Diagnosis Code    Dyslipidemia E78.5    Type 2 diabetes mellitus with diabetic polyneuropathy, without long-term current use of insulin ([facility]) E11.42    Vertigo, benign paroxysmal H81.10    Benign essential HTN I10    Lymphoma ([facility]) C85.90    Obesity E66.9    Dizziness R42    Acquired lymphedema of lower  risk for morbidity and mortality requiring testing and treatment.        Thank you for allowing me to participate in the care of your patient.           Electronically signed by Cliff Keller DPM on 1/11/2024 at 8:33 AM.

## 2024-01-11 NOTE — ANESTHESIA POSTPROCEDURE EVALUATION
Department of Anesthesiology  Postprocedure Note    Patient: Ari Snider  MRN: [id number]  YOB: 1935  Date of evaluation: 1/11/2024    Procedure Summary       Date: 01/11/24 Room / Location: [address]    Anesthesia Start: 1035 Anesthesia Stop: 1128    Procedure: PARTIAL RIGHT FOOT AMPUTATION (Right: Foot) Diagnosis:       Diabetic foot ulcer with osteomyelitis (HCC)      (Diabetic foot ulcer with osteomyelitis (HCC) [E11.621, E11.69, L97.509, M86.9])    Surgeons: Cliff Keller DPM Responsible Provider: Nick Elmore MD    Anesthesia Type: MAC ASA Status: 3            Anesthesia Type: No value filed.    Opal Phase I: Opal Score: 10    Opal Phase II: Opal Score: 10    Anesthesia Post Evaluation    Comments: Postoperative Anesthesia Note    Name:    Ari Snider  MRN:      [id number]    Patient Vitals in the past 12 hrs:  01/11/24 1215, BP:(!) 111/92, Pulse:68, Resp:16, SpO2:96 %  01/11/24 1200, BP:(!) 111/49, Temp:97.5 °F (36.4 °C), Temp src:Temporal, Pulse:65, Resp:16, SpO2:97 %  01/11/24 1145, BP:(!) 120/51, Pulse:72, Resp:16, SpO2:94 %  01/11/24 1138, BP:(!) 109/43, Pulse:65, Resp:16, SpO2:95 %  01/11/24 1135, BP:(!) 106/45, Temp:97.5 °F (36.4 °C), Temp src:Temporal, Pulse:67, Resp:16, SpO2:95 %  01/11/24 1133, BP:(!) 99/43, Pulse:68, Resp:16, SpO2:95 %  01/11/24 1130, BP:(!) 112/46, Pulse:70, Resp:16, SpO2:96 %  01/11/24 1127, BP:(!) 110/46, Temp:97.3 °F (36.3 °C), Temp src:Temporal, Pulse:68, Resp:16, SpO2:96 %  01/11/24 0700, BP:(!) 116/52, Temp:98.4 °F (36.9 °C), Temp src:Oral, Pulse:80, Resp:18, SpO2:97 %     LABS:    CBC  Lab Results       Component                Value               Date/Time                  WBC                      10.9                01/11/2024 07:05 AM        HGB                      8.4 (L)             01/11/2024 07:05 AM        HCT                      25.4 (L)            01/11/2024 07:05 AM        PLT

## 2024-01-11 NOTE — PROGRESS NOTES
Pt a/o  [place]. Am assessment completed see flow sheet. Pt denies any pain/ needs at this time. Call light within reach.   Consent signed and on chart for surgery today.  Vitals:    01/11/24 0700   BP: (!) 116/52   Pulse: 80   Resp: 18   Temp: 98.4 °F (36.9 °C)   SpO2: 97%

## 2024-01-11 NOTE — PROGRESS NOTES
{Assessment 2:00525}.     {DC justification:24451}    Goals :   To be met in 3 visits:  1). Independent with LE Ex x 10 reps  2). Sit to/from stand: {Level of assist:20713}  3). Bed to chair: {Level of assist:02134}  4). CHF goal: {CHF:09338}    To be met in 6 visits:  1).  Supine to/from sit: {Level of assist:78882}  2).  Sit to/from stand: {Level of assist:01901}  3).  Bed to chair: {Level of assist:94156}  4).  Gait: Ambulate {Gait Goal:44753}  with {Level of assist:06477} and use of {AD list:65706}  5).  Tolerate B LE exercises 3 sets of 10-15 reps  6).  Ascend/descend *** steps with {Level of assist:96925} with use of {stairs goal:73601} and {IP AD list:44104}    Rehabilitation Potential: {Good Fair Poor:06669}  Strengths for achieving goals include:   {Strengths for Achieving Goals:28021}   Barriers to achieving goals include:    {barriers to therapy:00496}    Plan    To be seen {Plan of care:19797} while in acute care setting for therapeutic exercises, bed mobility, transfers, progressive gait training, balance training, and family/patient education.    Signature: Shirin Vieira PT     If patient discharges from this facility prior to next visit, this note will serve as the Discharge Summary.    CHF Education  {CHF Education:15751}

## 2024-01-11 NOTE — PROGRESS NOTES
Phase II:  1.  Patient is identified using name and the date of birth.  2.  The patient is free from signs and symptoms of chemical, electrical, laser, radiation, positioning, or transfer/transport injury.  3.  The patient receives appropriate medication(s), safely administered during the Perioperative period.  4.  The patient has wound/tissue perfusion consistent with or improved from baseline levels established preoperatively.  5.  The patient is at or returning to normothermia at the conclusion of the immediate postoperative period.  6.  The patient's fluid, electrolyte, and acid base balances are consistent with or improved from baseline levels established preoperatively.  7.  The patient's pulmonary function is consistent with or improved from baseline levels established preoperatively.  8.  The patient's cardiovascular status is consistent with or improved from baseline levels established preoperatively.  9.  The patient/caregiver demonstrates knowledge of nutritional management related to the operative or other invasive procedure.  10.  The patient/caregiver demonstrates knowledge of medication, pain, and wound management.  11.  The patient participates in the rehabilitation process as applicable.  12.  The patient/caregiver participates in decisions affection his or her Perioperative plan of care.  13.  The patient's care is consistent with the individualized Perioperative plan of care.  14.  The patient's right to privacy is maintained.  15.  The patient is the recipient of competent and ethical care within legal standards of practice.  16.  The patient's value system, lifestyle, ethnicity, and culture are considered, respected, and incorporated in the Perioperative plan of care and understands special services available.  17.  The patient demonstrates and/or reports adequate pain control throughout the the Perioperative period.  18.  The patient's neurological status is consistent with or improved from
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
## 2024-01-11 NOTE — PROGRESS NOTES
4 Eyes Skin Assessment     NAME:  Ari Snider  YOB: 1935  MEDICAL RECORD NUMBER:  [id number]    The patient is being assessed for  Admission    I agree that at least one RN has performed a thorough Head to Toe Skin Assessment on the patient. ALL assessment sites listed below have been assessed.      Areas assessed by both nurses:    Head, Face, Ears, Shoulders, Back, Chest, Arms, Elbows, Hands, Sacrum. Buttock, Coccyx, Ischium, Legs. Feet and Heels, and Under Medical Devices                       Does the Patient have a Wound? Yes wound(s) were present on assessment. LDA wound assessment was Initiated and completed by RN       Javier Prevention initiated by RN: Yes  Wound Care Orders initiated by RN: No    Pressure Injury (Stage 3,4, Unstageable, DTI, NWPT, and Complex wounds) if present, place Wound referral order by RN under : No    New Ostomies, if present place, Ostomy referral order under : No     Nurse 1 eSignature: Electronically signed by Marry Jackson RN on 1/11/24 at 12:47 AM EST    **SHARE this note so that the co-signing nurse can place an eSignature**    Nurse 2 eSignature: Electronically signed by Mallory Rodríguez RN on 1/11/24 at 1:43 AM EST

## 2024-01-11 NOTE — ANESTHESIA PRE PROCEDURE
Department of Anesthesiology  Preprocedure Note       Name:  Ari Snider   Age:  88 y.o.  :  1935                                          MRN:  [id number]         Date:  2024      Surgeon: Surgeon(s):  Cliff Keller DPM    Procedure: Procedure(s):  PARTIAL RIGHT FOOT AMPUTATION    Medications prior to admission:   Prior to Admission medications    Medication Sig Start Date End Date Taking? Authorizing Provider   atorvastatin (LIPITOR) 40 MG tablet TAKE ONE TABLET BY MOUTH DAILY 23   Wilma Rowley APRN - CNP   pantoprazole (PROTONIX) 40 MG tablet Take 1 tablet by mouth every morning (before breakfast) 23   Escobar Gastelum MD   metFORMIN (GLUCOPHAGE) 1000 MG tablet TAKE ONE TABLET BY MOUTH TWICE A DAY WITH A MEAL FOR DIABETES 7/10/23   Wilma Rowley APRN - CNP   glipiZIDE (GLUCOTROL) 10 MG tablet TAKE ONE TABLET BY MOUTH TWICE A DAY BEFORE MEALS 7/10/23   Wilma Rowley APRN - CNP   lisinopril (PRINIVIL;ZESTRIL) 5 MG tablet TAKE ONE TABLET BY MOUTH DAILY 7/10/23   Wilma Rowley APRN - CNP   aspirin 81 MG EC tablet Take 1 tablet by mouth daily    ProviderMoe MD   albuterol sulfate HFA (PROVENTIL;VENTOLIN;PROAIR) 108 (90 Base) MCG/ACT inhaler Use 1-2 puffs 3 times daily as needed for wheezing/cough. Dispense with Spacer and instruct in use.  At patient's preference may use 60 dose MDI. May Sub Pro-Air or Proventil as needed per insurance. 23   Logan Dhillon MD   acetaminophen (TYLENOL) 325 MG tablet Take 2 tablets by mouth every 4 hours as needed for Pain    ProviderMoe MD   ACCU-CHEK DIXON PLUS strip TEST DAILY AS NEEDED 3/7/22   Wilma Rowley APRN - CNP   Multiple Vitamins-Minerals (MULTIVITAMIN PO) Take 1 tablet by mouth daily.    Provider, MD Moe       Current medications:    Current Facility-Administered Medications   Medication Dose Route Frequency Provider Last Rate Last Admin    sodium chloride flush 0.9 % injection 10 mL  10

## 2024-01-11 NOTE — H&P
insurance. 7/8/23   Logan Dhillon MD   acetaminophen (TYLENOL) 325 MG tablet Take 2 tablets by mouth every 4 hours as needed for Pain    Provider, MD Moe   ACCU-CHEK DIXON PLUS strip TEST DAILY AS NEEDED 3/7/22   Wilma Rowley, APRN - CNP   Multiple Vitamins-Minerals (MULTIVITAMIN PO) Take 1 tablet by mouth daily.    Provider, Moe, MD       Allergies:  Patient has no known allergies.    Social History:          TOBACCO:   reports that he has quit smoking. He has never used smokeless tobacco.  ETOH:   reports no history of alcohol use.  E-cigarette/Vaping       Questions Responses    E-cigarette/Vaping Use Never User    Start Date     Passive Exposure     Quit Date     Counseling Given     Comments               Family History:      Family History reviewed with patient, and does not pertain and non-contributory to the current illness        Problem Relation Age of Onset    Other Mother     Other Father     No Known Problems Sister     No Known Problems Sister     Cancer Brother     No Known Problems Brother        REVIEW OF SYSTEMS:     Constitutional:  No Fever, No Chills, No Night Sweats  ENT/Mouth:  No Nasal Congestion,  No Hoarseness, No new mouth lesion  Eyes:  No Eye Pain, No Redness, No Discharge  Cardiovascular:  No Chest Pain, No Orthopnea, No Palpitations  Respiratory:  No Cough, No Sputum, No Dyspnea  Gastrointestinal: No Vomiting, No Diarrhea, No abdominal pain  Genitourinary: No Urinary Frequency, No Hematuria, No Urinary pain  Musculoskeletal:  No worsening Arthralgias, No worsening Myalgias  Skin: + New Skin Lesions, + new skin rash  Neuro:  No new weakness, No new numbness.  Psych:  No suicial ideation, No Violence ideation    PHYSICAL EXAM PERFORMED:    BP (!) 149/70   Pulse 95   Temp 97.8 °F (36.6 °C) (Oral)   Resp 18   Ht 1.727 m (5' 8\")   Wt 107.5 kg (236 lb 15.9 oz)   SpO2 99%   BMI 36.03 kg/m²     General appearance:  mild acute distress, appears older than stated  age  HEENT:   atraumatic, sclera anicteric, Conjunctivae clear.  Neck: Supple,Trachea midline, no goiter  Respiratory:minimal accessory muscle usage, Normal respiratory effort. Clear to auscultation, bilaterally without wheezing  Cardiovascular:  Regular rate and rhythm, capillary refill 2 seconds  Abdomen: Soft, non-tender, non-distended with normal bowel sounds.  Musculoskeletal:  No clubbing, cyanosis. trace edema LE bilaterally.   Skin: turgor normal.        Neurologic: Alert and oriented x4, no new focal sensory/motor deficits.     Labs:     Recent Labs     01/10/24  1730   WBC 13.4*   HGB 10.1*   HCT 30.9*        Recent Labs     01/10/24  1730      K 4.9      CO2 26   BUN 33*   CREATININE 1.6*   CALCIUM 9.7     Recent Labs     01/10/24  1730   AST 15   ALT 9*   BILITOT 0.7   ALKPHOS 85     Recent Labs     01/10/24  1730   INR 1.19*     No results for input(s): \"CKTOTAL\", \"TROPONINI\" in the last 72 hours.    Urinalysis:      Lab Results   Component Value Date/Time    NITRU Negative 01/10/2024 06:27 PM    WBCUA 0-2 01/10/2024 06:27 PM    BACTERIA Rare 01/10/2024 06:27 PM    RBCUA 3-4 01/10/2024 06:27 PM    BLOODU Negative 01/10/2024 06:27 PM    SPECGRAV 1.025 01/10/2024 06:27 PM    GLUCOSEU Negative 01/10/2024 06:27 PM       Radiology:     CXR: I have reviewed the CXR with the following interpretation:   Cardiomegaly  EKG:  I have reviewed the EKG with the following interpretation:   Normal sinus rhythm QTc 490  .  XR CHEST PORTABLE   Final Result   Borderline cardiomegaly which is more apparent with no acute pulmonary   abnormality.         XR ANKLE RIGHT (MIN 3 VIEWS)   Final Result   Nonspecific diffuse subcutaneous soft tissue swelling with no other acute   findings.  There is no subcutaneous air or evidence of osteomyelitis.         XR FOOT RIGHT (MIN 3 VIEWS)   Final Result   1. Soft tissue swelling throughout the foot with new lucencies and loss of   cortical definition at the head of

## 2024-01-11 NOTE — PLAN OF CARE
Pre-Operative:  1.  Patient/Caregiver identifies - states name and date of birth.  2.  The patient is free from signs and symptoms of injury.  3.  The patient receives appropriate medication(s), safely administered during the Perioperative period.  4.  The patient is free from signs and symptoms of infection.  5.  The patient has wound / tissue perfusion.  6.  The patients's fluid, electrolyte, and acid-base balances are established preoperatively.  7.  The patient's pulmonary function is established preoperatively.  8.  The patient's cardiovascular status is established preoperatively.  9.  The patient / caregiver demonstrates knowledge of nutritional management related to the operative or other invasive procedure.  10.  The patient/caregiver demonstrates knowledge of medication management.  11.  The patient/caregiver demonstrates knowledge of pain management.  12.  The patient participates in the rehabilitation process as applicable.  13.  The patient/caregiver participates in decisions affection his or her Perioperative plan of care.  14.  The patient's care is consistent with the individualized Perioperative plan of care.  15.  The patient's right to privacy is maintained.  16.  The patient is the recipient of competent and ethical care within legal standards of practice.  17.  The patient's value system, lifestyle, ethnicity, and culture are considered, respected, and incorporated in the Perioperative plan of care and understands special services available.  18.  The patient demonstrates and/or reports adequate pain control throughout the the Perioperative period.  19.  The patient's neurological status is established preoperatively.  20.  The patient/caregiver demonstrates knowledge of the expected responses to the operative or invasive procedure.  21.  Patient/Caregiver has reduced anxiety.  Interventions- Familiarize with environment and equipment.  22. Patient/Caregiver verbalizes understanding of Phase I

## 2024-01-11 NOTE — ED NOTES
Pt resting quietly at this time.  Family left 20 minutes ago with pt.'s belongings.  Notified pt that we will be getting him out of here shortly.

## 2024-01-11 NOTE — PROGRESS NOTES
Report given to Giovanni MARTINEZ from 2W at this time at the bedside. Request placed for transport to take patient back to floor.

## 2024-01-11 NOTE — BRIEF OP NOTE
Brief Postoperative Note      Patient: Ari Snider  YOB: 1935  MRN: [id number]    Date of Procedure: 1/11/2024    Pre-Op Diagnosis Codes:     * Diabetic foot ulcer with osteomyelitis (HCC) [E11.621, E11.69, L97.509, M86.9]    Post-Op Diagnosis: Same       Procedure(s):  PARTIAL RIGHT FOOT AMPUTATION    Surgeon(s):  Cliff Keller DPM    Assistant:  Surgical Assistant: Everardo Ch    Anesthesia: Monitor Anesthesia Care    Estimated Blood Loss (mL): less than 50     Complications: None    Specimens:   ID Type Source Tests Collected by Time Destination   1 : RIGHT FOOT CULTURE Specimen Foot CULTURE, Cliff Alejandro DPM 1/11/2024 1049    2 : RIGHT FOOT BONE FOR CULTURE Bone Bone CULTURE, Cliff Alejandro DPM 1/11/2024 1050    A : RIGHT FOOT 2ND, 3RD AND 5TH TOE Tissue Tissue SURGICAL PATHOLOGY Cliff Keller DPM 1/11/2024 1052        Implants:  Implant Name Type Inv. Item Serial No.  Lot No. LRB No. Used Action   PATCH AMNION 2 LAYR PROTCT 4 X 4CM STERISHIELD II - [id number]  PATCH AMNION 2 LAYR PROTCT 4 X 4CM STERISHIELD II [id number] [employer]  Right 1 Implanted   PATCH AMNION 2 LAYR PROTCT 4 X 4CM STERISHIELD II - [id number]  PATCH AMNION 2 LAYR PROTCT 4 X 4CM STERISHIELD II [id number] [employer]WD  Right 1 Implanted         Drains: * No LDAs found *    Findings: ulcer with purulence and osteomyelitis      Electronically signed by Cliff Keller DPM on 1/11/2024 at 11:24 AM

## 2024-01-11 NOTE — PROGRESS NOTES
Patient returned to room via bed, denies complaints at this time.  Family at bedside.  Vitals:    01/11/24 1235   BP: 122/67   Pulse: 70   Resp: 18   Temp: 97.7 °F (36.5 °C)   SpO2: 95%    Upon arrival to floor.

## 2024-01-11 NOTE — CARE COORDINATION
Case Management Assessment  Initial Evaluation    Date/Time of Evaluation: 1/11/2024 1:52 PM  Assessment Completed by: Shirin Fajardo RN    If patient is discharged prior to next notation, then this note serves as note for discharge by case management.    Patient Name: Ari Snider                   YOB: 1935  Diagnosis: Acute hematogenous osteomyelitis of right foot (HCC) [M86.071]  Cellulitis [L03.90]                   Date / Time: 1/10/2024  5:07 PM    Patient Admission Status: Inpatient   Readmission Risk (Low < 19, Mod (19-27), High > 27): Readmission Risk Score: 15.2    Current PCP: Wilma Rowley APRN - CNP  PCP verified by CM? (P) Yes    Chart Reviewed: Yes      History Provided by: (P) Patient  Patient Orientation: (P) Alert and Oriented, Person, Place, Situation    Patient Cognition: (P) Alert    Hospitalization in the last 30 days (Readmission):  No    If yes, Readmission Assessment in CM Navigator will be completed.    Advance Directives:      Code Status: Full Code   Patient's Primary Decision Maker is: (P) Legal Next of Kin    Secondary Decision Maker (Active): Ari Snider Jr - Child - [phone]    Secondary Decision Maker: Dahlia Moon - Child - [phone]    Secondary Decision Maker: Andressa Snider D - Spouse - [phone]    Supplemental (Other) Decision Maker: Bethel Snider - Child - [phone]    Supplemental (Other) Decision Maker: Nidia Peterson - Child - [phone]    Discharge Planning:    Patient lives with: (P) Alone Type of Home: (P) House  Primary Care Giver: (P) Family  Patient Support Systems include: (P) Family Members   Current Financial resources: (P) Medicare,  ([facility])  Current community resources: (P) ECF/Home Care  Current services prior to admission: (P) Durable Medical Equipment            Current DME: (P) Walker, Cane, Shower Chair            Type of Home Care services:  (P) [facility]    ADLS  Prior functional level: (P) Assistance with the

## 2024-01-11 NOTE — ED PROVIDER NOTES
[facility] EMERGENCY DEPARTMENT  EMERGENCY DEPARTMENT ENCOUNTER      Pt Name: Ari Snider  MRN: [id number]  Birthdate 1935  Date of evaluation: 1/10/2024  Provider: BIANCA THAKUR MD    CHIEF COMPLAINT       Chief Complaint   Patient presents with    Altered Mental Status     Family states pt with frequent confusion over the past 24-36 hrs         HISTORY OF PRESENT ILLNESS   (Location/Symptom, Timing/Onset, Context/Setting, Quality, Duration, Modifying Factors, Severity)  Note limiting factors.     Ari Snider is a 88 y.o. male who presents to the emergency department     Patient presents with some confusion today probably low-grade fever he is a diabetic unfortunately has had some osteomyelitis in his right foot before with some amputation of the right great toe apparently this was in January 2021.  He had been taken care of by Dr. Whaley.  He is a diabetic history of lymphoma according to his records patient denies any pain denies cough cold congestion headache    The history is provided by the patient.       Nursing Notes were reviewed.    REVIEW OF SYSTEMS    (2-9 systems for level 4, 10 or more for level 5)     Review of Systems   Constitutional:  Negative for activity change.   HENT:  Negative for congestion.    Eyes:  Negative for visual disturbance.   Genitourinary:  Negative for difficulty urinating.   Musculoskeletal:  Positive for gait problem and joint swelling.   All other systems reviewed and are negative.      Except as noted above the remainder of the review of systems was reviewed and negative.       PAST MEDICAL HISTORY     Past Medical History:   Diagnosis Date    Abscess of toe of right foot 07/27/2011    Acute CVA (cerebrovascular accident) (HCC) 03/28/2021    CAD (coronary artery disease)     Cancer (HCC)     Lymphoma    Cushing's syndrome (HCC) 9/22/2010    Diabetes mellitus (HCC)     Diabetic ulcer of toe 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 mellitus, with fat layer  Radiologist below, if available at the time of this note:    XR CHEST PORTABLE   Final Result   Borderline cardiomegaly which is more apparent with no acute pulmonary   abnormality.         XR ANKLE RIGHT (MIN 3 VIEWS)   Final Result   Nonspecific diffuse subcutaneous soft tissue swelling with no other acute   findings.  There is no subcutaneous air or evidence of osteomyelitis.         XR FOOT RIGHT (MIN 3 VIEWS)   Final Result   1. Soft tissue swelling throughout the foot with new lucencies and loss of   cortical definition at the head of the 3rd metatarsal suggesting   osteomyelitis.  There is also flattening and irregularity of the head of the   3rd metatarsal.   2. New posterior dislocation of the 2nd metatarsophalangeal joint.   3. Advanced degenerative changes at the midfoot.         CT HEAD WO CONTRAST   Final Result   No acute intracranial findings.                 LABS: His labs were all reviewed.  He does have a slightly elevated white count I did try to insert him but could not because of 3 stars in his EKG interpretation  But all of his labs were reviewed by myself      EMERGENCY DEPARTMENT COURSE and DIFFERENTIAL DIAGNOSIS/MDM:     Vitals:    01/10/24 1718 01/10/24 1815 01/10/24 1915   BP: (!) 155/68 135/64 134/68   Pulse: 87 74 80   Resp: 18 20 18   Temp: 99.8 °F (37.7 °C)     TempSrc: Temporal     SpO2: 97% 98% 99%   Weight: 108.9 kg (240 lb)     Height: 1.727 m (5' 8\")             MDM  Historian: Patient but also his son and his daughter in the room and were very helpful in providing history  Limitations: He has lost lack of sensation in his foot due to his diabetes so with his neuropathy his infection got out of control  History of present illness patient apparently was acting confused today low-grade fever patient has diabetes when I got him undressed I noticed that his right foot was swollen the family was not aware of this.      On physical exam swelling redness warmth noted the right

## 2024-01-12 ENCOUNTER — APPOINTMENT (OUTPATIENT)
Dept: INTERVENTIONAL RADIOLOGY/VASCULAR | Age: 89
DRG: 853 | End: 2024-01-12
Payer: MEDICARE

## 2024-01-12 PROBLEM — R78.81 BACTEREMIA DUE TO METHICILLIN RESISTANT STAPHYLOCOCCUS AUREUS: Status: ACTIVE | Noted: 2024-01-12

## 2024-01-12 PROBLEM — B95.62 BACTEREMIA DUE TO METHICILLIN RESISTANT STAPHYLOCOCCUS AUREUS: Status: ACTIVE | Noted: 2024-01-12

## 2024-01-12 PROBLEM — M86.071 ACUTE HEMATOGENOUS OSTEOMYELITIS OF RIGHT FOOT (HCC): Status: ACTIVE | Noted: 2024-01-12

## 2024-01-12 LAB
ALBUMIN SERPL-MCNC: 3.3 G/DL (ref 3.4–5)
ALBUMIN/GLOB SERPL: 1.1 {RATIO} (ref 1.1–2.2)
ALP SERPL-CCNC: 82 U/L (ref 40–129)
ALT SERPL-CCNC: 10 U/L (ref 10–40)
ANION GAP SERPL CALCULATED.3IONS-SCNC: 10 MMOL/L (ref 3–16)
AST SERPL-CCNC: 19 U/L (ref 15–37)
BASOPHILS # BLD: 0 K/UL (ref 0–0.2)
BASOPHILS NFR BLD: 0 %
BILIRUB SERPL-MCNC: 0.5 MG/DL (ref 0–1)
BUN SERPL-MCNC: 22 MG/DL (ref 7–20)
CALCIUM SERPL-MCNC: 8.5 MG/DL (ref 8.3–10.6)
CHLORIDE SERPL-SCNC: 104 MMOL/L (ref 99–110)
CO2 SERPL-SCNC: 24 MMOL/L (ref 21–32)
CREAT SERPL-MCNC: 1.3 MG/DL (ref 0.8–1.3)
DEPRECATED RDW RBC AUTO: 15.2 % (ref 12.4–15.4)
EOSINOPHIL # BLD: 0.1 K/UL (ref 0–0.6)
EOSINOPHIL NFR BLD: 1 %
EST. AVERAGE GLUCOSE BLD GHB EST-MCNC: 177.2 MG/DL
GFR SERPLBLD CREATININE-BSD FMLA CKD-EPI: 53 ML/MIN/{1.73_M2}
GLUCOSE BLD-MCNC: 123 MG/DL (ref 70–99)
GLUCOSE BLD-MCNC: 128 MG/DL (ref 70–99)
GLUCOSE BLD-MCNC: 155 MG/DL (ref 70–99)
GLUCOSE BLD-MCNC: 164 MG/DL (ref 70–99)
GLUCOSE SERPL-MCNC: 111 MG/DL (ref 70–99)
HBA1C MFR BLD: 7.8 %
HCT VFR BLD AUTO: 27.6 % (ref 40.5–52.5)
HGB BLD-MCNC: 9.3 G/DL (ref 13.5–17.5)
LYMPHOCYTES # BLD: 3.2 K/UL (ref 1–5.1)
LYMPHOCYTES NFR BLD: 32 %
MCH RBC QN AUTO: 31 PG (ref 26–34)
MCHC RBC AUTO-ENTMCNC: 33.5 G/DL (ref 31–36)
MCV RBC AUTO: 92.5 FL (ref 80–100)
MONOCYTES # BLD: 0.7 K/UL (ref 0–1.3)
MONOCYTES NFR BLD: 8 %
NEUTROPHILS # BLD: 4.9 K/UL (ref 1.7–7.7)
NEUTROPHILS NFR BLD: 55 %
PATH INTERP BLD-IMP: NO
PATH INTERP BLD-IMP: NORMAL
PERFORMED ON: ABNORMAL
PLATELET # BLD AUTO: 161 K/UL (ref 135–450)
PLATELET BLD QL SMEAR: ADEQUATE
PMV BLD AUTO: 8 FL (ref 5–10.5)
POTASSIUM SERPL-SCNC: 3.8 MMOL/L (ref 3.5–5.1)
PROT SERPL-MCNC: 6.4 G/DL (ref 6.4–8.2)
RBC # BLD AUTO: 2.99 M/UL (ref 4.2–5.9)
SLIDE REVIEW: ABNORMAL
SODIUM SERPL-SCNC: 138 MMOL/L (ref 136–145)
VARIANT LYMPHS NFR BLD MANUAL: 4 % (ref 0–6)
WBC # BLD AUTO: 8.9 K/UL (ref 4–11)

## 2024-01-12 PROCEDURE — 99232 SBSQ HOSP IP/OBS MODERATE 35: CPT | Performed by: INTERNAL MEDICINE

## 2024-01-12 PROCEDURE — 80053 COMPREHEN METABOLIC PANEL: CPT

## 2024-01-12 PROCEDURE — 83036 HEMOGLOBIN GLYCOSYLATED A1C: CPT

## 2024-01-12 PROCEDURE — 85025 COMPLETE CBC W/AUTO DIFF WBC: CPT

## 2024-01-12 PROCEDURE — 97161 PT EVAL LOW COMPLEX 20 MIN: CPT

## 2024-01-12 PROCEDURE — 6370000000 HC RX 637 (ALT 250 FOR IP): Performed by: PODIATRIST

## 2024-01-12 PROCEDURE — 97166 OT EVAL MOD COMPLEX 45 MIN: CPT

## 2024-01-12 PROCEDURE — 6360000002 HC RX W HCPCS: Performed by: PODIATRIST

## 2024-01-12 PROCEDURE — 36415 COLL VENOUS BLD VENIPUNCTURE: CPT

## 2024-01-12 PROCEDURE — C1751 CATH, INF, PER/CENT/MIDLINE: HCPCS

## 2024-01-12 PROCEDURE — 97530 THERAPEUTIC ACTIVITIES: CPT

## 2024-01-12 PROCEDURE — 2580000003 HC RX 258: Performed by: PODIATRIST

## 2024-01-12 PROCEDURE — 1200000000 HC SEMI PRIVATE

## 2024-01-12 RX ADMIN — PIPERACILLIN AND TAZOBACTAM 3375 MG: 3; .375 INJECTION, POWDER, FOR SOLUTION INTRAVENOUS at 03:32

## 2024-01-12 RX ADMIN — ENOXAPARIN SODIUM 30 MG: 100 INJECTION SUBCUTANEOUS at 20:30

## 2024-01-12 RX ADMIN — Medication 3 MG: at 20:30

## 2024-01-12 RX ADMIN — SODIUM CHLORIDE, PRESERVATIVE FREE 10 ML: 5 INJECTION INTRAVENOUS at 10:52

## 2024-01-12 RX ADMIN — PIPERACILLIN AND TAZOBACTAM 3375 MG: 3; .375 INJECTION, POWDER, FOR SOLUTION INTRAVENOUS at 11:02

## 2024-01-12 RX ADMIN — LINEZOLID 600 MG: 2 INJECTION, SOLUTION INTRAVENOUS at 14:20

## 2024-01-12 RX ADMIN — ENOXAPARIN SODIUM 30 MG: 100 INJECTION SUBCUTANEOUS at 10:51

## 2024-01-12 RX ADMIN — ATORVASTATIN CALCIUM 40 MG: 40 TABLET, FILM COATED ORAL at 10:51

## 2024-01-12 RX ADMIN — ASPIRIN 81 MG: 81 TABLET, COATED ORAL at 10:51

## 2024-01-12 RX ADMIN — PANTOPRAZOLE SODIUM 40 MG: 40 TABLET, DELAYED RELEASE ORAL at 05:22

## 2024-01-12 RX ADMIN — PIPERACILLIN AND TAZOBACTAM 3375 MG: 3; .375 INJECTION, POWDER, FOR SOLUTION INTRAVENOUS at 19:37

## 2024-01-12 RX ADMIN — LINEZOLID 600 MG: 2 INJECTION, SOLUTION INTRAVENOUS at 01:14

## 2024-01-12 ASSESSMENT — PAIN SCALES - GENERAL: PAINLEVEL_OUTOF10: 0

## 2024-01-12 NOTE — PROGRESS NOTES
Upon arrival to place midline assessed chart for issues related to midline placement, check for consent, and did time out with JUAN Tuttle. Pt. Tolerated midline placement well, no difficulty accessing cephalic vein, threaded well, with free flowing blood return. Reported off to JUAN Tuttle

## 2024-01-12 NOTE — PLAN OF CARE
Problem: Discharge Planning  Goal: Discharge to home or other facility with appropriate resources  Outcome: Progressing     Problem: Safety - Adult  Goal: Free from fall injury  Outcome: Progressing     Problem: ABCDS Injury Assessment  Goal: Absence of physical injury  Outcome: Progressing     Problem: Skin/Tissue Integrity  Goal: Absence of new skin breakdown  Description: 1.  Monitor for areas of redness and/or skin breakdown  2.  Assess vascular access sites hourly  3.  Every 4-6 hours minimum:  Change oxygen saturation probe site  4.  Every 4-6 hours:  If on nasal continuous positive airway pressure, respiratory therapy assess nares and determine need for appliance change or resting period.  Outcome: Progressing     Problem: Chronic Conditions and Co-morbidities  Goal: Patient's chronic conditions and co-morbidity symptoms are monitored and maintained or improved  Outcome: Progressing
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
## 2024-01-12 NOTE — FLOWSHEET NOTE
01/11/24 2123   Vital Signs   Temp 100.2 °F (37.9 °C)   Temp Source Oral   Pulse (!) 101   Heart Rate Source Monitor   Respirations 21   BP (!) 171/81   MAP (Calculated) 111   BP Location Right upper arm   BP Method Automatic   Patient Position High fowlers   Oxygen Therapy   SpO2 92 %   O2 Device None (Room air)     HS assessment completed.  No signs or symptoms of distress noted. Patient tolerated night medications well. Respirations easy and even. Bed in lowest position, bed alarm in place and functioning properly, bed rails x2 up,  Call light within reach.     Bedside Mobility Assessment Tool (BMAT):     Assessment Level 1- Sit and Shake    1. From a semi-reclined position, ask patient to sit up and rotate to a seated position at the side of the bed. Can use the bedrail.    2. Ask patient to reach out and grab your hand and shake making sure patient reaches across his/her midline.   Pass- Patient is able to come to a seated position, maintain core strength. Maintains seated balance while reaching across midline. Move on to Assessment Level 2.     Assessment Level 2- Stretch and Point   1. With patient in seated position at the side of the bed, have patient place both feet on the floor (or stool) with knees no higher than hips.    2. Ask patient to stretch one leg and straighten the knee, then bend the ankle/flex and point the toes. If appropriate, repeat with the other leg.   Pass- Patient is able to demonstrate appropriate quad strength on intended weight bearing limb(s). Move onto Assessment Level 3.     Assessment Level 3- Stand   1. Ask patient to elevate off the bed or chair (seated to standing) using an assistive device (cane, bedrail).    2. Patient should be able to raise buttocks off be and hold for a count of five. May repeat once.   Fail- Patient unable to demonstrate standing stability. Patient is MOBILITY LEVEL 3.     Assessment Level 4- Walk   1. Ask patient to march in place at bedside.    2.  Then ask patient to advance step and return each foot. Some medical conditions may render a patient from stepping backwards, use your best clinical judgement.   Fail- Patient not able to complete tasks OR requires use of assistive device. Patient is MOBILITY LEVEL 3.       Mobility Level- 2

## 2024-01-12 NOTE — PROGRESS NOTES
Inpatient Physical Therapy Evaluation & Treatment    Unit: [facility]  Date:  1/12/2024  Patient Name:    Ari Snider  Admitting diagnosis:  Acute hematogenous osteomyelitis of right foot (HCC) [M86.071]  Cellulitis [L03.90]  Admit Date:  1/10/2024  Precautions/Restrictions/WB Status/ Lines/ Wounds/ Oxygen: Fall risk, Bed/chair alarm, Lines (IV), WB Restrictions (NWB RLE), and Isolation Precautions: Contact      Pt seen for cotreatment this date due to patient safety, patient endurance, acute illness/injury, and need for the assistance of 2 skilled therapists    Treatment Time:  14:50 - 15:23  Treatment Number:  1   Timed Code Treatment Minutes: 23 minutes  Total Treatment Minutes:  33  minutes    Patient Stated Goals for Therapy: \" go home \"          Discharge Recommendations: SNF  DME needs for discharge: Defer to facility       Therapy recommendation for EMS Transport: requires transport by cot due to pt needs lift equipment for safe transfers and pt needs A x 2 for safe transfers    Therapy recommendations for staff:   Assist of 2 for transfers with use of BENTLEY STEDY and gait belt to/from BSC  to/from chair    History of Present Illness:   88 y.o. male who is seen for evaluation of cellulitis and ulcer on the right foot. Came to ER due to confusion and found to have foot infection. States wound has been bothering his for awhile. Does not have much feeling in his feet.   Has had amputations in the past due to infections.     1/11: s/p PARTIAL RIGHT FOOT AMPUTATION     Home Health S4 Level Recommendation:  NA        AM-PAC Mobility Score       AM-PAC Inpatient Mobility without Stair Climbing Raw Score : 12      Subjective  Patient lying reclined in bed with no family present.  Pt agreeable to this PT session.     Cognition    A&O Person and Situation (knows \"hospital\" but not which one)  Able to follow 2 step commands    Pain   No  Location: NA  Rating: NA /10  Pain Medicine Status: Denies need    Preadmission

## 2024-01-12 NOTE — FLOWSHEET NOTE
01/11/24 1920   Handoff   Communication Given Shift Handoff   Handoff Given To Marry   Handoff Received From Giovanni   Handoff Communication Face to Face;At bedside   Time Handoff Given 1920   End of Shift Check Performed Yes

## 2024-01-12 NOTE — CARE COORDINATION
INTERDISCIPLINARY PLAN OF CARE CONFERENCE    Date/Time: 1/12/2024 11:50 AM  Completed by: Shirin Fajardo RN, Case Management      Patient Name:  Ari Snider  YOB: 1935  Admitting Diagnosis: Acute hematogenous osteomyelitis of right foot (HCC) [M86.071]  Cellulitis [L03.90]     Admit Date/Time:  1/10/2024  5:07 PM    Chart reviewed. Interdisciplinary team contacted or reviewed plan related to patient progress and discharge plans.   Disciplines included Case Management, Nursing, and Dietitian.    Current Status: IP 01/11/2024  PT/OT recommendation for discharge plan of care: ordered    Expected D/C Disposition:  Home  Confirmed plan with patient   Discharge Plan Comments: Chart reviewed. Lives alone. Amb with walker. Family checks in frequently, cooks meals, housekeeping and can assist with dressing changes.   Plans to return home. Declined skilled HHC. PT/OT recs PENDING. Likely home. If needs [facility] +CM following    Home O2 in place on admit: No  Pt informed of need to bring portable home O2 tank on day of discharge for nursing to connect prior to leaving:  No  Verbalized agreement/Understanding:  No

## 2024-01-12 NOTE — PROGRESS NOTES
Shift assessment complete. VSS. Pt A/OX4. Denies pain or N/V/D. Scheduled meds given. See MAR. Surgical dressing on RLE C,D, & I. Call light within reach. Bed alarm on.

## 2024-01-12 NOTE — CARE COORDINATION
Change in DC PLAN:  Chart reviewed. Met with pt and son at bedside and explained the role of the CM. Pt needs STR placement. Prefers [facility]. Referral sent for review. (Alt choices #2 [facility], #3 [facility]).   Per Yissel ([facility]) if pt accepted she will initiate pre-cert. Await decision.

## 2024-01-13 LAB
ALBUMIN SERPL-MCNC: 3.2 G/DL (ref 3.4–5)
ALBUMIN/GLOB SERPL: 1 {RATIO} (ref 1.1–2.2)
ALP SERPL-CCNC: 85 U/L (ref 40–129)
ALT SERPL-CCNC: 13 U/L (ref 10–40)
ANION GAP SERPL CALCULATED.3IONS-SCNC: 10 MMOL/L (ref 3–16)
AST SERPL-CCNC: 19 U/L (ref 15–37)
BACTERIA BLD CULT ORG #2: ABNORMAL
BACTERIA BLD CULT: ABNORMAL
BACTERIA BLD CULT: ABNORMAL
BASOPHILS # BLD: 0 K/UL (ref 0–0.2)
BASOPHILS NFR BLD: 0 %
BILIRUB SERPL-MCNC: 0.4 MG/DL (ref 0–1)
BUN SERPL-MCNC: 18 MG/DL (ref 7–20)
CALCIUM SERPL-MCNC: 8.2 MG/DL (ref 8.3–10.6)
CHLORIDE SERPL-SCNC: 104 MMOL/L (ref 99–110)
CO2 SERPL-SCNC: 23 MMOL/L (ref 21–32)
CREAT SERPL-MCNC: 1.3 MG/DL (ref 0.8–1.3)
DEPRECATED RDW RBC AUTO: 14.7 % (ref 12.4–15.4)
EOSINOPHIL # BLD: 0.2 K/UL (ref 0–0.6)
EOSINOPHIL NFR BLD: 3 %
EST. AVERAGE GLUCOSE BLD GHB EST-MCNC: 182.9 MG/DL
GFR SERPLBLD CREATININE-BSD FMLA CKD-EPI: 53 ML/MIN/{1.73_M2}
GLUCOSE BLD-MCNC: 137 MG/DL (ref 70–99)
GLUCOSE BLD-MCNC: 161 MG/DL (ref 70–99)
GLUCOSE BLD-MCNC: 192 MG/DL (ref 70–99)
GLUCOSE BLD-MCNC: 196 MG/DL (ref 70–99)
GLUCOSE SERPL-MCNC: 133 MG/DL (ref 70–99)
HBA1C MFR BLD: 8 %
HCT VFR BLD AUTO: 24.8 % (ref 40.5–52.5)
HGB BLD-MCNC: 8.3 G/DL (ref 13.5–17.5)
LYMPHOCYTES # BLD: 3.2 K/UL (ref 1–5.1)
LYMPHOCYTES NFR BLD: 35 %
MCH RBC QN AUTO: 30.9 PG (ref 26–34)
MCHC RBC AUTO-ENTMCNC: 33.2 G/DL (ref 31–36)
MCV RBC AUTO: 93.1 FL (ref 80–100)
MONOCYTES # BLD: 0.6 K/UL (ref 0–1.3)
MONOCYTES NFR BLD: 8 %
NEUTROPHILS # BLD: 3.7 K/UL (ref 1.7–7.7)
NEUTROPHILS NFR BLD: 47 %
NEUTS BAND NFR BLD MANUAL: 1 % (ref 0–7)
ORGANISM: ABNORMAL
OVALOCYTES BLD QL SMEAR: ABNORMAL
PERFORMED ON: ABNORMAL
PLATELET # BLD AUTO: 175 K/UL (ref 135–450)
PLATELET BLD QL SMEAR: ADEQUATE
PMV BLD AUTO: 7.9 FL (ref 5–10.5)
POIKILOCYTOSIS BLD QL SMEAR: ABNORMAL
POTASSIUM SERPL-SCNC: 3.9 MMOL/L (ref 3.5–5.1)
PROT SERPL-MCNC: 6.4 G/DL (ref 6.4–8.2)
RBC # BLD AUTO: 2.67 M/UL (ref 4.2–5.9)
SLIDE REVIEW: ABNORMAL
SODIUM SERPL-SCNC: 137 MMOL/L (ref 136–145)
VARIANT LYMPHS NFR BLD MANUAL: 6 % (ref 0–6)
WBC # BLD AUTO: 7.7 K/UL (ref 4–11)

## 2024-01-13 PROCEDURE — 82550 ASSAY OF CK (CPK): CPT

## 2024-01-13 PROCEDURE — 6370000000 HC RX 637 (ALT 250 FOR IP): Performed by: PODIATRIST

## 2024-01-13 PROCEDURE — 2580000003 HC RX 258: Performed by: PODIATRIST

## 2024-01-13 PROCEDURE — 99233 SBSQ HOSP IP/OBS HIGH 50: CPT | Performed by: INTERNAL MEDICINE

## 2024-01-13 PROCEDURE — 6360000002 HC RX W HCPCS: Performed by: PODIATRIST

## 2024-01-13 PROCEDURE — 87040 BLOOD CULTURE FOR BACTERIA: CPT

## 2024-01-13 PROCEDURE — 2580000003 HC RX 258: Performed by: INTERNAL MEDICINE

## 2024-01-13 PROCEDURE — 93306 TTE W/DOPPLER COMPLETE: CPT

## 2024-01-13 PROCEDURE — 6360000002 HC RX W HCPCS: Performed by: INTERNAL MEDICINE

## 2024-01-13 PROCEDURE — 80053 COMPREHEN METABOLIC PANEL: CPT

## 2024-01-13 PROCEDURE — 85025 COMPLETE CBC W/AUTO DIFF WBC: CPT

## 2024-01-13 PROCEDURE — 36569 INSJ PICC 5 YR+ W/O IMAGING: CPT

## 2024-01-13 PROCEDURE — 36410 VNPNXR 3YR/> PHY/QHP DX/THER: CPT

## 2024-01-13 PROCEDURE — 05HF33Z INSERTION OF INFUSION DEVICE INTO LEFT CEPHALIC VEIN, PERCUTANEOUS APPROACH: ICD-10-PCS | Performed by: INTERNAL MEDICINE

## 2024-01-13 PROCEDURE — 1200000000 HC SEMI PRIVATE

## 2024-01-13 PROCEDURE — 36415 COLL VENOUS BLD VENIPUNCTURE: CPT

## 2024-01-13 RX ADMIN — LINEZOLID 600 MG: 2 INJECTION, SOLUTION INTRAVENOUS at 02:09

## 2024-01-13 RX ADMIN — SODIUM CHLORIDE, PRESERVATIVE FREE 10 ML: 5 INJECTION INTRAVENOUS at 23:23

## 2024-01-13 RX ADMIN — ENOXAPARIN SODIUM 30 MG: 100 INJECTION SUBCUTANEOUS at 08:50

## 2024-01-13 RX ADMIN — ENOXAPARIN SODIUM 30 MG: 100 INJECTION SUBCUTANEOUS at 23:21

## 2024-01-13 RX ADMIN — SODIUM CHLORIDE, PRESERVATIVE FREE 10 ML: 5 INJECTION INTRAVENOUS at 08:51

## 2024-01-13 RX ADMIN — CEFEPIME 2000 MG: 2 INJECTION, POWDER, FOR SOLUTION INTRAVENOUS at 23:20

## 2024-01-13 RX ADMIN — PANTOPRAZOLE SODIUM 40 MG: 40 TABLET, DELAYED RELEASE ORAL at 06:24

## 2024-01-13 RX ADMIN — VANCOMYCIN HYDROCHLORIDE 2000 MG: 10 INJECTION, POWDER, LYOPHILIZED, FOR SOLUTION INTRAVENOUS at 23:21

## 2024-01-13 RX ADMIN — ASPIRIN 81 MG: 81 TABLET, COATED ORAL at 08:50

## 2024-01-13 RX ADMIN — ATORVASTATIN CALCIUM 40 MG: 40 TABLET, FILM COATED ORAL at 08:50

## 2024-01-13 RX ADMIN — LINEZOLID 600 MG: 2 INJECTION, SOLUTION INTRAVENOUS at 13:59

## 2024-01-13 RX ADMIN — Medication 3 MG: at 23:21

## 2024-01-13 RX ADMIN — PIPERACILLIN AND TAZOBACTAM 3375 MG: 3; .375 INJECTION, POWDER, FOR SOLUTION INTRAVENOUS at 02:10

## 2024-01-13 RX ADMIN — PIPERACILLIN AND TAZOBACTAM 3375 MG: 3; .375 INJECTION, POWDER, FOR SOLUTION INTRAVENOUS at 11:36

## 2024-01-13 RX ADMIN — OXYCODONE AND ACETAMINOPHEN 1 TABLET: 5; 325 TABLET ORAL at 23:21

## 2024-01-13 ASSESSMENT — PAIN DESCRIPTION - ORIENTATION: ORIENTATION: RIGHT

## 2024-01-13 ASSESSMENT — PAIN SCALES - GENERAL
PAINLEVEL_OUTOF10: 0
PAINLEVEL_OUTOF10: 5

## 2024-01-13 ASSESSMENT — PAIN DESCRIPTION - LOCATION: LOCATION: FOOT;LEG

## 2024-01-13 ASSESSMENT — PAIN DESCRIPTION - DESCRIPTORS: DESCRIPTORS: DISCOMFORT;PRESSURE

## 2024-01-13 NOTE — PROGRESS NOTES
Shift assessment, completed, see flow sheet. Pt is alert and oriented x4, [place]. Following commands.     VSS. Respirations are easy, even, and unlabored. Bilateral lung sounds are diminished throughout, slight rhonchi noted.     Midline to RUE remains in place with site and dressing C/D/I. Pt able to utilize urinal as needed. Remains NWB to RLE at this time per orders, compliant.    Call light within reach. Bed in lowest position. Bed alarm on. Pt denies any further needs at this time.

## 2024-01-13 NOTE — FLOWSHEET NOTE
01/12/24 2329   Vital Signs   Temp 98.4 °F (36.9 °C)   Temp Source Oral   Pulse 75   Heart Rate Source Monitor   Respirations 16   BP (!) 115/51   MAP (Calculated) 72   MAP (mmHg) 71   BP Location Left upper arm   BP Method Automatic   Patient Position Semi fowlers   Pain Assessment   Pain Assessment None - Denies Pain   Pain Level 0   Oxygen Therapy   SpO2 94 %   O2 Device None (Room air)     HS assessment completed. No complaints of pain or discomfort voiced. No signs or symptoms of distress noted. Patient tolerated night medications well. Respirations easy and even. Bed in lowest position, bed alarm in place and functioning properly, bed rails x2 up,  Call light within reach.     Bedside Mobility Assessment Tool (BMAT):     Assessment Level 1- Sit and Shake    1. From a semi-reclined position, ask patient to sit up and rotate to a seated position at the side of the bed. Can use the bedrail.    2. Ask patient to reach out and grab your hand and shake making sure patient reaches across his/her midline.   Pass- Patient is able to come to a seated position, maintain core strength. Maintains seated balance while reaching across midline. Move on to Assessment Level 2.     Assessment Level 2- Stretch and Point   1. With patient in seated position at the side of the bed, have patient place both feet on the floor (or stool) with knees no higher than hips.    2. Ask patient to stretch one leg and straighten the knee, then bend the ankle/flex and point the toes. If appropriate, repeat with the other leg.   Pass- Patient is able to demonstrate appropriate quad strength on intended weight bearing limb(s). Move onto Assessment Level 3.     Assessment Level 3- Stand   1. Ask patient to elevate off the bed or chair (seated to standing) using an assistive device (cane, bedrail).    2. Patient should be able to raise buttocks off be and hold for a count of five. May repeat once.   Fail- Patient unable to demonstrate standing

## 2024-01-13 NOTE — PROGRESS NOTES
[facility] Progress Note    Daily Progress Note for 2024 10:29 AM 0223/0223-02  Ari Snider : 1935 Age: 88 y.o. Sex: male  Length of Stay:  3    Interval History:      CC: F/U Altered Mental Status (Family states pt with frequent confusion over the past 24-36 hrs)    Subjective:       Mr Snider says he is feeling fine today.     Objective:     Vitals:    24 2329 24 0200 24 0600 24 0844   BP: (!) 115/51 (!) 113/53  (!) 135/57   Pulse: 75 76  86   Resp: 16 15  15   Temp: 98.4 °F (36.9 °C) 97.8 °F (36.6 °C)  98.4 °F (36.9 °C)   TempSrc: Oral Oral  Oral   SpO2: 94% 93%  96%   Weight:   105.6 kg (232 lb 11.2 oz)    Height:              Intake/Output Summary (Last 24 hours) at 2024 1029  Last data filed at 2024 0855  Gross per 24 hour   Intake 240 ml   Output 1225 ml   Net -985 ml       Body mass index is 35.38 kg/m².    Physical Exam:  General: Cooperative, pleasant/Ill appearing, on  Nasal cannula  HEENT:  Head: normocephalic,atraumatic, anicteric sclera, clear conjunctiva  Neck: Normal size, Jugular venous pulsations: normal  Respiratory:unlabored breathing, clear to auscultation with no crackles, wheezes rhonchi  Heart: Regular rate and rhythm, S1, S2-normal, No murmurs  Abdomen: soft, nondistended, nontender, normoactive bowel sounds,  Neurological/Psych: Alert and oriented times three, no focal neurological deficits, Mood and affect appropriate.  Skin: No obvious rashes    Extremities:  no edema, Pedal pulses 2+ bilaterally    Scheduled Medications:  sodium chloride flush, 10 mL, 2 times per day  enoxaparin, 30 mg, BID  melatonin, 3 mg, Nightly  piperacillin-tazobactam, 3,375 mg, q8h  linezolid, 600 mg, Q12H  aspirin, 81 mg, Daily  atorvastatin, 40 mg, Daily  pantoprazole, 40 mg, QAM AC  insulin lispro, 0-8 Units, TID WC  insulin lispro, 0-4 Units, Nightly        PRN Medications:  perflutren lipid microspheres, 1.5 mL, ONCE PRN  sodium chloride

## 2024-01-13 NOTE — PROGRESS NOTES
[facility]    Daily Progress Note for 2024 7:44 PM 0223/0223-02  Ari Snider : 1935 Age: 88 y.o. Sex: male  Length of Stay:  2    Interval History:      CC: F/U Altered Mental Status (Family states pt with frequent confusion over the past 24-36 hrs)      Subjective:       Mr Snider says he feels pretty well today, has been sitting up in chair and would like to get back into bed. Otherwise says he feels fine.       Objective:     Vitals:    24 2355 24 0357 24 0925 24 1430   BP: (!) 142/50 (!) 115/53 134/64 (!) 104/52   Pulse: 86 79 77 69   Resp: 16 16 16 18   Temp: 98.8 °F (37.1 °C) 97.7 °F (36.5 °C) 98 °F (36.7 °C) 97.3 °F (36.3 °C)   TempSrc: Axillary Oral Oral Oral   SpO2: 97% 100% 94% 94%   Weight: 106.2 kg (234 lb 2.1 oz)      Height:              Intake/Output Summary (Last 24 hours) at 2024 1944  Last data filed at 2024 1420  Gross per 24 hour   Intake 120 ml   Output 800 ml   Net -680 ml     Body mass index is 35.6 kg/m².    Physical Exam:  General: Cooperative, pleasant/Ill appearing, on  Nasal cannula  HEENT:  Head: normocephalic,atraumatic, anicteric sclera, clear conjunctiva  Neck: Normal size, Jugular venous pulsations: normal  Respiratory:unlabored breathing, clear to auscultation with no crackles, wheezes rhonchi  Heart: Regular rate and rhythm, S1, S2-normal, No murmurs  Abdomen: soft, nondistended, nontender, normoactive bowel sounds,  Neurological/Psych: Alert and oriented times three, no focal neurological deficits, Mood and affect appropriate.  Skin: No obvious rashes    Extremities:  no edema, Pedal pulses 2+ bilaterally    Scheduled Medications:  sodium chloride flush, 10 mL, 2 times per day  enoxaparin, 30 mg, BID  melatonin, 3 mg, Nightly  piperacillin-tazobactam, 3,375 mg, q8h  linezolid, 600 mg, Q12H  aspirin, 81 mg, Daily  atorvastatin, 40 mg, Daily  pantoprazole, 40 mg, QAM AC  insulin lispro, 0-8 Units,
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
## 2024-01-13 NOTE — PROGRESS NOTES
Bed side report given to JUAN Tuttle. Scheduled Zosyn given, see MAR. Warm blanket provided. Pt denies further needs at this time. Call light within reach. Bed alarm on.

## 2024-01-13 NOTE — PROGRESS NOTES
RN called to room d/t \"IV leaking all over.\" Upon inspection of midline to RUE, noted site to have minimal bright red active bleeding and dressing and pad underneath of him were both saturated with IVF. Turned off IVF, disconnected pt from IVF, changed linens and gown, called PICC team and left a message to come and evaluate current midline for patency.

## 2024-01-14 LAB
GLUCOSE BLD-MCNC: 146 MG/DL (ref 70–99)
GLUCOSE BLD-MCNC: 187 MG/DL (ref 70–99)
GLUCOSE BLD-MCNC: 187 MG/DL (ref 70–99)
GLUCOSE BLD-MCNC: 223 MG/DL (ref 70–99)
PERFORMED ON: ABNORMAL

## 2024-01-14 PROCEDURE — 6370000000 HC RX 637 (ALT 250 FOR IP): Performed by: PODIATRIST

## 2024-01-14 PROCEDURE — 1200000000 HC SEMI PRIVATE

## 2024-01-14 PROCEDURE — 6360000002 HC RX W HCPCS: Performed by: PODIATRIST

## 2024-01-14 PROCEDURE — 2580000003 HC RX 258: Performed by: INTERNAL MEDICINE

## 2024-01-14 PROCEDURE — 6360000002 HC RX W HCPCS: Performed by: INTERNAL MEDICINE

## 2024-01-14 PROCEDURE — 6370000000 HC RX 637 (ALT 250 FOR IP): Performed by: INTERNAL MEDICINE

## 2024-01-14 PROCEDURE — 99233 SBSQ HOSP IP/OBS HIGH 50: CPT | Performed by: INTERNAL MEDICINE

## 2024-01-14 PROCEDURE — 2580000003 HC RX 258: Performed by: PODIATRIST

## 2024-01-14 RX ORDER — GUAIFENESIN/DEXTROMETHORPHAN 100-10MG/5
5 SYRUP ORAL EVERY 4 HOURS PRN
Status: DISCONTINUED | OUTPATIENT
Start: 2024-01-14 | End: 2024-01-16 | Stop reason: HOSPADM

## 2024-01-14 RX ADMIN — SODIUM CHLORIDE, PRESERVATIVE FREE 10 ML: 5 INJECTION INTRAVENOUS at 21:23

## 2024-01-14 RX ADMIN — ASPIRIN 81 MG: 81 TABLET, COATED ORAL at 08:06

## 2024-01-14 RX ADMIN — CEFEPIME 2000 MG: 2 INJECTION, POWDER, FOR SOLUTION INTRAVENOUS at 21:26

## 2024-01-14 RX ADMIN — GUAIFENESIN AND DEXTROMETHORPHAN 5 ML: 100; 10 SYRUP ORAL at 15:08

## 2024-01-14 RX ADMIN — ENOXAPARIN SODIUM 30 MG: 100 INJECTION SUBCUTANEOUS at 08:06

## 2024-01-14 RX ADMIN — SODIUM CHLORIDE, PRESERVATIVE FREE 10 ML: 5 INJECTION INTRAVENOUS at 08:06

## 2024-01-14 RX ADMIN — ENOXAPARIN SODIUM 30 MG: 100 INJECTION SUBCUTANEOUS at 21:24

## 2024-01-14 RX ADMIN — ATORVASTATIN CALCIUM 40 MG: 40 TABLET, FILM COATED ORAL at 08:06

## 2024-01-14 RX ADMIN — Medication 3 MG: at 21:24

## 2024-01-14 RX ADMIN — VANCOMYCIN HYDROCHLORIDE 1250 MG: 10 INJECTION, POWDER, LYOPHILIZED, FOR SOLUTION INTRAVENOUS at 22:02

## 2024-01-14 ASSESSMENT — PAIN SCALES - GENERAL: PAINLEVEL_OUTOF10: 0

## 2024-01-14 NOTE — FLOWSHEET NOTE
01/13/24 2321   Vital Signs   Temp 97.9 °F (36.6 °C)   Temp Source Oral   Pulse 75   Heart Rate Source Monitor   Respirations 16   BP (!) 124/58   MAP (Calculated) 80   BP Location Right upper arm   BP Method Automatic   Patient Position Semi fowlers   Pain Assessment   Pain Assessment 0-10   Pain Level 5   Patient's Stated Pain Goal 0 - No pain   Pain Location Foot;Leg   Pain Orientation Right   Pain Descriptors Discomfort;Pressure   Opioid-Induced Sedation   POSS Score 1   Oxygen Therapy   SpO2 95 %   O2 Device None (Room air)     HS assessment completed. No complaints of pain or discomfort voiced. No signs or symptoms of distress noted. Patient tolerated night medications well. Respirations easy and even. Bed in lowest position, bed alarm in place and functioning properly, bed rails x2 up,  Call light within reach.     Bedside Mobility Assessment Tool (BMAT):     Assessment Level 1- Sit and Shake    1. From a semi-reclined position, ask patient to sit up and rotate to a seated position at the side of the bed. Can use the bedrail.    2. Ask patient to reach out and grab your hand and shake making sure patient reaches across his/her midline.   Pass- Patient is able to come to a seated position, maintain core strength. Maintains seated balance while reaching across midline. Move on to Assessment Level 2.     Assessment Level 2- Stretch and Point   1. With patient in seated position at the side of the bed, have patient place both feet on the floor (or stool) with knees no higher than hips.    2. Ask patient to stretch one leg and straighten the knee, then bend the ankle/flex and point the toes. If appropriate, repeat with the other leg.   Fail- Patient is unable to complete task. Patient is MOBILITY LEVEL 2.     Assessment Level 3- Stand   1. Ask patient to elevate off the bed or chair (seated to standing) using an assistive device (cane, bedrail).    2. Patient should be able to raise buttocks off be and hold for

## 2024-01-14 NOTE — PROGRESS NOTES
Arrived to place midline with bedside RN Marcia. Pre-procedure and timeout done with JUAN Kennedy, discussed limitations of placement and allergies.      Pt's left cephalic are all easily collapsible with no indication for a clot. Vein selected is large enough for catheter (please see image below). Pt tolerated sterile procedure well, with no difficulty accessing basilic vein, when accessed - blood was free flowing and non-pulsatile. Guidewire, introducer, and catheter went in smoothly. ML placement verification with blood return and flushes easily.  OK to use ML.    Nurses:  OK to use Midline.    Please replace all existing IV tubing with new IV tubing prior to using the ML for current IV infusions.  Please remove any PIVs from ML arm.  Please refrain from obtaining BPs in the arm with a ML.  All of the above may be sources of infection or damage to the ML line/site.     Post procedure - reorganized pt table, placed pt in lowest position, with call light and educated on line care. Instructed pt/RN not to use arm for at least 30min to avoid bleeding. Reported off to bedside RN.      If you have any questions please call number below and dispatch will direct you to the PICC RN that is on call.    [phone]

## 2024-01-14 NOTE — FLOWSHEET NOTE
01/14/24 0800   Vital Signs   Temp 98 °F (36.7 °C)   Temp Source Oral   Pulse 78   Heart Rate Source Monitor   Respirations 16   BP (!) 155/70   MAP (Calculated) 98   BP Location Right upper arm   BP Method Automatic   Patient Position High fowlers   Pain Assessment   Pain Assessment None - Denies Pain   Oxygen Therapy   SpO2 96 %   O2 Device None (Room air)     AM assessment completed. No complaints of pain or discomfort voiced. No signs of symptoms of distress noted. Patient tolerated morning medications well. Respirations easy and even. Bed in lowest position, bed alarm in place and functioning properly, SR up x 2 and bed in low position. Call light within reach.     Bedside Mobility Assessment Tool (BMAT):     Assessment Level 1- Sit and Shake    1. From a semi-reclined position, ask patient to sit up and rotate to a seated position at the side of the bed. Can use the bedrail.    2. Ask patient to reach out and grab your hand and shake making sure patient reaches across his/her midline.   Pass- Patient is able to come to a seated position, maintain core strength. Maintains seated balance while reaching across midline. Move on to Assessment Level 2.     Assessment Level 2- Stretch and Point   1. With patient in seated position at the side of the bed, have patient place both feet on the floor (or stool) with knees no higher than hips.    2. Ask patient to stretch one leg and straighten the knee, then bend the ankle/flex and point the toes. If appropriate, repeat with the other leg.   Fail- Patient is unable to complete task. Patient is MOBILITY LEVEL 2.     Assessment Level 3- Stand   1. Ask patient to elevate off the bed or chair (seated to standing) using an assistive device (cane, bedrail).    2. Patient should be able to raise buttocks off be and hold for a count of five. May repeat once.   Fail- Patient unable to demonstrate standing stability. Patient is MOBILITY LEVEL 3.     Assessment Level 4- Walk   1.

## 2024-01-14 NOTE — PROGRESS NOTES
[facility] Progress Note    Daily Progress Note for 2024 8:55 AM 0223/0223-02  Ari Snider : 1935 Age: 88 y.o. Sex: male  Length of Stay:  4    Interval History:      CC: F/U Altered Mental Status (Family states pt with frequent confusion over the past 24-36 hrs)    Subjective:       Mr Snider says he is feeling fine today. Family is at the bedside, discussed the patient.     Objective:     Vitals:    24 0320 24 0321 24 0600 24 0800   BP: 127/60   (!) 155/70   Pulse: 74   78   Resp: 16   16   Temp: 97.4 °F (36.3 °C)   98 °F (36.7 °C)   TempSrc: Oral   Oral   SpO2: 96%   96%   Weight:  107.2 kg (236 lb 6.4 oz) 107.2 kg (236 lb 6.4 oz)    Height:              Intake/Output Summary (Last 24 hours) at 2024 0855  Last data filed at 2024 0848  Gross per 24 hour   Intake 180 ml   Output 900 ml   Net -720 ml       Body mass index is 35.94 kg/m².    Physical Exam:  General: Cooperative, pleasant/Ill appearing, on  Nasal cannula  HEENT:  Head: normocephalic,atraumatic, anicteric sclera, clear conjunctiva  Neck: Normal size, Jugular venous pulsations: normal  Respiratory:unlabored breathing, clear to auscultation with no crackles, wheezes rhonchi  Heart: Regular rate and rhythm, S1, S2-normal, No murmurs  Abdomen: soft, nondistended, nontender, normoactive bowel sounds,  Neurological/Psych: Alert and oriented times three, no focal neurological deficits, Mood and affect appropriate.  Skin: No obvious rashes    Extremities:  no edema, Pedal pulses 2+ bilaterally    Scheduled Medications:  cefepime, 2,000 mg, Q24H  vancomycin, 1,250 mg, Q24H  sodium chloride flush, 10 mL, 2 times per day  enoxaparin, 30 mg, BID  melatonin, 3 mg, Nightly  aspirin, 81 mg, Daily  atorvastatin, 40 mg, Daily  pantoprazole, 40 mg, QAM AC  insulin lispro, 0-8 Units, TID WC  insulin lispro, 0-4 Units, Nightly        PRN Medications:  perflutren lipid microspheres, 1.5 mL, ONCE

## 2024-01-14 NOTE — PLAN OF CARE
Problem: Discharge Planning  Goal: Discharge to home or other facility with appropriate resources  1/14/2024 0944 by Gloria Giron RN  Outcome: Progressing  1/14/2024 0454 by Marry Jackson RN  Outcome: Progressing     Problem: Safety - Adult  Goal: Free from fall injury  1/14/2024 0944 by Gloria Giron RN  Outcome: Progressing  1/14/2024 0454 by Marry Jackson RN  Outcome: Progressing     Problem: ABCDS Injury Assessment  Goal: Absence of physical injury  Outcome: Progressing     Problem: Skin/Tissue Integrity  Goal: Absence of new skin breakdown  Description: 1.  Monitor for areas of redness and/or skin breakdown  2.  Assess vascular access sites hourly  3.  Every 4-6 hours minimum:  Change oxygen saturation probe site  4.  Every 4-6 hours:  If on nasal continuous positive airway pressure, respiratory therapy assess nares and determine need for appliance change or resting period.  1/14/2024 0944 by Gloria Giron RN  Outcome: Progressing  1/14/2024 0454 by Marry Jackson RN  Outcome: Progressing     Problem: Chronic Conditions and Co-morbidities  Goal: Patient's chronic conditions and co-morbidity symptoms are monitored and maintained or improved  1/14/2024 0944 by Gloria Giron RN  Outcome: Progressing  1/14/2024 0454 by Marry Jackson RN  Outcome: Progressing

## 2024-01-14 NOTE — CONSULTS
[facility]   Pharmacy Pharmacokinetic Monitoring Service - Vancomycin     Ari Snider is a 88 y.o. male starting on vancomycin therapy for skin & soft tissue infection x 7 days. Pharmacy consulted by Dr. Conner for monitoring and adjustment.    Target Concentration: Goal AUC/JEFF 400-600 mg*hr/L    Additional Antimicrobials: Cefepime    Pertinent Laboratory Values:   Wt Readings from Last 1 Encounters:   01/13/24 105.6 kg (232 lb 11.2 oz)     Temp Readings from Last 1 Encounters:   01/13/24 98.2 °F (36.8 °C) (Oral)     Estimated Creatinine Clearance: 47 mL/min (based on SCr of 1.3 mg/dL).  Recent Labs     01/12/24  0636 01/13/24  0624   CREATININE 1.3 1.3   BUN 22* 18   WBC 8.9 7.7     Procalcitonin:     Pertinent Cultures:  Culture Date Source Results        MRSA Nasal Swab: N/A. Non-respiratory infection.    Plan:  Dosing recommendations based on Bayesian software  Start vancomycin 2000 mg x 1, then 1250 mg every 24 hours  Anticipated AUC of 524 and trough concentration of 15.7 at steady state  Renal labs as indicated   Vancomycin concentration ordered for 1/15 @ 21:00   Pharmacy will continue to monitor patient and adjust therapy as indicated    Thank you for the consult,  Everardo Castellanos RPH  1/13/2024 8:48 PM

## 2024-01-15 PROBLEM — E66.01 CLASS 2 SEVERE OBESITY DUE TO EXCESS CALORIES WITH SERIOUS COMORBIDITY AND BODY MASS INDEX (BMI) OF 35.0 TO 35.9 IN ADULT (HCC): Status: ACTIVE | Noted: 2021-03-29

## 2024-01-15 PROBLEM — R07.9 CHEST PAIN: Status: RESOLVED | Noted: 2023-07-10 | Resolved: 2024-01-15

## 2024-01-15 PROBLEM — E66.812 CLASS 2 SEVERE OBESITY DUE TO EXCESS CALORIES WITH SERIOUS COMORBIDITY AND BODY MASS INDEX (BMI) OF 35.0 TO 35.9 IN ADULT: Status: ACTIVE | Noted: 2021-03-29

## 2024-01-15 PROBLEM — E11.65 TYPE 2 DIABETES MELLITUS WITH HYPERGLYCEMIA (HCC): Status: ACTIVE | Noted: 2023-07-10

## 2024-01-15 PROBLEM — R06.02 SOB (SHORTNESS OF BREATH): Status: RESOLVED | Noted: 2023-01-04 | Resolved: 2024-01-15
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
## 2024-01-15 PROBLEM — L03.115 CELLULITIS OF RIGHT LOWER EXTREMITY: Status: ACTIVE | Noted: 2024-01-11

## 2024-01-15 PROBLEM — M86.171 ACUTE OSTEOMYELITIS OF RIGHT FOOT (HCC): Status: ACTIVE | Noted: 2024-01-12

## 2024-01-15 PROBLEM — E11.620 TYPE 2 DIABETES MELLITUS WITH DIABETIC DERMATITIS, WITHOUT LONG-TERM CURRENT USE OF INSULIN (HCC): Status: RESOLVED | Noted: 2022-10-17 | Resolved: 2024-01-15

## 2024-01-15 PROBLEM — M70.61 GREATER TROCHANTERIC BURSITIS OF RIGHT HIP: Status: RESOLVED | Noted: 2023-03-01 | Resolved: 2024-01-15
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
## 2024-01-15 PROBLEM — A49.02 MRSA INFECTION: Status: RESOLVED | Noted: 2022-06-16 | Resolved: 2024-01-15

## 2024-01-15 PROBLEM — M25.551 RIGHT HIP PAIN: Status: RESOLVED | Noted: 2023-03-01 | Resolved: 2024-01-15

## 2024-01-15 PROBLEM — M54.50 LOW BACK PAIN: Status: RESOLVED | Noted: 2023-03-01 | Resolved: 2024-01-15

## 2024-01-15 LAB
CK SERPL-CCNC: 174 U/L (ref 39–308)
CRP SERPL-MCNC: 66 MG/L (ref 0–5.1)
ERYTHROCYTE [SEDIMENTATION RATE] IN BLOOD BY WESTERGREN METHOD: 113 MM/HR (ref 0–20)
GLUCOSE BLD-MCNC: 162 MG/DL (ref 70–99)
GLUCOSE BLD-MCNC: 216 MG/DL (ref 70–99)
GLUCOSE BLD-MCNC: 235 MG/DL (ref 70–99)
GLUCOSE BLD-MCNC: 255 MG/DL (ref 70–99)
PERFORMED ON: ABNORMAL

## 2024-01-15 PROCEDURE — 6360000002 HC RX W HCPCS: Performed by: INTERNAL MEDICINE

## 2024-01-15 PROCEDURE — 6360000002 HC RX W HCPCS: Performed by: PODIATRIST

## 2024-01-15 PROCEDURE — 6370000000 HC RX 637 (ALT 250 FOR IP)

## 2024-01-15 PROCEDURE — 99232 SBSQ HOSP IP/OBS MODERATE 35: CPT | Performed by: INTERNAL MEDICINE

## 2024-01-15 PROCEDURE — 6370000000 HC RX 637 (ALT 250 FOR IP): Performed by: PODIATRIST

## 2024-01-15 PROCEDURE — 6370000000 HC RX 637 (ALT 250 FOR IP): Performed by: INTERNAL MEDICINE

## 2024-01-15 PROCEDURE — 2580000003 HC RX 258: Performed by: INTERNAL MEDICINE

## 2024-01-15 PROCEDURE — 85652 RBC SED RATE AUTOMATED: CPT

## 2024-01-15 PROCEDURE — 2580000003 HC RX 258: Performed by: PODIATRIST

## 2024-01-15 PROCEDURE — 36415 COLL VENOUS BLD VENIPUNCTURE: CPT

## 2024-01-15 PROCEDURE — 1200000000 HC SEMI PRIVATE

## 2024-01-15 PROCEDURE — 97116 GAIT TRAINING THERAPY: CPT

## 2024-01-15 PROCEDURE — 97530 THERAPEUTIC ACTIVITIES: CPT

## 2024-01-15 PROCEDURE — 99223 1ST HOSP IP/OBS HIGH 75: CPT | Performed by: INTERNAL MEDICINE

## 2024-01-15 PROCEDURE — 86140 C-REACTIVE PROTEIN: CPT

## 2024-01-15 RX ORDER — DIMETHICONE, OXYBENZONE, AND PADIMATE O 2; 2.5; 6.6 G/100G; G/100G; G/100G
STICK TOPICAL
Status: COMPLETED
Start: 2024-01-15 | End: 2024-01-15

## 2024-01-15 RX ADMIN — INSULIN LISPRO 4 UNITS: 100 INJECTION, SOLUTION INTRAVENOUS; SUBCUTANEOUS at 11:26

## 2024-01-15 RX ADMIN — Medication 3 MG: at 22:11

## 2024-01-15 RX ADMIN — SODIUM CHLORIDE, PRESERVATIVE FREE 10 ML: 5 INJECTION INTRAVENOUS at 22:11

## 2024-01-15 RX ADMIN — GUAIFENESIN AND DEXTROMETHORPHAN 5 ML: 100; 10 SYRUP ORAL at 05:25

## 2024-01-15 RX ADMIN — SODIUM CHLORIDE, PRESERVATIVE FREE 10 ML: 5 INJECTION INTRAVENOUS at 09:49

## 2024-01-15 RX ADMIN — Medication: at 05:25

## 2024-01-15 RX ADMIN — PANTOPRAZOLE SODIUM 40 MG: 40 TABLET, DELAYED RELEASE ORAL at 05:25

## 2024-01-15 RX ADMIN — DAPTOMYCIN 650 MG: 500 INJECTION, POWDER, LYOPHILIZED, FOR SOLUTION INTRAVENOUS at 09:50

## 2024-01-15 RX ADMIN — ENOXAPARIN SODIUM 30 MG: 100 INJECTION SUBCUTANEOUS at 09:49

## 2024-01-15 RX ADMIN — INSULIN LISPRO 2 UNITS: 100 INJECTION, SOLUTION INTRAVENOUS; SUBCUTANEOUS at 16:37

## 2024-01-15 RX ADMIN — GUAIFENESIN AND DEXTROMETHORPHAN 5 ML: 100; 10 SYRUP ORAL at 22:11

## 2024-01-15 RX ADMIN — ASPIRIN 81 MG: 81 TABLET, COATED ORAL at 09:49

## 2024-01-15 RX ADMIN — ENOXAPARIN SODIUM 30 MG: 100 INJECTION SUBCUTANEOUS at 22:11

## 2024-01-15 ASSESSMENT — PAIN SCALES - GENERAL
PAINLEVEL_OUTOF10: 0
PAINLEVEL_OUTOF10: 0

## 2024-01-15 NOTE — PROGRESS NOTES
Physician Progress Note      PATIENT:               NYA GARAY  [facility] #:                  [id number]  :                       1935  ADMIT DATE:       1/10/2024 5:07 PM  DISCH DATE:  RESPONDING  PROVIDER #:        Lona Mcadams MD          QUERY TEXT:    Pt admitted with DM foot ulcer with OM.  On [id number]-97.4, lactic-2.0,   temp-99.8, acute metabolic encephalopathy and has positive MRSA bacteremia.    Treatment includes Zyvox and Zosyn on admission and now on Vancomycin and   Cefepime.  Please further specify:    The medical record reflects the following:  Risk Factors: advanced age, DM, foot ulcer, OM  Clinical Indicators: On [id number]-14.4, lactic-2.0, temp-99.8, acute   metabolic encephalopathy and has positive MRSA bacteremia  Treatment: blood cultures, lactic, Zyvox and Zosyn on admission and now on   Vancomycin and Cefepime, s/p partial rt foot amputation, echo    Thank you,  Marianela Sullivan RN,BSN,CCDS,CRCR  Options provided:  -- Sepsis with MRSA bacteremia due to DM foot ulcer with OM, present on   admission  -- Sepsis with MRSA bacteremia due to, please specify infection.  -- No Sepsis  -- Other - I will add my own diagnosis  -- Disagree - Not applicable / Not valid  -- Disagree - Clinically unable to determine / Unknown  -- Refer to Clinical Documentation Reviewer    PROVIDER RESPONSE TEXT:    This patient has sepsis with MRSA bacteremia due to DM foot ulcer with OM   which was present on admission.    Query created by: Marianela Sullivan on 1/15/2024 5:56 AM      Electronically signed by:  Lona Mcadams MD 1/15/2024 6:17 PM

## 2024-01-15 NOTE — PLAN OF CARE
Problem: Safety - Adult  Goal: Free from fall injury  Outcome: Progressing     Problem: Discharge Planning  Goal: Discharge to home or other facility with appropriate resources  Outcome: Progressing     Problem: ABCDS Injury Assessment  Goal: Absence of physical injury  Outcome: Progressing

## 2024-01-15 NOTE — PROGRESS NOTES
[place] [facility]    Daily Progress Note for 1/15/2024 1:24 PM [id number]/0223-02  Ari Snider : 1935 Age: 88 y.o. Sex: male  Length of Stay:  5    Interval History:      CC: F/U Altered Mental Status (Family states pt with frequent confusion over the past 24-36 hrs)      MRSA bacteremia, R foot OM . S/P partial foot amp     Subjective:       Mr Snider says he is feeling fine today.   Family is at the bedside, discussed the patient.     Patient seen and examined.   Seen by ID and now switched to IV daptomycin is doing well   no complaints   blood cultures growing MRSA. .      Objective:     Vitals:    24 2115 01/15/24 0133 01/15/24 0531 01/15/24 0945   BP: 132/68 (!) 148/72  (!) 184/82   Pulse: 74 72  77   Resp: 16 16  17   Temp: 98 °F (36.7 °C) 98 °F (36.7 °C)  97.7 °F (36.5 °C)   TempSrc: Oral Oral  Oral   SpO2: 93% 94%  96%   Weight:   106.8 kg (235 lb 6.4 oz)    Height:              Intake/Output Summary (Last 24 hours) at 1/15/2024 1324  Last data filed at 1/15/2024 1118  Gross per 24 hour   Intake 802.62 ml   Output 1000 ml   Net -197.38 ml       Body mass index is 35.79 kg/m².    Physical Exam:  General: Cooperative, pleasant/Ill appearing, on  Nasal cannula  HEENT:  Head: normocephalic,atraumatic, anicteric sclera, clear conjunctiva  Neck: Normal size, Jugular venous pulsations: normal  Respiratory:unlabored breathing, clear to auscultation with no crackles, wheezes rhonchi  Heart: Regular rate and rhythm, S1, S2-normal, No murmurs  Abdomen: soft, nondistended, nontender, normoactive bowel sounds,  Neurological/Psych: Alert and oriented times three, no focal neurological deficits, Mood and affect appropriate.  Skin: No obvious rashes    Extremities:  no edema, Pedal pulses 2+ bilaterally. Right foot in dressing     Scheduled Medications:  DAPTOmycin (CUBICIN) 650 mg in sodium chloride 0.9 % 50 mL IVPB, 8 mg/kg (Adjusted), Q24H  sodium chloride flush, 10 mL, 2 times  per day  enoxaparin, 30 mg, BID  melatonin, 3 mg, Nightly  aspirin, 81 mg, Daily  [Held by provider] atorvastatin, 40 mg, Daily  pantoprazole, 40 mg, QAM AC  insulin lispro, 0-8 Units, TID WC  insulin lispro, 0-4 Units, Nightly        PRN Medications:  guaiFENesin-dextromethorphan, 5 mL, Q4H PRN  perflutren lipid microspheres, 1.5 mL, ONCE PRN  sodium chloride flush, 10 mL, PRN  sodium chloride, , PRN  potassium chloride, 40 mEq, PRN   Or  potassium alternative oral replacement, 40 mEq, PRN   Or  potassium chloride, 10 mEq, PRN  magnesium sulfate, 2,000 mg, PRN  promethazine, 12.5 mg, Q6H PRN   Or  ondansetron, 4 mg, Q6H PRN  acetaminophen, 650 mg, Q6H PRN   Or  acetaminophen, 650 mg, Q6H PRN  oxyCODONE-acetaminophen, 1 tablet, Q4H PRN   Or  oxyCODONE-acetaminophen, 2 tablet, Q4H PRN  glucose, 4 tablet, PRN  dextrose bolus, 125 mL, PRN   Or  dextrose bolus, 250 mL, PRN  glucagon (rDNA), 1 mg, PRN  dextrose, , Continuous PRN          Data Review:      Laboratory Data Reviewed:    CBC:  Lab Results   Component Value Date    WBC 7.7 01/13/2024    HGB 8.3 (L) 01/13/2024    HCT 24.8 (L) 01/13/2024    MCV 93.1 01/13/2024     01/13/2024         Basic Metabolic Panel  Lab Results   Component Value Date/Time     01/13/2024 06:24 AM     01/13/2024 06:24 AM    CO2 23 01/13/2024 06:24 AM    GLUCOSE 133 01/13/2024 06:24 AM    BUN 18 01/13/2024 06:24 AM    CREATININE 1.3 01/13/2024 06:24 AM       HEPATIC PANEL   Lab Results   Component Value Date/Time    AST 19 01/13/2024 06:24 AM    ALT 13 01/13/2024 06:24 AM       Lab Results   Component Value Date    CKTOTAL 174 01/13/2024    TROPONINI <0.01 03/28/2021       Lab Results   Component Value Date/Time    INR 1.19 01/10/2024 05:30 PM    INR 1.24 06/16/2022 07:17 PM    INR 1.00 07/14/2016 11:25 AM       Test Review:    Results       Procedure Component Value Units Date/Time    Culture, Blood 1 [[id number]] Collected: 01/13/24 1132    Order Status: Completed

## 2024-01-15 NOTE — CONSULTS
[facility] Infectious Disease Consult Note      Ari Snider     : 1935    DATE OF VISIT:  1/15/2024  DATE OF ADMISSION:  1/10/2024       Subjective:     Ari Snider is a 88 y.o. male whom I've been asked to see by Dr. Cliff Keller for right foot osteomyelitis and bacteremia.    Chief Complaint   Patient presents with    Altered Mental Status     Family states pt with frequent confusion over the past 24-36 hrs      HPI:  I knew Mr. Snider briefly a couple of summers ago, with a soft tissue infection and osteo related to a prior right diabetic foot ulcer. He's slightly hyperglycemic in the long-term and has dense neuropathy and impressive R>LLE lymphedema, had what he felt was just a callus on the right plantar forefoot, but then was brought to the hospital by his family for a day or two of confusion.    He had a low-grade temp here, leukocytosis, mild lactic acidosis and RHIANNON, and was found to have a true diabetic ulcer within that right foot callus, with signs of soft tissue infection, and then XR signs concerning for osteo.     Some cultures and routine labs / imaging were done, and he was placed on broad-spectrum IV Abx. A couple of days after admission he underwent a TMA (given a prior partial 1st ray amp and 4th toe amp, and need for at least a partial 3rd ray amp, and concerns for foot instability and additional pressure points if a more extensive surgery was not done).    Based on OR findings we're presuming a chance of residual metatarsal osteo. His initial BCx turned positive as well, so repeats were ordered, Abx were maintained, and a TTE was done as well.    A midline was placed on Friday, I believe because of difficult peripheral access.    Today he notes primarily some nasal-sinus-chest congestion, but no F/C/D, no right foot pain, no CP, a bit of shortness of breath with lying flat. Tolerating Abx well, no sore throat or mouth, no N/V/D, rash, IV site pain. Has been staying off his  total, counting from the day of surgery.     If his BCx are still negative tomorrow and he feels well, we could consider discharge then.    No additional RLE vascular eval needed right now.    And as long as the repeat BCx remain negative and he has no other clinical signs worrisome for endocarditis (new murmur, emboli, etc), I don't think we need a ROSA at this point, in part because he'll be getting 4 weeks of IV Abx regardless.     F/U in wound care with Dr. Keller and me next Monday. But I'll see him up here tomorrow AM again.     Thanks to the pharmacy for noting this - while on the dapto, his statin should be on hold.     Electronically signed by Sincere Travis MD on 1/15/2024 at 7:25 AM.

## 2024-01-15 NOTE — PROGRESS NOTES
Inpatient Physical Therapy Treatment    Unit: [facility]  Date:  1/15/2024  Patient Name:    Ari Snider  Admitting diagnosis:  Acute hematogenous osteomyelitis of right foot (HCC) [M86.071]  Cellulitis [L03.90]  Admit Date:  1/10/2024  Precautions/Restrictions/WB Status/ Lines/ Wounds/ Oxygen: Fall risk, Bed/chair alarm, WB Restrictions (NWB RLE), and Isolation Precautions: Contact (MRSA)      Pt seen for cotreatment this date due to patient safety, patient endurance, acute illness/injury, and need for the assistance of 2 skilled therapists    Treatment Time:  11:39-12:06  Treatment Number:  2   Timed Code Treatment Minutes:  27 minutes  Total Treatment Minutes:  27  minutes    Patient Stated Goals for Therapy: \" go home \"          Discharge Recommendations: SNF  DME needs for discharge: Defer to facility       Therapy recommendation for EMS Transport: requires transport by cot due to pt needs lift equipment for safe transfers and pt needs A x 2 for safe transfers    Therapy recommendations for staff:   Assist of 2 for transfers with use of BENTLEY STEDY and gait belt to/from BSC  to/from chair    History of Present Illness:   88 y.o. male who is seen for evaluation of cellulitis and ulcer on the right foot. Came to ER due to confusion and found to have foot infection. States wound has been bothering his for awhile. Does not have much feeling in his feet.   Has had amputations in the past due to infections.     1/11: s/p PARTIAL RIGHT FOOT AMPUTATION     Home Health S4 Level Recommendation:  NA        AM-PAC Mobility Score       AM-PAC Inpatient Mobility without Stair Climbing Raw Score : 12      Subjective  Patient lying reclined in bed with no family present.  Pt agreeable to this PT session.     Cognition    A&O Person and Situation (knows month but not year)  Able to follow 2 step commands with repetition/increased time    Pain   No  Location: NA  Rating: NA /10  Pain Medicine Status: Denies need    Preadmission  Environment:   Pt questionable historian 2/2 AMS.   Pt lives with    Alone  Home environment:    one story home  Steps to enter first floor:   ramp  Steps to second floor/basement: N/A  Laundry:     1st floor  Bathroom:     tub/shower unit, grab bars in shower, and standard height toilet  Pt sleeps in a:    Flat bed  Equipment owned:    rollator, SPC, manual WC, electric scooter, and shower chair/bench    Preadmission Status:  Pt able to drive: Yes  Pt fully independent with ADLs: Yes  Pt receives assistance from family for: grocery shopping, children bring meals and do grocery shopping  Pt independent for functional transfers and utilized No Device for mobility in home and Rollator out in community  History of falls: No  Home Health Services:None    Objective  Does this pt have an acute or acute on chronic diagnosis of CHF? No    Balance  Static Sitting:  Good ; Supervision  Dynamic Sitting:  Good - ; SBA   Comments: EOB    Static Standing: Fair ; Min A x2 at RW  Dynamic Standing: Fair -; Mod A x2 at RW for bed > chair  Comments:     Posture  Seated: Forward head and neck and Thoracic kyphosis  Standing: Forward head and neck and Thoracic kyphosis    Bed Mobility   Supine to Sit:    SBA from elevated HOB  Sit to Supine:   Not Tested  Rolling:   Not Tested   Scooting in sitting: SBA forward in bed and backward in recliner  Scooting in supine:  Not Tested   Bridging:  Not Tested    Transfer Training     Sit to stand:   Mod A  x 2, from elevated bed height (unable from lowest bed height)  Stand to sit:   Mod A  x 2  Bed to/from Chair:  Mod A x 2 with use of gait belt and rolling walker (RW), pt able to maintain NWB R LE, but required assist and VCs for RW management -- shuffling LLE    Gait gait deferred due to difficulty with transfers; pt ambulated 0 ft.   Distance:      0 ft  Deviations (firm surface/linoleum):  N/A  Assistive Device Used:    N/A  Level of Assist:    Not Tested   Comment:     Stair Training

## 2024-01-15 NOTE — PLAN OF CARE
Problem: Discharge Planning  Goal: Discharge to home or other facility with appropriate resources  1/15/2024 1112 by Cassandra Cardenas RN  Outcome: Progressing  1/15/2024 0254 by Neena Brothers RN  Outcome: Progressing     Problem: Safety - Adult  Goal: Free from fall injury  1/15/2024 1112 by Cassandra Cardenas RN  Outcome: Progressing  1/15/2024 0254 by Neena Brothers RN  Outcome: Progressing     Problem: ABCDS Injury Assessment  Goal: Absence of physical injury  1/15/2024 1112 by Csasandra Cardenas RN  Outcome: Progressing  1/15/2024 0254 by Neena Brothers RN  Outcome: Progressing     Problem: Skin/Tissue Integrity  Goal: Absence of new skin breakdown  Description: 1.  Monitor for areas of redness and/or skin breakdown  2.  Assess vascular access sites hourly  3.  Every 4-6 hours minimum:  Change oxygen saturation probe site  4.  Every 4-6 hours:  If on nasal continuous positive airway pressure, respiratory therapy assess nares and determine need for appliance change or resting period.  Outcome: Progressing     Problem: Chronic Conditions and Co-morbidities  Goal: Patient's chronic conditions and co-morbidity symptoms are monitored and maintained or improved  Outcome: Progressing     Problem: Pain  Goal: Verbalizes/displays adequate comfort level or baseline comfort level  Outcome: Progressing
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
## 2024-01-15 NOTE — CARE COORDINATION
INTERDISCIPLINARY PLAN OF CARE CONFERENCE    Date/Time: 1/15/2024 11:44 AM  Completed by: Shirin Fajardo RN, Case Management      Patient Name:  Ari Snider  YOB: 1935  Admitting Diagnosis: Acute hematogenous osteomyelitis of right foot (HCC) [M86.071]  Cellulitis [L03.90]     Admit Date/Time:  1/10/2024  5:07 PM    Chart reviewed. Interdisciplinary team contacted or reviewed plan related to patient progress and discharge plans.   Disciplines included Case Management, Nursing, and Dietitian.    Current Status: IP 01/11/2024  PT/OT recommendation for discharge plan of care: SNF    Expected D/C Disposition:  Skilled nursing facility    Discharge Plan Comments: Accepted at [facility] pending pre-cert. Request for therapy updates. PT/OT aware. CM following  ADDENDUM 13:45  ACCEPTED and pre-cert approved for [facility].    Barrier: COST OF MED  Pt will need 4 week course of Daptomycin. Per SNF they may not be able to accept on this med.       Home O2 in place on admit: No  Pt informed of need to bring portable home O2 tank on day of discharge for nursing to connect prior to leaving:  No  Verbalized agreement/Understanding:  No

## 2024-01-15 NOTE — PROGRESS NOTES
Shift assessment complete. Alert and oriented. Patient denies any needs at this time. Bed in lowest position. Side rails up x2. Call light in reach.
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
## 2024-01-16 VITALS
OXYGEN SATURATION: 96 % | WEIGHT: 233 LBS | SYSTOLIC BLOOD PRESSURE: 194 MMHG | TEMPERATURE: 98.1 F | HEIGHT: 68 IN | BODY MASS INDEX: 35.31 KG/M2 | HEART RATE: 84 BPM | RESPIRATION RATE: 16 BRPM | DIASTOLIC BLOOD PRESSURE: 81 MMHG

## 2024-01-16 PROBLEM — G93.40 ENCEPHALOPATHY: Status: ACTIVE | Noted: 2024-01-16

## 2024-01-16 LAB
BACTERIA SPEC AEROBE CULT: ABNORMAL
BACTERIA SPEC ANAEROBE CULT: ABNORMAL
BACTERIA SPEC ANAEROBE CULT: ABNORMAL
GLUCOSE BLD-MCNC: 181 MG/DL (ref 70–99)
GLUCOSE BLD-MCNC: 245 MG/DL (ref 70–99)
GRAM STN SPEC: ABNORMAL
GRAM STN SPEC: ABNORMAL
ORGANISM: ABNORMAL
PERFORMED ON: ABNORMAL
PERFORMED ON: ABNORMAL

## 2024-01-16 PROCEDURE — 6370000000 HC RX 637 (ALT 250 FOR IP): Performed by: INTERNAL MEDICINE

## 2024-01-16 PROCEDURE — 6360000002 HC RX W HCPCS: Performed by: PODIATRIST

## 2024-01-16 PROCEDURE — 0Y6M0ZF DETACHMENT AT RIGHT FOOT, PARTIAL 5TH RAY, OPEN APPROACH: ICD-10-PCS | Performed by: PODIATRIST

## 2024-01-16 PROCEDURE — 0Y6M0ZB DETACHMENT AT RIGHT FOOT, PARTIAL 2ND RAY, OPEN APPROACH: ICD-10-PCS | Performed by: PODIATRIST

## 2024-01-16 PROCEDURE — 99239 HOSP IP/OBS DSCHRG MGMT >30: CPT | Performed by: INTERNAL MEDICINE

## 2024-01-16 PROCEDURE — 99232 SBSQ HOSP IP/OBS MODERATE 35: CPT | Performed by: INTERNAL MEDICINE

## 2024-01-16 PROCEDURE — 6370000000 HC RX 637 (ALT 250 FOR IP): Performed by: PODIATRIST

## 2024-01-16 PROCEDURE — 0Y6M0ZC DETACHMENT AT RIGHT FOOT, PARTIAL 3RD RAY, OPEN APPROACH: ICD-10-PCS | Performed by: PODIATRIST

## 2024-01-16 PROCEDURE — 6360000002 HC RX W HCPCS: Performed by: INTERNAL MEDICINE

## 2024-01-16 PROCEDURE — 2580000003 HC RX 258: Performed by: INTERNAL MEDICINE

## 2024-01-16 PROCEDURE — 2580000003 HC RX 258: Performed by: PODIATRIST

## 2024-01-16 RX ORDER — LANOLIN ALCOHOL/MO/W.PET/CERES
3 CREAM (GRAM) TOPICAL NIGHTLY PRN
DISCHARGE
Start: 2024-01-16

## 2024-01-16 RX ORDER — OXYCODONE HYDROCHLORIDE AND ACETAMINOPHEN 5; 325 MG/1; MG/1
1 TABLET ORAL EVERY 6 HOURS PRN
Qty: 10 TABLET | Refills: 0 | Status: SHIPPED | OUTPATIENT
Start: 2024-01-16 | End: 2024-01-19

## 2024-01-16 RX ORDER — ALBUTEROL SULFATE 90 UG/1
1 AEROSOL, METERED RESPIRATORY (INHALATION) EVERY 4 HOURS PRN
Status: DISCONTINUED | OUTPATIENT
Start: 2024-01-16 | End: 2024-01-16 | Stop reason: HOSPADM

## 2024-01-16 RX ORDER — LISINOPRIL 5 MG/1
5 TABLET ORAL DAILY
Status: DISCONTINUED | OUTPATIENT
Start: 2024-01-16 | End: 2024-01-16 | Stop reason: HOSPADM

## 2024-01-16 RX ORDER — GLIPIZIDE 5 MG/1
5 TABLET ORAL
Status: DISCONTINUED | OUTPATIENT
Start: 2024-01-16 | End: 2024-01-16 | Stop reason: HOSPADM

## 2024-01-16 RX ADMIN — ASPIRIN 81 MG: 81 TABLET, COATED ORAL at 09:57

## 2024-01-16 RX ADMIN — LISINOPRIL 5 MG: 5 TABLET ORAL at 12:23

## 2024-01-16 RX ADMIN — METFORMIN HYDROCHLORIDE 1000 MG: 500 TABLET ORAL at 12:23

## 2024-01-16 RX ADMIN — VANCOMYCIN HYDROCHLORIDE 1250 MG: 1 INJECTION, POWDER, LYOPHILIZED, FOR SOLUTION INTRAVENOUS at 10:53

## 2024-01-16 RX ADMIN — SODIUM CHLORIDE, PRESERVATIVE FREE 10 ML: 5 INJECTION INTRAVENOUS at 09:57

## 2024-01-16 RX ADMIN — INSULIN LISPRO 4 UNITS: 100 INJECTION, SOLUTION INTRAVENOUS; SUBCUTANEOUS at 12:19

## 2024-01-16 RX ADMIN — ATORVASTATIN CALCIUM 40 MG: 40 TABLET, FILM COATED ORAL at 10:02

## 2024-01-16 RX ADMIN — ENOXAPARIN SODIUM 30 MG: 100 INJECTION SUBCUTANEOUS at 09:57

## 2024-01-16 RX ADMIN — PANTOPRAZOLE SODIUM 40 MG: 40 TABLET, DELAYED RELEASE ORAL at 05:46

## 2024-01-16 ASSESSMENT — PAIN SCALES - GENERAL: PAINLEVEL_OUTOF10: 0
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
## 2024-01-16 NOTE — PLAN OF CARE
Problem: Safety - Adult  Goal: Free from fall injury  1/16/2024 0035 by Neena Brothers RN  Outcome: Progressing  1/15/2024 1112 by Cassandra Cardenas RN  Outcome: Progressing     Problem: Skin/Tissue Integrity  Goal: Absence of new skin breakdown  Description: 1.  Monitor for areas of redness and/or skin breakdown  2.  Assess vascular access sites hourly  3.  Every 4-6 hours minimum:  Change oxygen saturation probe site  4.  Every 4-6 hours:  If on nasal continuous positive airway pressure, respiratory therapy assess nares and determine need for appliance change or resting period.  1/16/2024 0035 by Neena Brothers, RN  Outcome: Progressing  1/15/2024 1112 by Cassandra Cardenas RN  Outcome: Progressing     Problem: Chronic Conditions and Co-morbidities  Goal: Patient's chronic conditions and co-morbidity symptoms are monitored and maintained or improved  1/15/2024 1112 by Cassandra Cardenas, RN  Outcome: Progressing

## 2024-01-16 NOTE — PROGRESS NOTES
[facility] Progress Note    Daily Progress Note for 2024 10:46 AM 0223/0223-02  Ari Snider : 1935 Age: 88 y.o. Sex: male  Length of Stay:  6    Interval History:      CC: F/U Altered Mental Status (Family states pt with frequent confusion over the past 24-36 hrs)      MRSA bacteremia, R foot OM . S/P partial foot amp     Subjective:       Mr Snider says he is feeling fine today.   Family is at the bedside, discussed the patient.     Patient seen and examined.   Seen by ID and now switched to IV daptomycin is doing well   no complaints   blood cultures growing MRSA. .      Objective:     Vitals:    01/15/24 2200 24 0345 24 0546 24 0750   BP: (!) 155/58 (!) 163/71  (!) 194/81   Pulse: 77 72  84   Resp: 18 18  16   Temp: 98.6 °F (37 °C) 98.2 °F (36.8 °C)  98.1 °F (36.7 °C)   TempSrc: Oral Oral  Oral   SpO2: 97% 97%  96%   Weight:   105.7 kg (233 lb)    Height:              Intake/Output Summary (Last 24 hours) at 2024 1046  Last data filed at 2024 0958  Gross per 24 hour   Intake 1102.62 ml   Output 850 ml   Net 252.62 ml       Body mass index is 35.43 kg/m².    Physical Exam:  General: Cooperative, pleasant/Ill appearing, on  Nasal cannula  HEENT:  Head: normocephalic,atraumatic, anicteric sclera, clear conjunctiva  Neck: Normal size, Jugular venous pulsations: normal  Respiratory:unlabored breathing, clear to auscultation with no crackles, wheezes rhonchi  Heart: Regular rate and rhythm, S1, S2-normal, No murmurs  Abdomen: soft, nondistended, nontender, normoactive bowel sounds,  Neurological/Psych: Alert and oriented times three, no focal neurological deficits, Mood and affect appropriate.  Skin: No obvious rashes    Extremities:  no edema, Pedal pulses 2+ bilaterally. Right foot in dressing     Scheduled Medications:  vancomycin, 1,250 mg, Q24H  sodium chloride flush, 10 mL, 2 times per day  enoxaparin, 30 mg, BID  melatonin, 3 mg,

## 2024-01-16 NOTE — PROGRESS NOTES
4 Eyes Skin Assessment     NAME:  Ari Snider  YOB: 1935  MEDICAL RECORD NUMBER:  [id number]    The patient is being assessed for  Transfer to New Unit    I agree that at least one RN has performed a thorough Head to Toe Skin Assessment on the patient. ALL assessment sites listed below have been assessed.      Areas assessed by both nurses:scattered bruising and abrasions.Partial amputation right foot,surgical dressing and ace wrap.    Head, Face, Ears, Shoulders, Back, Chest, Arms, Elbows, Hands, Sacrum. Buttock, Coccyx, Ischium, Legs. Feet and Heels, and Under Medical Devices         Does the Patient have a Wound? Yes wound(s) were present on assessment. LDA wound assessment was Initiated and completed by JUAN Herrera Prevention initiated by RN: Yes  Wound Care Orders initiated by RN: Yes    Pressure Injury (Stage 3,4, Unstageable, DTI, NWPT, and Complex wounds) if present, place Wound referral order by RN under : No    New Ostomies, if present place, Ostomy referral order under : No     Nurse 1 eSignature: Electronically signed by Alisa Fisher RN on 1/16/24 at 2:05 PM EST    **SHARE this note so that the co-signing nurse can place an eSignature**    Nurse 2 eSignature: Electronically signed by Brissa Mcdonald RN on 1/16/24 at 2:06 PM EST

## 2024-01-16 NOTE — PROGRESS NOTES
Shift assessment complete. Alert and oriented. Patient reports cough. Prn medication administered for cough, see MAR. Patient denies any needs at this time. Bed in lowest position. Side rails up x2. Call light in reach.

## 2024-01-16 NOTE — CARE COORDINATION
01/16/24 1218   IMM Letter   IMM Letter given to Patient/Family/Significant other/Guardian/POA/by: MYA Fajardo RN   IMM Letter date given: 01/16/24   IMM Letter time given: 1207      CM delivered 2nd IMM delivered within 4 hours for DC, verbal explanation of patient rights at bedside. Pt voiced understanding of discharge MCR rights and is agreeable to discharge.

## 2024-01-16 NOTE — PROGRESS NOTES
[facility] Infectious Disease Progress Note      Ari Snider     : 1935    DATE OF VISIT:  2024  DATE OF ADMISSION:  1/10/2024       Subjective:     Ari Snider is a 88 y.o. male whom I've been seeing for a right Ambrose 3 diabetic foot ulcer, complicated by MRSA cellulitis, acute osteo, bacteremia.    Since I last saw him, he's generally feeling ok, still some nasal and chest congestion, no foot pain. No F/C/D, tolerating Abx well, no sore throat or mouth, no IV site pain, no rash, no N/V/D.    A bit of a hiccup with discharge plans, as a local rehab has accepted him, but will not be able to accommodate him with the daptomycin plan, given its cost.     Mr. Snider has a past medical history of Abscess of toe of right foot, Acute CVA (cerebrovascular accident) ([facility]), Blood transfusion, Cushing's syndrome ([facility]), Diabetic ulcer of toe 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 mellitus, with fat layer exposed ([facility]), Greater trochanteric bursitis of right hip, Lumbar spondylosis, Lymphoma ([facility]), MRSA infection, Osteomyelitis of left foot ([facility]), Tinnitus, Tobacco use, Ulcer of other part of foot, Vertigo, benign paroxysmal, and 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[facility]).    Current Facility-Administered Medications: DAPTOmycin (CUBICIN) 650 mg in sodium chloride 0.9 % 50 mL IVPB, 8 mg/kg (Adjusted), IntraVENous, Q24H  guaiFENesin-dextromethorphan (ROBITUSSIN DM) 100-10 MG/5ML syrup 5 mL, 5 mL, Oral, Q4H PRN  perflutren lipid microspheres (DEFINITY) injection 1.5 mL, 1.5 mL, IntraVENous, ONCE PRN  sodium chloride flush 0.9 % injection 10 mL, 10 mL, IntraVENous, 2 times per day  sodium chloride flush 0.9 % injection 10 mL, 10 mL, IntraVENous, PRN  0.9 % sodium chloride infusion, , IntraVENous, PRN  potassium chloride (KLOR-CON M) extended release tablet 40 mEq, 40 mEq, Oral, PRN **OR** potassium bicarb-citric acid (EFFER-K) effervescent tablet 40 mEq, 40 mEq, Oral, PRN **OR** potassium chloride 10 mEq/100 mL IVPB

## 2024-01-16 NOTE — PLAN OF CARE
Problem: Discharge Planning  Goal: Discharge to home or other facility with appropriate resources  Outcome: Adequate for Discharge     Problem: Safety - Adult  Goal: Free from fall injury  1/16/2024 1324 by Alisa Fisher RN  Outcome: Adequate for Discharge     Problem: ABCDS Injury Assessment  Goal: Absence of physical injury  Outcome: Adequate for Discharge     Problem: Skin/Tissue Integrity  Goal: Absence of new skin breakdown  Description: 1.  Monitor for areas of redness and/or skin breakdown  2.  Assess vascular access sites hourly  3.  Every 4-6 hours minimum:  Change oxygen saturation probe site  4.  Every 4-6 hours:  If on nasal continuous positive airway pressure, respiratory therapy assess nares and determine need for appliance change or resting period.  1/16/2024 1324 by Alisa Fisher, RN  Outcome: Adequate for Discharge     Problem: Chronic Conditions and Co-morbidities  Goal: Patient's chronic conditions and co-morbidity symptoms are monitored and maintained or improved  Outcome: Adequate for Discharge     Problem: Pain  Goal: Verbalizes/displays adequate comfort level or baseline comfort level  1/16/2024 1324 by Alisa Fisher, RN  Outcome: Adequate for Discharge

## 2024-01-17 LAB
BACTERIA BLD CULT ORG #2: NORMAL
BACTERIA BLD CULT: NORMAL

## 2024-01-22 ENCOUNTER — HOSPITAL ENCOUNTER (OUTPATIENT)
Dept: WOUND CARE | Age: 89
Discharge: HOME | End: 2024-01-22
Payer: MEDICARE

## 2024-01-22 VITALS
RESPIRATION RATE: 18 BRPM | WEIGHT: 224.6 LBS | DIASTOLIC BLOOD PRESSURE: 65 MMHG | HEIGHT: 68 IN | TEMPERATURE: 98 F | BODY MASS INDEX: 34.04 KG/M2 | HEART RATE: 81 BPM | SYSTOLIC BLOOD PRESSURE: 132 MMHG

## 2024-01-22 DIAGNOSIS — E11.621 DIABETIC ULCER OF OTHER PART OF RIGHT FOOT ASSOCIATED WITH TYPE 2 DIABETES MELLITUS, WITH NECROSIS OF BONE (HCC): Primary | ICD-10-CM

## 2024-01-22 DIAGNOSIS — L97.514 DIABETIC ULCER OF OTHER PART OF RIGHT FOOT ASSOCIATED WITH TYPE 2 DIABETES MELLITUS, WITH NECROSIS OF BONE (HCC): Primary | ICD-10-CM

## 2024-01-22 PROCEDURE — 99214 OFFICE O/P EST MOD 30 MIN: CPT

## 2024-01-22 PROCEDURE — 99214 OFFICE O/P EST MOD 30 MIN: CPT | Performed by: INTERNAL MEDICINE

## 2024-01-22 PROCEDURE — 11042 DBRDMT SUBQ TIS 1ST 20SQCM/<: CPT

## 2024-01-22 RX ORDER — LIDOCAINE 40 MG/G
CREAM TOPICAL ONCE
Status: DISCONTINUED | OUTPATIENT
Start: 2024-01-22 | End: 2024-01-23 | Stop reason: HOSPADM

## 2024-01-22 RX ORDER — LIDOCAINE HYDROCHLORIDE 40 MG/ML
SOLUTION TOPICAL ONCE
OUTPATIENT
Start: 2024-01-22 | End: 2024-01-22

## 2024-01-22 RX ORDER — TRIAMCINOLONE ACETONIDE 1 MG/G
OINTMENT TOPICAL ONCE
OUTPATIENT
Start: 2024-01-22 | End: 2024-01-22

## 2024-01-22 RX ORDER — LIDOCAINE 50 MG/G
OINTMENT TOPICAL ONCE
OUTPATIENT
Start: 2024-01-22 | End: 2024-01-22

## 2024-01-22 RX ORDER — SODIUM CHLOR/HYPOCHLOROUS ACID 0.033 %
SOLUTION, IRRIGATION IRRIGATION ONCE
OUTPATIENT
Start: 2024-01-22 | End: 2024-01-22

## 2024-01-22 RX ORDER — BACITRACIN ZINC AND POLYMYXIN B SULFATE 500; 1000 [USP'U]/G; [USP'U]/G
OINTMENT TOPICAL ONCE
OUTPATIENT
Start: 2024-01-22 | End: 2024-01-22

## 2024-01-22 RX ORDER — OXYCODONE HYDROCHLORIDE AND ACETAMINOPHEN 5; 325 MG/1; MG/1
1 TABLET ORAL EVERY 6 HOURS PRN
COMMUNITY

## 2024-01-22 RX ORDER — LIDOCAINE 40 MG/G
CREAM TOPICAL ONCE
OUTPATIENT
Start: 2024-01-22 | End: 2024-01-22

## 2024-01-22 NOTE — PLAN OF CARE
Pt to the [facility] for initial appointment.  Pt had surgery on 1/11/24.  Pt is presently at  [facility] in [place].  Phone [phone] Fax [phone].  Sutures intact and approximated.  Pt to have post op dressing placed and ECF can change dressing on Thursday.  Pt to continue with IV antibiotic Vancomycin, with weekly dressing change,  weekly CBCD, ESR, CRP, and biweekly BMP and Vancomycin trough.  Pt to follow up in the [facility] in 1 week with Dr Keller and Dr Travis.  Pt to remain not weight bearing on right foot unless pivoting on right heel with transfers Discharge instructions reviewed with patient, all questions answered, copy given to patient. Dressings were applied to all wounds per M.D. Instructions at this visit.
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
## 2024-01-22 NOTE — DISCHARGE INSTRUCTIONS
Angy at [phone]         Change PICC/Midline dressing weekly.    Additional instructions for specific diagnoses:    General comments for diabetic / neuropathic ulcers:  *  Unless you've been instructed not to remove your dressings, be sure to inspect your feet daily, and notify us of any major changes.  *  If you do not have a long-term podiatrist, be sure to let us know.  *  Moisturize your skin regularly with Vaseline, Aquaphor, Aveeno, CeraVe, Cetaphil, Eucerin, Lubriderm, etc; but keep the skin between your toes dry.  *  Always allow your wound-care doctor or podiatrist to cut nails, calluses & corns.   *  Be sure to always wear footwear that fits well, and NEVER go without shoes and socks.  *  Do you know your Hemoglobin A1c level? You should! Please ask if you have questions.  *  Be sure to adhere to any recommendations from your PCP or endocrinologist when it comes to diabetes medications and diet. If you have questions, please ask.  *  If you smoke, your wound can not heal properly -- please talk with us when you're ready to quit.   *  Do not soak your feet unless specifically instructed to do so by us.    F/U Appointment is with Dr. Keller and Dr Travis in 1 week, on                                   at                       .     Your nurse  is JUAN Nielsen.     If we applied slip-resistant hospital socks today, be sure to remove them at least once a day to inspect your toes or feet, even if you're not changing the wraps or dressings underneath. If you see anything concerning (redness, excess moisture, etc), please call and let us know right away.    Should you experience any significant changes in your wound(s) (including redness, increased warmth, increased pain, increased drainage, odor, or fever) or have questions about your wound care, please contact the [facility] at [phone] Monday-Thursday from 8:00 am - 4:30 pm, or Friday from 8:00 am - 2:30 pm.  If you

## 2024-01-22 NOTE — PROGRESS NOTES
obesity due to excess calories with serious comorbidity and body mass index (BMI) of 35.0 to 35.9 in adult ([facility]) E66.01, Z68.35    Acquired lymphedema of lower extremity I89.0    CAD (coronary artery disease) I25.10    Normocytic normochromic anemia D64.9    Sensorineural hearing loss, bilateral H90.3    Nonrheumatic aortic valve stenosis I35.0    Chronic pansinusitis J32.4    PVD (peripheral vascular disease) ([facility]) I73.9    Bilateral carotid artery stenosis I65.23    Atherosclerosis of abdominal aorta ([facility]) I70.0    Right bundle branch block I45.10    Lumbar spondylosis M47.816    Type 2 diabetes mellitus with hyperglycemia ([facility]) E11.65    Cellulitis of right lower extremity L03.115    Acute hematogenous osteomyelitis of right foot ([facility]) M86.071    Bacteremia due to methicillin resistant Staphylococcus aureus R78.81, B95.62    Encephalopathy G93.40       Assessment of today's active condition(s): diabetic foot ulcer right, osteomyelitis right, diabetes mellitus with peripheral neuropathy, MRSA. Factors contributing to occurrence and/or persistence of the chronic ulcer include diabetes, poor glucose control, and obesity. Sharp debridement is indicated today, based upon the exam findings in the ulcer(s) above.       Procedure note:     Consent obtained. Time out taken.     Anesthetic  Anesthetic: 4% Lidocaine Cream     After application of topical 4% lidocaine plain to the ulcer, the ulcer was debrided of all fibrotic, necrotic, hyperkeratotic tissue, nonviable and viable tissue through the subcutaneous tissue.  This excised skin and subcutaneous tissue with a scalpel.   Total Surface Area Debrided:  1 sq cm.      The ulcer(s) were then irrigated with normal saline solution. The procedure was completed with a small amount of bleeding, and hemostasis was by pressure. The patient tolerated the procedure well, with no significant complications. The patient's level of pain during and after the procedure was monitored,

## 2024-01-23 PROBLEM — E66.09 CLASS 1 OBESITY DUE TO EXCESS CALORIES WITH SERIOUS COMORBIDITY AND BODY MASS INDEX (BMI) OF 34.0 TO 34.9 IN ADULT: Status: ACTIVE | Noted: 2021-03-29

## 2024-01-23 PROBLEM — E66.811 CLASS 1 OBESITY DUE TO EXCESS CALORIES WITH SERIOUS COMORBIDITY AND BODY MASS INDEX (BMI) OF 34.0 TO 34.9 IN ADULT: Status: ACTIVE | Noted: 2021-03-29

## 2024-01-23 PROBLEM — G93.40 ENCEPHALOPATHY: Status: RESOLVED | Noted: 2024-01-16 | Resolved: 2024-01-23

## 2024-01-23 PROBLEM — L03.115 CELLULITIS OF RIGHT LOWER EXTREMITY: Status: RESOLVED | Noted: 2024-01-11 | Resolved: 2024-01-23

## 2024-01-23 NOTE — DISCHARGE INSTRUCTIONS
Wound Care Center Physician Orders and Discharge Instructions  [facility]  [address], [address]  [place] [address]  Telephone: [phone]      Fax: [phone]        Pt at [facility] [place] [facility] in [place]     NAME:  Ari Snider   YOB: 1935  PRIMARY DIAGNOSIS FOR WOUND CARE CENTER:  Diabetic foot wound, Ambrose 3 .     Wound cleansing:   Do not scrub or use excessive force.  Wash hands with soap and water before and after dressing changes.  Prior to applying a clean dressing, cleanse wound with normal saline, wound cleanser, or mild soap and water. Ask your physician or nurse before getting the wound(s) wet in the shower.                Wound care for home:     Right plantar foot wound:    Wash wound with soap and water with dressing changes.    Collagen into wound    Cover with 4x4's, ABD, kerlix, ace for protection    May change dressing on Thursday.     Right suture line:     Wash wound with soap and water with dressing changes     Betadine to suture line     Cover with 4x4's, ABD kerlix, ace for protection     May change dressing on Thursday.     Non weight bearing on right foot.  May put weight on right heel for transfer.        Please note, all wounds (unless stated otherwise here) were mechanically debrided at the time of cleansing here in the wound-care center today, so a small amount of pain, drainage or bleeding from that process might be expected, and is normal.      All products for home use, including multiple products for a single wound if applicable, are medically necessary in order to achieve the best chance at timely wound healing. See provider documentation for details if needed.     Substituted dressings applied in the [facility] today, if applicable:           New orders for this week (labs, imaging, medications, etc.):       Continue Vancomycin IV as ordered.  Please draw weekly labs:  CBCD, ESR, CRP weekly.  BMP, & Vancomycin trough 2 times a week.

## 2024-01-23 NOTE — PROGRESS NOTES
with a bit of crusted blood, but no active bleeding or drainage there today. Periodic photos in Media tab.     Today's Wound Measurements, per RN documentation:  Wound 01/22/24 #1 Right plantar foot, DFU Ambrose III (onset 01,2024)-Wound Length (cm): 1 cm    Wound 01/22/24 #1 Right plantar foot, DFU Ambrose III (onset 01,2024)-Wound Width (cm): 0.7 cm    Wound 01/22/24 #1 Right plantar foot, DFU Ambrose III (onset 01,2024)-Wound Depth (cm): 0.7 cm  ______________________________    Lab Results   Component Value Date    LABA1C 8.0 01/12/2024     Recent pertinent micro results:      COVID and Flu negative.      Darren 10 BCx (+) 2 of 2 MRSA, Vanco JEFF 2, Dapto JEFF 1.                                                             Jan 11 soft tissue and bone OR cultures (+) MRSA. Also one with a Corynebacterium, and anaerobic cultures negative.                                                               OR path (+) acute osteo.                                                                  Jan 13 BCx x 2 negative.    Recent lab trends overall:    Jan 13-15 ([facility]) -- Creat 1.3, cRP 66.0, alb 3.2, WBC 7.7, Hgb 8.3, plts 175k, 200 Eos, .    Jan 18 ([facility]) -- Creat 1.17, alb 2.6, WBC 8.8, Hgb 8.9, plts 311k, 400 Eos, VT 12.5 (6AM).    Jan 19 ([facility]) -- VT 11.1 (1130 AM).    Jan 22 ([facility]) -- Creat 1.14, VT 16.2 (6AM).    Not sure what's going on with labs. No ESR and cRP from last week, nothing sent to me until today, not sure why he needed vanco levels 3 times in 5 days, or why the timing of the level on the 19th was what it was. Assuming he's getting vanco around 7AM, I would have increased his dose back on the 18th (given the JEFF of 2), but with him up to 16.2 now on his own, I'll just keep it the same for now.    Lab orders are exactly what they were when the nursing home received his vanco script last week -- CBC, ESR and cRP once weekly, BMP and VT twice weekly. We'll clarify that with them.  _______________________    Darren

## 2024-01-24 PROBLEM — M86.071 ACUTE HEMATOGENOUS OSTEOMYELITIS OF RIGHT FOOT (HCC): Status: ACTIVE | Noted: 2024-01-24

## 2024-01-29 ENCOUNTER — HOSPITAL ENCOUNTER (OUTPATIENT)
Dept: WOUND CARE | Age: 89
Discharge: HOME | End: 2024-01-29
Payer: MEDICARE

## 2024-01-29 VITALS
TEMPERATURE: 96.9 F | SYSTOLIC BLOOD PRESSURE: 141 MMHG | BODY MASS INDEX: 34.28 KG/M2 | HEART RATE: 76 BPM | WEIGHT: 226.2 LBS | HEIGHT: 68 IN | RESPIRATION RATE: 20 BRPM | DIASTOLIC BLOOD PRESSURE: 61 MMHG

## 2024-01-29 DIAGNOSIS — E11.621 DIABETIC ULCER OF OTHER PART OF RIGHT FOOT ASSOCIATED WITH TYPE 2 DIABETES MELLITUS, WITH NECROSIS OF BONE (HCC): Primary | ICD-10-CM

## 2024-01-29 DIAGNOSIS — L97.514 DIABETIC ULCER OF OTHER PART OF RIGHT FOOT ASSOCIATED WITH TYPE 2 DIABETES MELLITUS, WITH NECROSIS OF BONE (HCC): Primary | ICD-10-CM

## 2024-01-29 PROCEDURE — 99213 OFFICE O/P EST LOW 20 MIN: CPT

## 2024-01-29 PROCEDURE — 99213 OFFICE O/P EST LOW 20 MIN: CPT | Performed by: INTERNAL MEDICINE

## 2024-01-29 RX ORDER — LIDOCAINE HYDROCHLORIDE 40 MG/ML
SOLUTION TOPICAL ONCE
OUTPATIENT
Start: 2024-01-29 | End: 2024-01-29

## 2024-01-29 RX ORDER — LIDOCAINE 40 MG/G
CREAM TOPICAL ONCE
OUTPATIENT
Start: 2024-01-29 | End: 2024-01-29

## 2024-01-29 RX ORDER — LIDOCAINE 40 MG/G
CREAM TOPICAL ONCE
Status: DISCONTINUED | OUTPATIENT
Start: 2024-01-29 | End: 2024-01-30 | Stop reason: HOSPADM

## 2024-01-29 RX ORDER — LIDOCAINE 50 MG/G
OINTMENT TOPICAL ONCE
OUTPATIENT
Start: 2024-01-29 | End: 2024-01-29

## 2024-01-29 RX ORDER — SODIUM CHLOR/HYPOCHLOROUS ACID 0.033 %
SOLUTION, IRRIGATION IRRIGATION ONCE
OUTPATIENT
Start: 2024-01-29 | End: 2024-01-29

## 2024-01-29 RX ORDER — TRIAMCINOLONE ACETONIDE 1 MG/G
OINTMENT TOPICAL ONCE
OUTPATIENT
Start: 2024-01-29 | End: 2024-01-29

## 2024-01-29 RX ORDER — BACITRACIN ZINC AND POLYMYXIN B SULFATE 500; 1000 [USP'U]/G; [USP'U]/G
OINTMENT TOPICAL ONCE
OUTPATIENT
Start: 2024-01-29 | End: 2024-01-29

## 2024-01-29 NOTE — PROGRESS NOTES
[facility] ([facility]) Progress Note    Ari Snider     : 1935    DATE OF VISIT:  2024    Subjective:     Ari Snider is a 88 y.o. male who has a diabetic and  neuropathic ulcer located on the foot (right, plantar, forefoot).     Current complaint of pain in this ulcer? yes.  Quality of pain: aching  Timing: intermittent and stable  Severity: mild  Associated Signs/Symptoms: swelling, drainage (light, clear), and numbness  Other significant symptoms or pertinent ulcer history: feeling ok overall, no fever or chills, no Midline pain, no Abx side effects, eating well, getting around a bit more, no falls.     Additional ulcer(s) noted? no.      Mr. Snider has a past medical history of Abscess of toe of right foot, Acute CVA (cerebrovascular accident) ([facility]), Blood transfusion, Cellulitis of right lower extremity, Cushing's syndrome ([facility]), Diabetic ulcer of toe 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 mellitus, with fat layer exposed ([facility]), Greater trochanteric bursitis of right hip, Hypertension, Lumbar spondylosis, Lymphoma ([facility]), MRSA infection, Osteomyelitis of left foot ([facility]), Tinnitus, Tobacco use, Ulcer of other part of foot, Vertigo, benign paroxysmal, and 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[facility]).    He has a past surgical history that includes Arm Debridement; Abdomen surgery; Colonoscopy; Dilatation, esophagus; skin biopsy; Foot Debridement (Right, 2021); Foot Debridement (Right, 2021); Foot Debridement (Right, 2022); Foot surgery (Right, 2022); IR MIDLINE CATH (2024); and Foot surgery (Right, 2024).    His family history includes Cancer in his brother; No Known Problems in his brother, sister, and sister; Other in his father and mother.     Mr. Snider reports that he has quit smoking. He has never used smokeless tobacco. He reports that he does not drink alcohol and does not use drugs.    His current medication list consists of Multiple Vitamin,

## 2024-01-29 NOTE — PROGRESS NOTES
[facility] Progress Note      Ari Snider     : 1935    DATE OF VISIT:  2024    Subjective:     Ari Snider is a 88 y.o. male who has a chief complaint of a diabetic ulcer located on the right foot. Significant symptoms or pertinent ulcer history since last visit: States no issues with antibiotics. Has been staying off the foot. States no pain in the foot at this time.          Mr. Snider has a past medical history of Abscess of toe of right foot, Acute CVA (cerebrovascular accident) ([facility]), Blood transfusion, Cellulitis of right lower extremity, Cushing's syndrome ([facility]), Diabetic ulcer of toe 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 mellitus, with fat layer exposed ([facility]), Greater trochanteric bursitis of right hip, Hypertension, Lumbar spondylosis, Lymphoma ([facility]), MRSA infection, Osteomyelitis of left foot ([facility]), Tinnitus, Tobacco use, Ulcer of other part of foot, Vertigo, benign paroxysmal, and 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[facility]).    He has a past surgical history that includes Arm Debridement; Abdomen surgery; Colonoscopy; Dilatation, esophagus; skin biopsy; Foot Debridement (Right, 2021); Foot Debridement (Right, 2021); Foot Debridement (Right, 2022); Foot surgery (Right, 2022); IR MIDLINE CATH (2024); and Foot surgery (Right, 2024).    His family history includes Cancer in his brother; No Known Problems in his brother, sister, and sister; Other in his father and mother.     Mr. Snider reports that he has quit smoking. He has never used smokeless tobacco. He reports that he does not drink alcohol and does not use drugs.    His current medication list consists of Multiple Vitamin, acetaminophen, albuterol sulfate HFA, aspirin, atorvastatin, blood glucose test strips, glipiZIDE, lisinopril, melatonin, metFORMIN, oxyCODONE-acetaminophen, pantoprazole, and vancomycin.    Allergies: Patient has no known allergies.    Pertinent items from the

## 2024-01-29 NOTE — PLAN OF CARE
Pt to the [facility] for follow up appointment.  Sutures intact and well approximated.  Pt to continue with biweekly dressing.  Pt to continue with IV antibiotics and biweekly lab work.  Writer spoke with Tawnya at [facility] regarding lab results.   Pt may continue with physical therapy.  Pt to follow up in the [facility] in 1 week.  Discharge instructions reviewed with patient, all questions answered, copy given to patient. Dressings were applied to all wounds per M.D. Instructions at this visit.

## 2024-01-30 NOTE — DISCHARGE INSTRUCTIONS
Friday (2/9/24).  Please fax labs from today to Dr Travis.         Change PICC/Midline dressing weekly.       Pt may be discharged to home after last dose of antibiotics on 2/9/24 and PICC/Midline is pulled after last dose on 2/9/24.     May bear weight on right foot.      Additional instructions for specific diagnoses:     General comments for diabetic / neuropathic ulcers:  *  Unless you've been instructed not to remove your dressings, be sure to inspect your feet daily, and notify us of any major changes.  *  If you do not have a long-term podiatrist, be sure to let us know.  *  Moisturize your skin regularly with Vaseline, Aquaphor, Aveeno, CeraVe, Cetaphil, Eucerin, Lubriderm, etc; but keep the skin between your toes dry.  *  Always allow your wound-care doctor or podiatrist to cut nails, calluses & corns.   *  Be sure to always wear footwear that fits well, and NEVER go without shoes and socks.  *  Do you know your Hemoglobin A1c level? You should! Please ask if you have questions.  *  Be sure to adhere to any recommendations from your PCP or endocrinologist when it comes to diabetes medications and diet. If you have questions, please ask.  *  If you smoke, your wound can not heal properly -- please talk with us when you're ready to quit.   *  Do not soak your feet unless specifically instructed to do so by us.     F/U Appointment is with Dr. Keller and Dr Travis in 1 week, on                                   at                       .     Your nurse  is JUAN Nielsen.      If we applied slip-resistant hospital socks today, be sure to remove them at least once a day to inspect your toes or feet, even if you're not changing the wraps or dressings underneath. If you see anything concerning (redness, excess moisture, etc), please call and let us know right away.     Should you experience any significant changes in your wound(s) (including redness, increased warmth, increased pain, increased drainage,

## 2024-02-05 ENCOUNTER — HOSPITAL ENCOUNTER (OUTPATIENT)
Dept: WOUND CARE | Age: 89
Discharge: HOME | End: 2024-02-05
Attending: PODIATRIST
Payer: MEDICARE

## 2024-02-05 VITALS
WEIGHT: 226.6 LBS | TEMPERATURE: 97.3 F | HEART RATE: 84 BPM | BODY MASS INDEX: 34.34 KG/M2 | HEIGHT: 68 IN | SYSTOLIC BLOOD PRESSURE: 131 MMHG | DIASTOLIC BLOOD PRESSURE: 70 MMHG | RESPIRATION RATE: 18 BRPM

## 2024-02-05 DIAGNOSIS — L97.514 DIABETIC ULCER OF OTHER PART OF RIGHT FOOT ASSOCIATED WITH TYPE 2 DIABETES MELLITUS, WITH NECROSIS OF BONE (HCC): Primary | ICD-10-CM

## 2024-02-05 DIAGNOSIS — E11.621 DIABETIC ULCER OF OTHER PART OF RIGHT FOOT ASSOCIATED WITH TYPE 2 DIABETES MELLITUS, WITH NECROSIS OF BONE (HCC): Primary | ICD-10-CM

## 2024-02-05 PROCEDURE — 99213 OFFICE O/P EST LOW 20 MIN: CPT | Performed by: INTERNAL MEDICINE

## 2024-02-05 PROCEDURE — 99213 OFFICE O/P EST LOW 20 MIN: CPT

## 2024-02-05 RX ORDER — TRIAMCINOLONE ACETONIDE 1 MG/G
OINTMENT TOPICAL ONCE
OUTPATIENT
Start: 2024-02-05 | End: 2024-02-05

## 2024-02-05 RX ORDER — LIDOCAINE 40 MG/G
CREAM TOPICAL ONCE
Status: DISCONTINUED | OUTPATIENT
Start: 2024-02-05 | End: 2024-02-06 | Stop reason: HOSPADM

## 2024-02-05 RX ORDER — LIDOCAINE 50 MG/G
OINTMENT TOPICAL ONCE
OUTPATIENT
Start: 2024-02-05 | End: 2024-02-05

## 2024-02-05 RX ORDER — BACITRACIN ZINC AND POLYMYXIN B SULFATE 500; 1000 [USP'U]/G; [USP'U]/G
OINTMENT TOPICAL ONCE
OUTPATIENT
Start: 2024-02-05 | End: 2024-02-05

## 2024-02-05 RX ORDER — LIDOCAINE HYDROCHLORIDE 40 MG/ML
SOLUTION TOPICAL ONCE
OUTPATIENT
Start: 2024-02-05 | End: 2024-02-05

## 2024-02-05 RX ORDER — LIDOCAINE 40 MG/G
CREAM TOPICAL ONCE
OUTPATIENT
Start: 2024-02-05 | End: 2024-02-05

## 2024-02-05 RX ORDER — SODIUM CHLOR/HYPOCHLOROUS ACID 0.033 %
SOLUTION, IRRIGATION IRRIGATION ONCE
OUTPATIENT
Start: 2024-02-05 | End: 2024-02-05

## 2024-02-05 NOTE — PLAN OF CARE
Pt to the [facility] for follow up appointment.  Sutures removed today per Dr Keller.  Incision well approximated.  Pt to have dressing applied today and changed at [facility] on Thursday.  Pt to continue with IV antibiotics through Friday.   After dose on Friday patient may have PICC/Midline removed and can be discharged home.  Pt may place weight on right foot.  Pt to follow up in the [facility] in 1 week.  Discharge instructions reviewed with patient, all questions answered, copy given to patient. Dressings were applied to all wounds per M.D. Instructions at this visit.

## 2024-02-06 NOTE — DISCHARGE INSTRUCTIONS
Wound Care Center Physician Orders and Discharge Instructions  [facility]  [address], [address]  [place] [address]  Telephone: [phone]      Fax: [phone]        Home Health Company:  [employer]  phone [phone]     NAME:  Ari Snider   YOB: 1935  PRIMARY DIAGNOSIS FOR WOUND CARE CENTER:  Diabetic foot wound, Ambrose 3 .     Wound cleansing:   Do not scrub or use excessive force.  Wash hands with soap and water before and after dressing changes.  Prior to applying a clean dressing, cleanse wound with normal saline, wound cleanser, or mild soap and water. Ask your physician or nurse before getting the wound(s) wet in the shower.                Wound care for home:     Right plantar foot wound:    healed.    May apply lotion to dry skin.     Right suture line:   Wash wound with soap and water with dressing changes.    Collagen into wound     May place patient's lotion (Amlactin 12) to heel with dressing changes    Cover with 4x4's, ABD, kerlix, ace for protection    May change dressing on Wednesday and Friday     May bear weight on right foot.   Elevate foot when sitting.           Please note, all wounds (unless stated otherwise here) were mechanically debrided at the time of cleansing here in the wound-care center today, so a small amount of pain, drainage or bleeding from that process might be expected, and is normal.      All products for home use, including multiple products for a single wound if applicable, are medically necessary in order to achieve the best chance at timely wound healing. See provider documentation for details if needed.     Substituted dressings applied in the [facility] today, if applicable:           New orders for this week (labs, imaging, medications, etc.):      May take a shower with cast protector on.   May bear weight on right foot.      Additional instructions for specific diagnoses:     General comments for diabetic / neuropathic

## 2024-02-07 ENCOUNTER — TELEPHONE (OUTPATIENT)
Dept: FAMILY MEDICINE CLINIC | Age: 89
End: 2024-02-07

## 2024-02-07 NOTE — TELEPHONE ENCOUNTER
Trinity with [facility]. Homecare calling to ask if Wilma will be signing home care orders for SN, PT, and OT. Please advise.

## 2024-02-09 NOTE — PROGRESS NOTES
[facility] Wound Care Center Progress Note      Ari Snider     : 1935    DATE OF VISIT:  2024    Subjective:     Ari Snider is a 88 y.o. male who has a chief complaint of a diabetic ulcer located on the right foot. Significant symptoms or pertinent ulcer history since last visit: States no issues with antibiotics. Has been staying off the foot. States no pain in the foot at this time.    Wants to go home.       Mr. Snider has a past medical history of Abscess of toe of right foot, Acute CVA (cerebrovascular accident) ([facility]), Blood transfusion, Cellulitis of right lower extremity, Cushing's syndrome ([facility]), Diabetic ulcer of toe 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 mellitus, with fat layer exposed ([facility]), Greater trochanteric bursitis of right hip, Hypertension, Lumbar spondylosis, Lymphoma ([facility]), MRSA infection, Osteomyelitis of left foot ([facility]), Tinnitus, Tobacco use, Ulcer of other part of foot, Vertigo, benign paroxysmal, and 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[facility]).    He has a past surgical history that includes Arm Debridement; Abdomen surgery; Colonoscopy; Dilatation, esophagus; skin biopsy; Foot Debridement (Right, 2021); Foot Debridement (Right, 2021); Foot Debridement (Right, 2022); Foot surgery (Right, 2022); IR MIDLINE CATH (2024); and Foot surgery (Right, 2024).    His family history includes Cancer in his brother; No Known Problems in his brother, sister, and sister; Other in his father and mother.     Mr. Snider reports that he has quit smoking. He has never used smokeless tobacco. He reports that he does not drink alcohol and does not use drugs.    His current medication list consists of Multiple Vitamin, acetaminophen, albuterol sulfate HFA, aspirin, atorvastatin, blood glucose test strips, glipiZIDE, lisinopril, melatonin, metFORMIN, oxyCODONE-acetaminophen, pantoprazole, and vancomycin.    Allergies: Patient has no known allergies.    Pertinent 
Wound 01/22/24 #1 Right plantar foot, DFU Ambrose III (onset 01,2024)-Dressing/Treatment: Other (comment) (Collagen into wound     May place patient's lotion (Amlactin 12) to heel with dressing changes    Cover with 4x4's, ABD, kerlix, ace for protection).    Debridement and local care per Dr. Keller.    We both feel that he can increase the amount of physical activity and increase the amount of right foot weightbearing this week, in preparation for having to be on his feet more at home.      Be sure to push dietary protein, try to keep glucoses down.     Continue vancomycin, but we can stop that on Friday, remove the midline, and from an ID and wound-care standpoint, we're fine with discharge home then.      Continue periodic leg elevation in between sessions of PT, to keep edema down.    D/W his son, who accompanied him today.     Written discharge instructions given to patient. Follow up in 1 week.    Electronically signed by Sincere Travis MD on 2/9/2024 at 6:16 AM.

## 2024-02-12 ENCOUNTER — TELEPHONE (OUTPATIENT)
Dept: FAMILY MEDICINE CLINIC | Age: 89
End: 2024-02-12

## 2024-02-12 ENCOUNTER — HOSPITAL ENCOUNTER (OUTPATIENT)
Dept: WOUND CARE | Age: 89
Discharge: HOME | End: 2024-02-12
Attending: PODIATRIST
Payer: MEDICARE

## 2024-02-12 VITALS
BODY MASS INDEX: 34.34 KG/M2 | WEIGHT: 226.6 LBS | TEMPERATURE: 97.6 F | HEART RATE: 75 BPM | SYSTOLIC BLOOD PRESSURE: 114 MMHG | RESPIRATION RATE: 20 BRPM | HEIGHT: 68 IN | DIASTOLIC BLOOD PRESSURE: 62 MMHG

## 2024-02-12 DIAGNOSIS — L97.514 DIABETIC ULCER OF OTHER PART OF RIGHT FOOT ASSOCIATED WITH TYPE 2 DIABETES MELLITUS, WITH NECROSIS OF BONE (HCC): Primary | ICD-10-CM

## 2024-02-12 DIAGNOSIS — E11.621 DIABETIC ULCER OF OTHER PART OF RIGHT FOOT ASSOCIATED WITH TYPE 2 DIABETES MELLITUS, WITH NECROSIS OF BONE (HCC): Primary | ICD-10-CM

## 2024-02-12 PROCEDURE — 99213 OFFICE O/P EST LOW 20 MIN: CPT

## 2024-02-12 RX ORDER — BACITRACIN ZINC AND POLYMYXIN B SULFATE 500; 1000 [USP'U]/G; [USP'U]/G
OINTMENT TOPICAL ONCE
OUTPATIENT
Start: 2024-02-12 | End: 2024-02-12

## 2024-02-12 RX ORDER — LIDOCAINE 40 MG/G
CREAM TOPICAL ONCE
OUTPATIENT
Start: 2024-02-12 | End: 2024-02-12

## 2024-02-12 RX ORDER — TRIAMCINOLONE ACETONIDE 1 MG/G
OINTMENT TOPICAL ONCE
OUTPATIENT
Start: 2024-02-12 | End: 2024-02-12

## 2024-02-12 RX ORDER — SODIUM CHLOR/HYPOCHLOROUS ACID 0.033 %
SOLUTION, IRRIGATION IRRIGATION ONCE
OUTPATIENT
Start: 2024-02-12 | End: 2024-02-12

## 2024-02-12 RX ORDER — LIDOCAINE HYDROCHLORIDE 40 MG/ML
SOLUTION TOPICAL ONCE
OUTPATIENT
Start: 2024-02-12 | End: 2024-02-12

## 2024-02-12 RX ORDER — LIDOCAINE 50 MG/G
OINTMENT TOPICAL ONCE
OUTPATIENT
Start: 2024-02-12 | End: 2024-02-12

## 2024-02-12 RX ORDER — LIDOCAINE 40 MG/G
CREAM TOPICAL ONCE
Status: DISCONTINUED | OUTPATIENT
Start: 2024-02-12 | End: 2024-02-13 | Stop reason: HOSPADM

## 2024-02-12 NOTE — PLAN OF CARE
Pt to the [facility] for follow up appointment.  Pt was discharged from F to home.  Pt has [facility] coming to his home on Wednesday's and Friday's.  IV antibiotic course completed.  Pt to have dressing changed per Home Care on Wednesday's and Friday's.  Discussed with patient the importance of elevating legs.  Pt to follow up in the [facility] in 1 week with Dr Keller.  Discharge instructions reviewed with patient, all questions answered, copy given to patient. Dressings were applied to all wounds per M.D. Instructions at this visit.

## 2024-02-12 NOTE — PROGRESS NOTES
Wound Care Center Physician Orders and Discharge Instructions  [facility]  [address], [address]  [place] [address]  Telephone: [phone]      Fax: [phone]        Home Health Company:  [employer]  phone [phone]     NAME:  Ari Snider   YOB: 1935  PRIMARY DIAGNOSIS FOR WOUND CARE CENTER:  Diabetic foot wound, Ambrose 3 .     Wound cleansing:   Do not scrub or use excessive force.  Wash hands with soap and water before and after dressing changes.  Prior to applying a clean dressing, cleanse wound with normal saline, wound cleanser, or mild soap and water. Ask your physician or nurse before getting the wound(s) wet in the shower.                Wound care for home:     Right plantar foot wound:    healed.    May apply lotion to dry skin.     Right suture line:   Wash wound with soap and water with dressing changes.    Collagen into wound     May place patient's lotion (Amlactin 12) to heel with dressing changes    Cover with 4x4's, ABD, kerlix, ace for protection    May change dressing on Wednesday and Friday     May bear weight on right foot.   Elevate foot when sitting.           Please note, all wounds (unless stated otherwise here) were mechanically debrided at the time of cleansing here in the wound-care center today, so a small amount of pain, drainage or bleeding from that process might be expected, and is normal.      All products for home use, including multiple products for a single wound if applicable, are medically necessary in order to achieve the best chance at timely wound healing. See provider documentation for details if needed.     Substituted dressings applied in the [facility] today, if applicable:           New orders for this week (labs, imaging, medications, etc.):      May take a shower with cast protector on.   May bear weight on right foot.      Additional instructions for specific diagnoses:     General comments for diabetic / neuropathic

## 2024-02-13 NOTE — PROGRESS NOTES
[facility] Wound Care Center Progress Note      Ari Snider     : 1935    DATE OF VISIT:  2024    Subjective:     Ari Snider is a 88 y.o. male who has a chief complaint of a diabetic ulcer located on the right foot. Significant symptoms or pertinent ulcer history since last visit: States  dressing came off over the weekend for a day. Has kept foot dry. Is back at home.       Mr. Snider has a past medical history of Abscess of toe of right foot, Acute CVA (cerebrovascular accident) (HCC), Blood transfusion, Cellulitis of right lower extremity, Cushing's syndrome (HCC), Diabetic ulcer of toe 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 mellitus, with fat layer exposed (HCC), Greater trochanteric bursitis of right hip, Hypertension, Lumbar spondylosis, Lymphoma (HCC), MRSA infection, Osteomyelitis of left foot (HCC), Tinnitus, Tobacco use, Ulcer of other part of foot, Vertigo, benign paroxysmal, and 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[facility]).    He has a past surgical history that includes Arm Debridement; Abdomen surgery; Colonoscopy; Dilatation, esophagus; skin biopsy; Foot Debridement (Right, 2021); Foot Debridement (Right, 2021); Foot Debridement (Right, 2022); Foot surgery (Right, 2022); IR MIDLINE CATH (2024); and Foot surgery (Right, 2024).    His family history includes Cancer in his brother; No Known Problems in his brother, sister, and sister; Other in his father and mother.     Mr. Snider reports that he has quit smoking. He has never used smokeless tobacco. He reports that he does not drink alcohol and does not use drugs.    His current medication list consists of Multiple Vitamin, acetaminophen, albuterol sulfate HFA, aspirin, atorvastatin, blood glucose test strips, glipiZIDE, lisinopril, melatonin, metFORMIN, oxyCODONE-acetaminophen, and pantoprazole.    Allergies: Patient has no known allergies.    Pertinent items from the review of systems are discussed in

## 2024-02-13 NOTE — PROGRESS NOTES
Brief ID progress note during his F/U appt with Dr. Keller --    Doing well overall. IV Vanco stopped last Thursday, midline removed, and he went home Friday. Doing ok overall, just had a foot dressing loosen up.     Two very small areas of dehiscence laterally (only a few mm across at both), nice and red after brief debridement today. No signs of infection. Prior midline site is fine. Still has massive swelling of that extremity, but thankfully the open areas of the wound are quite small, so not draining much and threatening maceration. Primary plantar forefoot diabetic ulcer healed up.    No additional Abx planned.  Ongoing debridement, local care, offloading, edema-control measures per Dr. Keller.  F/U next week with him.    Electronically signed by Sincere Travis MD on 2/13/2024 at 11:10 AM

## 2024-02-13 NOTE — DISCHARGE INSTRUCTIONS
Wound Care Center Physician Orders and Discharge Instructions  [facility]  [address], [address]  [place] [address]  Telephone: [phone]      Fax: [phone]        [facility] [employer] Company:  [facility]  phone [phone]     NAME:  Ari Snider   YOB: 1935  PRIMARY DIAGNOSIS FOR WOUND CARE CENTER:  Diabetic foot wound, Ambrose 3 .     Wound cleansing:   Do not scrub or use excessive force.  Wash hands with soap and water before and after dressing changes.  Prior to applying a clean dressing, cleanse wound with normal saline, wound cleanser, or mild soap and water. Ask your physician or nurse before getting the wound(s) wet in the shower.                Wound care for home:     Right suture line:   Wash wound with soap and water with dressing changes.    Collagen into wound     May place patient's lotion (Amlactin 12) to heel with dressing changes    Cover with 4x4's, ABD, kerlix    Single medium medigrip toes to knee    May change dressing on Wednesday and Friday     May bear weight on right foot.   Elevate foot when sitting.           Please note, all wounds (unless stated otherwise here) were mechanically debrided at the time of cleansing here in the wound-care center today, so a small amount of pain, drainage or bleeding from that process might be expected, and is normal.      All products for home use, including multiple products for a single wound if applicable, are medically necessary in order to achieve the best chance at timely wound healing. See provider documentation for details if needed.     Substituted dressings applied in the [facility] today, if applicable:           New orders for this week (labs, imaging, medications, etc.):      May take a shower with cast protector on.   May bear weight on right foot.      Additional instructions for specific diagnoses:     General comments for diabetic / neuropathic ulcers:  *  Unless you've been instructed not to remove

## 2024-02-14 NOTE — OP NOTE
[address] [address]                                OPERATIVE REPORT    PATIENT NAME: NYA GARAY                    :        1935  MED REC NO:   [id number]                          ROOM:  ACCOUNT NO:   [id number]                           ADMIT DATE: 01/10/2024  PROVIDER:     Cliff Keller DPM    DATE OF PROCEDURE:  2024    PREOPERATIVE DIAGNOSES:  1.  Diabetic foot ulceration of right with osteomyelitis.  2.  Diabetes mellitus.    POSTOPERATIVE DIAGNOSES:  1.  Diabetic foot ulceration of right with osteomyelitis.  2.  Diabetes mellitus.    OPERATION PERFORMED:  Partial right foot amputation.    SURGEON:  Cliff Keller DPM    ANESTHESIA:  Local MAC.    HEMOSTASIS:  Ankle tourniquet at 250 mmHg.    ESTIMATED BLOOD LOSS:  Less than 100 mL.    REPORT OF OPERATION:  The patient was brought into the operating room  and placed on the operating table in the supine position.  Under mild IV  sedation, the right ankle was anesthetized with 20 mL of 1:1 mixture of  1% lidocaine plain and 0.5% Marcaine plain.  The foot was then scrubbed,  prepped, and draped in the usual sterile manner.  Esmarch was then  utilized to exsanguinate the right foot.  Ankle tourniquet was then  inflated to 250 mmHg.    Attention was then directed to the right foot, where there was an  ulceration on the plantar aspect of the third metatarsal head.  We did  probe down directly to the level of the bone.  The entire portion was  noted to be erythematous and edematous consistent with his preoperative  diagnosis of cellulitis or skin infection.  We did ellipse the edges of  the skin with a scalpel in order to facilitate postoperative drainage as  well.    Attention was then directed to the distal aspect of the forefoot where  the patient was noted to have a previous amputation of partial first ray  amputation as well as fourth digit amputation.  At

## 2024-02-19 ENCOUNTER — HOSPITAL ENCOUNTER (OUTPATIENT)
Dept: WOUND CARE | Age: 89
Discharge: HOME | End: 2024-02-19
Attending: PODIATRIST
Payer: MEDICARE

## 2024-02-19 VITALS
HEART RATE: 80 BPM | SYSTOLIC BLOOD PRESSURE: 129 MMHG | HEIGHT: 68 IN | RESPIRATION RATE: 20 BRPM | TEMPERATURE: 97.6 F | DIASTOLIC BLOOD PRESSURE: 66 MMHG | WEIGHT: 225.2 LBS | BODY MASS INDEX: 34.13 KG/M2

## 2024-02-19 DIAGNOSIS — L97.514 DIABETIC ULCER OF OTHER PART OF RIGHT FOOT ASSOCIATED WITH TYPE 2 DIABETES MELLITUS, WITH NECROSIS OF BONE (HCC): Primary | ICD-10-CM

## 2024-02-19 DIAGNOSIS — E11.621 DIABETIC ULCER OF OTHER PART OF RIGHT FOOT ASSOCIATED WITH TYPE 2 DIABETES MELLITUS, WITH NECROSIS OF BONE (HCC): Primary | ICD-10-CM

## 2024-02-19 PROCEDURE — 99213 OFFICE O/P EST LOW 20 MIN: CPT

## 2024-02-19 RX ORDER — BACITRACIN ZINC AND POLYMYXIN B SULFATE 500; 1000 [USP'U]/G; [USP'U]/G
OINTMENT TOPICAL ONCE
OUTPATIENT
Start: 2024-02-19 | End: 2024-02-19

## 2024-02-19 RX ORDER — TRIAMCINOLONE ACETONIDE 1 MG/G
OINTMENT TOPICAL ONCE
OUTPATIENT
Start: 2024-02-19 | End: 2024-02-19

## 2024-02-19 RX ORDER — LIDOCAINE 50 MG/G
OINTMENT TOPICAL ONCE
OUTPATIENT
Start: 2024-02-19 | End: 2024-02-19

## 2024-02-19 RX ORDER — LIDOCAINE 40 MG/G
CREAM TOPICAL ONCE
OUTPATIENT
Start: 2024-02-19 | End: 2024-02-19

## 2024-02-19 RX ORDER — SODIUM CHLOR/HYPOCHLOROUS ACID 0.033 %
SOLUTION, IRRIGATION IRRIGATION ONCE
OUTPATIENT
Start: 2024-02-19 | End: 2024-02-19

## 2024-02-19 RX ORDER — LIDOCAINE HYDROCHLORIDE 40 MG/ML
SOLUTION TOPICAL ONCE
OUTPATIENT
Start: 2024-02-19 | End: 2024-02-19

## 2024-02-19 RX ORDER — LIDOCAINE 40 MG/G
CREAM TOPICAL ONCE
Status: DISCONTINUED | OUTPATIENT
Start: 2024-02-19 | End: 2024-02-20 | Stop reason: HOSPADM

## 2024-02-19 NOTE — PROGRESS NOTES
Wound Care Center Physician Orders and Discharge Instructions  [facility]  [address], [address]  [place] [address]  Telephone: [phone]      Fax: [phone]        Home [employer] Company:  [facility]  phone [phone]     NAME:  Ari Snider   YOB: 1935  PRIMARY DIAGNOSIS FOR WOUND CARE CENTER:  Diabetic foot wound, Ambrose 3 .     Wound cleansing:   Do not scrub or use excessive force.  Wash hands with soap and water before and after dressing changes.  Prior to applying a clean dressing, cleanse wound with normal saline, wound cleanser, or mild soap and water. Ask your physician or nurse before getting the wound(s) wet in the shower.                Wound care for home:     Right suture line:   Wash wound with soap and water with dressing changes.    Collagen into wound     May place patient's lotion (Amlactin 12) to heel with dressing changes    Cover with 4x4's, ABD, kerlix    Single medium medigrip toes to knee    May change dressing on Wednesday and Friday     May bear weight on right foot.   Elevate foot when sitting.           Please note, all wounds (unless stated otherwise here) were mechanically debrided at the time of cleansing here in the wound-care center today, so a small amount of pain, drainage or bleeding from that process might be expected, and is normal.      All products for home use, including multiple products for a single wound if applicable, are medically necessary in order to achieve the best chance at timely wound healing. See provider documentation for details if needed.     Substituted dressings applied in the [facility] today, if applicable:           New orders for this week (labs, imaging, medications, etc.):      May take a shower with cast protector on.   May bear weight on right foot.      Additional instructions for specific diagnoses:     General comments for diabetic / neuropathic ulcers:  *  Unless you've been instructed not to remove

## 2024-02-20 NOTE — PROGRESS NOTES
[facility] Wound Care Center Progress Note      Ari Snider     : 1935    DATE OF VISIT:  2024    Subjective:     Ari Snider is a 88 y.o. male who has a chief complaint of a diabetic ulcer located on the right foot. Significant symptoms or pertinent ulcer history since last visit: States doing well since being home. Has been walking some.   Had some issues with cold nose, fingers, etc.. at random times.  This has resolved. No actual chills.       Mr. Sniedr has a past medical history of Abscess of toe of right foot, Acute CVA (cerebrovascular accident) (HCC), Blood transfusion, Cellulitis of right lower extremity, Cushing's syndrome (HCC), Diabetic ulcer of toe 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 mellitus, with fat layer exposed (HCC), Greater trochanteric bursitis of right hip, Hypertension, Lumbar spondylosis, Lymphoma ([facility]), MRSA infection, Osteomyelitis of left foot ([facility]), Tinnitus, Tobacco use, Ulcer of other part of foot, Vertigo, benign paroxysmal, and 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[facility]).    He has a past surgical history that includes Arm Debridement; Abdomen surgery; Colonoscopy; Dilatation, esophagus; skin biopsy; Foot Debridement (Right, 2021); Foot Debridement (Right, 2021); Foot Debridement (Right, 2022); Foot surgery (Right, 2022); IR MIDLINE CATH (2024); and Foot surgery (Right, 2024).    His family history includes Cancer in his brother; No Known Problems in his brother, sister, and sister; Other in his father and mother.     Mr. Snider reports that he has quit smoking. He has never used smokeless tobacco. He reports that he does not drink alcohol and does not use drugs.    His current medication list consists of Multiple Vitamin, acetaminophen, albuterol sulfate HFA, aspirin, atorvastatin, blood glucose test strips, glipiZIDE, lisinopril, melatonin, metFORMIN, oxyCODONE-acetaminophen, and pantoprazole.    Allergies: Patient has no known

## 2024-02-26 ENCOUNTER — HOSPITAL ENCOUNTER (OUTPATIENT)
Dept: WOUND CARE | Age: 89
Discharge: HOME | End: 2024-02-26
Attending: PODIATRIST
Payer: MEDICARE

## 2024-02-26 VITALS
WEIGHT: 220.6 LBS | BODY MASS INDEX: 33.43 KG/M2 | HEIGHT: 68 IN | HEART RATE: 72 BPM | TEMPERATURE: 97.6 F | RESPIRATION RATE: 18 BRPM | SYSTOLIC BLOOD PRESSURE: 129 MMHG | DIASTOLIC BLOOD PRESSURE: 64 MMHG

## 2024-02-26 DIAGNOSIS — L97.514 DIABETIC ULCER OF OTHER PART OF RIGHT FOOT ASSOCIATED WITH TYPE 2 DIABETES MELLITUS, WITH NECROSIS OF BONE (HCC): Primary | ICD-10-CM

## 2024-02-26 DIAGNOSIS — E11.621 DIABETIC ULCER OF OTHER PART OF RIGHT FOOT ASSOCIATED WITH TYPE 2 DIABETES MELLITUS, WITH NECROSIS OF BONE (HCC): Primary | ICD-10-CM

## 2024-02-26 PROCEDURE — 99213 OFFICE O/P EST LOW 20 MIN: CPT

## 2024-02-26 RX ORDER — LIDOCAINE 40 MG/G
CREAM TOPICAL ONCE
Status: DISCONTINUED | OUTPATIENT
Start: 2024-02-26 | End: 2024-02-27 | Stop reason: HOSPADM

## 2024-02-26 RX ORDER — SODIUM CHLOR/HYPOCHLOROUS ACID 0.033 %
SOLUTION, IRRIGATION IRRIGATION ONCE
OUTPATIENT
Start: 2024-02-26 | End: 2024-02-26

## 2024-02-26 RX ORDER — TRIAMCINOLONE ACETONIDE 1 MG/G
OINTMENT TOPICAL ONCE
OUTPATIENT
Start: 2024-02-26 | End: 2024-02-26

## 2024-02-26 RX ORDER — LIDOCAINE 40 MG/G
CREAM TOPICAL ONCE
OUTPATIENT
Start: 2024-02-26 | End: 2024-02-26

## 2024-02-26 RX ORDER — BACITRACIN ZINC AND POLYMYXIN B SULFATE 500; 1000 [USP'U]/G; [USP'U]/G
OINTMENT TOPICAL ONCE
OUTPATIENT
Start: 2024-02-26 | End: 2024-02-26

## 2024-02-26 RX ORDER — LIDOCAINE HYDROCHLORIDE 40 MG/ML
SOLUTION TOPICAL ONCE
OUTPATIENT
Start: 2024-02-26 | End: 2024-02-26

## 2024-02-26 RX ORDER — LIDOCAINE 50 MG/G
OINTMENT TOPICAL ONCE
OUTPATIENT
Start: 2024-02-26 | End: 2024-02-26

## 2024-02-26 NOTE — PLAN OF CARE
Pt to the [facility] for follow up appointment.  Dr Keller placed 2 stitches in wound.  Pt to have betadine and dry dressing placed to wound.  Pt has home care and therapy.  Pt to follow up in the [facility] in 1 week.  Discharge instructions reviewed with patient, all questions answered, copy given to patient. Dressings were applied to all wounds per M.D. Instructions at this visit.

## 2024-02-27 NOTE — DISCHARGE INSTRUCTIONS
Wound Care Center Physician Orders and Discharge Instructions  [facility]  [address], [address]  [place] [id number]  Telephone: [phone]      Fax: [phone]        Home Health Company:  [facility]  phone [phone].   No need to come the week of 3/4/24     NAME:  Ari Snider   YOB: 1935  PRIMARY DIAGNOSIS FOR WOUND CARE CENTER:  Diabetic foot wound, Ambrose 3 .     Wound cleansing:   Do not scrub or use excessive force.  Wash hands with soap and water before and after dressing changes.  Prior to applying a clean dressing, cleanse wound with normal saline, wound cleanser, or mild soap and water. Ask your physician or nurse before getting the wound(s) wet in the shower.                Wound care for home:     Right suture line and right lower leg:   Wash wound with soap and water with dressing changes.   Xeroform to pretib/lower leg area   Betadine to foot incision    4x4's, ABD    Accuwrap   Leave dressing on all week    May bear weight on right foot.   Elevate foot when sitting.           Please note, all wounds (unless stated otherwise here) were mechanically debrided at the time of cleansing here in the wound-care center today, so a small amount of pain, drainage or bleeding from that process might be expected, and is normal.      All products for home use, including multiple products for a single wound if applicable, are medically necessary in order to achieve the best chance at timely wound healing. See provider documentation for details if needed.     Substituted dressings applied in the [facility] today, if applicable:           New orders for this week (labs, imaging, medications, etc.):      May take a shower with cast protector on.   May bear weight on right foot.      Additional instructions for specific diagnoses:     General comments for diabetic / neuropathic ulcers:  *  Unless you've been instructed not to remove your dressings, be sure to inspect your

## 2024-02-27 NOTE — PROGRESS NOTES
[facility] Wound Care Center Progress Note      Ari Snider     : 1935    DATE OF VISIT:  2024    Subjective:     Ari Snider is a 88 y.o. male who has a chief complaint of a diabetic ulcer located on the right foot. Significant symptoms or pertinent ulcer history since last visit: States doing well since being home. Has been walking some.   Swelling is doing better.       Mr. Snider has a past medical history of Abscess of toe of right foot, Acute CVA (cerebrovascular accident) ([facility]), Blood transfusion, Cellulitis of right lower extremity, Cushing's syndrome ([facility]), Diabetic ulcer of toe 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 mellitus, with fat layer exposed ([facility]), Greater trochanteric bursitis of right hip, Hypertension, Lumbar spondylosis, Lymphoma ([facility]), MRSA infection, Osteomyelitis of left foot ([facility]), Tinnitus, Tobacco use, Ulcer of other part of foot, Vertigo, benign paroxysmal, and 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[facility]).    He has a past surgical history that includes Arm Debridement; Abdomen surgery; Colonoscopy; Dilatation, esophagus; skin biopsy; Foot Debridement (Right, 2021); Foot Debridement (Right, 2021); Foot Debridement (Right, 2022); Foot surgery (Right, 2022); IR MIDLINE CATH (2024); and Foot surgery (Right, 2024).    His family history includes Cancer in his brother; No Known Problems in his brother, sister, and sister; Other in his father and mother.     Mr. Snider reports that he has quit smoking. He has never used smokeless tobacco. He reports that he does not drink alcohol and does not use drugs.    His current medication list consists of Multiple Vitamin, acetaminophen, albuterol sulfate HFA, aspirin, atorvastatin, blood glucose test strips, glipiZIDE, lisinopril, melatonin, metFORMIN, oxyCODONE-acetaminophen, and pantoprazole.    Allergies: Patient has no known allergies.    Pertinent items from the review of systems are discussed in the 
Unless you've been instructed not to remove your dressings, be sure to inspect your feet daily, and notify us of any major changes.  *  If you do not have a long-term podiatrist, be sure to let us know.  *  Moisturize your skin regularly with Vaseline, Aquaphor, Aveeno, CeraVe, Cetaphil, Eucerin, Lubriderm, etc; but keep the skin between your toes dry.  *  Always allow your wound-care doctor or podiatrist to cut nails, calluses & corns.   *  Be sure to always wear footwear that fits well, and NEVER go without shoes and socks.  *  Do you know your Hemoglobin A1c level? You should! Please ask if you have questions.  *  Be sure to adhere to any recommendations from your PCP or endocrinologist when it comes to diabetes medications and diet. If you have questions, please ask.  *  If you smoke, your wound can not heal properly -- please talk with us when you're ready to quit.   *  Do not soak your feet unless specifically instructed to do so by us.     F/U Appointment is with Dr. Keller in 1 week, on                                   at                       .     Your nurse  is JUAN Nielsen.      If we applied slip-resistant hospital socks today, be sure to remove them at least once a day to inspect your toes or feet, even if you're not changing the wraps or dressings underneath. If you see anything concerning (redness, excess moisture, etc), please call and let us know right away.     Should you experience any significant changes in your wound(s) (including redness, increased warmth, increased pain, increased drainage, odor, or fever) or have questions about your wound care, please contact the [facility] Care Center at [phone] Monday-Thursday from 8:00 am - 4:30 pm, or Friday from 8:00 am - 2:30 pm.  If you need help with your wound outside these hours and cannot wait until we are again available, contact your home-care company (if applicable), your PCP, or go to the nearest emergency room.

## 2024-03-04 ENCOUNTER — HOSPITAL ENCOUNTER (OUTPATIENT)
Dept: WOUND CARE | Age: 89
Discharge: HOME | End: 2024-03-04
Attending: PODIATRIST

## 2024-03-04 VITALS
HEART RATE: 71 BPM | BODY MASS INDEX: 33.89 KG/M2 | TEMPERATURE: 97.7 F | WEIGHT: 223.6 LBS | RESPIRATION RATE: 18 BRPM | DIASTOLIC BLOOD PRESSURE: 55 MMHG | SYSTOLIC BLOOD PRESSURE: 140 MMHG | HEIGHT: 68 IN

## 2024-03-04 DIAGNOSIS — R10.84 GENERALIZED ABDOMINAL PAIN: ICD-10-CM

## 2024-03-04 DIAGNOSIS — E11.621 DIABETIC ULCER OF OTHER PART OF RIGHT FOOT ASSOCIATED WITH TYPE 2 DIABETES MELLITUS, WITH NECROSIS OF BONE (HCC): Primary | ICD-10-CM

## 2024-03-04 DIAGNOSIS — L97.514 DIABETIC ULCER OF OTHER PART OF RIGHT FOOT ASSOCIATED WITH TYPE 2 DIABETES MELLITUS, WITH NECROSIS OF BONE (HCC): Primary | ICD-10-CM

## 2024-03-04 RX ORDER — FAMOTIDINE 20 MG/1
TABLET, FILM COATED ORAL
Qty: 90 TABLET | Refills: 3 | Status: SHIPPED | OUTPATIENT
Start: 2024-03-04

## 2024-03-04 RX ORDER — LIDOCAINE HYDROCHLORIDE 40 MG/ML
SOLUTION TOPICAL ONCE
OUTPATIENT
Start: 2024-03-04 | End: 2024-03-04

## 2024-03-04 RX ORDER — LIDOCAINE 50 MG/G
OINTMENT TOPICAL ONCE
OUTPATIENT
Start: 2024-03-04 | End: 2024-03-04

## 2024-03-04 RX ORDER — BACITRACIN ZINC AND POLYMYXIN B SULFATE 500; 1000 [USP'U]/G; [USP'U]/G
OINTMENT TOPICAL ONCE
OUTPATIENT
Start: 2024-03-04 | End: 2024-03-04

## 2024-03-04 RX ORDER — LIDOCAINE 40 MG/G
CREAM TOPICAL ONCE
OUTPATIENT
Start: 2024-03-04 | End: 2024-03-04

## 2024-03-04 RX ORDER — LIDOCAINE 40 MG/G
CREAM TOPICAL ONCE
Status: DISCONTINUED | OUTPATIENT
Start: 2024-03-04 | End: 2024-03-05 | Stop reason: HOSPADM

## 2024-03-04 RX ORDER — SODIUM CHLOR/HYPOCHLOROUS ACID 0.033 %
SOLUTION, IRRIGATION IRRIGATION ONCE
OUTPATIENT
Start: 2024-03-04 | End: 2024-03-04

## 2024-03-04 RX ORDER — TRIAMCINOLONE ACETONIDE 1 MG/G
OINTMENT TOPICAL ONCE
OUTPATIENT
Start: 2024-03-04 | End: 2024-03-04

## 2024-03-04 NOTE — PROGRESS NOTES
[facility] Wound Care Center Progress Note      Ari Snider     : 1935    DATE OF VISIT:  3/4/2024    Subjective:     Ari Snider is a 88 y.o. male who has a chief complaint of a diabetic ulcer located on the right foot. Significant symptoms or pertinent ulcer history since last visit: States doing well since being home. Has been walking some.   Swelling was better at the end of last week.       Mr. Snider has a past medical history of Abscess of toe of right foot, Acute CVA (cerebrovascular accident) (HCC), Blood transfusion, Cellulitis of right lower extremity, Cushing's syndrome (HCC), Diabetic ulcer of toe 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 mellitus, with fat layer exposed ([facility]), Greater trochanteric bursitis of right hip, Hypertension, Lumbar spondylosis, Lymphoma (HCC), MRSA infection, Osteomyelitis of left foot ([facility]), Tinnitus, Tobacco use, Ulcer of other part of foot, Vertigo, benign paroxysmal, and 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[facility]).    He has a past surgical history that includes Arm Debridement; Abdomen surgery; Colonoscopy; Dilatation, esophagus; skin biopsy; Foot Debridement (Right, 2021); Foot Debridement (Right, 2021); Foot Debridement (Right, 2022); Foot surgery (Right, 2022); IR MIDLINE CATH (2024); and Foot surgery (Right, 2024).    His family history includes Cancer in his brother; No Known Problems in his brother, sister, and sister; Other in his father and mother.     Mr. Snider reports that he has quit smoking. He has never used smokeless tobacco. He reports that he does not drink alcohol and does not use drugs.    His current medication list consists of Multiple Vitamin, acetaminophen, albuterol sulfate HFA, aspirin, atorvastatin, blood glucose test strips, glipiZIDE, lisinopril, melatonin, metFORMIN, oxyCODONE-acetaminophen, and pantoprazole.    Allergies: Patient has no known allergies.    Pertinent items from the review of systems

## 2024-03-04 NOTE — PROGRESS NOTES
Wound Care Center Physician Orders and Discharge Instructions  [facility]  [address], [address]  [place] [address]  Telephone: [phone]      Fax: [phone]        Home Health Company:  [facility]  phone [phone].   No need to come the week of 3/4/24     NAME:  Ari Snider   YOB: 1935  PRIMARY DIAGNOSIS FOR WOUND CARE CENTER:  Diabetic foot wound, Ambrose 3 .     Wound cleansing:   Do not scrub or use excessive force.  Wash hands with soap and water before and after dressing changes.  Prior to applying a clean dressing, cleanse wound with normal saline, wound cleanser, or mild soap and water. Ask your physician or nurse before getting the wound(s) wet in the shower.                Wound care for home:     Right suture line and right lower leg:   Wash wound with soap and water with dressing changes.   Xeroform to pretib/lower leg area   Betadine to foot incision    4x4's, ABD    Accuwrap   Leave dressing on all week    May bear weight on right foot.   Elevate foot when sitting.           Please note, all wounds (unless stated otherwise here) were mechanically debrided at the time of cleansing here in the wound-care center today, so a small amount of pain, drainage or bleeding from that process might be expected, and is normal.      All products for home use, including multiple products for a single wound if applicable, are medically necessary in order to achieve the best chance at timely wound healing. See provider documentation for details if needed.     Substituted dressings applied in the [facility] today, if applicable:           New orders for this week (labs, imaging, medications, etc.):      May take a shower with cast protector on.   May bear weight on right foot.      Additional instructions for specific diagnoses:     General comments for diabetic / neuropathic ulcers:  *  Unless you've been instructed not to remove your dressings, be sure to inspect your

## 2024-03-04 NOTE — PLAN OF CARE
Pt to the [facility] for follow up appointment.  Pt with increased edema in right lower leg.  Pt to have weekly compression wrap applied to right lower leg.  Pt to follow up in the [facility] in 1 week.  Discharge instructions reviewed with patient, all questions answered, copy given to patient. Dressings were applied to all wounds per M.D. Instructions at this visit.

## 2024-03-05 NOTE — DISCHARGE INSTRUCTIONS
Wound Care Center Physician Orders and Discharge Instructions  [facility]  [address], [address]  [place] [id number]  Telephone: [phone]      Fax: [phone]        Home Health Company:  [facility]  phone [phone].   No need to come the week of 3/4/24     NAME:  Ari Snider   YOB: 1935  PRIMARY DIAGNOSIS FOR WOUND CARE CENTER:  Diabetic foot wound, Ambrose 3 .     Wound cleansing:   Do not scrub or use excessive force.  Wash hands with soap and water before and after dressing changes.  Prior to applying a clean dressing, cleanse wound with normal saline, wound cleanser, or mild soap and water. Ask your physician or nurse before getting the wound(s) wet in the shower.                Wound care for home:     Right suture line and right lower leg:   Wash wound with soap and water with dressing changes.   Betadine to foot incision    4x4's, ABD    Accuwrap   Leave dressing on all week     May bear weight on right foot.   Elevate foot when sitting.           Please note, all wounds (unless stated otherwise here) were mechanically debrided at the time of cleansing here in the wound-care center today, so a small amount of pain, drainage or bleeding from that process might be expected, and is normal.      All products for home use, including multiple products for a single wound if applicable, are medically necessary in order to achieve the best chance at timely wound healing. See provider documentation for details if needed.     Substituted dressings applied in the [facility] today, if applicable:           New orders for this week (labs, imaging, medications, etc.):      May take a shower with cast protector on.   May bear weight on right foot.      Additional instructions for specific diagnoses:     General comments for diabetic / neuropathic ulcers:  *  Unless you've been instructed not to remove your dressings, be sure to inspect your feet daily, and notify us of any

## 2024-03-11 ENCOUNTER — HOSPITAL ENCOUNTER (OUTPATIENT)
Dept: WOUND CARE | Age: 89
Discharge: HOME | End: 2024-03-11
Attending: PODIATRIST | Admitting: PODIATRIST
Payer: MEDICARE

## 2024-03-11 VITALS
WEIGHT: 223 LBS | DIASTOLIC BLOOD PRESSURE: 69 MMHG | HEIGHT: 68 IN | BODY MASS INDEX: 33.8 KG/M2 | TEMPERATURE: 97.7 F | SYSTOLIC BLOOD PRESSURE: 151 MMHG | RESPIRATION RATE: 20 BRPM | HEART RATE: 73 BPM

## 2024-03-11 DIAGNOSIS — L97.514 DIABETIC ULCER OF OTHER PART OF RIGHT FOOT ASSOCIATED WITH TYPE 2 DIABETES MELLITUS, WITH NECROSIS OF BONE (HCC): Primary | ICD-10-CM

## 2024-03-11 DIAGNOSIS — E11.621 DIABETIC ULCER OF OTHER PART OF RIGHT FOOT ASSOCIATED WITH TYPE 2 DIABETES MELLITUS, WITH NECROSIS OF BONE (HCC): Primary | ICD-10-CM

## 2024-03-11 PROCEDURE — 29581 APPL MULTLAYER CMPRN SYS LEG: CPT

## 2024-03-11 RX ORDER — TRIAMCINOLONE ACETONIDE 1 MG/G
OINTMENT TOPICAL ONCE
OUTPATIENT
Start: 2024-03-11 | End: 2024-03-11

## 2024-03-11 RX ORDER — LIDOCAINE 40 MG/G
CREAM TOPICAL ONCE
OUTPATIENT
Start: 2024-03-11 | End: 2024-03-11

## 2024-03-11 RX ORDER — LIDOCAINE 50 MG/G
OINTMENT TOPICAL ONCE
OUTPATIENT
Start: 2024-03-11 | End: 2024-03-11

## 2024-03-11 RX ORDER — SODIUM CHLOR/HYPOCHLOROUS ACID 0.033 %
SOLUTION, IRRIGATION IRRIGATION ONCE
OUTPATIENT
Start: 2024-03-11 | End: 2024-03-11

## 2024-03-11 RX ORDER — BACITRACIN ZINC AND POLYMYXIN B SULFATE 500; 1000 [USP'U]/G; [USP'U]/G
OINTMENT TOPICAL ONCE
OUTPATIENT
Start: 2024-03-11 | End: 2024-03-11

## 2024-03-11 RX ORDER — LIDOCAINE HYDROCHLORIDE 40 MG/ML
SOLUTION TOPICAL ONCE
OUTPATIENT
Start: 2024-03-11 | End: 2024-03-11

## 2024-03-11 RX ORDER — LIDOCAINE 40 MG/G
CREAM TOPICAL ONCE
Status: DISCONTINUED | OUTPATIENT
Start: 2024-03-11 | End: 2024-03-12 | Stop reason: HOSPADM

## 2024-03-11 NOTE — PROGRESS NOTES
Wound Care Center Physician Orders and Discharge Instructions  [facility]  [address], [address]  [place] [address]  Telephone: [phone]      Fax: [phone]        Home Health Company:  [facility]  phone [phone].   No need to come the week of 3/4/24     NAME:  Ari Snider   YOB: 1935  PRIMARY DIAGNOSIS FOR WOUND CARE CENTER:  Diabetic foot wound, Ambrose 3 .     Wound cleansing:   Do not scrub or use excessive force.  Wash hands with soap and water before and after dressing changes.  Prior to applying a clean dressing, cleanse wound with normal saline, wound cleanser, or mild soap and water. Ask your physician or nurse before getting the wound(s) wet in the shower.                Wound care for home:     Right suture line and right lower leg:   Wash wound with soap and water with dressing changes.   Betadine to foot incision    4x4's, ABD    Accuwrap   Leave dressing on all week     May bear weight on right foot.   Elevate foot when sitting.           Please note, all wounds (unless stated otherwise here) were mechanically debrided at the time of cleansing here in the wound-care center today, so a small amount of pain, drainage or bleeding from that process might be expected, and is normal.      All products for home use, including multiple products for a single wound if applicable, are medically necessary in order to achieve the best chance at timely wound healing. See provider documentation for details if needed.     Substituted dressings applied in the [facility] today, if applicable:           New orders for this week (labs, imaging, medications, etc.):      May take a shower with cast protector on.   May bear weight on right foot.      Additional instructions for specific diagnoses:     General comments for diabetic / neuropathic ulcers:  *  Unless you've been instructed not to remove your dressings, be sure to inspect your feet daily, and notify us of any 
appointment. Also discussed ways to keep the wrap dry for a shower, including a plastic cast-guard, available in retail pharmacies. He should call before his next visit if there is any significant pain, significant strike-through of drainage to the surface of the wrap, or any significant sense of the wrap sliding down more than an inch or so, bunching-up and abrading his skin.    Written discharge instructions given to patient.   Offload ulcer(s) as directed.   Elevate leg(s) as directed.   Follow up in 1 week.     Increase elevation of legs.            Electronically signed by Cliff Keller DPM on 3/11/2024 at 7:34 PM.

## 2024-03-11 NOTE — PLAN OF CARE
Pt to the [facility] for follow up appointment.  Wounds measuring smaller.  Pt to continue with weekly compression wrap to right lower leg.  Pt to follow up in the [facility] in 1 week.  Discharge instructions reviewed with patient, all questions answered, copy given to patient. Dressings were applied to all wounds per M.D. Instructions at this visit.

## 2024-03-12 NOTE — DISCHARGE INSTRUCTIONS
Wound Care Center Physician Orders and Discharge Instructions  [facility]  [address], [address]  [place] [id number]  Telephone: [phone]      Fax: [phone]        Home Health Company:       NAME:  Ari Snider   YOB: 1935  PRIMARY DIAGNOSIS FOR WOUND CARE CENTER:  Diabetic foot wound, Ambrose 3 .     Wound cleansing:   Do not scrub or use excessive force.  Wash hands with soap and water before and after dressing changes.  Prior to applying a clean dressing, cleanse wound with normal saline, wound cleanser, or mild soap and water. Ask your physician or nurse before getting the wound(s) wet in the shower.                Wound care for home:     Right suture line and right lower leg:   Wash wound with soap and water with dressing changes.   Betadine to foot incision    4x4's, ABD    Accuwrap   Leave dressing on all week     May bear weight on right foot.   Elevate foot when sitting.           Please note, all wounds (unless stated otherwise here) were mechanically debrided at the time of cleansing here in the wound-care center today, so a small amount of pain, drainage or bleeding from that process might be expected, and is normal.      All products for home use, including multiple products for a single wound if applicable, are medically necessary in order to achieve the best chance at timely wound healing. See provider documentation for details if needed.     Substituted dressings applied in the [facility] today, if applicable:           New orders for this week (labs, imaging, medications, etc.):      May take a shower with cast protector on.   May bear weight on right foot.      Additional instructions for specific diagnoses:     General comments for diabetic / neuropathic ulcers:  *  Unless you've been instructed not to remove your dressings, be sure to inspect your feet daily, and notify us of any major changes.  *  If you do not have a long-term podiatrist, be

## 2024-03-18 ENCOUNTER — HOSPITAL ENCOUNTER (OUTPATIENT)
Dept: WOUND CARE | Age: 89
Discharge: HOME | End: 2024-03-18
Attending: PODIATRIST | Admitting: PODIATRIST
Payer: MEDICARE

## 2024-03-18 VITALS
HEART RATE: 72 BPM | TEMPERATURE: 97.7 F | SYSTOLIC BLOOD PRESSURE: 158 MMHG | BODY MASS INDEX: 34.1 KG/M2 | RESPIRATION RATE: 18 BRPM | WEIGHT: 225 LBS | DIASTOLIC BLOOD PRESSURE: 80 MMHG | HEIGHT: 68 IN

## 2024-03-18 DIAGNOSIS — L97.514 DIABETIC ULCER OF OTHER PART OF RIGHT FOOT ASSOCIATED WITH TYPE 2 DIABETES MELLITUS, WITH NECROSIS OF BONE (HCC): Primary | ICD-10-CM

## 2024-03-18 DIAGNOSIS — E11.621 DIABETIC ULCER OF OTHER PART OF RIGHT FOOT ASSOCIATED WITH TYPE 2 DIABETES MELLITUS, WITH NECROSIS OF BONE (HCC): Primary | ICD-10-CM

## 2024-03-18 PROCEDURE — 29581 APPL MULTLAYER CMPRN SYS LEG: CPT

## 2024-03-18 RX ORDER — SODIUM CHLOR/HYPOCHLOROUS ACID 0.033 %
SOLUTION, IRRIGATION IRRIGATION ONCE
OUTPATIENT
Start: 2024-03-18 | End: 2024-03-18

## 2024-03-18 RX ORDER — LIDOCAINE 40 MG/G
CREAM TOPICAL ONCE
Status: DISCONTINUED | OUTPATIENT
Start: 2024-03-18 | End: 2024-03-19 | Stop reason: HOSPADM

## 2024-03-18 RX ORDER — LIDOCAINE 50 MG/G
OINTMENT TOPICAL ONCE
OUTPATIENT
Start: 2024-03-18 | End: 2024-03-18

## 2024-03-18 RX ORDER — TRIAMCINOLONE ACETONIDE 1 MG/G
OINTMENT TOPICAL ONCE
OUTPATIENT
Start: 2024-03-18 | End: 2024-03-18

## 2024-03-18 RX ORDER — LIDOCAINE 40 MG/G
CREAM TOPICAL ONCE
OUTPATIENT
Start: 2024-03-18 | End: 2024-03-18

## 2024-03-18 RX ORDER — BACITRACIN ZINC AND POLYMYXIN B SULFATE 500; 1000 [USP'U]/G; [USP'U]/G
OINTMENT TOPICAL ONCE
OUTPATIENT
Start: 2024-03-18 | End: 2024-03-18

## 2024-03-18 RX ORDER — LIDOCAINE HYDROCHLORIDE 40 MG/ML
SOLUTION TOPICAL ONCE
OUTPATIENT
Start: 2024-03-18 | End: 2024-03-18

## 2024-03-18 NOTE — PROGRESS NOTES
Wound Care Center Physician Orders and Discharge Instructions  [facility]  [address], [address]  [place] [id number]  Telephone: [phone]      Fax: [phone]        Home Health Company:       NAME:  Ari Snider   YOB: 1935  PRIMARY DIAGNOSIS FOR WOUND CARE CENTER:  Diabetic foot wound, Ambrose 3 .     Wound cleansing:   Do not scrub or use excessive force.  Wash hands with soap and water before and after dressing changes.  Prior to applying a clean dressing, cleanse wound with normal saline, wound cleanser, or mild soap and water. Ask your physician or nurse before getting the wound(s) wet in the shower.                Wound care for home:     Right suture line and right lower leg:   Wash wound with soap and water with dressing changes.   Betadine to foot incision    4x4's, ABD    Accuwrap   Leave dressing on all week     May bear weight on right foot.   Elevate foot when sitting.           Please note, all wounds (unless stated otherwise here) were mechanically debrided at the time of cleansing here in the wound-care center today, so a small amount of pain, drainage or bleeding from that process might be expected, and is normal.      All products for home use, including multiple products for a single wound if applicable, are medically necessary in order to achieve the best chance at timely wound healing. See provider documentation for details if needed.     Substituted dressings applied in the [facility] today, if applicable:           New orders for this week (labs, imaging, medications, etc.):      May take a shower with cast protector on.   May bear weight on right foot.      Additional instructions for specific diagnoses:     General comments for diabetic / neuropathic ulcers:  *  Unless you've been instructed not to remove your dressings, be sure to inspect your feet daily, and notify us of any major changes.  *  If you do not have a long-term podiatrist, be

## 2024-03-18 NOTE — PLAN OF CARE
Pt to the [facility] for follow up appointment.  Wound healed.  Pt to have compression wrap applied for another week.  Pt to follow up in the [facility] in 1 week.  No need for Home Care at this time.  Discharge instructions reviewed with patient, all questions answered, copy given to patient. Dressings were applied to all wounds per M.D. Instructions at this visit.

## 2024-03-19 NOTE — PROGRESS NOTES
[facility] Wound Care Center Progress Note      Ari Snider     : 1935    DATE OF VISIT:  3/18/2024    Subjective:     Ari Snider is a 88 y.o. male who has a chief complaint of a diabetic ulcer located on the right foot. Significant symptoms or pertinent ulcer history since last visit: States doing well since being home. Has been walking some.   Did well with wrap.  Leg feels good.       Mr. Snider has a past medical history of Abscess of toe of right foot, Acute CVA (cerebrovascular accident) (HCC), Blood transfusion, Cellulitis of right lower extremity, Cushing's syndrome (HCC), Diabetic ulcer of toe 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 mellitus, with fat layer exposed ([facility]), Greater trochanteric bursitis of right hip, Hypertension, Lumbar spondylosis, Lymphoma (HCC), MRSA infection, Osteomyelitis of left foot (HCC), Tinnitus, Tobacco use, Ulcer of other part of foot, Vertigo, benign paroxysmal, and 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[facility]).    He has a past surgical history that includes Arm Debridement; Abdomen surgery; Colonoscopy; Dilatation, esophagus; skin biopsy; Foot Debridement (Right, 2021); Foot Debridement (Right, 2021); Foot Debridement (Right, 2022); Foot surgery (Right, 2022); IR MIDLINE CATH (2024); and Foot surgery (Right, 2024).    His family history includes Cancer in his brother; No Known Problems in his brother, sister, and sister; Other in his father and mother.     Mr. Snider reports that he has quit smoking. He has never used smokeless tobacco. He reports that he does not drink alcohol and does not use drugs.    His current medication list consists of Multiple Vitamin, acetaminophen, albuterol sulfate HFA, aspirin, atorvastatin, blood glucose test strips, famotidine, glipiZIDE, lisinopril, melatonin, metFORMIN, oxyCODONE-acetaminophen, and pantoprazole.    Allergies: Patient has no known allergies.    Pertinent items from the review of

## 2024-03-19 NOTE — DISCHARGE INSTRUCTIONS
Wound Care Center Physician Orders and Discharge Instructions  [facility]  [address], [address]  [place] [address]  Telephone: [phone]      Fax: [phone]        Home Health Company:       NAME:  Ari Snider   YOB: 1935  PRIMARY DIAGNOSIS FOR WOUND CARE CENTER:  Diabetic foot wound, Ambrose 3 .     Wound cleansing:   Do not scrub or use excessive force.  Wash hands with soap and water before and after dressing changes.  Prior to applying a clean dressing, cleanse wound with normal saline, wound cleanser, or mild soap and water. Ask your physician or nurse before getting the wound(s) wet in the shower.                Wound care for home:     Right suture line and right lower leg:    Healed    Right pretib wounds:   Wash wound with soap and water with dressing changes.   collagen to wound   4x4's, ABD    Accuwrap   Leave dressing on all week     May bear weight on right foot.   Elevate foot when sitting.           Please note, all wounds (unless stated otherwise here) were mechanically debrided at the time of cleansing here in the wound-care center today, so a small amount of pain, drainage or bleeding from that process might be expected, and is normal.      All products for home use, including multiple products for a single wound if applicable, are medically necessary in order to achieve the best chance at timely wound healing. See provider documentation for details if needed.     Substituted dressings applied in the [facility] today, if applicable:           New orders for this week (labs, imaging, medications, etc.):      May take a shower with cast protector on.   May bear weight on right foot.      Additional instructions for specific diagnoses:     General comments for diabetic / neuropathic ulcers:  *  Unless you've been instructed not to remove your dressings, be sure to inspect your feet daily, and notify us of any major changes.  *  If you do not have a

## 2024-03-20 ENCOUNTER — OFFICE VISIT (OUTPATIENT)
Dept: FAMILY MEDICINE CLINIC | Age: 89
End: 2024-03-20
Payer: MEDICARE

## 2024-03-20 VITALS
SYSTOLIC BLOOD PRESSURE: 118 MMHG | TEMPERATURE: 97.6 F | BODY MASS INDEX: 34.36 KG/M2 | DIASTOLIC BLOOD PRESSURE: 62 MMHG | HEART RATE: 67 BPM | WEIGHT: 226 LBS | OXYGEN SATURATION: 96 %

## 2024-03-20 DIAGNOSIS — I89.0 LYMPHEDEMA: ICD-10-CM

## 2024-03-20 DIAGNOSIS — E78.5 DYSLIPIDEMIA: ICD-10-CM

## 2024-03-20 DIAGNOSIS — Z00.00 MEDICARE ANNUAL WELLNESS VISIT, SUBSEQUENT: Primary | ICD-10-CM

## 2024-03-20 DIAGNOSIS — E11.65 TYPE 2 DIABETES MELLITUS WITH HYPERGLYCEMIA, WITHOUT LONG-TERM CURRENT USE OF INSULIN (HCC): ICD-10-CM

## 2024-03-20 DIAGNOSIS — I65.23 BILATERAL CAROTID ARTERY STENOSIS: ICD-10-CM

## 2024-03-20 DIAGNOSIS — I73.9 PVD (PERIPHERAL VASCULAR DISEASE) (HCC): ICD-10-CM

## 2024-03-20 DIAGNOSIS — I25.10 CORONARY ARTERY DISEASE INVOLVING NATIVE CORONARY ARTERY OF NATIVE HEART WITHOUT ANGINA PECTORIS: ICD-10-CM

## 2024-03-20 DIAGNOSIS — E11.42 TYPE 2 DIABETES MELLITUS WITH DIABETIC POLYNEUROPATHY, WITHOUT LONG-TERM CURRENT USE OF INSULIN (HCC): ICD-10-CM

## 2024-03-20 DIAGNOSIS — E66.09 CLASS 1 OBESITY DUE TO EXCESS CALORIES WITH SERIOUS COMORBIDITY AND BODY MASS INDEX (BMI) OF 34.0 TO 34.9 IN ADULT: ICD-10-CM

## 2024-03-20 PROCEDURE — 3052F HG A1C>EQUAL 8.0%<EQUAL 9.0%: CPT | Performed by: NURSE PRACTITIONER

## 2024-03-20 PROCEDURE — 1123F ACP DISCUSS/DSCN MKR DOCD: CPT | Performed by: NURSE PRACTITIONER

## 2024-03-20 PROCEDURE — G0439 PPPS, SUBSEQ VISIT: HCPCS | Performed by: NURSE PRACTITIONER

## 2024-03-20 SDOH — ECONOMIC STABILITY: FOOD INSECURITY: WITHIN THE PAST 12 MONTHS, YOU WORRIED THAT YOUR FOOD WOULD RUN OUT BEFORE YOU GOT MONEY TO BUY MORE.: NEVER TRUE

## 2024-03-20 SDOH — ECONOMIC STABILITY: FOOD INSECURITY: WITHIN THE PAST 12 MONTHS, THE FOOD YOU BOUGHT JUST DIDN'T LAST AND YOU DIDN'T HAVE MONEY TO GET MORE.: NEVER TRUE

## 2024-03-20 SDOH — ECONOMIC STABILITY: INCOME INSECURITY: HOW HARD IS IT FOR YOU TO PAY FOR THE VERY BASICS LIKE FOOD, HOUSING, MEDICAL CARE, AND HEATING?: NOT HARD AT ALL

## 2024-03-20 ASSESSMENT — PATIENT HEALTH QUESTIONNAIRE - PHQ9
SUM OF ALL RESPONSES TO PHQ QUESTIONS 1-9: 2
SUM OF ALL RESPONSES TO PHQ QUESTIONS 1-9: 2
SUM OF ALL RESPONSES TO PHQ9 QUESTIONS 1 & 2: 2
2. FEELING DOWN, DEPRESSED OR HOPELESS: NOT AT ALL
1. LITTLE INTEREST OR PLEASURE IN DOING THINGS: MORE THAN HALF THE DAYS
2. FEELING DOWN, DEPRESSED OR HOPELESS: NOT AT ALL
SUM OF ALL RESPONSES TO PHQ9 QUESTIONS 1 & 2: 2
SUM OF ALL RESPONSES TO PHQ QUESTIONS 1-9: 2
SUM OF ALL RESPONSES TO PHQ QUESTIONS 1-9: 2
1. LITTLE INTEREST OR PLEASURE IN DOING THINGS: MORE THAN HALF THE DAYS

## 2024-03-20 NOTE — PATIENT INSTRUCTIONS
brown rice.    Lean proteins    These can include lean meat, poultry, fish, and eggs.  You can also have tofu, beans, peas, lentils, nuts, and seeds.    Dairy    Try milk, yogurt, and cheese.  Choose low-fat or fat-free when you can.  If you need to, use lactose-free milk or fortified plant-based milk products, such as soy milk.    Water    Drink water when you're thirsty.  Limit sugar-sweetened drinks, including soda, fruit drinks, and sports drinks.  Where can you learn more?  Go to https://www.[employer].net/patientEd and enter T756 to learn more about \"Eating Healthy Foods: Care Instructions.\"  Current as of: September 20, 2023               Content Version: 14.0  © 9715-6659 [employer].   Care instructions adapted under license by [employer]. If you have questions about a medical condition or this instruction, always ask your healthcare professional. [employer] disclaims any warranty or liability for your use of this information.           Starting a Weight Loss Plan: Care Instructions  Overview     It can be a challenge to lose weight. But your doctor can help you make a weight-loss plan that meets your needs.  You don't have to make a lot of big changes at once. A better idea might be to focus on small changes and stick with them. When those changes become habit, you can add a few more changes.  Some people find it helpful to take an exercise or nutrition class. If you have questions, ask your doctor about seeing a registered dietitian or an exercise specialist. You might also think about joining a weight-loss support group.  If you're not ready to make changes right now, try to pick a date in the future. Then make an appointment with your doctor to talk about when and how you'll get started with a plan.  Follow-up care is a key part of your treatment and safety. Be sure to make and go to all appointments, and call your doctor if you are having problems. It's also a good idea to

## 2024-03-20 NOTE — PROGRESS NOTES
Proventil as needed per insurance. Yes Logan Dhillon MD   acetaminophen (TYLENOL) 325 MG tablet Take 2 tablets by mouth every 4 hours as needed for Pain Yes Provider, MD Moe   Multiple Vitamins-Minerals (MULTIVITAMIN PO) Take 1 tablet by mouth daily. Yes Provider, MD Moe       CareTeam (Including outside providers/suppliers regularly involved in providing care):   Patient Care Team:  Wilma Rowley APRN - CNP as PCP - General (Nurse Practitioner)  Wilma Rowley APRN - CNP as PCP - Empaneled Provider  Everardo Hdz MD as Consulting Physician (Hematology and Oncology)  Micky Araujo MD as Physician (Vascular Surgery)  Cliff Stover MD as Consulting Physician (Cardiology)     Reviewed and updated this visit:  Tobacco  Allergies  Meds  Problems  Med Hx  Surg Hx  Soc Hx  Fam Hx

## 2024-03-25 ENCOUNTER — HOSPITAL ENCOUNTER (OUTPATIENT)
Dept: WOUND CARE | Age: 89
Discharge: HOME | End: 2024-03-25
Attending: PODIATRIST | Admitting: PODIATRIST
Payer: MEDICARE

## 2024-03-25 VITALS
TEMPERATURE: 97.8 F | DIASTOLIC BLOOD PRESSURE: 73 MMHG | HEART RATE: 81 BPM | HEIGHT: 68 IN | RESPIRATION RATE: 20 BRPM | WEIGHT: 227.2 LBS | SYSTOLIC BLOOD PRESSURE: 148 MMHG | BODY MASS INDEX: 34.43 KG/M2

## 2024-03-25 DIAGNOSIS — E11.621 DIABETIC ULCER OF OTHER PART OF RIGHT FOOT ASSOCIATED WITH TYPE 2 DIABETES MELLITUS, WITH NECROSIS OF BONE (HCC): Primary | ICD-10-CM

## 2024-03-25 DIAGNOSIS — L97.514 DIABETIC ULCER OF OTHER PART OF RIGHT FOOT ASSOCIATED WITH TYPE 2 DIABETES MELLITUS, WITH NECROSIS OF BONE (HCC): Primary | ICD-10-CM

## 2024-03-25 PROCEDURE — 29581 APPL MULTLAYER CMPRN SYS LEG: CPT

## 2024-03-25 RX ORDER — LIDOCAINE 40 MG/G
CREAM TOPICAL ONCE
Status: DISCONTINUED | OUTPATIENT
Start: 2024-03-25 | End: 2024-03-26 | Stop reason: HOSPADM

## 2024-03-25 RX ORDER — BACITRACIN ZINC AND POLYMYXIN B SULFATE 500; 1000 [USP'U]/G; [USP'U]/G
OINTMENT TOPICAL ONCE
OUTPATIENT
Start: 2024-03-25 | End: 2024-03-25

## 2024-03-25 RX ORDER — LIDOCAINE 50 MG/G
OINTMENT TOPICAL ONCE
OUTPATIENT
Start: 2024-03-25 | End: 2024-03-25

## 2024-03-25 RX ORDER — LIDOCAINE HYDROCHLORIDE 40 MG/ML
SOLUTION TOPICAL ONCE
OUTPATIENT
Start: 2024-03-25 | End: 2024-03-25

## 2024-03-25 RX ORDER — LIDOCAINE 40 MG/G
CREAM TOPICAL ONCE
OUTPATIENT
Start: 2024-03-25 | End: 2024-03-25

## 2024-03-25 RX ORDER — SODIUM CHLOR/HYPOCHLOROUS ACID 0.033 %
SOLUTION, IRRIGATION IRRIGATION ONCE
OUTPATIENT
Start: 2024-03-25 | End: 2024-03-25

## 2024-03-25 RX ORDER — TRIAMCINOLONE ACETONIDE 1 MG/G
OINTMENT TOPICAL ONCE
OUTPATIENT
Start: 2024-03-25 | End: 2024-03-25

## 2024-03-25 NOTE — PLAN OF CARE
Pt to the [facility] for follow up appointment.  Suture line healed.  Pt with new wound on right pretib.  Will place weekly compression wrap to right lower leg.  Pt to follow up in the [facility] in 1 week.  Discharge instructions reviewed with patient, all questions answered, copy given to patient. Dressings were applied to all wounds per M.D. Instructions at this visit.

## 2024-03-26 ASSESSMENT — ENCOUNTER SYMPTOMS: SHORTNESS OF BREATH: 0

## 2024-03-26 NOTE — DISCHARGE INSTRUCTIONS
Wound Care Center Physician Orders and Discharge Instructions  [facility]  [address], [address]  [place] [address]  Telephone: [phone]      Fax: [phone]        Home Health Company:       NAME:  Ari Snider   YOB: 1935  PRIMARY DIAGNOSIS FOR WOUND CARE CENTER:  Diabetic foot wound, Ambrose 3 .     Wound cleansing:   Do not scrub or use excessive force.  Wash hands with soap and water before and after dressing changes.  Prior to applying a clean dressing, cleanse wound with normal saline, wound cleanser, or mild soap and water. Ask your physician or nurse before getting the wound(s) wet in the shower.                Wound care for home:     Right suture line and right lower leg:    Healed     Right pretib wounds:   Wash wound with soap and water with dressing changes.   collagen to wound   4x4's, ABD    Accuwrap   Leave dressing on all week     May bear weight on right foot.   Elevate foot when sitting.           Please note, all wounds (unless stated otherwise here) were mechanically debrided at the time of cleansing here in the wound-care center today, so a small amount of pain, drainage or bleeding from that process might be expected, and is normal.      All products for home use, including multiple products for a single wound if applicable, are medically necessary in order to achieve the best chance at timely wound healing. See provider documentation for details if needed.     Substituted dressings applied in the [facility] today, if applicable:           New orders for this week (labs, imaging, medications, etc.):      May take a shower with cast protector on.   May bear weight on right foot.      Additional instructions for specific diagnoses:     General comments for diabetic / neuropathic ulcers:  *  Unless you've been instructed not to remove your dressings, be sure to inspect your feet daily, and notify us of any major changes.  *  If you do not have a

## 2024-04-01 ENCOUNTER — HOSPITAL ENCOUNTER (OUTPATIENT)
Dept: WOUND CARE | Age: 89
Discharge: HOME | End: 2024-04-01
Attending: PODIATRIST | Admitting: PODIATRIST
Payer: MEDICARE

## 2024-04-01 VITALS
RESPIRATION RATE: 18 BRPM | DIASTOLIC BLOOD PRESSURE: 77 MMHG | SYSTOLIC BLOOD PRESSURE: 154 MMHG | TEMPERATURE: 97.9 F | BODY MASS INDEX: 34.19 KG/M2 | WEIGHT: 225.6 LBS | HEIGHT: 68 IN | HEART RATE: 80 BPM

## 2024-04-01 DIAGNOSIS — L97.514 DIABETIC ULCER OF OTHER PART OF RIGHT FOOT ASSOCIATED WITH TYPE 2 DIABETES MELLITUS, WITH NECROSIS OF BONE (HCC): Primary | ICD-10-CM

## 2024-04-01 DIAGNOSIS — E11.621 DIABETIC ULCER OF OTHER PART OF RIGHT FOOT ASSOCIATED WITH TYPE 2 DIABETES MELLITUS, WITH NECROSIS OF BONE (HCC): Primary | ICD-10-CM

## 2024-04-01 PROCEDURE — 29581 APPL MULTLAYER CMPRN SYS LEG: CPT

## 2024-04-01 RX ORDER — BACITRACIN ZINC AND POLYMYXIN B SULFATE 500; 1000 [USP'U]/G; [USP'U]/G
OINTMENT TOPICAL ONCE
OUTPATIENT
Start: 2024-04-01 | End: 2024-04-01

## 2024-04-01 RX ORDER — TRIAMCINOLONE ACETONIDE 1 MG/G
OINTMENT TOPICAL ONCE
OUTPATIENT
Start: 2024-04-01 | End: 2024-04-01

## 2024-04-01 RX ORDER — SODIUM CHLOR/HYPOCHLOROUS ACID 0.033 %
SOLUTION, IRRIGATION IRRIGATION ONCE
OUTPATIENT
Start: 2024-04-01 | End: 2024-04-01

## 2024-04-01 RX ORDER — LIDOCAINE 50 MG/G
OINTMENT TOPICAL ONCE
OUTPATIENT
Start: 2024-04-01 | End: 2024-04-01

## 2024-04-01 RX ORDER — LIDOCAINE 40 MG/G
CREAM TOPICAL ONCE
OUTPATIENT
Start: 2024-04-01 | End: 2024-04-01

## 2024-04-01 RX ORDER — LIDOCAINE HYDROCHLORIDE 40 MG/ML
SOLUTION TOPICAL ONCE
OUTPATIENT
Start: 2024-04-01 | End: 2024-04-01

## 2024-04-01 RX ORDER — LIDOCAINE 40 MG/G
CREAM TOPICAL ONCE
Status: DISCONTINUED | OUTPATIENT
Start: 2024-04-01 | End: 2024-04-02 | Stop reason: HOSPADM

## 2024-04-01 NOTE — PLAN OF CARE
Pt to the [facility] for follow up appointment.  Wound improving.  Pt to continue with compression wrap on right lower leg this week.  Pt to follow up in the [facility] in 1 week.  Discharge instructions reviewed with patient, all questions answered, copy given to patient. Dressings were applied to all wounds per M.D. Instructions at this visit.

## 2024-04-01 NOTE — PROGRESS NOTES
reviewed and is negative.   Denies nausea, vomiting, fevers, chills, shortness of breath or chest pain.       Objective:     BP (!) 154/77   Pulse 80   Temp 97.9 °F (36.6 °C) (Oral)   Resp 18   Ht 1.727 m (5' 8\")   Wt 102.3 kg (225 lb 9.6 oz)   BMI 34.30 kg/m²        Dorsalis pedis pulse left palpable  Posterior tibial pulse left palpable  Dorsalis pedis pulse right palpable  Posterior tibial pulse right palpable  Protective sensation absent bilateral LE      Thin area of skin anterior aspect right lower leg.    Ulcer on the anterior aspect right lower leg epithelialized    Venous dermatitis right LE.  Thickening of skin right LE.      + edema of LE bilateral (right  > left)      Pre-debridement ulcer measurements are in the wound documentation flowsheet.    Post  Debridement Measurements:  Today's wound measurements:  [REMOVED] Wound 03/25/24 #3 Right proximal pretib, venous, partial thickness, onset 3/2024-Wound Length (cm): 0 cm  Wound 03/25/24 #4 Right distal pretib, venous, partial thickness, onset 3/2024-Wound Length (cm): 0.1 cm    [REMOVED] Wound 03/25/24 #3 Right proximal pretib, venous, partial thickness, onset 3/2024-Wound Width (cm): 0 cm  Wound 03/25/24 #4 Right distal pretib, venous, partial thickness, onset 3/2024-Wound Width (cm): 0.1 cm    [REMOVED] Wound 03/25/24 #3 Right proximal pretib, venous, partial thickness, onset 3/2024-Wound Depth (cm): 0 cm  Wound 03/25/24 #4 Right distal pretib, venous, partial thickness, onset 3/2024-Wound Depth (cm): 0.1 cm    Pain Control: Anesthetic  Anesthetic: 4% Lidocaine Cream         LABS  Lab Results   Component Value Date    LABA1C 8.0 01/12/2024         Assessment:     Patient Active Problem List   Diagnosis Code    Dyslipidemia E78.5    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

## 2024-04-02 NOTE — DISCHARGE INSTRUCTIONS
Wound Care Center Physician Orders and Discharge Instructions  [facility]  [address], [address]  [place] [address]  Telephone: [phone]      Fax: [phone]        Home Health Company:       NAME:  Ari Snider   YOB: 1935  PRIMARY DIAGNOSIS FOR WOUND CARE CENTER:  Diabetic foot wound, Ambrose 3 .     Wound cleansing:   Do not scrub or use excessive force.  Wash hands with soap and water before and after dressing changes.  Prior to applying a clean dressing, cleanse wound with normal saline, wound cleanser, or mild soap and water. Ask your physician or nurse before getting the wound(s) wet in the shower.                Wound care for home:     Right suture line and right lower leg:    Healed     Right pretib wounds:   Wash wound with soap and water with dressing changes.   healed   Lotion to dry skin     Prescription for insert in shoe.   Take prescription to Neel at [facility] [phone] to have new insert made      Call Dr Keller's office for month appointment.    Cliff Keller [facility]  [address]  [address]  [place] [address]    Phone [phone]         WOUND CARE CENTER DISCHARGE INSTRUCTIONS:     CONGRATULATIONS! You have completed your treatment program!  We have created a list of general reminders to assist you in preventing future wounds:     1.   Check your skin daily for reddened areas, abrasions, or sores. If a new wound is noted, call the Wound Care Center at [phone].   2.   Clean your using mild soap and warm (not hot) water, daily.   3.   If your skin appears dry, apply lotion as needed, but not between the toes.   4.   Avoid pressure and trauma to recently healed wounds. The skin is still very fragile and will break down easily.     5.   Always wear socks and well-fitting shoes. Never walk barefoot.   6.   Maintain a nutritious diet, minimizing alcohol, caffeine, and excess sugar. We can give you recommendations for increasing the

## 2024-04-08 ENCOUNTER — HOSPITAL ENCOUNTER (OUTPATIENT)
Dept: WOUND CARE | Age: 89
Discharge: HOME | End: 2024-04-08
Attending: PODIATRIST | Admitting: PODIATRIST
Payer: MEDICARE

## 2024-04-08 VITALS
DIASTOLIC BLOOD PRESSURE: 71 MMHG | WEIGHT: 227.8 LBS | SYSTOLIC BLOOD PRESSURE: 130 MMHG | BODY MASS INDEX: 34.53 KG/M2 | HEIGHT: 68 IN | TEMPERATURE: 98.2 F | RESPIRATION RATE: 20 BRPM | HEART RATE: 72 BPM

## 2024-04-08 DIAGNOSIS — L97.514 DIABETIC ULCER OF OTHER PART OF RIGHT FOOT ASSOCIATED WITH TYPE 2 DIABETES MELLITUS, WITH NECROSIS OF BONE (HCC): Primary | ICD-10-CM

## 2024-04-08 DIAGNOSIS — E11.621 DIABETIC ULCER OF OTHER PART OF RIGHT FOOT ASSOCIATED WITH TYPE 2 DIABETES MELLITUS, WITH NECROSIS OF BONE (HCC): Primary | ICD-10-CM

## 2024-04-08 PROCEDURE — 99213 OFFICE O/P EST LOW 20 MIN: CPT

## 2024-04-08 RX ORDER — LIDOCAINE 40 MG/G
CREAM TOPICAL ONCE
Status: CANCELLED | OUTPATIENT
Start: 2024-04-08 | End: 2024-04-08

## 2024-04-08 RX ORDER — LIDOCAINE HYDROCHLORIDE 40 MG/ML
SOLUTION TOPICAL ONCE
Status: CANCELLED | OUTPATIENT
Start: 2024-04-08 | End: 2024-04-08

## 2024-04-08 RX ORDER — BACITRACIN ZINC AND POLYMYXIN B SULFATE 500; 1000 [USP'U]/G; [USP'U]/G
OINTMENT TOPICAL ONCE
Status: CANCELLED | OUTPATIENT
Start: 2024-04-08 | End: 2024-04-08

## 2024-04-08 RX ORDER — LIDOCAINE 50 MG/G
OINTMENT TOPICAL ONCE
Status: CANCELLED | OUTPATIENT
Start: 2024-04-08 | End: 2024-04-08

## 2024-04-08 RX ORDER — SODIUM CHLOR/HYPOCHLOROUS ACID 0.033 %
SOLUTION, IRRIGATION IRRIGATION ONCE
Status: CANCELLED | OUTPATIENT
Start: 2024-04-08 | End: 2024-04-08

## 2024-04-08 RX ORDER — TRIAMCINOLONE ACETONIDE 1 MG/G
OINTMENT TOPICAL ONCE
Status: CANCELLED | OUTPATIENT
Start: 2024-04-08 | End: 2024-04-08

## 2024-04-08 NOTE — PLAN OF CARE
Pt to the [facility] for follow up appointment.  Wound healed.  Pt to place lotion to dry skin.  Pt to follow up with Neel at [facility] regarding new shoe insert.  Pt to call Dr Keller's office for appointment in 1 month.  Pt to follow up in the [facility] as needed.  Discharge instructions reviewed with patient, all questions answered, copy given to patient. Dressings were applied to all wounds per M.D. Instructions at this visit.

## 2024-04-15 NOTE — TELEPHONE ENCOUNTER
Called patient.  LMOM to call RT Inhaler-Nebulizer Bronchodilator Protocol Note    There is a bronchodilator order in the chart from a provider indicating to follow the RT Bronchodilator Protocol and there is an “Initiate RT Inhaler-Nebulizer Bronchodilator Protocol” order as well (see protocol at bottom of note).    CXR Findings:  No results found.    The findings from the last RT Protocol Assessment were as follows:   History Pulmonary Disease: Chronic pulmonary disease  Respiratory Pattern: Dyspnea on exertion or RR 21-25 bpm  Breath Sounds: Intermittent or unilateral wheezes  Cough: Strong, productive  Indication for Bronchodilator Therapy: On home bronchodilators  Bronchodilator Assessment Score: 9    Aerosolized bronchodilator medication orders have been revised according to the RT Inhaler-Nebulizer Bronchodilator Protocol below.    Respiratory Therapist to perform RT Therapy Protocol Assessment initially then follow the protocol.  Repeat RT Therapy Protocol Assessment PRN for score 0-3 or on second treatment, BID, and PRN for scores above 3.    No Indications - adjust the frequency to every 6 hours PRN wheezing or bronchospasm, if no treatments needed after 48 hours then discontinue using Per Protocol order mode.     If indication present, adjust the RT bronchodilator orders based on the Bronchodilator Assessment Score as indicated below.  Use Inhaler orders unless patient has one or more of the following: on home nebulizer, not able to hold breath for 10 seconds, is not alert and oriented, cannot activate and use MDI correctly, or respiratory rate 25 breaths per minute or more, then use the equivalent nebulizer order(s) with same Frequency and PRN reasons based on the score.  If a patient is on this medication at home then do not decrease Frequency below that used at home.    0-3 - enter or revise RT bronchodilator order(s) to equivalent RT Bronchodilator order with Frequency of every 4 hours PRN for wheezing or increased work of  RT Inhaler-Nebulizer Bronchodilator Protocol Note    There is a bronchodilator order in the chart from a provider indicating to follow the RT Bronchodilator Protocol and there is an “Initiate RT Inhaler-Nebulizer Bronchodilator Protocol” order as well (see protocol at bottom of note).    CXR Findings:  No results found.    The findings from the last RT Protocol Assessment were as follows:   History Pulmonary Disease: Chronic pulmonary disease  Respiratory Pattern: Dyspnea on exertion or RR 21-25 bpm  Breath Sounds: Intermittent or unilateral wheezes  Cough: Strong, productive  Indication for Bronchodilator Therapy: On home bronchodilators  Bronchodilator Assessment Score: 9    Aerosolized bronchodilator medication orders have been revised according to the RT Inhaler-Nebulizer Bronchodilator Protocol below.    Respiratory Therapist to perform RT Therapy Protocol Assessment initially then follow the protocol.  Repeat RT Therapy Protocol Assessment PRN for score 0-3 or on second treatment, BID, and PRN for scores above 3.    No Indications - adjust the frequency to every 6 hours PRN wheezing or bronchospasm, if no treatments needed after 48 hours then discontinue using Per Protocol order mode.     If indication present, adjust the RT bronchodilator orders based on the Bronchodilator Assessment Score as indicated below.  Use Inhaler orders unless patient has one or more of the following: on home nebulizer, not able to hold breath for 10 seconds, is not alert and oriented, cannot activate and use MDI correctly, or respiratory rate 25 breaths per minute or more, then use the equivalent nebulizer order(s) with same Frequency and PRN reasons based on the score.  If a patient is on this medication at home then do not decrease Frequency below that used at home.    0-3 - enter or revise RT bronchodilator order(s) to equivalent RT Bronchodilator order with Frequency of every 4 hours PRN for wheezing or increased work of  RT Inhaler-Nebulizer Bronchodilator Protocol Note    There is a bronchodilator order in the chart from a provider indicating to follow the RT Bronchodilator Protocol and there is an “Initiate RT Inhaler-Nebulizer Bronchodilator Protocol” order as well (see protocol at bottom of note).    CXR Findings:  No results found.    The findings from the last RT Protocol Assessment were as follows:   History Pulmonary Disease: Chronic pulmonary disease  Respiratory Pattern: Dyspnea on exertion or RR 21-25 bpm  Breath Sounds: Intermittent or unilateral wheezes  Cough: Strong, spontaneous, non-productive  Indication for Bronchodilator Therapy: On home bronchodilators  Bronchodilator Assessment Score: 8    Aerosolized bronchodilator medication orders have been revised according to the RT Inhaler-Nebulizer Bronchodilator Protocol below.    Respiratory Therapist to perform RT Therapy Protocol Assessment initially then follow the protocol.  Repeat RT Therapy Protocol Assessment PRN for score 0-3 or on second treatment, BID, and PRN for scores above 3.    No Indications - adjust the frequency to every 6 hours PRN wheezing or bronchospasm, if no treatments needed after 48 hours then discontinue using Per Protocol order mode.     If indication present, adjust the RT bronchodilator orders based on the Bronchodilator Assessment Score as indicated below.  Use Inhaler orders unless patient has one or more of the following: on home nebulizer, not able to hold breath for 10 seconds, is not alert and oriented, cannot activate and use MDI correctly, or respiratory rate 25 breaths per minute or more, then use the equivalent nebulizer order(s) with same Frequency and PRN reasons based on the score.  If a patient is on this medication at home then do not decrease Frequency below that used at home.    0-3 - enter or revise RT bronchodilator order(s) to equivalent RT Bronchodilator order with Frequency of every 4 hours PRN for wheezing or  RT Inhaler-Nebulizer Bronchodilator Protocol Note    There is a bronchodilator order in the chart from a provider indicating to follow the RT Bronchodilator Protocol and there is an “Initiate RT Inhaler-Nebulizer Bronchodilator Protocol” order as well (see protocol at bottom of note).    CXR Findings:  No results found.    The findings from the last RT Protocol Assessment were as follows:   History Pulmonary Disease: Chronic pulmonary disease  Respiratory Pattern: Regular pattern and RR 12-20 bpm  Breath Sounds: Slightly diminished and/or crackles  Cough: Strong, productive  Indication for Bronchodilator Therapy: On home bronchodilators  Bronchodilator Assessment Score: 5    Aerosolized bronchodilator medication orders have been revised according to the RT Inhaler-Nebulizer Bronchodilator Protocol below.    Respiratory Therapist to perform RT Therapy Protocol Assessment initially then follow the protocol.  Repeat RT Therapy Protocol Assessment PRN for score 0-3 or on second treatment, BID, and PRN for scores above 3.    No Indications - adjust the frequency to every 6 hours PRN wheezing or bronchospasm, if no treatments needed after 48 hours then discontinue using Per Protocol order mode.     If indication present, adjust the RT bronchodilator orders based on the Bronchodilator Assessment Score as indicated below.  Use Inhaler orders unless patient has one or more of the following: on home nebulizer, not able to hold breath for 10 seconds, is not alert and oriented, cannot activate and use MDI correctly, or respiratory rate 25 breaths per minute or more, then use the equivalent nebulizer order(s) with same Frequency and PRN reasons based on the score.  If a patient is on this medication at home then do not decrease Frequency below that used at home.    0-3 - enter or revise RT bronchodilator order(s) to equivalent RT Bronchodilator order with Frequency of every 4 hours PRN for wheezing or increased work of breathing  RT Inhaler-Nebulizer Bronchodilator Protocol Note    There is a bronchodilator order in the chart from a provider indicating to follow the RT Bronchodilator Protocol and there is an “Initiate RT Inhaler-Nebulizer Bronchodilator Protocol” order as well (see protocol at bottom of note).    CXR Findings:  No results found.    The findings from the last RT Protocol Assessment were as follows:   History Pulmonary Disease: Chronic pulmonary disease  Respiratory Pattern: Regular pattern and RR 12-20 bpm  Breath Sounds: Slightly diminished and/or crackles  Cough: Strong, productive  Indication for Bronchodilator Therapy: On home bronchodilators  Bronchodilator Assessment Score: 5    Aerosolized bronchodilator medication orders have been revised according to the RT Inhaler-Nebulizer Bronchodilator Protocol below.    Respiratory Therapist to perform RT Therapy Protocol Assessment initially then follow the protocol.  Repeat RT Therapy Protocol Assessment PRN for score 0-3 or on second treatment, BID, and PRN for scores above 3.    No Indications - adjust the frequency to every 6 hours PRN wheezing or bronchospasm, if no treatments needed after 48 hours then discontinue using Per Protocol order mode.     If indication present, adjust the RT bronchodilator orders based on the Bronchodilator Assessment Score as indicated below.  Use Inhaler orders unless patient has one or more of the following: on home nebulizer, not able to hold breath for 10 seconds, is not alert and oriented, cannot activate and use MDI correctly, or respiratory rate 25 breaths per minute or more, then use the equivalent nebulizer order(s) with same Frequency and PRN reasons based on the score.  If a patient is on this medication at home then do not decrease Frequency below that used at home.    0-3 - enter or revise RT bronchodilator order(s) to equivalent RT Bronchodilator order with Frequency of every 4 hours PRN for wheezing or increased work of breathing  RT Nebulizer Bronchodilator Protocol Note    There is a bronchodilator order in the chart from a provider indicating to follow the RT Bronchodilator Protocol and there is an “Initiate RT Bronchodilator Protocol” order as well (see protocol at bottom of note).    CXR Findings:  No results found.    The findings from the last RT Protocol Assessment were as follows:  Smoking: Chronic pulmonary disease  Respiratory Pattern: Dyspnea on exertion or RR 21-25 bpm  Breath Sounds: Intermittent or unilateral wheezes  Cough: Weak, non-productive  Indication for Bronchodilator Therapy: On home bronchodilators  Bronchodilator Assessment Score: 11    Aerosolized bronchodilator medication orders have been revised according to the RT Nebulizer Bronchodilator Protocol below.    Respiratory Therapist to perform RT Therapy Protocol Assessment initially then follow the protocol.  Repeat RT Therapy Protocol Assessment PRN for score 0-3 or on second treatment, BID, and PRN for scores above 3.    No Indications - adjust the frequency to every 6 hours PRN wheezing or bronchospasm, if no treatments needed after 48 hours then discontinue using Per Protocol order mode.     If indication present, adjust the RT bronchodilator orders based on the Bronchodilator Assessment Score as indicated below.  If a patient is on this medication at home then do not decrease Frequency below that used at home.    0-3 - enter or revise RT bronchodilator order(s) to equivalent RT Bronchodilator order with Frequency of every 4 hours PRN for wheezing or increased work of breathing using Per Protocol order mode.       4-6 - enter or revise RT Bronchodilator order(s) to two equivalent RT bronchodilator orders with one order with BID Frequency and one order with Frequency of every 4 hours PRN wheezing or increased work of breathing using Per Protocol order mode.         7-10 - enter or revise RT Bronchodilator order(s) to two equivalent RT bronchodilator orders with  ,

## 2024-04-17 LAB
ESTIMATED AVERAGE GLUCOSE: NORMAL
HBA1C MFR BLD: 8.2 %

## 2024-05-01 ENCOUNTER — OFFICE VISIT (OUTPATIENT)
Dept: FAMILY MEDICINE CLINIC | Age: 89
End: 2024-05-01
Payer: MEDICARE

## 2024-05-01 VITALS
HEART RATE: 68 BPM | BODY MASS INDEX: 35.73 KG/M2 | WEIGHT: 235 LBS | SYSTOLIC BLOOD PRESSURE: 137 MMHG | DIASTOLIC BLOOD PRESSURE: 71 MMHG | TEMPERATURE: 97.3 F | OXYGEN SATURATION: 95 %

## 2024-05-01 DIAGNOSIS — C85.90 LYMPHOMA, UNSPECIFIED BODY REGION, UNSPECIFIED LYMPHOMA TYPE (HCC): ICD-10-CM

## 2024-05-01 DIAGNOSIS — D64.9 NORMOCYTIC NORMOCHROMIC ANEMIA: ICD-10-CM

## 2024-05-01 DIAGNOSIS — E66.01 SEVERE OBESITY (BMI 35.0-39.9) WITH COMORBIDITY (HCC): ICD-10-CM

## 2024-05-01 DIAGNOSIS — E11.65 TYPE 2 DIABETES MELLITUS WITH HYPERGLYCEMIA, WITHOUT LONG-TERM CURRENT USE OF INSULIN (HCC): Primary | ICD-10-CM

## 2024-05-01 PROCEDURE — 99214 OFFICE O/P EST MOD 30 MIN: CPT | Performed by: NURSE PRACTITIONER

## 2024-05-01 PROCEDURE — G2211 COMPLEX E/M VISIT ADD ON: HCPCS | Performed by: NURSE PRACTITIONER

## 2024-05-01 PROCEDURE — 1123F ACP DISCUSS/DSCN MKR DOCD: CPT | Performed by: NURSE PRACTITIONER

## 2024-05-01 PROCEDURE — 3052F HG A1C>EQUAL 8.0%<EQUAL 9.0%: CPT | Performed by: NURSE PRACTITIONER

## 2024-05-01 RX ORDER — ORAL SEMAGLUTIDE 3 MG/1
3 TABLET ORAL
Qty: 30 TABLET | Refills: 0 | Status: SHIPPED | OUTPATIENT
Start: 2024-05-01

## 2024-05-01 ASSESSMENT — PATIENT HEALTH QUESTIONNAIRE - PHQ9
10. IF YOU CHECKED OFF ANY PROBLEMS, HOW DIFFICULT HAVE THESE PROBLEMS MADE IT FOR YOU TO DO YOUR WORK, TAKE CARE OF THINGS AT HOME, OR GET ALONG WITH OTHER PEOPLE: NOT DIFFICULT AT ALL
3. TROUBLE FALLING OR STAYING ASLEEP: NOT AT ALL
SUM OF ALL RESPONSES TO PHQ QUESTIONS 1-9: 0
SUM OF ALL RESPONSES TO PHQ QUESTIONS 1-9: 0
9. THOUGHTS THAT YOU WOULD BE BETTER OFF DEAD, OR OF HURTING YOURSELF: NOT AT ALL
2. FEELING DOWN, DEPRESSED OR HOPELESS: NOT AT ALL
1. LITTLE INTEREST OR PLEASURE IN DOING THINGS: NOT AT ALL
6. FEELING BAD ABOUT YOURSELF - OR THAT YOU ARE A FAILURE OR HAVE LET YOURSELF OR YOUR FAMILY DOWN: NOT AT ALL
SUM OF ALL RESPONSES TO PHQ9 QUESTIONS 1 & 2: 0
SUM OF ALL RESPONSES TO PHQ QUESTIONS 1-9: 0
7. TROUBLE CONCENTRATING ON THINGS, SUCH AS READING THE NEWSPAPER OR WATCHING TELEVISION: NOT AT ALL
SUM OF ALL RESPONSES TO PHQ QUESTIONS 1-9: 0
8. MOVING OR SPEAKING SO SLOWLY THAT OTHER PEOPLE COULD HAVE NOTICED. OR THE OPPOSITE, BEING SO FIGETY OR RESTLESS THAT YOU HAVE BEEN MOVING AROUND A LOT MORE THAN USUAL: NOT AT ALL
4. FEELING TIRED OR HAVING LITTLE ENERGY: NOT AT ALL
5. POOR APPETITE OR OVEREATING: NOT AT ALL

## 2024-05-01 ASSESSMENT — ENCOUNTER SYMPTOMS: SHORTNESS OF BREATH: 0

## 2024-05-01 NOTE — PROGRESS NOTES
5/1/2024    Chief Complaint   Patient presents with    Follow-up     6 wks fu he is not fasting     Diabetes     Does not check bs     Hypertension       Ari Snider is a 88 y.o. male, presents today for:      ASSESSMENT/PLAN:    1. Type 2 diabetes mellitus with hyperglycemia, without long-term current use of insulin (HCC)  Worsening control today with [facility] 8.[facility]% [facility] ( [facility])  Continue Metformin/ Glipizide. Discussed medication options with pt and son, Josue.  Will start Rybelsus today.  Consider D/C of Glipizide if tolerates well.   Continue checking BS daily  Encouraged Annual eye exam  on statin/ on ACE   Encouraged low carbohydrate, heart healthy, low fat diet  Vaccinations are UTD  Goal A1C < 7.5% due to age, co-morbidities  Encouraged continued care with Dr. Keller for foot exams  Annual labs due next OV  - Semaglutide (RYBELSUS) 3 MG TABS; Take 3 mg by mouth every morning (before breakfast)  Dispense: 30 tablet; Refill: 0  - Basic Metabolic Panel; Future    2. Normocytic normochromic anemia  Noted continue normocytic normochromic anemia.  Updated iron/ B12 labs ordered today.  Asymptomatic.   - Iron and TIBC; Future  - Ferritin; Future  - Vitamin B12 & Folate; Future  - CBC; Future    3. Lymphoma, unspecified body region, unspecified lymphoma type (HCC)  Asymptomatic today  Encouraged surveillance OV with [facility]    4. Severe obesity (BMI 35.0-39.9) with comorbidity (HCC)  Encouraged diet/ exercise changes      Return in about 3 months (around 8/1/2024) for dm.    Presenting today for chronic disease follow up.  Here today with son, Josue. [facility]  DM: is checking BS.  no hypoglycemic symptoms.  Chronic right great toe ulcer foot/ eye complaints.  Annual Eye exam is UTD (through [facility]).  is tolerating Glipizide.  Has now obtained Diabetic shoes [facility]  HTN: No CP, SOB, orthopnea, PND.  Continues to have right leg lymphedema.  Tolerating meds well.  not checking BP at home.

## 2024-05-28 DIAGNOSIS — E11.65 TYPE 2 DIABETES MELLITUS WITH HYPERGLYCEMIA, WITHOUT LONG-TERM CURRENT USE OF INSULIN (HCC): ICD-10-CM

## 2024-05-28 RX ORDER — ORAL SEMAGLUTIDE 3 MG/1
1 TABLET ORAL
Qty: 30 TABLET | Refills: 0 | OUTPATIENT
Start: 2024-05-28

## 2024-05-28 RX ORDER — ORAL SEMAGLUTIDE 7 MG/1
7 TABLET ORAL
Qty: 30 TABLET | Refills: 0 | Status: ON HOLD | OUTPATIENT
Start: 2024-05-28 | End: 2024-06-26

## 2024-05-28 NOTE — TELEPHONE ENCOUNTER
Spoke to patient's son and he said he seems to be tolerating it pretty well but he thought they were supposed to stop the metformin so they stopped it 2 wks ago and he is just taking rybelsus and glipizide.  But when I look at the note it looks like you wanted him to do metformin, glipizide and rybelsus with possibly d/c the glipizide if he rybelsus was tolerated well.  Patients son said he most of got confused about the instructions so he has not been taking the metformin.  Please advise.    I did put the 7mg rybelsus on because I told the patients son that is usually the next dose after the 3mg

## 2024-05-28 NOTE — TELEPHONE ENCOUNTER
Patients son Josue stated he does not check his BS and he wont and he stated he does not eat like he is diabetic,  he just eats whatever he wants.    I did tell him that he should be taking the metformin, glipizide and rybelsus 7mg till we instruct them otherwise

## 2024-05-28 NOTE — TELEPHONE ENCOUNTER
Yes I still want him on the Metformin.  I increased the Rybelsus to 7 mg.  How have his sugars been on just the Rybelsus?  Im trying to decide if he can stop the Glipizide now.

## 2024-06-03 ENCOUNTER — TELEPHONE (OUTPATIENT)
Dept: FAMILY MEDICINE CLINIC | Age: 89
End: 2024-06-03

## 2024-06-03 DIAGNOSIS — E11.65 TYPE 2 DIABETES MELLITUS WITH HYPERGLYCEMIA, WITHOUT LONG-TERM CURRENT USE OF INSULIN (HCC): Primary | ICD-10-CM

## 2024-06-03 NOTE — TELEPHONE ENCOUNTER
Patients son called and stated he started him back on the metformin since he had taken him off of it when he started the Rybelsus, because he had got the instructions wrong about the medications.  The problem he is having now is since he put him back on the metformin and he is dizzy and very nauseated so he had him stop the rybelsus, he is only taking metformin and glipizide now but he is still dizzy and nauseous.  This is somewhat complicated and it may be best for Wilma to call patients son to discuss this

## 2024-06-05 RX ORDER — BLOOD-GLUCOSE METER
1 KIT MISCELLANEOUS DAILY
Qty: 1 KIT | Refills: 0 | Status: SHIPPED | OUTPATIENT
Start: 2024-06-05

## 2024-06-05 RX ORDER — GLUCOSAMINE HCL/CHONDROITIN SU 500-400 MG
CAPSULE ORAL
Qty: 100 STRIP | Refills: 0 | Status: SHIPPED | OUTPATIENT
Start: 2024-06-05

## 2024-06-05 RX ORDER — LANCETS 30 GAUGE
1 EACH MISCELLANEOUS DAILY
Qty: 100 EACH | Refills: 5 | Status: SHIPPED | OUTPATIENT
Start: 2024-06-05 | End: 2024-06-06 | Stop reason: SDUPTHER

## 2024-06-05 NOTE — TELEPHONE ENCOUNTER
Called sonJosue to discuss diabetes medication regmen.       Finsihed out week with normal meds  Took Metformin out and gave Rybelsus instead.  Realized meant Glipizide.  Took Metformin off for 2 weeks.  Did not have issues with Rybelsus. Seemed to have no issues.  After talking to staff on 5/28 Josue restarted Metformin, Glipizide and Rybelsus he was very sick.  Took Rybelsus in the AM and Metformin in PM and was sick the next morning.    Then put him back on Metformin and Glipizide.  The next morning he was very nauseated.  Since then has left him on Metformin and Glipizide.  Got sick again yesterday    Reviewed with son that dizziness has previously been reviewed with PCP and multiple specialists over the past 60 years and is likely multifactoral (see prior OV notes).  Son notes patient seems to be more nauseous in the AM than other times but is not checking his blood sugars.  Not occurring every day.  Josue will plan to do the following medication regimen:    This week:Planning to do regular meds for the next week.   2nd week: Discussed possible hypoglycemia episodes in the AM causing dizziness and to attempt Metformin twice a day and Glipizide just in the MA the week after. Hold Rybelsus  3rd week: May then consider restart Rybelsus at 3 mg with Metformin only pending response to meds the week before.  No Glipizide.     [facility] is [facility]  Pt has in past been reluctant to check blood sugar regularly.  Discussed possibly coming to office to do trial of CGM with DexCom G7 or Freestyle Nereida 3.      Also discussed potential cost of Rybelsus which may be a barrier to continued use.  Encouraged [facility] to [facility] if can receive from there or if they can assist with other diabetes medication options.  Did review with son it may mean switching [facility]Ps to [facility].      Carmen frias is FYI for you.

## 2024-06-06 DIAGNOSIS — E11.65 TYPE 2 DIABETES MELLITUS WITH HYPERGLYCEMIA, WITHOUT LONG-TERM CURRENT USE OF INSULIN (HCC): ICD-10-CM

## 2024-06-06 RX ORDER — LANCETS 30 GAUGE
1 EACH MISCELLANEOUS 2 TIMES DAILY
Qty: 100 EACH | Refills: 5 | OUTPATIENT
Start: 2024-06-06

## 2024-06-06 NOTE — TELEPHONE ENCOUNTER
Pharmacy called and test strips have different directions that lancets. Spoke to Carmen and we changed lancets to bid same as test strips. If you could sign rx to change med list.

## 2024-06-13 ENCOUNTER — APPOINTMENT (OUTPATIENT)
Dept: GENERAL RADIOLOGY | Age: 89
End: 2024-06-13
Payer: OTHER GOVERNMENT

## 2024-06-13 ENCOUNTER — APPOINTMENT (OUTPATIENT)
Dept: CT IMAGING | Age: 89
End: 2024-06-13
Payer: OTHER GOVERNMENT

## 2024-06-13 ENCOUNTER — HOSPITAL ENCOUNTER (INPATIENT)
Age: 89
LOS: 2 days | Discharge: HOME | End: 2024-06-15
Attending: STUDENT IN AN ORGANIZED HEALTH CARE EDUCATION/TRAINING PROGRAM | Admitting: INTERNAL MEDICINE
Payer: OTHER GOVERNMENT

## 2024-06-13 DIAGNOSIS — R07.9 CHEST PAIN, UNSPECIFIED TYPE: Primary | ICD-10-CM

## 2024-06-13 PROBLEM — K82.8 GALLBLADDER SLUDGE: Status: ACTIVE | Noted: 2024-06-13

## 2024-06-13 PROBLEM — R10.13 EPIGASTRIC PAIN: Status: ACTIVE | Noted: 2024-06-13

## 2024-06-13 LAB
ALBUMIN SERPL-MCNC: 3.9 G/DL (ref 3.4–5)
ALBUMIN/GLOB SERPL: 1.1 {RATIO} (ref 1.1–2.2)
ALP SERPL-CCNC: 89 U/L (ref 40–129)
ALT SERPL-CCNC: <5 U/L (ref 10–40)
ANION GAP SERPL CALCULATED.3IONS-SCNC: 13 MMOL/L (ref 3–16)
AST SERPL-CCNC: 14 U/L (ref 15–37)
BASOPHILS # BLD: 0.1 K/UL (ref 0–0.2)
BASOPHILS NFR BLD: 1 %
BILIRUB SERPL-MCNC: 0.3 MG/DL (ref 0–1)
BUN SERPL-MCNC: 25 MG/DL (ref 7–20)
CALCIUM SERPL-MCNC: 9 MG/DL (ref 8.3–10.6)
CHLORIDE SERPL-SCNC: 104 MMOL/L (ref 99–110)
CO2 SERPL-SCNC: 24 MMOL/L (ref 21–32)
CREAT SERPL-MCNC: 1.1 MG/DL (ref 0.8–1.3)
DEPRECATED RDW RBC AUTO: 16.7 % (ref 12.4–15.4)
EKG ATRIAL RATE: 68 BPM
EKG DIAGNOSIS: NORMAL
EKG P AXIS: 60 DEGREES
EKG P-R INTERVAL: 178 MS
EKG Q-T INTERVAL: 456 MS
EKG QRS DURATION: 162 MS
EKG QTC CALCULATION (BAZETT): 484 MS
EKG R AXIS: -49 DEGREES
EKG T AXIS: 44 DEGREES
EKG VENTRICULAR RATE: 68 BPM
EOSINOPHIL # BLD: 0.3 K/UL (ref 0–0.6)
EOSINOPHIL NFR BLD: 4.2 %
GFR SERPLBLD CREATININE-BSD FMLA CKD-EPI: 65 ML/MIN/{1.73_M2}
GLUCOSE BLD-MCNC: 167 MG/DL (ref 70–99)
GLUCOSE BLD-MCNC: 180 MG/DL (ref 70–99)
GLUCOSE SERPL-MCNC: 180 MG/DL (ref 70–99)
HCT VFR BLD AUTO: 30.1 % (ref 40.5–52.5)
HGB BLD-MCNC: 10.1 G/DL (ref 13.5–17.5)
LACTATE BLDV-SCNC: 1.1 MMOL/L (ref 0.4–1.9)
LEFT VENTRICULAR EJECTION FRACTION MODE: NORMAL
LIPASE SERPL-CCNC: 45 U/L (ref 13–60)
LV EF: 55 % (ref 55–60)
LYMPHOCYTES # BLD: 3.8 K/UL (ref 1–5.1)
LYMPHOCYTES NFR BLD: 49.3 %
MCH RBC QN AUTO: 30.6 PG (ref 26–34)
MCHC RBC AUTO-ENTMCNC: 33.6 G/DL (ref 31–36)
MCV RBC AUTO: 91.1 FL (ref 80–100)
MONOCYTES # BLD: 0.4 K/UL (ref 0–1.3)
MONOCYTES NFR BLD: 5.4 %
NEUTROPHILS # BLD: 3.1 K/UL (ref 1.7–7.7)
NEUTROPHILS NFR BLD: 40.1 %
PERFORMED ON: ABNORMAL
PERFORMED ON: ABNORMAL
PLATELET # BLD AUTO: 193 K/UL (ref 135–450)
PMV BLD AUTO: 7.7 FL (ref 5–10.5)
POTASSIUM SERPL-SCNC: 4.5 MMOL/L (ref 3.5–5.1)
PROT SERPL-MCNC: 7.4 G/DL (ref 6.4–8.2)
RBC # BLD AUTO: 3.31 M/UL (ref 4.2–5.9)
SODIUM SERPL-SCNC: 141 MMOL/L (ref 136–145)
TROPONIN, HIGH SENSITIVITY: 15 NG/L (ref 0–22)
TROPONIN, HIGH SENSITIVITY: 16 NG/L (ref 0–22)
TROPONIN, HIGH SENSITIVITY: 18 NG/L (ref 0–22)
WBC # BLD AUTO: 7.4 K/UL (ref 4–11)

## 2024-06-13 PROCEDURE — 6360000002 HC RX W HCPCS

## 2024-06-13 PROCEDURE — 2060000000 HC ICU INTERMEDIATE R&B

## 2024-06-13 PROCEDURE — 6370000000 HC RX 637 (ALT 250 FOR IP)

## 2024-06-13 PROCEDURE — 84484 ASSAY OF TROPONIN QUANT: CPT

## 2024-06-13 PROCEDURE — C9113 INJ PANTOPRAZOLE SODIUM, VIA: HCPCS

## 2024-06-13 PROCEDURE — 96376 TX/PRO/DX INJ SAME DRUG ADON: CPT

## 2024-06-13 PROCEDURE — 83550 IRON BINDING TEST: CPT

## 2024-06-13 PROCEDURE — 93010 ELECTROCARDIOGRAM REPORT: CPT | Performed by: INTERNAL MEDICINE

## 2024-06-13 PROCEDURE — 80053 COMPREHEN METABOLIC PANEL: CPT

## 2024-06-13 PROCEDURE — 99223 1ST HOSP IP/OBS HIGH 75: CPT

## 2024-06-13 PROCEDURE — 74174 CTA ABD&PLVS W/CONTRAST: CPT

## 2024-06-13 PROCEDURE — 2580000003 HC RX 258

## 2024-06-13 PROCEDURE — 71275 CT ANGIOGRAPHY CHEST: CPT

## 2024-06-13 PROCEDURE — 83605 ASSAY OF LACTIC ACID: CPT

## 2024-06-13 PROCEDURE — 99285 EMERGENCY DEPT VISIT HI MDM: CPT

## 2024-06-13 PROCEDURE — 83540 ASSAY OF IRON: CPT

## 2024-06-13 PROCEDURE — 82607 VITAMIN B-12: CPT

## 2024-06-13 PROCEDURE — 93005 ELECTROCARDIOGRAM TRACING: CPT | Performed by: STUDENT IN AN ORGANIZED HEALTH CARE EDUCATION/TRAINING PROGRAM

## 2024-06-13 PROCEDURE — 96375 TX/PRO/DX INJ NEW DRUG ADDON: CPT

## 2024-06-13 PROCEDURE — 83690 ASSAY OF LIPASE: CPT

## 2024-06-13 PROCEDURE — 82746 ASSAY OF FOLIC ACID SERUM: CPT

## 2024-06-13 PROCEDURE — 96374 THER/PROPH/DIAG INJ IV PUSH: CPT

## 2024-06-13 PROCEDURE — 6360000002 HC RX W HCPCS: Performed by: STUDENT IN AN ORGANIZED HEALTH CARE EDUCATION/TRAINING PROGRAM

## 2024-06-13 PROCEDURE — 6360000004 HC RX CONTRAST MEDICATION: Performed by: STUDENT IN AN ORGANIZED HEALTH CARE EDUCATION/TRAINING PROGRAM

## 2024-06-13 PROCEDURE — 36415 COLL VENOUS BLD VENIPUNCTURE: CPT

## 2024-06-13 PROCEDURE — 2580000003 HC RX 258: Performed by: STUDENT IN AN ORGANIZED HEALTH CARE EDUCATION/TRAINING PROGRAM

## 2024-06-13 PROCEDURE — 83036 HEMOGLOBIN GLYCOSYLATED A1C: CPT

## 2024-06-13 PROCEDURE — 85025 COMPLETE CBC W/AUTO DIFF WBC: CPT

## 2024-06-13 PROCEDURE — 71045 X-RAY EXAM CHEST 1 VIEW: CPT

## 2024-06-13 PROCEDURE — 6370000000 HC RX 637 (ALT 250 FOR IP): Performed by: STUDENT IN AN ORGANIZED HEALTH CARE EDUCATION/TRAINING PROGRAM

## 2024-06-13 RX ORDER — POTASSIUM CHLORIDE 20 MEQ/1
40 TABLET, EXTENDED RELEASE ORAL PRN
Status: DISCONTINUED | OUTPATIENT
Start: 2024-06-13 | End: 2024-06-15 | Stop reason: HOSPADM

## 2024-06-13 RX ORDER — HYDRALAZINE HYDROCHLORIDE 20 MG/ML
10 INJECTION INTRAMUSCULAR; INTRAVENOUS EVERY 6 HOURS PRN
Status: DISCONTINUED | OUTPATIENT
Start: 2024-06-13 | End: 2024-06-15 | Stop reason: HOSPADM

## 2024-06-13 RX ORDER — SODIUM CHLORIDE 0.9 % (FLUSH) 0.9 %
5-40 SYRINGE (ML) INJECTION PRN
Status: DISCONTINUED | OUTPATIENT
Start: 2024-06-13 | End: 2024-06-15 | Stop reason: HOSPADM

## 2024-06-13 RX ORDER — ONDANSETRON 2 MG/ML
4 INJECTION INTRAMUSCULAR; INTRAVENOUS EVERY 6 HOURS PRN
Status: DISCONTINUED | OUTPATIENT
Start: 2024-06-13 | End: 2024-06-15 | Stop reason: HOSPADM

## 2024-06-13 RX ORDER — ACETAMINOPHEN 325 MG/1
650 TABLET ORAL EVERY 6 HOURS PRN
Status: DISCONTINUED | OUTPATIENT
Start: 2024-06-13 | End: 2024-06-15 | Stop reason: HOSPADM

## 2024-06-13 RX ORDER — REGADENOSON 0.08 MG/ML
0.4 INJECTION, SOLUTION INTRAVENOUS
Status: DISCONTINUED | OUTPATIENT
Start: 2024-06-13 | End: 2024-06-13

## 2024-06-13 RX ORDER — ASPIRIN 81 MG/1
81 TABLET ORAL DAILY
Status: DISCONTINUED | OUTPATIENT
Start: 2024-06-13 | End: 2024-06-15 | Stop reason: HOSPADM

## 2024-06-13 RX ORDER — PANTOPRAZOLE SODIUM 40 MG/10ML
40 INJECTION, POWDER, LYOPHILIZED, FOR SOLUTION INTRAVENOUS 2 TIMES DAILY
Status: DISCONTINUED | OUTPATIENT
Start: 2024-06-13 | End: 2024-06-15 | Stop reason: HOSPADM

## 2024-06-13 RX ORDER — ATORVASTATIN CALCIUM 40 MG/1
40 TABLET, FILM COATED ORAL DAILY
Status: DISCONTINUED | OUTPATIENT
Start: 2024-06-13 | End: 2024-06-15 | Stop reason: HOSPADM

## 2024-06-13 RX ORDER — POLYETHYLENE GLYCOL 3350 17 G/17G
17 POWDER, FOR SOLUTION ORAL DAILY PRN
Status: DISCONTINUED | OUTPATIENT
Start: 2024-06-13 | End: 2024-06-15 | Stop reason: HOSPADM

## 2024-06-13 RX ORDER — 0.9 % SODIUM CHLORIDE 0.9 %
1000 INTRAVENOUS SOLUTION INTRAVENOUS ONCE
Status: COMPLETED | OUTPATIENT
Start: 2024-06-13 | End: 2024-06-13

## 2024-06-13 RX ORDER — ONDANSETRON 4 MG/1
4 TABLET, ORALLY DISINTEGRATING ORAL EVERY 8 HOURS PRN
Status: DISCONTINUED | OUTPATIENT
Start: 2024-06-13 | End: 2024-06-15 | Stop reason: HOSPADM

## 2024-06-13 RX ORDER — ASPIRIN 81 MG/1
324 TABLET, CHEWABLE ORAL ONCE
Status: COMPLETED | OUTPATIENT
Start: 2024-06-13 | End: 2024-06-13

## 2024-06-13 RX ORDER — MORPHINE SULFATE 4 MG/ML
4 INJECTION, SOLUTION INTRAMUSCULAR; INTRAVENOUS ONCE
Status: COMPLETED | OUTPATIENT
Start: 2024-06-13 | End: 2024-06-13

## 2024-06-13 RX ORDER — ACETAMINOPHEN 650 MG/1
650 SUPPOSITORY RECTAL EVERY 6 HOURS PRN
Status: DISCONTINUED | OUTPATIENT
Start: 2024-06-13 | End: 2024-06-15 | Stop reason: HOSPADM

## 2024-06-13 RX ORDER — DEXTROSE MONOHYDRATE 100 MG/ML
INJECTION, SOLUTION INTRAVENOUS CONTINUOUS PRN
Status: DISCONTINUED | OUTPATIENT
Start: 2024-06-13 | End: 2024-06-15 | Stop reason: HOSPADM

## 2024-06-13 RX ORDER — MAGNESIUM SULFATE IN WATER 40 MG/ML
2000 INJECTION, SOLUTION INTRAVENOUS PRN
Status: DISCONTINUED | OUTPATIENT
Start: 2024-06-13 | End: 2024-06-15 | Stop reason: HOSPADM

## 2024-06-13 RX ORDER — INSULIN LISPRO 100 [IU]/ML
0-4 INJECTION, SOLUTION INTRAVENOUS; SUBCUTANEOUS
Status: DISCONTINUED | OUTPATIENT
Start: 2024-06-13 | End: 2024-06-15 | Stop reason: HOSPADM

## 2024-06-13 RX ORDER — LISINOPRIL 5 MG/1
5 TABLET ORAL DAILY
Status: DISCONTINUED | OUTPATIENT
Start: 2024-06-14 | End: 2024-06-15 | Stop reason: HOSPADM

## 2024-06-13 RX ORDER — ENOXAPARIN SODIUM 100 MG/ML
40 INJECTION SUBCUTANEOUS DAILY
Status: DISCONTINUED | OUTPATIENT
Start: 2024-06-13 | End: 2024-06-14

## 2024-06-13 RX ORDER — NITROGLYCERIN 0.4 MG/1
0.4 TABLET SUBLINGUAL EVERY 5 MIN PRN
Status: DISCONTINUED | OUTPATIENT
Start: 2024-06-13 | End: 2024-06-13

## 2024-06-13 RX ORDER — INSULIN LISPRO 100 [IU]/ML
0-4 INJECTION, SOLUTION INTRAVENOUS; SUBCUTANEOUS NIGHTLY
Status: DISCONTINUED | OUTPATIENT
Start: 2024-06-13 | End: 2024-06-15 | Stop reason: HOSPADM

## 2024-06-13 RX ORDER — SODIUM CHLORIDE 9 MG/ML
INJECTION, SOLUTION INTRAVENOUS PRN
Status: DISCONTINUED | OUTPATIENT
Start: 2024-06-13 | End: 2024-06-15 | Stop reason: HOSPADM

## 2024-06-13 RX ORDER — POTASSIUM CHLORIDE 7.45 MG/ML
10 INJECTION INTRAVENOUS PRN
Status: DISCONTINUED | OUTPATIENT
Start: 2024-06-13 | End: 2024-06-15 | Stop reason: HOSPADM

## 2024-06-13 RX ORDER — ONDANSETRON 2 MG/ML
4 INJECTION INTRAMUSCULAR; INTRAVENOUS ONCE
Status: COMPLETED | OUTPATIENT
Start: 2024-06-13 | End: 2024-06-13

## 2024-06-13 RX ORDER — SODIUM CHLORIDE 0.9 % (FLUSH) 0.9 %
5-40 SYRINGE (ML) INJECTION EVERY 12 HOURS SCHEDULED
Status: DISCONTINUED | OUTPATIENT
Start: 2024-06-13 | End: 2024-06-15 | Stop reason: HOSPADM

## 2024-06-13 RX ORDER — GLUCAGON 1 MG/ML
1 KIT INJECTION PRN
Status: DISCONTINUED | OUTPATIENT
Start: 2024-06-13 | End: 2024-06-15 | Stop reason: HOSPADM

## 2024-06-13 RX ADMIN — PANTOPRAZOLE SODIUM 40 MG: 40 INJECTION, POWDER, FOR SOLUTION INTRAVENOUS at 20:23

## 2024-06-13 RX ADMIN — NITROGLYCERIN 0.4 MG: 0.4 TABLET SUBLINGUAL at 11:31

## 2024-06-13 RX ADMIN — HYDROMORPHONE HYDROCHLORIDE 0.25 MG: 1 INJECTION, SOLUTION INTRAMUSCULAR; INTRAVENOUS; SUBCUTANEOUS at 17:01

## 2024-06-13 RX ADMIN — MORPHINE SULFATE 4 MG: 4 INJECTION INTRAVENOUS at 12:54

## 2024-06-13 RX ADMIN — ASPIRIN 81 MG: 81 TABLET, COATED ORAL at 17:15

## 2024-06-13 RX ADMIN — SODIUM CHLORIDE 1000 ML: 9 INJECTION, SOLUTION INTRAVENOUS at 12:02

## 2024-06-13 RX ADMIN — NITROGLYCERIN 0.4 MG: 0.4 TABLET SUBLINGUAL at 11:26

## 2024-06-13 RX ADMIN — SODIUM CHLORIDE, PRESERVATIVE FREE 10 ML: 5 INJECTION INTRAVENOUS at 20:24

## 2024-06-13 RX ADMIN — MORPHINE SULFATE 4 MG: 4 INJECTION INTRAVENOUS at 11:37

## 2024-06-13 RX ADMIN — ONDANSETRON 4 MG: 2 INJECTION INTRAMUSCULAR; INTRAVENOUS at 11:37

## 2024-06-13 RX ADMIN — ASPIRIN 324 MG: 81 TABLET, CHEWABLE ORAL at 11:24

## 2024-06-13 RX ADMIN — ENOXAPARIN SODIUM 40 MG: 100 INJECTION SUBCUTANEOUS at 17:15

## 2024-06-13 RX ADMIN — Medication: at 17:15

## 2024-06-13 RX ADMIN — HYDRALAZINE HYDROCHLORIDE 10 MG: 20 INJECTION INTRAMUSCULAR; INTRAVENOUS at 20:24

## 2024-06-13 RX ADMIN — IOMEPROL INJECTION 75 ML: 714 INJECTION, SOLUTION INTRAVASCULAR at 11:55

## 2024-06-13 ASSESSMENT — PAIN SCALES - GENERAL
PAINLEVEL_OUTOF10: 10
PAINLEVEL_OUTOF10: 9
PAINLEVEL_OUTOF10: 10
PAINLEVEL_OUTOF10: 8
PAINLEVEL_OUTOF10: 10

## 2024-06-13 ASSESSMENT — PAIN DESCRIPTION - DESCRIPTORS
DESCRIPTORS: SHARP;SHOOTING
DESCRIPTORS: SHARP
DESCRIPTORS: ACHING;BURNING

## 2024-06-13 ASSESSMENT — PAIN - FUNCTIONAL ASSESSMENT: PAIN_FUNCTIONAL_ASSESSMENT: 0-10

## 2024-06-13 ASSESSMENT — PAIN DESCRIPTION - ONSET: ONSET: GRADUAL

## 2024-06-13 ASSESSMENT — PAIN DESCRIPTION - LOCATION
LOCATION: CHEST
LOCATION: CHEST
LOCATION: CHEST;ABDOMEN

## 2024-06-13 ASSESSMENT — PAIN DESCRIPTION - PAIN TYPE: TYPE: ACUTE PAIN

## 2024-06-13 ASSESSMENT — PAIN DESCRIPTION - FREQUENCY: FREQUENCY: CONTINUOUS

## 2024-06-13 ASSESSMENT — PAIN DESCRIPTION - ORIENTATION: ORIENTATION: MID
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
## 2024-06-13 NOTE — FLOWSHEET NOTE
06/13/24 1916   Handoff   Communication Given Shift Handoff   Handoff Given To JUAN Castro   Handoff Received From JUAN Genao   Handoff Communication Face to Face   Time Handoff Given 1916   End of Shift Check Performed Yes     EOS report given to JUAN Castro. Pt in bed, call light within reach. Pt is stable, care is transferred.

## 2024-06-13 NOTE — PROGRESS NOTES
Patient admitted to room 308 from [facility]. Patient oriented to room, call light, bed rails, phone, lights and bathroom. Patient instructed about the schedule of the day including: vital sign frequency, lab draws, possible tests, frequency of MD and staff rounds, daily weights, I &O's and prescribed diet. 13 Telemetry box in place, patient aware of placement and reason. Bed locked, in lowest position, side rails up 2/4, call light within reach.        Recliner Assessment  Patient is able to demonstrate the ability to move from a reclining position to an upright position within the recliner.       4 Eyes Skin Assessment     NAME:  Ari Snider  YOB: 1935  MEDICAL RECORD NUMBER:  [id number]    The patient is being assessed for  Admission    I agree that at least one RN has performed a thorough Head to Toe Skin Assessment on the patient. ALL assessment sites listed below have been assessed.      Areas assessed by both nurses:    Head, Face, Ears, Shoulders, Back, Chest, Arms, Elbows, Hands, Sacrum. Buttock, Coccyx, Ischium, Legs. Feet and Heels, and Under Medical Devices         Does the Patient have a Wound? No noted wound(s)       Javier Prevention initiated by RN: No  Wound Care Orders initiated by RN: No    Pressure Injury (Stage 3,4, Unstageable, DTI, NWPT, and Complex wounds) if present, place Wound referral order by RN under : No    New Ostomies, if present place, Ostomy referral order under : No     Nurse 1 eSignature: Electronically signed by Birgit Lal RN on 6/13/24 at 3:59 PM EDT    **SHARE this note so that the co-signing nurse can place an eSignature**    Nurse 2 eSignature: Electronically signed by Shantal Velasco RN on 6/13/24 at 7:07 PM EDT

## 2024-06-13 NOTE — ED PROVIDER NOTES
[facility] EMERGENCY DEPARTMENT     EMERGENCY DEPARTMENT ENCOUNTER            Pt Name: Ari Snider   MRN: [id number]   Birthdate 1935   Date of evaluation: 6/13/2024   Provider: Aracely Watkins MD   PCP: Wilma Rowley APRN - CNP   Note Started: 10:59 AM EDT 6/13/24          CHIEF COMPLAINT     Chief Complaint   Patient presents with    Chest Pain     Pt states chest pain for a little over an hour.         HISTORY OF PRESENT ILLNESS:   History from : Patient and Family      Limitations to history : hard of hearing      Ari Snider is a 88 y.o. male who presents complaining of sharp, substernal chest pain.  Patient states that symptoms started about an hour prior to arrival.  He states that it started in the center of his chest.  No radiation.  Has not had pain like this before.  Denies associated shortness of breath.  Denies recent illnesses.  Has chronic lower extremity swelling which they state is actually better than his baseline.  Denies any other complaints or concerns.  Does take a daily baby aspirin at night but has not taken aspirin yet today.    Nursing Notes were all reviewed and agreed with, or any disagreements were addressed in the HPI.     REVIEW OF SYSTEMS :    Positives and Pertinent negatives as per HPI.      MEDICAL HISTORY   has a past medical history of Abscess of toe of right foot (07/27/2011), Acute CVA (cerebrovascular accident) ([facility]) (03/28/2021), Blood transfusion, Cellulitis of right lower extremity (01/11/2024), Cushing's syndrome ([facility]) (09/22/2010), Diabetic ulcer of toe 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 mellitus, with fat layer exposed ([facility]), Greater trochanteric bursitis of right hip (03/01/2023), Hypertension, Lumbar spondylosis (03/01/2023), Lymphoma ([facility]), MRSA infection (06/16/2022), Osteomyelitis of left foot ([facility]) (01/2021), Tinnitus (03/14/2012), Tobacco use, Ulcer of other part of foot (08/02/2011), Vertigo, benign paroxysmal (08/15/2016), and  SEPSIS   LIPASE      When ordered only abnormal lab results are displayed. All other labs were within normal range or not returned as of this dictation.     RADIOLOGY:      Non-plain film images such as CT, Ultrasound and MRI are read by the radiologist. Plain radiographic images are visualized and preliminarily interpreted by the ED Provider with the below findings:   Interpretation per the Radiologist below, if available at the time of this note:  CTA CHEST W WO CONTRAST   Final Result   No thoracic or abdominal aortic aneurysm or dissection.      Coronary vascular disease.      Centrilobular emphysema.  No acute pulmonary process identified.  There are   findings related to chronic granulomatous disease.      Mild mural thickening of the rectum. Correlation with physical exam findings   may be helpful.      Mild diffuse mural thickening of the bladder likely related to chronic   bladder outlet obstruction.      Mild dilatation of the gallbladder with possible minimal sludge in the   dependent gallbladder.      Splenomegaly.      Thyroid nodules.  Nonemergent additional evaluation with thyroid ultrasound   may be helpful if not recently performed.      Additional findings noted above.         CTA ABDOMEN PELVIS W CONTRAST   Final Result   No thoracic or abdominal aortic aneurysm or dissection.      Coronary vascular disease.      Centrilobular emphysema.  No acute pulmonary process identified.  There are   findings related to chronic granulomatous disease.      Mild mural thickening of the rectum. Correlation with physical exam findings   may be helpful.      Mild diffuse mural thickening of the bladder likely related to chronic   bladder outlet obstruction.      Mild dilatation of the gallbladder with possible minimal sludge in the   dependent gallbladder.      Splenomegaly.      Thyroid nodules.  Nonemergent additional evaluation with thyroid ultrasound   may be helpful if not recently performed.      Additional  findings noted above.         XR CHEST PORTABLE   Final Result   No acute cardiopulmonary process.            EKG: The Ekg interpreted by me shows  normal sinus rhythm with a rate of 68  Axis is   Left axis deviation  QTc is  normal  Right bundle branch block, left anterior fascicular block      ST Segments: nonspecific changes  No significant change from prior EKG dated 1/10/2024    PROCEDURES   Unless otherwise noted below, none     CRITICAL CARE TIME   I personally saw the patient and independently provided 35 minutes of non-concurrent critical care out of the total shared critical care time excluding separately billable procedures.        Vitals:    Vitals:    06/13/24 1142 06/13/24 1146 06/13/24 1200 06/13/24 1250   BP: (!) 103/56 (!) 119/42 (!) 148/51 (!) 183/82   Pulse: 57 61  65   Resp: 18 18  18   Temp:       TempSrc:       SpO2: 99% 99%  99%   Weight:       Height:                 Is this patient to be included in the SEP-1 Core Measure due to severe sepsis or septic shock?   No   Exclusion criteria - the patient is NOT to be included for SEP-1 Core Measure due to:  Infection is not suspected       CC/HPI Summary, DDx, ED Course, and Reassessment: Patient is an 88-year-old male, with history of coronary artery disease, presenting with concerns for substernal and epigastric pain that started approximately 1 hour prior to arrival.  Upon arrival to ED, vitals remarkable for hypertension, otherwise reassuring.  Patient is in no acute distress.  Afebrile appears nontoxic.  Is hard of hearing so much of the history is obtained from his family member at bedside.  Patient denies ever having pain like this previously.  Does have a history of coronary artery disease.  EKG shows no evidence of acute ischemia or dysrhythmias.  Troponin is within normal limits x 2.  Patient was treated with aspirin and nitroglycerin, his blood pressure did decrease that he stated that he was then having pain radiating to his back and

## 2024-06-13 NOTE — PROGRESS NOTES
Consult has been called to Dr. Lainez on 6/13/24. Spoke with Dina. 4:31 PM    Cara Ramirez  6/13/2024

## 2024-06-13 NOTE — ED NOTES
Pt now very diaphoretic, pt stating \"I dont think those meds are going to matter. I am going to die\"  to bedside.

## 2024-06-14 ENCOUNTER — APPOINTMENT (OUTPATIENT)
Age: 89
End: 2024-06-14
Attending: STUDENT IN AN ORGANIZED HEALTH CARE EDUCATION/TRAINING PROGRAM
Payer: OTHER GOVERNMENT

## 2024-06-14 ENCOUNTER — APPOINTMENT (OUTPATIENT)
Dept: ULTRASOUND IMAGING | Age: 89
End: 2024-06-14
Payer: OTHER GOVERNMENT

## 2024-06-14 PROBLEM — R10.11 RIGHT UPPER QUADRANT ABDOMINAL PAIN: Status: ACTIVE | Noted: 2024-06-14

## 2024-06-14 LAB
ALBUMIN SERPL-MCNC: 3.4 G/DL (ref 3.4–5)
ALBUMIN/GLOB SERPL: 1 {RATIO} (ref 1.1–2.2)
ALP SERPL-CCNC: 79 U/L (ref 40–129)
ALT SERPL-CCNC: 9 U/L (ref 10–40)
ANION GAP SERPL CALCULATED.3IONS-SCNC: 11 MMOL/L (ref 3–16)
AST SERPL-CCNC: 15 U/L (ref 15–37)
BILIRUB DIRECT SERPL-MCNC: <0.2 MG/DL (ref 0–0.3)
BILIRUB INDIRECT SERPL-MCNC: NORMAL MG/DL (ref 0–1)
BILIRUB SERPL-MCNC: 0.5 MG/DL (ref 0–1)
BILIRUB UR QL STRIP.AUTO: NEGATIVE
BUN SERPL-MCNC: 23 MG/DL (ref 7–20)
CALCIUM SERPL-MCNC: 8.7 MG/DL (ref 8.3–10.6)
CHARACTER UR: ABNORMAL
CHLORIDE SERPL-SCNC: 102 MMOL/L (ref 99–110)
CHOLEST SERPL-MCNC: 96 MG/DL (ref 0–199)
CLARITY UR: ABNORMAL
CO2 SERPL-SCNC: 24 MMOL/L (ref 21–32)
COLOR UR: YELLOW
CREAT SERPL-MCNC: 1.1 MG/DL (ref 0.8–1.3)
DEPRECATED RDW RBC AUTO: 16.2 % (ref 12.4–15.4)
ECHO AV AREA PEAK VELOCITY: 1.5 CM2
ECHO AV AREA VTI: 1.8 CM2
ECHO AV AREA/BSA PEAK VELOCITY: 0.7 CM2/M2
ECHO AV AREA/BSA VTI: 0.8 CM2/M2
ECHO AV MEAN GRADIENT: 23 MMHG
ECHO AV MEAN VELOCITY: 2.3 M/S
ECHO AV PEAK GRADIENT: 40 MMHG
ECHO AV PEAK VELOCITY: 3.2 M/S
ECHO AV VELOCITY RATIO: 0.41
ECHO AV VTI: 56.4 CM
ECHO BSA: 2.21 M2
ECHO LVOT AREA: 3.8 CM2
ECHO LVOT AV VTI INDEX: 0.46
ECHO LVOT DIAM: 2.2 CM
ECHO LVOT MEAN GRADIENT: 4 MMHG
ECHO LVOT PEAK GRADIENT: 6 MMHG
ECHO LVOT PEAK VELOCITY: 1.3 M/S
ECHO LVOT STROKE VOLUME INDEX: 46.5 ML/M2
ECHO LVOT SV: 99.5 ML
ECHO LVOT VTI: 26.2 CM
EST. AVERAGE GLUCOSE BLD GHB EST-MCNC: 182.9 MG/DL
FOLATE SERPL-MCNC: >20 NG/ML (ref 4.78–24.2)
GFR SERPLBLD CREATININE-BSD FMLA CKD-EPI: 65 ML/MIN/{1.73_M2}
GLUCOSE BLD-MCNC: 131 MG/DL (ref 70–99)
GLUCOSE BLD-MCNC: 132 MG/DL (ref 70–99)
GLUCOSE BLD-MCNC: 143 MG/DL (ref 70–99)
GLUCOSE BLD-MCNC: 145 MG/DL (ref 70–99)
GLUCOSE SERPL-MCNC: 131 MG/DL (ref 70–99)
GLUCOSE UR STRIP.AUTO-MCNC: NEGATIVE MG/DL
HBA1C MFR BLD: 8 %
HCT VFR BLD AUTO: 29.2 % (ref 40.5–52.5)
HDLC SERPL-MCNC: 27 MG/DL (ref 40–60)
HGB BLD-MCNC: 9.8 G/DL (ref 13.5–17.5)
HGB UR QL STRIP.AUTO: ABNORMAL
IRON SATN MFR SERPL: 9 % (ref 20–50)
IRON SERPL-MCNC: 25 UG/DL (ref 59–158)
KETONES UR STRIP.AUTO-MCNC: NEGATIVE MG/DL
LDLC SERPL CALC-MCNC: 36 MG/DL
LEUKOCYTE ESTERASE UR QL STRIP.AUTO: ABNORMAL
MCH RBC QN AUTO: 30.4 PG (ref 26–34)
MCHC RBC AUTO-ENTMCNC: 33.4 G/DL (ref 31–36)
MCV RBC AUTO: 91.1 FL (ref 80–100)
NITRITE UR QL STRIP.AUTO: NEGATIVE
PERFORMED ON: ABNORMAL
PH UR STRIP.AUTO: 6 [PH] (ref 5–8)
PLATELET # BLD AUTO: 185 K/UL (ref 135–450)
PMV BLD AUTO: 7.9 FL (ref 5–10.5)
POTASSIUM SERPL-SCNC: 4.5 MMOL/L (ref 3.5–5.1)
PROT SERPL-MCNC: 6.8 G/DL (ref 6.4–8.2)
PROT UR STRIP.AUTO-MCNC: 100 MG/DL
RBC # BLD AUTO: 3.21 M/UL (ref 4.2–5.9)
RBC #/AREA URNS HPF: ABNORMAL /HPF (ref 0–4)
SODIUM SERPL-SCNC: 137 MMOL/L (ref 136–145)
SP GR UR STRIP.AUTO: 1.02 (ref 1–1.03)
TIBC SERPL-MCNC: 273 UG/DL (ref 260–445)
TRIGL SERPL-MCNC: 163 MG/DL (ref 0–150)
UA COMPLETE W REFLEX CULTURE PNL UR: YES
UA DIPSTICK W REFLEX MICRO PNL UR: YES
URN SPEC COLLECT METH UR: ABNORMAL
UROBILINOGEN UR STRIP-ACNC: 0.2 E.U./DL
VIT B12 SERPL-MCNC: 538 PG/ML (ref 211–911)
VLDLC SERPL CALC-MCNC: 33 MG/DL
WBC # BLD AUTO: 11.1 K/UL (ref 4–11)
WBC #/AREA URNS HPF: >100 /HPF (ref 0–5)

## 2024-06-14 PROCEDURE — 6360000002 HC RX W HCPCS: Performed by: INTERNAL MEDICINE

## 2024-06-14 PROCEDURE — 6360000002 HC RX W HCPCS

## 2024-06-14 PROCEDURE — 93308 TTE F-UP OR LMTD: CPT | Performed by: INTERNAL MEDICINE

## 2024-06-14 PROCEDURE — C9113 INJ PANTOPRAZOLE SODIUM, VIA: HCPCS

## 2024-06-14 PROCEDURE — 81001 URINALYSIS AUTO W/SCOPE: CPT

## 2024-06-14 PROCEDURE — 80053 COMPREHEN METABOLIC PANEL: CPT

## 2024-06-14 PROCEDURE — 85027 COMPLETE CBC AUTOMATED: CPT

## 2024-06-14 PROCEDURE — 99222 1ST HOSP IP/OBS MODERATE 55: CPT | Performed by: STUDENT IN AN ORGANIZED HEALTH CARE EDUCATION/TRAINING PROGRAM

## 2024-06-14 PROCEDURE — 2060000000 HC ICU INTERMEDIATE R&B

## 2024-06-14 PROCEDURE — 93321 DOPPLER ECHO F-UP/LMTD STD: CPT | Performed by: INTERNAL MEDICINE

## 2024-06-14 PROCEDURE — 80061 LIPID PANEL: CPT

## 2024-06-14 PROCEDURE — 2500000003 HC RX 250 WO HCPCS: Performed by: INTERNAL MEDICINE

## 2024-06-14 PROCEDURE — 2580000003 HC RX 258

## 2024-06-14 PROCEDURE — 99232 SBSQ HOSP IP/OBS MODERATE 35: CPT | Performed by: INTERNAL MEDICINE

## 2024-06-14 PROCEDURE — 76705 ECHO EXAM OF ABDOMEN: CPT

## 2024-06-14 PROCEDURE — 99222 1ST HOSP IP/OBS MODERATE 55: CPT | Performed by: SURGERY

## 2024-06-14 PROCEDURE — 93321 DOPPLER ECHO F-UP/LMTD STD: CPT

## 2024-06-14 PROCEDURE — 36415 COLL VENOUS BLD VENIPUNCTURE: CPT

## 2024-06-14 PROCEDURE — 87186 SC STD MICRODIL/AGAR DIL: CPT

## 2024-06-14 PROCEDURE — 87077 CULTURE AEROBIC IDENTIFY: CPT

## 2024-06-14 PROCEDURE — 6370000000 HC RX 637 (ALT 250 FOR IP)

## 2024-06-14 PROCEDURE — 93325 DOPPLER ECHO COLOR FLOW MAPG: CPT | Performed by: INTERNAL MEDICINE

## 2024-06-14 PROCEDURE — 87086 URINE CULTURE/COLONY COUNT: CPT

## 2024-06-14 PROCEDURE — 6370000000 HC RX 637 (ALT 250 FOR IP): Performed by: INTERNAL MEDICINE

## 2024-06-14 RX ORDER — HYDROXYZINE HYDROCHLORIDE 10 MG/1
10 TABLET, FILM COATED ORAL ONCE
Status: COMPLETED | OUTPATIENT
Start: 2024-06-14 | End: 2024-06-14

## 2024-06-14 RX ORDER — ZIPRASIDONE MESYLATE 20 MG/ML
20 INJECTION, POWDER, LYOPHILIZED, FOR SOLUTION INTRAMUSCULAR ONCE
Status: COMPLETED | OUTPATIENT
Start: 2024-06-14 | End: 2024-06-14

## 2024-06-14 RX ORDER — WATER 10 ML/10ML
INJECTION INTRAMUSCULAR; INTRAVENOUS; SUBCUTANEOUS
Status: COMPLETED
Start: 2024-06-14 | End: 2024-06-14

## 2024-06-14 RX ORDER — MECOBALAMIN 5000 MCG
5 TABLET,DISINTEGRATING ORAL NIGHTLY
Status: DISCONTINUED | OUTPATIENT
Start: 2024-06-14 | End: 2024-06-15 | Stop reason: HOSPADM

## 2024-06-14 RX ORDER — TRAMADOL HYDROCHLORIDE 50 MG/1
50 TABLET ORAL EVERY 6 HOURS PRN
Status: DISCONTINUED | OUTPATIENT
Start: 2024-06-14 | End: 2024-06-15 | Stop reason: HOSPADM

## 2024-06-14 RX ORDER — ENOXAPARIN SODIUM 100 MG/ML
30 INJECTION SUBCUTANEOUS 2 TIMES DAILY
Status: DISCONTINUED | OUTPATIENT
Start: 2024-06-14 | End: 2024-06-15 | Stop reason: HOSPADM

## 2024-06-14 RX ADMIN — SODIUM CHLORIDE, PRESERVATIVE FREE 10 ML: 5 INJECTION INTRAVENOUS at 11:50

## 2024-06-14 RX ADMIN — HYDROMORPHONE HYDROCHLORIDE 0.25 MG: 1 INJECTION, SOLUTION INTRAMUSCULAR; INTRAVENOUS; SUBCUTANEOUS at 02:09

## 2024-06-14 RX ADMIN — ENOXAPARIN SODIUM 30 MG: 100 INJECTION SUBCUTANEOUS at 20:29

## 2024-06-14 RX ADMIN — MICONAZOLE NITRATE: 20 POWDER TOPICAL at 19:06

## 2024-06-14 RX ADMIN — ZIPRASIDONE MESYLATE 20 MG: 20 INJECTION, POWDER, LYOPHILIZED, FOR SOLUTION INTRAMUSCULAR at 03:04

## 2024-06-14 RX ADMIN — PANTOPRAZOLE SODIUM 40 MG: 40 INJECTION, POWDER, FOR SOLUTION INTRAVENOUS at 11:51

## 2024-06-14 RX ADMIN — HYDROXYZINE HYDROCHLORIDE 10 MG: 10 TABLET ORAL at 16:37

## 2024-06-14 RX ADMIN — LISINOPRIL 5 MG: 5 TABLET ORAL at 11:51

## 2024-06-14 RX ADMIN — HYDROMORPHONE HYDROCHLORIDE 0.25 MG: 1 INJECTION, SOLUTION INTRAMUSCULAR; INTRAVENOUS; SUBCUTANEOUS at 06:14

## 2024-06-14 RX ADMIN — WATER: 1 INJECTION INTRAMUSCULAR; INTRAVENOUS; SUBCUTANEOUS at 03:08

## 2024-06-14 RX ADMIN — ENOXAPARIN SODIUM 40 MG: 100 INJECTION SUBCUTANEOUS at 11:51

## 2024-06-14 RX ADMIN — ASPIRIN 81 MG: 81 TABLET, COATED ORAL at 11:51

## 2024-06-14 RX ADMIN — ATORVASTATIN CALCIUM 40 MG: 40 TABLET, FILM COATED ORAL at 11:51

## 2024-06-14 RX ADMIN — SODIUM CHLORIDE, PRESERVATIVE FREE 10 ML: 5 INJECTION INTRAVENOUS at 20:28

## 2024-06-14 RX ADMIN — Medication 5 MG: at 20:28

## 2024-06-14 RX ADMIN — PANTOPRAZOLE SODIUM 40 MG: 40 INJECTION, POWDER, FOR SOLUTION INTRAVENOUS at 20:28

## 2024-06-14 ASSESSMENT — PAIN DESCRIPTION - DESCRIPTORS
DESCRIPTORS: ACHING;DISCOMFORT
DESCRIPTORS: ACHING;DISCOMFORT

## 2024-06-14 ASSESSMENT — PAIN SCALES - GENERAL
PAINLEVEL_OUTOF10: 4
PAINLEVEL_OUTOF10: 4
PAINLEVEL_OUTOF10: 8
PAINLEVEL_OUTOF10: 8

## 2024-06-14 ASSESSMENT — PAIN DESCRIPTION - LOCATION
LOCATION: ABDOMEN
LOCATION: ABDOMEN

## 2024-06-14 NOTE — FLOWSHEET NOTE
06/13/24 1929   Vital Signs   Temp 97.1 °F (36.2 °C)   Temp Source Oral   Pulse 80   Heart Rate Source Monitor   Respirations 18   BP (!) 183/80   MAP (Calculated) 114   BP Location Right upper arm   BP Method Automatic   Patient Position Semi fowlers   Oxygen Therapy   SpO2 98 %   O2 Device None (Room air)     Patient Alert, vitals and assessments done at this time. Bed in lowest position, call light within reach.
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
## 2024-06-14 NOTE — ACP (ADVANCE CARE PLANNING)
Advance Care Planning     General Advance Care Planning (ACP) Conversation    Date of Conversation: 6/14/2024  Conducted with: Patient with Decision Making Capacity and Healthcare Decision Maker  Other persons present: None    Healthcare Decision Maker:   Secondary Decision Maker (Active): Ari Snider Jr - Child - [phone]    Secondary Decision Maker: Dahlia Moon - Child - [phone]    Supplemental (Other) Decision Maker: Bethel Snider - Child - [phone]    Supplemental (Other) Decision Maker: Nidia Peterson - Child - [phone]  Click here to complete Healthcare Decision Makers including selection of the Healthcare Decision Maker Relationship (ie \"Primary\").       Content/Action Overview:  DECLINED ACP Conversation - will revisit periodically  Reviewed DNR/DNI and patient elects Full Code (Attempt Resuscitation)        Length of Voluntary ACP Conversation in minutes:  <16 minutes (Non-Billable)    Lupillo Grayson RN

## 2024-06-14 NOTE — PLAN OF CARE
Problem: Chronic Conditions and Co-morbidities  Goal: Patient's chronic conditions and co-morbidity symptoms are monitored and maintained or improved  6/13/2024 2039 by Gloria Casitllo RN  Outcome: Progressing  6/13/2024 1555 by Birgit Lal RN  Outcome: Progressing     Problem: Discharge Planning  Goal: Discharge to home or other facility with appropriate resources  6/13/2024 2039 by Gloria Castillo RN  Outcome: Progressing  6/13/2024 1555 by Birgit Lal RN  Outcome: Progressing     Problem: Pain  Goal: Verbalizes/displays adequate comfort level or baseline comfort level  6/13/2024 2039 by Gloria Castillo RN  Outcome: Progressing  6/13/2024 1555 by Birgit Lal RN  Outcome: Progressing     Problem: Skin/Tissue Integrity  Goal: Absence of new skin breakdown  Description: 1.  Monitor for areas of redness and/or skin breakdown  2.  Assess vascular access sites hourly  3.  Every 4-6 hours minimum:  Change oxygen saturation probe site  4.  Every 4-6 hours:  If on nasal continuous positive airway pressure, respiratory therapy assess nares and determine need for appliance change or resting period.  6/13/2024 2039 by Gloria Castillo RN  Outcome: Progressing  6/13/2024 1555 by Birgit Lal RN  Outcome: Progressing     Problem: Safety - Adult  Goal: Free from fall injury  6/13/2024 2039 by Gloria Castillo RN  Outcome: Progressing  6/13/2024 1555 by Birgit Lal RN  Outcome: Progressing     Problem: ABCDS Injury Assessment  Goal: Absence of physical injury  6/13/2024 2039 by Gloria Castillo RN  Outcome: Progressing  6/13/2024 1555 by Birgit Lal RN  Outcome: Progressing

## 2024-06-14 NOTE — PROGRESS NOTES
IM Progress Note    Admit Date:  6/13/2024  1    Interval history:    The patient is a 88 y.o. male with pmhx of CAD, CVA, DM, HTN, PVD, carotid artery stenosis who presented to [facility] ED with complaint of RUQ and epigastric pain     Subjective:  Mr. Snider became restless last night needing geodon    Daughter at bedside reports some sundowners    Pt waking from meds and still with right uq PAIN     Objective:   BP (!) 142/64   Pulse 98   Temp 98.3 °F (36.8 °C) (Oral)   Resp 18   Ht 1.727 m (5' 8\")   Wt 101.8 kg (224 lb 8 oz)   SpO2 94%   BMI 34.14 kg/m²     Intake/Output Summary (Last 24 hours) at 6/14/2024 0723  Last data filed at 6/14/2024 0536  Gross per 24 hour   Intake 10 ml   Output 1300 ml   Net -1290 ml       Physical Exam:        General: OBESE elderly male, drowsy from meds   . 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 Distal pulses well felt  Neurological : grossly normal with improving drowsiness from last night     Medications:   Scheduled Medications:    sodium chloride flush  5-40 mL IntraVENous 2 times per day    aspirin  81 mg Oral Daily    atorvastatin  40 mg Oral Daily    insulin lispro  0-4 Units SubCUTAneous TID     insulin lispro  0-4 Units SubCUTAneous Nightly    enoxaparin  40 mg SubCUTAneous Daily    pantoprazole  40 mg IntraVENous BID    lisinopril  5 mg Oral Daily     I   dextrose      sodium chloride       glucose, dextrose bolus **OR** dextrose bolus, glucagon (rDNA), dextrose, sodium chloride flush, sodium chloride, potassium chloride **OR** potassium alternative oral replacement **OR** potassium chloride, magnesium sulfate,  cirrhosis         CTA CHEST W WO CONTRAST   Final Result   No thoracic or abdominal aortic aneurysm or dissection.      Coronary vascular disease.      Centrilobular emphysema.  No acute pulmonary process identified.  There are   findings related to chronic granulomatous disease.      Mild mural thickening of the rectum. Correlation with physical exam findings   may be helpful.      Mild diffuse mural thickening of the bladder likely related to chronic   bladder outlet obstruction.      Mild dilatation of the gallbladder with possible minimal sludge in the   dependent gallbladder.      Splenomegaly.      Thyroid nodules.  Nonemergent additional evaluation with thyroid ultrasound   may be helpful if not recently performed.      Additional findings noted above.         CTA ABDOMEN PELVIS W CONTRAST   Final Result   No thoracic or abdominal aortic aneurysm or dissection.      Coronary vascular disease.      Centrilobular emphysema.  No acute pulmonary process identified.  There are   findings related to chronic granulomatous disease.      Mild mural thickening of the rectum. Correlation with physical exam findings   may be helpful.      Mild diffuse mural thickening of the bladder likely related to chronic   bladder outlet obstruction.      Mild dilatation of the gallbladder with possible minimal sludge in the   dependent gallbladder.      Splenomegaly.      Thyroid nodules.  Nonemergent additional evaluation with thyroid ultrasound   may be helpful if not recently performed.      Additional findings noted above.         XR CHEST PORTABLE   Final Result   No acute cardiopulmonary process.              ECHO       Left Ventricle: Normal left ventricular systolic function with a visually estimated EF of 55 - 60%. Normal wall thickness. Normal wall motion. Indeterminate diastolic function.    Right Ventricle: Visually normal systolic function.    Aortic Valve: Severely thickened right cusp. Severely restricted motion. Mild  regurgitation. Moderate stenosis of the aortic valve. Noted by the right parasternal view. AV mean gradient is 23 mmHg. AV peak gradient is 40 mmHg. AV peak velocity is 3.2 m/s. LVOT diameter is 2.2 cm. AV area by continuity VTI is 1.8 cm2.    Pericardium: No pericardial effusion.    Limited study..         ASSESSMENT/PLAN:    #Epigastric/RUQ pain   #Dilatation of GB with possible sludge   -LFTs WNLs and US with no clear cholecystitis or gallstones  - given multiple doses of morphine in ER with some improvement  - consider PUD  - obtain surgery consult  - start PPI , resume diet      #Hx of CAD  -low suspicion for cardiac etiology 2/2 to reproducible on exam   - serial troponins neg, EKG wnl.   - cardiology consulted  - ECHO with normal EF , moderate AS , normal wall motion   - continue ASA and statins for hx of CAD       #HTN   -on lisinopril and added hydralazine prn         #Possible chronic bladder outlet obstruction noted on CT scan   -urine studies pending  -monitor intake and output   -has voided without difficulty   -started flomax - outpt eval         #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  #Splenomegaly /cirrhosis   #Normocytic anemia   #Non-hodgkins lymphoma  -received rituximab  in the past  -saw oncology 11/2019 but later followed up with holistic person      #Bilateral lower extremity swelling  -chronic  -elevate extremities  -resume home diuresis      #Dm type 2   #Neuropathy   -hold oral regimen   -SSI   -monitor BG      #Normocytic anemia   -monitor CBC , hb at 10     #PVD   -ASA, statin      #HX of right foot osteomyelitis and MRSA bacteremia  -completed course of IV vancomycin in Jan 2024  -follows with podiatry outpatient      #Carotid artery stenosis   -on ASA, statin      #Thyroid nodules   -need outpatient US      #HLD   -statin         Note epigastric pain, HTN, hx of CAD makes patient higher risk for morbidity and mortality requiring testing and treatment.         DVT Prophylaxis: Lovenox   Diet:
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
## 2024-06-14 NOTE — FLOWSHEET NOTE
06/14/24 1130   Vital Signs   Temp 98.3 °F (36.8 °C)   Temp Source Oral   Pulse 83   Heart Rate Source Monitor   Respirations 18   BP (!) 117/57   MAP (Calculated) 77   BP Location Right upper arm   BP Method Automatic   Patient Position Semi fowlers     Assessment & vital signs completed. Morning meds given per MAR. Pt denies any further needs at this time. Call light within reach, pt is stable.

## 2024-06-14 NOTE — CARE COORDINATION
Case Management Assessment  Initial Evaluation    Date/Time of Evaluation: 6/14/2024 11:45 AM  Assessment Completed by: Lupillo Grayson RN    If patient is discharged prior to next notation, then this note serves as note for discharge by case management.    Patient Name: Ari Snider                   YOB: 1935  Diagnosis: Chest pain [R07.9]  Chest pain, unspecified type [R07.9]                   Date / Time: 6/13/2024 10:58 AM    Patient Admission Status: Inpatient   Readmission Risk (Low < 19, Mod (19-27), High > 27): Readmission Risk Score: 15.3    Current PCP: Wilma Rowley APRN - CNP  PCP verified by CM? Yes    Chart Reviewed: Yes      History Provided by: Child/Family  Patient Orientation: Alert and Oriented    Patient Cognition: Alert    Hospitalization in the last 30 days (Readmission):  No    If yes, Readmission Assessment in CM Navigator will be completed.    Advance Directives:      Code Status: Full Code   Patient's Primary Decision Maker is: Named in Scanned ACP Document    Secondary Decision Maker (Active): Ari Snider Jr - Child - [phone]    Secondary Decision Maker: Dahlia Moon - Child - [phone]    Supplemental (Other) Decision Maker: Bethel Snider - Child - [phone]    Supplemental (Other) Decision Maker: Nidia Peterson - Child - [phone]    Discharge Planning:    Patient lives with: Alone Type of Home: House  Primary Care Giver: Self  Patient Support Systems include: Children   Current Financial resources: Medicare  Current community resources: None  Current services prior to admission: Durable Medical Equipment            Current DME: Walker, Shower Chair            Type of Home Care services:  None    ADLS  Prior functional level: Independent in ADLs/IADLs  Current functional level: Independent in ADLs/IADLs    PT AM-PAC:   /24  OT AM-PAC:   /24    Family can provide assistance at DC: Yes  Would you like Case Management to discuss the discharge plan with any

## 2024-06-14 NOTE — FLOWSHEET NOTE
06/14/24 1942   Handoff   Communication Given Shift Handoff   Handoff Given To JUAN Pete   Handoff Received From JUAN Genao   Handoff Communication Face to Face   Time Handoff Given 1942   End of Shift Check Performed Yes     EOS report given to JUAN Pete. Pt in bed, call light within reach. Pt is stable, care is transferred.

## 2024-06-14 NOTE — FLOWSHEET NOTE
06/13/24 2024 06/13/24 2032 06/13/24 2037   Vital Signs   Pulse 80 81 80   Heart Rate Source Monitor Monitor Monitor   BP (!) 183/80 (!) 162/74 (!) 156/71   MAP (Calculated) 114 103 99   BP Location Right upper arm Left upper arm Left upper arm   BP Method Automatic Automatic Automatic   Patient Position Semi fowlers Semi fowlers Semi fowlers      06/13/24 2043 06/13/24 2045   Vital Signs   Pulse 85 84   Heart Rate Source Monitor Monitor   BP (!) 171/74 (!) 160/75   MAP (Calculated) 106 103   BP Location Left upper arm Left upper arm   BP Method Automatic Automatic   Patient Position Semi fowlers Semi fowlers     /80, administered PRN Hydrazine, re checked BP q 5mins for 15 mins after administration at this time.  Gloria Castillo RN

## 2024-06-15 VITALS
OXYGEN SATURATION: 93 % | HEIGHT: 68 IN | SYSTOLIC BLOOD PRESSURE: 115 MMHG | RESPIRATION RATE: 17 BRPM | HEART RATE: 70 BPM | WEIGHT: 222.3 LBS | BODY MASS INDEX: 33.69 KG/M2 | TEMPERATURE: 99.3 F | DIASTOLIC BLOOD PRESSURE: 54 MMHG

## 2024-06-15 LAB
ALBUMIN SERPL-MCNC: 3.3 G/DL (ref 3.4–5)
ALBUMIN/GLOB SERPL: 0.9 {RATIO} (ref 1.1–2.2)
ALP SERPL-CCNC: 86 U/L (ref 40–129)
ALT SERPL-CCNC: 10 U/L (ref 10–40)
ANION GAP SERPL CALCULATED.3IONS-SCNC: 13 MMOL/L (ref 3–16)
AST SERPL-CCNC: 15 U/L (ref 15–37)
BILIRUB SERPL-MCNC: 1.1 MG/DL (ref 0–1)
BUN SERPL-MCNC: 34 MG/DL (ref 7–20)
CALCIUM SERPL-MCNC: 8.4 MG/DL (ref 8.3–10.6)
CHLORIDE SERPL-SCNC: 101 MMOL/L (ref 99–110)
CO2 SERPL-SCNC: 22 MMOL/L (ref 21–32)
CREAT SERPL-MCNC: 1.7 MG/DL (ref 0.8–1.3)
DEPRECATED RDW RBC AUTO: 16.4 % (ref 12.4–15.4)
GFR SERPLBLD CREATININE-BSD FMLA CKD-EPI: 38 ML/MIN/{1.73_M2}
GLUCOSE BLD-MCNC: 153 MG/DL (ref 70–99)
GLUCOSE BLD-MCNC: 223 MG/DL (ref 70–99)
GLUCOSE SERPL-MCNC: 145 MG/DL (ref 70–99)
HCT VFR BLD AUTO: 29.2 % (ref 40.5–52.5)
HGB BLD-MCNC: 9.7 G/DL (ref 13.5–17.5)
MCH RBC QN AUTO: 30.5 PG (ref 26–34)
MCHC RBC AUTO-ENTMCNC: 33.2 G/DL (ref 31–36)
MCV RBC AUTO: 91.9 FL (ref 80–100)
PERFORMED ON: ABNORMAL
PERFORMED ON: ABNORMAL
PLATELET # BLD AUTO: 165 K/UL (ref 135–450)
PMV BLD AUTO: 8.2 FL (ref 5–10.5)
POTASSIUM SERPL-SCNC: 4.5 MMOL/L (ref 3.5–5.1)
PROT SERPL-MCNC: 6.9 G/DL (ref 6.4–8.2)
RBC # BLD AUTO: 3.18 M/UL (ref 4.2–5.9)
SODIUM SERPL-SCNC: 136 MMOL/L (ref 136–145)
WBC # BLD AUTO: 12.9 K/UL (ref 4–11)

## 2024-06-15 PROCEDURE — 6360000002 HC RX W HCPCS: Performed by: INTERNAL MEDICINE

## 2024-06-15 PROCEDURE — 80053 COMPREHEN METABOLIC PANEL: CPT

## 2024-06-15 PROCEDURE — C9113 INJ PANTOPRAZOLE SODIUM, VIA: HCPCS

## 2024-06-15 PROCEDURE — 2580000003 HC RX 258: Performed by: INTERNAL MEDICINE

## 2024-06-15 PROCEDURE — 6370000000 HC RX 637 (ALT 250 FOR IP)

## 2024-06-15 PROCEDURE — 2580000003 HC RX 258

## 2024-06-15 PROCEDURE — 36415 COLL VENOUS BLD VENIPUNCTURE: CPT

## 2024-06-15 PROCEDURE — 85027 COMPLETE CBC AUTOMATED: CPT

## 2024-06-15 PROCEDURE — 99238 HOSP IP/OBS DSCHRG MGMT 30/<: CPT | Performed by: INTERNAL MEDICINE

## 2024-06-15 PROCEDURE — 6360000002 HC RX W HCPCS

## 2024-06-15 RX ORDER — TAMSULOSIN HYDROCHLORIDE 0.4 MG/1
0.4 CAPSULE ORAL DAILY
Qty: 30 CAPSULE | Refills: 0 | Status: SHIPPED | OUTPATIENT
Start: 2024-06-15

## 2024-06-15 RX ORDER — SODIUM CHLORIDE 9 MG/ML
INJECTION, SOLUTION INTRAVENOUS CONTINUOUS
Status: DISCONTINUED | OUTPATIENT
Start: 2024-06-15 | End: 2024-06-15 | Stop reason: HOSPADM

## 2024-06-15 RX ORDER — CIPROFLOXACIN 500 MG/1
500 TABLET, FILM COATED ORAL 2 TIMES DAILY
Qty: 10 TABLET | Refills: 0 | Status: SHIPPED | OUTPATIENT
Start: 2024-06-15 | End: 2024-06-20

## 2024-06-15 RX ADMIN — SODIUM CHLORIDE: 9 INJECTION, SOLUTION INTRAVENOUS at 09:26

## 2024-06-15 RX ADMIN — ENOXAPARIN SODIUM 30 MG: 100 INJECTION SUBCUTANEOUS at 09:34

## 2024-06-15 RX ADMIN — CEFTRIAXONE SODIUM 1000 MG: 1 INJECTION, POWDER, FOR SOLUTION INTRAMUSCULAR; INTRAVENOUS at 09:30

## 2024-06-15 RX ADMIN — ASPIRIN 81 MG: 81 TABLET, COATED ORAL at 09:31

## 2024-06-15 RX ADMIN — INSULIN LISPRO 1 UNITS: 100 INJECTION, SOLUTION INTRAVENOUS; SUBCUTANEOUS at 11:54

## 2024-06-15 RX ADMIN — PANTOPRAZOLE SODIUM 40 MG: 40 INJECTION, POWDER, FOR SOLUTION INTRAVENOUS at 09:31

## 2024-06-15 RX ADMIN — LISINOPRIL 5 MG: 5 TABLET ORAL at 09:31

## 2024-06-15 RX ADMIN — ATORVASTATIN CALCIUM 40 MG: 40 TABLET, FILM COATED ORAL at 09:31

## 2024-06-15 RX ADMIN — MICONAZOLE NITRATE: 20 POWDER TOPICAL at 09:38

## 2024-06-15 RX ADMIN — SODIUM CHLORIDE, PRESERVATIVE FREE 10 ML: 5 INJECTION INTRAVENOUS at 09:45

## 2024-06-15 NOTE — PROGRESS NOTES
Updated  that patient had 0 on post void residual. MD inquired about mentation. Pt is alert and oriented x 3. Could no recall the date. Daughter at bedside, agreed he was back to baseline. MD messaged ok to discharge. Electronically signed by Wilma Hastings RN on 6/15/2024 at 12:08 PM

## 2024-06-15 NOTE — FLOWSHEET NOTE
06/15/24 0024   Vital Signs   Temp 98.3 °F (36.8 °C)   Temp Source Oral   Pulse 86   Respirations 16   BP (!) 127/55   MAP (Calculated) 79   BP Location Left upper arm   BP Method Automatic   Oxygen Therapy   SpO2 92 %   O2 Device None (Room air)     Resting quietly with respirations WNL on RA.  SonJosue, at the bedside.  Call in easy reach.  Continue to monitor closely.  Yanci Chambers RN

## 2024-06-15 NOTE — PROGRESS NOTES
Patient educated on discharge instructions as well as new medications use, dosage, administration and possible side effects.  Patient verified knowledge. IV removed without difficulty and dry dressing in place. Telemetry monitor removed and returned to [facility]. Pt left facility in stable condition to Home with all of their personal belongings. Electronically signed by Wilma Hastings RN on 6/15/2024 at 1:21 PM

## 2024-06-15 NOTE — PROGRESS NOTES
Shift assessment complete. Call light and bedside table within reach. Electronically signed by Wilma Hastings RN on 6/15/2024 at 9:31 AM

## 2024-06-15 NOTE — PROGRESS NOTES
IM Progress Note    Admit Date:  6/13/2024  2    Interval history:    The patient is a 88 y.o. male with pmhx of CAD, CVA, DM, HTN, PVD, carotid artery stenosis who presented to [facility] ED with complaint of RUQ and epigastric pain     Subjective:  Mr. Snider became restless last night needing seroquel  Son came and helped him     Pt seen sleeping soundly and son reports he just slept  No fevers or chills overnight  No pain issues         Objective:   BP (!) 109/50   Pulse 70   Temp 99.3 °F (37.4 °C) (Axillary)   Resp 17   Ht 1.727 m (5' 8\")   Wt 100.8 kg (222 lb 4.8 oz)   SpO2 93%   BMI 33.80 kg/m²     Intake/Output Summary (Last 24 hours) at 6/15/2024 0835  Last data filed at 6/15/2024 0318  Gross per 24 hour   Intake 120 ml   Output 400 ml   Net -280 ml         Physical Exam:        General: OBESE elderly male, sleeping comfortably in bed   . Appears to be not in any distress  Chest:  Clear to auscultation bilaterally, no added sounds  Cardiovascular:  RRR S1S2 heard,+ASM      Medications:   Scheduled Medications:    cefTRIAXone (ROCEPHIN) IV  1,000 mg IntraVENous Q24H    melatonin  5 mg Oral Nightly    enoxaparin  30 mg SubCUTAneous BID    miconazole   Topical BID    sodium chloride flush  5-40 mL IntraVENous 2 times per day    aspirin  81 mg Oral Daily    atorvastatin  40 mg Oral Daily    insulin lispro  0-4 Units SubCUTAneous TID WC    insulin lispro  0-4 Units SubCUTAneous Nightly    pantoprazole  40 mg IntraVENous BID    lisinopril  5 mg Oral Daily     I   sodium chloride      dextrose      sodium chloride       traMADol, glucose, dextrose bolus **OR** dextrose bolus, glucagon (rDNA), dextrose, sodium chloride flush, sodium chloride, potassium chloride **OR** potassium alternative oral replacement **OR** potassium chloride, magnesium sulfate, ondansetron **OR** ondansetron, acetaminophen **OR** acetaminophen, polyethylene glycol, perflutren lipid microspheres, hydrALAZINE    Lab Data:  Recent Labs

## 2024-06-15 NOTE — DISCHARGE INSTR - DIET

## 2024-06-15 NOTE — PROGRESS NOTES
Post void residual complete. Pt voided 200 ml of urine. Bladder scan showed 0 ml for post void residual. Electronically signed by Wilma Hastings RN on 6/15/2024 at 10:17 AM

## 2024-06-15 NOTE — PROGRESS NOTES
Son, Josue, unplugged patient's telesitter camera.  Josue states he is watching his father and he doesn't need a telesitter.  Requested Josue notify staff when he leaves the room so the telesitter can be plugged back in for close monitoring of his father to prevent falling.  Josue verbalized good understanding.  Yanci Chambers RN
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
## 2024-06-15 NOTE — PLAN OF CARE
Problem: Chronic Conditions and Co-morbidities  Goal: Patient's chronic conditions and co-morbidity symptoms are monitored and maintained or improved  6/14/2024 2301 by Yanci Chambers RN  Outcome: Progressing     Problem: Discharge Planning  Goal: Discharge to home or other facility with appropriate resources  6/14/2024 2301 by Yanci Chambers RN  Outcome: Progressing     Problem: Pain  Goal: Verbalizes/displays adequate comfort level or baseline comfort level  6/14/2024 2301 by Yanci Chambers RN  Outcome: Progressing     Problem: Skin/Tissue Integrity  Goal: Absence of new skin breakdown  Description: 1.  Monitor for areas of redness and/or skin breakdown  2.  Assess vascular access sites hourly  3.  Every 4-6 hours minimum:  Change oxygen saturation probe site  4.  Every 4-6 hours:  If on nasal continuous positive airway pressure, respiratory therapy assess nares and determine need for appliance change or resting period.  6/14/2024 2301 by Yanci Chambers RN  Outcome: Progressing     Problem: Safety - Adult  Goal: Free from fall injury  6/14/2024 2301 by Yanci Chambers, RN  Outcome: Progressing     Problem: ABCDS Injury Assessment  Goal: Absence of physical injury  6/14/2024 2301 by Yanci Chambers RN  Outcome: Progressing

## 2024-06-15 NOTE — PLAN OF CARE
Problem: Chronic Conditions and Co-morbidities  Goal: Patient's chronic conditions and co-morbidity symptoms are monitored and maintained or improved  6/15/2024 1225 by Wilma Hastings RN  Outcome: Adequate for Discharge  6/14/2024 2301 by Yanci Chambers RN  Outcome: Progressing     Problem: Discharge Planning  Goal: Discharge to home or other facility with appropriate resources  6/15/2024 1225 by Wilma Hastings RN  Outcome: Adequate for Discharge  6/14/2024 2301 by Yanci Chambers RN  Outcome: Progressing     Problem: Pain  Goal: Verbalizes/displays adequate comfort level or baseline comfort level  6/15/2024 1225 by Wilma Hastings RN  Outcome: Adequate for Discharge  6/14/2024 2301 by Yanci Chambers RN  Outcome: Progressing     Problem: Skin/Tissue Integrity  Goal: Absence of new skin breakdown  Description: 1.  Monitor for areas of redness and/or skin breakdown  2.  Assess vascular access sites hourly  3.  Every 4-6 hours minimum:  Change oxygen saturation probe site  4.  Every 4-6 hours:  If on nasal continuous positive airway pressure, respiratory therapy assess nares and determine need for appliance change or resting period.  6/15/2024 1225 by Wilma Hastings RN  Outcome: Adequate for Discharge  6/14/2024 2301 by Yanci Chambers RN  Outcome: Progressing     Problem: Safety - Adult  Goal: Free from fall injury  6/15/2024 1225 by Wilma Hastings RN  Outcome: Adequate for Discharge  6/14/2024 2301 by Yanci Chambers RN  Outcome: Progressing     Problem: ABCDS Injury Assessment  Goal: Absence of physical injury  6/15/2024 1225 by Wilma Hastings RN  Outcome: Adequate for Discharge  6/14/2024 2301 by Yanci Chambers RN  Outcome: Progressing

## 2024-06-15 NOTE — PLAN OF CARE
Problem: Discharge Planning  Goal: Discharge to home or other facility with appropriate resources  6/15/2024 1225 by Wilma Hastings RN  Outcome: Completed  6/15/2024 1225 by Wilma Hastings RN  Outcome: Adequate for Discharge  6/14/2024 2301 by Yanci Chambers RN  Outcome: Progressing     Problem: Pain  Goal: Verbalizes/displays adequate comfort level or baseline comfort level  6/15/2024 1225 by Wilma Hastings RN  Outcome: Completed  6/15/2024 1225 by Wilma Hastings RN  Outcome: Adequate for Discharge  6/14/2024 2301 by Yanci Chambers RN  Outcome: Progressing     Problem: Skin/Tissue Integrity  Goal: Absence of new skin breakdown  Description: 1.  Monitor for areas of redness and/or skin breakdown  2.  Assess vascular access sites hourly  3.  Every 4-6 hours minimum:  Change oxygen saturation probe site  4.  Every 4-6 hours:  If on nasal continuous positive airway pressure, respiratory therapy assess nares and determine need for appliance change or resting period.  6/15/2024 1225 by Wilma Hastings RN  Outcome: Completed  6/15/2024 1225 by Wilma Hastings RN  Outcome: Adequate for Discharge  6/14/2024 2301 by Yanci Chambers RN  Outcome: Progressing     Problem: ABCDS Injury Assessment  Goal: Absence of physical injury  6/15/2024 1225 by Wilma Hastings RN  Outcome: Completed  6/15/2024 1225 by Wilma Hastings RN  Outcome: Adequate for Discharge  6/14/2024 2301 by Yanci Chambers RN  Outcome: Progressing

## 2024-06-16 DIAGNOSIS — E11.620 TYPE 2 DIABETES MELLITUS WITH DIABETIC DERMATITIS, WITHOUT LONG-TERM CURRENT USE OF INSULIN (HCC): ICD-10-CM

## 2024-06-16 LAB
BACTERIA UR CULT: ABNORMAL
ORGANISM: ABNORMAL

## 2024-06-17 ENCOUNTER — CARE COORDINATION (OUTPATIENT)
Dept: CASE MANAGEMENT | Age: 89
End: 2024-06-17

## 2024-06-17 RX ORDER — GLIPIZIDE 10 MG/1
TABLET ORAL
Qty: 180 TABLET | Refills: 3 | Status: SHIPPED | OUTPATIENT
Start: 2024-06-17

## 2024-06-17 NOTE — CARE COORDINATION
Care Transitions Note    Initial Call - Call within 2 business days of discharge: Yes      Patient: Ari Snider   Patient : 1935   MRN: [id number]    Reason for Admission: 2 days -> epigastric/RUQ pain, dilatation of GB with possible sludge, hx CAD, HTN, possible chronic bladder outlet obstruction noted on CT scan, acute cystitis, mediastinal lymph node 1.2 cm, splenomegaly/cirrhosis, normocytic anemia, non-Hodgkin's lymphoma, BLE swelling-chronic, possible dementia with agitation, DM2, neuropathy, normocytic anemia, PVD, hx R foot osteomyelitis and MRSA bacteremia, carotid artery stenosis, thyroid nodules -> home no services with referral to PCP to [facility], f/up for thyroid US outpatient, f/up podiatry,   Discharge Date: 6/15/24  RURS: Readmission Risk Score: 18.5    Last Discharge Facility       Date Complaint Diagnosis Description Type Department Provider    24 Chest Pain Chest pain, unspecified type ED to Hosp-Admission (Discharged) (ADMITTED) [facility] Escobar Gastelum MD; June...     Was this an external facility discharge? No    CTN attempted follow-up outreach to patient. Message left including CTN contact information.     Future Appointments         Provider Specialty Dept Phone    2024 2:45 PM Garfield Cordoba,  Cardiology [phone]    2024 10:20 AM Wilma Rowley, MALATHI - CNP Family Medicine [phone]          Annika Russell RN

## 2024-06-17 NOTE — DISCHARGE SUMMARY
Name:  Ari Snider  Room:  /0308-01  MRN:    [id number]    Discharge Summary      This discharge summary is in conjunction with a complete physical exam done on the day of discharge.    Discharging Physician: Dr. Gastelum       Admit: 6/13/2024  Discharge:  6/15/2024    HPI taken from admission H&P:    The patient is a 88 y.o. male with pmhx of CAD, CVA, DM, HTN, PVD, carotid artery stenosis who presented to [facility] ED with complaint of RUQ and epigastric pain. Started abruptly this morning. Pain is located in RUQ and radiates to epigastric region and through his back. Pain is sharp, stabbing and constant. Denies chest pain and it is reproducible on exam.   Did also feel nauseous and dry heaved. No emesis. Was given several nitroglycerin without improvement. Denies shortness of breath.      No fever, chills, cough, diarrhea, urinary symptoms.      Diagnoses this Admission and Hospital Course     #Epigastric/RUQ pain   #Dilatation of GB with possible sludge   -LFTs WNLs and US with no clear cholecystitis or gallstones  - given multiple doses of morphine in ER with some improvement  - surgery does not believe it is from gall bladder   - resumed diet and improving  - could be muscular issue      #Hx of CAD  -low suspicion for cardiac etiology 2/2 to reproducible on exam   - serial troponins neg, EKG wnl.   - cardiology consulted  - ECHO with normal EF , moderate AS , normal wall motion   - continue ASA and statins for hx of CAD     #HTN   -on lisinopril and added hydralazine prn      #Possible chronic bladder outlet obstruction noted on CT scan   Acute cystitis   - start on rocephin  -urine studies with large leukocyte esterase, >100 WBCs  -monitor intake and output   -has voided without difficulty   -started flomax - outpt eval      #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  #Splenomegaly /cirrhosis   #Normocytic anemia   #Non-hodgkins lymphoma  -received rituximab  in the past  -saw oncology 11/2019 but later followed  performed.      Additional findings noted above.         CTA ABDOMEN PELVIS W CONTRAST   Final Result   No thoracic or abdominal aortic aneurysm or dissection.      Coronary vascular disease.      Centrilobular emphysema.  No acute pulmonary process identified.  There are   findings related to chronic granulomatous disease.      Mild mural thickening of the rectum. Correlation with physical exam findings   may be helpful.      Mild diffuse mural thickening of the bladder likely related to chronic   bladder outlet obstruction.      Mild dilatation of the gallbladder with possible minimal sludge in the   dependent gallbladder.      Splenomegaly.      Thyroid nodules.  Nonemergent additional evaluation with thyroid ultrasound   may be helpful if not recently performed.      Additional findings noted above.         XR CHEST PORTABLE   Final Result   No acute cardiopulmonary process.             06/13/24    ECHO (TTE) LIMITED (PRN CONTRAST/BUBBLE/STRAIN/3D) 06/14/2024 10:28 AM (Final)    Interpretation Summary    Left Ventricle: Normal left ventricular systolic function with a visually estimated EF of 55 - 60%. Normal wall thickness. Normal wall motion. Indeterminate diastolic function.    Right Ventricle: Visually normal systolic function.    Aortic Valve: Severely thickened right cusp. Severely restricted motion. Mild regurgitation. Moderate stenosis of the aortic valve. Noted by the right parasternal view. AV mean gradient is 23 mmHg. AV peak gradient is 40 mmHg. AV peak velocity is 3.2 m/s. LVOT diameter is 2.2 cm. AV area by continuity VTI is 1.8 cm2.    Pericardium: No pericardial effusion.    Limited study.    Signed by: Larry Hawkins MD on 6/14/2024 10:28 AM       Discharge Medications     Medication List        START taking these medications      ciprofloxacin 500 MG tablet  Commonly known as: Cipro  Take 1 tablet by mouth 2 times daily for 5 days     tamsulosin 0.4 MG capsule  Commonly known as: FLOMAX  Take 1

## 2024-06-18 ENCOUNTER — CARE COORDINATION (OUTPATIENT)
Dept: CASE MANAGEMENT | Age: 89
End: 2024-06-18

## 2024-06-18 NOTE — TELEPHONE ENCOUNTER
Care Transitions Initial Follow Up Call    Outreach made within 2 business days of discharge: Yes    Patient: Ari Snider Patient : 1935   MRN: [id number]  Reason for Admission: There are no discharge diagnoses documented for the most recent discharge.  Discharge Date: 6/15/24       Spoke with: Patient     Discharge department/facility: [facility] Interactive Patient Contact:  Was patient able to fill all prescriptions: Yes  Was patient instructed to bring all medications to the follow-up visit: Yes  Is patient taking all medications as directed in the discharge summary? Yes  Does patient understand their discharge instructions: Yes  Does patient have questions or concerns that need addressed prior to 7-14 day follow up office visit: no    Scheduled appointment with PCP within 7-14 days    Follow Up  Future Appointments   Date Time Provider Department Center   2024  1:20 PM Roxbury, Wilma, APRN - CNP [facility] [place] [facility]   2024  2:45 PM Garfield Cordoba DO [facility]   2024 10:20 AM Roxbury, Wilma, APRN - CNP [facility] [place] [facility]       Lizeth Thu

## 2024-06-19 ENCOUNTER — OFFICE VISIT (OUTPATIENT)
Dept: FAMILY MEDICINE CLINIC | Age: 89
End: 2024-06-19

## 2024-06-19 VITALS
BODY MASS INDEX: 34.06 KG/M2 | SYSTOLIC BLOOD PRESSURE: 118 MMHG | OXYGEN SATURATION: 96 % | DIASTOLIC BLOOD PRESSURE: 68 MMHG | HEART RATE: 73 BPM | WEIGHT: 224 LBS | TEMPERATURE: 97.2 F

## 2024-06-19 DIAGNOSIS — I35.0 NONRHEUMATIC AORTIC VALVE STENOSIS: ICD-10-CM

## 2024-06-19 DIAGNOSIS — R07.9 CHEST PAIN, UNSPECIFIED TYPE: ICD-10-CM

## 2024-06-19 DIAGNOSIS — N17.9 AKI (ACUTE KIDNEY INJURY) (HCC): ICD-10-CM

## 2024-06-19 DIAGNOSIS — Z09 HOSPITAL DISCHARGE FOLLOW-UP: Primary | ICD-10-CM

## 2024-06-19 LAB
BILIRUBIN, POC: NEGATIVE
BLOOD URINE, POC: NEGATIVE
CLARITY, POC: CLEAR
COLOR, POC: YELLOW
GLUCOSE URINE, POC: NEGATIVE
KETONES, POC: NORMAL
LEUKOCYTE EST, POC: NEGATIVE
NITRITE, POC: NEGATIVE
PH, POC: 5.5
PROTEIN, POC: 100
SPECIFIC GRAVITY, POC: >=1.03
UROBILINOGEN, POC: 0.2

## 2024-06-19 NOTE — PROGRESS NOTES
endorsed nausea and dry heaving.  Denied chest pain/ shortness of breath.  Hospital course uneventful.  Abdominal US negative for cholecystitis/ gallstones.  ECHO showing normal ER, moderate AS, normal wall motion.  Pain improved with morphine in ER.  Noted acute cystitis and given Rocephin.  Started on Flomax due to concern for BPH.  Developed confusion during hospitalization which resolved when patient went home.       Interval history/Current status: Here today with son, Josue.  Reports he is feeling well.  No longer having abdominal pain.      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, with necrosis of bone (HCC)    Type 2 diabetes mellitus without complication, without long-term current use of insulin (HCC)    Benign essential HTN    Lymphoma (HCC)    Class 1 obesity due to excess calories with serious comorbidity and body mass index (BMI) of 34.0 to 34.9 in adult    Acquired lymphedema of lower extremity    CAD (coronary artery disease)    Anemia    Sensorineural hearing loss, bilateral    Nonrheumatic aortic valve stenosis    Chronic pansinusitis    PVD (peripheral vascular disease) (HCC)    Bilateral carotid artery stenosis    Atherosclerosis of abdominal aorta (HCC)    Right bundle branch block    Lumbar spondylosis    Type 2 diabetes mellitus with hyperglycemia (HCC)    Acute osteomyelitis of metatarsal bone of right foot (HCC)    Bacteremia due to methicillin resistant Staphylococcus aureus    Chest pain    Epigastric pain    Gallbladder sludge    Right upper quadrant abdominal pain       Medications listed as ordered at the time of discharge from hospital     Medication List            Accurate as of June 19, 2024 11:59 PM. If you have any questions, ask your nurse or doctor.                CHANGE how you take these medications      glipiZIDE 10 MG tablet  Commonly known as: GLUCOTROL  TAKE 1 TABLET BY MOUTH TWICE A DAY

## 2024-06-20 LAB
ALBUMIN SERPL-MCNC: 3.6 G/DL (ref 3.4–5)
ALBUMIN/GLOB SERPL: 1 {RATIO} (ref 1.1–2.2)
ALP SERPL-CCNC: 132 U/L (ref 40–129)
ALT SERPL-CCNC: 14 U/L (ref 10–40)
ANION GAP SERPL CALCULATED.3IONS-SCNC: 13 MMOL/L (ref 3–16)
AST SERPL-CCNC: 15 U/L (ref 15–37)
BILIRUB SERPL-MCNC: 0.3 MG/DL (ref 0–1)
BUN SERPL-MCNC: 35 MG/DL (ref 7–20)
CALCIUM SERPL-MCNC: 9 MG/DL (ref 8.3–10.6)
CHLORIDE SERPL-SCNC: 104 MMOL/L (ref 99–110)
CO2 SERPL-SCNC: 23 MMOL/L (ref 21–32)
CREAT SERPL-MCNC: 1.5 MG/DL (ref 0.8–1.3)
DEPRECATED RDW RBC AUTO: 16.8 % (ref 12.4–15.4)
GFR SERPLBLD CREATININE-BSD FMLA CKD-EPI: 45 ML/MIN/{1.73_M2}
GLUCOSE SERPL-MCNC: 192 MG/DL (ref 70–99)
HCT VFR BLD AUTO: 28.8 % (ref 40.5–52.5)
HGB BLD-MCNC: 9.5 G/DL (ref 13.5–17.5)
MCH RBC QN AUTO: 30.7 PG (ref 26–34)
MCHC RBC AUTO-ENTMCNC: 33.1 G/DL (ref 31–36)
MCV RBC AUTO: 92.5 FL (ref 80–100)
PLATELET # BLD AUTO: 246 K/UL (ref 135–450)
PMV BLD AUTO: 8.2 FL (ref 5–10.5)
POTASSIUM SERPL-SCNC: 4.9 MMOL/L (ref 3.5–5.1)
PROT SERPL-MCNC: 7.1 G/DL (ref 6.4–8.2)
RBC # BLD AUTO: 3.11 M/UL (ref 4.2–5.9)
SODIUM SERPL-SCNC: 140 MMOL/L (ref 136–145)
WBC # BLD AUTO: 9.5 K/UL (ref 4–11)

## 2024-06-20 NOTE — RESULT ENCOUNTER NOTE
Kidney function is better than during hospitalization.  Now slightly decreased from your baseline.  WE will check this again in 3 months.      One of your liver enzymes is very slightly elevated.  Will monitor this.      Anemia is about the same.  Will monitor this.      Overall good report today.

## 2024-06-25 ENCOUNTER — PATIENT MESSAGE (OUTPATIENT)
Dept: FAMILY MEDICINE CLINIC | Age: 89
End: 2024-06-25

## 2024-06-25 NOTE — TELEPHONE ENCOUNTER
From: Ari Snider  To: Wilma Rowley  Sent: 6/25/2024 4:02 PM EDT  Subject: [facility] Paperwork Request    Sorry for the delay. I just attached the pages which needed Dad's physician's input.  Feel free to email me at [email or web address].  Thank you Kiet

## 2024-06-26 ENCOUNTER — APPOINTMENT (OUTPATIENT)
Dept: ULTRASOUND IMAGING | Age: 89
End: 2024-06-26
Payer: OTHER GOVERNMENT

## 2024-06-26 ENCOUNTER — APPOINTMENT (OUTPATIENT)
Dept: CT IMAGING | Age: 89
End: 2024-06-26
Payer: OTHER GOVERNMENT

## 2024-06-26 ENCOUNTER — HOSPITAL ENCOUNTER (INPATIENT)
Age: 89
End: 2024-06-26
Attending: EMERGENCY MEDICINE | Admitting: INTERNAL MEDICINE
Payer: OTHER GOVERNMENT

## 2024-06-26 ENCOUNTER — TELEPHONE (OUTPATIENT)
Dept: FAMILY MEDICINE CLINIC | Age: 89
End: 2024-06-26

## 2024-06-26 DIAGNOSIS — K81.0 ACUTE CHOLECYSTITIS: Primary | ICD-10-CM

## 2024-06-26 PROBLEM — R74.01 TRANSAMINITIS: Status: ACTIVE | Noted: 2024-06-26

## 2024-06-26 PROBLEM — K85.90 ACUTE PANCREATITIS, UNSPECIFIED COMPLICATION STATUS, UNSPECIFIED PANCREATITIS TYPE: Status: ACTIVE | Noted: 2024-06-26

## 2024-06-26 LAB
ALBUMIN SERPL-MCNC: 3.6 G/DL (ref 3.4–5)
ALBUMIN/GLOB SERPL: 0.9 {RATIO} (ref 1.1–2.2)
ALP SERPL-CCNC: 356 U/L (ref 40–129)
ALT SERPL-CCNC: 166 U/L (ref 10–40)
AMORPH SED URNS QL MICRO: ABNORMAL /HPF
ANION GAP SERPL CALCULATED.3IONS-SCNC: 13 MMOL/L (ref 3–16)
AST SERPL-CCNC: 198 U/L (ref 15–37)
BACTERIA URNS QL MICRO: ABNORMAL /HPF
BASOPHILS # BLD: 0.1 K/UL (ref 0–0.2)
BASOPHILS NFR BLD: 0.7 %
BILIRUB SERPL-MCNC: 0.5 MG/DL (ref 0–1)
BILIRUB UR QL STRIP.AUTO: NEGATIVE
BUN SERPL-MCNC: 39 MG/DL (ref 7–20)
CALCIUM SERPL-MCNC: 9 MG/DL (ref 8.3–10.6)
CHLORIDE SERPL-SCNC: 100 MMOL/L (ref 99–110)
CLARITY UR: CLEAR
CO2 SERPL-SCNC: 24 MMOL/L (ref 21–32)
COLOR UR: YELLOW
CREAT SERPL-MCNC: 1.5 MG/DL (ref 0.8–1.3)
DEPRECATED RDW RBC AUTO: 16.5 % (ref 12.4–15.4)
EKG ATRIAL RATE: 72 BPM
EKG DIAGNOSIS: NORMAL
EKG P-R INTERVAL: 184 MS
EKG Q-T INTERVAL: 438 MS
EKG QRS DURATION: 140 MS
EKG QTC CALCULATION (BAZETT): 479 MS
EKG R AXIS: -59 DEGREES
EKG T AXIS: 21 DEGREES
EKG VENTRICULAR RATE: 72 BPM
EOSINOPHIL # BLD: 0 K/UL (ref 0–0.6)
EOSINOPHIL NFR BLD: 0 %
EPI CELLS #/AREA URNS HPF: ABNORMAL /HPF (ref 0–5)
FINE GRAN CASTS #/AREA URNS HPF: ABNORMAL /LPF (ref 0–2)
GFR SERPLBLD CREATININE-BSD FMLA CKD-EPI: 45 ML/MIN/{1.73_M2}
GLUCOSE BLD-MCNC: 353 MG/DL (ref 70–99)
GLUCOSE SERPL-MCNC: 441 MG/DL (ref 70–99)
GLUCOSE UR STRIP.AUTO-MCNC: >=1000 MG/DL
HCT VFR BLD AUTO: 33.9 % (ref 40.5–52.5)
HGB BLD-MCNC: 10.9 G/DL (ref 13.5–17.5)
HGB UR QL STRIP.AUTO: NEGATIVE
KETONES UR STRIP.AUTO-MCNC: NEGATIVE MG/DL
LEUKOCYTE ESTERASE UR QL STRIP.AUTO: NEGATIVE
LIPASE SERPL-CCNC: 2584 U/L (ref 13–60)
LYMPHOCYTES # BLD: 1.3 K/UL (ref 1–5.1)
LYMPHOCYTES NFR BLD: 10.7 %
MCH RBC QN AUTO: 29.5 PG (ref 26–34)
MCHC RBC AUTO-ENTMCNC: 32.2 G/DL (ref 31–36)
MCV RBC AUTO: 91.5 FL (ref 80–100)
MONOCYTES # BLD: 0.3 K/UL (ref 0–1.3)
MONOCYTES NFR BLD: 2.9 %
NEUTROPHILS # BLD: 10.1 K/UL (ref 1.7–7.7)
NEUTROPHILS NFR BLD: 85.7 %
NITRITE UR QL STRIP.AUTO: NEGATIVE
PERFORMED ON: ABNORMAL
PH UR STRIP.AUTO: 6 [PH] (ref 5–8)
PLATELET # BLD AUTO: 382 K/UL (ref 135–450)
PMV BLD AUTO: 7.4 FL (ref 5–10.5)
POTASSIUM SERPL-SCNC: 5.8 MMOL/L (ref 3.5–5.1)
POTASSIUM SERPL-SCNC: 6 MMOL/L (ref 3.5–5.1)
PROCALCITONIN SERPL IA-MCNC: 1.47 NG/ML (ref 0–0.15)
PROT SERPL-MCNC: 7.6 G/DL (ref 6.4–8.2)
PROT UR STRIP.AUTO-MCNC: 30 MG/DL
RBC # BLD AUTO: 3.7 M/UL (ref 4.2–5.9)
RBC #/AREA URNS HPF: ABNORMAL /HPF (ref 0–4)
SODIUM SERPL-SCNC: 137 MMOL/L (ref 136–145)
SP GR UR STRIP.AUTO: 1.02 (ref 1–1.03)
TROPONIN, HIGH SENSITIVITY: 25 NG/L (ref 0–22)
TROPONIN, HIGH SENSITIVITY: 27 NG/L (ref 0–22)
UA COMPLETE W REFLEX CULTURE PNL UR: ABNORMAL
UA DIPSTICK W REFLEX MICRO PNL UR: YES
URN SPEC COLLECT METH UR: ABNORMAL
UROBILINOGEN UR STRIP-ACNC: 0.2 E.U./DL
WBC # BLD AUTO: 11.8 K/UL (ref 4–11)
WBC #/AREA URNS HPF: ABNORMAL /HPF (ref 0–5)

## 2024-06-26 PROCEDURE — 85025 COMPLETE CBC W/AUTO DIFF WBC: CPT

## 2024-06-26 PROCEDURE — 36415 COLL VENOUS BLD VENIPUNCTURE: CPT

## 2024-06-26 PROCEDURE — 6360000002 HC RX W HCPCS: Performed by: NURSE PRACTITIONER

## 2024-06-26 PROCEDURE — 80053 COMPREHEN METABOLIC PANEL: CPT

## 2024-06-26 PROCEDURE — 81001 URINALYSIS AUTO W/SCOPE: CPT

## 2024-06-26 PROCEDURE — 93005 ELECTROCARDIOGRAM TRACING: CPT | Performed by: NURSE PRACTITIONER

## 2024-06-26 PROCEDURE — 96374 THER/PROPH/DIAG INJ IV PUSH: CPT

## 2024-06-26 PROCEDURE — 83036 HEMOGLOBIN GLYCOSYLATED A1C: CPT

## 2024-06-26 PROCEDURE — APPSS30 APP SPLIT SHARED TIME 16-30 MINUTES

## 2024-06-26 PROCEDURE — 84145 PROCALCITONIN (PCT): CPT

## 2024-06-26 PROCEDURE — 6370000000 HC RX 637 (ALT 250 FOR IP): Performed by: NURSE PRACTITIONER

## 2024-06-26 PROCEDURE — 74176 CT ABD & PELVIS W/O CONTRAST: CPT

## 2024-06-26 PROCEDURE — 83690 ASSAY OF LIPASE: CPT

## 2024-06-26 PROCEDURE — 2060000000 HC ICU INTERMEDIATE R&B

## 2024-06-26 PROCEDURE — 2580000003 HC RX 258: Performed by: NURSE PRACTITIONER

## 2024-06-26 PROCEDURE — 99223 1ST HOSP IP/OBS HIGH 75: CPT | Performed by: SURGERY

## 2024-06-26 PROCEDURE — 96375 TX/PRO/DX INJ NEW DRUG ADDON: CPT

## 2024-06-26 PROCEDURE — 96376 TX/PRO/DX INJ SAME DRUG ADON: CPT

## 2024-06-26 PROCEDURE — 84484 ASSAY OF TROPONIN QUANT: CPT

## 2024-06-26 PROCEDURE — 84132 ASSAY OF SERUM POTASSIUM: CPT

## 2024-06-26 PROCEDURE — 93010 ELECTROCARDIOGRAM REPORT: CPT | Performed by: STUDENT IN AN ORGANIZED HEALTH CARE EDUCATION/TRAINING PROGRAM

## 2024-06-26 PROCEDURE — 99285 EMERGENCY DEPT VISIT HI MDM: CPT

## 2024-06-26 PROCEDURE — 99223 1ST HOSP IP/OBS HIGH 75: CPT | Performed by: INTERNAL MEDICINE

## 2024-06-26 PROCEDURE — 76705 ECHO EXAM OF ABDOMEN: CPT

## 2024-06-26 PROCEDURE — 2500000003 HC RX 250 WO HCPCS: Performed by: NURSE PRACTITIONER

## 2024-06-26 RX ORDER — MORPHINE SULFATE 2 MG/ML
2 INJECTION, SOLUTION INTRAMUSCULAR; INTRAVENOUS ONCE
Status: COMPLETED | OUTPATIENT
Start: 2024-06-26 | End: 2024-06-26

## 2024-06-26 RX ORDER — DEXTROSE MONOHYDRATE 100 MG/ML
INJECTION, SOLUTION INTRAVENOUS CONTINUOUS PRN
Status: DISCONTINUED | OUTPATIENT
Start: 2024-06-26 | End: 2024-07-04 | Stop reason: HOSPADM

## 2024-06-26 RX ORDER — POTASSIUM CHLORIDE 7.45 MG/ML
10 INJECTION INTRAVENOUS PRN
Status: DISCONTINUED | OUTPATIENT
Start: 2024-06-26 | End: 2024-07-04 | Stop reason: HOSPADM

## 2024-06-26 RX ORDER — SODIUM CHLORIDE 0.9 % (FLUSH) 0.9 %
5-40 SYRINGE (ML) INJECTION PRN
Status: DISCONTINUED | OUTPATIENT
Start: 2024-06-26 | End: 2024-07-04 | Stop reason: HOSPADM

## 2024-06-26 RX ORDER — SODIUM CHLORIDE 9 MG/ML
INJECTION, SOLUTION INTRAVENOUS CONTINUOUS
Status: DISCONTINUED | OUTPATIENT
Start: 2024-06-26 | End: 2024-06-27

## 2024-06-26 RX ORDER — GLUCAGON 1 MG/ML
1 KIT INJECTION PRN
Status: DISCONTINUED | OUTPATIENT
Start: 2024-06-26 | End: 2024-07-04 | Stop reason: HOSPADM

## 2024-06-26 RX ORDER — MAGNESIUM SULFATE IN WATER 40 MG/ML
2000 INJECTION, SOLUTION INTRAVENOUS PRN
Status: DISCONTINUED | OUTPATIENT
Start: 2024-06-26 | End: 2024-07-04 | Stop reason: HOSPADM

## 2024-06-26 RX ORDER — CALCIUM GLUCONATE 20 MG/ML
1000 INJECTION, SOLUTION INTRAVENOUS ONCE
Status: COMPLETED | OUTPATIENT
Start: 2024-06-26 | End: 2024-06-26

## 2024-06-26 RX ORDER — ENOXAPARIN SODIUM 100 MG/ML
40 INJECTION SUBCUTANEOUS DAILY
Status: DISCONTINUED | OUTPATIENT
Start: 2024-06-26 | End: 2024-07-01

## 2024-06-26 RX ORDER — ONDANSETRON 2 MG/ML
4 INJECTION INTRAMUSCULAR; INTRAVENOUS EVERY 6 HOURS PRN
Status: DISCONTINUED | OUTPATIENT
Start: 2024-06-26 | End: 2024-07-04 | Stop reason: HOSPADM

## 2024-06-26 RX ORDER — PANTOPRAZOLE SODIUM 40 MG/1
40 TABLET, DELAYED RELEASE ORAL
Status: DISCONTINUED | OUTPATIENT
Start: 2024-06-27 | End: 2024-07-04 | Stop reason: HOSPADM

## 2024-06-26 RX ORDER — SODIUM CHLORIDE 0.9 % (FLUSH) 0.9 %
5-40 SYRINGE (ML) INJECTION EVERY 12 HOURS SCHEDULED
Status: DISCONTINUED | OUTPATIENT
Start: 2024-06-26 | End: 2024-07-04 | Stop reason: HOSPADM

## 2024-06-26 RX ORDER — POTASSIUM CHLORIDE 29.8 MG/ML
20 INJECTION INTRAVENOUS PRN
Status: DISCONTINUED | OUTPATIENT
Start: 2024-06-26 | End: 2024-07-04 | Stop reason: HOSPADM

## 2024-06-26 RX ORDER — LISINOPRIL 5 MG/1
5 TABLET ORAL DAILY
Status: DISCONTINUED | OUTPATIENT
Start: 2024-06-26 | End: 2024-07-04 | Stop reason: HOSPADM

## 2024-06-26 RX ORDER — ONDANSETRON 2 MG/ML
4 INJECTION INTRAMUSCULAR; INTRAVENOUS ONCE
Status: COMPLETED | OUTPATIENT
Start: 2024-06-26 | End: 2024-06-26

## 2024-06-26 RX ORDER — SODIUM CHLORIDE 9 MG/ML
INJECTION, SOLUTION INTRAVENOUS PRN
Status: DISCONTINUED | OUTPATIENT
Start: 2024-06-26 | End: 2024-07-04 | Stop reason: HOSPADM

## 2024-06-26 RX ORDER — ASPIRIN 81 MG/1
81 TABLET ORAL DAILY
Status: DISCONTINUED | OUTPATIENT
Start: 2024-06-26 | End: 2024-07-04 | Stop reason: HOSPADM

## 2024-06-26 RX ORDER — GLUCAGON 1 MG/ML
1 KIT INJECTION PRN
Status: DISCONTINUED | OUTPATIENT
Start: 2024-06-26 | End: 2024-06-26

## 2024-06-26 RX ORDER — ONDANSETRON 4 MG/1
4 TABLET, ORALLY DISINTEGRATING ORAL EVERY 8 HOURS PRN
Status: DISCONTINUED | OUTPATIENT
Start: 2024-06-26 | End: 2024-07-04 | Stop reason: HOSPADM

## 2024-06-26 RX ORDER — DEXTROSE MONOHYDRATE 100 MG/ML
INJECTION, SOLUTION INTRAVENOUS CONTINUOUS PRN
Status: DISCONTINUED | OUTPATIENT
Start: 2024-06-26 | End: 2024-06-26

## 2024-06-26 RX ORDER — TAMSULOSIN HYDROCHLORIDE 0.4 MG/1
0.4 CAPSULE ORAL DAILY
Status: DISCONTINUED | OUTPATIENT
Start: 2024-06-26 | End: 2024-07-04 | Stop reason: HOSPADM

## 2024-06-26 RX ORDER — INSULIN LISPRO 100 [IU]/ML
0-4 INJECTION, SOLUTION INTRAVENOUS; SUBCUTANEOUS EVERY 4 HOURS
Status: DISCONTINUED | OUTPATIENT
Start: 2024-06-26 | End: 2024-07-04 | Stop reason: HOSPADM

## 2024-06-26 RX ADMIN — SODIUM CHLORIDE, PRESERVATIVE FREE 10 ML: 5 INJECTION INTRAVENOUS at 21:10

## 2024-06-26 RX ADMIN — HYDROMORPHONE HYDROCHLORIDE 0.25 MG: 1 INJECTION, SOLUTION INTRAMUSCULAR; INTRAVENOUS; SUBCUTANEOUS at 16:00

## 2024-06-26 RX ADMIN — ONDANSETRON 4 MG: 2 INJECTION INTRAMUSCULAR; INTRAVENOUS at 11:35

## 2024-06-26 RX ADMIN — MORPHINE SULFATE 2 MG: 2 INJECTION, SOLUTION INTRAMUSCULAR; INTRAVENOUS at 14:53

## 2024-06-26 RX ADMIN — SODIUM ZIRCONIUM CYCLOSILICATE 10 G: 10 POWDER, FOR SUSPENSION ORAL at 14:40

## 2024-06-26 RX ADMIN — CALCIUM GLUCONATE 1000 MG: 20 INJECTION, SOLUTION INTRAVENOUS at 15:55

## 2024-06-26 RX ADMIN — MORPHINE SULFATE 2 MG: 2 INJECTION, SOLUTION INTRAMUSCULAR; INTRAVENOUS at 11:35

## 2024-06-26 RX ADMIN — DEXTROSE MONOHYDRATE 250 ML: 100 INJECTION, SOLUTION INTRAVENOUS at 16:16

## 2024-06-26 RX ADMIN — HYDROMORPHONE HYDROCHLORIDE 0.25 MG: 1 INJECTION, SOLUTION INTRAMUSCULAR; INTRAVENOUS; SUBCUTANEOUS at 21:27

## 2024-06-26 RX ADMIN — SODIUM BICARBONATE 50 MEQ: 84 INJECTION INTRAVENOUS at 14:54

## 2024-06-26 RX ADMIN — INSULIN LISPRO 4 UNITS: 100 INJECTION, SOLUTION INTRAVENOUS; SUBCUTANEOUS at 21:09

## 2024-06-26 RX ADMIN — PIPERACILLIN AND TAZOBACTAM 3375 MG: 3; .375 INJECTION, POWDER, LYOPHILIZED, FOR SOLUTION INTRAVENOUS at 16:38

## 2024-06-26 RX ADMIN — INSULIN HUMAN 5 UNITS: 100 INJECTION, SOLUTION PARENTERAL at 16:12

## 2024-06-26 ASSESSMENT — PAIN SCALES - GENERAL
PAINLEVEL_OUTOF10: 10
PAINLEVEL_OUTOF10: 4
PAINLEVEL_OUTOF10: 4
PAINLEVEL_OUTOF10: 8

## 2024-06-26 ASSESSMENT — PAIN DESCRIPTION - LOCATION
LOCATION: ABDOMEN
LOCATION: ABDOMEN

## 2024-06-26 ASSESSMENT — PAIN DESCRIPTION - ORIENTATION
ORIENTATION: RIGHT;ANTERIOR;MID;OUTER
ORIENTATION: RIGHT

## 2024-06-26 ASSESSMENT — PAIN - FUNCTIONAL ASSESSMENT
PAIN_FUNCTIONAL_ASSESSMENT: PREVENTS OR INTERFERES SOME ACTIVE ACTIVITIES AND ADLS
PAIN_FUNCTIONAL_ASSESSMENT: 0-10

## 2024-06-26 ASSESSMENT — PAIN DESCRIPTION - DESCRIPTORS: DESCRIPTORS: SHARP

## 2024-06-26 ASSESSMENT — PAIN DESCRIPTION - FREQUENCY: FREQUENCY: CONTINUOUS
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
## 2024-06-26 NOTE — TELEPHONE ENCOUNTER
Josue Ball (Ph# [phone]) Called and is asking if provider will call him to discuss his dad. Josue states that Pt was in hospital 2 weeks ago and was released, and still having abdominal pains, and Josue wants to discuss possible medication interactions.     Josue states pt has been seen by Tad a couple times when Wilma was not in. NAI

## 2024-06-26 NOTE — TELEPHONE ENCOUNTER
Patient developed abdominal pain. Was treated for pancreatitis and is now back in the hospital after starting Rybelsus

## 2024-06-26 NOTE — FLOWSHEET NOTE
06/26/24 1812   Vital Signs   Temp 97.9 °F (36.6 °C)   Temp Source Oral   Pulse 88   Heart Rate Source Monitor   Respirations 16   BP (!) 194/77   MAP (Calculated) 116   BP Location Left upper arm   BP Method Automatic   Patient Position Semi fowlers   Pain Assessment   Pain Assessment 0-10   Pain Level 10   Patient's Stated Pain Goal 0 - No pain   Pain Location Abdomen   Pain Orientation Right;Anterior;Mid;Outer   Pain Descriptors Sharp   Functional Pain Assessment Prevents or interferes some active activities and ADLs   Pain Radiating Towards   (RUQ)   Pain Frequency Continuous   Non-Pharmaceutical Pain Intervention(s) Rest   Oxygen Therapy   SpO2 97 %   O2 Device None (Room air)   Height and Weight   Height 1.727 m (5' 8\")   Weight - Scale 97.9 kg (215 lb 14.4 oz)   Weight Method Standing scale   BSA (Calculated - sq m) 2.17 sq meters   BMI (Calculated) 32.9     ADMISSION ASSESSMENT COMPLETED. PT IS A/O X3. CONFUSED TO CURRENT MONTH AND YEAR. PT IS IMPULSIVE AND WANTS TO GET UP BY HIMSELF TO GO TO THE BATHROOM. PT REMINDED TO USE CALL LIGHT FOR HELP. PT AMBULATED TO BATHROOM VIA FRONT WHEEL WALKER. PT WOULD BENEFIT FROM OT/PT. WILL PROVIDED BSC FOR EASIER TOILETING. PT HAD A HARD TIME GETTING OFF TOILET. PT DOES NOT WANT TO USE URINAL D/T HAVING DIFFICULTY STARTING STREAM. BED ALARM IS ACTIVE. PT C/O MID ABDOMINAL PAIN THAT RADIATES OVER TO RUQ. PT STATES PAIN IS SHARP. STABLE ON ROOM AIR, SITTING SEMI FOWLERS IN BED. ADMISSION ASSESSMENT COMPLETED WITH THE HELP OF PTS SON (TREMAYNE PADILLA). SKIN ABNORMALITIES NOTED, S1 ON COCCYX, SCROTAL EXCORIATION. HEART MEPELEX APPLIED TO COCCYX, INTER-DRY APPLIED BETWEEN GROIN CREASES AND DESITIN APPLIED TO SCROTUM. TOES AMPUTATED ON RIGHT FOOT. WILL CONTINUE TO MONITOR.
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
## 2024-06-26 NOTE — H&P
Hospital Medicine History & Physical      PCP: Wilma Rowley, APRN - CNP    Date of Admission: 6/26/2024    Date of Service: Pt seen/examined on 06/26/24      Chief Complaint:    Chief Complaint   Patient presents with    Abdominal Pain     Ruq pain.  Was here a couple of weeks ago for same.  Found sludge in gallbladder         History Of Present Illness:      The patient is a 88 y.o. male PMH of CAD, CVA, DM, HTN, PVD and carotid stenosis who presents to [facility] with abdominal pain.    History obtained from the patient and review of EMR.  He came to the emergency with complaints of right upper quadrant abdominal pain.  Patient be admitted to hospital 10 days ago with similar complaints.  At that time he was diagnosed with possible sludge in the gallbladder.  Surgery did not feel it was coming from the gallbladder.    This time patient had significant right upper quadrant abdominal pain that started at 530 this morning.  Workup in the ER showed elevation of liver enzymes.  Lipase is elevated consistent with acute pancreatitis.  Ultrasound showed acute cholecystitis.  Patient was in a significant amount of pain when I saw him.  No fever or chills.    Past Medical History:        Diagnosis Date    Abscess of toe of right foot 07/27/2011    Acute CVA (cerebrovascular accident) ([facility]) 03/28/2021    Blood transfusion     Cellulitis of right lower extremity 01/11/2024    Cushing's syndrome ([facility]) 09/22/2010    Diabetic ulcer of toe 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 mellitus, with fat layer exposed ([facility])     Greater trochanteric bursitis of right hip 03/01/2023    Hypertension     Lumbar spondylosis 03/01/2023    Lymphoma ([facility])     MRSA infection 06/16/2022    Osteomyelitis of left foot ([facility]) 01/2021    Tinnitus 03/14/2012    Tobacco use     Ulcer of other part of foot 08/02/2011    Vertigo, benign paroxysmal 08/15/2016    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

## 2024-06-26 NOTE — PROGRESS NOTES
Consult received, full note to follow:  88 year old male with abdominal pain and elevated liver enzymes, MRCP ordered to rule out choledocholithiasis.

## 2024-06-26 NOTE — ED PROVIDER NOTES
ED Attending Attestation Note    I personally saw the patient and made/approved the management plan and take responsibility for patient management.     Briefly, 88 y.o. male presents with worsening right upper quadrant abdominal pain.  Was previously evaluated for similar symptoms and found to have sludge in his gallbladder.  Patient states that is quite significant how that this pain is and family that is at bedside states that he typically handles pain quite well so this is unusual for him to be this uncomfortable..     Focused exam:   Gen: 88 y.o. male, NAD  Abdomen is obese, soft but rotund with significant tenderness noted to the right upper quadrant.  No jaundice noted.  GCS 15.    Imaging:   US GALLBLADDER RUQ   Final Result   1. Equivocal findings for acute cholecystitis with mild gallbladder wall   thickening and a positive sonographic Udarte sign, but no pericholecystic   fluid.  Thickening of the gallbladder could be related to adjacent   pancreatitis, but that is uncertain.  The gallbladder contains sludge and   small stones.   2. No biliary dilatation.   3. Mild hepatomegaly.         CT ABDOMEN PELVIS WO CONTRAST Additional Contrast? None   Final Result   Moderate ill-defined inflammatory change surrounding the pancreas suggesting   acute pancreatitis.  Inflammatory change extends inferiorly along the root of   mesentery into the pericolic gutters.      Gallbladder wall thickening is seen, which could be reactive or due to   concomitant cholecystitis.      Nonobstructing renal calculi on the left with splenomegaly            The Ekg interpreted by me shows  normal sinus rhythm with a rate of 72  Axis is   Left axis deviation  QTc is  within an acceptable range  Intervals and Durations are unremarkable.      ST Segments: nonspecific changes.  Bifascicular block.  No significant change from prior EKG dated 6/13/24    MDM:   Patient presenting for evaluation of worsening right upper quad abdominal pain is  significant elevation of his LFTs, and more notably to of his lipase.  Imaging shows evidence of possible cholecystitis as well as acute pancreatitis.  At this time, will consult GI and general surgery to evaluate and provide further management recommendations, administer broad-spectrum antibiotics as well as pain and nausea control, and plan for admission to the hospitalist team for overall management.  Of note, patient also noted to have hyperkalemia and was given medications to mitigate this as well.      For further details of the patient's emergency department visit, please see the advanced practice provider's documentation.    Kimberley Paul MD     This report has been produced using speech recognition software and may contain errors related to that system including errors in grammar, punctuation, and spelling, as well as words and phrases that may be inappropriate. If there are any questions or concerns please feel free to contact the dictating provider for clarification.        Kimberley Paul MD  06/26/24 7541

## 2024-06-26 NOTE — PROGRESS NOTES
Patient admitted to room 3115 from ED. Patient oriented to room, call light, bed rails, phone, lights and bathroom. Patient instructed about the schedule of the day including: vital sign frequency, lab draws, possible tests, frequency of MD and staff rounds, daily weights, I &O's and prescribed diet. PCU Telemetry box in place, patient aware of placement and reason. Bed locked, in lowest position, side rails up 2/4, call light within reach.        Recliner Assessment  Patient is not able to demonstrated the ability to move from a reclining position to an upright position within the recliner. however patient is alert, oriented and able to provide informed consent       4 Eyes Skin Assessment     NAME:  Ari Snider  YOB: 1935  MEDICAL RECORD NUMBER:  [id number]    The patient is being assessed for  Admission    I agree that at least one RN has performed a thorough Head to Toe Skin Assessment on the patient. ALL assessment sites listed below have been assessed.      Areas assessed by both nurses:    Head, Face, Ears, Shoulders, Back, Chest, Arms, Elbows, Hands, Sacrum. Buttock, Coccyx, Ischium, Legs. Feet and Heels, and Under Medical Devices         Does the Patient have a Wound? Yes wound(s) were present on assessment. LDA wound assessment was Initiated and completed by RN  S1 ON COCCYX  SCROTAL EXCORIATION               Javier Prevention initiated by RN: Yes  Wound Care Orders initiated by RN: No    Pressure Injury (Stage 3,4, Unstageable, DTI, NWPT, and Complex wounds) if present, place Wound referral order by RN under : No    New Ostomies, if present place, Ostomy referral order under : No     Nurse 1 eSignature: Electronically signed by Zari Jensen RN on 6/26/24 at 7:15 PM EDT    **SHARE this note so that the co-signing nurse can place an eSignature**    Nurse 2 eSignature: {Esignature:[id number]}

## 2024-06-26 NOTE — ED NOTES
1500 - Call placed to [facility] for GI consult.   1528 - Call returned to Radames West NP from  at [facility].

## 2024-06-27 ENCOUNTER — APPOINTMENT (OUTPATIENT)
Dept: MRI IMAGING | Age: 89
End: 2024-06-27
Payer: OTHER GOVERNMENT

## 2024-06-27 LAB
ALBUMIN SERPL-MCNC: 3.2 G/DL (ref 3.4–5)
ALP SERPL-CCNC: 258 U/L (ref 40–129)
ALT SERPL-CCNC: 103 U/L (ref 10–40)
ANION GAP SERPL CALCULATED.3IONS-SCNC: 11 MMOL/L (ref 3–16)
AST SERPL-CCNC: 63 U/L (ref 15–37)
BASOPHILS # BLD: 0.1 K/UL (ref 0–0.2)
BASOPHILS NFR BLD: 0.5 %
BILIRUB DIRECT SERPL-MCNC: <0.2 MG/DL (ref 0–0.3)
BILIRUB INDIRECT SERPL-MCNC: ABNORMAL MG/DL (ref 0–1)
BILIRUB SERPL-MCNC: 0.5 MG/DL (ref 0–1)
BUN SERPL-MCNC: 42 MG/DL (ref 7–20)
CALCIUM SERPL-MCNC: 8.5 MG/DL (ref 8.3–10.6)
CHLORIDE SERPL-SCNC: 103 MMOL/L (ref 99–110)
CO2 SERPL-SCNC: 26 MMOL/L (ref 21–32)
CREAT SERPL-MCNC: 1.5 MG/DL (ref 0.8–1.3)
DEPRECATED RDW RBC AUTO: 16.7 % (ref 12.4–15.4)
EOSINOPHIL # BLD: 0 K/UL (ref 0–0.6)
EOSINOPHIL NFR BLD: 0.1 %
EST. AVERAGE GLUCOSE BLD GHB EST-MCNC: 180 MG/DL
GFR SERPLBLD CREATININE-BSD FMLA CKD-EPI: 45 ML/MIN/{1.73_M2}
GLUCOSE BLD-MCNC: 167 MG/DL (ref 70–99)
GLUCOSE BLD-MCNC: 210 MG/DL (ref 70–99)
GLUCOSE BLD-MCNC: 222 MG/DL (ref 70–99)
GLUCOSE BLD-MCNC: 227 MG/DL (ref 70–99)
GLUCOSE BLD-MCNC: 260 MG/DL (ref 70–99)
GLUCOSE BLD-MCNC: 308 MG/DL (ref 70–99)
GLUCOSE SERPL-MCNC: 275 MG/DL (ref 70–99)
HBA1C MFR BLD: 7.9 %
HCT VFR BLD AUTO: 33.5 % (ref 40.5–52.5)
HGB BLD-MCNC: 10.9 G/DL (ref 13.5–17.5)
LYMPHOCYTES # BLD: 2.4 K/UL (ref 1–5.1)
LYMPHOCYTES NFR BLD: 9.8 %
MCH RBC QN AUTO: 29.9 PG (ref 26–34)
MCHC RBC AUTO-ENTMCNC: 32.4 G/DL (ref 31–36)
MCV RBC AUTO: 92.1 FL (ref 80–100)
MONOCYTES # BLD: 0.8 K/UL (ref 0–1.3)
MONOCYTES NFR BLD: 3.3 %
NEUTROPHILS # BLD: 20.8 K/UL (ref 1.7–7.7)
NEUTROPHILS NFR BLD: 86.3 %
PERFORMED ON: ABNORMAL
PLATELET # BLD AUTO: 401 K/UL (ref 135–450)
PMV BLD AUTO: 7.4 FL (ref 5–10.5)
POTASSIUM SERPL-SCNC: 4.6 MMOL/L (ref 3.5–5.1)
PROT SERPL-MCNC: 6.9 G/DL (ref 6.4–8.2)
RBC # BLD AUTO: 3.63 M/UL (ref 4.2–5.9)
SODIUM SERPL-SCNC: 140 MMOL/L (ref 136–145)
TRIGL SERPL-MCNC: 88 MG/DL (ref 0–150)
WBC # BLD AUTO: 24.1 K/UL (ref 4–11)

## 2024-06-27 PROCEDURE — 6370000000 HC RX 637 (ALT 250 FOR IP): Performed by: NURSE PRACTITIONER

## 2024-06-27 PROCEDURE — 6360000002 HC RX W HCPCS: Performed by: NURSE PRACTITIONER

## 2024-06-27 PROCEDURE — 85025 COMPLETE CBC W/AUTO DIFF WBC: CPT

## 2024-06-27 PROCEDURE — 2060000000 HC ICU INTERMEDIATE R&B

## 2024-06-27 PROCEDURE — 99232 SBSQ HOSP IP/OBS MODERATE 35: CPT | Performed by: SURGERY

## 2024-06-27 PROCEDURE — 80048 BASIC METABOLIC PNL TOTAL CA: CPT

## 2024-06-27 PROCEDURE — 84478 ASSAY OF TRIGLYCERIDES: CPT

## 2024-06-27 PROCEDURE — 2580000003 HC RX 258: Performed by: INTERNAL MEDICINE

## 2024-06-27 PROCEDURE — 80076 HEPATIC FUNCTION PANEL: CPT

## 2024-06-27 PROCEDURE — 2580000003 HC RX 258: Performed by: NURSE PRACTITIONER

## 2024-06-27 PROCEDURE — 74181 MRI ABDOMEN W/O CONTRAST: CPT

## 2024-06-27 PROCEDURE — 36415 COLL VENOUS BLD VENIPUNCTURE: CPT

## 2024-06-27 PROCEDURE — 99233 SBSQ HOSP IP/OBS HIGH 50: CPT | Performed by: INTERNAL MEDICINE

## 2024-06-27 PROCEDURE — 6360000002 HC RX W HCPCS: Performed by: INTERNAL MEDICINE

## 2024-06-27 PROCEDURE — APPSS15 APP SPLIT SHARED TIME 0-15 MINUTES

## 2024-06-27 RX ORDER — SODIUM CHLORIDE, SODIUM LACTATE, POTASSIUM CHLORIDE, CALCIUM CHLORIDE 600; 310; 30; 20 MG/100ML; MG/100ML; MG/100ML; MG/100ML
INJECTION, SOLUTION INTRAVENOUS CONTINUOUS
Status: DISCONTINUED | OUTPATIENT
Start: 2024-06-27 | End: 2024-06-29

## 2024-06-27 RX ADMIN — PANTOPRAZOLE SODIUM 40 MG: 40 TABLET, DELAYED RELEASE ORAL at 05:06

## 2024-06-27 RX ADMIN — SODIUM CHLORIDE, POTASSIUM CHLORIDE, SODIUM LACTATE AND CALCIUM CHLORIDE: 600; 310; 30; 20 INJECTION, SOLUTION INTRAVENOUS at 16:26

## 2024-06-27 RX ADMIN — ENOXAPARIN SODIUM 40 MG: 100 INJECTION SUBCUTANEOUS at 08:32

## 2024-06-27 RX ADMIN — INSULIN LISPRO 1 UNITS: 100 INJECTION, SOLUTION INTRAVENOUS; SUBCUTANEOUS at 18:31

## 2024-06-27 RX ADMIN — HYDROMORPHONE HYDROCHLORIDE 1 MG: 1 INJECTION, SOLUTION INTRAMUSCULAR; INTRAVENOUS; SUBCUTANEOUS at 18:40

## 2024-06-27 RX ADMIN — PIPERACILLIN AND TAZOBACTAM 3375 MG: 3; .375 INJECTION, POWDER, LYOPHILIZED, FOR SOLUTION INTRAVENOUS at 16:40

## 2024-06-27 RX ADMIN — PIPERACILLIN AND TAZOBACTAM 3375 MG: 3; .375 INJECTION, POWDER, LYOPHILIZED, FOR SOLUTION INTRAVENOUS at 00:36

## 2024-06-27 RX ADMIN — ONDANSETRON 4 MG: 2 INJECTION INTRAMUSCULAR; INTRAVENOUS at 12:22

## 2024-06-27 RX ADMIN — INSULIN LISPRO 1 UNITS: 100 INJECTION, SOLUTION INTRAVENOUS; SUBCUTANEOUS at 16:32

## 2024-06-27 RX ADMIN — HYDROMORPHONE HYDROCHLORIDE 0.25 MG: 1 INJECTION, SOLUTION INTRAMUSCULAR; INTRAVENOUS; SUBCUTANEOUS at 05:00

## 2024-06-27 RX ADMIN — INSULIN LISPRO 3 UNITS: 100 INJECTION, SOLUTION INTRAVENOUS; SUBCUTANEOUS at 02:35

## 2024-06-27 RX ADMIN — TAMSULOSIN HYDROCHLORIDE 0.4 MG: 0.4 CAPSULE ORAL at 08:24

## 2024-06-27 RX ADMIN — HYDROMORPHONE HYDROCHLORIDE 1 MG: 1 INJECTION, SOLUTION INTRAMUSCULAR; INTRAVENOUS; SUBCUTANEOUS at 12:23

## 2024-06-27 RX ADMIN — INSULIN LISPRO 1 UNITS: 100 INJECTION, SOLUTION INTRAVENOUS; SUBCUTANEOUS at 10:29

## 2024-06-27 RX ADMIN — SODIUM CHLORIDE: 9 INJECTION, SOLUTION INTRAVENOUS at 00:35

## 2024-06-27 RX ADMIN — PIPERACILLIN AND TAZOBACTAM 3375 MG: 3; .375 INJECTION, POWDER, LYOPHILIZED, FOR SOLUTION INTRAVENOUS at 08:28

## 2024-06-27 RX ADMIN — HYDROMORPHONE HYDROCHLORIDE 1 MG: 1 INJECTION, SOLUTION INTRAMUSCULAR; INTRAVENOUS; SUBCUTANEOUS at 08:22

## 2024-06-27 RX ADMIN — HYDROMORPHONE HYDROCHLORIDE 0.25 MG: 1 INJECTION, SOLUTION INTRAMUSCULAR; INTRAVENOUS; SUBCUTANEOUS at 00:37

## 2024-06-27 RX ADMIN — ONDANSETRON 4 MG: 2 INJECTION INTRAMUSCULAR; INTRAVENOUS at 05:06

## 2024-06-27 RX ADMIN — INSULIN LISPRO 2 UNITS: 100 INJECTION, SOLUTION INTRAVENOUS; SUBCUTANEOUS at 05:14

## 2024-06-27 ASSESSMENT — PAIN DESCRIPTION - LOCATION
LOCATION: ABDOMEN

## 2024-06-27 ASSESSMENT — PAIN DESCRIPTION - DESCRIPTORS
DESCRIPTORS: ACHING;DISCOMFORT
DESCRIPTORS: ACHING;DISCOMFORT
DESCRIPTORS: THROBBING;STABBING

## 2024-06-27 ASSESSMENT — PAIN SCALES - GENERAL
PAINLEVEL_OUTOF10: 8
PAINLEVEL_OUTOF10: 4
PAINLEVEL_OUTOF10: 9
PAINLEVEL_OUTOF10: 4
PAINLEVEL_OUTOF10: 7
PAINLEVEL_OUTOF10: 10
PAINLEVEL_OUTOF10: 8

## 2024-06-27 ASSESSMENT — PAIN DESCRIPTION - ORIENTATION: ORIENTATION: RIGHT

## 2024-06-27 NOTE — PROGRESS NOTES
Pt refusing medications, confused and wants to leave family call;ed in and will spend the night. Pain medications and Zofran given. Daughter here

## 2024-06-27 NOTE — ACP (ADVANCE CARE PLANNING)
Advance Care Planning     General Advance Care Planning (ACP) Conversation    Date of Conversation: 6/27/2024  Conducted with: Patient with Decision Making Capacity  Other persons present: Daughter Dahlia    Healthcare Decision Maker:   Secondary Decision Maker (Active): Ari Snider Jr - Child - [phone]    Secondary Decision Maker: Dahlia Moon - Child - [phone]    Supplemental (Other) Decision Maker: Bethel Snider - Child - [phone]    Supplemental (Other) Decision Maker: Nidia Peterson - Child - [phone]  Click here to complete Healthcare Decision Makers including selection of the Healthcare Decision Maker Relationship (ie \"Primary\").   Today we documented Decision Maker(s) consistent with Legal Next of Kin hierarchy.    Content/Action Overview:  Has ACP document(s) on file - reflects the patient's care preferences  Reviewed DNR/DNI and patient elects Full Code (Attempt Resuscitation)        Length of Voluntary ACP Conversation in minutes:  <16 minutes (Non-Billable)    Adrienne Gill

## 2024-06-27 NOTE — CARE COORDINATION
Case Management Assessment  Initial Evaluation    Date/Time of Evaluation: 6/27/2024 8:49 AM  Assessment Completed by: Adrienne Gill    If patient is discharged prior to next notation, then this note serves as note for discharge by case management.    Patient Name: Ari Snider                   YOB: 1935  Diagnosis: Acute cholecystitis [K81.0]  Acute pancreatitis, unspecified complication status, unspecified pancreatitis type [K85.90]                   Date / Time: 6/26/2024 10:33 AM    Patient Admission Status: Inpatient   Readmission Risk (Low < 19, Mod (19-27), High > 27): Readmission Risk Score: 18.7    Current PCP: Wilma Rowley APRN - CNP  PCP verified by CM? Yes ([place])    Chart Reviewed: Yes      History Provided by: Child/Family  Patient Orientation: Alert and Oriented    Patient Cognition: Alert    Hospitalization in the last 30 days (Readmission):  Yes    If yes, Readmission Assessment in CM Navigator will be completed.    Advance Directives:      Code Status: Full Code   Patient's Primary Decision Maker is: Named in Scanned ACP Document    Secondary Decision Maker (Active): Ari Snider  - Child - [phone]    Secondary Decision Maker: Dahlia Moon - Child - [phone]    Supplemental (Other) Decision Maker: Bethel Snider - Child - [phone]    Supplemental (Other) Decision Maker: Nidia Peterson - Child - [phone]    Discharge Planning:    Patient lives with: Alone Type of Home: House  Primary Care Giver: Self  Patient Support Systems include: Children, Family Members   Current Financial resources:  ([facility])  Current community resources: None  Current services prior to admission: Durable Medical Equipment            Current DME: Shower Chair, Walker, Cane            Type of Home Care services:  None    ADLS  Prior functional level: Independent in ADLs/IADLs  Current functional level: Independent in ADLs/IADLs    PT AM-PAC:   /24  OT AM-PAC:   /24    Family can  provide assistance at DC: Yes  Would you like Case Management to discuss the discharge plan with any other family members/significant others, and if so, who? Yes (children)  Plans to Return to Present Housing: Yes  Other Identified Issues/Barriers to RETURNING to current housing: lives alone, falls  Potential Assistance needed at discharge: Skilled Nursing Facility, Home Care            Potential DME:    Patient expects to discharge to: Unknown  Plan for transportation at discharge:      Financial    Payor: [facility] MEDICARE / Plan: [employer] MEDIBLUE ESSENTIAL/PLUS / Product Type: *No Product type* /     Does insurance require precert for SNF: Yes    Potential assistance Purchasing Medications: No  Meds-to-Beds request: Yes      [id number] - [place], [place] - [address] - P [phone] - F [phone]  [address]  [place] [address]  Phone: [phone] Fax: [phone]      Notes:    Factors facilitating achievement of predicted outcomes: Family support    Barriers to discharge: Lower extremity weakness, surgery consult    Additional Case Management Notes: Reviewed chart and met with pt at [facility]. Pt sleeping, daughter Dahlia completed assessment. From house alone, plan TBD, need PT recs. Family is with pt daily and helps with ADLs. Pt does not drive. Has walker. Will continue to monitor.       The Plan for Transition of Care is related to the following treatment goals of Acute cholecystitis [K81.0]  Acute pancreatitis, unspecified complication status, unspecified pancreatitis type [K85.90]    IF APPLICABLE: The Patient and/or patient representative Ari and his family were provided with a choice of provider and agrees with the discharge plan. Freedom of choice list with basic dialogue that supports the patient's individualized plan of care/goals and shares the quality data associated with the providers was provided to:     Patient Representative Name:       The Patient and/or Patient  Representative Agree with the Discharge Plan?      Adrienne Gill  Case Management Department  Ph: [phone] Fax: [phone]

## 2024-06-27 NOTE — PROGRESS NOTES
06/27/24 1637   Encounter Summary   Encounter Overview/Reason Attempted Encounter  (Patient was sleeping)   Service Provided For Patient   Support System Unknown   Last Encounter  06/27/24  ( left a prayer card and said a silent prayer)   Assessment/Intervention/Outcome   Assessment Unable to assess   Intervention Prayer (assurance of)/[place]oree

## 2024-06-27 NOTE — CONSULTS
Consultation Note    Patient Name: Ari Snider  : 1935  Age: 88 y.o.     Admitting Physician: Amna Macdonald,*   Date of Admission: 2024 10:33 AM   Primary Care Physician: Wilma Rowley, MALATHI - VIJAY        rAi Snider is being seen at the request of Amna Macdonald,* for gallstone pancreatitis..    History of Present Illness:    88-year-old gentleman with a history of hypertension, CVA, diabetes presenting with a sudden onset of abdominal discomfort/chest pain.  The patient did undergo cardiac evaluation which was unremarkable.  A CT of the abdomen shows evidence of moderate inflammatory change around the pancreas suggesting acute pancreatitis.  Gallbladder wall thickening was also seen.  The patient was also found to have elevated transaminases on admission.  The patient states that he has nausea but no vomiting at present time.  He denies any fevers or chills at home.  The patient denies any hematemesis, coffee-ground emesis, or any rectal bleeding.  Ultrasound yesterday confirmed acute cholecystitis with no biliary dilatation.  Patient has undergone MRI today.  We are awaiting MRI results.        Past Medical History:  Past Medical History:   Diagnosis Date    Abscess of toe of right foot 2011    Acute CVA (cerebrovascular accident) ([facility]) 2021    Blood transfusion     Cellulitis of right lower extremity 2024    Cushing's syndrome ([facility]) 2010    Diabetic ulcer of toe 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 mellitus, with fat layer exposed ([facility])     Greater trochanteric bursitis of right hip 2023    Hypertension     Lumbar spondylosis 2023    Lymphoma ([facility])     MRSA infection 2022    Osteomyelitis of left foot ([facility]) 2021    Tinnitus 2012    Tobacco use     Ulcer of other part of foot 2011    Vertigo, benign paroxysmal 08/15/2016    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[facility]) 2021        Past Surgical History:  Past Surgical  at the lung bases, suggesting small  airways disease or air trapping.    Coronary artery calcification is seen.  Aortic valve calcification is seen.  Small hiatal hernia seen.  There is nonspecific thickening at the GE junction.    Organs: Calcified granuloma seen within the spleen.  There is splenomegaly.    Adrenal glands unremarkable.    No hydronephrosis on left.  A few punctate calcifications are seen in the  left kidney measuring 1-2 mm in size.    No hydronephrosis on the right.  Rim calcified cyst is seen in the right  kidney.    Trace perihepatic fluid is seen.  There is gallbladder wall thickening.  There is surrounding fat stranding..  Gallstones are seen    No pancreatic calcification noted.  There is moderate ill-defined fat  stranding surrounding the pancreas, new from prior    GI/Bowel: Scattered fluid-filled loops of both small and large bowel are  seen.  Moderate stool load seen in the colon.  No bowel obstruction noted.    There is fat stranding and trace  fluid seen in the pericolic gutters.    Pelvis: No free fluid is seen within the pelvis.  No pelvic adenopathy.  Atherosclerotic change seen in the iliacs.    Peritoneum/Retroperitoneum: No aortic aneurysm.  No retroperitoneal  adenopathy.    Bones/Soft Tissues: Spurring is seen in the spine.  Spurring is seen in the  hips..  Tiny periumbilical hernia containing a small amount of ascites is seen  Impression: Moderate ill-defined inflammatory change surrounding the pancreas suggesting  acute pancreatitis.  Inflammatory change extends inferiorly along the root of  mesentery into the pericolic gutters.    Gallbladder wall thickening is seen, which could be reactive or due to  concomitant cholecystitis.    Nonobstructing renal calculi on the left with splenomegaly       Labs:   Recent Labs     06/26/24  1100 06/26/24  1200 06/27/24  0558   WBC 11.8*  --  24.1*   HGB 10.9*  --  10.9*     --  401   ALKPHOS 356*  --  258*   *  --  103*

## 2024-06-27 NOTE — PROGRESS NOTES
Progress Note    Admit Date:  6/26/2024    Subjective:  Mr. Snider is still in pain.  I have increased the dose of his Dilaudid which is helping.  MRCP to be done today.  No fever or chills.    Objective:   /65   Pulse 88   Temp 97.9 °F (36.6 °C) (Oral)   Resp 18   Ht 1.727 m (5' 8\")   Wt 101.6 kg (224 lb)   SpO2 92%   BMI 34.06 kg/m²        Intake/Output Summary (Last 24 hours) at 6/27/2024 1440  Last data filed at 6/27/2024 0828  Gross per 24 hour   Intake 30 ml   Output 300 ml   Net -270 ml       Physical Exam:  Gen: Awake and alert.  Ill-appearing  Eyes: PERRL. No sclera icterus. No conjunctival injection.   ENT: No discharge. Pharynx clear.   Neck: No JVD.  No Carotid Bruit. Trachea midline.  Resp: No accessory muscle use. No crackles. No wheezes. No rhonchi.   CV: Regular rate. Regular rhythm. No murmur.  No rub. +edema.   GI: Right upper quadrant abdominal pain and tenderness with guarding.  Epigastric tenderness.  Mild distention. No masses.  Pain limits exam no organomegaly. Normal bowel sounds. No hernia.   Skin: Warm and dry. No nodule on exposed extremities. No rash on exposed extremities.   M/S: No cyanosis. No joint deformity. No clubbing.   Neuro: Awake. Grossly nonfocal    Psych: Oriented x 3. No anxiety or agitation.          Scheduled Meds:   pantoprazole  40 mg Oral QAM AC    tamsulosin  0.4 mg Oral Daily    [Held by provider] aspirin  81 mg Oral Daily    sodium chloride flush  5-40 mL IntraVENous 2 times per day    enoxaparin  40 mg SubCUTAneous Daily    insulin lispro  0-4 Units SubCUTAneous Q4H    [Held by provider] lisinopril  5 mg Oral Daily    piperacillin-tazobactam  3,375 mg IntraVENous Q8H       Continuous Infusions:   dextrose      sodium chloride      sodium chloride 150 mL/hr at 06/27/24 0035       PRN Meds:  HYDROmorphone, glucose, dextrose bolus **OR** dextrose bolus, glucagon (rDNA), dextrose, sodium chloride flush, sodium chloride, potassium chloride **OR** potassium  pericholecystic   fluid.  Thickening of the gallbladder could be related to adjacent   pancreatitis, but that is uncertain.  The gallbladder contains sludge and   small stones.   2. No biliary dilatation.   3. Mild hepatomegaly.         CT ABDOMEN PELVIS WO CONTRAST Additional Contrast? None   Final Result   Moderate ill-defined inflammatory change surrounding the pancreas suggesting   acute pancreatitis.  Inflammatory change extends inferiorly along the root of   mesentery into the pericolic gutters.      Gallbladder wall thickening is seen, which could be reactive or due to   concomitant cholecystitis.      Nonobstructing renal calculi on the left with splenomegaly         MRI ABDOMEN WO CONTRAST MRCP    (Results Pending)     Echo 6/14/2024  Left Ventricle: Normal left ventricular systolic function with a visually estimated EF of 55 - 60%. Normal wall thickness. Normal wall motion. Indeterminate diastolic function.    Right Ventricle: Visually normal systolic function.    Aortic Valve: Severely thickened right cusp. Severely restricted motion. Mild regurgitation. Moderate stenosis of the aortic valve. Noted by the right parasternal view. AV mean gradient is 23 mmHg. AV peak gradient is 40 mmHg. AV peak velocity is 3.2 m/s. LVOT diameter is 2.2 cm. AV area by continuity VTI is 1.8 cm2.    Pericardium: No pericardial effusion.    Limited study.  Assessment:  Principal Problem:    Acute pancreatitis, unspecified complication status, unspecified pancreatitis type  Active Problems:    CAD (coronary artery disease)    Benign essential HTN    Type 2 diabetes mellitus with hyperglycemia (HCC)    Acute cholecystitis    Transaminitis  Resolved Problems:    * No resolved hospital problems. *      Plan:    Epigastric RUQ pain  Acute cholecystitis  Acute pancreatitis likely 2/2 above  Transaminitis   - CT as above  - lipase >2000 on admission  - abdominal pain--not controlled by morphine--change to Dilaudid  every 3 hours and

## 2024-06-27 NOTE — PROGRESS NOTES
General Surgery  Daily Progress Note    Pt Name: Ari Snider  Medical Record Number: [id number]  Date of Birth 1935   Today's Date: 6/27/2024  Admit date: 6/26/2024  LOS: Day 1    SUBJECTIVE  Sleepy after pain medications. Awake but nodding off and won't answer my questions. Daughter at bedside states he had a rough night and was still in significant pain this morning.       OBJECTIVE  Vitals:    06/27/24 0107 06/27/24 0437 06/27/24 0530 06/27/24 0737   BP:  (!) 132/55  (!) 144/96   Pulse:  89  89   Resp: 16 18 18 18   Temp:  97.8 °F (36.6 °C)  97.7 °F (36.5 °C)   TempSrc:  Oral  Oral   SpO2:  96%  95%   Weight:       Height:           Gen: No distress. Sleepy.   Resp: Normal rate. Easy and unlabored. No accessory muscle use.   CV: Regular rate. Regular rhythm.   GI: +RUQ and epigastric TTP. Soft,  Non-distended.  Normal bowel sounds.  Skin: Warm and dry. No nodule or rash on exposed extremities.       I/O last 3 completed shifts:  In: -   Out: 300 [Urine:300]  No intake/output data recorded.    LABS  CBC:   Recent Labs     06/26/24  1100 06/27/24  0558   WBC 11.8* 24.1*   HGB 10.9* 10.9*   HCT 33.9* 33.5*   MCV 91.5 92.1    401     BMP:   Recent Labs     06/26/24  1100 06/26/24  1200 06/27/24  0558     --  140   K 6.0* 5.8* 4.6     --  103   CO2 24  --  26   BUN 39*  --  42*   CREATININE 1.5*  --  1.5*     LIVER PROFILE:   Recent Labs     06/26/24  1100 06/27/24  0558   * 63*   * 103*   LIPASE 2,584.0*  --    BILIDIR  --  <0.2   BILITOT 0.5 0.5   ALKPHOS 356* 258*     PT/INR: No results for input(s): \"PROTIME\", \"INR\" in the last 72 hours.  APTT: No results for input(s): \"APTT\" in the last 72 hours.  UA:  Recent Labs     06/26/24  1100   COLORU Yellow   PHUR 6.0   WBCUA 0-2   RBCUA 0-2   BACTERIA Rare*   CLARITYU Clear   LEUKOCYTESUR Negative   UROBILINOGEN 0.2   BILIRUBINUR Negative   BLOODU Negative   GLUCOSEU >=1000*   AMORPHOUS 1+         IMAGING  US GALLBLADDER RUQ  hydration  PRN pain control and antiemetics  DVT prophylaxis with Lx      Dispo: trixie Bah PA-C  6/27/2024 10:00 AM      I have personally performed a face to face diagnostic evaluation on this patient and agree with the progress note and care plan of VIDHYA Forbes. More than half of the time spent on this encounter was completed by me including the history, physical examination and the entire medical decision making. My findings are as follows:    Patient complains of abdominal pain that is diffuse  /65   Pulse 88   Temp 97.9 °F (36.6 °C) (Oral)   Resp 18   Ht 1.727 m (5' 8\")   Wt 101.6 kg (224 lb)   SpO2 92%   BMI 34.06 kg/m²   Awake and alert in NAD  RESP: unlabored, no distress  CV: RRR  ABD: Bowel sounds positive, soft, diffusely tender  SKIN: Warm and dry    MRCP pending  Assessment/plan: Acute pancreatitis, likely gallstone etiology as they were identified on imaging from this admission.  Continue supportive care.  Await results of MRCP.  Plan for interval outpatient cholecystectomy once the acute episode has resolved    FABRICE MOREL MD

## 2024-06-27 NOTE — PROGRESS NOTES
Shift report given to nurse Florian at bedside. Patient care handed off in stable condition at this time. Patricia Quinonez RN.

## 2024-06-27 NOTE — PROGRESS NOTES
Shift assessment complete, morning medications given, patient with complaints of pain 10 out of 10 on scale, dilaudid given dose increased to 1 mg, daughter at bedside, patient up in chair, will continue to monitor. Patricia Quinonez RN

## 2024-06-27 NOTE — PROGRESS NOTES
Patient returned from MRCP at this time, given ice chips family at bedside. Patricia Quinonez RN

## 2024-06-28 LAB
ALBUMIN SERPL-MCNC: 2.6 G/DL (ref 3.4–5)
ALP SERPL-CCNC: 171 U/L (ref 40–129)
ALT SERPL-CCNC: 54 U/L (ref 10–40)
ANION GAP SERPL CALCULATED.3IONS-SCNC: 11 MMOL/L (ref 3–16)
AST SERPL-CCNC: 24 U/L (ref 15–37)
BASOPHILS # BLD: 0.1 K/UL (ref 0–0.2)
BASOPHILS NFR BLD: 0.3 %
BILIRUB DIRECT SERPL-MCNC: 0.3 MG/DL (ref 0–0.3)
BILIRUB INDIRECT SERPL-MCNC: 0.2 MG/DL (ref 0–1)
BILIRUB SERPL-MCNC: 0.5 MG/DL (ref 0–1)
BUN SERPL-MCNC: 43 MG/DL (ref 7–20)
CALCIUM SERPL-MCNC: 7.6 MG/DL (ref 8.3–10.6)
CHLORIDE SERPL-SCNC: 106 MMOL/L (ref 99–110)
CO2 SERPL-SCNC: 26 MMOL/L (ref 21–32)
CREAT SERPL-MCNC: 1.7 MG/DL (ref 0.8–1.3)
DEPRECATED RDW RBC AUTO: 16.2 % (ref 12.4–15.4)
EOSINOPHIL # BLD: 0 K/UL (ref 0–0.6)
EOSINOPHIL NFR BLD: 0.1 %
GFR SERPLBLD CREATININE-BSD FMLA CKD-EPI: 38 ML/MIN/{1.73_M2}
GLUCOSE BLD-MCNC: 180 MG/DL (ref 70–99)
GLUCOSE BLD-MCNC: 188 MG/DL (ref 70–99)
GLUCOSE BLD-MCNC: 191 MG/DL (ref 70–99)
GLUCOSE BLD-MCNC: 194 MG/DL (ref 70–99)
GLUCOSE BLD-MCNC: 197 MG/DL (ref 70–99)
GLUCOSE BLD-MCNC: 202 MG/DL (ref 70–99)
GLUCOSE SERPL-MCNC: 191 MG/DL (ref 70–99)
HCT VFR BLD AUTO: 25.4 % (ref 40.5–52.5)
HGB BLD-MCNC: 8.4 G/DL (ref 13.5–17.5)
LYMPHOCYTES # BLD: 2.7 K/UL (ref 1–5.1)
LYMPHOCYTES NFR BLD: 16.9 %
MCH RBC QN AUTO: 29.9 PG (ref 26–34)
MCHC RBC AUTO-ENTMCNC: 32.9 G/DL (ref 31–36)
MCV RBC AUTO: 90.6 FL (ref 80–100)
MONOCYTES # BLD: 0.5 K/UL (ref 0–1.3)
MONOCYTES NFR BLD: 2.9 %
NEUTROPHILS # BLD: 12.8 K/UL (ref 1.7–7.7)
NEUTROPHILS NFR BLD: 79.8 %
PERFORMED ON: ABNORMAL
PLATELET # BLD AUTO: 287 K/UL (ref 135–450)
PMV BLD AUTO: 7.2 FL (ref 5–10.5)
POTASSIUM SERPL-SCNC: 4.1 MMOL/L (ref 3.5–5.1)
PROT SERPL-MCNC: 5.8 G/DL (ref 6.4–8.2)
RBC # BLD AUTO: 2.81 M/UL (ref 4.2–5.9)
SODIUM SERPL-SCNC: 143 MMOL/L (ref 136–145)
WBC # BLD AUTO: 16 K/UL (ref 4–11)

## 2024-06-28 PROCEDURE — 85025 COMPLETE CBC W/AUTO DIFF WBC: CPT

## 2024-06-28 PROCEDURE — APPSS15 APP SPLIT SHARED TIME 0-15 MINUTES

## 2024-06-28 PROCEDURE — 6360000002 HC RX W HCPCS: Performed by: NURSE PRACTITIONER

## 2024-06-28 PROCEDURE — 6360000002 HC RX W HCPCS: Performed by: INTERNAL MEDICINE

## 2024-06-28 PROCEDURE — 2580000003 HC RX 258: Performed by: NURSE PRACTITIONER

## 2024-06-28 PROCEDURE — 36415 COLL VENOUS BLD VENIPUNCTURE: CPT

## 2024-06-28 PROCEDURE — 6370000000 HC RX 637 (ALT 250 FOR IP): Performed by: NURSE PRACTITIONER

## 2024-06-28 PROCEDURE — 99232 SBSQ HOSP IP/OBS MODERATE 35: CPT | Performed by: SURGERY

## 2024-06-28 PROCEDURE — 80076 HEPATIC FUNCTION PANEL: CPT

## 2024-06-28 PROCEDURE — 2060000000 HC ICU INTERMEDIATE R&B

## 2024-06-28 PROCEDURE — 99232 SBSQ HOSP IP/OBS MODERATE 35: CPT | Performed by: INTERNAL MEDICINE

## 2024-06-28 PROCEDURE — 2580000003 HC RX 258: Performed by: INTERNAL MEDICINE

## 2024-06-28 PROCEDURE — 80048 BASIC METABOLIC PNL TOTAL CA: CPT

## 2024-06-28 RX ORDER — DIMETHICONE, OXYBENZONE, AND PADIMATE O 2; 2.5; 6.6 G/100G; G/100G; G/100G
STICK TOPICAL PRN
Status: DISCONTINUED | OUTPATIENT
Start: 2024-06-28 | End: 2024-07-04 | Stop reason: HOSPADM

## 2024-06-28 RX ADMIN — PIPERACILLIN AND TAZOBACTAM 3375 MG: 3; .375 INJECTION, POWDER, LYOPHILIZED, FOR SOLUTION INTRAVENOUS at 09:53

## 2024-06-28 RX ADMIN — SODIUM CHLORIDE, POTASSIUM CHLORIDE, SODIUM LACTATE AND CALCIUM CHLORIDE: 600; 310; 30; 20 INJECTION, SOLUTION INTRAVENOUS at 22:47

## 2024-06-28 RX ADMIN — ENOXAPARIN SODIUM 40 MG: 100 INJECTION SUBCUTANEOUS at 09:51

## 2024-06-28 RX ADMIN — SODIUM CHLORIDE, POTASSIUM CHLORIDE, SODIUM LACTATE AND CALCIUM CHLORIDE: 600; 310; 30; 20 INJECTION, SOLUTION INTRAVENOUS at 12:13

## 2024-06-28 RX ADMIN — HYDROMORPHONE HYDROCHLORIDE 1 MG: 1 INJECTION, SOLUTION INTRAMUSCULAR; INTRAVENOUS; SUBCUTANEOUS at 00:44

## 2024-06-28 RX ADMIN — HYDROMORPHONE HYDROCHLORIDE 1 MG: 1 INJECTION, SOLUTION INTRAMUSCULAR; INTRAVENOUS; SUBCUTANEOUS at 13:19

## 2024-06-28 RX ADMIN — SODIUM CHLORIDE, POTASSIUM CHLORIDE, SODIUM LACTATE AND CALCIUM CHLORIDE: 600; 310; 30; 20 INJECTION, SOLUTION INTRAVENOUS at 17:37

## 2024-06-28 RX ADMIN — PIPERACILLIN AND TAZOBACTAM 3375 MG: 3; .375 INJECTION, POWDER, LYOPHILIZED, FOR SOLUTION INTRAVENOUS at 16:41

## 2024-06-28 RX ADMIN — HYDROMORPHONE HYDROCHLORIDE 1 MG: 1 INJECTION, SOLUTION INTRAMUSCULAR; INTRAVENOUS; SUBCUTANEOUS at 22:44

## 2024-06-28 RX ADMIN — PANTOPRAZOLE SODIUM 40 MG: 40 TABLET, DELAYED RELEASE ORAL at 06:27

## 2024-06-28 RX ADMIN — HYDROMORPHONE HYDROCHLORIDE 1 MG: 1 INJECTION, SOLUTION INTRAMUSCULAR; INTRAVENOUS; SUBCUTANEOUS at 04:13

## 2024-06-28 RX ADMIN — HYDROMORPHONE HYDROCHLORIDE 1 MG: 1 INJECTION, SOLUTION INTRAMUSCULAR; INTRAVENOUS; SUBCUTANEOUS at 19:35

## 2024-06-28 RX ADMIN — PIPERACILLIN AND TAZOBACTAM 3375 MG: 3; .375 INJECTION, POWDER, LYOPHILIZED, FOR SOLUTION INTRAVENOUS at 01:09

## 2024-06-28 RX ADMIN — TAMSULOSIN HYDROCHLORIDE 0.4 MG: 0.4 CAPSULE ORAL at 09:51

## 2024-06-28 RX ADMIN — HYDROMORPHONE HYDROCHLORIDE 1 MG: 1 INJECTION, SOLUTION INTRAMUSCULAR; INTRAVENOUS; SUBCUTANEOUS at 16:12

## 2024-06-28 RX ADMIN — SODIUM CHLORIDE, POTASSIUM CHLORIDE, SODIUM LACTATE AND CALCIUM CHLORIDE: 600; 310; 30; 20 INJECTION, SOLUTION INTRAVENOUS at 06:15

## 2024-06-28 RX ADMIN — HYDROMORPHONE HYDROCHLORIDE 0.25 MG: 1 INJECTION, SOLUTION INTRAMUSCULAR; INTRAVENOUS; SUBCUTANEOUS at 18:44

## 2024-06-28 RX ADMIN — INSULIN LISPRO 1 UNITS: 100 INJECTION, SOLUTION INTRAVENOUS; SUBCUTANEOUS at 20:34

## 2024-06-28 RX ADMIN — SODIUM CHLORIDE, POTASSIUM CHLORIDE, SODIUM LACTATE AND CALCIUM CHLORIDE: 600; 310; 30; 20 INJECTION, SOLUTION INTRAVENOUS at 01:13

## 2024-06-28 RX ADMIN — HYDROMORPHONE HYDROCHLORIDE 1 MG: 1 INJECTION, SOLUTION INTRAMUSCULAR; INTRAVENOUS; SUBCUTANEOUS at 10:06

## 2024-06-28 RX ADMIN — ONDANSETRON 4 MG: 4 TABLET, ORALLY DISINTEGRATING ORAL at 08:35

## 2024-06-28 ASSESSMENT — PAIN SCALES - GENERAL
PAINLEVEL_OUTOF10: 6
PAINLEVEL_OUTOF10: 8
PAINLEVEL_OUTOF10: 8
PAINLEVEL_OUTOF10: 7
PAINLEVEL_OUTOF10: 8
PAINLEVEL_OUTOF10: 5
PAINLEVEL_OUTOF10: 3
PAINLEVEL_OUTOF10: 7
PAINLEVEL_OUTOF10: 7
PAINLEVEL_OUTOF10: 0
PAINLEVEL_OUTOF10: 9
PAINLEVEL_OUTOF10: 8
PAINLEVEL_OUTOF10: 9
PAINLEVEL_OUTOF10: 10

## 2024-06-28 ASSESSMENT — PAIN - FUNCTIONAL ASSESSMENT
PAIN_FUNCTIONAL_ASSESSMENT: ACTIVITIES ARE NOT PREVENTED
PAIN_FUNCTIONAL_ASSESSMENT: ACTIVITIES ARE NOT PREVENTED

## 2024-06-28 ASSESSMENT — PAIN DESCRIPTION - LOCATION
LOCATION: ABDOMEN
LOCATION: BACK
LOCATION: CHEST
LOCATION: ABDOMEN

## 2024-06-28 ASSESSMENT — PAIN DESCRIPTION - DESCRIPTORS
DESCRIPTORS: ACHING
DESCRIPTORS: ACHING
DESCRIPTORS: CRUSHING
DESCRIPTORS: ACHING;DISCOMFORT

## 2024-06-28 ASSESSMENT — PAIN DESCRIPTION - PAIN TYPE: TYPE: CHRONIC PAIN

## 2024-06-28 ASSESSMENT — PAIN DESCRIPTION - ORIENTATION
ORIENTATION: ANTERIOR
ORIENTATION: LOWER
ORIENTATION: RIGHT;LEFT

## 2024-06-28 ASSESSMENT — PAIN SCALES - WONG BAKER
WONGBAKER_NUMERICALRESPONSE: HURTS A LITTLE BIT

## 2024-06-28 ASSESSMENT — PAIN DESCRIPTION - ONSET: ONSET: ON-GOING

## 2024-06-28 ASSESSMENT — PAIN DESCRIPTION - FREQUENCY: FREQUENCY: CONTINUOUS

## 2024-06-28 NOTE — PROGRESS NOTES
Physician Progress Note      PATIENT:               NYA GARAY  CSN #:                  [id number]  :                       1935  ADMIT DATE:       2024 10:33 AM  DISCH DATE:  RESPONDING  PROVIDER #:        Amna Kaba MD          QUERY TEXT:    Pt admitted with Acute pancreatitis and cholecystitis. Pt noted to have   Leukocytosis, Neutrophilia and tachycardia. If possible, please document in   the progress notes and discharge summary if you are evaluating and /or   treating any of the following:    The medical record reflects the following:  Risk Factors: Hx DM, CVA, CAD, CKD, Lymphoma  Clinical Indicators:  ED for RUQ pain, nausea, vomiting,  \" acute   cholecystitis with mild gallbladder wall thickening\" on US, WBC=11.8.    WBC=24.1, neutrophils=20.8, HR up to 97.  HR=, WBC=16,   Neutrophils=12.8  Treatment: IVF, IV Zosyn, monitor labs, cholecystectomy once pancreatitis   resolved.  Options provided:  -- Evolving Sepsis due to cholecystitis present on admission  -- Sepsis, due to cholecystitis developed following admission  -- Cholecystitis without Sepsis  -- Other - I will add my own diagnosis  -- Disagree - Not applicable / Not valid  -- Disagree - Clinically unable to determine / Unknown  -- Refer to Clinical Documentation Reviewer    PROVIDER RESPONSE TEXT:    Evolving Sepsis due to cholecystitis present on admission    Query created by: Anusha Engel on 2024 10:54 AM      Electronically signed by:  Amna Kaba MD 2024   11:38 AM

## 2024-06-28 NOTE — PLAN OF CARE
Problem: Discharge Planning  Goal: Discharge to home or other facility with appropriate resources  6/28/2024 1041 by Mary Rahman RN  Outcome: Progressing     Problem: Pain  Goal: Verbalizes/displays adequate comfort level or baseline comfort level  6/28/2024 1041 by Mary Rahman RN  Outcome: Progressing     Problem: Safety - Adult  Goal: Free from fall injury  6/28/2024 1041 by Mary Rahman RN  Outcome: Progressing     Problem: ABCDS Injury Assessment  Goal: Absence of physical injury  6/28/2024 1041 by Mary Rahman RN  Outcome: Progressing     Problem: Skin/Tissue Integrity  Goal: Absence of new skin breakdown  Description: 1.  Monitor for areas of redness and/or skin breakdown  2.  Assess vascular access sites hourly  3.  Every 4-6 hours minimum:  Change oxygen saturation probe site  4.  Every 4-6 hours:  If on nasal continuous positive airway pressure, respiratory therapy assess nares and determine need for appliance change or resting period.  6/28/2024 1041 by Mary Rahman RN  Outcome: Progressing

## 2024-06-28 NOTE — DISCHARGE INSTRUCTIONS
[facility]    Andrea Garcia M.D.              Damian Funes M.D.              [facility]     Cliff Paulino M.D.               Everardo Ponce M.D.  [facility] - Thursday only                                                                                                                                                         [facility] Office        [facility] Office         [facility] - Dr. Ponce Only  [address]                     [address]                [address]  [address]                               [address]                                 [place] [address]  [place] [address]              [place] [address]                 [phone]      DISCHARGE INSTRUCTIONS    Our office will call to set up your gallbladder surgery in approximately 2 weeks with Dr. Paulino. If you have not heard from us a few days after discharge please call the [place] office to schedule.     Please complete entire course of antibiotics.     Adhere to a low fat diet until your gallbladder has been removed.     If you have pain medicine ordered. Take as directed.     Do NOT drive while taking your narcotic pain medicine. You can resume driving when you feel capable of responding to urgent situation if needed and not taking prescription pain medication.    IF YOU DO NOT TAKE ALL OF YOUR NARCOTIC PAIN MEDICATION, please dispose of them responsibly. There are drop off boxes in the Emergency Departments 24/7 at both [facility] and [facility]. If these locations are not convenient, other options for discarding them can be found at:   [email or web address]    Call the [place] office with questions or concerns.

## 2024-06-28 NOTE — PROGRESS NOTES
Progress Note    Admit Date:  6/26/2024    Subjective:  Mr. Snider is doing some better.  Pain is improving.  MRCP showed pancreatitis.  No choledocholithiasis.  No fever.  Started on clear liquid diet.    Objective:   /67   Pulse 95   Temp 98.7 °F (37.1 °C) (Oral)   Resp 20   Ht 1.727 m (5' 8\")   Wt 97.5 kg (215 lb)   SpO2 92%   BMI 32.69 kg/m²        Intake/Output Summary (Last 24 hours) at 6/28/2024 1144  Last data filed at 6/28/2024 0916  Gross per 24 hour   Intake 0 ml   Output 950 ml   Net -950 ml         Physical Exam:  Gen: Awake and alert.  Ill-appearing  Eyes: PERRL. No sclera icterus. No conjunctival injection.   ENT: No discharge. Pharynx clear.   Neck: No JVD.  No Carotid Bruit. Trachea midline.  Resp: No accessory muscle use. No crackles. No wheezes. No rhonchi.   CV: Regular rate. Regular rhythm. No murmur.  No rub. +edema.   GI: Right upper quadrant abdominal pain and tenderness improving.  Epigastric tenderness improving.  Mild distention. No masses.  Pain limits exam no organomegaly. Normal bowel sounds. No hernia.   Skin: Warm and dry. No nodule on exposed extremities. No rash on exposed extremities.   M/S: No cyanosis. No joint deformity. No clubbing.   Neuro: Awake. Grossly nonfocal    Psych: Oriented x 3. No anxiety or agitation.          Scheduled Meds:   pantoprazole  40 mg Oral QAM AC    tamsulosin  0.4 mg Oral Daily    [Held by provider] aspirin  81 mg Oral Daily    sodium chloride flush  5-40 mL IntraVENous 2 times per day    enoxaparin  40 mg SubCUTAneous Daily    insulin lispro  0-4 Units SubCUTAneous Q4H    [Held by provider] lisinopril  5 mg Oral Daily    piperacillin-tazobactam  3,375 mg IntraVENous Q8H       Continuous Infusions:   lactated ringers IV soln 200 mL/hr at 06/28/24 0615    dextrose      sodium chloride         PRN Meds:  medicated lip balm, HYDROmorphone, glucose, dextrose bolus **OR** dextrose bolus, glucagon (rDNA), dextrose, sodium chloride flush, sodium  along the root of   mesentery into the pericolic gutters.      Gallbladder wall thickening is seen, which could be reactive or due to   concomitant cholecystitis.      Nonobstructing renal calculi on the left with splenomegaly           Echo 6/14/2024  Left Ventricle: Normal left ventricular systolic function with a visually estimated EF of 55 - 60%. Normal wall thickness. Normal wall motion. Indeterminate diastolic function.    Right Ventricle: Visually normal systolic function.    Aortic Valve: Severely thickened right cusp. Severely restricted motion. Mild regurgitation. Moderate stenosis of the aortic valve. Noted by the right parasternal view. AV mean gradient is 23 mmHg. AV peak gradient is 40 mmHg. AV peak velocity is 3.2 m/s. LVOT diameter is 2.2 cm. AV area by continuity VTI is 1.8 cm2.    Pericardium: No pericardial effusion.    Limited study.  Assessment:  Principal Problem:    Acute pancreatitis, unspecified complication status, unspecified pancreatitis type  Active Problems:    CAD (coronary artery disease)    Benign essential HTN    Type 2 diabetes mellitus with hyperglycemia (HCC)    Acute cholecystitis    Transaminitis  Resolved Problems:    * No resolved hospital problems. *      Plan:    Epigastric RUQ pain  Acute cholecystitis  Acute pancreatitis likely 2/2 above  Transaminitis   - CT as above  - lipase >2000 on admission  - abdominal pain--not controlled by morphine--change to Dilaudid  every 3 hours and monitor  - IVF at 150 ml/hr  -Start clears  - General Surgery consulted in ED  - GI consulted  - IV Zosyn   - PPI  -MRCP done.  No choledocholithiasis seen.  Acute pancreatitis seen.  Has cholelithiasis.  Start diet.        Hyperkalemia resolved  - 5.8  - IV insulin given in ED  - monitor      HTN-blood pressure improved with pain control  - blood pressure SBP 190s at the time of admission due to pain  - likely 2/2 pain  - holding Lisinopril  - increased pain regimen and monitor  - may need PRN  Hydralazine  - resumed Flomax        CKD stage a with elevated creatinine  - previous baseline 1.1  - currently 1.5  - IVF  - holding Lisinopril         CAD  - troponin 27---25  - no acute EKG changes  - recent echo as above  - holding ASA and statin         DM type 2  Neuropathy  - hold oral regimen  - SSI  - monitor glucose      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  Splenomegaly /cirrhosis   Normocytic anemia   Non-hodgkins lymphoma  -received rituximab  in the past  -saw oncology 11/2019 but later followed up with holistic person            Chronic bilateral lower extremity edema  - monitor, not on diuretics   - echo as above, normal EF and intermediate DD  - monitor with IVF     Previous admission pt noted with possible dementia with agitation   - he developed confusion during his stay and family reported sundowners  - monitor        Normocytic anemia   -monitor CBC , hb at 10     PVD   -ASA, statin at home, holding      HX of right foot osteomyelitis and MRSA bacteremia  -completed course of IV vancomycin in Jan 2024  -follows with podiatry outpatient      Carotid artery stenosis   -on ASA, statin -holding      Thyroid nodules   -need outpatient US      I discussed CODE STATUS with the son who is a power of .  For present he will remain a full code.  If he needs prolonged mechanical ventilation the son will reconsider at that time.  Explained that patient is ill to the son.        Note above makes patient higher risk for morbidity and mortality requiring testing and treatment.       DVT Prophylaxis: lovenox   Diet: Clear liquid  Code Status: Prior         The patient is clinically improving.  Try to slowly advance diet and discharge planning over the weekend.    KEVON SPRING MD 6/28/2024 11:44 AM

## 2024-06-28 NOTE — PROGRESS NOTES
Progress Note    Patient Ari Snider  MRN: [id number]  YOB: 1935 Age: 88 y.o. Sex: male  Room: [place] [id number]       Admitting Physician: Amna Macdonald MD   Date of Admission: 6/26/2024 10:33 AM   Primary Care Physician: Wilma Rowley APRN - CNP     Subjective:  Ari Snider was seen and examined. We are following for .  -- Gallstone pancreatitis.  Patient has ongoing abdominal discomfort.  No nausea, no recorded bowel movements today.  MRCP yesterday did not reveal choledocholithiasis.    ROS:  Constitutional: Denies fever, no change in appetite  Respiratory: Denies cough or shortness of breath  Cardiovascular: Denies chest pain or edema    Objective:  Vital Signs:   Vitals:    06/28/24 1319   BP:    Pulse:    Resp: 18   Temp:    SpO2:          Physical Exam:  Constitutional: Alert and oriented x 4. No acute distress.   HEENT: Sclera anicteric, mucosal membranes moist  Cardiovascular: Regular rate and rhythm.  No murmurs.  Respiratory: Respirations nonlabored, no crepitus  GI: Abdomen tender.  Normal active bowel sounds.  No masses palpable.   Rectal: Deferred  Neurological: Patient confused.     Intake/Output:    Intake/Output Summary (Last 24 hours) at 6/28/2024 1424  Last data filed at 6/28/2024 1319  Gross per 24 hour   Intake 180 ml   Output 1200 ml   Net -1020 ml        Current Medications:  Current Facility-Administered Medications   Medication Dose Route Frequency Provider Last Rate Last Admin    medicated lip balm (BLISTEX/CARMEX) stick   Topical PRN Romario Bah PA        HYDROmorphone (DILAUDID) injection 1 mg  1 mg IntraVENous Q3H PRN Aman Macdonald MD   1 mg at 06/28/24 1319    lactated ringers IV soln infusion   IntraVENous Continuous Ny Espinosa  mL/hr at 06/28/24 1213 New Bag at 06/28/24 1213    pantoprazole (PROTONIX) tablet 40 mg  40 mg Oral QAM AC Ivory Harris APRN - CNP   40 mg at 06/28/24 0627    tamsulosin (FLOMAX) capsule 0.4 mg   Ivory Harris, MALATHI - CNP   Stopped at 06/28/24 1330         Recent Imaging:   MRI ABDOMEN WO CONTRAST MRCP  Narrative: EXAMINATION:  MRI OF THE ABDOMEN WITHOUT CONTRAST AND MRCP 6/27/2024 2:30 pm    TECHNIQUE:  Multiplanar multisequence MRI of the abdomen was performed without the  administration of intravenous contrast.  After initial T2 axial and coronal  images, thick slab, thin slab and 3D coronal MRCP sequences were obtained  without the administration of intravenous contrast.  MIP images are provided  for review.    COMPARISON:  Right upper quadrant ultrasound and CT 06/26/2024    HISTORY:  ORDERING SYSTEM PROVIDED HISTORY: elevated liver tests, rule out  choledocholithiasis  TECHNOLOGIST PROVIDED HISTORY:  Reason for exam:->elevated liver tests, rule out choledocholithiasis  Reason for Exam: patient complains of abdominal pain/ abnormal lab work    FINDINGS:  LOWER CHEST:  The visualized lung bases reveal no signal abnormality.    LIVER: The liver is normal in morphology. No evidence for steatosis. No  suspicious liver lesion identified.    GALLBLADDER/BILE DUCTS: Layering sludge or small stones.  Minimal gallbladder  wall thickening.  No intrahepatic nor extrahepatic biliary dilation.    PANCREAS:  Peripancreatic induration is again demonstrated.  No organized  fluid collection.  No focal signal abnormality within the pancreatic  parenchyma or duct dilatation.    SPLEEN:  No significant findings.    ADRENAL GLANDS:  Normal.    KIDNEYS:  No significant findings.    GI/BOWEL: The visualized bowel is normal in caliber.    PERITONEUM/RETROPERITONEUM:  No abdominal lymphadenopathy.  Trace upper  abdominal ascites.    VESSELS:  The aorta is normal in caliber.    SOFT TISSUES/BONES:  No suspicious signal abnormality identified.    *Unless otherwise specified, incidental findings do not require dedicated  imaging follow-up.  Impression: 1. Findings compatible with acute pancreatitis. No organized fluid  collection.  2. No biliary dilation or choledocholithiasis.  3. Layering sludge or small stones in the gallbladder. Minimal gallbladder  wall thickening may be reactive or secondary to acute cholecystitis.  4. Trace upper abdominal ascites.       Labs:   Recent Labs     06/26/24  1100 06/26/24  1200 06/27/24  0558 06/28/24  0444   WBC 11.8*  --  24.1* 16.0*   HGB 10.9*  --  10.9* 8.4*     --  401 287   ALKPHOS 356*  --  258* 171*   *  --  103* 54*   *  --  63* 24   BILITOT 0.5  --  0.5 0.5   BILIDIR  --   --  <0.2 0.3   IBILI  --   --  see below 0.2   BUN 39*  --  42* 43*   CREATININE 1.5*  --  1.5* 1.7*     --  140 143   K 6.0* 5.8* 4.6 4.1          Assessment:  Hospital Problems             Last Modified POA    * (Principal) Acute pancreatitis, unspecified complication status, unspecified pancreatitis type 6/26/2024 Yes    CAD (coronary artery disease) 6/26/2024 Yes    Benign essential HTN 6/26/2024 Yes    Type 2 diabetes mellitus with hyperglycemia (HCC) 6/26/2024 Yes    Acute cholecystitis 6/26/2024 Yes    Transaminitis 6/26/2024 Yes     88-year-old gentleman with gallstone pancreatitis:      Plan:  Continue with IV fluids.  Patient tolerating a liquid diet.  Given ongoing pain will not progress for now.  Liver tests improving.  Analgesia as per hospitalist team.      Ny Espinosa MD    [facility]    [phone]. Also available via [employer]    Please note that some or all of this record was generated using voice recognition software. If there are any questions about the content of this document, please contact the author as some errors in translation may have occurred.

## 2024-06-28 NOTE — PROGRESS NOTES
Handoff report given to Anival MARTINEZ.  Patient is in stable condition and has no needs at this time. Call light is in reach and bed is in the lowest position.  Care is transferred at this time.

## 2024-06-28 NOTE — FLOWSHEET NOTE
06/27/24 1905   Vital Signs   Temp 98.4 °F (36.9 °C)   Temp Source Axillary   Pulse 97   Heart Rate Source Monitor   Respirations 18   BP (!) 99/56   MAP (Calculated) 70   BP Location Right upper arm   BP Method Automatic   Oxygen Therapy   SpO2 96 %   O2 Device None (Room air)     Shift assessment done  Rx given per MAR  Call light within reach bedside table within reach  Pt on room air  Pt stated no need at the moment

## 2024-06-28 NOTE — PROGRESS NOTES
Pt still gets severe breakthrough pain when he tries to eat the lemon sherbet or the apple sauce. Family is asking for more pain medicine for him. Provider was notified and gave 0.25mg of dilaudid and regressed Pt's diet to NPO with ice chips and sips.

## 2024-06-28 NOTE — PROGRESS NOTES
General Surgery  Daily Progress Note    Pt Name: Ari Snider  Medical Record Number: [id number]  Date of Birth 1935   Today's Date: 6/28/2024  Admit date: 6/26/2024  LOS: Day 2    SUBJECTIVE  Feeling better this morning. Still having pain but much improved. He's thirsty.       OBJECTIVE  Vitals:    06/27/24 2329 06/28/24 0114 06/28/24 0334 06/28/24 0413   BP: 122/65  127/68    Pulse: 92  (!) 102    Resp: 18 18 18 18   Temp: 99.6 °F (37.6 °C)  98.8 °F (37.1 °C)    TempSrc: Oral  Oral    SpO2: 94%  92%    Weight:   97.5 kg (215 lb)    Height:           Gen: No distress. Alert. [place].   Resp: Normal rate. Easy and unlabored. No accessory muscle use.   CV: Regular rate. Regular rhythm.   GI: +Improving RUQ and epigastric TTP. Soft,  Non-distended.  Normal bowel sounds.  Skin: Warm and dry. No nodule or rash on exposed extremities.       I/O last 3 completed shifts:  In: 30 [P.O.:30]  Out: 1075 [Urine:1075]  No intake/output data recorded.    LABS  CBC:   Recent Labs     06/26/24  1100 06/27/24  0558 06/28/24 0444   WBC 11.8* 24.1* 16.0*   HGB 10.9* 10.9* 8.4*   HCT 33.9* 33.5* 25.4*   MCV 91.5 92.1 90.6    401 287     BMP:   Recent Labs     06/26/24  1100 06/26/24  1200 06/27/24  0558 06/28/24 0444     --  140 143   K 6.0* 5.8* 4.6 4.1     --  103 106   CO2 24  --  26 26   BUN 39*  --  42* 43*   CREATININE 1.5*  --  1.5* 1.7*     LIVER PROFILE:   Recent Labs     06/26/24  1100 06/27/24  0558 06/28/24 0444   * 63* 24   * 103* 54*   LIPASE 2,584.0*  --   --    BILIDIR  --  <0.2 0.3   BILITOT 0.5 0.5 0.5   ALKPHOS 356* 258* 171*     PT/INR: No results for input(s): \"PROTIME\", \"INR\" in the last 72 hours.  APTT: No results for input(s): \"APTT\" in the last 72 hours.  UA:  Recent Labs     06/26/24  1100   COLORU Yellow   PHUR 6.0   WBCUA 0-2   RBCUA 0-2   BACTERIA Rare*   CLARITYU Clear   LEUKOCYTESUR Negative   UROBILINOGEN 0.2   BILIRUBINUR Negative   BLOODU Negative   GLUCOSEU

## 2024-06-29 LAB
ALBUMIN SERPL-MCNC: 2.5 G/DL (ref 3.4–5)
ALP SERPL-CCNC: 148 U/L (ref 40–129)
ALT SERPL-CCNC: 42 U/L (ref 10–40)
ANION GAP SERPL CALCULATED.3IONS-SCNC: 11 MMOL/L (ref 3–16)
AST SERPL-CCNC: 23 U/L (ref 15–37)
BASOPHILS # BLD: 0.1 K/UL (ref 0–0.2)
BASOPHILS NFR BLD: 0.4 %
BILIRUB DIRECT SERPL-MCNC: <0.2 MG/DL (ref 0–0.3)
BILIRUB INDIRECT SERPL-MCNC: ABNORMAL MG/DL (ref 0–1)
BILIRUB SERPL-MCNC: 0.6 MG/DL (ref 0–1)
BUN SERPL-MCNC: 38 MG/DL (ref 7–20)
CALCIUM SERPL-MCNC: 7.8 MG/DL (ref 8.3–10.6)
CHLORIDE SERPL-SCNC: 108 MMOL/L (ref 99–110)
CO2 SERPL-SCNC: 25 MMOL/L (ref 21–32)
CREAT SERPL-MCNC: 1.5 MG/DL (ref 0.8–1.3)
DEPRECATED RDW RBC AUTO: 16.5 % (ref 12.4–15.4)
EOSINOPHIL # BLD: 0.2 K/UL (ref 0–0.6)
EOSINOPHIL NFR BLD: 1.2 %
GFR SERPLBLD CREATININE-BSD FMLA CKD-EPI: 45 ML/MIN/{1.73_M2}
GLUCOSE BLD-MCNC: 154 MG/DL (ref 70–99)
GLUCOSE BLD-MCNC: 155 MG/DL (ref 70–99)
GLUCOSE BLD-MCNC: 163 MG/DL (ref 70–99)
GLUCOSE BLD-MCNC: 173 MG/DL (ref 70–99)
GLUCOSE BLD-MCNC: 193 MG/DL (ref 70–99)
GLUCOSE BLD-MCNC: 203 MG/DL (ref 70–99)
GLUCOSE SERPL-MCNC: 159 MG/DL (ref 70–99)
HCT VFR BLD AUTO: 24.1 % (ref 40.5–52.5)
HGB BLD-MCNC: 8 G/DL (ref 13.5–17.5)
LIPASE SERPL-CCNC: 59 U/L (ref 13–60)
LYMPHOCYTES # BLD: 2.4 K/UL (ref 1–5.1)
LYMPHOCYTES NFR BLD: 18.9 %
MCH RBC QN AUTO: 30.4 PG (ref 26–34)
MCHC RBC AUTO-ENTMCNC: 33.1 G/DL (ref 31–36)
MCV RBC AUTO: 91.9 FL (ref 80–100)
MONOCYTES # BLD: 0.5 K/UL (ref 0–1.3)
MONOCYTES NFR BLD: 3.9 %
NEUTROPHILS # BLD: 9.7 K/UL (ref 1.7–7.7)
NEUTROPHILS NFR BLD: 75.6 %
PERFORMED ON: ABNORMAL
PLATELET # BLD AUTO: 267 K/UL (ref 135–450)
PMV BLD AUTO: 7.7 FL (ref 5–10.5)
POTASSIUM SERPL-SCNC: 3.9 MMOL/L (ref 3.5–5.1)
PROT SERPL-MCNC: 6.1 G/DL (ref 6.4–8.2)
RBC # BLD AUTO: 2.62 M/UL (ref 4.2–5.9)
SODIUM SERPL-SCNC: 144 MMOL/L (ref 136–145)
WBC # BLD AUTO: 12.8 K/UL (ref 4–11)

## 2024-06-29 PROCEDURE — 83690 ASSAY OF LIPASE: CPT

## 2024-06-29 PROCEDURE — 2060000000 HC ICU INTERMEDIATE R&B

## 2024-06-29 PROCEDURE — 80048 BASIC METABOLIC PNL TOTAL CA: CPT

## 2024-06-29 PROCEDURE — 80076 HEPATIC FUNCTION PANEL: CPT

## 2024-06-29 PROCEDURE — 2580000003 HC RX 258: Performed by: INTERNAL MEDICINE

## 2024-06-29 PROCEDURE — 36415 COLL VENOUS BLD VENIPUNCTURE: CPT

## 2024-06-29 PROCEDURE — 99233 SBSQ HOSP IP/OBS HIGH 50: CPT | Performed by: SURGERY

## 2024-06-29 PROCEDURE — 99232 SBSQ HOSP IP/OBS MODERATE 35: CPT | Performed by: INTERNAL MEDICINE

## 2024-06-29 PROCEDURE — 6360000002 HC RX W HCPCS: Performed by: NURSE PRACTITIONER

## 2024-06-29 PROCEDURE — 6370000000 HC RX 637 (ALT 250 FOR IP): Performed by: NURSE PRACTITIONER

## 2024-06-29 PROCEDURE — 2580000003 HC RX 258: Performed by: NURSE PRACTITIONER

## 2024-06-29 PROCEDURE — 6360000002 HC RX W HCPCS: Performed by: INTERNAL MEDICINE

## 2024-06-29 PROCEDURE — 6370000000 HC RX 637 (ALT 250 FOR IP): Performed by: INTERNAL MEDICINE

## 2024-06-29 PROCEDURE — 85025 COMPLETE CBC W/AUTO DIFF WBC: CPT

## 2024-06-29 RX ORDER — POLYETHYLENE GLYCOL 3350 17 G/17G
17 POWDER, FOR SOLUTION ORAL DAILY
Status: DISCONTINUED | OUTPATIENT
Start: 2024-06-29 | End: 2024-07-04 | Stop reason: HOSPADM

## 2024-06-29 RX ADMIN — POLYETHYLENE GLYCOL (3350) 17 G: 17 POWDER, FOR SOLUTION ORAL at 11:13

## 2024-06-29 RX ADMIN — HYDROMORPHONE HYDROCHLORIDE 1 MG: 1 INJECTION, SOLUTION INTRAMUSCULAR; INTRAVENOUS; SUBCUTANEOUS at 20:14

## 2024-06-29 RX ADMIN — INSULIN LISPRO 1 UNITS: 100 INJECTION, SOLUTION INTRAVENOUS; SUBCUTANEOUS at 14:37

## 2024-06-29 RX ADMIN — TAMSULOSIN HYDROCHLORIDE 0.4 MG: 0.4 CAPSULE ORAL at 08:31

## 2024-06-29 RX ADMIN — ONDANSETRON 4 MG: 2 INJECTION INTRAMUSCULAR; INTRAVENOUS at 08:32

## 2024-06-29 RX ADMIN — SODIUM CHLORIDE, PRESERVATIVE FREE 10 ML: 5 INJECTION INTRAVENOUS at 08:35

## 2024-06-29 RX ADMIN — HYDROMORPHONE HYDROCHLORIDE 1 MG: 1 INJECTION, SOLUTION INTRAMUSCULAR; INTRAVENOUS; SUBCUTANEOUS at 15:15

## 2024-06-29 RX ADMIN — HYDROMORPHONE HYDROCHLORIDE 1 MG: 1 INJECTION, SOLUTION INTRAMUSCULAR; INTRAVENOUS; SUBCUTANEOUS at 05:40

## 2024-06-29 RX ADMIN — PANTOPRAZOLE SODIUM 40 MG: 40 TABLET, DELAYED RELEASE ORAL at 05:34

## 2024-06-29 RX ADMIN — SODIUM CHLORIDE, POTASSIUM CHLORIDE, SODIUM LACTATE AND CALCIUM CHLORIDE: 600; 310; 30; 20 INJECTION, SOLUTION INTRAVENOUS at 04:01

## 2024-06-29 RX ADMIN — PIPERACILLIN AND TAZOBACTAM 3375 MG: 3; .375 INJECTION, POWDER, LYOPHILIZED, FOR SOLUTION INTRAVENOUS at 08:36

## 2024-06-29 RX ADMIN — HYDROMORPHONE HYDROCHLORIDE 1 MG: 1 INJECTION, SOLUTION INTRAMUSCULAR; INTRAVENOUS; SUBCUTANEOUS at 02:37

## 2024-06-29 RX ADMIN — ENOXAPARIN SODIUM 40 MG: 100 INJECTION SUBCUTANEOUS at 08:42

## 2024-06-29 RX ADMIN — PIPERACILLIN AND TAZOBACTAM 3375 MG: 3; .375 INJECTION, POWDER, LYOPHILIZED, FOR SOLUTION INTRAVENOUS at 16:56

## 2024-06-29 RX ADMIN — PIPERACILLIN AND TAZOBACTAM 3375 MG: 3; .375 INJECTION, POWDER, LYOPHILIZED, FOR SOLUTION INTRAVENOUS at 00:37

## 2024-06-29 RX ADMIN — HYDROMORPHONE HYDROCHLORIDE 1 MG: 1 INJECTION, SOLUTION INTRAMUSCULAR; INTRAVENOUS; SUBCUTANEOUS at 11:13

## 2024-06-29 ASSESSMENT — PAIN SCALES - GENERAL
PAINLEVEL_OUTOF10: 4
PAINLEVEL_OUTOF10: 3
PAINLEVEL_OUTOF10: 4
PAINLEVEL_OUTOF10: 6
PAINLEVEL_OUTOF10: 8
PAINLEVEL_OUTOF10: 8
PAINLEVEL_OUTOF10: 7
PAINLEVEL_OUTOF10: 4
PAINLEVEL_OUTOF10: 3
PAINLEVEL_OUTOF10: 9
PAINLEVEL_OUTOF10: 6
PAINLEVEL_OUTOF10: 6

## 2024-06-29 ASSESSMENT — PAIN - FUNCTIONAL ASSESSMENT
PAIN_FUNCTIONAL_ASSESSMENT: ACTIVITIES ARE NOT PREVENTED

## 2024-06-29 ASSESSMENT — PAIN DESCRIPTION - LOCATION
LOCATION: ABDOMEN

## 2024-06-29 ASSESSMENT — PAIN SCALES - WONG BAKER
WONGBAKER_NUMERICALRESPONSE: HURTS LITTLE MORE

## 2024-06-29 ASSESSMENT — PAIN DESCRIPTION - DESCRIPTORS
DESCRIPTORS: ACHING

## 2024-06-29 ASSESSMENT — PAIN DESCRIPTION - ORIENTATION: ORIENTATION: ANTERIOR

## 2024-06-29 NOTE — FLOWSHEET NOTE
06/29/24 1439   Vital Signs   Temp 98.2 °F (36.8 °C)   Temp Source Oral   Pulse 88   Heart Rate Source Monitor   Respirations 18   BP (!) 146/74   MAP (Calculated) 98   BP Location Right upper arm   BP Method Automatic   Patient Position Turns self;High fowlers   Pain Assessment   Pain Assessment 0-10   Pain Level 4   Patient's Stated Pain Goal 0 - No pain   Oxygen Therapy   SpO2 93 %   Pulse Oximetry Type Intermittent   Pulse Oximeter Device Mode Continuous   Pulse Oximeter Device Location Finger   O2 Device None (Room air)     Patient tolerating full liquid diet, remains with abdominal pain, GI pleased with progress. Patient has no complaints of nausea.  Will continue to monitor progress. Patricia Quinonez RN

## 2024-06-29 NOTE — PLAN OF CARE
Problem: Discharge Planning  Goal: Discharge to home or other facility with appropriate resources  6/28/2024 2127 by Anival Buenrostro RN  Outcome: Progressing  6/28/2024 1041 by Mary Rahman RN  Outcome: Progressing     Problem: Pain  Goal: Verbalizes/displays adequate comfort level or baseline comfort level  6/28/2024 2127 by Anival Buenrostro RN  Outcome: Progressing  6/28/2024 1041 by Mary Rhaman RN  Outcome: Progressing     Problem: Safety - Adult  Goal: Free from fall injury  6/28/2024 2127 by Anival Buenrostro RN  Outcome: Progressing  6/28/2024 1041 by Mary Rahman RN  Outcome: Progressing     Problem: ABCDS Injury Assessment  Goal: Absence of physical injury  6/28/2024 2127 by Anival Buenrostro RN  Outcome: Progressing  6/28/2024 1041 by Mary Rahman RN  Outcome: Progressing     Problem: Skin/Tissue Integrity  Goal: Absence of new skin breakdown  Description: 1.  Monitor for areas of redness and/or skin breakdown  2.  Assess vascular access sites hourly  3.  Every 4-6 hours minimum:  Change oxygen saturation probe site  4.  Every 4-6 hours:  If on nasal continuous positive airway pressure, respiratory therapy assess nares and determine need for appliance change or resting period.  6/28/2024 2127 by Anival Buenrostro RN  Outcome: Progressing  6/28/2024 1041 by Mary Rahman RN  Outcome: Progressing     Problem: Chronic Conditions and Co-morbidities  Goal: Patient's chronic conditions and co-morbidity symptoms are monitored and maintained or improved  6/28/2024 2127 by Anival Buenrostro RN  Outcome: Progressing  6/28/2024 1041 by Mary Rahman RN  Outcome: Progressing

## 2024-06-29 NOTE — PROGRESS NOTES
Progress Note    Admit Date:  6/26/2024    Subjective:  Mr. Snider is doing some better.  Pain is improving.  MRCP showed pancreatitis.  No choledocholithiasis.  No fever.    C/o mild abd pain  Objective:   /82   Pulse 88   Temp 98 °F (36.7 °C) (Oral)   Resp 18   Ht 1.727 m (5' 8\")   Wt 97.6 kg (215 lb 2.7 oz)   SpO2 94%   BMI 32.72 kg/m²        Intake/Output Summary (Last 24 hours) at 6/29/2024 0849  Last data filed at 6/28/2024 1751  Gross per 24 hour   Intake 180 ml   Output 425 ml   Net -245 ml         Physical Exam:  Gen: Awake and alert.  Ill-appearing  Eyes: PERRL. No sclera icterus. No conjunctival injection.   ENT: No discharge. Pharynx clear.   Neck: No JVD.  No Carotid Bruit. Trachea midline.  Resp: No accessory muscle use. No crackles. No wheezes. No rhonchi.   CV: Regular rate. Regular rhythm. No murmur.  No rub. +edema.   GI: Right upper quadrant abdominal pain and tenderness improving.  Epigastric tenderness improving.  Mild distention. No masses.  Pain limits exam no organomegaly. Normal bowel sounds. No hernia.   Skin: Warm and dry. No nodule on exposed extremities. No rash on exposed extremities.   M/S: No cyanosis. No joint deformity. No clubbing.   Neuro: Awake. Grossly nonfocal    Psych: Oriented x 3. No anxiety or agitation.          Scheduled Meds:   pantoprazole  40 mg Oral QAM AC    tamsulosin  0.4 mg Oral Daily    [Held by provider] aspirin  81 mg Oral Daily    sodium chloride flush  5-40 mL IntraVENous 2 times per day    enoxaparin  40 mg SubCUTAneous Daily    insulin lispro  0-4 Units SubCUTAneous Q4H    [Held by provider] lisinopril  5 mg Oral Daily    piperacillin-tazobactam  3,375 mg IntraVENous Q8H       Continuous Infusions:   lactated ringers IV soln 200 mL/hr at 06/29/24 0401    dextrose      sodium chloride         PRN Meds:  medicated lip balm, HYDROmorphone, glucose, dextrose bolus **OR** dextrose bolus, glucagon (rDNA), dextrose, sodium chloride flush, sodium  stage a with elevated creatinine  - previous baseline 1.1  - currently 1.5  - IVF  - holding Lisinopril       CAD  - troponin 27---25  - no acute EKG changes  - recent echo as above  - holding ASA and statin       DM type 2  Neuropathy  - hold oral regimen  - SSI  - monitor glucose      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  Splenomegaly /cirrhosis   Normocytic anemia   Non-hodgkins lymphoma  -received rituximab  in the past  -saw oncology 11/2019 but later followed up with holistic person       Chronic bilateral lower extremity edema  - monitor, not on diuretics   - echo as above, normal EF and intermediate DD  - monitor with IVF     Previous admission pt noted with possible dementia with agitation   - he developed confusion during his stay and family reported sundowners  - monitor   PVD   -ASA, statin at home, holding      HX of right foot osteomyelitis and MRSA bacteremia  -completed course of IV vancomycin in Jan 2024  -follows with podiatry outpatient      Carotid artery stenosis   -on ASA, statin -holding      Thyroid nodules   -need outpatient US         DVT Prophylaxis: lovenox   Diet: Clear liquid  Code Status: full code   ALFA VILLASEÑOR MD 6/29/2024 8:49 AM

## 2024-06-29 NOTE — PROGRESS NOTES
Shift report given to nurse Florian at bedside. Patient care handed off in stable condition at this time. Patricia Quinonez RN

## 2024-06-29 NOTE — PROCEDURES
Progress Note    Patient Ari Snider  MRN: [id number]  YOB: 1935 Age: 88 y.o. Sex: male  Room: [place] [id number]       Admitting Physician: Amna Macdonald MD   Date of Admission: 6/26/2024 10:33 AM   Primary Care Physician: Wilma Rowley APRN - CNP     Subjective:  Ari Snider was seen and examined. We are following for acute pancreatitis.  -- Patient reports no abdominal discomfort at present time.  No fevers no chills.  No nausea.  I understand he did have some pain with eating last evening.    ROS:  Constitutional: Denies fever, no change in appetite  Respiratory: Denies cough or shortness of breath  Cardiovascular: Denies chest pain or edema    Objective:  Vital Signs:   Vitals:    06/29/24 1545   BP:    Pulse:    Resp: 18   Temp:    SpO2:          Physical Exam:  Constitutional: Alert and oriented x 4. No acute distress.   HEENT: Sclera anicteric, mucosal membranes moist  Cardiovascular: Regular rate and rhythm.  No murmurs.  Respiratory: Respirations nonlabored, no crepitus  GI: Abdomen nondistended, soft, and nontender.  Normal active bowel sounds.  No masses palpable.   Rectal: Deferred  Musculoskeletal:  No pitting edema of the lower legs.  Neurological: Gross memory appears intact.      Intake/Output:    Intake/Output Summary (Last 24 hours) at 6/29/2024 1639  Last data filed at 6/29/2024 1526  Gross per 24 hour   Intake 582 ml   Output 125 ml   Net 457 ml        Current Medications:  Current Facility-Administered Medications   Medication Dose Route Frequency Provider Last Rate Last Admin    polyethylene glycol (GLYCOLAX) packet 17 g  17 g Oral Daily Noel Holm MD   17 g at 06/29/24 1113    medicated lip balm (BLISTEX/CARMEX) stick   Topical PRN Romario Bah PA        HYDROmorphone (DILAUDID) injection 1 mg  1 mg IntraVENous Q3H PRN Amna Macdonald MD   1 mg at 06/29/24 1515    pantoprazole (PROTONIX) tablet 40 mg  40 mg Oral GREGORY Harris  Ivory, APRN - CNP   40 mg at 06/29/24 0534    tamsulosin (FLOMAX) capsule 0.4 mg  0.4 mg Oral Daily Harris, Ivory, APRN - CNP   0.4 mg at 06/29/24 0831    [Held by provider] aspirin EC tablet 81 mg  81 mg Oral Daily Harris, Ivory, APRN - CNP        glucose chewable tablet 16 g  4 tablet Oral PRN Harris, Ivory, APRN - CNP        dextrose bolus 10% 125 mL  125 mL IntraVENous PRN Harris, Ivory, APRN - CNP        Or    dextrose bolus 10% 250 mL  250 mL IntraVENous PRN Harris, Ivory, APRN - CNP        glucagon injection 1 mg  1 mg SubCUTAneous PRN Harris, Ivory, APRN - CNP        dextrose 10 % infusion   IntraVENous Continuous PRN Harris, Ivory, APRN - CNP        sodium chloride flush 0.9 % injection 5-40 mL  5-40 mL IntraVENous 2 times per day Harris, Ivory, APRN - CNP   10 mL at 06/29/24 0835    sodium chloride flush 0.9 % injection 5-40 mL  5-40 mL IntraVENous PRN Harris, Ivory, APRN - CNP        0.9 % sodium chloride infusion   IntraVENous PRN Harris, Ivory, APRN - CNP        potassium chloride 20 mEq/50 mL IVPB (Central Line)  20 mEq IntraVENous PRN Harris, Ivory, APRN - CNP        Or    potassium chloride 10 mEq/100 mL IVPB (Peripheral Line)  10 mEq IntraVENous PRN Harris, Ivory, APRN - CNP        magnesium sulfate 2000 mg in 50 mL IVPB premix  2,000 mg IntraVENous PRN Harris, Ivory, APRN - CNP        ondansetron (ZOFRAN-ODT) disintegrating tablet 4 mg  4 mg Oral Q8H PRN Harris, Ivory, APRN - CNP   4 mg at 06/28/24 0835    Or    ondansetron (ZOFRAN) injection 4 mg  4 mg IntraVENous Q6H PRN Harris, Ivory, APRN - CNP   4 mg at 06/29/24 0832    enoxaparin (LOVENOX) injection 40 mg  40 mg SubCUTAneous Daily Harris, Ivory, APRN - CNP   40 mg at 06/29/24 0842    insulin lispro (HUMALOG,ADMELOG) injection vial 0-4 Units  0-4 Units SubCUTAneous Q4H Ivory Harris APRN - CNP   1 Units at 06/29/24 8787    [Held by provider] lisinopril (PRINIVIL;ZESTRIL) tablet 5 mg  5 mg Oral Daily Ivory Harris APRN - CNP        piperacillin-tazobactam (ZOSYN) 3,375 mg in

## 2024-06-29 NOTE — PROGRESS NOTES
[facility] SURGERY DAILY PROGRESS NOTE    SUBJECTIVE: Awake, alert.  Had increased pain yesterday and was made NPO.  Still with pain and now with nausea.     OBJECTIVE: CURRENT VITALS:  /82   Pulse 88   Temp 98 °F (36.7 °C) (Oral)   Resp 18   Ht 1.727 m (5' 8\")   Wt 97.6 kg (215 lb 2.7 oz)   SpO2 94%   BMI 32.72 kg/m²          ABD: Soft.  Distended.  Tender RUQ.     LABS:    CBC:   Recent Labs     06/27/24  0558 06/28/24 0444 06/29/24 0424   WBC 24.1* 16.0* 12.8*   RBC 3.63* 2.81* 2.62*   HGB 10.9* 8.4* 8.0*   HCT 33.5* 25.4* 24.1*   MCV 92.1 90.6 91.9   RDW 16.7* 16.2* 16.5*    287 267     BMP:   Recent Labs     06/27/24  0558 06/28/24 0444 06/29/24 0424    143 144   K 4.6 4.1 3.9    106 108   CO2 26 26 25   BUN 42* 43* 38*   CREATININE 1.5* 1.7* 1.5*       ASSESSMENT:   Gallstone pancreatitis      PLAN:   Recheck lipase today given increased symptoms  Continue Supportive and symptomatic treatments.  Continue NPO.         CASSIDY DIXON MD

## 2024-06-29 NOTE — FLOWSHEET NOTE
06/28/24 1930   Vital Signs   Temp 98.4 °F (36.9 °C)   Temp Source Axillary   Pulse 90   Heart Rate Source Monitor   Respirations 18   /68   MAP (Calculated) 84   BP Location Right upper arm   BP Method Automatic   Patient Position Semi fowlers   Pain Assessment   Pain Level 8   Pain Location Abdomen   Oxygen Therapy   SpO2 94 %   O2 Device None (Room air)     Shift assessment done  Vitals charted  Pt on room air  Prn dilaudid given for pain  Call light within reach  Daughter at bedside  Standard safety measures in place

## 2024-06-29 NOTE — PROGRESS NOTES
Shift assessment complete, zofran for nausea, patient had 3 bites of applesauce but complained of stomach discomfort, morning medications given, patient resting with no complaints of pain, will continue to monitor. Patricia Quinonez RN

## 2024-06-30 VITALS
DIASTOLIC BLOOD PRESSURE: 68 MMHG | WEIGHT: 213.19 LBS | SYSTOLIC BLOOD PRESSURE: 138 MMHG | TEMPERATURE: 97.6 F | HEIGHT: 68 IN | BODY MASS INDEX: 32.31 KG/M2 | OXYGEN SATURATION: 94 % | RESPIRATION RATE: 16 BRPM | HEART RATE: 92 BPM

## 2024-06-30 LAB
ANION GAP SERPL CALCULATED.3IONS-SCNC: 11 MMOL/L (ref 3–16)
BASOPHILS # BLD: 0.1 K/UL (ref 0–0.2)
BASOPHILS NFR BLD: 0.5 %
BUN SERPL-MCNC: 29 MG/DL (ref 7–20)
CALCIUM SERPL-MCNC: 7.8 MG/DL (ref 8.3–10.6)
CHLORIDE SERPL-SCNC: 106 MMOL/L (ref 99–110)
CO2 SERPL-SCNC: 22 MMOL/L (ref 21–32)
CREAT SERPL-MCNC: 1.1 MG/DL (ref 0.8–1.3)
DEPRECATED RDW RBC AUTO: 16.2 % (ref 12.4–15.4)
EOSINOPHIL # BLD: 0.1 K/UL (ref 0–0.6)
EOSINOPHIL NFR BLD: 1.4 %
GFR SERPLBLD CREATININE-BSD FMLA CKD-EPI: 65 ML/MIN/{1.73_M2}
GLUCOSE BLD-MCNC: 159 MG/DL (ref 70–99)
GLUCOSE BLD-MCNC: 230 MG/DL (ref 70–99)
GLUCOSE BLD-MCNC: 231 MG/DL (ref 70–99)
GLUCOSE BLD-MCNC: 267 MG/DL (ref 70–99)
GLUCOSE BLD-MCNC: 268 MG/DL (ref 70–99)
GLUCOSE SERPL-MCNC: 255 MG/DL (ref 70–99)
HCT VFR BLD AUTO: 25.3 % (ref 40.5–52.5)
HGB BLD-MCNC: 8.2 G/DL (ref 13.5–17.5)
LYMPHOCYTES # BLD: 1.8 K/UL (ref 1–5.1)
LYMPHOCYTES NFR BLD: 16.6 %
MCH RBC QN AUTO: 30.3 PG (ref 26–34)
MCHC RBC AUTO-ENTMCNC: 32.2 G/DL (ref 31–36)
MCV RBC AUTO: 94 FL (ref 80–100)
MONOCYTES # BLD: 0.5 K/UL (ref 0–1.3)
MONOCYTES NFR BLD: 4.2 %
NEUTROPHILS # BLD: 8.4 K/UL (ref 1.7–7.7)
NEUTROPHILS NFR BLD: 77.3 %
PERFORMED ON: ABNORMAL
PLATELET # BLD AUTO: 264 K/UL (ref 135–450)
PMV BLD AUTO: 7.7 FL (ref 5–10.5)
POTASSIUM SERPL-SCNC: 3.9 MMOL/L (ref 3.5–5.1)
RBC # BLD AUTO: 2.7 M/UL (ref 4.2–5.9)
SODIUM SERPL-SCNC: 139 MMOL/L (ref 136–145)
WBC # BLD AUTO: 10.9 K/UL (ref 4–11)

## 2024-06-30 PROCEDURE — 36415 COLL VENOUS BLD VENIPUNCTURE: CPT

## 2024-06-30 PROCEDURE — 97161 PT EVAL LOW COMPLEX 20 MIN: CPT

## 2024-06-30 PROCEDURE — 6360000002 HC RX W HCPCS: Performed by: NURSE PRACTITIONER

## 2024-06-30 PROCEDURE — 97535 SELF CARE MNGMENT TRAINING: CPT

## 2024-06-30 PROCEDURE — 80048 BASIC METABOLIC PNL TOTAL CA: CPT

## 2024-06-30 PROCEDURE — 85025 COMPLETE CBC W/AUTO DIFF WBC: CPT

## 2024-06-30 PROCEDURE — 2060000000 HC ICU INTERMEDIATE R&B

## 2024-06-30 PROCEDURE — 6370000000 HC RX 637 (ALT 250 FOR IP): Performed by: INTERNAL MEDICINE

## 2024-06-30 PROCEDURE — 6370000000 HC RX 637 (ALT 250 FOR IP): Performed by: NURSE PRACTITIONER

## 2024-06-30 PROCEDURE — 97116 GAIT TRAINING THERAPY: CPT

## 2024-06-30 PROCEDURE — 99232 SBSQ HOSP IP/OBS MODERATE 35: CPT | Performed by: INTERNAL MEDICINE

## 2024-06-30 PROCEDURE — 97530 THERAPEUTIC ACTIVITIES: CPT

## 2024-06-30 PROCEDURE — 6360000002 HC RX W HCPCS: Performed by: INTERNAL MEDICINE

## 2024-06-30 PROCEDURE — 2580000003 HC RX 258: Performed by: NURSE PRACTITIONER

## 2024-06-30 PROCEDURE — 97166 OT EVAL MOD COMPLEX 45 MIN: CPT

## 2024-06-30 RX ORDER — ACETAMINOPHEN 325 MG/1
650 TABLET ORAL EVERY 4 HOURS PRN
Status: DISCONTINUED | OUTPATIENT
Start: 2024-06-30 | End: 2024-07-04 | Stop reason: HOSPADM

## 2024-06-30 RX ORDER — AMOXICILLIN AND CLAVULANATE POTASSIUM 875; 125 MG/1; MG/1
1 TABLET, FILM COATED ORAL 2 TIMES DAILY
Qty: 8 TABLET | Refills: 0 | Status: SHIPPED | OUTPATIENT
Start: 2024-06-30 | End: 2024-07-04

## 2024-06-30 RX ADMIN — PIPERACILLIN AND TAZOBACTAM 3375 MG: 3; .375 INJECTION, POWDER, LYOPHILIZED, FOR SOLUTION INTRAVENOUS at 07:53

## 2024-06-30 RX ADMIN — INSULIN LISPRO 2 UNITS: 100 INJECTION, SOLUTION INTRAVENOUS; SUBCUTANEOUS at 15:09

## 2024-06-30 RX ADMIN — INSULIN LISPRO 2 UNITS: 100 INJECTION, SOLUTION INTRAVENOUS; SUBCUTANEOUS at 10:30

## 2024-06-30 RX ADMIN — TAMSULOSIN HYDROCHLORIDE 0.4 MG: 0.4 CAPSULE ORAL at 07:37

## 2024-06-30 RX ADMIN — PIPERACILLIN AND TAZOBACTAM 3375 MG: 3; .375 INJECTION, POWDER, LYOPHILIZED, FOR SOLUTION INTRAVENOUS at 15:56

## 2024-06-30 RX ADMIN — ACETAMINOPHEN 650 MG: 325 TABLET ORAL at 23:15

## 2024-06-30 RX ADMIN — ONDANSETRON 4 MG: 4 TABLET, ORALLY DISINTEGRATING ORAL at 15:13

## 2024-06-30 RX ADMIN — PIPERACILLIN AND TAZOBACTAM 3375 MG: 3; .375 INJECTION, POWDER, LYOPHILIZED, FOR SOLUTION INTRAVENOUS at 23:19

## 2024-06-30 RX ADMIN — HYDROMORPHONE HYDROCHLORIDE 1 MG: 1 INJECTION, SOLUTION INTRAMUSCULAR; INTRAVENOUS; SUBCUTANEOUS at 00:22

## 2024-06-30 RX ADMIN — INSULIN LISPRO 1 UNITS: 100 INJECTION, SOLUTION INTRAVENOUS; SUBCUTANEOUS at 23:16

## 2024-06-30 RX ADMIN — HYDROMORPHONE HYDROCHLORIDE 1 MG: 1 INJECTION, SOLUTION INTRAMUSCULAR; INTRAVENOUS; SUBCUTANEOUS at 03:30

## 2024-06-30 RX ADMIN — INSULIN LISPRO 1 UNITS: 100 INJECTION, SOLUTION INTRAVENOUS; SUBCUTANEOUS at 19:28

## 2024-06-30 RX ADMIN — PANTOPRAZOLE SODIUM 40 MG: 40 TABLET, DELAYED RELEASE ORAL at 06:23

## 2024-06-30 RX ADMIN — PIPERACILLIN AND TAZOBACTAM 3375 MG: 3; .375 INJECTION, POWDER, LYOPHILIZED, FOR SOLUTION INTRAVENOUS at 00:36

## 2024-06-30 RX ADMIN — POLYETHYLENE GLYCOL (3350) 17 G: 17 POWDER, FOR SOLUTION ORAL at 07:36

## 2024-06-30 RX ADMIN — ACETAMINOPHEN 650 MG: 325 TABLET ORAL at 15:09

## 2024-06-30 RX ADMIN — ENOXAPARIN SODIUM 40 MG: 100 INJECTION SUBCUTANEOUS at 07:37

## 2024-06-30 ASSESSMENT — PAIN SCALES - WONG BAKER
WONGBAKER_NUMERICALRESPONSE: HURTS LITTLE MORE
WONGBAKER_NUMERICALRESPONSE: HURTS A LITTLE BIT
WONGBAKER_NUMERICALRESPONSE: HURTS LITTLE MORE

## 2024-06-30 ASSESSMENT — PAIN SCALES - GENERAL
PAINLEVEL_OUTOF10: 5
PAINLEVEL_OUTOF10: 7
PAINLEVEL_OUTOF10: 4
PAINLEVEL_OUTOF10: 3
PAINLEVEL_OUTOF10: 7
PAINLEVEL_OUTOF10: 3
PAINLEVEL_OUTOF10: 6
PAINLEVEL_OUTOF10: 6
PAINLEVEL_OUTOF10: 2

## 2024-06-30 ASSESSMENT — PAIN DESCRIPTION - ORIENTATION: ORIENTATION: ANTERIOR

## 2024-06-30 ASSESSMENT — PAIN DESCRIPTION - DESCRIPTORS
DESCRIPTORS: ACHING
DESCRIPTORS: ACHING

## 2024-06-30 ASSESSMENT — PAIN DESCRIPTION - LOCATION
LOCATION: ABDOMEN

## 2024-06-30 NOTE — PROGRESS NOTES
[facility] GENERAL SURGERY DAILY PROGRESS NOTE    SUBJECTIVE: Awake, alert.  Feels better.     OBJECTIVE: CURRENT VITALS:  BP (!) 147/83   Pulse 97   Temp 97.6 °F (36.4 °C) (Oral)   Resp 18   Ht 1.727 m (5' 8\")   Wt 96.7 kg (213 lb 3 oz)   SpO2 94%   BMI 32.41 kg/m²          ABD: Soft.  Non distended.  Less tender.      LABS:    CBC:   Recent Labs     06/28/24 0444 06/29/24 0424 06/30/24  0937   WBC 16.0* 12.8* 10.9   RBC 2.81* 2.62* 2.70*   HGB 8.4* 8.0* 8.2*   HCT 25.4* 24.1* 25.3*   MCV 90.6 91.9 94.0   RDW 16.2* 16.5* 16.2*    267 264     BMP:   Recent Labs     06/28/24 0444 06/29/24 0424 06/30/24  0937    144 139   K 4.1 3.9 3.9    108 106   CO2 26 25 22   BUN 43* 38* 29*   CREATININE 1.7* 1.5* 1.1       ASSESSMENT:   Gallstone pancreatitis        PLAN:   Tolerating PO  Discharge planning  Follow up with Dr. Paulino as outpatient           CASSIDY DIXON MD

## 2024-06-30 NOTE — CARE COORDINATION
Reviewed chart and noted prob dc. Son at bedside and states pt will need rehab. Pt is agreeable. Will obtain PT/OT evals. Family will discuss further with pt and then choose a facility.

## 2024-06-30 NOTE — PROGRESS NOTES
Inpatient Occupational Therapy Evaluation and Treatment    Unit: PCU  Date:  6/30/2024  Patient Name:    Ari Snider  Admitting diagnosis:  Acute cholecystitis [K81.0]  Acute pancreatitis, unspecified complication status, unspecified pancreatitis type [K85.90]  Admit Date:  6/26/2024  Precautions/Restrictions/WB Status/ Lines/ Wounds/ Oxygen: Fall risk, Bed/chair alarm, [place] (hard of hearing), [place], and Isolation Precautions: Contact    Pt seen for cotreatment this date due to patient safety, patient endurance, complexity of condition, and need for the assistance of 2 skilled therapists    Treatment Time:  1410-1506  Treatment Number:  1  Timed Code Treatment Minutes: 46 minutes  Total Treatment Minutes:  56  minutes    Patient Goals for Therapy: \"I want to go home \"          Discharge Recommendations: Home with 24/7 supervision and Home OT  DME needs for discharge: Needs Met       Therapy recommendations for staff:   Stand by assist for ambulation with use of rolling walker (RW) and gait belt to/from bathroom    History of Present Illness:   Per H&P of Dr. Amna Macdonald MD 6/26/2024  Chief complaint: Abdominal Pain Ruq pain. Was here a couple of weeks ago for same. Found sludge in gallbladder     The patient is a 88 y.o. male PMH of CAD, CVA, DM, HTN, PVD and carotid stenosis who presents to [facility] with abdominal pain.    History obtained from the patient and review of EMR.  He came to the emergency with complaints of right upper quadrant abdominal pain.  Patient be admitted to hospital 10 days ago with similar complaints.  At that time he was diagnosed with possible sludge in the gallbladder.  Surgery did not feel it was coming from the gallbladder.     This time patient had significant right upper quadrant abdominal pain that started at 530 this morning.  Workup in the ER showed elevation of liver enzymes.  Lipase is elevated consistent with acute pancreatitis.  Ultrasound showed acute

## 2024-06-30 NOTE — PROGRESS NOTES
Shift assessment complete, morning medications given, patient resting with complaints of pain 5 out of 10 on scale, patient with new onset wheezes bilaterally, patient up to chair with this RN, 250 ml urine given, poly glycol given no BM yet, will continue to monitor. Patricia Quinonez, RN

## 2024-06-30 NOTE — DISCHARGE SUMMARY
Name:  Ari Snider  Room:  /0315-01  MRN:    [id number]    Discharge Summary      This discharge summary is in conjunction with a complete physical exam done on the day of discharge.      Discharging Physician: ALFA VILLASEÑOR MD      Admit: 6/26/2024  Discharge:  7/4/2024     Diagnoses this Admission    Principal Problem:    Acute pancreatitis, unspecified complication status, unspecified pancreatitis type  Active Problems:    CAD (coronary artery disease)    Benign essential HTN    Type 2 diabetes mellitus with hyperglycemia (HCC)    Acute cholecystitis    Transaminitis  Resolved Problems:    * No resolved hospital problems. *          Procedures (Please Review Full Report for Details)      Consults    IP CONSULT TO GENERAL SURGERY  IP CONSULT TO GI  IP CONSULT TO GI  IP CONSULT TO GENERAL SURGERY      HPI:    The patient is a 88 y.o. male PMH of CAD, CVA, DM, HTN, PVD and carotid stenosis who presents to [facility] with abdominal pain.    History obtained from the patient and review of EMR.  He came to the emergency with complaints of right upper quadrant abdominal pain.  Patient be admitted to hospital 10 days ago with similar complaints.  At that time he was diagnosed with possible sludge in the gallbladder.  Surgery did not feel it was coming from the gallbladder.      Physical Exam at Discharge:  BP (!) 147/83   Pulse 97   Temp 97.6 °F (36.4 °C) (Oral)   Resp 18   Ht 1.727 m (5' 8\")   Wt 96.7 kg (213 lb 3 oz)   SpO2 94%   BMI 32.41 kg/m²         Hospital Course      Epigastric RUQ pain  Acute cholecystitis  Acute pancreatitis likely 2/2 above  Transaminitis   - CT as above  - lipase >2000 on admission  - abdominal pain--not controlled by morphine--change to Dilaudid  every 3 hours and monitor  - IVF at 150 ml/hr  -Start clears  - General Surgery consulted in ED  - GI consulted  - IV Zosyn   - PPI  -MRCP done.  No choledocholithiasis seen.  Acute pancreatitis seen.  Has

## 2024-06-30 NOTE — FLOWSHEET NOTE
06/30/24 1550   Vital Signs   Temp 98.1 °F (36.7 °C)   Temp Source Oral   Pulse 80   Heart Rate Source Monitor   Respirations 18   BP (!) 100/54   MAP (Calculated) 69   BP Location Left upper arm   BP Method Automatic   Patient Position Semi fowlers;Right side   Pain Assessment   Pain Assessment 0-10   Pain Level 4   Patient's Stated Pain Goal 0 - No pain   Oxygen Therapy   SpO2 93 %   Pulse Oximetry Type Intermittent   Pulse Oximeter Device Location Finger   O2 Device None (Room air)     Patient just awakened from sleep and has some mild confusion, this RN reoriented patient and offered to reposition for comfort, call light provided. Patricia Quinonez, RN

## 2024-06-30 NOTE — PROGRESS NOTES
Shift report given to nurse Castro at bedside. Patient care handed off in stable condition at this time. Patricia Quinonez RN

## 2024-06-30 NOTE — PROGRESS NOTES
Inpatient Physical Therapy Evaluation & Treatment    Unit: PCU  Date:  6/30/2024  Patient Name:    Ari Snider  Admitting diagnosis:  Acute cholecystitis [K81.0]  Acute pancreatitis, unspecified complication status, unspecified pancreatitis type [K85.90]  Admit Date:  6/26/2024  Precautions/Restrictions/WB Status/ Lines/ Wounds/ Oxygen: Fall risk, Bed/chair alarm, Lines (IV), [place] (hard of hearing), [place], and Isolation Precautions: Contact      Pt seen for cotreatment this date due to patient safety, patient endurance, complexity of condition, limited functional status information, and need for the assistance of 2 skilled therapists    Treatment Time:  5869-7291  Treatment Number:  1   Timed Code Treatment Minutes: 40 minutes  Total Treatment Minutes:  50  minutes    Patient Stated Goals for Therapy: \" Go home \"          Discharge Recommendations: Home with 24hr assistance and Home PT  DME needs for discharge: Needs Met       Therapy recommendation for EMS Transport: can transport by wheelchair    Therapy recommendations for staff:   Stand by assist for ambulation with use of rolling walker (RW) to/from chair  to/from bathroom    History of Present Illness:   Per H&P of Dr. Amna Macdonald MD 6/26/2024  Chief complaint: Abdominal Pain Ruq pain. Was here a couple of weeks ago for same. Found sludge in gallbladder      The patient is a 88 y.o. male PMH of CAD, CVA, DM, HTN, PVD and carotid stenosis who presents to [facility] with abdominal pain.    History obtained from the patient and review of EMR.  He came to the emergency with complaints of right upper quadrant abdominal pain.  Patient be admitted to hospital 10 days ago with similar complaints.  At that time he was diagnosed with possible sludge in the gallbladder.  Surgery did not feel it was coming from the gallbladder.     This time patient had significant right upper quadrant abdominal pain that started at 530 this morning.  Workup in the

## 2024-06-30 NOTE — PROGRESS NOTES
Patient up to bedside commode at this time, patient passed significant amount of flatus but was unable to produce a bowel movement, patient was encouraged to try again later,  patient able to ambulate with walker and stand by asst., brief changed and ice chips provided per request. Patricia Quinonez RN

## 2024-06-30 NOTE — FLOWSHEET NOTE
06/29/24 1906   Vital Signs   Temp 97.9 °F (36.6 °C)   Temp Source Oral   Pulse 94   Heart Rate Source Monitor   Respirations 17   BP (!) 152/71   MAP (Calculated) 98   BP Location Right upper arm   BP Method Automatic   Patient Position Semi fowlers   Oxygen Therapy   SpO2 92 %   O2 Device None (Room air)     Shift assessment done  Vitals done charted  Pt on room air  No laboured breathing  Call light within reach  Bed in lowest position  Will ct monitor the pt

## 2024-06-30 NOTE — PROGRESS NOTES
Progress Note    Patient Ari Snider  MRN: [id number]  YOB: 1935 Age: 88 y.o. Sex: male  Room: [place] [address]       Admitting Physician: Amna Macdonald MD   Date of Admission: 6/26/2024 10:33 AM   Primary Care Physician: Wilma Rowley APRN - CNP     Subjective:  Ari Snider was seen and examined. We are following for acute gallstone pancreatitis.  -- Patient notices marked improvement in abdominal discomfort.  Denies any abdominal pain.    ROS:  Constitutional: Denies fever, no change in appetite  Respiratory: Denies cough or shortness of breath  Cardiovascular: Denies chest pain or edema    Objective:  Vital Signs:   Vitals:    06/30/24 0729   BP: (!) 147/83   Pulse: 97   Resp: 18   Temp: 97.6 °F (36.4 °C)   SpO2: 94%         Physical Exam:  Constitutional: Alert and oriented x 4. No acute distress.   HEENT: Sclera anicteric, mucosal membranes moist  Cardiovascular: Regular rate and rhythm.  No murmurs.  Respiratory: Respirations nonlabored, no crepitus  GI: Abdomen nondistended, soft, and nontender.  Normal active bowel sounds.  No masses palpable.   Rectal: Deferred      Intake/Output:    Intake/Output Summary (Last 24 hours) at 6/30/2024 0952  Last data filed at 6/30/2024 0922  Gross per 24 hour   Intake 882 ml   Output 951 ml   Net -69 ml        Current Medications:  Current Facility-Administered Medications   Medication Dose Route Frequency Provider Last Rate Last Admin    polyethylene glycol (GLYCOLAX) packet 17 g  17 g Oral Daily Noel Holm MD   17 g at 06/30/24 0736    medicated lip balm (BLISTEX/CARMEX) stick   Topical PRN Romario Bah PA        pantoprazole (PROTONIX) tablet 40 mg  40 mg Oral QAM AC Ivory Harris APRN - CNP   40 mg at 06/30/24 0623    tamsulosin (FLOMAX) capsule 0.4 mg  0.4 mg Oral Daily Ivory Harris APRN - CNP   0.4 mg at 06/30/24 0737    [Held by provider] aspirin EC tablet 81 mg  81 mg Oral Daily Ivory Harris APRN - CNP

## 2024-06-30 NOTE — PROGRESS NOTES
Progress Note    Admit Date:  6/26/2024    Subjective:  Mr. Snider is doing some better.  Pain is improving.  MRCP showed pancreatitis.  No choledocholithiasis.  No fever.    No abd pain   Objective:   BP (!) 147/83   Pulse 97   Temp 97.6 °F (36.4 °C) (Oral)   Resp 18   Ht 1.727 m (5' 8\")   Wt 96.7 kg (213 lb 3 oz)   SpO2 94%   BMI 32.41 kg/m²        Intake/Output Summary (Last 24 hours) at 6/30/2024 0910  Last data filed at 6/30/2024 0729  Gross per 24 hour   Intake 942 ml   Output 1075 ml   Net -133 ml         Physical Exam:  Gen: Awake and alert.  Ill-appearing  Eyes: PERRL. No sclera icterus. No conjunctival injection.   ENT: No discharge. Pharynx clear.   Neck: No JVD.  No Carotid Bruit. Trachea midline.  Resp: No accessory muscle use. No crackles. No wheezes. No rhonchi.   CV: Regular rate. Regular rhythm. No murmur.  No rub. +edema.   GI: Right upper quadrant abdominal pain and tenderness improving.  Epigastric tenderness improving.  Mild distention. No masses.  Pain limits exam no organomegaly. Normal bowel sounds. No hernia.   Skin: Warm and dry. No nodule on exposed extremities. No rash on exposed extremities.   M/S: No cyanosis. No joint deformity. No clubbing.   Neuro: Awake. Grossly nonfocal    Psych: Oriented x 3. No anxiety or agitation.          Scheduled Meds:   polyethylene glycol  17 g Oral Daily    pantoprazole  40 mg Oral QAM AC    tamsulosin  0.4 mg Oral Daily    [Held by provider] aspirin  81 mg Oral Daily    sodium chloride flush  5-40 mL IntraVENous 2 times per day    enoxaparin  40 mg SubCUTAneous Daily    insulin lispro  0-4 Units SubCUTAneous Q4H    [Held by provider] lisinopril  5 mg Oral Daily    piperacillin-tazobactam  3,375 mg IntraVENous Q8H       Continuous Infusions:   dextrose      sodium chloride         PRN Meds:  medicated lip balm, HYDROmorphone, glucose, dextrose bolus **OR** dextrose bolus, glucagon (rDNA), dextrose, sodium chloride flush, sodium chloride, potassium  chloride **OR** potassium chloride, magnesium sulfate, ondansetron **OR** ondansetron      Data:  CBC:   Recent Labs     06/28/24  0444 06/29/24 0424   WBC 16.0* 12.8*   HGB 8.4* 8.0*   HCT 25.4* 24.1*   MCV 90.6 91.9    267       BMP:   Recent Labs     06/28/24  0444 06/29/24  0424    144   K 4.1 3.9    108   CO2 26 25   BUN 43* 38*   CREATININE 1.7* 1.5*       LIVER PROFILE:   Recent Labs     06/28/24  0444 06/29/24  0424   AST 24 23   ALT 54* 42*   LIPASE  --  59.0   BILIDIR 0.3 <0.2   BILITOT 0.5 0.6   ALKPHOS 171* 148*       PT/INR: No results for input(s): \"PROTIME\", \"INR\" in the last 72 hours.  Cultures:   Results       ** No results found for the last 336 hours. **          No results for input(s): \"PROCAL\" in the last 72 hours.       MRI ABDOMEN WO CONTRAST MRCP   Final Result   1. Findings compatible with acute pancreatitis. No organized fluid collection.   2. No biliary dilation or choledocholithiasis.   3. Layering sludge or small stones in the gallbladder. Minimal gallbladder   wall thickening may be reactive or secondary to acute cholecystitis.   4. Trace upper abdominal ascites.         US GALLBLADDER RUQ   Final Result   1. Equivocal findings for acute cholecystitis with mild gallbladder wall   thickening and a positive sonographic Duarte sign, but no pericholecystic   fluid.  Thickening of the gallbladder could be related to adjacent   pancreatitis, but that is uncertain.  The gallbladder contains sludge and   small stones.   2. No biliary dilatation.   3. Mild hepatomegaly.         CT ABDOMEN PELVIS WO CONTRAST Additional Contrast? None   Final Result   Moderate ill-defined inflammatory change surrounding the pancreas suggesting   acute pancreatitis.  Inflammatory change extends inferiorly along the root of   mesentery into the pericolic gutters.      Gallbladder wall thickening is seen, which could be reactive or due to   concomitant cholecystitis.      Nonobstructing renal

## 2024-07-01 LAB
ALBUMIN SERPL-MCNC: 2.5 G/DL (ref 3.4–5)
ALP SERPL-CCNC: 155 U/L (ref 40–129)
ALT SERPL-CCNC: 27 U/L (ref 10–40)
AST SERPL-CCNC: 19 U/L (ref 15–37)
BILIRUB DIRECT SERPL-MCNC: <0.2 MG/DL (ref 0–0.3)
BILIRUB INDIRECT SERPL-MCNC: ABNORMAL MG/DL (ref 0–1)
BILIRUB SERPL-MCNC: 0.4 MG/DL (ref 0–1)
FLUAV RNA RESP QL NAA+PROBE: NOT DETECTED
FLUBV RNA RESP QL NAA+PROBE: NOT DETECTED
GLUCOSE BLD-MCNC: 217 MG/DL (ref 70–99)
GLUCOSE BLD-MCNC: 229 MG/DL (ref 70–99)
GLUCOSE BLD-MCNC: 231 MG/DL (ref 70–99)
GLUCOSE BLD-MCNC: 238 MG/DL (ref 70–99)
GLUCOSE BLD-MCNC: 287 MG/DL (ref 70–99)
LIPASE SERPL-CCNC: 52 U/L (ref 13–60)
PERFORMED ON: ABNORMAL
PROT SERPL-MCNC: 6.1 G/DL (ref 6.4–8.2)
SARS-COV-2 RNA RESP QL NAA+PROBE: NOT DETECTED

## 2024-07-01 PROCEDURE — 99231 SBSQ HOSP IP/OBS SF/LOW 25: CPT | Performed by: SURGERY

## 2024-07-01 PROCEDURE — 6360000002 HC RX W HCPCS: Performed by: INTERNAL MEDICINE

## 2024-07-01 PROCEDURE — 80076 HEPATIC FUNCTION PANEL: CPT

## 2024-07-01 PROCEDURE — 6370000000 HC RX 637 (ALT 250 FOR IP): Performed by: INTERNAL MEDICINE

## 2024-07-01 PROCEDURE — 87636 SARSCOV2 & INF A&B AMP PRB: CPT

## 2024-07-01 PROCEDURE — 99232 SBSQ HOSP IP/OBS MODERATE 35: CPT | Performed by: INTERNAL MEDICINE

## 2024-07-01 PROCEDURE — 83690 ASSAY OF LIPASE: CPT

## 2024-07-01 PROCEDURE — 6370000000 HC RX 637 (ALT 250 FOR IP): Performed by: NURSE PRACTITIONER

## 2024-07-01 PROCEDURE — 6360000002 HC RX W HCPCS: Performed by: NURSE PRACTITIONER

## 2024-07-01 PROCEDURE — 2060000000 HC ICU INTERMEDIATE R&B

## 2024-07-01 PROCEDURE — APPSS15 APP SPLIT SHARED TIME 0-15 MINUTES

## 2024-07-01 PROCEDURE — 36415 COLL VENOUS BLD VENIPUNCTURE: CPT

## 2024-07-01 PROCEDURE — 2580000003 HC RX 258: Performed by: NURSE PRACTITIONER

## 2024-07-01 RX ORDER — ENOXAPARIN SODIUM 100 MG/ML
30 INJECTION SUBCUTANEOUS 2 TIMES DAILY
Status: DISCONTINUED | OUTPATIENT
Start: 2024-07-01 | End: 2024-07-04 | Stop reason: HOSPADM

## 2024-07-01 RX ORDER — HYDROCODONE BITARTRATE AND ACETAMINOPHEN 5; 325 MG/1; MG/1
1 TABLET ORAL EVERY 6 HOURS PRN
Status: DISCONTINUED | OUTPATIENT
Start: 2024-07-01 | End: 2024-07-04 | Stop reason: HOSPADM

## 2024-07-01 RX ADMIN — SODIUM CHLORIDE, PRESERVATIVE FREE 10 ML: 5 INJECTION INTRAVENOUS at 21:12

## 2024-07-01 RX ADMIN — INSULIN LISPRO 2 UNITS: 100 INJECTION, SOLUTION INTRAVENOUS; SUBCUTANEOUS at 12:00

## 2024-07-01 RX ADMIN — PIPERACILLIN AND TAZOBACTAM 3375 MG: 3; .375 INJECTION, POWDER, LYOPHILIZED, FOR SOLUTION INTRAVENOUS at 09:24

## 2024-07-01 RX ADMIN — ENOXAPARIN SODIUM 30 MG: 100 INJECTION SUBCUTANEOUS at 20:06

## 2024-07-01 RX ADMIN — ENOXAPARIN SODIUM 40 MG: 100 INJECTION SUBCUTANEOUS at 09:16

## 2024-07-01 RX ADMIN — HYDROCODONE BITARTRATE AND ACETAMINOPHEN 1 TABLET: 5; 325 TABLET ORAL at 10:41

## 2024-07-01 RX ADMIN — INSULIN LISPRO 1 UNITS: 100 INJECTION, SOLUTION INTRAVENOUS; SUBCUTANEOUS at 15:13

## 2024-07-01 RX ADMIN — ACETAMINOPHEN 650 MG: 325 TABLET ORAL at 15:13

## 2024-07-01 RX ADMIN — INSULIN LISPRO 1 UNITS: 100 INJECTION, SOLUTION INTRAVENOUS; SUBCUTANEOUS at 18:10

## 2024-07-01 RX ADMIN — PANTOPRAZOLE SODIUM 40 MG: 40 TABLET, DELAYED RELEASE ORAL at 04:54

## 2024-07-01 RX ADMIN — PIPERACILLIN AND TAZOBACTAM 3375 MG: 3; .375 INJECTION, POWDER, LYOPHILIZED, FOR SOLUTION INTRAVENOUS at 18:19

## 2024-07-01 RX ADMIN — TAMSULOSIN HYDROCHLORIDE 0.4 MG: 0.4 CAPSULE ORAL at 09:16

## 2024-07-01 RX ADMIN — INSULIN LISPRO 1 UNITS: 100 INJECTION, SOLUTION INTRAVENOUS; SUBCUTANEOUS at 09:15

## 2024-07-01 RX ADMIN — HYDROCODONE BITARTRATE AND ACETAMINOPHEN 1 TABLET: 5; 325 TABLET ORAL at 22:29

## 2024-07-01 ASSESSMENT — PAIN SCALES - GENERAL
PAINLEVEL_OUTOF10: 6
PAINLEVEL_OUTOF10: 5
PAINLEVEL_OUTOF10: 3
PAINLEVEL_OUTOF10: 5
PAINLEVEL_OUTOF10: 6
PAINLEVEL_OUTOF10: 3
PAINLEVEL_OUTOF10: 7

## 2024-07-01 ASSESSMENT — PAIN DESCRIPTION - DESCRIPTORS
DESCRIPTORS: ACHING
DESCRIPTORS: DISCOMFORT
DESCRIPTORS: CRAMPING
DESCRIPTORS: OTHER (COMMENT)
DESCRIPTORS: OTHER (COMMENT)

## 2024-07-01 ASSESSMENT — PAIN SCALES - WONG BAKER
WONGBAKER_NUMERICALRESPONSE: HURTS LITTLE MORE

## 2024-07-01 ASSESSMENT — PAIN DESCRIPTION - LOCATION
LOCATION: ABDOMEN

## 2024-07-01 ASSESSMENT — PAIN DESCRIPTION - ORIENTATION
ORIENTATION: OTHER (COMMENT)

## 2024-07-01 ASSESSMENT — PAIN - FUNCTIONAL ASSESSMENT
PAIN_FUNCTIONAL_ASSESSMENT: PREVENTS OR INTERFERES SOME ACTIVE ACTIVITIES AND ADLS
PAIN_FUNCTIONAL_ASSESSMENT: PREVENTS OR INTERFERES SOME ACTIVE ACTIVITIES AND ADLS

## 2024-07-01 NOTE — PROGRESS NOTES
PM assessment completed. Scheduled medications given per MAR. VSS room air, A/O x4. Patient does not appear in distress and denies any needs at this time. Call light in reach, will monitor, bed alarm on.

## 2024-07-01 NOTE — PROGRESS NOTES
Skin assessed. +4 pitting edema in BLE. Generalized edema in upper extremities, more so in the left arm. Scrotum has 2 open excoriation spots. Photos captured. S1 and S2 on coccyx, media captured. New mepelex applied and barrier cream applied to scrotum. Pt repositioned in bed. Pt c/o abdominal pain he rates as a 5. Will given prn pain medicine. Abdomen distended/rounded. Pt able to tell writer name, , and current month. Confused to time and place. Will continue to monitor.

## 2024-07-01 NOTE — PROGRESS NOTES
Blood sugar taken, results 229. Pt states he cannot taste or smell anything or that things taste \"off and funny.\" Writer will notify charge nurse.

## 2024-07-01 NOTE — PROGRESS NOTES
Progress Note    Admit Date:  6/26/2024    Subjective:  Mr. Snider says he is hurting all over his abdomen  Tolerating liquids   He is having BMs    BP (!) 148/62   Pulse 85   Temp 97.9 °F (36.6 °C) (Oral)   Resp 16   Ht 1.727 m (5' 8\")   Wt 103.5 kg (228 lb 3.2 oz)   SpO2 96%   BMI 34.70 kg/m²        Intake/Output Summary (Last 24 hours) at 7/1/2024 0943  Last data filed at 7/1/2024 0315  Gross per 24 hour   Intake 360 ml   Output 450 ml   Net -90 ml         Physical Exam:  Gen: Awake and alert.  Ill-appearing  Eyes: PERRL. No sclera icterus. No conjunctival injection.   ENT: No discharge. Pharynx clear.   Neck: No JVD.  No Carotid Bruit. Trachea midline.  Resp: No accessory muscle use. No crackles. No wheezes. No rhonchi.   CV: Regular rate. Regular rhythm. No murmur.  No rub. +edema.   GI: Right upper quadrant abdominal pain and tenderness improving.  Epigastric tenderness improving.  Mild distention. No masses.  Pain limits exam no organomegaly. Normal bowel sounds. No hernia.   Skin: Warm and dry. No nodule on exposed extremities. No rash on exposed extremities.   M/S: No cyanosis. No joint deformity. No clubbing.   Neuro: Awake. Grossly nonfocal    Psych: Oriented x 3. No anxiety or agitation.          Scheduled Meds:   polyethylene glycol  17 g Oral Daily    pantoprazole  40 mg Oral QAM AC    tamsulosin  0.4 mg Oral Daily    [Held by provider] aspirin  81 mg Oral Daily    sodium chloride flush  5-40 mL IntraVENous 2 times per day    enoxaparin  40 mg SubCUTAneous Daily    insulin lispro  0-4 Units SubCUTAneous Q4H    [Held by provider] lisinopril  5 mg Oral Daily    piperacillin-tazobactam  3,375 mg IntraVENous Q8H       Continuous Infusions:   dextrose      sodium chloride         PRN Meds:  acetaminophen, medicated lip balm, glucose, dextrose bolus **OR** dextrose bolus, glucagon (rDNA), dextrose, sodium chloride flush, sodium chloride, potassium chloride **OR** potassium chloride, magnesium sulfate,  glucose      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  Splenomegaly /cirrhosis   Normocytic anemia   Non-hodgkins lymphoma  -received rituximab  in the past  -saw oncology 11/2019 but later followed up with holistic person       Chronic bilateral lower extremity edema  - monitor, not on diuretics   - echo as above, normal EF and intermediate DD  - monitor with IVF     Previous admission pt noted with possible dementia with agitation   - he developed confusion during his stay and family reported sundowners  - monitor   PVD   -ASA, statin at home, holding      HX of right foot osteomyelitis and MRSA bacteremia  -completed course of IV vancomycin in Jan 2024  -follows with podiatry outpatient      Carotid artery stenosis   -on ASA, statin -holding      Thyroid nodules   -need outpatient US       DVT Prophylaxis: lovenox   Diet: full liquid   Code Status: full code     ALFA VILLASEÑOR MD 7/1/2024 9:43 AM

## 2024-07-01 NOTE — PROGRESS NOTES
Perfect serve sent to Dr. Holm regarding pt c/o not being able to smell anything and that things taste funny. Pt is slightly wheezy but denies any sob. Waiting for perfect  reply.

## 2024-07-01 NOTE — CARE COORDINATION
Met with pt and DIL at bedside. DIL states family is leaning to DC home with home care. Pt is 0% service connected and therefore does not qualify for SNF or [facility]. Pt is eligible for home care (SN, PT, OT, HHA) and the [facility] [place] at [phone]

## 2024-07-01 NOTE — PROGRESS NOTES
General Surgery  Daily Progress Note    Pt Name: Ari Snider  Medical Record Number: [id number]  Date of Birth 1935   Today's Date: 7/1/2024  Admit date: 6/26/2024  LOS: Day 5    SUBJECTIVE  Having a bit more pain since I saw him last. States it hasn't fully gone away since onset. Thirsty.      OBJECTIVE  Vitals:    06/30/24 1911 06/30/24 2300 07/01/24 0315 07/01/24 0853   BP: 119/60 138/68 (!) 156/76 (!) 148/62   Pulse: 83 92 84 85   Resp: 17 16 16 16   Temp: 98.1 °F (36.7 °C) 97.6 °F (36.4 °C) 97.7 °F (36.5 °C) 97.9 °F (36.6 °C)   TempSrc: Oral Oral Oral Oral   SpO2: 92% 94% 96% 96%   Weight:   103.5 kg (228 lb 3.2 oz)    Height:           Gen: No distress. Alert. [place].   Resp: Normal rate. Easy and unlabored. No accessory muscle use.   CV: Regular rate. Regular rhythm.   GI: +Mild RUQ and epigastric TTP. Soft,  Non-distended.  Normal bowel sounds.  Skin: Warm and dry. No nodule or rash on exposed extremities.       I/O last 3 completed shifts:  In: 600 [P.O.:600]  Out: 1101 [Urine:1100; Stool:1]  No intake/output data recorded.    LABS  CBC:   Recent Labs     06/29/24  0424 06/30/24  0937   WBC 12.8* 10.9   HGB 8.0* 8.2*   HCT 24.1* 25.3*   MCV 91.9 94.0    264     BMP:   Recent Labs     06/29/24  0424 06/30/24  0937    139   K 3.9 3.9    106   CO2 25 22   BUN 38* 29*   CREATININE 1.5* 1.1     LIVER PROFILE:   Recent Labs     06/29/24 0424   AST 23   ALT 42*   LIPASE 59.0   BILIDIR <0.2   BILITOT 0.6   ALKPHOS 148*     PT/INR: No results for input(s): \"PROTIME\", \"INR\" in the last 72 hours.  APTT: No results for input(s): \"APTT\" in the last 72 hours.  UA:  No results for input(s): \"NITRITE\", \"COLORU\", \"PHUR\", \"LABCAST\", \"WBCUA\", \"RBCUA\", \"MUCUS\", \"TRICHOMONAS\", \"YEAST\", \"BACTERIA\", \"CLARITYU\", \"SPECGRAV\", \"LEUKOCYTESUR\", \"UROBILINOGEN\", \"BILIRUBINUR\", \"BLOODU\", \"GLUCOSEU\", \"AMORPHOUS\" in the last 72 hours.    Invalid input(s): \"KETONESU\"        IMAGING  MRI ABDOMEN WO CONTRAST MRCP  check liver profile and lipase today - last values were improved      PLAN:  Conservative inpatient management as below - will need eventual CCY with IOC with Dr. Paulino once pancreatitis resolves   GI following  IV abx to include Zosyn - recommend transition to PO abx to complete 10 day course at discharge  Continue full liquid diet - advance to regular low fat as tolerated, will need to adhere to this until after CCY  Encourage ambulation   PRN pain control and antiemetics  DVT prophylaxis with Lx      Dispo: cc    Romario Bah PA-C  7/1/2024 10:49 AM        Patient seen and examined.  I agree with the assessment and plan from VIDHYA Forbes.  More than half of the time spent on this encounter was completed by me including the history, physical examination and the entire medical decision making.     Having persistent symptoms.  Labs reviewed.    Re-evaluate in AM with Dr. Paulino.  May need to consider cholecystectomy this admission.    CASSIDY DIXON MD

## 2024-07-02 LAB
ALBUMIN SERPL-MCNC: 2.4 G/DL (ref 3.4–5)
ALBUMIN/GLOB SERPL: 0.7 {RATIO} (ref 1.1–2.2)
ALP SERPL-CCNC: 145 U/L (ref 40–129)
ALT SERPL-CCNC: 21 U/L (ref 10–40)
ANION GAP SERPL CALCULATED.3IONS-SCNC: 11 MMOL/L (ref 3–16)
AST SERPL-CCNC: 14 U/L (ref 15–37)
BASOPHILS # BLD: 0.1 K/UL (ref 0–0.2)
BASOPHILS NFR BLD: 0.6 %
BILIRUB SERPL-MCNC: 0.4 MG/DL (ref 0–1)
BUN SERPL-MCNC: 18 MG/DL (ref 7–20)
CALCIUM SERPL-MCNC: 7.9 MG/DL (ref 8.3–10.6)
CHLORIDE SERPL-SCNC: 106 MMOL/L (ref 99–110)
CO2 SERPL-SCNC: 24 MMOL/L (ref 21–32)
CREAT SERPL-MCNC: 1.1 MG/DL (ref 0.8–1.3)
DEPRECATED RDW RBC AUTO: 16.3 % (ref 12.4–15.4)
EOSINOPHIL # BLD: 0.2 K/UL (ref 0–0.6)
EOSINOPHIL NFR BLD: 1.9 %
GFR SERPLBLD CREATININE-BSD FMLA CKD-EPI: 65 ML/MIN/{1.73_M2}
GLUCOSE BLD-MCNC: 203 MG/DL (ref 70–99)
GLUCOSE BLD-MCNC: 223 MG/DL (ref 70–99)
GLUCOSE BLD-MCNC: 229 MG/DL (ref 70–99)
GLUCOSE BLD-MCNC: 235 MG/DL (ref 70–99)
GLUCOSE BLD-MCNC: 245 MG/DL (ref 70–99)
GLUCOSE BLD-MCNC: 256 MG/DL (ref 70–99)
GLUCOSE SERPL-MCNC: 217 MG/DL (ref 70–99)
HCT VFR BLD AUTO: 25.3 % (ref 40.5–52.5)
HGB BLD-MCNC: 8.6 G/DL (ref 13.5–17.5)
LYMPHOCYTES # BLD: 1.7 K/UL (ref 1–5.1)
LYMPHOCYTES NFR BLD: 17.2 %
MCH RBC QN AUTO: 31 PG (ref 26–34)
MCHC RBC AUTO-ENTMCNC: 34 G/DL (ref 31–36)
MCV RBC AUTO: 91.2 FL (ref 80–100)
MONOCYTES # BLD: 0.4 K/UL (ref 0–1.3)
MONOCYTES NFR BLD: 4.5 %
NEUTROPHILS # BLD: 7.5 K/UL (ref 1.7–7.7)
NEUTROPHILS NFR BLD: 75.8 %
PERFORMED ON: ABNORMAL
PLATELET # BLD AUTO: 296 K/UL (ref 135–450)
PMV BLD AUTO: 7.9 FL (ref 5–10.5)
POTASSIUM SERPL-SCNC: 3.6 MMOL/L (ref 3.5–5.1)
PROT SERPL-MCNC: 6 G/DL (ref 6.4–8.2)
RBC # BLD AUTO: 2.78 M/UL (ref 4.2–5.9)
SODIUM SERPL-SCNC: 141 MMOL/L (ref 136–145)
WBC # BLD AUTO: 9.9 K/UL (ref 4–11)

## 2024-07-02 PROCEDURE — 6360000002 HC RX W HCPCS: Performed by: INTERNAL MEDICINE

## 2024-07-02 PROCEDURE — 2580000003 HC RX 258: Performed by: NURSE PRACTITIONER

## 2024-07-02 PROCEDURE — 99232 SBSQ HOSP IP/OBS MODERATE 35: CPT | Performed by: SURGERY

## 2024-07-02 PROCEDURE — 6360000002 HC RX W HCPCS: Performed by: NURSE PRACTITIONER

## 2024-07-02 PROCEDURE — 80053 COMPREHEN METABOLIC PANEL: CPT

## 2024-07-02 PROCEDURE — 99232 SBSQ HOSP IP/OBS MODERATE 35: CPT | Performed by: INTERNAL MEDICINE

## 2024-07-02 PROCEDURE — 36415 COLL VENOUS BLD VENIPUNCTURE: CPT

## 2024-07-02 PROCEDURE — 2060000000 HC ICU INTERMEDIATE R&B

## 2024-07-02 PROCEDURE — 6370000000 HC RX 637 (ALT 250 FOR IP): Performed by: INTERNAL MEDICINE

## 2024-07-02 PROCEDURE — 85025 COMPLETE CBC W/AUTO DIFF WBC: CPT

## 2024-07-02 PROCEDURE — 6370000000 HC RX 637 (ALT 250 FOR IP): Performed by: NURSE PRACTITIONER

## 2024-07-02 RX ADMIN — PIPERACILLIN AND TAZOBACTAM 3375 MG: 3; .375 INJECTION, POWDER, LYOPHILIZED, FOR SOLUTION INTRAVENOUS at 00:57

## 2024-07-02 RX ADMIN — INSULIN LISPRO 1 UNITS: 100 INJECTION, SOLUTION INTRAVENOUS; SUBCUTANEOUS at 04:26

## 2024-07-02 RX ADMIN — INSULIN LISPRO 1 UNITS: 100 INJECTION, SOLUTION INTRAVENOUS; SUBCUTANEOUS at 17:32

## 2024-07-02 RX ADMIN — PIPERACILLIN AND TAZOBACTAM 3375 MG: 3; .375 INJECTION, POWDER, LYOPHILIZED, FOR SOLUTION INTRAVENOUS at 11:36

## 2024-07-02 RX ADMIN — INSULIN LISPRO 1 UNITS: 100 INJECTION, SOLUTION INTRAVENOUS; SUBCUTANEOUS at 11:24

## 2024-07-02 RX ADMIN — INSULIN LISPRO 2 UNITS: 100 INJECTION, SOLUTION INTRAVENOUS; SUBCUTANEOUS at 15:00

## 2024-07-02 RX ADMIN — ENOXAPARIN SODIUM 30 MG: 100 INJECTION SUBCUTANEOUS at 20:08

## 2024-07-02 RX ADMIN — ENOXAPARIN SODIUM 30 MG: 100 INJECTION SUBCUTANEOUS at 11:26

## 2024-07-02 RX ADMIN — POLYETHYLENE GLYCOL (3350) 17 G: 17 POWDER, FOR SOLUTION ORAL at 11:24

## 2024-07-02 RX ADMIN — TAMSULOSIN HYDROCHLORIDE 0.4 MG: 0.4 CAPSULE ORAL at 11:25

## 2024-07-02 RX ADMIN — SODIUM CHLORIDE, PRESERVATIVE FREE 5 ML: 5 INJECTION INTRAVENOUS at 11:33

## 2024-07-02 RX ADMIN — PIPERACILLIN AND TAZOBACTAM 3375 MG: 3; .375 INJECTION, POWDER, LYOPHILIZED, FOR SOLUTION INTRAVENOUS at 17:34

## 2024-07-02 RX ADMIN — PANTOPRAZOLE SODIUM 40 MG: 40 TABLET, DELAYED RELEASE ORAL at 04:26

## 2024-07-02 ASSESSMENT — PAIN DESCRIPTION - FREQUENCY
FREQUENCY: CONTINUOUS
FREQUENCY: CONTINUOUS

## 2024-07-02 ASSESSMENT — PAIN SCALES - GENERAL
PAINLEVEL_OUTOF10: 6
PAINLEVEL_OUTOF10: 7
PAINLEVEL_OUTOF10: 0
PAINLEVEL_OUTOF10: 7
PAINLEVEL_OUTOF10: 8

## 2024-07-02 ASSESSMENT — PAIN - FUNCTIONAL ASSESSMENT
PAIN_FUNCTIONAL_ASSESSMENT: ACTIVITIES ARE NOT PREVENTED

## 2024-07-02 ASSESSMENT — PAIN DESCRIPTION - LOCATION
LOCATION: ABDOMEN

## 2024-07-02 ASSESSMENT — PAIN DESCRIPTION - DESCRIPTORS
DESCRIPTORS: ACHING

## 2024-07-02 ASSESSMENT — PAIN DESCRIPTION - PAIN TYPE
TYPE: CHRONIC PAIN

## 2024-07-02 ASSESSMENT — PAIN DESCRIPTION - ORIENTATION
ORIENTATION: PROXIMAL
ORIENTATION: MID

## 2024-07-02 ASSESSMENT — PAIN DESCRIPTION - ONSET
ONSET: ON-GOING

## 2024-07-02 NOTE — PROGRESS NOTES
PT SITTING IN BEDSIDE CHAIR WITH VISITOR IN ROOM. PT DENIES ANY PAIN OR NEEDS @ THIS TIME. WILL CONTINUE TO MONITOR.

## 2024-07-02 NOTE — PROGRESS NOTES
Problems:    CAD (coronary artery disease)    Benign essential HTN    Type 2 diabetes mellitus with hyperglycemia (HCC)    Acute cholecystitis    Transaminitis  Resolved Problems:    * No resolved hospital problems. *      Plan:    Epigastric RUQ pain  Acute cholecystitis  Acute pancreatitis likely 2/2 above  Transaminitis   - CT as above  - lipase >2000 on admission  - abdominal pain--not controlled by morphine--change to Dilaudid  every 3 hours and monitor  - IVF at 150 ml/hr  -Start clears  - General Surgery consulted in ED  - GI consulted  - IV Zosyn   - PPI  -MRCP done.  No choledocholithiasis seen.  Acute pancreatitis seen.  Has cholelithiasis.  Start diet.  Now on full liquid. Still has  pain      HTN-blood pressure improved with pain control  - blood pressure SBP 190s at the time of admission due to pain  - likely 2/2 pain  - holding Lisinopril  - increased pain regimen and monitor  - may need PRN Hydralazine  - resumed Flomax      CKD stage a with elevated creatinine  - previous baseline 1.1  - currently 1.5  - IVF  - holding Lisinopril       CAD  - troponin 27---25  - no acute EKG changes  - recent echo as above  - holding ASA and statin       DM type 2  Neuropathy  - hold oral regimen  - SSI  - monitor glucose      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  Splenomegaly /cirrhosis   Normocytic anemia   Non-hodgkins lymphoma  -received rituximab  in the past  -saw oncology 11/2019 but later followed up with holistic person       Chronic bilateral lower extremity edema  - monitor, not on diuretics   - echo as above, normal EF and intermediate DD  - monitor with IVF     Previous admission pt noted with possible dementia with agitation   - he developed confusion during his stay and family reported sundowners  - monitor   PVD   -ASA, statin at home, holding      HX of right foot osteomyelitis and MRSA bacteremia  -completed course of IV vancomycin in Jan 2024  -follows with podiatry outpatient      Carotid artery stenosis    -on ASA, statin -holding      Thyroid nodules   -need outpatient US       DVT Prophylaxis: lovenox   Diet: full liquid   Code Status: full code     ALFA VILLASEÑOR MD 7/2/2024 9:16 AM

## 2024-07-02 NOTE — PROGRESS NOTES
Pt states he took \"a bit\" of food and that he just \"can't do it.\" Pt states that his stomach does not hurt but it feels like everything is \"just swirling around in there and it's just uncomfortable.\" Pt drank half carton of milk and some water. Will continue to monitor.

## 2024-07-02 NOTE — PROGRESS NOTES
activity.  Plan for interval outpatient cholecystectomy once he has recovered from the acute episode      FABRICE MOREL MD

## 2024-07-03 LAB
GLUCOSE BLD-MCNC: 269 MG/DL (ref 70–99)
GLUCOSE BLD-MCNC: 272 MG/DL (ref 70–99)
GLUCOSE BLD-MCNC: 281 MG/DL (ref 70–99)
GLUCOSE BLD-MCNC: 341 MG/DL (ref 70–99)
GLUCOSE BLD-MCNC: 348 MG/DL (ref 70–99)
PERFORMED ON: ABNORMAL

## 2024-07-03 PROCEDURE — 6370000000 HC RX 637 (ALT 250 FOR IP): Performed by: INTERNAL MEDICINE

## 2024-07-03 PROCEDURE — 99232 SBSQ HOSP IP/OBS MODERATE 35: CPT | Performed by: SURGERY

## 2024-07-03 PROCEDURE — 6370000000 HC RX 637 (ALT 250 FOR IP): Performed by: NURSE PRACTITIONER

## 2024-07-03 PROCEDURE — 2060000000 HC ICU INTERMEDIATE R&B

## 2024-07-03 PROCEDURE — 97530 THERAPEUTIC ACTIVITIES: CPT

## 2024-07-03 PROCEDURE — 6360000002 HC RX W HCPCS: Performed by: INTERNAL MEDICINE

## 2024-07-03 PROCEDURE — 6360000002 HC RX W HCPCS: Performed by: NURSE PRACTITIONER

## 2024-07-03 PROCEDURE — 99232 SBSQ HOSP IP/OBS MODERATE 35: CPT | Performed by: INTERNAL MEDICINE

## 2024-07-03 PROCEDURE — 97116 GAIT TRAINING THERAPY: CPT

## 2024-07-03 PROCEDURE — 2580000003 HC RX 258: Performed by: NURSE PRACTITIONER

## 2024-07-03 RX ORDER — INSULIN LISPRO 100 [IU]/ML
6 INJECTION, SOLUTION INTRAVENOUS; SUBCUTANEOUS ONCE
Status: COMPLETED | OUTPATIENT
Start: 2024-07-03 | End: 2024-07-03

## 2024-07-03 RX ADMIN — INSULIN LISPRO 2 UNITS: 100 INJECTION, SOLUTION INTRAVENOUS; SUBCUTANEOUS at 20:34

## 2024-07-03 RX ADMIN — INSULIN LISPRO 2 UNITS: 100 INJECTION, SOLUTION INTRAVENOUS; SUBCUTANEOUS at 17:51

## 2024-07-03 RX ADMIN — TAMSULOSIN HYDROCHLORIDE 0.4 MG: 0.4 CAPSULE ORAL at 10:55

## 2024-07-03 RX ADMIN — PIPERACILLIN AND TAZOBACTAM 3375 MG: 3; .375 INJECTION, POWDER, LYOPHILIZED, FOR SOLUTION INTRAVENOUS at 01:00

## 2024-07-03 RX ADMIN — SODIUM CHLORIDE, PRESERVATIVE FREE 10 ML: 5 INJECTION INTRAVENOUS at 20:33

## 2024-07-03 RX ADMIN — PANTOPRAZOLE SODIUM 40 MG: 40 TABLET, DELAYED RELEASE ORAL at 06:25

## 2024-07-03 RX ADMIN — ENOXAPARIN SODIUM 30 MG: 100 INJECTION SUBCUTANEOUS at 20:33

## 2024-07-03 RX ADMIN — INSULIN LISPRO 2 UNITS: 100 INJECTION, SOLUTION INTRAVENOUS; SUBCUTANEOUS at 10:59

## 2024-07-03 RX ADMIN — INSULIN LISPRO 6 UNITS: 100 INJECTION, SOLUTION INTRAVENOUS; SUBCUTANEOUS at 13:18

## 2024-07-03 RX ADMIN — PIPERACILLIN AND TAZOBACTAM 3375 MG: 3; .375 INJECTION, POWDER, LYOPHILIZED, FOR SOLUTION INTRAVENOUS at 10:53

## 2024-07-03 RX ADMIN — ENOXAPARIN SODIUM 30 MG: 100 INJECTION SUBCUTANEOUS at 10:54

## 2024-07-03 RX ADMIN — POLYETHYLENE GLYCOL (3350) 17 G: 17 POWDER, FOR SOLUTION ORAL at 10:55

## 2024-07-03 RX ADMIN — INSULIN LISPRO 3 UNITS: 100 INJECTION, SOLUTION INTRAVENOUS; SUBCUTANEOUS at 15:39

## 2024-07-03 RX ADMIN — PIPERACILLIN AND TAZOBACTAM 3375 MG: 3; .375 INJECTION, POWDER, LYOPHILIZED, FOR SOLUTION INTRAVENOUS at 17:41

## 2024-07-03 NOTE — ADT AUTH CERT
CO2 22 24   BUN 29* 18   CREATININE 1.1 1.1      LIVER PROFILE:        Recent Labs     07/01/24  1111 07/02/24  0540   AST 19 14*   ALT 27 21   LIPASE 52.0  --    BILIDIR <0.2  --    BILITOT 0.4 0.4   ALKPHOS 155* 145*      PT/INR: No results for input(s): \"PROTIME\", \"INR\" in the last 72 hours.  APTT: No results for input(s): \"APTT\" in the last 72 hours.  UA:  No results for input(s): \"NITRITE\", \"COLORU\", \"PHUR\", \"LABCAST\", \"WBCUA\", \"RBCUA\", \"MUCUS\", \"TRICHOMONAS\", \"YEAST\", \"BACTERIA\", \"CLARITYU\", \"SPECGRAV\", \"LEUKOCYTESUR\", \"UROBILINOGEN\", \"BILIRUBINUR\", \"BLOODU\", \"GLUCOSEU\", \"AMORPHOUS\" in the last 72 hours.     Invalid input(s): \"KETONESU\"           IMAGING  MRI ABDOMEN WO CONTRAST MRCP   Final Result   1. Findings compatible with acute pancreatitis. No organized fluid collection.   2. No biliary dilation or choledocholithiasis.   3. Layering sludge or small stones in the gallbladder. Minimal gallbladder   wall thickening may be reactive or secondary to acute cholecystitis.   4. Trace upper abdominal ascites.           US GALLBLADDER RUQ   Final Result   1. Equivocal findings for acute cholecystitis with mild gallbladder wall   thickening and a positive sonographic Duarte sign, but no pericholecystic   fluid.  Thickening of the gallbladder could be related to adjacent   pancreatitis, but that is uncertain.  The gallbladder contains sludge and   small stones.   2. No biliary dilatation.   3. Mild hepatomegaly.           CT ABDOMEN PELVIS WO CONTRAST Additional Contrast? None   Final Result   Moderate ill-defined inflammatory change surrounding the pancreas suggesting   acute pancreatitis.  Inflammatory change extends inferiorly along the root of   mesentery into the pericolic gutters.       Gallbladder wall thickening is seen, which could be reactive or due to   concomitant cholecystitis.       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  Splenomegaly /cirrhosis   Normocytic anemia   Non-hodgkins lymphoma  -received rituximab  in the past  -saw oncology 11/2019 but later followed up with holistic person       Chronic bilateral lower extremity edema  - monitor, not on diuretics   - echo as above, normal EF and intermediate DD  - monitor with IVF     Previous admission pt noted with possible dementia with agitation   - he developed confusion during his stay and family reported sundowners  - monitor   PVD   -ASA, statin at home, holding      HX of right foot osteomyelitis and MRSA bacteremia  -completed course of IV vancomycin in Jan 2024  -follows with podiatry outpatient      Carotid artery stenosis   -on ASA, statin -holding      Thyroid nodules   -need outpatient US       DVT Prophylaxis: lovenox   Diet: full liquid   Code Status: full code      NOEL HOLM MD 7/2/2024 9:16 AM                            Electronically signed by Noel Holm MD on 7/2/2024  9:23 AM

## 2024-07-03 NOTE — PROGRESS NOTES
Was asked by Zari MARTINEZ to enter chart to give patient 6 units of Humalog. Order verified with Dr. Holm.

## 2024-07-03 NOTE — FLOWSHEET NOTE
07/03/24 0700   Vital Signs   Temp 98 °F (36.7 °C)   Temp Source Oral   Pulse 86   Heart Rate Source Monitor   Respirations 18   BP (!) 146/65   MAP (Calculated) 92   BP Location Left upper arm   BP Method Automatic   Patient Position Lying right side   Oxygen Therapy   SpO2 96 %   O2 Device None (Room air)     AM ASSESSMENT COMPLETED. PT RESTING IN BED AND DENIES ANY PAIN OR NEEDS @ THIS TIME. PT STATES HE STILL DOESN'T HAVE MUCH OF AN APPETITE AND NOTHING TASTES RIGHT. WILL CONTINUE TO MONITOR.

## 2024-07-03 NOTE — PROGRESS NOTES
Inpatient Physical Therapy Treatment    Unit: PCU  Date:  7/3/2024  Patient Name:    Ari Snider  Admitting diagnosis:  Acute cholecystitis [K81.0]  Acute pancreatitis, unspecified complication status, unspecified pancreatitis type [K85.90]  Admit Date:  6/26/2024  Precautions/Restrictions/WB Status/ Lines/ Wounds/ Oxygen: Fall risk, Bed/chair alarm, Lines (IV), [place] (hard of hearing), Telesitter, and Isolation Precautions: Contact    Treatment Time:  14:12-14:55  Treatment Number:  2   Timed Code Treatment Minutes: 33 minutes  Total Treatment Minutes:  33  minutes    Patient Stated Goals for Therapy: \" I'd like to get stronger \"          Discharge Recommendations: Home with 24hr assistance and Home PT  DME needs for discharge: Needs Met       Therapy recommendation for EMS Transport: can transport by wheelchair    Therapy recommendations for staff:   Stand by assist for ambulation with use of rolling walker (RW) to/from chair  to/from bathroom    History of Present Illness:   Per H&P of Dr. Amna Macdonald MD 6/26/2024  Chief complaint: Abdominal Pain Ruq pain. Was here a couple of weeks ago for same. Found sludge in gallbladder      The patient is a 88 y.o. male PMH of CAD, CVA, DM, HTN, PVD and carotid stenosis who presents to [facility] with abdominal pain.    History obtained from the patient and review of EMR.  He came to the emergency with complaints of right upper quadrant abdominal pain.  Patient admitted to hospital 10 days ago with similar complaints.  At that time he was diagnosed with possible sludge in the gallbladder.  Surgery did not feel it was coming from the gallbladder.\"  Per Gen Surg progress note 7/3:   Acute gallstone pancreatitis  Acute calculous cholecystitis   Transaminitis - improving  Conservative management.    AM-PAC Mobility Score    AM-PAC Inpatient Mobility Raw Score : 19       Subjective  Patient sitting up in chair with no family present.  Pt agreeable to this PT  reps  5).  Ascend/descend 2 steps with CGA with use of hand rail bilateral and LRAD (least restrictive assistive device)    Rehabilitation Potential: Good  Strengths for achieving goals include:   PLOF, Family Support, and Pt cooperative   Barriers to achieving goals include:    Complexity of condition    Plan    To be seen 3-5 x/ week while in acute care setting for therapeutic exercises, bed mobility, transfers, progressive gait training, balance training, and family/patient education.    Signature: Shirin Vieira, PT     If patient discharges from this facility prior to next visit, this note will serve as the Discharge Summary.

## 2024-07-03 NOTE — PROGRESS NOTES
Comprehensive Nutrition Assessment    Type and Reason for Visit:  Initial, RD Nutrition Re-Screen/LOS    Nutrition Recommendations/Plan:   Continue Low Fat diet  Added Ensure Compact TID      Malnutrition Assessment:  Malnutrition Status:  At risk for malnutrition (Comment) (07/03/24 1840)    Context:  Acute Illness     Findings of the 6 clinical characteristics of malnutrition:  Energy Intake:  75% or less of estimated energy requirements for 7 or more days  Weight Loss:  No significant weight loss     Body Fat Loss:  No significant body fat loss Orbital   Muscle Mass Loss:  No significant muscle mass loss Temples (temporalis)  Fluid Accumulation:  Mild Extremities   Strength:  Not Performed    Nutrition Assessment:    Pt. nutritionally compromised AEB admitted w/ acute cholecystitis and decreased PO intakes x 7 days .  At risk for further nutrition compromise r/t c/o of abd pain and reduced intakes; altered nutrition related labs .  Will add ensure compact TID .   Nutrition Related Findings:    pt is A & O x4; acute pain has caused decreased appetite; drinks boost at home as needed;  + Gallstone pancreatitis tx of ATB; high BGl; Low h/h; Wound Type: None       Current Nutrition Intake & Therapies:    Average Meal Intake: 1-25%  Average Supplements Intake: None Ordered  ADULT DIET; Regular; Low Fat (less than or equal to 50 gm/day)  ADULT ORAL NUTRITION SUPPLEMENT; Breakfast, Lunch, Dinner; Standard 4 oz Oral Supplement    Anthropometric Measures:  Height: 172.7 cm (5' 7.99\")  Ideal Body Weight (IBW): 154 lbs (70 kg)    Admission Body Weight: 101.6 kg (224 lb)  Current Body Weight: 106.1 kg (234 lb), 151.9 % IBW. Weight Source: Bed Scale  Current BMI (kg/m2): 35.6  Usual Body Weight: 101.6 kg (224 lb)  % Weight Change (Calculated): 4.5                    BMI Categories: Obese Class 2 (BMI 35.0 -39.9)    Estimated Daily Nutrient Needs:  Energy Requirements Based On: Kcal/kg  Weight Used for Energy Requirements:

## 2024-07-03 NOTE — PROGRESS NOTES
\"GLUCOSEU\", \"AMORPHOUS\" in the last 72 hours.    Invalid input(s): \"KETONESU\"        IMAGING  MRI ABDOMEN WO CONTRAST MRCP   Final Result   1. Findings compatible with acute pancreatitis. No organized fluid collection.   2. No biliary dilation or choledocholithiasis.   3. Layering sludge or small stones in the gallbladder. Minimal gallbladder   wall thickening may be reactive or secondary to acute cholecystitis.   4. Trace upper abdominal ascites.         US GALLBLADDER RUQ   Final Result   1. Equivocal findings for acute cholecystitis with mild gallbladder wall   thickening and a positive sonographic Duarte sign, but no pericholecystic   fluid.  Thickening of the gallbladder could be related to adjacent   pancreatitis, but that is uncertain.  The gallbladder contains sludge and   small stones.   2. No biliary dilatation.   3. Mild hepatomegaly.         CT ABDOMEN PELVIS WO CONTRAST Additional Contrast? None   Final Result   Moderate ill-defined inflammatory change surrounding the pancreas suggesting   acute pancreatitis.  Inflammatory change extends inferiorly along the root of   mesentery into the pericolic gutters.      Gallbladder wall thickening is seen, which could be reactive or due to   concomitant cholecystitis.      Nonobstructing renal calculi on the left with splenomegaly               ASSESSMENT:  Ari Snider is a 88 y.o. male with a pmhx of CAD, DM, HTN, PVD and carotid artery stenosis who presented with RUQ and epigastric abdominal pain with associated n/v.     CT and RUQ US reviewed and compared to recent prior studies. 6/26 CT remarkable for pancreatic inflammation. GB with mild dilation, sludge and tiny visible stones. GB wall is now thickened with surrounding fluid and inflammatory change on 6/26 imaging. Prior imaging from a few weeks ago only remarkable for mild GB dilation and small amount of sludge.      Acute gallstone pancreatitis  Acute calculous cholecystitis   Transaminitis -  improving    MRCP consistent with pancreatitis and cholecystitis. No CBD stone.     Overall his subjective abdominal pain has improved although not completely resolved.    Abdominal exam is relatively benign. Abdomen non-tender for me once again today.      PLAN:  Conservative inpatient management as below - will need eventual CCY with IOC with Dr. Paulino once pancreatitis resolves - our office will reach out to schedule  GI following  IV abx to include Zosyn - recommend transition to PO abx to complete 10 day course at discharge  Regular, low fat diet as tolerated - continue at discharge   Encourage ambulation   PRN pain control and antiemetics  DVT prophylaxis with Lx      Dispo: okay to discharge from surgical perspective once medically stable and able to keep himself hydrated via PO     Romario Bah PA-C  7/3/2024 9:35 AM    I have personally performed a face to face diagnostic evaluation on this patient and agree with the progress note and care plan of VIDHYA Forbes. More than half of the time spent on this encounter was completed by me including the history, physical examination and the entire medical decision making. My findings are as follows:    States he still has some discomfort, but feels better today.  He is eating some    BP (!) 161/75   Pulse 79   Temp 97.9 °F (36.6 °C) (Oral)   Resp 16   Ht 1.727 m (5' 8\")   Wt 106.1 kg (234 lb)   SpO2 97%   BMI 35.58 kg/m²   Awake and alert in NAD  RESP: unlabored, no distress  CV: RRR  ABD: Bowel sounds positive, soft, nontender  SKIN: Warm and dry    Assessment/plan: Gallstone pancreatitis, continually clinically improving.  Encourage oral intake and activity.  Plan for interval outpatient cholecystectomy      FABRICE PAULINO MD

## 2024-07-03 NOTE — PROGRESS NOTES
Progress Note    Admit Date:  6/26/2024    Subjective:  Mr. Snider is eating a little better today    BP (!) 146/65   Pulse 86   Temp 98 °F (36.7 °C) (Oral)   Resp 18   Ht 1.727 m (5' 8\")   Wt 106.1 kg (234 lb)   SpO2 96%   BMI 35.58 kg/m²        Intake/Output Summary (Last 24 hours) at 7/3/2024 1008  Last data filed at 7/3/2024 0434  Gross per 24 hour   Intake 360 ml   Output 650 ml   Net -290 ml         Physical Exam:  Gen: Awake and alert.  Ill-appearing  Eyes: PERRL. No sclera icterus. No conjunctival injection.   ENT: No discharge. Pharynx clear.   Neck: No JVD.  No Carotid Bruit. Trachea midline.  Resp: No accessory muscle use. No crackles. No wheezes. No rhonchi.   CV: Regular rate. Regular rhythm. No murmur.  No rub. +edema.   GI: Right upper quadrant abdominal pain and tenderness improving.  Epigastric tenderness improving.  Mild distention. No masses.  Pain limits exam no organomegaly. Normal bowel sounds. No hernia.   Skin: Warm and dry. No nodule on exposed extremities. No rash on exposed extremities.   M/S: No cyanosis. No joint deformity. No clubbing.   Neuro: Awake. Grossly nonfocal    Psych: Oriented x 3. No anxiety or agitation.          Scheduled Meds:   enoxaparin  30 mg SubCUTAneous BID    polyethylene glycol  17 g Oral Daily    pantoprazole  40 mg Oral QAM AC    tamsulosin  0.4 mg Oral Daily    [Held by provider] aspirin  81 mg Oral Daily    sodium chloride flush  5-40 mL IntraVENous 2 times per day    insulin lispro  0-4 Units SubCUTAneous Q4H    [Held by provider] lisinopril  5 mg Oral Daily    piperacillin-tazobactam  3,375 mg IntraVENous Q8H       Continuous Infusions:   dextrose      sodium chloride         PRN Meds:  HYDROcodone 5 mg - acetaminophen, acetaminophen, medicated lip balm, glucose, dextrose bolus **OR** dextrose bolus, glucagon (rDNA), dextrose, sodium chloride flush, sodium chloride, potassium chloride **OR** potassium chloride, magnesium sulfate, ondansetron **OR**

## 2024-07-03 NOTE — PROGRESS NOTES
WRITER INFORMED OF BLOOD SUGAR LEVEL @7748 (341). PERFECTSERVE SENT TO DR VILLASEÑOR.    Pt was given 2 units insulin @1059 from the 0700 blood sugar results. I was not the one to give this dose. I was called @ 8830 and informed of the new blood sugar result of 341. Pt is not scheduled to have more insulin until 1400.     WAITING ON REPLY FROM

## 2024-07-03 NOTE — FLOWSHEET NOTE
07/02/24 1933   Vital Signs   Temp 98.3 °F (36.8 °C)   Temp Source Oral   Pulse 85   Heart Rate Source Monitor   Respirations 18   BP (!) 147/73   MAP (Calculated) 98   BP Method Automatic   Patient Position Semi fowlers   Pain Assessment   Pain Assessment None - Denies Pain   Oxygen Therapy   SpO2 99 %   O2 Device None (Room air)     Shift assessment done  Pt having oral thrush  Pt denies pain  On room air  Daughter at the bedside  Call light within   Given insulin 1 unit for blood glucose (223)  Pt seated at chair

## 2024-07-04 VITALS
BODY MASS INDEX: 35.16 KG/M2 | WEIGHT: 232 LBS | HEART RATE: 78 BPM | RESPIRATION RATE: 18 BRPM | OXYGEN SATURATION: 97 % | TEMPERATURE: 98.2 F | DIASTOLIC BLOOD PRESSURE: 67 MMHG | SYSTOLIC BLOOD PRESSURE: 125 MMHG | HEIGHT: 68 IN

## 2024-07-04 LAB
GLUCOSE BLD-MCNC: 190 MG/DL (ref 70–99)
GLUCOSE BLD-MCNC: 212 MG/DL (ref 70–99)
GLUCOSE BLD-MCNC: 251 MG/DL (ref 70–99)
GLUCOSE BLD-MCNC: 314 MG/DL (ref 70–99)
PERFORMED ON: ABNORMAL

## 2024-07-04 PROCEDURE — 99232 SBSQ HOSP IP/OBS MODERATE 35: CPT | Performed by: SURGERY

## 2024-07-04 PROCEDURE — 2580000003 HC RX 258: Performed by: NURSE PRACTITIONER

## 2024-07-04 PROCEDURE — 99238 HOSP IP/OBS DSCHRG MGMT 30/<: CPT | Performed by: INTERNAL MEDICINE

## 2024-07-04 PROCEDURE — 6370000000 HC RX 637 (ALT 250 FOR IP): Performed by: NURSE PRACTITIONER

## 2024-07-04 PROCEDURE — 6360000002 HC RX W HCPCS: Performed by: NURSE PRACTITIONER

## 2024-07-04 PROCEDURE — 6360000002 HC RX W HCPCS: Performed by: INTERNAL MEDICINE

## 2024-07-04 RX ORDER — AMOXICILLIN AND CLAVULANATE POTASSIUM 875; 125 MG/1; MG/1
1 TABLET, FILM COATED ORAL 2 TIMES DAILY
Qty: 4 TABLET | Refills: 0 | Status: ON HOLD | OUTPATIENT
Start: 2024-07-04 | End: 2024-07-11 | Stop reason: HOSPADM

## 2024-07-04 RX ADMIN — PIPERACILLIN AND TAZOBACTAM 3375 MG: 3; .375 INJECTION, POWDER, LYOPHILIZED, FOR SOLUTION INTRAVENOUS at 09:32

## 2024-07-04 RX ADMIN — INSULIN LISPRO 1 UNITS: 100 INJECTION, SOLUTION INTRAVENOUS; SUBCUTANEOUS at 09:32

## 2024-07-04 RX ADMIN — ENOXAPARIN SODIUM 30 MG: 100 INJECTION SUBCUTANEOUS at 09:32

## 2024-07-04 RX ADMIN — TAMSULOSIN HYDROCHLORIDE 0.4 MG: 0.4 CAPSULE ORAL at 09:32

## 2024-07-04 RX ADMIN — INSULIN LISPRO 3 UNITS: 100 INJECTION, SOLUTION INTRAVENOUS; SUBCUTANEOUS at 11:54

## 2024-07-04 RX ADMIN — PIPERACILLIN AND TAZOBACTAM 3375 MG: 3; .375 INJECTION, POWDER, LYOPHILIZED, FOR SOLUTION INTRAVENOUS at 01:10

## 2024-07-04 RX ADMIN — INSULIN LISPRO 2 UNITS: 100 INJECTION, SOLUTION INTRAVENOUS; SUBCUTANEOUS at 02:06

## 2024-07-04 RX ADMIN — PANTOPRAZOLE SODIUM 40 MG: 40 TABLET, DELAYED RELEASE ORAL at 05:12

## 2024-07-04 RX ADMIN — SODIUM CHLORIDE: 9 INJECTION, SOLUTION INTRAVENOUS at 01:09

## 2024-07-04 NOTE — PROGRESS NOTES
[facility]    PATIENT NAME: Ari Snider     TODAY'S DATE: 7/4/2024    CHIEF COMPLAINT: None    INTERVAL HISTORY/HPI:    Pt states he feels well.  Denies abdominal pain.  He is eating, and this did not exacerbate his symptoms.     REVIEW OF SYSTEMS:  Pertinent positives and negatives as per interval history section    OBJECTIVE:  VITALS:  BP (!) 144/73   Pulse 82   Temp 98.5 °F (36.9 °C) (Oral)   Resp 19   Ht 1.727 m (5' 7.99\")   Wt 105.2 kg (232 lb)   SpO2 96%   BMI 35.28 kg/m²     INTAKE/OUTPUT:    I/O last 3 completed shifts:  In: 600 [P.O.:600]  Out: 650 [Urine:650]  I/O this shift:  In: 120 [P.O.:120]  Out: -     CONSTITUTIONAL:  awake and alert  LUNGS:  Respirations easy and unlabored  CARD:  regular rate and rhythm  ABDOMEN:  normal bowel sounds, soft, non-distended, non-tender     Data:  CBC:   Recent Labs     07/02/24  0540   WBC 9.9   HGB 8.6*   HCT 25.3*        BMP:    Recent Labs     07/02/24  0540      K 3.6      CO2 24   BUN 18   CREATININE 1.1   GLUCOSE 217*     Hepatic:   Recent Labs     07/01/24  1111 07/02/24  0540   AST 19 14*   ALT 27 21   BILITOT 0.4 0.4   ALKPHOS 155* 145*     Mag:    No results for input(s): \"MG\" in the last 72 hours.   Phos:   No results for input(s): \"PHOS\" in the last 72 hours.   INR: No results for input(s): \"INR\" in the last 72 hours.    Radiology Review:  *Imaging personally reviewed by me.   N/A      ASSESSMENT AND PLAN:  Acute cholecystitis and gallstone pancreatitis, clinically improved.  Okay for discharge from surgical standpoint on oral antibiotics and low-fat diet.  Will plan interval outpatient cholecystectomy     Electronically signed by FABRICE MOREL MD     [id number]

## 2024-07-04 NOTE — CARE COORDINATION
DISCHARGE ORDER  Date/Time 2024 11:41 AM  Completed by: Teresa Boeck, RN, Case Management    Patient Name: Ari Snider      : 1935  Admitting Diagnosis: Acute cholecystitis [K81.0]  Acute pancreatitis, unspecified complication status, unspecified pancreatitis type [K85.90]      Admit order Date and Status:24  (verify MD's last order for status of admission)      Noted discharge order.   If applicable PT/OT recommendation at Discharge: Home with 24 hr assist  DME recommendation by PT/OT:  Confirmed discharge plan: Yes  with whom the patient and the patient's son  If pt confirmed DC plan does family need to be contacted by CM Yes if yes who - at bedside    Discharge Plan: The patient is discharging to home with his family. [facility] Advised son that this has been done.     Date of Last IMM Given: 24    Reviewed chart.  Role of discharge planner explained and patient verbalized understanding. Discharge order is noted.    Has Home O2 in place on admit:  No  Informed of need to bring portable home O2 tank on day of discharge for nursing to connect prior to leaving:   Not Indicated  Verbalized agreement/Understanding:   Not Indicated  Pt is being d/c'd to home today. Pt's O2 sats are 97% on RA.    Discharge timeout done with Erik MARTINEZ. All discharge needs and concerns addressed.

## 2024-07-04 NOTE — PROGRESS NOTES
Discharge instructions given/explained to patient and family, all questions answered, IV and telemetry removed, discharged to home in stable condition with all belongings via private transport.  Rei Jesus RN  7/4/2024

## 2024-07-04 NOTE — CARE COORDINATION
CM did not deliver 2nd IMM within 4 hours for DC, verbal explanation of patient rights at bedside. Pt voiced understanding of discharge MCR rights and is agreeable to discharge.

## 2024-07-04 NOTE — FLOWSHEET NOTE
07/03/24 2345   Vital Signs   Temp 98.7 °F (37.1 °C)   Temp Source Oral   Pulse 77   Heart Rate Source Monitor   Respirations 18   BP (!) 150/76   MAP (Calculated) 101   BP Location Right upper arm   BP Method Automatic   Patient Position Semi fowlers   Oxygen Therapy   SpO2 97 %   O2 Device None (Room air)     Patient resting in bed watching tv. No S/S of acute distress noted. Call light in easy reach. No further needs voiced by patient at this time.

## 2024-07-04 NOTE — PROGRESS NOTES
Patient resting in bed, patient is pink, warm, and dry. Respirations E/E on room air. Iv site unremarkable. No S/S of acute distress noted. Head to toe completed at this time. Patient is A/O x4. Edema noted in BLE. Call light in easy reach, patient reminded to use call light for needs and assistance. Bed locked and in lowest position side rails up x2.

## 2024-07-04 NOTE — PROGRESS NOTES
Bedside report and transfer of care given to Erik Rn. Pt currently resting in bed with the call light within reach. Pt denies any other care needs at this time. Pt stable at this time.

## 2024-07-05 ENCOUNTER — APPOINTMENT (OUTPATIENT)
Dept: CT IMAGING | Age: 89
End: 2024-07-05
Payer: OTHER GOVERNMENT

## 2024-07-05 ENCOUNTER — HOSPITAL ENCOUNTER (INPATIENT)
Age: 89
LOS: 5 days | Discharge: HOME | End: 2024-07-11
Attending: EMERGENCY MEDICINE | Admitting: INTERNAL MEDICINE
Payer: OTHER GOVERNMENT

## 2024-07-05 ENCOUNTER — CARE COORDINATION (OUTPATIENT)
Dept: CASE MANAGEMENT | Age: 89
End: 2024-07-05

## 2024-07-05 ENCOUNTER — TELEPHONE (OUTPATIENT)
Dept: FAMILY MEDICINE CLINIC | Age: 89
End: 2024-07-05

## 2024-07-05 ENCOUNTER — APPOINTMENT (OUTPATIENT)
Dept: GENERAL RADIOLOGY | Age: 89
End: 2024-07-05
Payer: OTHER GOVERNMENT

## 2024-07-05 DIAGNOSIS — I50.33 ACUTE ON CHRONIC DIASTOLIC CONGESTIVE HEART FAILURE (HCC): ICD-10-CM

## 2024-07-05 DIAGNOSIS — R65.21 SEPTIC SHOCK (HCC): ICD-10-CM

## 2024-07-05 DIAGNOSIS — A41.9 SEPTIC SHOCK (HCC): ICD-10-CM

## 2024-07-05 DIAGNOSIS — R41.0 DELIRIUM: ICD-10-CM

## 2024-07-05 DIAGNOSIS — E83.42 HYPOMAGNESEMIA: ICD-10-CM

## 2024-07-05 DIAGNOSIS — E11.620 TYPE 2 DIABETES MELLITUS WITH DIABETIC DERMATITIS, WITHOUT LONG-TERM CURRENT USE OF INSULIN (HCC): ICD-10-CM

## 2024-07-05 DIAGNOSIS — K85.90 ACUTE PANCREATITIS, UNSPECIFIED COMPLICATION STATUS, UNSPECIFIED PANCREATITIS TYPE: Primary | ICD-10-CM

## 2024-07-05 DIAGNOSIS — R06.02 SHORTNESS OF BREATH: ICD-10-CM

## 2024-07-05 DIAGNOSIS — E11.620 TYPE 2 DIABETES MELLITUS WITH DIABETIC DERMATITIS, WITHOUT LONG-TERM CURRENT USE OF INSULIN (HCC): Primary | ICD-10-CM

## 2024-07-05 DIAGNOSIS — J81.0 ACUTE PULMONARY EDEMA (HCC): ICD-10-CM

## 2024-07-05 DIAGNOSIS — J43.2 CENTRILOBULAR EMPHYSEMA (HCC): ICD-10-CM

## 2024-07-05 DIAGNOSIS — I42.9 CARDIOMYOPATHY, UNSPECIFIED TYPE (HCC): ICD-10-CM

## 2024-07-05 DIAGNOSIS — I21.4 NSTEMI (NON-ST ELEVATED MYOCARDIAL INFARCTION) (HCC): Primary | ICD-10-CM

## 2024-07-05 DIAGNOSIS — J96.01 ACUTE RESPIRATORY FAILURE WITH HYPOXIA (HCC): ICD-10-CM

## 2024-07-05 LAB
ALBUMIN SERPL-MCNC: 3 G/DL (ref 3.4–5)
ALBUMIN/GLOB SERPL: 0.8 {RATIO} (ref 1.1–2.2)
ALP SERPL-CCNC: 141 U/L (ref 40–129)
ALT SERPL-CCNC: 16 U/L (ref 10–40)
AMMONIA PLAS-SCNC: 18 UMOL/L (ref 16–60)
AMORPH SED URNS QL MICRO: ABNORMAL /HPF
ANION GAP SERPL CALCULATED.3IONS-SCNC: 12 MMOL/L (ref 3–16)
AST SERPL-CCNC: 15 U/L (ref 15–37)
BACTERIA URNS QL MICRO: ABNORMAL /HPF
BASE EXCESS BLDV CALC-SCNC: -1.9 MMOL/L (ref -3–3)
BASOPHILS # BLD: 0.1 K/UL (ref 0–0.2)
BASOPHILS NFR BLD: 0.7 %
BILIRUB SERPL-MCNC: 0.3 MG/DL (ref 0–1)
BILIRUB UR QL STRIP.AUTO: ABNORMAL
BUN SERPL-MCNC: 15 MG/DL (ref 7–20)
CALCIUM SERPL-MCNC: 8.9 MG/DL (ref 8.3–10.6)
CHLORIDE SERPL-SCNC: 99 MMOL/L (ref 99–110)
CLARITY UR: ABNORMAL
CO2 BLDV-SCNC: 21 MMOL/L
CO2 SERPL-SCNC: 24 MMOL/L (ref 21–32)
COHGB MFR BLDV: 9.2 % (ref 0–1.5)
COLOR UR: YELLOW
CREAT SERPL-MCNC: 1.1 MG/DL (ref 0.8–1.3)
DEPRECATED RDW RBC AUTO: 16.2 % (ref 12.4–15.4)
EKG ATRIAL RATE: 111 BPM
EKG DIAGNOSIS: NORMAL
EKG P-R INTERVAL: 192 MS
EKG Q-T INTERVAL: 350 MS
EKG QRS DURATION: 136 MS
EKG QTC CALCULATION (BAZETT): 476 MS
EKG R AXIS: -66 DEGREES
EKG T AXIS: 47 DEGREES
EKG VENTRICULAR RATE: 111 BPM
EOSINOPHIL # BLD: 0.1 K/UL (ref 0–0.6)
EOSINOPHIL NFR BLD: 1.1 %
EPI CELLS #/AREA URNS HPF: ABNORMAL /HPF (ref 0–5)
FINE GRAN CASTS #/AREA URNS HPF: ABNORMAL /LPF (ref 0–2)
FLUAV RNA RESP QL NAA+PROBE: NOT DETECTED
FLUBV RNA RESP QL NAA+PROBE: NOT DETECTED
GFR SERPLBLD CREATININE-BSD FMLA CKD-EPI: 65 ML/MIN/{1.73_M2}
GLUCOSE SERPL-MCNC: 283 MG/DL (ref 70–99)
GLUCOSE UR STRIP.AUTO-MCNC: 500 MG/DL
HCO3 BLDV-SCNC: 20.5 MMOL/L (ref 23–29)
HCT VFR BLD AUTO: 29.1 % (ref 40.5–52.5)
HGB BLD-MCNC: 9.8 G/DL (ref 13.5–17.5)
HGB UR QL STRIP.AUTO: ABNORMAL
KETONES UR STRIP.AUTO-MCNC: NEGATIVE MG/DL
LACTATE BLDV-SCNC: 3.2 MMOL/L (ref 0.4–1.9)
LEUKOCYTE ESTERASE UR QL STRIP.AUTO: NEGATIVE
LIPASE SERPL-CCNC: 61 U/L (ref 13–60)
LYMPHOCYTES # BLD: 1.3 K/UL (ref 1–5.1)
LYMPHOCYTES NFR BLD: 12.1 %
MAGNESIUM SERPL-MCNC: 1.5 MG/DL (ref 1.8–2.4)
MCH RBC QN AUTO: 30.5 PG (ref 26–34)
MCHC RBC AUTO-ENTMCNC: 33.6 G/DL (ref 31–36)
MCV RBC AUTO: 90.7 FL (ref 80–100)
METHGB MFR BLDV: 0.3 %
MONOCYTES # BLD: 0.5 K/UL (ref 0–1.3)
MONOCYTES NFR BLD: 4.7 %
MUCOUS THREADS #/AREA URNS LPF: ABNORMAL /LPF
NEUTROPHILS # BLD: 8.7 K/UL (ref 1.7–7.7)
NEUTROPHILS NFR BLD: 81.4 %
NITRITE UR QL STRIP.AUTO: NEGATIVE
NT-PROBNP SERPL-MCNC: ABNORMAL PG/ML (ref 0–449)
O2 CT VFR BLDV CALC: 12 VOL %
O2 THERAPY: ABNORMAL
PCO2 BLDV: 27.2 MMHG (ref 40–50)
PH BLDV: 7.5 [PH] (ref 7.35–7.45)
PH UR STRIP.AUTO: 5.5 [PH] (ref 5–8)
PLATELET # BLD AUTO: 365 K/UL (ref 135–450)
PMV BLD AUTO: 7.5 FL (ref 5–10.5)
PO2 BLDV: 65.7 MMHG (ref 25–40)
POTASSIUM SERPL-SCNC: 3.8 MMOL/L (ref 3.5–5.1)
PROT SERPL-MCNC: 7 G/DL (ref 6.4–8.2)
PROT UR STRIP.AUTO-MCNC: 100 MG/DL
RBC # BLD AUTO: 3.2 M/UL (ref 4.2–5.9)
RBC #/AREA URNS HPF: ABNORMAL /HPF (ref 0–4)
SAO2 % BLDV: 95 %
SARS-COV-2 RNA RESP QL NAA+PROBE: NOT DETECTED
SODIUM SERPL-SCNC: 135 MMOL/L (ref 136–145)
SP GR UR STRIP.AUTO: >=1.03 (ref 1–1.03)
TROPONIN, HIGH SENSITIVITY: 244 NG/L (ref 0–22)
TROPONIN, HIGH SENSITIVITY: 254 NG/L (ref 0–22)
UA COMPLETE W REFLEX CULTURE PNL UR: ABNORMAL
UA DIPSTICK W REFLEX MICRO PNL UR: YES
URN SPEC COLLECT METH UR: ABNORMAL
UROBILINOGEN UR STRIP-ACNC: 0.2 E.U./DL
WBC # BLD AUTO: 10.7 K/UL (ref 4–11)
WBC #/AREA URNS HPF: ABNORMAL /HPF (ref 0–5)
YEAST URNS QL MICRO: PRESENT /HPF

## 2024-07-05 PROCEDURE — 36415 COLL VENOUS BLD VENIPUNCTURE: CPT

## 2024-07-05 PROCEDURE — 93010 ELECTROCARDIOGRAM REPORT: CPT | Performed by: INTERNAL MEDICINE

## 2024-07-05 PROCEDURE — 36556 INSERT NON-TUNNEL CV CATH: CPT

## 2024-07-05 PROCEDURE — 2700000000 HC OXYGEN THERAPY PER DAY

## 2024-07-05 PROCEDURE — 2500000003 HC RX 250 WO HCPCS: Performed by: EMERGENCY MEDICINE

## 2024-07-05 PROCEDURE — 96375 TX/PRO/DX INJ NEW DRUG ADDON: CPT

## 2024-07-05 PROCEDURE — 87040 BLOOD CULTURE FOR BACTERIA: CPT

## 2024-07-05 PROCEDURE — 2580000003 HC RX 258: Performed by: EMERGENCY MEDICINE

## 2024-07-05 PROCEDURE — 96367 TX/PROPH/DG ADDL SEQ IV INF: CPT

## 2024-07-05 PROCEDURE — 1111F DSCHRG MED/CURRENT MED MERGE: CPT | Performed by: NURSE PRACTITIONER

## 2024-07-05 PROCEDURE — 83735 ASSAY OF MAGNESIUM: CPT

## 2024-07-05 PROCEDURE — 94660 CPAP INITIATION&MGMT: CPT

## 2024-07-05 PROCEDURE — 94761 N-INVAS EAR/PLS OXIMETRY MLT: CPT

## 2024-07-05 PROCEDURE — 85025 COMPLETE CBC W/AUTO DIFF WBC: CPT

## 2024-07-05 PROCEDURE — 83690 ASSAY OF LIPASE: CPT

## 2024-07-05 PROCEDURE — 83605 ASSAY OF LACTIC ACID: CPT

## 2024-07-05 PROCEDURE — 96365 THER/PROPH/DIAG IV INF INIT: CPT

## 2024-07-05 PROCEDURE — 84484 ASSAY OF TROPONIN QUANT: CPT

## 2024-07-05 PROCEDURE — 83880 ASSAY OF NATRIURETIC PEPTIDE: CPT

## 2024-07-05 PROCEDURE — 82140 ASSAY OF AMMONIA: CPT

## 2024-07-05 PROCEDURE — 6360000004 HC RX CONTRAST MEDICATION: Performed by: EMERGENCY MEDICINE

## 2024-07-05 PROCEDURE — 99285 EMERGENCY DEPT VISIT HI MDM: CPT

## 2024-07-05 PROCEDURE — 81001 URINALYSIS AUTO W/SCOPE: CPT

## 2024-07-05 PROCEDURE — 5A09357 ASSISTANCE WITH RESPIRATORY VENTILATION, LESS THAN 24 CONSECUTIVE HOURS, CONTINUOUS POSITIVE AIRWAY PRESSURE: ICD-10-PCS | Performed by: INTERNAL MEDICINE

## 2024-07-05 PROCEDURE — 71045 X-RAY EXAM CHEST 1 VIEW: CPT

## 2024-07-05 PROCEDURE — 6370000000 HC RX 637 (ALT 250 FOR IP): Performed by: EMERGENCY MEDICINE

## 2024-07-05 PROCEDURE — 96376 TX/PRO/DX INJ SAME DRUG ADON: CPT

## 2024-07-05 PROCEDURE — 93005 ELECTROCARDIOGRAM TRACING: CPT | Performed by: EMERGENCY MEDICINE

## 2024-07-05 PROCEDURE — 87636 SARSCOV2 & INF A&B AMP PRB: CPT

## 2024-07-05 PROCEDURE — 80053 COMPREHEN METABOLIC PANEL: CPT

## 2024-07-05 PROCEDURE — 82803 BLOOD GASES ANY COMBINATION: CPT

## 2024-07-05 PROCEDURE — 6360000002 HC RX W HCPCS: Performed by: EMERGENCY MEDICINE

## 2024-07-05 RX ORDER — 0.9 % SODIUM CHLORIDE 0.9 %
30 INTRAVENOUS SOLUTION INTRAVENOUS ONCE
Status: COMPLETED | OUTPATIENT
Start: 2024-07-05 | End: 2024-07-05

## 2024-07-05 RX ORDER — FUROSEMIDE 10 MG/ML
20 INJECTION INTRAMUSCULAR; INTRAVENOUS ONCE
Status: COMPLETED | OUTPATIENT
Start: 2024-07-05 | End: 2024-07-06

## 2024-07-05 RX ORDER — ACETAMINOPHEN 500 MG
1000 TABLET ORAL ONCE
Status: COMPLETED | OUTPATIENT
Start: 2024-07-05 | End: 2024-07-05

## 2024-07-05 RX ORDER — INSULIN ASPART 100 [IU]/ML
8 INJECTION, SOLUTION INTRAVENOUS; SUBCUTANEOUS EVERY 6 HOURS
Qty: 5 ADJUSTABLE DOSE PRE-FILLED PEN SYRINGE | Refills: 3 | Status: ON HOLD | OUTPATIENT
Start: 2024-07-05 | End: 2024-08-04

## 2024-07-05 RX ORDER — MORPHINE SULFATE 4 MG/ML
4 INJECTION, SOLUTION INTRAMUSCULAR; INTRAVENOUS ONCE
Status: DISCONTINUED | OUTPATIENT
Start: 2024-07-05 | End: 2024-07-05

## 2024-07-05 RX ORDER — MAGNESIUM SULFATE IN WATER 40 MG/ML
2000 INJECTION, SOLUTION INTRAVENOUS ONCE
Status: COMPLETED | OUTPATIENT
Start: 2024-07-05 | End: 2024-07-06

## 2024-07-05 RX ORDER — INSULIN GLARGINE 100 [IU]/ML
10 INJECTION, SOLUTION SUBCUTANEOUS NIGHTLY
Qty: 5 ADJUSTABLE DOSE PRE-FILLED PEN SYRINGE | Refills: 0 | Status: ON HOLD | OUTPATIENT
Start: 2024-07-05

## 2024-07-05 RX ORDER — ONDANSETRON 2 MG/ML
4 INJECTION INTRAMUSCULAR; INTRAVENOUS ONCE
Status: COMPLETED | OUTPATIENT
Start: 2024-07-05 | End: 2024-07-05

## 2024-07-05 RX ORDER — ALBUTEROL SULFATE 90 UG/1
2 AEROSOL, METERED RESPIRATORY (INHALATION) EVERY 6 HOURS PRN
Qty: 18 G | Refills: 3 | Status: ON HOLD | OUTPATIENT
Start: 2024-07-05

## 2024-07-05 RX ADMIN — IOPAMIDOL 75 ML: 755 INJECTION, SOLUTION INTRAVENOUS at 21:06

## 2024-07-05 RX ADMIN — MAGNESIUM SULFATE HEPTAHYDRATE 2000 MG: 40 INJECTION, SOLUTION INTRAVENOUS at 23:52

## 2024-07-05 RX ADMIN — ONDANSETRON 4 MG: 2 INJECTION INTRAMUSCULAR; INTRAVENOUS at 21:26

## 2024-07-05 RX ADMIN — PIPERACILLIN AND TAZOBACTAM 4500 MG: 4; .5 INJECTION, POWDER, LYOPHILIZED, FOR SOLUTION INTRAVENOUS at 21:46

## 2024-07-05 RX ADMIN — SODIUM CHLORIDE 2052 ML: 9 INJECTION, SOLUTION INTRAVENOUS at 21:25

## 2024-07-05 RX ADMIN — NOREPINEPHRINE BITARTRATE 5 MCG/MIN: 1 INJECTION, SOLUTION, CONCENTRATE INTRAVENOUS at 23:56

## 2024-07-05 RX ADMIN — ACETAMINOPHEN 1000 MG: 500 TABLET ORAL at 20:56

## 2024-07-05 RX ADMIN — HYDROMORPHONE HYDROCHLORIDE 0.5 MG: 1 INJECTION, SOLUTION INTRAMUSCULAR; INTRAVENOUS; SUBCUTANEOUS at 21:39

## 2024-07-05 NOTE — TELEPHONE ENCOUNTER
Called patient son, Josue, regarding concern for elevated blood sugar.  Blood sugars have been going up since earlier today, most recent sugar 580.  Patient has not eaten today.  Discharged yesterday from [facility] after stopping Glipizide and Lisinopril with no replacement medication.  Son notes patient is alert and talking/ joking with him.  Seems slightly more short of breath over the past week with hospitalization.  New diagnosis of emphysema (but present on scans from 7/2023).  No recent albuterol prescription.      Will start insulin today due to recent pancreatitis.  New regimen:    10 units Lantus daily  8 units Novolog every 6 hours as needed for blood sugar >250.    Albuterol inhaler.     STAFF: Please call Marie and make sure it does not need prior authorization due to elevated blood sugar.  Family is on the way to pick it up.

## 2024-07-05 NOTE — CARE COORDINATION
Attempted to return call to Samantha [facility] nurse on behalf of AMBREEN Hawthorne regarding this patient. Left HIPAA compliant message requesting call back.     [facility] She states they have the referral but it may take a few days to obtain orders as they have to come from a [facility] physician, and pts PCP is [facility]. She states they may get orders as soon as Monday but it could take longer. Attempted to follow up with pt and Marine to see if home care is needed more urgently. Left HIPAA complaint message for pt requesting call back.

## 2024-07-05 NOTE — CARE COORDINATION
Text messages with landon Chapman's son regarding blood sugars and comfort level with injections. States his sister was an MA, no issues there. They were able to  the insulin ordered. Also let them know the [facility] did [facility] per Estelle and this CTN let son know and that implementation would take a few days per [facility] the [facility].     Will f/u with family on Monday.

## 2024-07-05 NOTE — TELEPHONE ENCOUNTER
Spoke to [facility] pharmacy and the lantus went through fine but the Novolog needed changed to Humalog so I gave a verbal to change it

## 2024-07-05 NOTE — TELEPHONE ENCOUNTER
Son is calling with high sugar reading on patient, his BS this morning was 526 rechecked about an hour later and it was 569.  Patient was discharged from hospital yesterday after being admitted for pancreatitis.  He was advised to hold his glipizide due to upcoming surgery for gallstones but he is still taking the Metformin.  He wanted to know what he should do to try and get his sugar levels down

## 2024-07-05 NOTE — CARE COORDINATION
Son Josue has called PCP office with blood sugars of 526, 569 and then 588. He is awaiting call back. Josue stated pt has not had additional food since breakfast which was a few hours ago and very limited. CTN stated lack of food may be an issue. CTN encouraged to call squad to see if blood sugar can be treated and make a decision if pt can remain at home. Josue states pt is alert and not disoriented. Drinking fluids and having an Ensure.     CTN stated not having insulin at home bringing his blood sugar down quickly will be difficult.

## 2024-07-05 NOTE — CARE COORDINATION
Care Transitions Note    Initial Call - Call within 2 business days of discharge: Yes    Attempted to reach patient for transitions of care follow up. Unable to reach patient. LM    Outreach Attempts:   HIPAA compliant voicemail left for patient.     Patient: Ari Snider    Patient : 1935   MRN: [id number]    Reason for Admission: Acute pancreatitis, unspecified complication status, unspecified pancreatitis type   Discharge Date: 24  RURS: Readmission Risk Score: 19.3    Last Discharge Facility       Date Complaint Diagnosis Description Type Department Provider    24 Abdominal Pain Acute cholecystitis ED to Hosp-Admission (Discharged) (ADMITTED) [facility] [place] [facility] Amna Macdonald MD; Jesus...            Future Appointments         Provider Specialty Dept Phone    2024 10:15 AM Nick Hunt MD Cardiology [phone]    2024 11:20 AM Wilma Rowley, MALATHI - CNP Family Medicine [phone]              Marine Bhakta RN

## 2024-07-05 NOTE — CARE COORDINATION
Care Transitions Note    Initial Call - Call within 2 business days of discharge: Yes    Patient Current Location:  Home: [address]  [facility] [address]    Son returned call,    to perform post hospital discharge assessment, verified name and  as identifiers. Provided introduction to self, and explanation of the Care Transition Nurse role.     Patient: Ari Snider    Patient : 1935   MRN: [id number]    Reason for Admission: Acute pancreatitis, unspecified complication status, unspecified pancreatitis type   Discharge Date: 24  RURS: Readmission Risk Score: 19.3      Last Discharge Facility       Date Complaint Diagnosis Description Type Department Provider    24 Abdominal Pain Acute cholecystitis ED to Hosp-Admission (Discharged) (ADMITTED) [facility] Amna Macdonald MD; Jesus...            Was this an external facility discharge? No    Additional needs identified to be addressed with provider   High priority: pt blood sugar this morning was 526. He was DCd last night from [place] for Acute pancreatitis, unspecified complication status, unspecified pancreatitis type. Pt is weak and tired, he is oriented.Lisinopril and glipizide were DCd and per son Josue, pt did not receive the metformin last night. He is awaiting surgery scheduling. Please advise son as needed regarding blood sugar.               Method of communication with provider: chart routing.    Patients top risk factors for readmission: falls and medical condition-Acute pancreatitis, unspecified complication status, unspecified pancreatitis type    Interventions to address risk factors:   Education: about diet and getting enough fluids, balancing foods with blood sugar, assisted in education with proteins with fruits that pt likes. They are using Ensure, encouraged to make smoothies due to pts like of cold beverages.   Review of patient management of conditions/medications: reviewed DC instructions   [facility]

## 2024-07-06 ENCOUNTER — APPOINTMENT (OUTPATIENT)
Dept: CT IMAGING | Age: 89
End: 2024-07-06
Payer: OTHER GOVERNMENT

## 2024-07-06 ENCOUNTER — APPOINTMENT (OUTPATIENT)
Dept: GENERAL RADIOLOGY | Age: 89
End: 2024-07-06
Payer: OTHER GOVERNMENT

## 2024-07-06 PROBLEM — A41.9 SEPTIC SHOCK (HCC): Status: ACTIVE | Noted: 2024-07-06

## 2024-07-06 PROBLEM — I21.4 NSTEMI (NON-ST ELEVATED MYOCARDIAL INFARCTION) (HCC): Status: ACTIVE | Noted: 2024-07-06

## 2024-07-06 PROBLEM — R65.21 SEPTIC SHOCK (HCC): Status: ACTIVE | Noted: 2024-07-06

## 2024-07-06 LAB
ALBUMIN SERPL-MCNC: 2.6 G/DL (ref 3.4–5)
ALBUMIN/GLOB SERPL: 0.9 {RATIO} (ref 1.1–2.2)
ALP SERPL-CCNC: 107 U/L (ref 40–129)
ALT SERPL-CCNC: 13 U/L (ref 10–40)
ANION GAP SERPL CALCULATED.3IONS-SCNC: 9 MMOL/L (ref 3–16)
AST SERPL-CCNC: 11 U/L (ref 15–37)
BASOPHILS # BLD: 0.1 K/UL (ref 0–0.2)
BASOPHILS # BLD: 0.1 K/UL (ref 0–0.2)
BASOPHILS NFR BLD: 0.5 %
BASOPHILS NFR BLD: 0.6 %
BILIRUB SERPL-MCNC: 0.3 MG/DL (ref 0–1)
BUN SERPL-MCNC: 15 MG/DL (ref 7–20)
CALCIUM SERPL-MCNC: 8.3 MG/DL (ref 8.3–10.6)
CHLORIDE SERPL-SCNC: 103 MMOL/L (ref 99–110)
CO2 SERPL-SCNC: 27 MMOL/L (ref 21–32)
CREAT SERPL-MCNC: 1.3 MG/DL (ref 0.8–1.3)
DEPRECATED RDW RBC AUTO: 16.2 % (ref 12.4–15.4)
DEPRECATED RDW RBC AUTO: 16.6 % (ref 12.4–15.4)
EKG ATRIAL RATE: 129 BPM
EKG ATRIAL RATE: 78 BPM
EKG DIAGNOSIS: NORMAL
EKG DIAGNOSIS: NORMAL
EKG P AXIS: -6 DEGREES
EKG P-R INTERVAL: 150 MS
EKG P-R INTERVAL: 168 MS
EKG Q-T INTERVAL: 330 MS
EKG Q-T INTERVAL: 436 MS
EKG QRS DURATION: 142 MS
EKG QRS DURATION: 144 MS
EKG QTC CALCULATION (BAZETT): 483 MS
EKG QTC CALCULATION (BAZETT): 497 MS
EKG R AXIS: -45 DEGREES
EKG R AXIS: -61 DEGREES
EKG T AXIS: 4 DEGREES
EKG T AXIS: 55 DEGREES
EKG VENTRICULAR RATE: 129 BPM
EKG VENTRICULAR RATE: 78 BPM
EOSINOPHIL # BLD: 0.1 K/UL (ref 0–0.6)
EOSINOPHIL # BLD: 0.2 K/UL (ref 0–0.6)
EOSINOPHIL NFR BLD: 1.2 %
EOSINOPHIL NFR BLD: 1.6 %
GFR SERPLBLD CREATININE-BSD FMLA CKD-EPI: 53 ML/MIN/{1.73_M2}
GLUCOSE BLD-MCNC: 111 MG/DL (ref 70–99)
GLUCOSE BLD-MCNC: 163 MG/DL (ref 70–99)
GLUCOSE BLD-MCNC: 226 MG/DL (ref 70–99)
GLUCOSE BLD-MCNC: 273 MG/DL (ref 70–99)
GLUCOSE BLD-MCNC: 305 MG/DL (ref 70–99)
GLUCOSE BLD-MCNC: 318 MG/DL (ref 70–99)
GLUCOSE SERPL-MCNC: 255 MG/DL (ref 70–99)
HCT VFR BLD AUTO: 23.5 % (ref 40.5–52.5)
HCT VFR BLD AUTO: 23.9 % (ref 40.5–52.5)
HGB BLD-MCNC: 7.6 G/DL (ref 13.5–17.5)
HGB BLD-MCNC: 7.7 G/DL (ref 13.5–17.5)
LACTATE BLDV-SCNC: 1.4 MMOL/L (ref 0.4–2)
LYMPHOCYTES # BLD: 1.8 K/UL (ref 1–5.1)
LYMPHOCYTES # BLD: 2.2 K/UL (ref 1–5.1)
LYMPHOCYTES NFR BLD: 18.9 %
LYMPHOCYTES NFR BLD: 21.9 %
MCH RBC QN AUTO: 29.8 PG (ref 26–34)
MCH RBC QN AUTO: 29.9 PG (ref 26–34)
MCHC RBC AUTO-ENTMCNC: 32.3 G/DL (ref 31–36)
MCHC RBC AUTO-ENTMCNC: 32.5 G/DL (ref 31–36)
MCV RBC AUTO: 92 FL (ref 80–100)
MCV RBC AUTO: 92.4 FL (ref 80–100)
MONOCYTES # BLD: 0.5 K/UL (ref 0–1.3)
MONOCYTES # BLD: 0.5 K/UL (ref 0–1.3)
MONOCYTES NFR BLD: 5 %
MONOCYTES NFR BLD: 5.5 %
NEUTROPHILS # BLD: 7 K/UL (ref 1.7–7.7)
NEUTROPHILS # BLD: 7.2 K/UL (ref 1.7–7.7)
NEUTROPHILS NFR BLD: 70.4 %
NEUTROPHILS NFR BLD: 74.4 %
NT-PROBNP SERPL-MCNC: ABNORMAL PG/ML (ref 0–449)
PERFORMED ON: ABNORMAL
PLATELET # BLD AUTO: 254 K/UL (ref 135–450)
PLATELET # BLD AUTO: 258 K/UL (ref 135–450)
PMV BLD AUTO: 7.2 FL (ref 5–10.5)
PMV BLD AUTO: 7.3 FL (ref 5–10.5)
POTASSIUM SERPL-SCNC: 3.7 MMOL/L (ref 3.5–5.1)
PROCALCITONIN SERPL IA-MCNC: 0.53 NG/ML (ref 0–0.15)
PROT SERPL-MCNC: 5.6 G/DL (ref 6.4–8.2)
RBC # BLD AUTO: 2.56 M/UL (ref 4.2–5.9)
RBC # BLD AUTO: 2.58 M/UL (ref 4.2–5.9)
SODIUM SERPL-SCNC: 139 MMOL/L (ref 136–145)
TROPONIN, HIGH SENSITIVITY: 293 NG/L (ref 0–22)
WBC # BLD AUTO: 10 K/UL (ref 4–11)
WBC # BLD AUTO: 9.7 K/UL (ref 4–11)

## 2024-07-06 PROCEDURE — 2580000003 HC RX 258: Performed by: INTERNAL MEDICINE

## 2024-07-06 PROCEDURE — 2000000000 HC ICU R&B

## 2024-07-06 PROCEDURE — 84145 PROCALCITONIN (PCT): CPT

## 2024-07-06 PROCEDURE — 99222 1ST HOSP IP/OBS MODERATE 55: CPT | Performed by: SURGERY

## 2024-07-06 PROCEDURE — 93010 ELECTROCARDIOGRAM REPORT: CPT | Performed by: INTERNAL MEDICINE

## 2024-07-06 PROCEDURE — 05HM33Z INSERTION OF INFUSION DEVICE INTO RIGHT INTERNAL JUGULAR VEIN, PERCUTANEOUS APPROACH: ICD-10-PCS | Performed by: INTERNAL MEDICINE

## 2024-07-06 PROCEDURE — 6370000000 HC RX 637 (ALT 250 FOR IP): Performed by: INTERNAL MEDICINE

## 2024-07-06 PROCEDURE — 84484 ASSAY OF TROPONIN QUANT: CPT

## 2024-07-06 PROCEDURE — 70450 CT HEAD/BRAIN W/O DYE: CPT

## 2024-07-06 PROCEDURE — 83605 ASSAY OF LACTIC ACID: CPT

## 2024-07-06 PROCEDURE — 94761 N-INVAS EAR/PLS OXIMETRY MLT: CPT

## 2024-07-06 PROCEDURE — 6360000002 HC RX W HCPCS: Performed by: INTERNAL MEDICINE

## 2024-07-06 PROCEDURE — 83880 ASSAY OF NATRIURETIC PEPTIDE: CPT

## 2024-07-06 PROCEDURE — 6360000002 HC RX W HCPCS: Performed by: EMERGENCY MEDICINE

## 2024-07-06 PROCEDURE — 74177 CT ABD & PELVIS W/CONTRAST: CPT

## 2024-07-06 PROCEDURE — 93005 ELECTROCARDIOGRAM TRACING: CPT | Performed by: INTERNAL MEDICINE

## 2024-07-06 PROCEDURE — 71045 X-RAY EXAM CHEST 1 VIEW: CPT

## 2024-07-06 PROCEDURE — 80053 COMPREHEN METABOLIC PANEL: CPT

## 2024-07-06 PROCEDURE — 36415 COLL VENOUS BLD VENIPUNCTURE: CPT

## 2024-07-06 PROCEDURE — 85025 COMPLETE CBC W/AUTO DIFF WBC: CPT

## 2024-07-06 PROCEDURE — 2700000000 HC OXYGEN THERAPY PER DAY

## 2024-07-06 PROCEDURE — 99291 CRITICAL CARE FIRST HOUR: CPT | Performed by: INTERNAL MEDICINE

## 2024-07-06 RX ORDER — SODIUM CHLORIDE, SODIUM LACTATE, POTASSIUM CHLORIDE, CALCIUM CHLORIDE 600; 310; 30; 20 MG/100ML; MG/100ML; MG/100ML; MG/100ML
INJECTION, SOLUTION INTRAVENOUS CONTINUOUS
Status: DISCONTINUED | OUTPATIENT
Start: 2024-07-06 | End: 2024-07-06

## 2024-07-06 RX ORDER — MAGNESIUM SULFATE IN WATER 40 MG/ML
2000 INJECTION, SOLUTION INTRAVENOUS PRN
Status: DISCONTINUED | OUTPATIENT
Start: 2024-07-06 | End: 2024-07-11 | Stop reason: HOSPADM

## 2024-07-06 RX ORDER — INSULIN GLARGINE 100 [IU]/ML
5 INJECTION, SOLUTION SUBCUTANEOUS NIGHTLY
Status: DISCONTINUED | OUTPATIENT
Start: 2024-07-06 | End: 2024-07-08

## 2024-07-06 RX ORDER — ACETAMINOPHEN 325 MG/1
650 TABLET ORAL EVERY 6 HOURS PRN
Status: DISCONTINUED | OUTPATIENT
Start: 2024-07-06 | End: 2024-07-11 | Stop reason: HOSPADM

## 2024-07-06 RX ORDER — ONDANSETRON 4 MG/1
4 TABLET, ORALLY DISINTEGRATING ORAL EVERY 8 HOURS PRN
Status: DISCONTINUED | OUTPATIENT
Start: 2024-07-06 | End: 2024-07-11 | Stop reason: HOSPADM

## 2024-07-06 RX ORDER — INSULIN LISPRO 100 [IU]/ML
0-4 INJECTION, SOLUTION INTRAVENOUS; SUBCUTANEOUS NIGHTLY
Status: DISCONTINUED | OUTPATIENT
Start: 2024-07-06 | End: 2024-07-06

## 2024-07-06 RX ORDER — INSULIN LISPRO 100 [IU]/ML
0-16 INJECTION, SOLUTION INTRAVENOUS; SUBCUTANEOUS
Status: DISCONTINUED | OUTPATIENT
Start: 2024-07-06 | End: 2024-07-11

## 2024-07-06 RX ORDER — PANTOPRAZOLE SODIUM 40 MG/10ML
40 INJECTION, POWDER, LYOPHILIZED, FOR SOLUTION INTRAVENOUS DAILY
Status: DISCONTINUED | OUTPATIENT
Start: 2024-07-06 | End: 2024-07-10

## 2024-07-06 RX ORDER — ONDANSETRON 2 MG/ML
4 INJECTION INTRAMUSCULAR; INTRAVENOUS EVERY 6 HOURS PRN
Status: DISCONTINUED | OUTPATIENT
Start: 2024-07-06 | End: 2024-07-11 | Stop reason: HOSPADM

## 2024-07-06 RX ORDER — SODIUM CHLORIDE 0.9 % (FLUSH) 0.9 %
5-40 SYRINGE (ML) INJECTION PRN
Status: DISCONTINUED | OUTPATIENT
Start: 2024-07-06 | End: 2024-07-11 | Stop reason: HOSPADM

## 2024-07-06 RX ORDER — ENOXAPARIN SODIUM 100 MG/ML
30 INJECTION SUBCUTANEOUS 2 TIMES DAILY
Status: DISCONTINUED | OUTPATIENT
Start: 2024-07-06 | End: 2024-07-11 | Stop reason: HOSPADM

## 2024-07-06 RX ORDER — POTASSIUM CHLORIDE 29.8 MG/ML
20 INJECTION INTRAVENOUS PRN
Status: DISCONTINUED | OUTPATIENT
Start: 2024-07-06 | End: 2024-07-11 | Stop reason: HOSPADM

## 2024-07-06 RX ORDER — GLUCAGON 1 MG/ML
1 KIT INJECTION PRN
Status: DISCONTINUED | OUTPATIENT
Start: 2024-07-06 | End: 2024-07-11 | Stop reason: HOSPADM

## 2024-07-06 RX ORDER — POLYETHYLENE GLYCOL 3350 17 G/17G
17 POWDER, FOR SOLUTION ORAL DAILY PRN
Status: DISCONTINUED | OUTPATIENT
Start: 2024-07-06 | End: 2024-07-11 | Stop reason: HOSPADM

## 2024-07-06 RX ORDER — SODIUM CHLORIDE 9 MG/ML
INJECTION, SOLUTION INTRAVENOUS PRN
Status: DISCONTINUED | OUTPATIENT
Start: 2024-07-06 | End: 2024-07-11 | Stop reason: HOSPADM

## 2024-07-06 RX ORDER — DEXMEDETOMIDINE HYDROCHLORIDE 4 UG/ML
.1-1.5 INJECTION, SOLUTION INTRAVENOUS CONTINUOUS
Status: DISCONTINUED | OUTPATIENT
Start: 2024-07-06 | End: 2024-07-06

## 2024-07-06 RX ORDER — POTASSIUM CHLORIDE 7.45 MG/ML
10 INJECTION INTRAVENOUS PRN
Status: DISCONTINUED | OUTPATIENT
Start: 2024-07-06 | End: 2024-07-11 | Stop reason: HOSPADM

## 2024-07-06 RX ORDER — SODIUM CHLORIDE 0.9 % (FLUSH) 0.9 %
5-40 SYRINGE (ML) INJECTION EVERY 12 HOURS SCHEDULED
Status: DISCONTINUED | OUTPATIENT
Start: 2024-07-06 | End: 2024-07-11 | Stop reason: HOSPADM

## 2024-07-06 RX ORDER — ACETAMINOPHEN 650 MG/1
650 SUPPOSITORY RECTAL EVERY 6 HOURS PRN
Status: DISCONTINUED | OUTPATIENT
Start: 2024-07-06 | End: 2024-07-11 | Stop reason: HOSPADM

## 2024-07-06 RX ORDER — DEXTROSE MONOHYDRATE 100 MG/ML
INJECTION, SOLUTION INTRAVENOUS CONTINUOUS PRN
Status: DISCONTINUED | OUTPATIENT
Start: 2024-07-06 | End: 2024-07-11 | Stop reason: HOSPADM

## 2024-07-06 RX ORDER — INSULIN LISPRO 100 [IU]/ML
0-4 INJECTION, SOLUTION INTRAVENOUS; SUBCUTANEOUS
Status: DISCONTINUED | OUTPATIENT
Start: 2024-07-06 | End: 2024-07-06

## 2024-07-06 RX ORDER — INSULIN LISPRO 100 [IU]/ML
0-4 INJECTION, SOLUTION INTRAVENOUS; SUBCUTANEOUS NIGHTLY
Status: DISCONTINUED | OUTPATIENT
Start: 2024-07-06 | End: 2024-07-11

## 2024-07-06 RX ADMIN — PIPERACILLIN AND TAZOBACTAM 3375 MG: 3; .375 INJECTION, POWDER, LYOPHILIZED, FOR SOLUTION INTRAVENOUS at 05:13

## 2024-07-06 RX ADMIN — PIPERACILLIN AND TAZOBACTAM 3375 MG: 3; .375 INJECTION, POWDER, LYOPHILIZED, FOR SOLUTION INTRAVENOUS at 12:10

## 2024-07-06 RX ADMIN — INSULIN GLARGINE 5 UNITS: 100 INJECTION, SOLUTION SUBCUTANEOUS at 20:21

## 2024-07-06 RX ADMIN — INSULIN LISPRO 12 UNITS: 100 INJECTION, SOLUTION INTRAVENOUS; SUBCUTANEOUS at 08:20

## 2024-07-06 RX ADMIN — SODIUM CHLORIDE: 9 INJECTION, SOLUTION INTRAVENOUS at 12:01

## 2024-07-06 RX ADMIN — PANTOPRAZOLE SODIUM 40 MG: 40 INJECTION, POWDER, FOR SOLUTION INTRAVENOUS at 16:13

## 2024-07-06 RX ADMIN — INSULIN LISPRO 8 UNITS: 100 INJECTION, SOLUTION INTRAVENOUS; SUBCUTANEOUS at 11:00

## 2024-07-06 RX ADMIN — HUMAN INSULIN 5 UNITS: 100 INJECTION, SUSPENSION SUBCUTANEOUS at 08:20

## 2024-07-06 RX ADMIN — FUROSEMIDE 20 MG: 10 INJECTION, SOLUTION INTRAMUSCULAR; INTRAVENOUS at 02:53

## 2024-07-06 RX ADMIN — PIPERACILLIN AND TAZOBACTAM 3375 MG: 3; .375 INJECTION, POWDER, LYOPHILIZED, FOR SOLUTION INTRAVENOUS at 21:39

## 2024-07-06 RX ADMIN — SODIUM CHLORIDE, PRESERVATIVE FREE 10 ML: 5 INJECTION INTRAVENOUS at 20:20

## 2024-07-06 RX ADMIN — ENOXAPARIN SODIUM 30 MG: 100 INJECTION SUBCUTANEOUS at 10:28

## 2024-07-06 RX ADMIN — ENOXAPARIN SODIUM 30 MG: 100 INJECTION SUBCUTANEOUS at 20:21

## 2024-07-06 RX ADMIN — SODIUM CHLORIDE, POTASSIUM CHLORIDE, SODIUM LACTATE AND CALCIUM CHLORIDE: 600; 310; 30; 20 INJECTION, SOLUTION INTRAVENOUS at 10:26

## 2024-07-06 ASSESSMENT — PAIN SCALES - GENERAL
PAINLEVEL_OUTOF10: 0

## 2024-07-06 ASSESSMENT — PAIN SCALES - WONG BAKER
WONGBAKER_NUMERICALRESPONSE: NO HURT
WONGBAKER_NUMERICALRESPONSE: NO HURT

## 2024-07-06 ASSESSMENT — PAIN - FUNCTIONAL ASSESSMENT: PAIN_FUNCTIONAL_ASSESSMENT: CRITICAL CARE PAIN OBSERVATION TOOL (CPOT)

## 2024-07-06 NOTE — PROGRESS NOTES
4 Eyes Skin Assessment     NAME:  Ari Snider  YOB: 1935  MEDICAL RECORD NUMBER:  [id number]    The patient is being assessed for  Admission    I agree that at least one RN has performed a thorough Head to Toe Skin Assessment on the patient. ALL assessment sites listed below have been assessed.      Areas assessed by both nurses:    Head, Face, Ears, Shoulders, Back, Chest, Arms, Elbows, Hands, Sacrum. Buttock, Coccyx, Ischium, Legs. Feet and Heels, and Under Medical Devices         Does the Patient have a Wound? No noted wound(s)       Javier Prevention initiated by RN: Yes  Wound Care Orders initiated by RN: No    Pressure Injury (Stage 3,4, Unstageable, DTI, NWPT, and Complex wounds) if present, place Wound referral order by RN under : No    New Ostomies, if present place, Ostomy referral order under : No     Nurse 1 eSignature: Electronically signed by Veronica Lazar RN on 7/6/24 at 6:06 AM EDT    **SHARE this note so that the co-signing nurse can place an eSignature**    Nurse 2 eSignature: Electronically signed by Grace Guthrie RN on 7/6/24 at 6:29 AM EDT

## 2024-07-06 NOTE — ED NOTES
Worsening SOB. Repositioned pt in bed. Stopped NS fluids (received roughly 600mL) per MD request. Called respiratory for Bipap.

## 2024-07-06 NOTE — PROGRESS NOTES
Message sent to hospitalist to update on Pt's drop in H&H. No signs of bleeding noted. Remains normotensive off levophed. Pt remains asymptomatic.

## 2024-07-06 NOTE — ED PROVIDER NOTES
[facility] Emergency Department      CHIEF COMPLAINT  Shortness of Breath (Was just released from here a few days ago. SOB and generalized weakness since. )      HISTORY OF PRESENT ILLNESS  Ari Snider is a 88 y.o. male with a history of aortic stenosis also with a history of hypertension, diabetes with recent admission for acute pancreatitis presents with shortness of breath, chest pain and abdominal pain with confusion.  Family states he was admitted to the hospital with acute pancreatitis and acute cholecystitis.  Family states they did not feel that he should be discharged from the hospital because he was not really eating or drinking very well.  He was not confused when he was in the hospital.  He was discharged home.  He has been home for less than 24 hours and started worsening tonight.  He was taken off of a couple of his diabetic medications and his blood glucose has been in the 500s all day.  They called their PCP and he was started on some short acting insulin but was worsening throughout the day so they called 911.  They were unaware he was having fevers but he did feel warm.  He has been very confused today.  No falls or trauma.  No vomiting..   No other complaints, modifying factors or associated symptoms.     History obtained from the patient's son.    I have reviewed the following from the nursing documentation.    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  He had an MRCP that showed no choledocholithiasis.  He was tolerating diet and was discharged home with plan to follow-up as an outpatient.  He did have an echocardiogram while admitted showing an EF of 55 to 60%.  Normal wall thickness and wall motion.  He was noted to have severely thickened aortic valve specifically the right cusp with restricted motion.  Moderate aortic stenosis.    Diagnoses affirmatively addressed, consideration of tests, treatments & admission, including shared decision making:    Here the patient is ill on arrival.  He is altered he has a rectal temperature of 100.3 and is tachycardic.  He is agitated.  He is not really able to answer my questions.  His son and daughter-in-law arrive and are able to fill me in on his history.  They state that they did not feel comfortable with him going home.  He has been home less than 24 hours and has not been eating well and started complaining of chest and abdominal pain.  He was confused.  Here full septic workup was initiated.  He was given Tylenol for his fever.  IV fluids were initiated and initially I ordered a 30 cc/kg IV fluid bolus based on ideal body weight given his normal EF on his recent echo.  He got about 500 cc of IV fluids but developed shortness of breath, worsening tachycardia and hypoxia.  Chest x-ray showing pulmonary edema.  IV fluids were stopped.  Respiratory status worsened and he was placed on BiPAP.  He was given 20 mg of IV Lasix.  Wilkerson catheter was placed.  Lactic acid is elevated.  Lipase is only slightly elevated at 61.  BNP is significantly elevated to 25,000.  Troponin was also elevated to 254.  EKG is showing a bifascicular block with a slightly widened QRS complex.  Repeat troponin trended down to 244.  I did speak with Dr. Arauz with cardiology as below.  Magnesium is low at 1.5 and this is being replaced.  Urinalysis with no clear evidence of a UTI.  COVID and flu are negative.  Venous  blood gas actually shows a respiratory alkalosis.  I have given him IV Zosyn here.  He is looking improved on BiPAP.  He did start to have low blood pressures and Levophed was started through peripheral line.  Blood pressures are improving.  He has some intermittent agitation I did order Precedex drip but we have not had to start it yet.  Dr. Muñoz is placing a central line so we can start to infuse his pressors centrally.  CT head and CT abdomen and pelvis have been completed.    CT abdomen pelvis is showing worsening acute pancreatitis and cholecystitis.  Head CT read is pending but I have reviewed it and it appears negative for intracranial hemorrhage.    Consultation summary: I have reached out to Dr. Rodriguez, hospitalist for ICU admission. He accepts.    Social determinants of health: none      Labs and imaging reviewed and results discussed with patient. Patient was reassessed as noted above . Plan of care discussed with patient. Patient in agreement with plan.      CLINICAL IMPRESSION  1. Septic shock (HCC)    2. NSTEMI (non-ST elevated myocardial infarction) (HCC)    3. Acute pulmonary edema (HCC)    4. Delirium    5. Acute respiratory failure with hypoxia (HCC)    6. Hypomagnesemia        Blood pressure 104/67, pulse 80, temperature 100 °F (37.8 °C), temperature source Axillary, resp. rate 16, SpO2 100 %.    DISPOSITION  Ari Snider was admitted in guarded condition.  I, Yamilet Hernandez MD am the primary clinician of record.    (Please note this note was completed with a voice recognition program.  Efforts were made to edit the dictations but occasionally words are mis-transcribed.)        Yamilet Hernandez MD  07/06/24 0129

## 2024-07-06 NOTE — PROGRESS NOTES
Extended Infusion B-Lactam Antibiotics: Piperacillin-Tazobactam  Pharmacist Verification:  Review indication, allergies, suspected pathogens, drug interactions, and renal function   Automatically interchange intermittent infusion orders with extended infusion (unless exclusion criteria met)   Automatically adjust dose based on indication and renal function   Automatically adjust timing of antibiotics to avoid compatibility issues, if applicable   Exclusions:   Pediatric patients  Patients in iliana-operative settings   Patients in ambulatory clinics or infusion centers  If a patient has restricted IV access or drug compatibility concerns, patients may receive standard intermittent infusions of antibiotics to minimize antibiotic line time and avoid drug incompatibilities, following discussion between pharmacist and prescriber.  Compatibility Information: For updated compatibility information please refer to [employer] IV Compatibility database in [employer].    Day:  Recent Labs     07/05/24 2031   CREATININE 1.1     Estimated Creatinine Clearance: 56 mL/min (based on SCr of 1.1 mg/dL).  Recent Labs     07/05/24 2031   WBC 10.7       Piperacillin and Tazobactam - Extended Infusion Dosing (4-hour infusion) - Preferred Dosing Strategy   Renal Function (CrCl mL/min) ? 20 < 20, HD, PD CRRT   All Indications - Loading dose of 4500 milligrams x 1 over 30 minutes or via IV push. Maintenance dose   should begin 6 hours after load for CrCl > 20 and 8 hours after load for CrCl < 20.   Maintenance dosing for all indications except as outlined below 3375mg q8h 3375mg q12h 3375mg q8h   Febrile neutropenia, Cystic fibrosis, BMI > 40*, local Pseudomonas susceptibility < 80% (refer to page 3 for indications)   4500mg q8h 4500 q12h 4500mg q8h   *Consider 3375mg q8h for indication of Urinary tract infections in BMI > 40. Adjust for decreased renal function.    Zosyn schedule changed from q12h as ordered to q8h per hospital protocol for

## 2024-07-06 NOTE — PROGRESS NOTES
07/06/24 0353   NIV Type   Equipment Type v60   Mode Bilevel   Mask Type Full face mask   Mask Size Large   Assessment   Pulse 86   Respirations 21   SpO2 100 %   Settings/Measurements   IPAP 10 cmH20   CPAP/EPAP 5 cmH2O   Vt (Measured) 401 mL   Rate Ordered 14   FiO2  30 %   Minute Volume (L/min) 13.9 Liters   Mask Leak (lpm) 2 lpm   Patient's Home Machine No   Patient Observation   Observations spo2 100% on 30% bipap
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
## 2024-07-06 NOTE — CONSULTS
Consultation Note    Patient Name: Ari Snider  : 1935  Age: 88 y.o.     Admitting Physician: Lilian Conner DO   Date of Admission: 2024  7:59 PM   Primary Care Physician: Wilma Rowley, MALATHI - CNP        Ari Snider is being seen at the request of Lilian Conner DO for pancreatitis, elevated lipase,.    History of Present Illness:  88-year-old gentleman who was readmitted after recent hospitalization for gallstone pancreatitis.  He was discharged for about 1 day and then was readmitted with volume overload and shortness of breath.  Repeat CT scan demonstrated pancreatic stranding which was worsening from prior.  Lipase is minimally elevated was previously over .  His liver enzymes are normal.  No hyperbilirubinemia.  He was admitted to the ICU for hypoxic respiratory failure.  Hospital course complicated by delirium he is companied by his son who is his primary decision maker.    He had an MRI during last hospitalization which showed acute pancreatitis without fluid collections no biliary ductal dilation or signs of choledocholithiasis.  Layering sludge and small stones in the gallbladder.  Minimal gallbladder thickening.    Patient not hypoxic respiratory failure right requiring BiPAP on presentation to the ER.  He was given Lasix.  Also hypotension required Levophed.    GI History:    Past Medical History:  Past Medical History:   Diagnosis Date    Abscess of toe of right foot 2011    Acute CVA (cerebrovascular accident) ([facility]) 2021    Blood transfusion     Cellulitis of right lower extremity 2024    Cushing's syndrome ([facility]) 2010    Diabetic ulcer of toe 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 mellitus, with fat layer exposed ([facility])     Greater trochanteric bursitis of right hip 2023    Hypertension     Lumbar spondylosis 2023    Lymphoma ([facility])     MRSA infection 2022    Osteomyelitis of left foot ([facility]) 2021    Tinnitus 2012     88-year-old male  HEENT: Sclera anicteric, mucosal membranes moist, right IJ in place  Cardiovascular: Regular rate and rhythm.  Systolic ejection murmur, S3 heard,  Respiratory: Respirations nonlabored, crackles bilaterally  GI: Abdomen nondistended, soft, and nontender.  Normal active bowel sounds.  No masses palpable.   Rectal: Deferred  Musculoskeletal: History of toe amputation on right lower extremity 1+ edema bilaterally.  Neurological: Gross memory appears intact.  Defers to his son for the history patient is alert      Recent Imaging:   XR CHEST PORTABLE  Narrative: EXAMINATION:  ONE XRAY VIEW OF THE CHEST    7/6/2024 1:23 am    COMPARISON:  Chest radiograph 07/05/2024.    HISTORY:  ORDERING SYSTEM PROVIDED HISTORY: c/p cvc  TECHNOLOGIST PROVIDED HISTORY:  Reason for exam:->c/p cvc  Reason for Exam: central line placement    FINDINGS:  A right transjugular central venous catheter terminates in the lower superior  vena cava.  The cardiac silhouette is enlarged but stable.  Pulmonary  vascular congestion with small bilateral pleural effusions and mild bibasilar  opacities.  No pneumothorax.  No acute osseous abnormality.  Impression: 1. Right transjugular central venous catheter terminates in the lower  superior vena cava. No pneumothorax.  2. Pulmonary vascular congestion with small bilateral pleural effusions and  mild bibasilar opacities likely representing atelectasis.  CT HEAD WO CONTRAST  Narrative: EXAMINATION:  CT OF THE HEAD WITHOUT CONTRAST  7/5/2024 11:08 pm    TECHNIQUE:  CT of the head was performed without the administration of intravenous  contrast. Automated exposure control, iterative reconstruction, and/or weight  based adjustment of the mA/kV was utilized to reduce the radiation dose to as  low as reasonably achievable.    COMPARISON:  None.    HISTORY:  ORDERING SYSTEM PROVIDED HISTORY: ams  TECHNOLOGIST PROVIDED HISTORY:  Has a \"code stroke\" or \"stroke alert\" been called?->No  Reason for  myocardial infarction) (HCC) 7/6/2024 Yes   Pancreatitis  Anemia      88-year-old gentleman recently admitted with gallstone pancreatitis sent out on Augmentin readmitted with CHF exacerbation and volume overload.  I do not think this is another episode of pancreatitis.  CT just shows expected evolution of stranding especially in the setting of volume overload.  He required BiPAP and pressors on presentation although he is slowly improving in the ICU.  I discussed at length with the family he does not have any signs of elevated liver enzymes hyperbilirubinemia and his lipase has returned to normal.  Discussed the CT scan is expected to continue showing pancreatitis this close to the episode. He has not had any signs of GI bleeding.     Plan:  Recommend diuresis blood pressure permitting  Would stop lactated Ringer's at this time  Defer decision on cholecystectomy timing to general surgery  Suspect anemia is multifactorial at this time monitor for signs of overt GI bleeding.  Can continue PPI      Andrea Menjivar MD    [facility]    [phone]. Also available via [employer]    Please note that some or all of this record was generated using voice recognition software. If there are any questions about the content of this document, please contact the author as some errors in translation may have occurred.

## 2024-07-06 NOTE — PROGRESS NOTES
GI consult called to [facility] as patient has history noted, spoke with Wilma. Electronically signed by Julio Mix on 7/6/2024 at 8:11 AM    General Surgery consult called to answering service, Electronically signed by Julio Mix on 7/6/2024 at 8:15 AM

## 2024-07-06 NOTE — PROGRESS NOTES
Admission assessment completed (see flowsheet).  Patient is confused, GCS 14/15, lethargic, with  spontaneous eye openning to voice. On continous BIPAP 16/8/30.    Infusing :  Levophed 5 mcg/kg/min    IV access:  PIV right AC  PIV left AC  PIV right wrist.  CVL right internal jugular vein      On purewick, has not voided yet.

## 2024-07-06 NOTE — PROGRESS NOTES
VTE Prophylaxis Monitoring    Recent Labs     07/05/24 2031   CREATININE 1.1     Recent Labs     07/05/24 2031   HGB 9.8*   HCT 29.1*        Estimated Creatinine Clearance: 54 mL/min (based on SCr of 1.1 mg/dL).  Wt Readings from Last 1 Encounters:   07/06/24 102.5 kg (226 lb)       The guidance below is to provide initial recommendations for dosing. If recommended dose does not align well with patient's current clinical picture, communications with the care team will occur to determine most appropriate medication and dose.    Do not exceed enoxaparin 40mg daily or UFH 5000 units SUBQ TID in patients with epidurals, lumbar drains, or external ventricular drains    TABLE 1.  ENOXAPARIN ROUTINE PROPHYLAXIS DOSING (Medically ill, routine surgery)   Patient Weight (kg)     50.9 and below 51 - 100.9 101 - 150.9 151 - 174.9 175 or greater         Estimated CrCl  (ml/min) 30 or greater   30 mg SUBQ daily   40 mg SUBQ daily 30 mg SUBQ BID  40 mg SUBQ BID 60mg SUBQ BID      15-29 UFH 5000 units SUBQ BID   30 mg SUBQ daily 30 mg SUBQ daily 40 mg SUBQ daily   60 mg SUBQ daily      Less than 15 or Dialysis UFH 5000 units SUBQ BID   UFH 5000 units SUBQ TID UFH 7500 units SUBQ TID     Enoxaparin dosage changed from 40 mg daily as ordered to 30 mg twice daily per above protocol for wt of 102.5 kg and CrCl of 54 ml/min at the time of order verification.    Terri Muñoz RPH 7/6/2024 7:36 AM

## 2024-07-06 NOTE — PROGRESS NOTES
Cardiologist updated at this time on Pt's repeat BNP, troponin, and EKG. No new orders at this time. Will continue to monitor.

## 2024-07-06 NOTE — ED PROVIDER NOTES
Central Line    Date/Time: 7/6/2024 2:25 AM    Performed by: Karl Muñoz MD  Authorized by: Yamilet Hernandez MD    Consent:     Consent obtained:  Verbal and written    Consent given by:  Healthcare agent    Risks, benefits, and alternatives were discussed: yes      Risks discussed:  Incorrect placement, bleeding, infection and pneumothorax    Alternatives discussed:  No treatment and delayed treatment  Universal protocol:     Patient identity confirmed:  Hospital-assigned identification number and arm band  Pre-procedure details:     Indication(s): central venous access and insufficient peripheral access      Hand hygiene: Hand hygiene performed prior to insertion      Sterile barrier technique: All elements of maximal sterile technique followed      Skin preparation:  Chlorhexidine    Skin preparation agent: Skin preparation agent completely dried prior to procedure    Sedation:     Sedation type:  None  Anesthesia:     Anesthesia method:  None  Procedure details:     Location:  R internal jugular    Site selection rationale:  Positioning, habitus    Patient position:  Trendelenburg    Procedural supplies:  Triple lumen    Catheter size:  7 Fr    Landmarks identified: yes      Ultrasound guidance: yes      Ultrasound guidance timing: prior to insertion and real time      Sterile ultrasound techniques: Sterile gel and sterile probe covers were used      Number of attempts:  1    Successful placement: yes    Post-procedure details:     Post-procedure:  Dressing applied and line sutured    Assessment:  Blood return through all ports, free fluid flow and placement verified by x-ray    Procedure completion:  Tolerated    Central line was placed to assist Dr. Hernandez because the patient was on vasopressors.  Risk and benefits were discussed with the family and they did consent to the procedure.  Attention was placed without complication and placement was verified with chest x-ray.      Karl Muñoz MD  07/06/24

## 2024-07-06 NOTE — PROGRESS NOTES
Notified the MD that the limited code status of the patient is No to intubation, yes to defibrillation, no to CPR, yes to resuscitative meds.  As per the POA Ari Snider Jr.  Code status not yet updated until now @ 0687hr

## 2024-07-06 NOTE — PLAN OF CARE
HEART FAILURE CARE PLAN:    Comorbidities Reviewed: Yes   Patient has a past medical history of Abscess of toe of right foot, Acute CVA (cerebrovascular accident) ([facility]), Blood transfusion, Cellulitis of right lower extremity, Cushing's syndrome ([facility]), Diabetic ulcer of toe 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 mellitus, with fat layer exposed ([facility]), Greater trochanteric bursitis of right hip, Hypertension, Lumbar spondylosis, Lymphoma ([facility]), MRSA infection, Osteomyelitis of left foot ([facility]), Tinnitus, Tobacco use, Ulcer of other part of foot, Vertigo, benign paroxysmal, and 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     Weights Reviewed: Yes   Admission weight: 102.5 kg (226 lb)   Wt Readings from Last 3 Encounters:   07/06/24 102.5 kg (226 lb)   07/04/24 105.2 kg (232 lb)   06/27/24 101.6 kg (224 lb)     Intake & Output Reviewed: Yes     Intake/Output Summary (Last 24 hours) at 7/6/2024 0606  Last data filed at 7/6/2024 0430  Gross per 24 hour   Intake 10 ml   Output 0 ml   Net 10 ml       ECHOCARDIOGRAM Reviewed: Yes   Patient's Ejection Fraction (EF) is greater than 40%     Medications Reviewed: Yes   SCHEDULED HOSPITAL MEDICATIONS:   piperacillin-tazobactam  3,375 mg IntraVENous Q8H    insulin lispro  0-4 Units SubCUTAneous TID WC    insulin lispro  0-4 Units SubCUTAneous Nightly     HOME MEDICATIONS:  Prior to Admission medications    Medication Sig Start Date End Date Taking? Authorizing Provider   insulin aspart (NOVOLOG FLEXPEN) 100 UNIT/ML injection pen Inject 8 Units into the skin every 6 hours For blood sugars greater than 250 7/5/24 8/4/24  Wilma Rowley APRN - CNP   insulin glargine (LANTUS SOLOSTAR) 100 UNIT/ML injection pen Inject 10 Units into the skin nightly 7/5/24   Wilma Rowley APRN - CNP   Insulin Pen Needle 32G X 4 MM MISC 1 each by Does not apply route daily 7/5/24   Wilma Rowley APRN - CNP   albuterol sulfate HFA (PROVENTIL HFA) 108 (90 Base) MCG/ACT inhaler Inhale 2 puffs into the lungs every 6  hours as needed for Wheezing  Patient not taking: Reported on 7/6/2024 7/5/24   Wilma Rowley APRN - CNP   amoxicillin-clavulanate (AUGMENTIN) 875-125 MG per tablet Take 1 tablet by mouth 2 times daily for 2 days 7/4/24 7/6/24  Noel Holm MD   tamsulosin (FLOMAX) 0.4 MG capsule Take 1 capsule by mouth daily 6/15/24   Escobar Gastelum MD   Lancets MISC 1 each by Does not apply route 2 times daily 6/6/24   Wilma Rowley APRN - CNP   blood glucose monitor strips Test 2 times a day & as needed for symptoms of irregular blood glucose. 6/5/24   Wilma Rowley APRN - CNP   atorvastatin (LIPITOR) 40 MG tablet TAKE ONE TABLET BY MOUTH DAILY 11/22/23   Wilma Rowley APRN - CNP   pantoprazole (PROTONIX) 40 MG tablet Take 1 tablet by mouth every morning (before breakfast) 7/11/23   Escobar Gastelum MD   metFORMIN (GLUCOPHAGE) 1000 MG tablet TAKE ONE TABLET BY MOUTH TWICE A DAY WITH A MEAL FOR DIABETES 7/10/23   Wilma Rowley APRN - CNP   aspirin 81 MG EC tablet Take 1 tablet by mouth daily  Patient not taking: Reported on 7/6/2024    ProviderMoe MD   Multiple Vitamins-Minerals (MULTIVITAMIN PO) Take 1 tablet by mouth daily.    Moe Beasley MD      Diet Reviewed: Yes   Diet NPO    Goal of Care Reviewed: Yes   Patient and/or Family's stated Goal of Care this Admission: Reduce shortness of breath, increase activity tolerance, better understand heart failure and disease management, be more comfortable, and reduce lower extremity edema prior to discharge.     Electronically signed by Veronica Lazar RN on 7/6/2024 at 6:06 AM

## 2024-07-06 NOTE — PLAN OF CARE
Problem: Safety - Adult  Goal: Free from fall injury  7/6/2024 1058 by Sae Solorio RN  Outcome: Progressing  7/6/2024 1015 by Anuja Arrington RN  Outcome: Progressing  7/6/2024 0457 by Veronica Lazar RN  Outcome: Progressing  7/6/2024 0457 by Veronica Lazar RN  Outcome: Progressing     Problem: Discharge Planning  Goal: Discharge to home or other facility with appropriate resources  7/6/2024 1058 by Sae Solorio RN  Outcome: Progressing  7/6/2024 1015 by Anuja Arrington RN  Outcome: Progressing  Flowsheets  Taken 7/6/2024 0852 by Sae Solorio RN  Discharge to home or other facility with appropriate resources: Identify barriers to discharge with patient and caregiver  Taken 7/6/2024 0516 by Veronica Lazar RN  Discharge to home or other facility with appropriate resources: Identify barriers to discharge with patient and caregiver  7/6/2024 0457 by Veronica Lazar RN  Outcome: Progressing  Flowsheets (Taken 7/6/2024 0440)  Discharge to home or other facility with appropriate resources:   Identify barriers to discharge with patient and caregiver   Arrange for needed discharge resources and transportation as appropriate     Problem: ABCDS Injury Assessment  Goal: Absence of physical injury  Outcome: Progressing     Problem: Skin/Tissue Integrity  Goal: Absence of new skin breakdown  Description: 1.  Monitor for areas of redness and/or skin breakdown  2.  Assess vascular access sites hourly  3.  Every 4-6 hours minimum:  Change oxygen saturation probe site  4.  Every 4-6 hours:  If on nasal continuous positive airway pressure, respiratory therapy assess nares and determine need for appliance change or resting period.  Outcome: Progressing     Problem: Confusion  Goal: Confusion, delirium, dementia, or psychosis is improved or at baseline  Description: INTERVENTIONS:  1. Assess for possible contributors to thought disturbance, including medications,  impaired vision or hearing, underlying metabolic abnormalities, dehydration, psychiatric diagnoses, and notify attending LIP  2. [place] high risk fall precautions, as indicated  3. Provide frequent short contacts to provide reality reorientation, refocusing and direction  4. Decrease environmental stimuli, including noise as appropriate  5. Monitor and intervene to maintain adequate nutrition, hydration, elimination, sleep and activity  6. If unable to ensure safety without constant attention obtain sitter and review sitter guidelines with assigned personnel  7. Initiate Psychosocial CNS and Spiritual Care consult, as indicated  Outcome: Progressing  Flowsheets (Taken 7/6/2024 3484)  Effect of thought disturbance (confusion, delirium, dementia, or psychosis) are managed with adequate functional status: Assess for contributors to thought disturbance, including medications, impaired vision or hearing, underlying metabolic abnormalities, dehydration, psychiatric diagnoses, notify LIP
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
## 2024-07-06 NOTE — PROGRESS NOTES
Patient remains lethargic, on BIPAP 14/10/30%  On levo 2 mcg/kg/min  On purewick, with 500 mls of urine output.  CVL and PIVs remain intact.

## 2024-07-06 NOTE — PROGRESS NOTES
Bedside report and Pt care transferred to Veronica MARTINEZ. Pt denies any assistance at this time.

## 2024-07-06 NOTE — PLAN OF CARE
Problem: Safety - Adult  Goal: Free from fall injury  7/6/2024 0457 by Veronica Lazar, RN  Outcome: Progressing  7/6/2024 0457 by Veronica Lazar, RN  Outcome: Progressing     Problem: Discharge Planning  Goal: Discharge to home or other facility with appropriate resources  Outcome: Progressing  Flowsheets (Taken 7/6/2024 0440)  Discharge to home or other facility with appropriate resources:   Identify barriers to discharge with patient and caregiver   Arrange for needed discharge resources and transportation as appropriate

## 2024-07-06 NOTE — PROGRESS NOTES
07/05/24 7601   NIV Type   $NIV $Daily Charge   Equipment Type v60   Mode Bilevel   Mask Type Full face mask   Mask Size Large   Assessment   Pulse (!) 108   Respirations 21   SpO2 96 %   Settings/Measurements   IPAP 16 cmH20   CPAP/EPAP 8 cmH2O   Vt (Measured) 560 mL   Rate Ordered 14   FiO2  30 %   Minute Volume (L/min) 16.2 Liters   Mask Leak (lpm) 0 lpm   Patient's Home Machine No   Patient Observation   Observations spo2 97% on 30% bipap

## 2024-07-06 NOTE — DISCHARGE SUMMARY
Patient was seen after midnight. Admitted for sepsis and worsening acute pancreatitis. Code status changed to DNR-CC      KEVON SPRING MD 7/6/2024 7:34 AM

## 2024-07-06 NOTE — PROGRESS NOTES
Spoke to Cardiologist Dr. Hunt, about Cardiology consult  STAT EKG order, STAT troponin, BNP, and CBC sent , results pending

## 2024-07-06 NOTE — H&P
Hospital Medicine History & Physical      Patient Name: Ari Snider    : 1935    PCP: Wilma Rowley, MALATHI - CNP    Date of Service:  Patient seen and examined on 24     Chief Complaint: Shortness of breath    History Of Present Illness:    Ari Snider is a 88 y.o. male with a PMH of hypertension, aortic stenosis, diabetes, CAD, PVD, CVA, who presented to ED with complaint of shortness of breath.    Of note after extensive discussion with patient's son and daughter-in-law at bedside CODE STATUS is DNR CC  Patient was recently admitted from  to  for acute cholecystitis/acute pancreatitis with associated transaminitis.  Recently discharged with Augmentin to complete course of antibiotics.  Presents to the ED with shortness of breath, chest pain and abdominal pain with associated confusion..  Per family patient has not been feeling well since his discharge from the hospital over 2 days ago.  Has not been eating and drinking properly.  Has been more confused.  Has been taken off of his diabetic medication and his blood sugar has been elevated in the 500s.  Patient's PCP 1045 short acting insulin with no improvement brought into the ED for further evaluation.  Of note patient was initiated on the sepsis protocol, given bolus IV fluids.  With no improvement in his blood pressure was initiated on Levophed after central line was placed.  Sedation with Precedex.  IV antibiotics given.  And placed on BiPAP currently 10/5, 30% with sats greater than 92%    Labs show sodium of 135, magnesium 1.5, lactate of 3.2, glucose of 283,.  proBNP of 25,536, troponin is 254/244.  WBC of 10.7 hemoglobin 9.8.  Influenza A/B/COVID-negative.  Urinalysis not indicative of a UTI.  VBG shows pH of 7.496, pCO2 of 27.2, pCO2 65.7, bicarb of 20.5    Chest x-ray concerning for CHF.  CT head shows no acute intracranial abnormality.  CT abdomen and pelvis shows worsening pancreatitis, gallbladder wall  MALATHI [facility] CNP   amoxicillin-clavulanate (AUGMENTIN) 875-125 MG per tablet Take 1 tablet by mouth 2 times daily for 2 days 7/4/24 7/6/24  Noel Holm MD   tamsulosin (FLOMAX) 0.4 MG capsule Take 1 capsule by mouth daily 6/15/24   Escobar Gastelum MD   Lancets MISC 1 each by Does not apply route 2 times daily 6/6/24   Wilma Rowley APRN - CNP   blood glucose monitor strips Test 2 times a day & as needed for symptoms of irregular blood glucose. 6/5/24   Wilma Rowley APRN - CNP   atorvastatin (LIPITOR) 40 MG tablet TAKE ONE TABLET BY MOUTH DAILY 11/22/23   Wilma Rowley APRN - CNP   pantoprazole (PROTONIX) 40 MG tablet Take 1 tablet by mouth every morning (before breakfast) 7/11/23   Escobar Gastelum MD   metFORMIN (GLUCOPHAGE) 1000 MG tablet TAKE ONE TABLET BY MOUTH TWICE A DAY WITH A MEAL FOR DIABETES 7/10/23   Wilma Rowley APRN - CNP   aspirin 81 MG EC tablet Take 1 tablet by mouth daily    ProviderMoe MD   Multiple Vitamins-Minerals (MULTIVITAMIN PO) Take 1 tablet by mouth daily.    Provider, MD Moe       Allergies:  Patient has no known allergies.    Social History:      TOBACCO:   reports that he has quit smoking. He has never used smokeless tobacco.  ETOH:   reports no history of alcohol use.  DRUGS:  reports no history of drug use.    Family History:      Reviewed in detail positive as follows:        Problem Relation Age of Onset    Other Mother     Other Father     No Known Problems Sister     No Known Problems Sister     Cancer Brother     No Known Problems Brother        REVIEW OF SYSTEMS:   Pertinent positives as noted in the HPI. All other systems reviewed and negative.    PHYSICAL EXAM PERFORMED:    /61   Pulse 86   Temp 100 °F (37.8 °C) (Axillary)   Resp 21   SpO2 100%     General appearance: Lethargic on BiPAP  HEENT:  Normocephalic, atraumatic   Neck: Supple, right IJ  Respiratory:   diminished breath sounds bilateral lung field

## 2024-07-07 PROBLEM — I95.9 HYPOTENSION: Status: ACTIVE | Noted: 2024-07-07

## 2024-07-07 PROBLEM — K86.1 CHRONIC BILIARY PANCREATITIS (HCC): Status: ACTIVE | Noted: 2024-07-07

## 2024-07-07 PROBLEM — R57.9 SHOCK (HCC): Status: ACTIVE | Noted: 2024-07-06

## 2024-07-07 PROBLEM — I50.31 ACUTE DIASTOLIC CHF (CONGESTIVE HEART FAILURE) (HCC): Status: ACTIVE | Noted: 2024-07-07

## 2024-07-07 PROBLEM — R79.89 ELEVATED TROPONIN: Status: ACTIVE | Noted: 2024-07-07

## 2024-07-07 PROBLEM — I50.33 ACUTE ON CHRONIC DIASTOLIC CONGESTIVE HEART FAILURE (HCC): Status: ACTIVE | Noted: 2024-07-07

## 2024-07-07 LAB
ANION GAP SERPL CALCULATED.3IONS-SCNC: 10 MMOL/L (ref 3–16)
ANISOCYTOSIS BLD QL SMEAR: ABNORMAL
BASOPHILS # BLD: 0 K/UL (ref 0–0.2)
BASOPHILS NFR BLD: 0 %
BUN SERPL-MCNC: 17 MG/DL (ref 7–20)
CALCIUM SERPL-MCNC: 8.4 MG/DL (ref 8.3–10.6)
CHLORIDE SERPL-SCNC: 104 MMOL/L (ref 99–110)
CO2 SERPL-SCNC: 25 MMOL/L (ref 21–32)
CREAT SERPL-MCNC: 1.2 MG/DL (ref 0.8–1.3)
DEPRECATED RDW RBC AUTO: 16.2 % (ref 12.4–15.4)
EOSINOPHIL # BLD: 0.6 K/UL (ref 0–0.6)
EOSINOPHIL NFR BLD: 7 %
GFR SERPLBLD CREATININE-BSD FMLA CKD-EPI: 58 ML/MIN/{1.73_M2}
GLUCOSE BLD-MCNC: 182 MG/DL (ref 70–99)
GLUCOSE BLD-MCNC: 203 MG/DL (ref 70–99)
GLUCOSE BLD-MCNC: 224 MG/DL (ref 70–99)
GLUCOSE BLD-MCNC: 239 MG/DL (ref 70–99)
GLUCOSE BLD-MCNC: 269 MG/DL (ref 70–99)
GLUCOSE SERPL-MCNC: 199 MG/DL (ref 70–99)
HCT VFR BLD AUTO: 21.9 % (ref 40.5–52.5)
HGB BLD-MCNC: 7.3 G/DL (ref 13.5–17.5)
HYPOCHROMIA BLD QL SMEAR: ABNORMAL
INR PPP: 1.69 (ref 0.85–1.15)
LYMPHOCYTES # BLD: 1.3 K/UL (ref 1–5.1)
LYMPHOCYTES NFR BLD: 16 %
MAGNESIUM SERPL-MCNC: 1.7 MG/DL (ref 1.8–2.4)
MCH RBC QN AUTO: 30.2 PG (ref 26–34)
MCHC RBC AUTO-ENTMCNC: 33.2 G/DL (ref 31–36)
MCV RBC AUTO: 91.1 FL (ref 80–100)
MONOCYTES # BLD: 0.3 K/UL (ref 0–1.3)
MONOCYTES NFR BLD: 3 %
NEUTROPHILS # BLD: 6.2 K/UL (ref 1.7–7.7)
NEUTROPHILS NFR BLD: 74 %
OVALOCYTES BLD QL SMEAR: ABNORMAL
PERFORMED ON: ABNORMAL
PLATELET # BLD AUTO: 260 K/UL (ref 135–450)
PLATELET BLD QL SMEAR: ADEQUATE
PMV BLD AUTO: 7.6 FL (ref 5–10.5)
POLYCHROMASIA BLD QL SMEAR: ABNORMAL
POTASSIUM SERPL-SCNC: 4 MMOL/L (ref 3.5–5.1)
PROCALCITONIN SERPL IA-MCNC: 0.3 NG/ML (ref 0–0.15)
PROTHROMBIN TIME: 20 SEC (ref 11.9–14.9)
RBC # BLD AUTO: 2.41 M/UL (ref 4.2–5.9)
SLIDE REVIEW: ABNORMAL
SODIUM SERPL-SCNC: 139 MMOL/L (ref 136–145)
TROPONIN, HIGH SENSITIVITY: 212 NG/L (ref 0–22)
WBC # BLD AUTO: 8.4 K/UL (ref 4–11)

## 2024-07-07 PROCEDURE — 83735 ASSAY OF MAGNESIUM: CPT

## 2024-07-07 PROCEDURE — 85025 COMPLETE CBC W/AUTO DIFF WBC: CPT

## 2024-07-07 PROCEDURE — 6360000002 HC RX W HCPCS: Performed by: INTERNAL MEDICINE

## 2024-07-07 PROCEDURE — 99222 1ST HOSP IP/OBS MODERATE 55: CPT | Performed by: INTERNAL MEDICINE

## 2024-07-07 PROCEDURE — 2000000000 HC ICU R&B

## 2024-07-07 PROCEDURE — 99233 SBSQ HOSP IP/OBS HIGH 50: CPT | Performed by: INTERNAL MEDICINE

## 2024-07-07 PROCEDURE — 6370000000 HC RX 637 (ALT 250 FOR IP): Performed by: INTERNAL MEDICINE

## 2024-07-07 PROCEDURE — 85610 PROTHROMBIN TIME: CPT

## 2024-07-07 PROCEDURE — 80048 BASIC METABOLIC PNL TOTAL CA: CPT

## 2024-07-07 PROCEDURE — 36415 COLL VENOUS BLD VENIPUNCTURE: CPT

## 2024-07-07 PROCEDURE — 2580000003 HC RX 258: Performed by: INTERNAL MEDICINE

## 2024-07-07 PROCEDURE — 84145 PROCALCITONIN (PCT): CPT

## 2024-07-07 PROCEDURE — 84484 ASSAY OF TROPONIN QUANT: CPT

## 2024-07-07 PROCEDURE — 99232 SBSQ HOSP IP/OBS MODERATE 35: CPT | Performed by: SURGERY

## 2024-07-07 RX ORDER — ASPIRIN 81 MG/1
81 TABLET, CHEWABLE ORAL DAILY
Status: DISCONTINUED | OUTPATIENT
Start: 2024-07-07 | End: 2024-07-11 | Stop reason: HOSPADM

## 2024-07-07 RX ORDER — FUROSEMIDE 10 MG/ML
40 INJECTION INTRAMUSCULAR; INTRAVENOUS ONCE
Status: COMPLETED | OUTPATIENT
Start: 2024-07-07 | End: 2024-07-07

## 2024-07-07 RX ORDER — ATORVASTATIN CALCIUM 40 MG/1
40 TABLET, FILM COATED ORAL NIGHTLY
Status: DISCONTINUED | OUTPATIENT
Start: 2024-07-07 | End: 2024-07-11 | Stop reason: HOSPADM

## 2024-07-07 RX ADMIN — SODIUM CHLORIDE, PRESERVATIVE FREE 10 ML: 5 INJECTION INTRAVENOUS at 08:58

## 2024-07-07 RX ADMIN — INSULIN LISPRO 4 UNITS: 100 INJECTION, SOLUTION INTRAVENOUS; SUBCUTANEOUS at 15:29

## 2024-07-07 RX ADMIN — ENOXAPARIN SODIUM 30 MG: 100 INJECTION SUBCUTANEOUS at 20:05

## 2024-07-07 RX ADMIN — SODIUM CHLORIDE, PRESERVATIVE FREE 10 ML: 5 INJECTION INTRAVENOUS at 20:23

## 2024-07-07 RX ADMIN — FUROSEMIDE 40 MG: 10 INJECTION, SOLUTION INTRAMUSCULAR; INTRAVENOUS at 08:58

## 2024-07-07 RX ADMIN — PANTOPRAZOLE SODIUM 40 MG: 40 INJECTION, POWDER, FOR SOLUTION INTRAVENOUS at 08:58

## 2024-07-07 RX ADMIN — PIPERACILLIN AND TAZOBACTAM 3375 MG: 3; .375 INJECTION, POWDER, LYOPHILIZED, FOR SOLUTION INTRAVENOUS at 20:03

## 2024-07-07 RX ADMIN — INSULIN LISPRO 4 UNITS: 100 INJECTION, SOLUTION INTRAVENOUS; SUBCUTANEOUS at 12:01

## 2024-07-07 RX ADMIN — ENOXAPARIN SODIUM 30 MG: 100 INJECTION SUBCUTANEOUS at 08:58

## 2024-07-07 RX ADMIN — INSULIN LISPRO 8 UNITS: 100 INJECTION, SOLUTION INTRAVENOUS; SUBCUTANEOUS at 08:58

## 2024-07-07 RX ADMIN — PIPERACILLIN AND TAZOBACTAM 3375 MG: 3; .375 INJECTION, POWDER, LYOPHILIZED, FOR SOLUTION INTRAVENOUS at 12:01

## 2024-07-07 RX ADMIN — INSULIN GLARGINE 5 UNITS: 100 INJECTION, SOLUTION SUBCUTANEOUS at 20:24

## 2024-07-07 RX ADMIN — ATORVASTATIN CALCIUM 40 MG: 40 TABLET, FILM COATED ORAL at 15:22

## 2024-07-07 RX ADMIN — ASPIRIN 81 MG: 81 TABLET, CHEWABLE ORAL at 15:22

## 2024-07-07 RX ADMIN — PIPERACILLIN AND TAZOBACTAM 3375 MG: 3; .375 INJECTION, POWDER, LYOPHILIZED, FOR SOLUTION INTRAVENOUS at 05:15

## 2024-07-07 NOTE — PLAN OF CARE
Problem: Safety - Adult  Goal: Free from fall injury  7/6/2024 2113 by Veronica Lazar RN  Outcome: Progressing     Taken 7/6/2024 0516 by Veronica Lazar RN  Discharge to home or other facility with appropriate resources: Identify barriers to discharge with patient and caregiver     Problem: ABCDS Injury Assessment  Goal: Absence of physical injury  7/6/2024 2113 by Veronica Lazar RN  Outcome: Progressing     Problem: Skin/Tissue Integrity  Goal: Absence of new skin breakdown  Description: 1.  Monitor for areas of redness and/or skin breakdown  2.  Assess vascular access sites hourly  3.  Every 4-6 hours minimum:  Change oxygen saturation probe site  4.  Every 4-6 hours:  If on nasal continuous positive airway pressure, respiratory therapy assess nares and determine need for appliance change or resting period.  7/6/2024 2113 by Veronica Lazar RN  Outcome: Progressing  Problem: Confusion  Goal: Confusion, delirium, dementia, or psychosis is improved or at baseline  Description: INTERVENTIONS:  1. Assess for possible contributors to thought disturbance, including medications, impaired vision or hearing, underlying metabolic abnormalities, dehydration, psychiatric diagnoses, and notify attending LIP  2. [place] high risk fall precautions, as indicated  3. Provide frequent short contacts to provide reality reorientation, refocusing and direction  4. Decrease environmental stimuli, including noise as appropriate  5. Monitor and intervene to maintain adequate nutrition, hydration, elimination, sleep and activity  6. If unable to ensure safety without constant attention obtain sitter and review sitter guidelines with assigned personnel  7. Initiate Psychosocial CNS and Spiritual Care consult, as indicated

## 2024-07-07 NOTE — CONSULTS
CARDIOLOGY CONSULTATION        Patient Name: Ari Snider  Date of admission: 7/5/2024  7:59 PM  Admission Dx: Hypomagnesemia [E83.42]  Delirium [R41.0]  Acute pulmonary edema (HCC) [J81.0]  NSTEMI (non-ST elevated myocardial infarction) (HCC) [I21.4]  Acute respiratory failure with hypoxia (HCC) [J96.01]  Septic shock (HCC) [A41.9, R65.21]  Requesting Physician: Randolph Rodriguez MD  Primary Care physician: Wilma Rowley APRN - CNP    Reason for Consultation/Chief Complaint: Elevated troponin     History of Present Illness:     Ari Snider is a 88 y.o. patient with a prior medical history notable for moderate aortic stenosis, coronary disease, PAD, CVA, diabetes and hypertension who presented to the hospital with complaints of shortness of breath.    Patient follows with Dr. Cordoba of [facility], last evaluation 1/2023.  Course: Patient recently admitted 6/26 to 7/4 for acute cholecystitis/acute pancreatitis.  This was medically managed.  On return home the patient was failing to thrive, inadequate p.o. intake, increasing confusion.  Elevated blood sugars.  Patient was readmitted 7/6 with concerns for sepsis and hypoxic respiratory failure requiring BiPAP.  EKG showed sinus tachycardia with bifascicular block.  No significant serial changes on repeat.  Labs notable for elevated lactate, proBNP over 25,000, elevated troponin 254/244.  Cardiology service consulted for elevated troponin.    Patient seen today with no family bedside.  Care discussed with bedside RN.  Notes improving shortness of breath.  Mild lower extremity edema and he is receiving Lasix this morning.  Denies chest pain at any point.  No palpitations or dizziness.  No ongoing fever, cough or sputum production.  No present abdominal discomfort although he had this as well as some nausea prior to admission.      Past Medical History:   has a past medical history of Abscess of toe of right foot, Acute CVA (cerebrovascular accident) (HCC), Blood  given his limited CODE STATUS, age and comorbidities.  He is not a good Cath Lab candidate.   -As he is stable without chest pain at this time, significant anemia, would hold off on therapeutic anticoagulation in this 88-year-old frail patient.    -Baby aspirin daily is okay and will start.  Okay to start statin therapy as liver enzymes have normalized on the heels of acute pancreatitis episode and GI team does not feel there is active new flare    Volume overload/HFpEF, acute   -Lasix 40 mg IV given yesterday with total 1.1 L urine output with 2 unmeasured voids   -Redosing today.  He does not appear significantly volume overloaded.  Follow-up echo results    Moderate aortic stenosis    Possible sepsis  Fever   -Broad-spectrum antibiotics.  Weaned from Levophed.  Blood pressure stable.  No documented fever.    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, with necrosis of bone ([facility])    Type 2 diabetes mellitus without complication, without long-term current use of insulin ([facility])    Benign essential HTN    Lymphoma ([facility])    Class 1 obesity due to excess calories with serious comorbidity and body mass index (BMI) of 34.0 to 34.9 in adult    Acquired lymphedema of lower extremity    CAD (coronary artery disease)    Anemia    Sensorineural hearing loss, bilateral    Nonrheumatic aortic valve stenosis    Chronic pansinusitis    PVD (peripheral vascular disease) ([facility])    Bilateral carotid artery stenosis    Atherosclerosis of abdominal aorta ([facility])    Right bundle branch block    Lumbar spondylosis    Type 2 diabetes mellitus with hyperglycemia ([facility])    Acute osteomyelitis of metatarsal bone of right foot ([facility])     Bacteremia due to methicillin resistant Staphylococcus aureus    Chest pain    Epigastric pain    Gallbladder sludge    Right upper quadrant abdominal pain    Acute pancreatitis, unspecified complication status, unspecified pancreatitis type    Acute cholecystitis    Transaminitis    Shock (HCC)    NSTEMI (non-ST elevated myocardial infarction) (HCC)    Hypotension    Acute diastolic CHF (congestive heart failure) (HCC)    Chronic biliary pancreatitis (HCC)    Elevated troponin         I will address the patient's cardiac risk factors and adjusted pharmacologic treatment as needed. In addition, I have reinforced the need for patient directed risk factor modification.  All questions and concerns were addressed to the patient/family. Alternatives to my treatment were discussed.     Thank you for allowing us to participate in the care of Ari Snidre. Please call me with any questions [phone].    Nick Hunt MD, [facility]  Cardiovascular Disease  [facility]  [phone] [place] Office  [phone] [place] Office  7/7/2024 8:24 AM

## 2024-07-07 NOTE — PLAN OF CARE
HEART FAILURE CARE PLAN:    Comorbidities Reviewed: Yes   Patient has a past medical history of Abscess of toe of right foot, Acute CVA (cerebrovascular accident) (HCC), Blood transfusion, Cellulitis of right lower extremity, Cushing's syndrome (HCC), Diabetic ulcer of toe 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 mellitus, with fat layer exposed (HCC), Greater trochanteric bursitis of right hip, Hypertension, Lumbar spondylosis, Lymphoma (HCC), MRSA infection, Osteomyelitis of left foot (HCC), Tinnitus, Tobacco use, Ulcer of other part of foot, Vertigo, benign paroxysmal, and 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[facility]).     Weights Reviewed: Yes   Admission weight: 102.5 kg (226 lb)   Wt Readings from Last 3 Encounters:   07/06/24 102.5 kg (226 lb)   07/04/24 105.2 kg (232 lb)   06/27/24 101.6 kg (224 lb)     Intake & Output Reviewed: Yes     Intake/Output Summary (Last 24 hours) at 7/6/2024 2121  Last data filed at 7/6/2024 2000  Gross per 24 hour   Intake 725.1 ml   Output 1700 ml   Net -974.9 ml       ECHOCARDIOGRAM Reviewed: Yes   Patient's Ejection Fraction (EF) is greater than 40%     Medications Reviewed: Yes   SCHEDULED HOSPITAL MEDICATIONS:   sodium chloride flush  5-40 mL IntraVENous 2 times per day    enoxaparin  30 mg SubCUTAneous BID    piperacillin-tazobactam  3,375 mg IntraVENous Q8H    insulin lispro  0-16 Units SubCUTAneous TID WC    insulin lispro  0-4 Units SubCUTAneous Nightly    insulin glargine  5 Units SubCUTAneous Nightly    pantoprazole  40 mg IntraVENous Daily     HOME MEDICATIONS:  Prior to Admission medications    Medication Sig Start Date End Date Taking? Authorizing Provider   insulin aspart (NOVOLOG FLEXPEN) 100 UNIT/ML injection pen Inject 8 Units into the skin every 6 hours For blood sugars greater than 250 7/5/24 8/4/24  Wilma Rowley APRN - CNP   insulin glargine (LANTUS SOLOSTAR) 100 UNIT/ML injection pen Inject 10 Units into the skin nightly 7/5/24   Wilma Rowley APRN - CNP   Insulin Pen

## 2024-07-07 NOTE — PROGRESS NOTES
Shift assessment completed (see flowsheet).  Patient is alert and oriented x4.  On room air, no signs of respiratory distress.    No infusions.    IV access:  PIV right AC  PIV left AC  PIV right wrist   CVL right internal jugular vein    On purewick

## 2024-07-07 NOTE — FLOWSHEET NOTE
07/07/24 0700   Vitals   Temp 98.9 °F (37.2 °C)   Temp Source Oral   Pulse 89   Respirations 25   /66   MAP (Calculated) 90   MAP (mmHg) 88   BP Method Automatic   Cardiac Rhythm Sinus rhythm   Oxygen Therapy   SpO2 96 %   Pulse Oximetry Type Continuous   O2 Device None (Room air)     Vital signs stable. Pt is alert and oriented X4 and denies any SOB, nausea, CP or abdominal pain at the moment. Nothing new noted on head to toe assessment. BLE remain edematous. Pt is NSR on the monitor. Pt continues to saturate well on RA. External urinary catheter remains secure in place. Morning medication administration completed. 40 mg IV lasix X 1 provided per MD orders. Pt denies any further assistance at the moment. Will continue to monitor.

## 2024-07-07 NOTE — PROGRESS NOTES
Pt 97% RA, respirations e/e with no signs of distress. Pt doesn't want to wear bipap, complaining it was hurting his nose. Leaving pt off bipap, will continue to monitor.

## 2024-07-07 NOTE — PROGRESS NOTES
Pulmonary & Critical Care Medicine ICU Progress Note    CC: confusion, sob, CP     Events of Last 24 hours: no CP, SOB improved    Invasive Lines: PIV    MV:      / / /FiO2 : 30 %  No results for input(s): \"PHART\", \"AOU5RFN\", \"PO2ART\" in the last 72 hours.    IV:   sodium chloride Stopped (07/06/24 1210)    dextrose      norepinephrine Stopped (07/06/24 1031)       Vitals:  /66   Pulse 88   Temp 99.5 °F (37.5 °C)   Resp 25   Ht 1.702 m (5' 7\")   Wt 102.8 kg (226 lb 11.2 oz)   SpO2 96%   BMI 35.51 kg/m²   on RA    Constitutional:  No acute distress.   Eyes: PERRL. Conjunctivae anicteric.   ENT: Normal nose. Normal tongue.    Neck:  Trachea is midline.   Respiratory: No accessory muscle usage.   Decreased breath sounds. No wheezes. No rales. No Rhonchi.  Cardiovascular: Normal S1S2. No digit clubbing. No digit cyanosis. + LE edema.   Psychiatric: No anxiety or Agitation. Alert and Oriented to person, place and time.     Scheduled Meds:   furosemide  40 mg IntraVENous Once    sodium chloride flush  5-40 mL IntraVENous 2 times per day    enoxaparin  30 mg SubCUTAneous BID    piperacillin-tazobactam  3,375 mg IntraVENous Q8H    insulin lispro  0-16 Units SubCUTAneous TID WC    insulin lispro  0-4 Units SubCUTAneous Nightly    insulin glargine  5 Units SubCUTAneous Nightly    pantoprazole  40 mg IntraVENous Daily     PRN Meds:  sodium chloride flush, sodium chloride, potassium chloride **OR** potassium chloride, magnesium sulfate, ondansetron **OR** ondansetron, polyethylene glycol, acetaminophen **OR** acetaminophen, glucose, dextrose bolus **OR** dextrose bolus, glucagon (rDNA), dextrose    Results:  CBC:   Recent Labs     07/06/24  1034 07/06/24  1422 07/07/24  0509   WBC 9.7 10.0 8.4   HGB 7.7* 7.6* 7.3*   HCT 23.9* 23.5* 21.9*   MCV 92.4 92.0 91.1    254 260     BMP:   Recent Labs     07/05/24 2031 07/06/24  1034 07/07/24  0509   * 139 139   K 3.8 3.7 4.0   CL 99 103 104   CO2 24 27 25

## 2024-07-07 NOTE — PROGRESS NOTES
[facility]    PATIENT NAME: Ari Snider     TODAY'S DATE: 7/7/2024    CHIEF COMPLAINT: None    INTERVAL HISTORY/HPI:    Pt states he feels well this morning.  He denies abdominal pain.     REVIEW OF SYSTEMS:  Pertinent positives and negatives as per interval history section    OBJECTIVE:  VITALS:  /69   Pulse 96   Temp 98.9 °F (37.2 °C) (Oral)   Resp (!) 35   Ht 1.702 m (5' 7\")   Wt 102.8 kg (226 lb 11.2 oz)   SpO2 94%   BMI 35.51 kg/m²     INTAKE/OUTPUT:    I/O last 3 completed shifts:  In: 925.1 [P.O.:440; I.V.:242.8; IV Piggyback:242.4]  Out: 1800 [Urine:1600; Other:200]  I/O this shift:  In: -   Out: 200 [Urine:200]    CONSTITUTIONAL:  awake and alert  LUNGS:  Respirations easy and unlabored  CARD:  regular rate and rhythm  ABDOMEN:  normal bowel sounds, soft, non-distended, non-tender     Data:  CBC:   Recent Labs     07/06/24  1034 07/06/24  1422 07/07/24  0509   WBC 9.7 10.0 8.4   HGB 7.7* 7.6* 7.3*   HCT 23.9* 23.5* 21.9*    254 260     BMP:    Recent Labs     07/05/24 2031 07/06/24  1034 07/07/24  0509   * 139 139   K 3.8 3.7 4.0   CL 99 103 104   CO2 24 27 25   BUN 15 15 17   CREATININE 1.1 1.3 1.2   GLUCOSE 283* 255* 199*     Hepatic:   Recent Labs     07/05/24 2031 07/06/24  1034   AST 15 11*   ALT 16 13   BILITOT 0.3 0.3   ALKPHOS 141* 107     Mag:      Recent Labs     07/05/24 2031 07/07/24  0509   MG 1.50* 1.70*      Phos:   No results for input(s): \"PHOS\" in the last 72 hours.   INR: No results for input(s): \"INR\" in the last 72 hours.    Radiology Review:  *Imaging personally reviewed by me.   N/A      ASSESSMENT AND PLAN:  CHF exacerbation, clinically improved.  He is tolerating a diet without ongoing abdominal symptoms.  Continue supportive medical care.  Await cardiology input     Electronically signed by FABRICE MOREL MD     [id number]

## 2024-07-07 NOTE — PROGRESS NOTES
Internal Medicine ICU Progress Note      Events of Last 24 hours:     The patient has been weaned off Levophed.  He denies any abdominal pain.  He required a small amount of oxygen during his sleep but is presently on room air.      Invasive Lines: CVC catheter RIJ    MV:  n/a    No results for input(s): \"PHART\", \"ZES7LYK\", \"PO2ART\" in the last 72 hours.    MV Settings:     / / /FiO2 : 30 %    IV:   sodium chloride Stopped (24 1210)    dextrose      norepinephrine Stopped (24 1031)       Vitals:  Temp  Av.6 °F (37 °C)  Min: 97.8 °F (36.6 °C)  Max: 99.5 °F (37.5 °C)  Pulse  Av.7  Min: 73  Max: 95  BP  Min: 95/59  Max: 137/66  SpO2  Av.3 %  Min: 90 %  Max: 100 %  Patient Vitals for the past 4 hrs:   BP Pulse Resp SpO2 Weight   24 0705 -- 88 -- -- --   24 0700 137/66 89 25 96 % --   24 0600 121/67 88 30 93 % --   24 0500 125/67 84 (!) 31 98 % --   24 0400 132/61 92 28 90 % 102.8 kg (226 lb 11.2 oz)       CVP:        Intake/Output Summary (Last 24 hours) at 2024 0747  Last data filed at 2024 0400  Gross per 24 hour   Intake 726.31 ml   Output 1300 ml   Net -573.69 ml       EXAM:  General: somewhat chronically ill appearing.   Eyes: PERRL. No sclera icterus. No conjunctiva injected.  ENT: No discharge. Pharynx clear.  Neck: Trachea midline. Normal thyroid.  Resp: No accessory muscle use. Few crackles. No wheezing. No rhonchi. No dullness on percussion.   CV: Regular rate. Regular rhythm. ESM aortic area. No rub. + edema. No JVD. Palpable pedal pulses.   GI: Non-tender. Non-distended. No masses. No organmegaly. Normal bowel sounds. No hernia.  Skin: Warm and dry. No nodule on exposed extremities. No rash on exposed extremities.  Lymph: No cervical LAD. No supraclavicular LAD.   M/S: No cyanosis. No joint deformity. No clubbing.   Neuro: Awake. Follows commands. Positive pupils/gag/corneals. Normal pain response.  Psych: Oriented to person, place, time. No  please draw pediatric bottle.~Blood Culture #2    COVID-19 & Influenza Combo [[id number]] Collected: 07/01/24 1510    Order Status: Completed Specimen: Nasopharyngeal Swab Updated: 07/01/24 1602     SARS-CoV-2 RNA, RT PCR NOT DETECTED     Comment: Not Detected results do not preclude SARS-CoV-2 infection and  should not be used as the sole basis for patient management  decisions.  Results must be combined with clinical observations,  patient history, and epidemiological information.  Testing was performed using ARIANA VIANEY SARS-CoV-2 and Influenza A/B  nucleic acid assay. This test is a multiplex Real-Time Reverse  Transcriptase Polymerase Chain Reaction (RT-PCR)-based in vitro  diagnostic test intended for the qualitative detection of nucleic  acids from SARS-CoV-2, influenza A, and influenza B in nasopharyngeal  and nasal swab specimens for use under the FDA’s Emergency Use  Authorization (EUA) only.    Patient Fact Sheet:  https://www.fda.gov/media/768929/download  Provider Fact Sheet: https://www.fda.gov/media/368330/download  EUA: https://www.fda.gov/media/232774/download  IFU: https://www.fda.gov/media/010552/download    Methodology:  RT-PCR          Influenza A NOT DETECTED     Influenza B NOT DETECTED           Results in Past 30 Days  Result Component Current Result Ref Range Previous Result Ref Range   Troponin, High Sensitivity 293 (H) (7/6/2024) 0 - 22 ng/L 244 (H) (7/5/2024) 0 - 22 ng/L        Results in Past 30 Days  Result Component Current Result Ref Range Previous Result Ref Range   Pro-BNP 26,896 (H) (7/6/2024) 0 - 449 pg/mL 25,536 (H) (7/5/2024) 0 - 449 pg/mL      Films:    XR CHEST PORTABLE   Final Result   1. Right transjugular central venous catheter terminates in the lower   superior vena cava. No pneumothorax.   2. Pulmonary vascular congestion with small bilateral pleural effusions and   mild bibasilar opacities likely representing atelectasis.         CT ABDOMEN PELVIS W IV CONTRAST

## 2024-07-07 NOTE — PROGRESS NOTES
4 Eyes Skin Assessment     NAME:  Ari Snider  YOB: 1935  MEDICAL RECORD NUMBER:  [id number]    The patient is being assessed for  Other shift    I agree that at least one RN has performed a thorough Head to Toe Skin Assessment on the patient. ALL assessment sites listed below have been assessed.      Areas assessed by both nurses:    Head, Face, Ears, Shoulders, Back, Chest, Arms, Elbows, Hands, Sacrum. Buttock, Coccyx, Ischium, Legs. Feet and Heels, and Under Medical Devices         Does the Patient have a Wound? No noted wound(s)       Javier Prevention initiated by RN: Yes  Wound Care Orders initiated by RN: No    Pressure Injury (Stage 3,4, Unstageable, DTI, NWPT, and Complex wounds) if present, place Wound referral order by RN under : No    New Ostomies, if present place, Ostomy referral order under : No     Nurse 1 eSignature: Electronically signed by Veronica Lazar RN on 7/6/24 at 9:21 PM EDT    **SHARE this note so that the co-signing nurse can place an eSignature**    Nurse 2 eSignature: Electronically signed by Grace Guthrie RN on 7/7/24 at 3:30 AM EDT

## 2024-07-07 NOTE — PROGRESS NOTES
Patient is alert and oriented X 4. Room air , well tolerated. With episode of desaturation when asleep as low as 88%.  2LPM NC provided as support while the patient was sleeping.  PIVs and CVL remain intact. Patient denies any pain or discomfort at this time. Bed in lowest position. Call light within reach.

## 2024-07-08 ENCOUNTER — APPOINTMENT (OUTPATIENT)
Age: 89
End: 2024-07-08
Attending: INTERNAL MEDICINE
Payer: OTHER GOVERNMENT

## 2024-07-08 PROBLEM — J96.01 ACUTE RESPIRATORY FAILURE WITH HYPOXIA (HCC): Status: ACTIVE | Noted: 2024-07-08

## 2024-07-08 PROBLEM — E83.42 HYPOMAGNESEMIA: Status: ACTIVE | Noted: 2024-07-08

## 2024-07-08 LAB
ALBUMIN SERPL-MCNC: 2.4 G/DL (ref 3.4–5)
ALP SERPL-CCNC: 93 U/L (ref 40–129)
ALT SERPL-CCNC: 13 U/L (ref 10–40)
ANION GAP SERPL CALCULATED.3IONS-SCNC: 9 MMOL/L (ref 3–16)
AST SERPL-CCNC: 15 U/L (ref 15–37)
BILIRUB DIRECT SERPL-MCNC: <0.2 MG/DL (ref 0–0.3)
BILIRUB INDIRECT SERPL-MCNC: ABNORMAL MG/DL (ref 0–1)
BILIRUB SERPL-MCNC: <0.2 MG/DL (ref 0–1)
BUN SERPL-MCNC: 15 MG/DL (ref 7–20)
CALCIUM SERPL-MCNC: 8.3 MG/DL (ref 8.3–10.6)
CHLORIDE SERPL-SCNC: 102 MMOL/L (ref 99–110)
CO2 SERPL-SCNC: 27 MMOL/L (ref 21–32)
CREAT SERPL-MCNC: 1.3 MG/DL (ref 0.8–1.3)
DEPRECATED RDW RBC AUTO: 16.4 % (ref 12.4–15.4)
ECHO BSA: 2.17 M2
ECHO EST RA PRESSURE: 15 MMHG
ECHO IVC EXP: 1.3 CM
ECHO IVC INSP: 1 CM
ECHO LV EDV A2C: 114 ML
ECHO LV EDV A4C: 124 ML
ECHO LV EDV INDEX A4C: 59 ML/M2
ECHO LV EDV NDEX A2C: 54 ML/M2
ECHO LV EJECTION FRACTION A2C: 36 %
ECHO LV EJECTION FRACTION A4C: 53 %
ECHO LV EJECTION FRACTION BIPLANE: 48 % (ref 55–100)
ECHO LV ESV A2C: 74 ML
ECHO LV ESV A4C: 58 ML
ECHO LV ESV INDEX A2C: 35 ML/M2
ECHO LV ESV INDEX A4C: 27 ML/M2
ECHO RIGHT VENTRICULAR SYSTOLIC PRESSURE (RVSP): 46 MMHG
ECHO RV FREE WALL PEAK S': 13 CM/S
ECHO RV TAPSE: 2.1 CM (ref 1.7–?)
ECHO TV REGURGITANT MAX VELOCITY: 2.8 M/S
ECHO TV REGURGITANT PEAK GRADIENT: 32 MMHG
GFR SERPLBLD CREATININE-BSD FMLA CKD-EPI: 53 ML/MIN/{1.73_M2}
GLUCOSE BLD-MCNC: 167 MG/DL (ref 70–99)
GLUCOSE BLD-MCNC: 208 MG/DL (ref 70–99)
GLUCOSE BLD-MCNC: 224 MG/DL (ref 70–99)
GLUCOSE BLD-MCNC: 241 MG/DL (ref 70–99)
GLUCOSE BLD-MCNC: 272 MG/DL (ref 70–99)
GLUCOSE BLD-MCNC: 277 MG/DL (ref 70–99)
GLUCOSE SERPL-MCNC: 229 MG/DL (ref 70–99)
HCT VFR BLD AUTO: 22.2 % (ref 40.5–52.5)
HGB BLD-MCNC: 7.4 G/DL (ref 13.5–17.5)
LEFT VENTRICULAR EJECTION FRACTION MODE: NORMAL
LIPASE SERPL-CCNC: 59 U/L (ref 13–60)
LV EF: 45 %
MCH RBC QN AUTO: 30.3 PG (ref 26–34)
MCHC RBC AUTO-ENTMCNC: 33.3 G/DL (ref 31–36)
MCV RBC AUTO: 90.8 FL (ref 80–100)
PERFORMED ON: ABNORMAL
PLATELET # BLD AUTO: 267 K/UL (ref 135–450)
PMV BLD AUTO: 7.3 FL (ref 5–10.5)
POTASSIUM SERPL-SCNC: 3.6 MMOL/L (ref 3.5–5.1)
PROCALCITONIN SERPL IA-MCNC: 0.2 NG/ML (ref 0–0.15)
PROT SERPL-MCNC: 5.6 G/DL (ref 6.4–8.2)
RBC # BLD AUTO: 2.45 M/UL (ref 4.2–5.9)
SODIUM SERPL-SCNC: 138 MMOL/L (ref 136–145)
WBC # BLD AUTO: 6.8 K/UL (ref 4–11)

## 2024-07-08 PROCEDURE — 6360000002 HC RX W HCPCS: Performed by: INTERNAL MEDICINE

## 2024-07-08 PROCEDURE — 80048 BASIC METABOLIC PNL TOTAL CA: CPT

## 2024-07-08 PROCEDURE — 2580000003 HC RX 258: Performed by: INTERNAL MEDICINE

## 2024-07-08 PROCEDURE — 93325 DOPPLER ECHO COLOR FLOW MAPG: CPT | Performed by: INTERNAL MEDICINE

## 2024-07-08 PROCEDURE — 99233 SBSQ HOSP IP/OBS HIGH 50: CPT | Performed by: INTERNAL MEDICINE

## 2024-07-08 PROCEDURE — 6370000000 HC RX 637 (ALT 250 FOR IP): Performed by: INTERNAL MEDICINE

## 2024-07-08 PROCEDURE — 99233 SBSQ HOSP IP/OBS HIGH 50: CPT | Performed by: SURGERY

## 2024-07-08 PROCEDURE — 84145 PROCALCITONIN (PCT): CPT

## 2024-07-08 PROCEDURE — 36415 COLL VENOUS BLD VENIPUNCTURE: CPT

## 2024-07-08 PROCEDURE — 80076 HEPATIC FUNCTION PANEL: CPT

## 2024-07-08 PROCEDURE — 2060000000 HC ICU INTERMEDIATE R&B

## 2024-07-08 PROCEDURE — 93321 DOPPLER ECHO F-UP/LMTD STD: CPT | Performed by: INTERNAL MEDICINE

## 2024-07-08 PROCEDURE — 85027 COMPLETE CBC AUTOMATED: CPT

## 2024-07-08 PROCEDURE — 93308 TTE F-UP OR LMTD: CPT | Performed by: INTERNAL MEDICINE

## 2024-07-08 PROCEDURE — 93308 TTE F-UP OR LMTD: CPT

## 2024-07-08 PROCEDURE — 83690 ASSAY OF LIPASE: CPT

## 2024-07-08 RX ORDER — REGADENOSON 0.08 MG/ML
0.4 INJECTION, SOLUTION INTRAVENOUS
Status: COMPLETED | OUTPATIENT
Start: 2024-07-08 | End: 2024-07-09

## 2024-07-08 RX ORDER — FUROSEMIDE 10 MG/ML
40 INJECTION INTRAMUSCULAR; INTRAVENOUS ONCE
Status: COMPLETED | OUTPATIENT
Start: 2024-07-08 | End: 2024-07-08

## 2024-07-08 RX ORDER — INSULIN GLARGINE 100 [IU]/ML
10 INJECTION, SOLUTION SUBCUTANEOUS NIGHTLY
Status: DISCONTINUED | OUTPATIENT
Start: 2024-07-08 | End: 2024-07-11 | Stop reason: HOSPADM

## 2024-07-08 RX ADMIN — ASPIRIN 81 MG: 81 TABLET, CHEWABLE ORAL at 08:57

## 2024-07-08 RX ADMIN — SODIUM CHLORIDE, PRESERVATIVE FREE 5 ML: 5 INJECTION INTRAVENOUS at 21:25

## 2024-07-08 RX ADMIN — INSULIN GLARGINE 10 UNITS: 100 INJECTION, SOLUTION SUBCUTANEOUS at 21:25

## 2024-07-08 RX ADMIN — IRON SUCROSE 200 MG: 20 INJECTION, SOLUTION INTRAVENOUS at 13:28

## 2024-07-08 RX ADMIN — MUPIROCIN: 20 OINTMENT TOPICAL at 21:25

## 2024-07-08 RX ADMIN — ENOXAPARIN SODIUM 30 MG: 100 INJECTION SUBCUTANEOUS at 21:24

## 2024-07-08 RX ADMIN — INSULIN LISPRO 8 UNITS: 100 INJECTION, SOLUTION INTRAVENOUS; SUBCUTANEOUS at 09:00

## 2024-07-08 RX ADMIN — PANTOPRAZOLE SODIUM 40 MG: 40 INJECTION, POWDER, FOR SOLUTION INTRAVENOUS at 08:57

## 2024-07-08 RX ADMIN — INSULIN LISPRO 8 UNITS: 100 INJECTION, SOLUTION INTRAVENOUS; SUBCUTANEOUS at 12:00

## 2024-07-08 RX ADMIN — ENOXAPARIN SODIUM 30 MG: 100 INJECTION SUBCUTANEOUS at 08:57

## 2024-07-08 RX ADMIN — PIPERACILLIN AND TAZOBACTAM 3375 MG: 3; .375 INJECTION, POWDER, LYOPHILIZED, FOR SOLUTION INTRAVENOUS at 04:02

## 2024-07-08 RX ADMIN — FUROSEMIDE 40 MG: 10 INJECTION, SOLUTION INTRAMUSCULAR; INTRAVENOUS at 08:57

## 2024-07-08 RX ADMIN — ATORVASTATIN CALCIUM 40 MG: 40 TABLET, FILM COATED ORAL at 21:24

## 2024-07-08 RX ADMIN — MUPIROCIN: 20 OINTMENT TOPICAL at 08:57

## 2024-07-08 RX ADMIN — SODIUM CHLORIDE, PRESERVATIVE FREE 5 ML: 5 INJECTION INTRAVENOUS at 08:57

## 2024-07-08 NOTE — PROGRESS NOTES
Comprehensive Nutrition Assessment    Type and Reason for Visit:  Initial, Positive Nutrition Screen, Consult, Patient Education (decreased appetite and weight loss)    Nutrition Recommendations/Plan:   May need to temporarily liberalize diet if po intake doesn't improve  Glucerna with meals  Will monitor nutritional adequacy, nutrition-related labs, weights, BMs, and clinical progress      Malnutrition Assessment:  Malnutrition Status:  At risk for malnutrition (Comment) (07/08/24 5215)    Context:  Chronic Illness     Findings of the 6 clinical characteristics of malnutrition:  Energy Intake:  75% or less estimated energy requirements for 1 month or longer  Weight Loss:  Unable to assess     Body Fat Loss:  No significant body fat loss     Muscle Mass Loss:  No significant muscle mass loss    Fluid Accumulation:  Unable to assess     Strength:  Not Performed    Nutrition Assessment:    Patient admitted with septic shock.  Currently in ICU.  Off Levo currently.  Dietitian consult ordered for CHF diet education.  Patient's daughter at bedside.   Po intake has been decreased for about 2 months.  Weight loss not recognized by patient or daughter, difficult to assess in EMR due to frequent fluctuations in weight.  Patient has been drinking nutrition supplements at home.  Ensure was ordered, patient's daughter agreeable to switch to Glucerna since BG greater than 200 mg/dl.  CHF education addressed along with low fat for gall bladder.    Nutrition Related Findings:    labs reviewed, lytes within normal limits on 7/8 Wound Type:  (redness)       Current Nutrition Intake & Therapies:    Average Meal Intake: 51-75%, 26-50%  Average Supplements Intake: 51-75%  ADULT DIET; Regular; 4 carb choices (60 gm/meal); Low Fat/Low Chol/High Fiber/2 gm Na  ADULT ORAL NUTRITION SUPPLEMENT; Breakfast, Lunch, Dinner; Diabetic Oral Supplement    Anthropometric Measures:  Height: 170.2 cm (5' 7\")  Ideal Body Weight (IBW): 148 lbs (67  kg)       Current Body Weight: 100.2 kg (220 lb 14.4 oz),   IBW. Weight Source: Bed Scale  Current BMI (kg/m2): 34.6                          BMI Categories: Obese Class 1 (BMI 30.0-34.9)    Estimated Daily Nutrient Needs:  Energy Requirements Based On: Kcal/kg  Weight Used for Energy Requirements: Ideal  Energy (kcal/day): 5382-8479 (30-32 kcal/61.4 kg)  Weight Used for Protein Requirements: Ideal  Protein (g/day): 80-92 (1.3-1.5 g/61.4 kg)  Method Used for Fluid Requirements: 1 ml/kcal  Fluid (ml/day):      Nutrition Diagnosis:   Increased nutrient needs related to increase demand for energy/nutrients as evidenced by poor intake prior to admission (at risk for malnutrition)    Nutrition Interventions:   Food and/or Nutrient Delivery: Continue Current Diet, Modify Oral Nutrition Supplement  Nutrition Education/Counseling: Education initiated  Coordination of Nutrition Care: Continue to monitor while inpatient       Goals:     Goals: PO intake 75% or greater       Nutrition Monitoring and Evaluation:      Food/Nutrient Intake Outcomes: Food and Nutrient Intake, Supplement Intake  Physical Signs/Symptoms Outcomes: Biochemical Data, Nutrition Focused Physical Findings    Discharge Planning:    Too soon to determine     CUONG KEY RD, LD  Contact: [phone]; at [facility] 7/9-7/12, call [phone]

## 2024-07-08 NOTE — PROGRESS NOTES
Internal Medicine ICU Progress Note      Events of Last 24 hours:     The patient has been weaned off Levophed.  He denies any abdominal pain.  He required a small amount of oxygen during his sleep but is presently on room air.    MR Snider denies any new issues today   Family reports he is not hungry since recent gallstone pancreatitis episode and losing appetite       Invasive Lines: CVC catheter RIJ    MV:  n/a    No results for input(s): \"PHART\", \"RVX5MOO\", \"PO2ART\" in the last 72 hours.    MV Settings:     / / /FiO2 : 30 %    IV:   sodium chloride Stopped (24 1210)    dextrose         Vitals:  Temp  Av.3 °F (36.8 °C)  Min: 97.8 °F (36.6 °C)  Max: 98.8 °F (37.1 °C)  Pulse  Av.2  Min: 75  Max: 97  BP  Min: 116/66  Max: 142/73  SpO2  Av.5 %  Min: 80 %  Max: 96 %  Patient Vitals for the past 4 hrs:   BP Temp Temp src Pulse Resp SpO2 Weight   24 0600 138/81 -- -- 80 27 95 % --   24 0509 -- -- -- -- -- -- 100.2 kg (220 lb 12.8 oz)   24 0502 127/71 -- -- 83 24 96 % --   24 0402 129/61 97.8 °F (36.6 °C) Tympanic 75 28 93 % --         CVP:        Intake/Output Summary (Last 24 hours) at 2024 0722  Last data filed at 2024 0402  Gross per 24 hour   Intake 660 ml   Output 2300 ml   Net -1640 ml         EXAM:  General: somewhat chronically ill appearing.   Eyes: PERRL. No sclera icterus. No conjunctiva injected.  ENT: No discharge. Pharynx clear.  Neck: Trachea midline. Normal thyroid.  Resp: No accessory muscle use. Few crackles. No wheezing. No rhonchi. No dullness on percussion.   CV: Regular rate. Regular rhythm. ESM aortic area. No rub. + 1 + peripheral edema. No JVD. Palpable pedal pulses.   GI: Non-tender. Non-distended. No masses. No organmegaly. Normal bowel sounds. No hernia.  Skin: Warm and dry. No nodule on exposed extremities. No rash on exposed extremities.  Lymph: No cervical LAD. No supraclavicular LAD.   M/S: No cyanosis. No joint deformity. No

## 2024-07-08 NOTE — PROGRESS NOTES
Pt shift assessment completed, pt alert able to make needs know no complaints voiced at this time . Pt sp02 95 % on RA,  LCTA. Male wick in place and draining yellow urine. Call light/ bedside table  in reach, bed alarm on and in place.   Patient is not able to demonstrate the ability to move from a reclining position to an upright position within the recliner due to weak .

## 2024-07-08 NOTE — PROGRESS NOTES
[facility] Daily Progress Note      Admit Date:  7/5/2024    Subjective:  Mr. Snider is being seen today for f/u elevated Evangelina. He feels fine today and denies any specific complaints. No issues per nursing.     Objective:   /81   Pulse 80   Temp 97.8 °F (36.6 °C) (Tympanic)   Resp 27   Ht 1.702 m (5' 7\")   Wt 100.2 kg (220 lb 12.8 oz)   SpO2 95%   BMI 34.58 kg/m²     Intake/Output Summary (Last 24 hours) at 7/8/2024 0724  Last data filed at 7/8/2024 0402  Gross per 24 hour   Intake 660 ml   Output 2300 ml   Net -1640 ml       TELEMETRY: NSR 96     Physical Exam:  General:  Awake, alert, NAD  Skin:  Warm and dry  Neck:  JVD none   Chest:  +soft crackles and diminished bs bases  Cardiovascular:  RRR S1S2; +II/VI JULIA  Abdomen:  Soft NT  Extremities:  trace-1+ BLE edema    Medications:    aspirin  81 mg Oral Daily    atorvastatin  40 mg Oral Nightly    sodium chloride flush  5-40 mL IntraVENous 2 times per day    enoxaparin  30 mg SubCUTAneous BID    piperacillin-tazobactam  3,375 mg IntraVENous Q8H    insulin lispro  0-16 Units SubCUTAneous TID WC    insulin lispro  0-4 Units SubCUTAneous Nightly    insulin glargine  5 Units SubCUTAneous Nightly    pantoprazole  40 mg IntraVENous Daily      sodium chloride Stopped (07/06/24 1210)    dextrose       perflutren lipid microspheres, sodium chloride flush, sodium chloride, potassium chloride **OR** potassium chloride, magnesium sulfate, ondansetron **OR** ondansetron, polyethylene glycol, acetaminophen **OR** acetaminophen, glucose, dextrose bolus **OR** dextrose bolus, glucagon (rDNA), dextrose    Lab Data:  CBC:   Recent Labs     07/06/24  1422 07/07/24  0509 07/08/24  0520   WBC 10.0 8.4 6.8   HGB 7.6* 7.3* 7.4*   HCT 23.5* 21.9* 22.2*   MCV 92.0 91.1 90.8    260 267     BMP:   Recent Labs     07/06/24  1034 07/07/24  0509 07/08/24  0520    139 138   K 3.7 4.0 3.6    104 102   CO2 27 25 27   BUN 15 17 15   CREATININE 1.3 1.2  qd    Note acute diastolic CHF and elevated Evangelina increase his morbidity and mortality requiring med tx and testing    Patient Active Problem List    Diagnosis Date Noted    Lumbar spondylosis 03/01/2023    PVD (peripheral vascular disease) ([facility]) 01/04/2023    Bilateral carotid artery stenosis 01/04/2023    Atherosclerosis of abdominal aorta ([facility]) 01/04/2023    Right bundle branch block 01/04/2023    Nonrheumatic aortic valve stenosis 10/17/2022    Chronic pansinusitis 10/17/2022    CAD (coronary artery disease) 06/16/2022    Anemia 06/16/2022    Sensorineural hearing loss, bilateral 06/16/2022    Dyslipidemia     Hypotension 07/07/2024    Acute diastolic CHF (congestive heart failure) ([facility]) 07/07/2024    Chronic biliary pancreatitis ([facility]) 07/07/2024    Elevated troponin 07/07/2024    Acute on chronic diastolic congestive heart failure ([facility]) 07/07/2024    Septic shock ([facility]) 07/06/2024    NSTEMI (non-ST elevated myocardial infarction) ([facility]) 07/06/2024    Acute pancreatitis, unspecified complication status, unspecified pancreatitis type 06/26/2024    Acute cholecystitis 06/26/2024    Transaminitis 06/26/2024    Right upper quadrant abdominal pain 06/14/2024    Chest pain 06/13/2024    Epigastric pain 06/13/2024    Gallbladder sludge 06/13/2024    Acute osteomyelitis of metatarsal bone of right foot ([facility]) 01/12/2024    Bacteremia due to methicillin resistant Staphylococcus aureus 01/12/2024    Type 2 diabetes mellitus with hyperglycemia ([facility]) 07/10/2023    Acquired lymphedema of lower extremity 10/15/2021    Class 1 obesity due to excess calories with serious comorbidity and body mass index (BMI) of 34.0 to 34.9 in adult 03/29/2021    Lymphoma ([facility]) 11/24/2020    Type 2 diabetes mellitus without complication, without long-term current use of insulin ([facility]) 08/15/2016    Benign essential HTN 08/15/2016    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

## 2024-07-08 NOTE — PROGRESS NOTES
General Surgery  Daily Progress Note    Pt Name: Ari Snider  Medical Record Number: [id number]  Date of Birth 1935   Today's Date: 7/8/2024  Admit date: 7/5/2024  LOS: Day 2    SUBJECTIVE  No complaints for me. Denies abdominal pain or nausea.       OBJECTIVE  Vitals:    07/08/24 0700 07/08/24 0800 07/08/24 0900 07/08/24 1000   BP: 139/67 (!) 142/66 (!) 127/57 (!) 158/62   Pulse: 86 89 78 84   Resp: 23 20 27 19   Temp: 97.7 °F (36.5 °C)      TempSrc: Temporal      SpO2: 96% 96% 94% 95%   Weight:       Height:           Gen: No distress. Alert.   Resp: Normal rate. Easy and unlabored. No accessory muscle use.   CV: Regular rate. Regular rhythm.   GI: Non-tender. Non-distended.  Normal bowel sounds.  Skin: Warm and dry. No nodule or rash on exposed extremities.       I/O last 3 completed shifts:  In: 870 [P.O.:860; I.V.:10]  Out: 2700 [Urine:2700]  No intake/output data recorded.    LABS  CBC:   Recent Labs     07/06/24  1422 07/07/24  0509 07/08/24  0520   WBC 10.0 8.4 6.8   HGB 7.6* 7.3* 7.4*   HCT 23.5* 21.9* 22.2*   MCV 92.0 91.1 90.8    260 267     BMP:   Recent Labs     07/06/24  1034 07/07/24  0509 07/08/24  0520    139 138   K 3.7 4.0 3.6    104 102   CO2 27 25 27   BUN 15 17 15   CREATININE 1.3 1.2 1.3     LIVER PROFILE:   Recent Labs     07/05/24  2031 07/05/24  2105 07/06/24  1034 07/08/24  0520   AST 15  --  11* 15   ALT 16  --  13 13   LIPASE  --  61.0*  --  59.0   BILIDIR  --   --   --  <0.2   BILITOT 0.3  --  0.3 <0.2   ALKPHOS 141*  --  107 93     PT/INR:   Recent Labs     07/07/24  0843   PROTIME 20.0*   INR 1.69*     APTT: No results for input(s): \"APTT\" in the last 72 hours.  UA:  Recent Labs     07/05/24  2227   COLORU Yellow   PHUR 5.5   WBCUA 6-9*   RBCUA 3-4   MUCUS 1+*   YEAST Present*   BACTERIA 3+*   CLARITYU SL CLOUDY*   LEUKOCYTESUR Negative   UROBILINOGEN 0.2   BILIRUBINUR SMALL*   BLOODU SMALL*   GLUCOSEU 500*   AMORPHOUS 4+         IMAGING  XR CHEST PORTABLE  CCY  Completed course of abx for cholecystitis  Low fat diet as tolerated  Further management and disposition per primary team      Dispo: okay to discharge from surgical perspective once medically stable    Romario Bah PA-C  7/8/2024 10:25 AM      Patient seen and examined.  I agree with the assessment and plan from VIDHYA Forbes.  More than half of the time spent on this encounter was completed by me including the history, physical examination and the entire medical decision making.     Patient denies abdominal pain.  Tolerating PO but not taking much.  Abdomen soft.  Non distended.  Non tender.    No imminent general surgical plans as he is high risk given cardiac and medical co-morbidities and there is no acute abdominal issue forcing intervention.    Continue medical stabilization, PO intake as tolerated and add nutritional supplements.  Follow up with Dr. Paulino after discharge to re consider fitness for elective cholecystectomy.      CASSIDY DIXON MD

## 2024-07-08 NOTE — PROGRESS NOTES
Pulmonary & Critical Care Medicine ICU Progress Note    CC: confusion, sob, CP     Events of Last 24 hours:  No CP,  SOB improved  Received Lasix  Chronic anemia     Invasive Lines: PIV    MV:      / / /FiO2 : 30 %  No results for input(s): \"PHART\", \"TYE4EQR\", \"PO2ART\" in the last 72 hours.    IV:   sodium chloride Stopped (07/06/24 1210)    dextrose         Vitals:  BP (!) 142/66   Pulse 89   Temp 97.7 °F (36.5 °C) (Temporal)   Resp 20   Ht 1.702 m (5' 7\")   Wt 100.2 kg (220 lb 12.8 oz)   SpO2 96%   BMI 34.58 kg/m²   on RA    Constitutional:  No acute distress.   Eyes: PERRL. Conjunctivae anicteric.   ENT: Normal nose. Normal tongue.    Neck:  Trachea is midline.   Respiratory: No accessory muscle usage.   Decreased breath sounds. No wheezes. No rales. No Rhonchi.  Cardiovascular: Normal S1S2. No digit clubbing. No digit cyanosis. + LE edema.   Psychiatric: No anxiety or Agitation. Alert and Oriented to person, place and time.     Scheduled Meds:   mupirocin   Each Nostril BID    furosemide  40 mg IntraVENous Once    aspirin  81 mg Oral Daily    atorvastatin  40 mg Oral Nightly    sodium chloride flush  5-40 mL IntraVENous 2 times per day    enoxaparin  30 mg SubCUTAneous BID    piperacillin-tazobactam  3,375 mg IntraVENous Q8H    insulin lispro  0-16 Units SubCUTAneous TID WC    insulin lispro  0-4 Units SubCUTAneous Nightly    insulin glargine  5 Units SubCUTAneous Nightly    pantoprazole  40 mg IntraVENous Daily     PRN Meds:  perflutren lipid microspheres, sodium chloride flush, sodium chloride, potassium chloride **OR** potassium chloride, magnesium sulfate, ondansetron **OR** ondansetron, polyethylene glycol, acetaminophen **OR** acetaminophen, glucose, dextrose bolus **OR** dextrose bolus, glucagon (rDNA), dextrose    Results:  CBC:   Recent Labs     07/06/24  1422 07/07/24  0509 07/08/24  0520   WBC 10.0 8.4 6.8   HGB 7.6* 7.3* 7.4*   HCT 23.5* 21.9* 22.2*   MCV 92.0 91.1 90.8    245 926        BMP:   Recent Labs     07/06/24  1034 07/07/24  0509 07/08/24  0520    139 138   K 3.7 4.0 3.6    104 102   CO2 27 25 27   BUN 15 17 15   CREATININE 1.3 1.2 1.3       LIVER PROFILE:   Recent Labs     07/05/24  2031 07/05/24  2105 07/06/24  1034   AST 15  --  11*   ALT 16  --  13   LIPASE  --  61.0*  --    BILITOT 0.3  --  0.3   ALKPHOS 141*  --  107         Cultures:    Procal 0.53 to 0.30     CT ABDOMEN PELVIS W IV CONTRAST Additional Contrast? None   Final Result   1. Worsening  pancreatitis.   2. Gallbladder wall thickening with surrounding fat stranding and gallstones.   This could be related to pancreatitis.   3. Small bilateral pleural effusions.   4. Splenomegaly.   5. Nonobstructing left renal calculi.   6. Anasarca.      CXR: FINDINGS:  A right transjugular central venous catheter terminates in the lower superior  vena cava.  The cardiac silhouette is enlarged but stable.  Pulmonary  vascular congestion with small bilateral pleural effusions and mild bibasilar  opacities.  No pneumothorax.  No acute osseous abnormality.     IMPRESSION:  1. Right transjugular central venous catheter terminates in the lower  superior vena cava. No pneumothorax.  2. Pulmonary vascular congestion with small bilateral pleural effusions and  mild bibasilar opacities likely representing atelectasis.        ASSESSMENT:  SOB  Does not seem to have recurrent pancreatitis and there is not a clear source of infection. I am concerned for a cardiac contribution due to CP and SOB/CP and an elevated BNP and troponin  Pancreatitis with a hospitalization last week. (CT now shows worse fat stranding but lipase is close to normal and last admission started > 2000)  NSTEMI with elevated troponin and BNP  Fever and chills at home - source unclear, have not recurred here  Anasarca  Possible CHF  H/o NHL  DM2  Anemia  Pleural effusion      PLAN:  SOB seems CHF   Has bilateral effusion   On Zosyn for possible Replace Mg  Received  lasix this morning  SSI  Limited TTE  Stop zosyn ,watch off abx as Not clear if even there is any infection ,check procal   Cardiology following ,improved with Lasix   SSI  Lovenox DVT prophylaxis and PPI for GI prophylaxis

## 2024-07-08 NOTE — CONSULTS
[facility]   HEART FAILURE PROGRAM      NAME:  Ari Snider  AGE: 88 y.o.   GENDER: male  : 1935  TODAY'S DATE:  2024    Subjective:     VISIT TYPE: Evaluation / Consult    ADMIT DATE: 2024    PAST MEDICAL HISTORY:      Diagnosis Date    Abscess of toe of right foot 2011    Acute CVA (cerebrovascular accident) ([facility]) 2021    Blood transfusion     Cellulitis of right lower extremity 2024    Cushing's syndrome ([facility]) 2010    Diabetic ulcer of toe 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 mellitus, with fat layer exposed ([facility])     Greater trochanteric bursitis of right hip 2023    Hypertension     Lumbar spondylosis 2023    Lymphoma ([facility])     MRSA infection 2022    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 Authorizing Provider   insulin aspart (NOVOLOG FLEXPEN) 100 UNIT/ML injection pen Inject 8 Units into the skin every 6 hours For blood sugars greater than 250 24  Wilma Rowley APRN - CNP   insulin glargine (LANTUS SOLOSTAR) 100 UNIT/ML injection pen Inject 10 Units into the skin nightly 24   Wilma Rowley APRN - CNP   Insulin Pen Needle 32G X 4 MM MISC 1 each by Does not apply route daily 24   Wilma Rowley APRN - CNP   albuterol sulfate HFA (PROVENTIL HFA) 108 (90 Base) MCG/ACT inhaler Inhale 2 puffs into the lungs every 6 hours as needed for Wheezing  Patient not taking: Reported on 2024   Wilma Rowley APRN - CNP   tamsulosin (FLOMAX) 0.4 MG capsule Take 1 capsule by mouth daily 6/15/24   Escobar Gastelum MD   Lancets MISC 1 each by Does not apply route 2 times daily 24   Wilma Rowley APRN - CNP   blood glucose monitor strips  surgery at this time.     Provided the Patient Ari and Daughter Dahlia with Heart Failure education on: signs/symptoms to monitor, medications, daily weights, low sodium diet, 2000 ml fluid restriction, and activity. Reviewed HF Zones (green/yellow/red) and importance of reporting yellow symptoms on Magnet provided.  Patient has a working scale at home but does not use daily. Patient feels safe using scale. Family has set up grab bars around his house to help. Reinforced the importance of daily weights and to report weight gain of 3 lbs in one day and 5 lbs in one week to Provider. Provided patient with a paper copy weight log to keep track of daily weights.    Reviewed low sodium diet and fluid restrictions. Dietician was in and reviewed with patient as well. Patient eats mostly home cooked meals from family. Does snack on crackers. Provided 32 oz labeled water pitcher to monitor intake of fluids. Drinks mostly water, pepsi, and milk. Patient does not feel he goes over 2/L limit.     Scheduled follow-up appointment with PCP Wilma Rowley CNP on 7/15/2024 at 1340. Provided Heart Failure Nurse resource number at [phone] for any further questions or assistance with resources.     HEART FAILURE MEDICATIONS:    No current HF medications.     SCALE AVAILABLE: Yes     CURRENT DIET: ADULT DIET; Regular; 4 carb choices (60 gm/meal); Low Fat/Low Chol/High Fiber/2 gm Na  ADULT ORAL NUTRITION SUPPLEMENT; Breakfast, Lunch, Dinner; Diabetic Oral Supplement    Education:     EDUCATION STATUS: Patient Ari and Daughter Dahlia  [x]  Provided both written and verbal education on Heart Failure signs & symptoms  []  Received verbal acknowledgment/understanding of Heart Failure related causes  []  Provided instructions on daily medications  [x]  Provided instructions to monitor and record weight daily  [x]  Provided instructions to call if weight increases 3 lbs in one day or 5 lbs in one week  [x]  Provided instructions on

## 2024-07-08 NOTE — DISCHARGE INSTRUCTIONS
Heart Failure Resources:  Heart Failure Interactive Workbook:  Go to https://[employer]Expert Dynamics/publication/?y=073279 for a Free Heart Failure Interactive Workbook provided by The American Heart Association. This interactive workbook will provide information on Healthier Living with Heart Failure. Please copy and paste link into search bar. Use your mouse to scroll through the pages.    HF [place] kristofer:   Heart Failure Free smart phone kristofer available for iPhone and Android download. Use your phone to track sodium intake, fluid intake, symptoms, and weight.     Low Sodium Diet / Recipes:  Go to www.[employer].[email or web address] website for “renal” diet which is Low Sodium! [employer] is a dialysis company, but this website offers free seasonal cookbooks. Each quarter, they will release 25-30 new recipes with a breakdown of calories, sodium, and glucose. You can also go to wwwSoundBetter/recipes website for free recipes.     Discharge Instruction Video:  Scan the QR code below with your camera and click the canva.com link to open the video and watch educational information on Heart Failure and Medications from one of our nurses.   https://www.[employer]/design/DAFZnsH_JRk/7YexjjwHPQOskKHkyF9yaw/edit    Home Exercise Program:   Identification of Green/Yellow/Red zones:  You should be able to identify when you feel good (green zone), if you have 1-2 symptoms of HF (yellow zone), or if you are in need of medical attention (red zone).  In your CHF education folder you were provided a “stop light tool” to outline this information.     We want to you to rate your exertion levels:    Our therapy team has discussed means of identification with you such as the \"Wallace scale.\"  The Wallace rating scale ranges from 6 to 20, where 6 means \"no exertion at all\" and 20 means \"maximal exertion.\" The goal is to use this to gauge how much effort it is taking for you to do your normal daily tasks.   You should be able to recognize when too much exertion is  being expended.    Elements of Energy Conservation:   Prioritize/Plan: Decide what needs to be done today, and what can wait for a later date, write to do lists, plan ahead to avoid extra trips, and gather supplies and equipment needed before starting an activity.   Position: Avoid tiring and awkward posture that may impair breathing.  Pace: Slow and steady pace, never rushing!  Pursed lip breathing.  Pursed Lip Breathing: \"Smell the roses, blow out the candles\".

## 2024-07-08 NOTE — PROGRESS NOTES
Reassessment completed (see Flowsheet )   Pt resting in bed eyes closed ,resp 25, SPO2  93 % RA   All ICU lines and monitoring  in place .

## 2024-07-08 NOTE — CARE COORDINATION
07/08/24 1636   Service Assessment   Patient Orientation   (Spoke with daughter for assessment questions)   Cognition   (Spoke with daughter for assessment questions)   History Provided By   (Spoke with daughter for assessment questions)   Primary Caregiver Self  (Son and daughter in law staying with the patient at night and other family members going over during the day.)   Support Systems Family Members;Children   Patient's Healthcare Decision Maker is: Named in Scanned ACP Document   PCP Verified by CM Yes   Last Visit to PCP Within last 3 months   Prior Functional Level Independent in ADLs/IADLs   Current Functional Level Independent in ADLs/IADLs   Can patient return to prior living arrangement Yes   Ability to make needs known: Good  (at baseline)   Family able to assist with home care needs: Yes   Would you like for me to discuss the discharge plan with any other family members/significant others, and if so, who? No   Financial Resources  ([facility])   Community Resources None   Discharge Planning   Type of Residence House   Living Arrangements Children;Family Members   Current Services Prior To Admission Durable Medical Equipment   Current DME Prior to Arrival Shower Chair;Walker   Potential Assistance Needed N/A   DME Ordered? No   Potential Assistance Purchasing Medications No   Type of Home Care Services OT;PT;Nursing Services  ([facility] the [facility] at [facility] on 7/4/24)   Patient expects to be discharged to: House   Follow Up Appointment: Best Day/Time    (self/family)   One/Two Story Residence One story   History of falls? 1  (Per last admission CM assessment)     Case Management Assessment  Initial Evaluation    Date/Time of Evaluation: 7/8/2024 4:53 PM  Assessment Completed by: Teresa Boeck, RN    If patient is discharged prior to next notation, then this note serves as note for discharge by case management.    Patient Name: Ari Snider                   Date of Birth:

## 2024-07-08 NOTE — PROGRESS NOTES
4 Eyes Skin Assessment     NAME:  Ari Snider  YOB: 1935  MEDICAL RECORD NUMBER:  [id number]    The patient is being assessed for  Other shift assessment     I agree that at least one RN has performed a thorough Head to Toe Skin Assessment on the patient. ALL assessment sites listed below have been assessed.      Areas assessed by both nurses:    Head, Face, Ears, Shoulders, Back, Chest, Arms, Elbows, Hands, Sacrum. Buttock, Coccyx, Ischium, Legs. Feet and Heels, and Under Medical Devices         Does the Patient have a Wound? Bruises / abrasions scattered, scab area intergluteal cleft , heels dry peeling. Redness groin / scrotum   Javier Prevention initiated by RN: Yes  Wound Care Orders initiated by RN: No    Pressure Injury (Stage 3,4, Unstageable, DTI, NWPT, and Complex wounds) if present, place Wound referral order by RN under : No    New Ostomies, if present place, Ostomy referral order under : No     Nurse 1 eSignature: Electronically signed by Kylie Chilel RN on 7/8/24 at 12:55 AM EDT    **SHARE this note so that the co-signing nurse can place an eSignature**    Nurse 2 eSignature: Electronically signed by Lolly Beckman RN on 7/8/24 at 2:10 AM EDT

## 2024-07-08 NOTE — PROGRESS NOTES
HEART FAILURE CARE PLAN:    Comorbidities Reviewed: Yes   Patient has a past medical history of Abscess of toe of right foot, Acute CVA (cerebrovascular accident) (HCC), Blood transfusion, Cellulitis of right lower extremity, Cushing's syndrome (HCC), Diabetic ulcer of toe 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 mellitus, with fat layer exposed (HCC), Greater trochanteric bursitis of right hip, Hypertension, Lumbar spondylosis, Lymphoma (HCC), MRSA infection, Osteomyelitis of left foot (HCC), Tinnitus, Tobacco use, Ulcer of other part of foot, Vertigo, benign paroxysmal, and 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[facility]).     Weights Reviewed: Yes   Admission weight: 102.5 kg (226 lb)   Wt Readings from Last 3 Encounters:   07/08/24 100.2 kg (220 lb 12.8 oz)   07/04/24 105.2 kg (232 lb)   06/27/24 101.6 kg (224 lb)     Intake & Output Reviewed: Yes     Intake/Output Summary (Last 24 hours) at 7/8/2024 0511  Last data filed at 7/8/2024 0402  Gross per 24 hour   Intake 660 ml   Output 2300 ml   Net -1640 ml       ECHOCARDIOGRAM Reviewed: Yes   Patient's Ejection Fraction (EF) is greater than 40%     Medications Reviewed: Yes   SCHEDULED HOSPITAL MEDICATIONS:   aspirin  81 mg Oral Daily    atorvastatin  40 mg Oral Nightly    sodium chloride flush  5-40 mL IntraVENous 2 times per day    enoxaparin  30 mg SubCUTAneous BID    piperacillin-tazobactam  3,375 mg IntraVENous Q8H    insulin lispro  0-16 Units SubCUTAneous TID WC    insulin lispro  0-4 Units SubCUTAneous Nightly    insulin glargine  5 Units SubCUTAneous Nightly    pantoprazole  40 mg IntraVENous Daily     HOME MEDICATIONS:  Prior to Admission medications    Medication Sig Start Date End Date Taking? Authorizing Provider   insulin aspart (NOVOLOG FLEXPEN) 100 UNIT/ML injection pen Inject 8 Units into the skin every 6 hours For blood sugars greater than 250 7/5/24 8/4/24  Wilma Rowley APRN - CNP   insulin glargine (LANTUS SOLOSTAR) 100 UNIT/ML injection pen Inject 10 Units into

## 2024-07-08 NOTE — PROGRESS NOTES
AM assessment completed. AM labs reviewed. R IJ CVC WNL, PIV x2 WNL.     High risk vesicant drug infusing:  __________    Multiple incompatible medications infusing:  _________    CVP Monitoring:  _________    Extremely difficult IV access challenge:  ___x_____    Continued need for central line access:  ___x_______    Addressed with physician:  ___x_____    RIGHT PATIENT, RIGHT TIME, RIGHT LINE    External catheter functioning properly. Dr. Roland at bedside earlier for pt evaluation- 40 mg IV lasix ordered. Pt tolerated breakfast. Pt q2h turning schedule. Call light in reach, bed alarm on, pt denies any needs at this time.     Samantha Herrera RN, BSN

## 2024-07-08 NOTE — PROGRESS NOTES
Gastroenterology Progress Note      Patient Ari Snider  MRN: [id number]  YOB: 1935 Age: 88 y.o. Sex: male  Room: [address] [place] [address]       Admitting Physician: Randolph Rodriguez MD   Date of Admission: 7/5/2024  7:59 PM   Primary Care Physician: Wilma Rowley, APRN - CNP     Subjective:  We are following for recent gallstone pancreatitis. Ari Snider was seen and examined. Reports feeling improved. Denies abdominal pain, nausea, vomiting, fever, chills, hematochezia or melenic stools.     Current Hospital Schedued Meds   mupirocin   Each Nostril BID    insulin glargine  10 Units SubCUTAneous Nightly    aspirin  81 mg Oral Daily    atorvastatin  40 mg Oral Nightly    sodium chloride flush  5-40 mL IntraVENous 2 times per day    enoxaparin  30 mg SubCUTAneous BID    insulin lispro  0-16 Units SubCUTAneous TID WC    insulin lispro  0-4 Units SubCUTAneous Nightly    pantoprazole  40 mg IntraVENous Daily     Current Hospital IV Meds   sodium chloride Stopped (07/06/24 1210)    dextrose       Current Hospital Prn Meds  perflutren lipid microspheres, sodium chloride flush, sodium chloride, potassium chloride **OR** potassium chloride, magnesium sulfate, ondansetron **OR** ondansetron, polyethylene glycol, acetaminophen **OR** acetaminophen, glucose, dextrose bolus **OR** dextrose bolus, glucagon (rDNA), dextrose    I/O last 3 completed shifts:  In: 870 [P.O.:860; I.V.:10]  Out: 2700 [Urine:2700]    Physical Exam:  VITAL SIGNS: BP (!) 158/62   Pulse 84   Temp 97.7 °F (36.5 °C) (Temporal)   Resp 19   Ht 1.702 m (5' 7\")   Wt 100.2 kg (220 lb 12.8 oz)   SpO2 95%   BMI 34.58 kg/m²   Wt Readings from Last 3 Encounters:   07/08/24 100.2 kg (220 lb 12.8 oz)   07/04/24 105.2 kg (232 lb)   06/27/24 101.6 kg (224 lb)     Constitutional: Well developed, Well nourished, No acute distress, Non-toxic appearance.   HENT: Normocephalic, Atraumatic, Bilateral external ears normal, Oropharynx moist, No oral  exudates, Nose normal.   Eyes: Conjunctiva normal, No discharge.   Cardiovascular: Normal heart rate, Normal rhythm  Thorax & Lungs: Normal breath sounds, No respiratory distress  Abdomen: normal bowel sounds, soft, non tender, non distended, no hernias,   Skin: Warm, Dry, No erythema, No rash.   Lower Extremities: Intact distal pulses, No edema  Neurologic: Alert & oriented x 3      Labs:  Recent Labs     07/06/24  1422 07/07/24  0509 07/08/24  0520   WBC 10.0 8.4 6.8   HGB 7.6* 7.3* 7.4*   HCT 23.5* 21.9* 22.2*    260 267       Recent Labs     07/06/24  1034 07/07/24  0509 07/08/24  0520    139 138   K 3.7 4.0 3.6    104 102   CO2 27 25 27   BUN 15 17 15       Lab Results   Component Value Date/Time    ALKPHOS 93 07/08/2024 05:20 AM    ALT 13 07/08/2024 05:20 AM    AST 15 07/08/2024 05:20 AM    BILITOT <0.2 07/08/2024 05:20 AM    BILIDIR <0.2 07/08/2024 05:20 AM       Lab Results   Component Value Date    LIPASE 59.0 07/08/2024    LIPASE 61.0 (H) 07/05/2024    LIPASE 52.0 07/01/2024    LIPASE 59.0 06/29/2024    LIPASE 2,584.0 (H) 06/26/2024    LIPASE 45.0 06/13/2024    LIPASE 51.0 07/08/2023    LIPASE 41.0 03/28/2021     No results found for: \"AMYLASE\"    Lab Results   Component Value Date    INR 1.69 (H) 07/07/2024    INR 1.19 (H) 01/10/2024    INR 1.24 (H) 06/16/2022    PROTIME 20.0 (H) 07/07/2024    PROTIME 15.1 (H) 01/10/2024    PROTIME 15.4 (H) 06/16/2022     Assessment and Plan:  88-year-old male with medical history of recent gallstone pancreatitis admitted for acute exacerbation of CHF and volume overload.  GI consulted for suspected acute pancreatitis.    Impression:  Recent gallstone pancreatitis 6/27/24 - resolving   Acute diastolic CHF exacerbation  Acute on chronic normocytic anemia - likely multifactorial in setting of volume overload    Plan:   Continue management per cardiology and ICU team.   Continue low fat diet.   Timing of cholecystectomy per surgery.   Monitor Hb/Hct and

## 2024-07-08 NOTE — PLAN OF CARE
HEART FAILURE CARE PLAN:    Comorbidities Reviewed: Yes   Patient has a past medical history of Abscess of toe of right foot, Acute CVA (cerebrovascular accident) (HCC), Blood transfusion, Cellulitis of right lower extremity, Cushing's syndrome (HCC), Diabetic ulcer of toe 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 mellitus, with fat layer exposed (HCC), Greater trochanteric bursitis of right hip, Hypertension, Lumbar spondylosis, Lymphoma (HCC), MRSA infection, Osteomyelitis of left foot (HCC), Tinnitus, Tobacco use, Ulcer of other part of foot, Vertigo, benign paroxysmal, and 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[facility]).     Weights Reviewed: Yes   Admission weight: 102.5 kg (226 lb)   Wt Readings from Last 3 Encounters:   07/08/24 100.2 kg (220 lb 12.8 oz)   07/04/24 105.2 kg (232 lb)   06/27/24 101.6 kg (224 lb)     Intake & Output Reviewed: Yes     Intake/Output Summary (Last 24 hours) at 7/8/2024 1530  Last data filed at 7/8/2024 1349  Gross per 24 hour   Intake 1260 ml   Output 2250 ml   Net -990 ml       ECHOCARDIOGRAM Reviewed: Yes   Patient's Ejection Fraction (EF) is echo pending     Medications Reviewed: Yes   SCHEDULED HOSPITAL MEDICATIONS:   mupirocin   Each Nostril BID    insulin glargine  10 Units SubCUTAneous Nightly    iron sucrose (VENOFER) 200 mg in sodium chloride 0.9 % 100 mL IVPB  200 mg IntraVENous Q24H    aspirin  81 mg Oral Daily    atorvastatin  40 mg Oral Nightly    sodium chloride flush  5-40 mL IntraVENous 2 times per day    enoxaparin  30 mg SubCUTAneous BID    insulin lispro  0-16 Units SubCUTAneous TID WC    insulin lispro  0-4 Units SubCUTAneous Nightly    pantoprazole  40 mg IntraVENous Daily     HOME MEDICATIONS:  Prior to Admission medications    Medication Sig Start Date End Date Taking? Authorizing Provider   insulin aspart (NOVOLOG FLEXPEN) 100 UNIT/ML injection pen Inject 8 Units into the skin every 6 hours For blood sugars greater than 250 7/5/24 8/4/24  Wilma Rowley, MALATHI - CNP   insulin  glargine (LANTUS SOLOSTAR) 100 UNIT/ML injection pen Inject 10 Units into the skin nightly 7/5/24   Wilma Rowley APRN - CNP   Insulin Pen Needle 32G X 4 MM MISC 1 each by Does not apply route daily 7/5/24   Wilma Rowley APRN - CNP   albuterol sulfate HFA (PROVENTIL HFA) 108 (90 Base) MCG/ACT inhaler Inhale 2 puffs into the lungs every 6 hours as needed for Wheezing  Patient not taking: Reported on 7/6/2024 7/5/24   Wilma Rowley APRN - CNP   tamsulosin (FLOMAX) 0.4 MG capsule Take 1 capsule by mouth daily 6/15/24   Escobar Gastelum MD   Lancets MISC 1 each by Does not apply route 2 times daily 6/6/24   Wilma Rowley APRN - CNP   blood glucose monitor strips Test 2 times a day & as needed for symptoms of irregular blood glucose. 6/5/24   Wilma Rowley APRN - CNP   atorvastatin (LIPITOR) 40 MG tablet TAKE ONE TABLET BY MOUTH DAILY 11/22/23   Wilma Rowley APRN - CNP   pantoprazole (PROTONIX) 40 MG tablet Take 1 tablet by mouth every morning (before breakfast) 7/11/23   Escobar Gastelum MD   metFORMIN (GLUCOPHAGE) 1000 MG tablet TAKE ONE TABLET BY MOUTH TWICE A DAY WITH A MEAL FOR DIABETES 7/10/23   Wilma Rowley APRN - CNP   aspirin 81 MG EC tablet Take 1 tablet by mouth daily  Patient not taking: Reported on 7/6/2024    ProviderMoe MD   Multiple Vitamins-Minerals (MULTIVITAMIN PO) Take 1 tablet by mouth daily.    Moe Beasley MD      Diet Reviewed: Yes   ADULT DIET; Regular; 4 carb choices (60 gm/meal); Low Fat/Low Chol/High Fiber/2 gm Na  ADULT ORAL NUTRITION SUPPLEMENT; Breakfast, Lunch, Dinner; Diabetic Oral Supplement    Goal of Care Reviewed: Yes   Patient and/or Family's stated Goal of Care this Admission: Reduce shortness of breath, increase activity tolerance, better understand heart failure and disease management, be more comfortable, and reduce lower extremity edema prior to discharge.     Electronically signed by Nidia Pedroza RN on 7/8/2024 at 3:30

## 2024-07-08 NOTE — CONSULTS
Nutrition Education    Educated on healthy high protein snacking, nutrition supplements, low fat options to avoid pain from gall bladder/pancreas, avoiding excess sodium/making appropriate substitutes to prevent calorie decline    Learners: Patient and Family  Readiness: Acceptance  Method: Explanation  Response: Verbalizes Understanding  Contact name and number provided. (Wrote name on CHF folder left by CHF nurse)    Time spent: 15 minutes    CUONG KEY RD, LD  Contact Number: [phone], At [facility] 7/9-7/12 can call [phone]

## 2024-07-08 NOTE — ACP (ADVANCE CARE PLANNING)
Advance Care Planning     General Advance Care Planning (ACP) Conversation    Date of Conversation: 7/8/2024  Conducted with: Spoke with daughter Dahlia -  Alt SCOTTY  Other persons present:     Healthcare Decision Maker:   Primary Decision Maker: Tylor SaenzAri - Child - [phone]    Secondary Decision Maker (Active): Dahlia Moon - Child - [phone]  Click here to complete Healthcare Decision Makers including selection of the Healthcare Decision Maker Relationship (ie \"Primary\").   Today we documented Decision Maker(s) consistent with ACP documents on file.    Content/Action Overview:  Has ACP document(s) on file - reflects the patient's care preferences  Reviewed DNR/DNI and patient Patient remains a Limited code status        Length of Voluntary ACP Conversation in minutes:  <16 minutes (Non-Billable)    Teresa Boeck, RN

## 2024-07-08 NOTE — PLAN OF CARE
Problem: Safety - Adult  Goal: Free from fall injury  Outcome: Progressing     Problem: Discharge Planning  Goal: Discharge to home or other facility with appropriate resources  Outcome: Progressing     Problem: ABCDS Injury Assessment  Goal: Absence of physical injury  Outcome: Progressing     Problem: Skin/Tissue Integrity  Goal: Absence of new skin breakdown  Description: 1.  Monitor for areas of redness and/or skin breakdown  2.  Assess vascular access sites hourly  3.  Every 4-6 hours minimum:  Change oxygen saturation probe site  4.  Every 4-6 hours:  If on nasal continuous positive airway pressure, respiratory therapy assess nares and determine need for appliance change or resting period.  Outcome: Progressing     Problem: Confusion  Goal: Confusion, delirium, dementia, or psychosis is improved or at baseline  Description: INTERVENTIONS:  1. Assess for possible contributors to thought disturbance, including medications, impaired vision or hearing, underlying metabolic abnormalities, dehydration, psychiatric diagnoses, and notify attending LIP  2. [place] high risk fall precautions, as indicated  3. Provide frequent short contacts to provide reality reorientation, refocusing and direction  4. Decrease environmental stimuli, including noise as appropriate  5. Monitor and intervene to maintain adequate nutrition, hydration, elimination, sleep and activity  6. If unable to ensure safety without constant attention obtain sitter and review sitter guidelines with assigned personnel  7. Initiate Psychosocial CNS and Spiritual Care consult, as indicated  Outcome: Progressing

## 2024-07-09 ENCOUNTER — APPOINTMENT (OUTPATIENT)
Dept: NUCLEAR MEDICINE | Age: 89
End: 2024-07-09
Payer: OTHER GOVERNMENT

## 2024-07-09 ENCOUNTER — APPOINTMENT (OUTPATIENT)
Age: 89
End: 2024-07-09
Payer: OTHER GOVERNMENT

## 2024-07-09 PROBLEM — I42.9 CARDIOMYOPATHY (HCC): Status: ACTIVE | Noted: 2024-07-09

## 2024-07-09 PROBLEM — I50.21 ACUTE SYSTOLIC CHF (CONGESTIVE HEART FAILURE) (HCC): Status: ACTIVE | Noted: 2024-07-09

## 2024-07-09 LAB
ANION GAP SERPL CALCULATED.3IONS-SCNC: 10 MMOL/L (ref 3–16)
BUN SERPL-MCNC: 15 MG/DL (ref 7–20)
CALCIUM SERPL-MCNC: 8.4 MG/DL (ref 8.3–10.6)
CHLORIDE SERPL-SCNC: 101 MMOL/L (ref 99–110)
CO2 SERPL-SCNC: 26 MMOL/L (ref 21–32)
CREAT SERPL-MCNC: 1 MG/DL (ref 0.8–1.3)
DEPRECATED RDW RBC AUTO: 16.8 % (ref 12.4–15.4)
ECHO BSA: 2.17 M2
GFR SERPLBLD CREATININE-BSD FMLA CKD-EPI: 73 ML/MIN/{1.73_M2}
GLUCOSE BLD-MCNC: 210 MG/DL (ref 70–99)
GLUCOSE BLD-MCNC: 215 MG/DL (ref 70–99)
GLUCOSE BLD-MCNC: 224 MG/DL (ref 70–99)
GLUCOSE BLD-MCNC: 227 MG/DL (ref 70–99)
GLUCOSE BLD-MCNC: 231 MG/DL (ref 70–99)
GLUCOSE BLD-MCNC: 377 MG/DL (ref 70–99)
GLUCOSE SERPL-MCNC: 202 MG/DL (ref 70–99)
HCT VFR BLD AUTO: 24.5 % (ref 40.5–52.5)
HGB BLD-MCNC: 7.8 G/DL (ref 13.5–17.5)
MCH RBC QN AUTO: 29.4 PG (ref 26–34)
MCHC RBC AUTO-ENTMCNC: 31.8 G/DL (ref 31–36)
MCV RBC AUTO: 92.6 FL (ref 80–100)
NUC STRESS EJECTION FRACTION: 56 %
NUC STRESS LV EDV: 154 ML (ref 67–155)
NUC STRESS LV ESV: 67 ML (ref 22–58)
NUC STRESS LV MASS: 181 G
PERFORMED ON: ABNORMAL
PLATELET # BLD AUTO: 316 K/UL (ref 135–450)
PMV BLD AUTO: 7.9 FL (ref 5–10.5)
POTASSIUM SERPL-SCNC: 3.6 MMOL/L (ref 3.5–5.1)
RBC # BLD AUTO: 2.65 M/UL (ref 4.2–5.9)
SODIUM SERPL-SCNC: 137 MMOL/L (ref 136–145)
STRESS BASELINE DIAS BP: 78 MMHG
STRESS BASELINE HR: 84 BPM
STRESS BASELINE SYS BP: 152 MMHG
STRESS ESTIMATED WORKLOAD: 1 METS
STRESS PEAK DIAS BP: 78 MMHG
STRESS PEAK SYS BP: 155 MMHG
STRESS PERCENT HR ACHIEVED: 79 %
STRESS POST PEAK HR: 105 BPM
STRESS RATE PRESSURE PRODUCT: ABNORMAL BPM*MMHG
STRESS TARGET HR: 133 BPM
WBC # BLD AUTO: 6.7 K/UL (ref 4–11)

## 2024-07-09 PROCEDURE — 2580000003 HC RX 258: Performed by: INTERNAL MEDICINE

## 2024-07-09 PROCEDURE — 93016 CV STRESS TEST SUPVJ ONLY: CPT | Performed by: INTERNAL MEDICINE

## 2024-07-09 PROCEDURE — 99233 SBSQ HOSP IP/OBS HIGH 50: CPT | Performed by: INTERNAL MEDICINE

## 2024-07-09 PROCEDURE — 6360000002 HC RX W HCPCS: Performed by: INTERNAL MEDICINE

## 2024-07-09 PROCEDURE — 6370000000 HC RX 637 (ALT 250 FOR IP): Performed by: INTERNAL MEDICINE

## 2024-07-09 PROCEDURE — 85027 COMPLETE CBC AUTOMATED: CPT

## 2024-07-09 PROCEDURE — 2060000000 HC ICU INTERMEDIATE R&B

## 2024-07-09 PROCEDURE — 80048 BASIC METABOLIC PNL TOTAL CA: CPT

## 2024-07-09 PROCEDURE — A9502 TC99M TETROFOSMIN: HCPCS | Performed by: INTERNAL MEDICINE

## 2024-07-09 PROCEDURE — 97530 THERAPEUTIC ACTIVITIES: CPT

## 2024-07-09 PROCEDURE — 3430000000 HC RX DIAGNOSTIC RADIOPHARMACEUTICAL: Performed by: INTERNAL MEDICINE

## 2024-07-09 PROCEDURE — 93018 CV STRESS TEST I&R ONLY: CPT | Performed by: INTERNAL MEDICINE

## 2024-07-09 PROCEDURE — 78452 HT MUSCLE IMAGE SPECT MULT: CPT

## 2024-07-09 PROCEDURE — 36415 COLL VENOUS BLD VENIPUNCTURE: CPT

## 2024-07-09 PROCEDURE — 78452 HT MUSCLE IMAGE SPECT MULT: CPT | Performed by: INTERNAL MEDICINE

## 2024-07-09 PROCEDURE — 97535 SELF CARE MNGMENT TRAINING: CPT

## 2024-07-09 PROCEDURE — 93017 CV STRESS TEST TRACING ONLY: CPT

## 2024-07-09 PROCEDURE — 97161 PT EVAL LOW COMPLEX 20 MIN: CPT

## 2024-07-09 PROCEDURE — 97166 OT EVAL MOD COMPLEX 45 MIN: CPT

## 2024-07-09 RX ORDER — METOPROLOL SUCCINATE 25 MG/1
25 TABLET, EXTENDED RELEASE ORAL DAILY
Status: DISCONTINUED | OUTPATIENT
Start: 2024-07-09 | End: 2024-07-11 | Stop reason: HOSPADM

## 2024-07-09 RX ADMIN — INSULIN LISPRO 4 UNITS: 100 INJECTION, SOLUTION INTRAVENOUS; SUBCUTANEOUS at 20:46

## 2024-07-09 RX ADMIN — MUPIROCIN: 20 OINTMENT TOPICAL at 20:47

## 2024-07-09 RX ADMIN — SACUBITRIL AND VALSARTAN 1 TABLET: 24; 26 TABLET, FILM COATED ORAL at 20:46

## 2024-07-09 RX ADMIN — ENOXAPARIN SODIUM 30 MG: 100 INJECTION SUBCUTANEOUS at 20:47

## 2024-07-09 RX ADMIN — INSULIN GLARGINE 10 UNITS: 100 INJECTION, SOLUTION SUBCUTANEOUS at 20:47

## 2024-07-09 RX ADMIN — PANTOPRAZOLE SODIUM 40 MG: 40 INJECTION, POWDER, FOR SOLUTION INTRAVENOUS at 08:47

## 2024-07-09 RX ADMIN — REGADENOSON 0.4 MG: 0.08 INJECTION, SOLUTION INTRAVENOUS at 13:05

## 2024-07-09 RX ADMIN — ATORVASTATIN CALCIUM 40 MG: 40 TABLET, FILM COATED ORAL at 20:46

## 2024-07-09 RX ADMIN — ASPIRIN 81 MG: 81 TABLET, CHEWABLE ORAL at 08:47

## 2024-07-09 RX ADMIN — TETROFOSMIN 10.9 MILLICURIE: 1.38 INJECTION, POWDER, LYOPHILIZED, FOR SOLUTION INTRAVENOUS at 11:38

## 2024-07-09 RX ADMIN — IRON SUCROSE 200 MG: 20 INJECTION, SOLUTION INTRAVENOUS at 17:50

## 2024-07-09 RX ADMIN — TETROFOSMIN 31.5 MILLICURIE: 1.38 INJECTION, POWDER, LYOPHILIZED, FOR SOLUTION INTRAVENOUS at 13:05

## 2024-07-09 RX ADMIN — SODIUM CHLORIDE: 9 INJECTION, SOLUTION INTRAVENOUS at 17:49

## 2024-07-09 RX ADMIN — SODIUM CHLORIDE, PRESERVATIVE FREE 10 ML: 5 INJECTION INTRAVENOUS at 20:47

## 2024-07-09 RX ADMIN — METOPROLOL SUCCINATE 25 MG: 25 TABLET, EXTENDED RELEASE ORAL at 18:25

## 2024-07-09 RX ADMIN — INSULIN LISPRO 4 UNITS: 100 INJECTION, SOLUTION INTRAVENOUS; SUBCUTANEOUS at 18:27

## 2024-07-09 RX ADMIN — SODIUM CHLORIDE, PRESERVATIVE FREE 5 ML: 5 INJECTION INTRAVENOUS at 08:47

## 2024-07-09 RX ADMIN — MUPIROCIN: 20 OINTMENT TOPICAL at 08:48

## 2024-07-09 NOTE — PROGRESS NOTES
Pulmonary & Critical Care Medicine ICU Progress Note    CC: confusion, sob, CP     Events of Last 24 hours:  No CP,  SOB improved  On Hold Lasix  Chronic anemia     Invasive Lines: PIV    MV:      / / /FiO2 : 30 %  No results for input(s): \"PHART\", \"ZPN3DDV\", \"PO2ART\" in the last 72 hours.    IV:   sodium chloride Stopped (07/06/24 1210)    dextrose         Vitals:  /67   Pulse 89   Temp 98.1 °F (36.7 °C)   Resp 27   Ht 1.702 m (5' 7\")   Wt 101.5 kg (223 lb 12.8 oz)   SpO2 96%   BMI 35.05 kg/m²   on RA    Constitutional:  No acute distress.   Eyes: PERRL. Conjunctivae anicteric.   ENT: Normal nose. Normal tongue.    Neck:  Trachea is midline.   Respiratory: No accessory muscle usage.   Decreased breath sounds. No wheezes. No rales. No Rhonchi.  Cardiovascular: Normal S1S2. No digit clubbing. No digit cyanosis. + LE edema.   Psychiatric: No anxiety or Agitation. Alert and Oriented to person, place and time.     Scheduled Meds:   mupirocin   Each Nostril BID    insulin glargine  10 Units SubCUTAneous Nightly    iron sucrose (VENOFER) 200 mg in sodium chloride 0.9 % 100 mL IVPB  200 mg IntraVENous Q24H    aspirin  81 mg Oral Daily    atorvastatin  40 mg Oral Nightly    sodium chloride flush  5-40 mL IntraVENous 2 times per day    enoxaparin  30 mg SubCUTAneous BID    insulin lispro  0-16 Units SubCUTAneous TID WC    insulin lispro  0-4 Units SubCUTAneous Nightly    pantoprazole  40 mg IntraVENous Daily     PRN Meds:  regadenoson, perflutren lipid microspheres, sodium chloride flush, sodium chloride, potassium chloride **OR** potassium chloride, magnesium sulfate, ondansetron **OR** ondansetron, polyethylene glycol, acetaminophen **OR** acetaminophen, glucose, dextrose bolus **OR** dextrose bolus, glucagon (rDNA), dextrose    Results:  CBC:   Recent Labs     07/07/24  0509 07/08/24  0520 07/09/24  0350   WBC 8.4 6.8 6.7   HGB 7.3* 7.4* 7.8*   HCT 21.9* 22.2* 24.5*   MCV 91.1 90.8 92.6    295 241  ,procal 0.2   Off  lasix this morning  SSI  Limited TTE  Stop zosyn ,watch off abx as Not clear if even there is any infection ,check procal   Cardiology following ,improved with Lasix   SSI  Lovenox DVT prophylaxis and PPI for GI prophylaxis

## 2024-07-09 NOTE — PROGRESS NOTES
4 Eyes Skin Assessment     The patient is being assess for  Transfer to New Unit    I agree that 2 RN's have performed a thorough Head to Toe Skin Assessment on the patient. ALL assessment sites listed below have been assessed.       Areas assessed by both nurses:   [x]   Head, Face, and Ears 2  [x]   Shoulders, Back, and Chest  [x]   Arms, Elbows, and Hands   [x]   Coccyx, Sacrum, and Ischum  [x]   Legs, Feet, and Heels        Does the Patient have Skin Breakdown?  No   Redness on bilat heels w/preventative mepilex,  Redness sacrum with preventative mepilex        Javier Prevention initiated:  Yes   Wound Care Orders initiated:  No      WOC nurse consulted for Pressure Injury (Stage 3,4, Unstageable, DTI, NWPT, and Complex wounds):  No      Nurse 1 eSignature: Electronically signed by Kp López RN on 7/9/24 at 3:33 PM EDT    **SHARE this note so that the co-signing nurse is able to place an eSignature**    Nurse 2 eSignature: Electronically signed by Jordana Mckeon RN on 7/9/24 at 7:51 PM EDT         ]

## 2024-07-09 NOTE — PROGRESS NOTES
4 Eyes Skin Assessment     NAME:  Ari Snider  YOB: 1935  MEDICAL RECORD NUMBER:  [id number]    The patient is being assessed for  Other shift assessment     I agree that at least one RN has performed a thorough Head to Toe Skin Assessment on the patient. ALL assessment sites listed below have been assessed.      Areas assessed by both nurses:    Head, Face, Ears, Shoulders, Back, Chest, Arms, Elbows, Hands, and Sacrum. Buttock, Coccyx, Ischium        Does the Patient have a Wound? No noted wound(s) Bruises / abrasions scattered, scab area intergluteal cleft , heels dry peeling. Redness groin / scrotum         Javier Prevention initiated by RN: Yes  Wound Care Orders initiated by RN: No    Pressure Injury (Stage 3,4, Unstageable, DTI, NWPT, and Complex wounds) if present, place Wound referral order by RN under : No    New Ostomies, if present place, Ostomy referral order under : No     Nurse 1 eSignature: Electronically signed by Kylie Chilel RN on 7/9/24 at 4:13 AM EDT    **SHARE this note so that the co-signing nurse can place an eSignature**    Nurse 2 eSignature: Electronically signed by Lolly Beckman RN on 7/9/24 at 7:03 AM EDT

## 2024-07-09 NOTE — PLAN OF CARE
Problem: Skin/Tissue Integrity  Goal: Absence of new skin breakdown  Description: 1.  Monitor for areas of redness and/or skin breakdown  2.  Assess vascular access sites hourly  3.  Every 4-6 hours minimum:  Change oxygen saturation probe site  4.  Every 4-6 hours:  If on nasal continuous positive airway pressure, respiratory therapy assess nares and determine need for appliance change or resting period.  Outcome: Progressing     Problem: Confusion  Goal: Confusion, delirium, dementia, or psychosis is improved or at baseline  Description: INTERVENTIONS:  1. Assess for possible contributors to thought disturbance, including medications, impaired vision or hearing, underlying metabolic abnormalities, dehydration, psychiatric diagnoses, and notify attending LIP  2. [place] high risk fall precautions, as indicated  3. Provide frequent short contacts to provide reality reorientation, refocusing and direction  4. Decrease environmental stimuli, including noise as appropriate  5. Monitor and intervene to maintain adequate nutrition, hydration, elimination, sleep and activity  6. If unable to ensure safety without constant attention obtain sitter and review sitter guidelines with assigned personnel  7. Initiate Psychosocial CNS and Spiritual Care consult, as indicated  Outcome: Progressing     Problem: Chronic Conditions and Co-morbidities  Goal: Patient's chronic conditions and co-morbidity symptoms are monitored and maintained or improved  Outcome: Progressing     Problem: Cardiovascular - Adult  Goal: Maintains optimal cardiac output and hemodynamic stability  Outcome: Progressing  HEART FAILURE CARE PLAN:    Comorbidities Reviewed: Yes   Patient has a past medical history of Abscess of toe of right foot, Acute CVA (cerebrovascular accident) (HCC), Blood transfusion, Cellulitis of right lower extremity, Cushing's syndrome (HCC), Diabetic ulcer of toe 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 mellitus, with fat

## 2024-07-09 NOTE — PROGRESS NOTES
Inpatient Physical Therapy Evaluation & Treatment    Unit: ICU  Date:  7/9/2024  Patient Name:    Ari Snider  Admitting diagnosis:  Hypomagnesemia [E83.42]  Delirium [R41.0]  Acute pulmonary edema (HCC) [J81.0]  NSTEMI (non-ST elevated myocardial infarction) (HCC) [I21.4]  Acute respiratory failure with hypoxia (HCC) [J96.01]  Septic shock (HCC) [A41.9, R65.21]  Admit Date:  7/5/2024  Precautions/Restrictions/WB Status/ Lines/ Wounds/ Oxygen: Fall risk, Bed/chair alarm, Lines (IV and external catheter), [place] (hard of hearing), Telemetry, and Continuous pulse oximetry      Pt seen for cotreatment this date due to patient safety, patient endurance, complexity of condition, and acute illness/injury    Treatment Time:  1030 - 1115  Treatment Number:  1   Timed Code Treatment Minutes: 35 minutes  Total Treatment Minutes:  45  minutes    Patient Stated Goals for Therapy: \" go home \"          Discharge Recommendations: SNF  DME needs for discharge: Defer to facility       Therapy recommendation for EMS Transport: requires transport by cot due to pt needs A x 2 for safe transfers    Therapy recommendations for staff:   Assist of 2 for ambulation with use of rolling walker (RW) and gait belt within room    History of Present Illness:   Per H&P by Dr. Rodriguez: \"88 y.o. male with a PMH of hypertension, aortic stenosis, diabetes, CAD, PVD, CVA, who presented to ED with complaint of shortness of breath.     Of note after extensive discussion with patient's son and daughter-in-law at bedside CODE STATUS is DNR CC  Patient was recently admitted from 6/26 to 7/4 for acute cholecystitis/acute pancreatitis with associated transaminitis.  Recently discharged with Augmentin to complete course of antibiotics.  Presents to the ED with shortness of breath, chest pain and abdominal pain with associated confusion..  Per family patient has not been feeling well since his discharge from the hospital over 2 days ago.  Has not been eating and

## 2024-07-09 NOTE — PROGRESS NOTES
End of shift report given to Audra RN  -  care transferred.  Patient remains OOB in chair, family at side.  Call light in patient's reach.  Patient denying needs.

## 2024-07-09 NOTE — PROGRESS NOTES
Internal Medicine ICU Progress Note      Events of Last 24 hours:     The patient has been weaned off Levophed.  He denies any abdominal pain.  He required a small amount of oxygen during his sleep but is presently on room air.    MR Snider denies any new issues today   No chest pain or sob   Edema resolving      ECHO with low EF and planned for ischemic eval with stress test today       Invasive Lines: CVC catheter RIJ        No results for input(s): \"PHART\", \"BZP7ECH\", \"PO2ART\" in the last 72 hours.        IV:   sodium chloride Stopped (24 1210)    dextrose         Vitals:  Temp  Av.6 °F (36.4 °C)  Min: 97.1 °F (36.2 °C)  Max: 98.1 °F (36.7 °C)  Pulse  Av.4  Min: 77  Max: 92  BP  Min: 120/59  Max: 158/62  SpO2  Av.2 %  Min: 92 %  Max: 97 %  Patient Vitals for the past 4 hrs:   BP Pulse Resp SpO2 Weight   24 0500 (!) 144/67 92 26 92 % 101.5 kg (223 lb 12.8 oz)   24 0400 (!) 142/65 83 21 94 % --         CVP:        Intake/Output Summary (Last 24 hours) at 2024 0706  Last data filed at 2024 0500  Gross per 24 hour   Intake 1322.03 ml   Output 2850 ml   Net -1527.97 ml         EXAM:        General:  elderly male up in bed Awake, alert and oriented. Appears to be not in any distress  Mucous Membranes:  Pink , anicteric  Neck: No JVD, no carotid bruit, no thyromegaly  Chest:  Clear to auscultation bilaterally, no added sounds  Cardiovascular:  RRR S1S2 heard, no murmurs or gallops  Abdomen:  Soft, obese undistended, non tender, no organomegaly, BS present  Extremities: right transmetarsal amputation noted  Resolving peripheral edema noted   Distal pulses well felt  Neurological : grossly normal with gen weakness    Medications:  Scheduled Meds:   mupirocin   Each Nostril BID    insulin glargine  10 Units SubCUTAneous Nightly    iron sucrose (VENOFER) 200 mg in sodium chloride 0.9 % 100 mL IVPB  200 mg IntraVENous Q24H    aspirin  81 mg Oral Daily    atorvastatin  40 mg Oral  BP is at goal today   Continue with current regimen - lisinopril 5 mg daily Nightly    sodium chloride flush  5-40 mL IntraVENous 2 times per day    enoxaparin  30 mg SubCUTAneous BID    insulin lispro  0-16 Units SubCUTAneous TID WC    insulin lispro  0-4 Units SubCUTAneous Nightly    pantoprazole  40 mg IntraVENous Daily       PRN Meds:  regadenoson, perflutren lipid microspheres, sodium chloride flush, sodium chloride, potassium chloride **OR** potassium chloride, magnesium sulfate, ondansetron **OR** ondansetron, polyethylene glycol, acetaminophen **OR** acetaminophen, glucose, dextrose bolus **OR** dextrose bolus, glucagon (rDNA), dextrose    Results:  CBC:   Recent Labs     07/06/24  1422 07/07/24  0509 07/08/24  0520   WBC 10.0 8.4 6.8   HGB 7.6* 7.3* 7.4*   HCT 23.5* 21.9* 22.2*   MCV 92.0 91.1 90.8    260 267       BMP:   Recent Labs     07/07/24  0509 07/08/24  0520 07/09/24  0437    138 137   K 4.0 3.6 3.6    102 101   CO2 25 27 26   BUN 17 15 15   CREATININE 1.2 1.3 1.0       LIVER PROFILE:   Recent Labs     07/06/24  1034 07/08/24  0520   AST 11* 15   ALT 13 13   LIPASE  --  59.0   BILIDIR  --  <0.2   BILITOT 0.3 <0.2   ALKPHOS 107 93       PT/INR:   Recent Labs     07/07/24  0843   PROTIME 20.0*   INR 1.69*       APTT: No results for input(s): \"APTT\" in the last 72 hours.  UA:  No results for input(s): \"NITRITE\", \"COLORU\", \"PHUR\", \"LABCAST\", \"WBCUA\", \"RBCUA\", \"MUCUS\", \"TRICHOMONAS\", \"YEAST\", \"BACTERIA\", \"CLARITYU\", \"SPECGRAV\", \"LEUKOCYTESUR\", \"UROBILINOGEN\", \"BILIRUBINUR\", \"BLOODU\", \"GLUCOSEU\", \"AMORPHOUS\" in the last 72 hours.    Invalid input(s): \"KETONESU\"      Cultures:    Results       Procedure Component Value Units Date/Time    COVID-19 & Influenza Combo [[id number]] Collected: 07/05/24 2105    Order Status: Completed Specimen: Nasopharyngeal Swab Updated: 07/05/24 2127     SARS-CoV-2 RNA, RT PCR NOT DETECTED     Comment: Not Detected results do not preclude SARS-CoV-2 infection and  should not be used as the sole basis for patient  management  decisions.  Results must be combined with clinical observations,  patient history, and epidemiological information.  Testing was performed using ARIANA VIANEY SARS-CoV-2 and Influenza A/B  nucleic acid assay. This test is a multiplex Real-Time Reverse  Transcriptase Polymerase Chain Reaction (RT-PCR)-based in vitro  diagnostic test intended for the qualitative detection of nucleic  acids from SARS-CoV-2, influenza A, and influenza B in nasopharyngeal  and nasal swab specimens for use under the FDA’s Emergency Use  Authorization (EUA) only.    Patient Fact Sheet:  https://www.fda.gov/media/102620/download  Provider Fact Sheet: https://www.fda.gov/media/835277/download  EUA: https://www.fda.gov/media/225973/download  IFU: https://www.fda.gov/media/034966/download    Methodology:  RT-PCR          Influenza A NOT DETECTED     Influenza B NOT DETECTED    Culture, Blood 1 [[id number]] Collected: 07/05/24 2031    Order Status: Completed Specimen: Blood Updated: 07/07/24 0115     Blood Culture, Routine No Growth to date.  Any change in status will be called.    Narrative:      ORDER#: [id number]                          ORDERED BY: SRIKANTH SHAFFER  SOURCE: Blood                              COLLECTED:  07/05/24 20:31  ANTIBIOTICS AT [facility]UCKER.:                      RECEIVED :  07/06/24 00:23  If child <=2 yrs old please draw pediatric bottle.~Blood Culture 1    Culture, Blood 2 [[id number]] Collected: 07/05/24 2031    Order Status: Completed Specimen: Blood Updated: 07/07/24 0115     Culture, Blood 2 No Growth to date.  Any change in status will be called.    Narrative:      ORDER#: [id number]                          ORDERED BY: SRIKANTH SHAFFER  SOURCE: Blood                              COLLECTED:  07/05/24 20:31  ANTIBIOTICS AT TUCKER.:                      RECEIVED :  07/06/24 00:23  If child <=2 yrs old please draw pediatric bottle.~Blood Culture #2    COVID-19 & Influenza Combo [[id number]] Collected: 07/01/24 1510     podiatry outpatient      #Carotid artery stenosis   -on ASA, statin      #Thyroid nodules   -need outpatient US   .  Lovenox for DVT prophylaxis.        Ok for PCU           Note above makes him higher risk for morbidity and mortality requiring testing and treatment.     All questions and concerns were addressed to the patient/family. Alternatives to my treatment were discussed. The note was completed using EMR. Every effort was made to ensure accuracy; however, inadvertent computerized transcription errors may be present.         Escobar Gastelum MD 7:06 AM 7/9/2024

## 2024-07-09 NOTE — PROGRESS NOTES
Dr. Reeves at bedside.  Okay to place diet when pt return from stress lab.  Pt off unit for stress test.

## 2024-07-09 NOTE — NURSING NOTE
A Lexiscan Myoview stress test was completed on this patient as ordered. The patient tolerated the procedure well. Awaiting stress imaging at this time.

## 2024-07-09 NOTE — PROGRESS NOTES
Pts daughter at bedside- updated on order for nuclear stress test in am. Pt made aware of NPO at 0000. Pt repositioned in bed, checked for incontinence. External catheter in place and functioning properly.

## 2024-07-09 NOTE — PROGRESS NOTES
Inpatient Occupational Therapy Evaluation and Treatment    Unit: ICU  Date:  7/9/2024  Patient Name:    Ari Snider  Admitting diagnosis:  Hypomagnesemia [E83.42]  Delirium [R41.0]  Acute pulmonary edema (HCC) [J81.0]  NSTEMI (non-ST elevated myocardial infarction) (HCC) [I21.4]  Acute respiratory failure with hypoxia (HCC) [J96.01]  Septic shock (HCC) [A41.9, R65.21]  Admit Date:  7/5/2024  Precautions/Restrictions/WB Status/ Lines/ Wounds/ Oxygen: Fall risk, Bed/chair alarm, Lines (IV and external catheter), [place] (hard of hearing), Telemetry, and Continuous pulse oximetry    Right transjugular central venous catheter     Pt seen for cotreatment this date due to patient safety, patient endurance, complexity of condition, and acute illness/injury    Treatment Time:  1895-7372  Treatment Number:  1  Timed Code Treatment Minutes: 35 minutes  Total Treatment Minutes:  45  minutes    Patient Goals for Therapy: \"go home \"          Discharge Recommendations: SNF  DME needs for discharge: Defer to facility       Therapy recommendations for staff:   Assist of 1 for ambulation with use of rolling walker (RW) and gait belt to/from BSC  to/from chair    History of Present Illness: Per H&P by Dr. Rodriguez: \"88 y.o. male with a PMH of hypertension, aortic stenosis, diabetes, CAD, PVD, CVA, who presented to ED with complaint of shortness of breath.     Of note after extensive discussion with patient's son and daughter-in-law at bedside CODE STATUS is DNR CC  Patient was recently admitted from 6/26 to 7/4 for acute cholecystitis/acute pancreatitis with associated transaminitis.  Recently discharged with Augmentin to complete course of antibiotics.  Presents to the ED with shortness of breath, chest pain and abdominal pain with associated confusion..  Per family patient has not been feeling well since his discharge from the hospital over 2 days ago.  Has not been eating and drinking properly.  Has been more confused.  Has been  stand:    CGA with RW and gait belt   Stand to sit:    CGA with RW and gait belt   Bed to chair:     Not Tested  Bed/ chair to standard toilet:  Not Tested  Bed/chair to BSC:   Not Tested  Functional Mobility:   CGA with RW and gait belt for ~10 ft within room with pt demonstrating increased weakness, increased fatigue, and forward flexed posture.    See PT note for gait analysis.    ADLs:  Dressing:      UE:   Not Tested  LE:    SBA doffing R sock in modified long-sitting in bed; Mod A donning R sock in modified long-sitting in bed    Bathing:    UE:  Not Tested  LE:  Not Tested    Eating:   Not Tested    Toileting:  Not Tested    Grooming/hygiene: Not Tested - Pt provided oral and grooming products.       Positioning Needs:   Pt in bed, no alarm needed, ICU monitoring, call light provided and all needs within reach .     Ther Ex / Activities Initiated:   N/A    Patient/Family Education:   Pt educated on role of inpatient OT, plan of care, importance of continued activity, DC recommendations, Functional transfer/mobility safety, Safety awareness, Transfer techniques, and Calling for assist with mobility.    CHF Education  ______________________________________________________________________    ADDENDUM   OCCUPATIONAL THERAPY HEART FAILURE EDUCATION    <<<CHF education was deferred d/t arrival of diagnostic team to prep pt for stress test. Forms left in pt's room.>>>    OT Heart Failure Education Goals    Patient educated and demonstrates /verbalizes appropriate teach back with ability to self rate on WALLACE and identify HF activity zone, prior to initiation of ADL’s to appropriately determine need for daily activity level/ energy conservation/pacing.   [] Goal Met  [] Goal established, will introduce or reinforce next session.  [] Goal Not Met  [] Goal N/A    Today patient verbalizes that he/she is in the:  [] Green Zone/All Clear  [] Yellow Zone/Caution  [] Red Zone/Medical Alert    Wallace RPE Scale  Activity  unable to return home due to home environment not conducive to patient recovery and limited safety awareness.    Goal(s) :   To be met in 3 Visits:  Bed to toilet/BSC:       SBA  Pt will complete 3/3 CHF goals     TBD following pt receiving CHF education    To be met in 5 Visits:  Supine to/from Sit in preparation for ADL task:   SBA  Toileting        SBA  Grooming       Supervision  Upper Body Dressing:      Supervision  Lower Body Dressing:      CGA  Pt to demonstrate UE therapeutic exs x 15 reps with minimal cues    Rehabilitation Potential: Good  Strengths for achieving goals include: PLOF and Family Support   Barriers to achieving goals include:  Complexity of condition and Weakness    Plan:  To be seen 3-5 x/wk while in acute care setting for therapeutic exercises, bed mobility, transfers, family/patient education, ADL/IADL retraining, and energy conservation training.    Electronically signed by Amy Stoner on 7/9/2024 at 4:33 PM    Amy Stoner OTS     Occupational therapy treatment completed under supervision of this therapist.  Marge Alegria OTR/L #[id number]      If patient discharges from this facility prior to next visit, this note will serve as the Discharge Summary

## 2024-07-09 NOTE — PROGRESS NOTES
Assessment complete pt alert able to make  needs known. Pt requested a drink, pt NPO. Mouth care completed. Bed fara low position and alarm in place. Call light in reach.   Patient is not able to demonstrate the ability to move from a reclining position to an upright position within the recliner due to weak .     middle

## 2024-07-09 NOTE — PROGRESS NOTES
[facility] Daily Progress Note      Admit Date:  7/5/2024    Subjective:  Mr. Snider is being seen today for f/u elevated Evangelina and new undefined cardiomyopathy.  He feels poorly in general but denies any specific complaints. No issues per nursing. Daughter at bedside.    Objective:   /67   Pulse 89   Temp 98.1 °F (36.7 °C)   Resp 27   Ht 1.702 m (5' 7\")   Wt 101.5 kg (223 lb 12.8 oz)   SpO2 96%   BMI 35.05 kg/m²     Intake/Output Summary (Last 24 hours) at 7/9/2024 0728  Last data filed at 7/9/2024 0500  Gross per 24 hour   Intake 1322.03 ml   Output 2850 ml   Net -1527.97 ml       TELEMETRY: NSR 81     Physical Exam:  General:  Awake, alert, NAD  Skin:  Warm and dry  Neck:  JVD none   Chest:  diminished bs bases  Cardiovascular:  RRR S1S2; +II/VI JULIA  Abdomen:  Soft NT  Extremities:  no concerning BLE edema    Medications:    mupirocin   Each Nostril BID    insulin glargine  10 Units SubCUTAneous Nightly    iron sucrose (VENOFER) 200 mg in sodium chloride 0.9 % 100 mL IVPB  200 mg IntraVENous Q24H    aspirin  81 mg Oral Daily    atorvastatin  40 mg Oral Nightly    sodium chloride flush  5-40 mL IntraVENous 2 times per day    enoxaparin  30 mg SubCUTAneous BID    insulin lispro  0-16 Units SubCUTAneous TID WC    insulin lispro  0-4 Units SubCUTAneous Nightly    pantoprazole  40 mg IntraVENous Daily      sodium chloride Stopped (07/06/24 1210)    dextrose       regadenoson, perflutren lipid microspheres, sodium chloride flush, sodium chloride, potassium chloride **OR** potassium chloride, magnesium sulfate, ondansetron **OR** ondansetron, polyethylene glycol, acetaminophen **OR** acetaminophen, glucose, dextrose bolus **OR** dextrose bolus, glucagon (rDNA), dextrose    Lab Data:  CBC:   Recent Labs     07/06/24  1422 07/07/24  0509 07/08/24  0520   WBC 10.0 8.4 6.8   HGB 7.6* 7.3* 7.4*   HCT 23.5* 21.9* 22.2*   MCV 92.0 91.1 90.8    260 267     BMP:   Recent Labs     07/07/24  0509

## 2024-07-09 NOTE — PROGRESS NOTES
HEART FAILURE CARE PLAN:    Comorbidities Reviewed: Yes   Patient has a past medical history of Abscess of toe of right foot, Acute CVA (cerebrovascular accident) (HCC), Blood transfusion, Cellulitis of right lower extremity, Cushing's syndrome (HCC), Diabetic ulcer of toe 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 mellitus, with fat layer exposed (HCC), Greater trochanteric bursitis of right hip, Hypertension, Lumbar spondylosis, Lymphoma (HCC), MRSA infection, Osteomyelitis of left foot (HCC), Tinnitus, Tobacco use, Ulcer of other part of foot, Vertigo, benign paroxysmal, and 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[facility]).     Weights Reviewed: Yes   Admission weight: 102.5 kg (226 lb)   Wt Readings from Last 3 Encounters:   07/09/24 101.5 kg (223 lb 12.8 oz)   07/04/24 105.2 kg (232 lb)   06/27/24 101.6 kg (224 lb)     Intake & Output Reviewed: Yes     Intake/Output Summary (Last 24 hours) at 7/9/2024 0555  Last data filed at 7/9/2024 0500  Gross per 24 hour   Intake 1322.03 ml   Output 2850 ml   Net -1527.97 ml       ECHOCARDIOGRAM Reviewed: Yes   Patient's Ejection Fraction (EF) is less than or equal to 40%. Discuss HFrEF Guideline Directed Medical Therapy (GDMT) with Cardiologist or Hospitalist:          Medications Reviewed: Yes   SCHEDULED HOSPITAL MEDICATIONS:   mupirocin   Each Nostril BID    insulin glargine  10 Units SubCUTAneous Nightly    iron sucrose (VENOFER) 200 mg in sodium chloride 0.9 % 100 mL IVPB  200 mg IntraVENous Q24H    aspirin  81 mg Oral Daily    atorvastatin  40 mg Oral Nightly    sodium chloride flush  5-40 mL IntraVENous 2 times per day    enoxaparin  30 mg SubCUTAneous BID    insulin lispro  0-16 Units SubCUTAneous TID WC    insulin lispro  0-4 Units SubCUTAneous Nightly    pantoprazole  40 mg IntraVENous Daily     HOME MEDICATIONS:  Prior to Admission medications    Medication Sig Start Date End Date Taking? Authorizing Provider   insulin aspart (NOVOLOG FLEXPEN) 100 UNIT/ML injection pen Inject 8 Units into

## 2024-07-09 NOTE — PROGRESS NOTES
Shift assessment completed, see flow sheet.   Pt is awake A/O to person, place although forgets name of facility.  Pt denies any  pain or N/V or SOB.  He c/o feeling hot BS was checked 237.  C/o being thirsty pt NPO for test     SpO2 95%. Respirations are easy, even, and unlabored.   Bilateral lung sounds diminished on RA.     VSS  SR on the monitor        PIV, WNL     All lines and monitoring devices in place. External [place] is patent and secured.    Bed in lowest position with wheels locked. No needs expressed at this time. Will continue to monitor.

## 2024-07-10 PROBLEM — J81.0 ACUTE PULMONARY EDEMA (HCC): Status: ACTIVE | Noted: 2024-07-10

## 2024-07-10 LAB
ANION GAP SERPL CALCULATED.3IONS-SCNC: 9 MMOL/L (ref 3–16)
BACTERIA BLD CULT ORG #2: NORMAL
BACTERIA BLD CULT: NORMAL
BUN SERPL-MCNC: 13 MG/DL (ref 7–20)
CALCIUM SERPL-MCNC: 8.4 MG/DL (ref 8.3–10.6)
CHLORIDE SERPL-SCNC: 100 MMOL/L (ref 99–110)
CO2 SERPL-SCNC: 28 MMOL/L (ref 21–32)
CREAT SERPL-MCNC: 0.9 MG/DL (ref 0.8–1.3)
GFR SERPLBLD CREATININE-BSD FMLA CKD-EPI: 82 ML/MIN/{1.73_M2}
GLUCOSE BLD-MCNC: 164 MG/DL (ref 70–99)
GLUCOSE BLD-MCNC: 192 MG/DL (ref 70–99)
GLUCOSE BLD-MCNC: 206 MG/DL (ref 70–99)
GLUCOSE BLD-MCNC: 214 MG/DL (ref 70–99)
GLUCOSE BLD-MCNC: 218 MG/DL (ref 70–99)
GLUCOSE SERPL-MCNC: 148 MG/DL (ref 70–99)
MAGNESIUM SERPL-MCNC: 1.6 MG/DL (ref 1.8–2.4)
PERFORMED ON: ABNORMAL
POTASSIUM SERPL-SCNC: 3.3 MMOL/L (ref 3.5–5.1)
SODIUM SERPL-SCNC: 137 MMOL/L (ref 136–145)

## 2024-07-10 PROCEDURE — 6360000002 HC RX W HCPCS: Performed by: INTERNAL MEDICINE

## 2024-07-10 PROCEDURE — 80048 BASIC METABOLIC PNL TOTAL CA: CPT

## 2024-07-10 PROCEDURE — 83735 ASSAY OF MAGNESIUM: CPT

## 2024-07-10 PROCEDURE — 6370000000 HC RX 637 (ALT 250 FOR IP): Performed by: INTERNAL MEDICINE

## 2024-07-10 PROCEDURE — 2060000000 HC ICU INTERMEDIATE R&B

## 2024-07-10 PROCEDURE — 99233 SBSQ HOSP IP/OBS HIGH 50: CPT | Performed by: INTERNAL MEDICINE

## 2024-07-10 PROCEDURE — 99232 SBSQ HOSP IP/OBS MODERATE 35: CPT | Performed by: INTERNAL MEDICINE

## 2024-07-10 PROCEDURE — 36415 COLL VENOUS BLD VENIPUNCTURE: CPT

## 2024-07-10 PROCEDURE — 2580000003 HC RX 258: Performed by: INTERNAL MEDICINE

## 2024-07-10 RX ORDER — PANTOPRAZOLE SODIUM 40 MG/1
40 TABLET, DELAYED RELEASE ORAL
Status: DISCONTINUED | OUTPATIENT
Start: 2024-07-11 | End: 2024-07-11 | Stop reason: HOSPADM

## 2024-07-10 RX ORDER — POTASSIUM CHLORIDE 20 MEQ/1
40 TABLET, EXTENDED RELEASE ORAL ONCE
Status: COMPLETED | OUTPATIENT
Start: 2024-07-10 | End: 2024-07-10

## 2024-07-10 RX ORDER — SPIRONOLACTONE 25 MG/1
12.5 TABLET ORAL DAILY
Status: DISCONTINUED | OUTPATIENT
Start: 2024-07-10 | End: 2024-07-11 | Stop reason: HOSPADM

## 2024-07-10 RX ORDER — MAGNESIUM SULFATE IN WATER 40 MG/ML
2000 INJECTION, SOLUTION INTRAVENOUS ONCE
Status: DISCONTINUED | OUTPATIENT
Start: 2024-07-10 | End: 2024-07-11 | Stop reason: HOSPADM

## 2024-07-10 RX ADMIN — POTASSIUM CHLORIDE 40 MEQ: 1500 TABLET, EXTENDED RELEASE ORAL at 08:43

## 2024-07-10 RX ADMIN — MUPIROCIN: 20 OINTMENT TOPICAL at 21:25

## 2024-07-10 RX ADMIN — INSULIN LISPRO 4 UNITS: 100 INJECTION, SOLUTION INTRAVENOUS; SUBCUTANEOUS at 12:59

## 2024-07-10 RX ADMIN — MAGNESIUM SULFATE HEPTAHYDRATE 2000 MG: 40 INJECTION, SOLUTION INTRAVENOUS at 08:42

## 2024-07-10 RX ADMIN — PANTOPRAZOLE SODIUM 40 MG: 40 INJECTION, POWDER, FOR SOLUTION INTRAVENOUS at 08:36

## 2024-07-10 RX ADMIN — INSULIN GLARGINE 10 UNITS: 100 INJECTION, SOLUTION SUBCUTANEOUS at 21:24

## 2024-07-10 RX ADMIN — IRON SUCROSE 200 MG: 20 INJECTION, SOLUTION INTRAVENOUS at 15:02

## 2024-07-10 RX ADMIN — SACUBITRIL AND VALSARTAN 1 TABLET: 24; 26 TABLET, FILM COATED ORAL at 08:45

## 2024-07-10 RX ADMIN — INSULIN LISPRO 4 UNITS: 100 INJECTION, SOLUTION INTRAVENOUS; SUBCUTANEOUS at 17:29

## 2024-07-10 RX ADMIN — ENOXAPARIN SODIUM 30 MG: 100 INJECTION SUBCUTANEOUS at 08:42

## 2024-07-10 RX ADMIN — ASPIRIN 81 MG: 81 TABLET, CHEWABLE ORAL at 08:43

## 2024-07-10 RX ADMIN — SODIUM CHLORIDE, PRESERVATIVE FREE 10 ML: 5 INJECTION INTRAVENOUS at 21:25

## 2024-07-10 RX ADMIN — SODIUM CHLORIDE, PRESERVATIVE FREE 10 ML: 5 INJECTION INTRAVENOUS at 08:47

## 2024-07-10 RX ADMIN — SACUBITRIL AND VALSARTAN 1 TABLET: 24; 26 TABLET, FILM COATED ORAL at 21:24

## 2024-07-10 RX ADMIN — MUPIROCIN: 20 OINTMENT TOPICAL at 08:53

## 2024-07-10 RX ADMIN — EMPAGLIFLOZIN 10 MG: 10 TABLET, FILM COATED ORAL at 08:44

## 2024-07-10 RX ADMIN — SPIRONOLACTONE 12.5 MG: 25 TABLET ORAL at 17:30

## 2024-07-10 RX ADMIN — METOPROLOL SUCCINATE 25 MG: 25 TABLET, EXTENDED RELEASE ORAL at 08:44

## 2024-07-10 RX ADMIN — ENOXAPARIN SODIUM 30 MG: 100 INJECTION SUBCUTANEOUS at 21:24

## 2024-07-10 RX ADMIN — ATORVASTATIN CALCIUM 40 MG: 40 TABLET, FILM COATED ORAL at 21:24

## 2024-07-10 RX ADMIN — SODIUM CHLORIDE: 9 INJECTION, SOLUTION INTRAVENOUS at 08:35

## 2024-07-10 ASSESSMENT — ENCOUNTER SYMPTOMS
STRIDOR: 0
ABDOMINAL DISTENTION: 0
DIARRHEA: 0
NAUSEA: 0
SHORTNESS OF BREATH: 0
CHEST TIGHTNESS: 0
EYE REDNESS: 0
COLOR CHANGE: 0
BACK PAIN: 0
EYE DISCHARGE: 0
VOMITING: 0
CONSTIPATION: 0
SORE THROAT: 0
WHEEZING: 0
EYE PAIN: 0

## 2024-07-10 ASSESSMENT — PAIN SCALES - GENERAL: PAINLEVEL_OUTOF10: 0

## 2024-07-10 NOTE — CONSULTS
[facility]  Palliative Medicine Consultation Note      Date Of Admission:7/5/2024  Date of consult: 07/10/24  Seen by PC in the past:  No    Recommendations:        Introduced palliative care and had a voluntary discussion about advance care planning. Palliative care consult ordered for a goals of care discussion and due to readmissions. The patient's son, Ari and his wife present for this discussion. Had discussion regarding the patient's current health status and the significance of his heart failure. Patient currently lives alone, but after discussing with Ari, he and his wife will be moving in to help take care of his father. They also would like Home Health services to come in and help provide care. Agreeable to allow [facility] to follow.       1. Goals of Care/Advanced Care planning/Code status: Limited  2. Pain: Denies  3. SOB: Denies  4. Disposition: Home with home health, HTP to follow    Reason for Consult:         [x]  Goals of Care  []  Code Status Discussion/Advanced Care Planning   []  Psychosocial/Family Support  []  Symptom Management  [x]  Other (Specify) Readmission    Requesting Physician: Dr. Conner    CHIEF COMPLAINT:  Shortness of breath    History Obtained From:  family member - Ari (Son), EMR    History of Present Illness:         Ari Snider is a 88 y.o. male with PMH of hypertension, aortic stenosis, diabetes, CAD, PVD, CVA  who presented to [facility] ED with complaints of shortness of breath. Patient was recently admitted from 6/26 to 7/4 for acute cholecystitis/acute pancreatitis with associated transaminitis.  Recently discharged with Augmentin to complete course of antibiotics. Presented to the ED with shortness of breath, chest pain and abdominal pain with associated confusion.  Per family patient has not been feeling well since his discharge from the hospital.  Has not been eating and drinking properly.  Has been more confused.  Has been taken off of   Heart sounds: Normal heart sounds. No murmur heard.     No friction rub. No gallop.   Pulmonary:      Effort: Pulmonary effort is normal.      Breath sounds: Normal breath sounds.   Abdominal:      General: Bowel sounds are normal.      Palpations: Abdomen is soft.   Musculoskeletal:      Cervical back: Normal range of motion and neck supple.      Right lower leg: Edema present.      Left lower leg: Edema present.   Skin:     General: Skin is warm and dry.      Capillary Refill: Capillary refill takes 2 to 3 seconds.   Neurological:      Mental Status: He is alert and oriented to person, place, and time. Mental status is at baseline.   Psychiatric:         Mood and Affect: Mood normal.         Behavior: Behavior normal.         Thought Content: Thought content normal.         Judgment: Judgment normal.          Palliative Performance Scale:  [] 60% Ambulation reduced; Significant disease; Can't do hobbies/housework; intake normal or reduced; occasional assist; LOC full/confusion  [] 50% Mainly sit/lie; Extensive disease; Can't do any work; Considerable assist; intake normal  Or reduced; LOC full/confusion  [x] 40% Mainly in bed; Extensive disease; Mainly assist; intake normal or reduced; occasional assist; LOC full/confusion  [] 30% Bed Bound; Extensive disease; Total care; intake reduced; LOC full/confusion  [] 20% Bed Bound; Extensive disease; Total care; intake minimal; Drowsy/coma  [] 10% Bed Bound; Extensive disease; Total care; Mouth care only; Drowsy/coma  [] 0% Death    PPS:     Vitals:    BP (!) 145/72   Pulse 82   Temp 97.4 °F (36.3 °C) (Oral)   Resp 16   Ht 1.702 m (5' 7\")   Wt 100.1 kg (220 lb 11.2 oz)   SpO2 94%   BMI 34.57 kg/m²     Labs:    BMP:   Recent Labs     07/08/24  0520 07/09/24  0437 07/10/24  0403    137 137   K 3.6 3.6 3.3*    101 100   CO2 27 26 28   BUN 15 15 13   CREATININE 1.3 1.0 0.9   GLUCOSE 229* 202* 148*     CBC:   Recent Labs     07/08/24  0520 07/09/24  0350  follow Mr. Snider's care as needed.    Bayron Biggs, APRN

## 2024-07-10 NOTE — FLOWSHEET NOTE
07/09/24 2010   Vital Signs   Temp 98.2 °F (36.8 °C)   Temp Source Oral   Pulse 77   Heart Rate Source Monitor   Respirations 16   BP (!) 116/58   MAP (Calculated) 77   BP Method Automatic   Patient Position Up in chair   Oxygen Therapy   SpO2 94 %   O2 Device None (Room air)     PM Assessment completed. Scheduled medications given per MAR, On Room Air, A&O X3, Vital Signs completed and Charted, Patient denies any further needs at this time. Call light within reach, Reminded patient to call RN if he needs anything.

## 2024-07-10 NOTE — PLAN OF CARE
Problem: Safety - Adult  Goal: Free from fall injury  Outcome: Progressing     Problem: Discharge Planning  Goal: Discharge to home or other facility with appropriate resources  Outcome: Progressing     Problem: ABCDS Injury Assessment  Goal: Absence of physical injury  Outcome: Progressing     Problem: Skin/Tissue Integrity  Goal: Absence of new skin breakdown  Description: 1.  Monitor for areas of redness and/or skin breakdown  2.  Assess vascular access sites hourly  3.  Every 4-6 hours minimum:  Change oxygen saturation probe site  4.  Every 4-6 hours:  If on nasal continuous positive airway pressure, respiratory therapy assess nares and determine need for appliance change or resting period.  7/9/2024 2241 by Audra Romero RN  Outcome: Progressing  7/9/2024 1119 by Kp López RN  Outcome: Progressing     Problem: Confusion  Goal: Confusion, delirium, dementia, or psychosis is improved or at baseline  Description: INTERVENTIONS:  1. Assess for possible contributors to thought disturbance, including medications, impaired vision or hearing, underlying metabolic abnormalities, dehydration, psychiatric diagnoses, and notify attending LIP  2. [place] high risk fall precautions, as indicated  3. Provide frequent short contacts to provide reality reorientation, refocusing and direction  4. Decrease environmental stimuli, including noise as appropriate  5. Monitor and intervene to maintain adequate nutrition, hydration, elimination, sleep and activity  6. If unable to ensure safety without constant attention obtain sitter and review sitter guidelines with assigned personnel  7. Initiate Psychosocial CNS and Spiritual Care consult, as indicated  7/9/2024 2241 by Audra Romero, RN  Outcome: Progressing  7/9/2024 1119 by Kp López, RN  Outcome: Progressing     Problem: Chronic Conditions and Co-morbidities  Goal: Patient's chronic conditions and co-morbidity symptoms are monitored and

## 2024-07-10 NOTE — PLAN OF CARE
HEART FAILURE CARE PLAN:    Comorbidities Reviewed: Yes   Patient has a past medical history of Abscess of toe of right foot, Acute CVA (cerebrovascular accident) (HCC), Blood transfusion, Cellulitis of right lower extremity, Cushing's syndrome (HCC), Diabetic ulcer of toe 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 mellitus, with fat layer exposed (HCC), Greater trochanteric bursitis of right hip, Hypertension, Lumbar spondylosis, Lymphoma (HCC), MRSA infection, Osteomyelitis of left foot (HCC), Tinnitus, Tobacco use, Ulcer of other part of foot, Vertigo, benign paroxysmal, and 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Formerly [facility]).     Weights Reviewed: Yes   Admission weight: 102.5 kg (226 lb)   Wt Readings from Last 3 Encounters:   07/09/24 101.5 kg (223 lb 12.8 oz)   07/04/24 105.2 kg (232 lb)   06/27/24 101.6 kg (224 lb)     Intake & Output Reviewed: Yes     Intake/Output Summary (Last 24 hours) at 7/9/2024 2249  Last data filed at 7/9/2024 1850  Gross per 24 hour   Intake 480 ml   Output 700 ml   Net -220 ml       ECHOCARDIOGRAM Reviewed: Yes   Patient's Ejection Fraction (EF) is greater than 40%     Medications Reviewed: Yes   SCHEDULED HOSPITAL MEDICATIONS:   sacubitril-valsartan  1 tablet Oral BID    metoprolol succinate  25 mg Oral Daily    mupirocin   Each Nostril BID    insulin glargine  10 Units SubCUTAneous Nightly    iron sucrose (VENOFER) 200 mg in sodium chloride 0.9 % 100 mL IVPB  200 mg IntraVENous Q24H    aspirin  81 mg Oral Daily    atorvastatin  40 mg Oral Nightly    sodium chloride flush  5-40 mL IntraVENous 2 times per day    enoxaparin  30 mg SubCUTAneous BID    insulin lispro  0-16 Units SubCUTAneous TID WC    insulin lispro  0-4 Units SubCUTAneous Nightly    pantoprazole  40 mg IntraVENous Daily     HOME MEDICATIONS:  Prior to Admission medications    Medication Sig Start Date End Date Taking? Authorizing Provider   insulin aspart (NOVOLOG FLEXPEN) 100 UNIT/ML injection pen Inject 8 Units into the skin every 6 hours For

## 2024-07-10 NOTE — PROGRESS NOTES
End of shift report given to Audra RN  -  care transferred  -  patient resting in bed, denying needs.  Bed alarm active.  Call light in reach.

## 2024-07-10 NOTE — PROGRESS NOTES
Pulmonary & Critical Care Medicine ICU Progress Note    CC: confusion, sob, CP     Events of Last 24 hours:  No CP,  SOB improved  Chronic anemia   No events over the  Invasive Lines: PIV    MV:      / / /FiO2 : 30 %  No results for input(s): \"PHART\", \"DWK1KJN\", \"PO2ART\" in the last 72 hours.    IV:   sodium chloride 5 mL/hr at 07/10/24 0835    dextrose         Vitals:  BP (!) 119/59   Pulse 75   Temp 98.5 °F (36.9 °C) (Oral)   Resp 18   Ht 1.702 m (5' 7\")   Wt 100.1 kg (220 lb 11.2 oz)   SpO2 94%   BMI 34.57 kg/m²   on RA    Constitutional:  No acute distress.   Eyes: PERRL. Conjunctivae anicteric.   ENT: Normal nose. Normal tongue.    Neck:  Trachea is midline.   Respiratory: No accessory muscle usage.   Decreased breath sounds. No wheezes. No rales. No Rhonchi.  Cardiovascular: Normal S1S2. No digit clubbing. No digit cyanosis. + LE edema.   Psychiatric: No anxiety or Agitation. Alert and Oriented to person, place and time.     Scheduled Meds:   magnesium sulfate  2,000 mg IntraVENous Once    spironolactone  12.5 mg Oral Daily    empagliflozin  10 mg Oral Daily    sacubitril-valsartan  1 tablet Oral BID    metoprolol succinate  25 mg Oral Daily    mupirocin   Each Nostril BID    insulin glargine  10 Units SubCUTAneous Nightly    iron sucrose (VENOFER) 200 mg in sodium chloride 0.9 % 100 mL IVPB  200 mg IntraVENous Q24H    aspirin  81 mg Oral Daily    atorvastatin  40 mg Oral Nightly    sodium chloride flush  5-40 mL IntraVENous 2 times per day    enoxaparin  30 mg SubCUTAneous BID    insulin lispro  0-16 Units SubCUTAneous TID WC    insulin lispro  0-4 Units SubCUTAneous Nightly    pantoprazole  40 mg IntraVENous Daily     PRN Meds:  perflutren lipid microspheres, sodium chloride flush, sodium chloride, potassium chloride **OR** potassium chloride, magnesium sulfate, ondansetron **OR** ondansetron, polyethylene glycol, acetaminophen **OR** acetaminophen, glucose, dextrose bolus **OR** dextrose bolus,  glucagon (rDNA), dextrose    Results:  CBC:   Recent Labs     07/08/24  0520 07/09/24  0350   WBC 6.8 6.7   HGB 7.4* 7.8*   HCT 22.2* 24.5*   MCV 90.8 92.6    316       BMP:   Recent Labs     07/08/24  0520 07/09/24  0437 07/10/24  0403    137 137   K 3.6 3.6 3.3*    101 100   CO2 27 26 28   BUN 15 15 13   CREATININE 1.3 1.0 0.9       LIVER PROFILE:   Recent Labs     07/08/24  0520   AST 15   ALT 13   LIPASE 59.0   BILIDIR <0.2   BILITOT <0.2   ALKPHOS 93         Cultures:    Procal 0.53 to 0.30     CT ABDOMEN PELVIS W IV CONTRAST Additional Contrast? None   Final Result   1. Worsening  pancreatitis.   2. Gallbladder wall thickening with surrounding fat stranding and gallstones.   This could be related to pancreatitis.   3. Small bilateral pleural effusions.   4. Splenomegaly.   5. Nonobstructing left renal calculi.   6. Anasarca.      CXR: FINDINGS:  A right transjugular central venous catheter terminates in the lower superior  vena cava.  The cardiac silhouette is enlarged but stable.  Pulmonary  vascular congestion with small bilateral pleural effusions and mild bibasilar  opacities.  No pneumothorax.  No acute osseous abnormality.     IMPRESSION:  1. Right transjugular central venous catheter terminates in the lower  superior vena cava. No pneumothorax.  2. Pulmonary vascular congestion with small bilateral pleural effusions and  mild bibasilar opacities likely representing atelectasis.        ASSESSMENT:  SOB  Does not seem to have recurrent pancreatitis and there is not a clear source of infection. I am concerned for a cardiac contribution due to CP and SOB/CP and an elevated BNP and troponin  Pancreatitis with a hospitalization last week. (CT now shows worse fat stranding but lipase is close to normal and last admission started > 2000)  NSTEMI with elevated troponin and BNP  Fever and chills at home - source unclear, have not recurred here  Anasarca  Possible CHF  H/o  NHL  DM2  Anemia  Pleural effusion      PLAN:  SOB seems CHF ,improved and he is on room air  Has bilateral effusion ,doing good with Lasix will check CXR   Off  Zosyn ,procal 0.2   Off  lasix this morning  SSI  Limited TTE  Antibiotic as per IM team  Cardiology following ,improved with Lasix   SSI  Lovenox DVT prophylaxis and PPI for GI prophylaxis

## 2024-07-10 NOTE — PROGRESS NOTES
Internal Medicine Progress Note      Events of Last 24 hours:     The patient has been weaned off Levophed.  He denies any abdominal pain.  He required a small amount of oxygen during his sleep but is presently on room air.    MR Snider denies any new issues today   No chest pain or sob   Edema resolving    He ate some, had BM yesterday but not much appetite. No abdominal pain. Wants to go home..    Invasive Lines: CVC catheter RIJ      No results for input(s): \"PHART\", \"OBQ0ULQ\", \"PO2ART\" in the last 72 hours.      IV:   sodium chloride 5 mL/hr at 07/10/24 0835    dextrose         Vitals:  Temp  Av.9 °F (36.6 °C)  Min: 97.1 °F (36.2 °C)  Max: 98.5 °F (36.9 °C)  Pulse  Av.3  Min: 72  Max: 91  BP  Min: 91/53  Max: 145/72  SpO2  Av.8 %  Min: 93 %  Max: 94 %  Patient Vitals for the past 4 hrs:   BP Temp Temp src Pulse Resp SpO2   07/10/24 1245 (!) 91/53 97.1 °F (36.2 °C) Oral 79 16 94 %         CVP:        Intake/Output Summary (Last 24 hours) at 7/10/2024 1323  Last data filed at 7/10/2024 0336  Gross per 24 hour   Intake 591.65 ml   Output 250 ml   Net 341.65 ml         EXAM:        General:  elderly male up in bed Awake, alert and oriented. Appears to be not in any distress  Mucous Membranes:  Pink , anicteric  Neck: No JVD, no carotid bruit, no thyromegaly  Chest:  Clear to auscultation bilaterally, no added sounds  Cardiovascular:  RRR S1S2 heard, no murmurs or gallops  Abdomen:  Soft, obese undistended, non tender, no organomegaly, BS present  Extremities: right transmetarsal amputation noted  Resolving peripheral edema noted   Distal pulses well felt  Neurological : grossly normal with gen weakness    Medications:  Scheduled Meds:   magnesium sulfate  2,000 mg IntraVENous Once    spironolactone  12.5 mg Oral Daily    empagliflozin  10 mg Oral Daily    [START ON 2024] pantoprazole  40 mg Oral QAM AC    sacubitril-valsartan  1 tablet Oral BID    metoprolol succinate  25 mg Oral Daily     (HCC)    Elevated troponin    Acute on chronic diastolic congestive heart failure (HCC)    Acute respiratory failure with hypoxia (HCC)    Hypomagnesemia    Cardiomyopathy (HCC)    Acute systolic CHF (congestive heart failure) (HCC)  Resolved Problems:    * No resolved hospital problems. *         Plan:    # Hypotension.  Patient was hypotensive when he came in.   .  Shock may be related to his heart failure.  He was on Levophed and  has been weaned off quickly - sepsis ruled out   .  Blood pressure stable at present.    # NSTEMI.  No chest pain but significantly elevated troponin noted   Started ASA, continued statins, BB  Recent ECHO with normal EF but repeat limited ECHO with low EF of 45 %and planned for ischemic eval with stress test  but stress test was negative  Planned for Entresto, toprol   Conservative mx per cardiology     # Acute systolic heart failure.     Likely combination of IVF boluses and Low EF    Recent echocardiogram showed normal EF but moderate AS    Fluids have been stopped. Repeat echo with new low EF    Given lasix as needed- see above    Cards managing    # Moderate aortic stenosis.  Not a candidate for surgery at this time given elderly age, frailty per cards    # Recent gallstone pancreatitis.  CT scan showed worsening but clinically he has no abdominal pain and lipase near normal.  Started on a diet. GI consulted and following.  Might need lap aidee at some point.    #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  #Splenomegaly /cirrhosis   #Normocytic anemia   #Non-hodgkins lymphoma  -received rituximab  in the past  -saw oncology 11/2019 but later followed up with holistic tx  - worsening anemia noted, iron levels low, start on venofer   - might need oncology consult again      # possible dementia   - pt developed confusion during his ;ast stay and family reports sundowners   - stable with no behavior issues this adm     #Dm type 2   #Neuropathy   -resume  oral regimen   -SSI   -monitor BG      #PVD

## 2024-07-10 NOTE — PLAN OF CARE
Problem: Safety - Adult  Goal: Free from fall injury  Outcome: Progressing     Problem: Discharge Planning  Goal: Discharge to home or other facility with appropriate resources  Outcome: Progressing     Problem: ABCDS Injury Assessment  Goal: Absence of physical injury  Outcome: Progressing     Problem: Skin/Tissue Integrity  Goal: Absence of new skin breakdown  Outcome: Progressing     Problem: Confusion  Goal: Confusion, delirium, dementia, or psychosis is improved or at baseline  Outcome: Progressing     Problem: Chronic Conditions and Co-morbidities  Goal: Patient's chronic conditions and co-morbidity symptoms are monitored and maintained or improved  Outcome: Progressing  HEART FAILURE CARE PLAN:    Comorbidities Reviewed: Yes   Patient has a past medical history of Abscess of toe of right foot, Acute CVA (cerebrovascular accident) ([facility]ly [facility]), Blood transfusion, Cellulitis of right lower extremity, Cushing's syndrome ([facility]), Diabetic ulcer of toe 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 mellitus, with fat layer exposed ([facility]), Greater trochanteric bursitis of right hip, Hypertension, Lumbar spondylosis, Lymphoma ([facility]), MRSA infection, Osteomyelitis of left foot ([facility]), Tinnitus, Tobacco use, Ulcer of other part of foot, Vertigo, benign paroxysmal, and 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[facility]ly [facility]).     Weights Reviewed: Yes   Admission weight: 102.5 kg (226 lb)   Wt Readings from Last 3 Encounters:   07/10/24 100.1 kg (220 lb 11.2 oz)   07/04/24 105.2 kg (232 lb)   06/27/24 101.6 kg (224 lb)     Intake & Output Reviewed: Yes     Intake/Output Summary (Last 24 hours) at 7/10/2024 1959  Last data filed at 7/10/2024 1755  Gross per 24 hour   Intake 711.65 ml   Output 1200 ml   Net -488.35 ml       ECHOCARDIOGRAM Reviewed: Yes   Patient's Ejection Fraction (EF) is greater than 40%     Medications Reviewed: Yes   SCHEDULED HOSPITAL MEDICATIONS:   magnesium sulfate  2,000 mg IntraVENous Once    spironolactone  12.5 mg Oral Daily    empagliflozin  DIABETES 7/10/23   Wilma Rowley, APRN - CNP   aspirin 81 MG EC tablet Take 1 tablet by mouth daily  Patient not taking: Reported on 7/6/2024    Provider, MD Moe   Multiple Vitamins-Minerals (MULTIVITAMIN PO) Take 1 tablet by mouth daily.    Provider, Moe, MD      Diet Reviewed: Yes   ADULT DIET; Regular; 4 carb choices (60 gm/meal); Low Fat/Low Chol/High Fiber/2 gm Na  ADULT ORAL NUTRITION SUPPLEMENT; Breakfast, Lunch, Dinner; Diabetic Oral Supplement    Goal of Care Reviewed: Yes   Patient and/or Family's stated Goal of Care this Admission: Reduce shortness of breath, increase activity tolerance, better understand heart failure and disease management, be more comfortable, and reduce lower extremity edema prior to discharge.     Electronically signed by Jordana Mckeon RN on 7/10/2024 at 7:59 PM      Problem: Cardiovascular - Adult  Goal: Maintains optimal cardiac output and hemodynamic stability  Outcome: Progressing  Goal: Absence of cardiac dysrhythmias or at baseline  Outcome: Progressing     Problem: Nutrition Deficit:  Goal: Optimize nutritional status  Outcome: Progressing

## 2024-07-10 NOTE — PROGRESS NOTES
Recent Labs     07/08/24  0520 07/09/24  0437 07/10/24  0403    137 137   K 3.6 3.6 3.3*    101 100   CO2 27 26 28   BUN 15 15 13   CREATININE 1.3 1.0 0.9     LIVER PROFILE:   Recent Labs     07/08/24  0520   AST 15   ALT 13   LIPASE 59.0   BILIDIR <0.2   BILITOT <0.2   ALKPHOS 93     PT/INR:   Recent Labs     07/07/24  0843   PROTIME 20.0*   INR 1.69*     Stress Myoview 7/2023  Summary   There is no evidence of stress induced ischemia. Hyperdynamic LV systolic   function with EF>65% with uniform wall motion. Low risk study.     Echo: 6/14/24 (no change 1/24)    Left Ventricle: Normal left ventricular systolic function with a visually estimated EF of 55 - 60%. Normal wall thickness. Normal wall motion. Indeterminate diastolic function.    Right Ventricle: Visually normal systolic function.    Aortic Valve: Severely thickened right cusp. Severely restricted motion. Mild regurgitation. Moderate stenosis of the aortic valve. Noted by the right parasternal view. AV mean gradient is 23 mmHg. AV peak gradient is 40 mmHg. AV peak velocity is 3.2 m/s. LVOT diameter is 2.2 cm. AV area by continuity VTI is 1.8 cm2.    Pericardium: No pericardial effusion.    Limited study.    Evangelina: 254, 244, 293, 212  (25 on 6/26/24)    BNP 25,536, 26,896    ECHO 7/8/24     Left Ventricle: Mildly reduced left ventricular systolic function with a visually estimated EF of 45%. EF by 2D Simpsons Biplane is 48%. Mild global hypokinesis present with mildy asynchronous septal motion.    Aortic Valve: Mild sclerosis of the aortic valve cusps.    Tricuspid Valve: Mildly elevated RVSP, consistent with mild pulmonary hypertension. Est RA pressure is 15 mmHg. The estimated RVSP is 46 mmHg.    Pericardium: No obvious pericardial effusion.    Image quality is adequate.    Beatriz nuc 7/9/24    Stress Combined Conclusion: No obvious reversible ischemia identified. Small fixed defect inferior wall c/w probable infarct prior. LV function normal 56%  with uniform wall motion. Lower risk abnormal study.    Stress Test: A pharmacological stress test was performed using regadenoson (Lexiscan). PO caffeine given as a reversal agent.    Resting ECG: NSR 85bpm; left axis; RBBB; NST change; cannot r/o prior inferior infarct    Stress ECG: There were no arrhythmias during stress. No significant ST changes noted. The stress ECG was not diagnostic due to failure to achieve target heart rate.    Assessment:    Elevated Evangelina:   -no anginal symptoms  -EKG without ischemic changes but ? Anterolateral infarct new on 7/6  -may be demand ischemia from hypoxia, anemia, and diastolic CHF  -note normal beatriz nuc 7/23  -I favor med mgt given elderly age, anemia, and frailty  -clinically looks OK    2.     Acute systolic CHF/new undefined CM:   -elevated BNP > 25K   -diuresed 4.3L total and weight down also   -received 1 x dose lasix 40mg IV    -repeat 40mg IV lasix dose 7/8 with 2.9L UOP    -ECHO reviewed with EF reduced 45% (new from 6/24)   -case d/w Dr. Gastelum and needs GB surgery   -cont Entresto 24-26 BID and toprol 25 qd for GDMT    -add aldactone 12.5 qd and jardiance 10 qd for GDMT today   -Beatriz nuc reviewed: no ischemia; likely prior small inferior infarct   -nuc study low risk abnormal and no cath needed; treat medically only   -presumed CAD and prior MI    3.      Moderate AS:   -6/24 ECHO with jacqueline 3.2m/s and MPG 23mmHg   -no criteria for TAVR    4.       Pancreatitis/cholecystitis:   -admitted 6/26-7/3   -medically improved   -conservative mgt     5.        CAD by CT imaging:   -continue baby asa and lipitor 40 qd    6.         Anemia:   -IV venofer x 3 dose per IM team   -stable H/H 7.8/24.5    7.          Pre-op:   -intermediate-higher risk clinically for surgery but could proceed if surgery feels necessary; I would recommend waiting few weeks if able to see how he adjusts to new cardiac med regimen     8.           Replete K+ and Mg+ appropriately    Note acute  diastolic CHF and elevated Evangelina increase his morbidity and mortality requiring med tx and testing    I discussed nuc study results and plan for med mgt with POA daughter, Dahlia Flood.     Patient Active Problem List    Diagnosis Date Noted    Lumbar spondylosis 03/01/2023    PVD (peripheral vascular disease) ([facility]) 01/04/2023    Bilateral carotid artery stenosis 01/04/2023    Atherosclerosis of abdominal aorta ([facility]) 01/04/2023    Right bundle branch block 01/04/2023    Aortic valve stenosis 10/17/2022    Chronic pansinusitis 10/17/2022    CAD (coronary artery disease) 06/16/2022    Anemia 06/16/2022    Sensorineural hearing loss, bilateral 06/16/2022    Dyslipidemia     Cardiomyopathy ([facility]) 07/09/2024    Acute systolic CHF (congestive heart failure) ([facility]) 07/09/2024    Acute respiratory failure with hypoxia ([facility]) 07/08/2024    Hypomagnesemia 07/08/2024    Hypotension 07/07/2024    Acute diastolic CHF (congestive heart failure) ([facility]) 07/07/2024    Chronic biliary pancreatitis ([facility]) 07/07/2024    Elevated troponin 07/07/2024    Acute on chronic diastolic congestive heart failure ([facility]) 07/07/2024    Septic shock ([facility]) 07/06/2024    NSTEMI (non-ST elevated myocardial infarction) ([facility]) 07/06/2024    Acute pancreatitis, unspecified complication status, unspecified pancreatitis type 06/26/2024    Acute cholecystitis 06/26/2024    Transaminitis 06/26/2024    Right upper quadrant abdominal pain 06/14/2024    Chest pain 06/13/2024    Epigastric pain 06/13/2024    Gallbladder sludge 06/13/2024    Acute osteomyelitis of metatarsal bone of right foot ([facility]) 01/12/2024    Bacteremia due to methicillin resistant Staphylococcus aureus 01/12/2024    Type 2 diabetes mellitus with hyperglycemia ([facility]) 07/10/2023    Acquired lymphedema of lower extremity 10/15/2021    Class 1 obesity due to excess calories with serious comorbidity and body mass index (BMI) of 34.0 to 34.9 in adult 03/29/2021    Lymphoma ([facility]) 11/24/2020    Type 2 diabetes

## 2024-07-10 NOTE — PROGRESS NOTES
Bedside report and transfer of care given to Jordana MARTINEZ. Pt currently resting in bed, call light within reach. Pt is stable and denies any further needs at this time.

## 2024-07-10 NOTE — PROGRESS NOTES
Patient assessment as noted per flowsheet  -  alert, oriented to person, place and situation.  Denying complaints of shortness of breath, pain or discomfort.  Assisted OOB to chair.  Call light in patient's reach.

## 2024-07-11 ENCOUNTER — TELEPHONE (OUTPATIENT)
Dept: FAMILY MEDICINE CLINIC | Age: 89
End: 2024-07-11

## 2024-07-11 VITALS
DIASTOLIC BLOOD PRESSURE: 66 MMHG | WEIGHT: 219.1 LBS | HEIGHT: 67 IN | SYSTOLIC BLOOD PRESSURE: 146 MMHG | RESPIRATION RATE: 16 BRPM | BODY MASS INDEX: 34.39 KG/M2 | TEMPERATURE: 98.6 F | HEART RATE: 74 BPM | OXYGEN SATURATION: 95 %

## 2024-07-11 LAB
ANION GAP SERPL CALCULATED.3IONS-SCNC: 10 MMOL/L (ref 3–16)
BUN SERPL-MCNC: 12 MG/DL (ref 7–20)
CALCIUM SERPL-MCNC: 8.5 MG/DL (ref 8.3–10.6)
CHLORIDE SERPL-SCNC: 104 MMOL/L (ref 99–110)
CO2 SERPL-SCNC: 26 MMOL/L (ref 21–32)
CREAT SERPL-MCNC: 0.9 MG/DL (ref 0.8–1.3)
GFR SERPLBLD CREATININE-BSD FMLA CKD-EPI: 82 ML/MIN/{1.73_M2}
GLUCOSE BLD-MCNC: 121 MG/DL (ref 70–99)
GLUCOSE BLD-MCNC: 163 MG/DL (ref 70–99)
GLUCOSE SERPL-MCNC: 122 MG/DL (ref 70–99)
MAGNESIUM SERPL-MCNC: 1.9 MG/DL (ref 1.8–2.4)
PERFORMED ON: ABNORMAL
PERFORMED ON: ABNORMAL
POTASSIUM SERPL-SCNC: 4.1 MMOL/L (ref 3.5–5.1)
SODIUM SERPL-SCNC: 140 MMOL/L (ref 136–145)

## 2024-07-11 PROCEDURE — 6360000002 HC RX W HCPCS: Performed by: INTERNAL MEDICINE

## 2024-07-11 PROCEDURE — 2580000003 HC RX 258: Performed by: INTERNAL MEDICINE

## 2024-07-11 PROCEDURE — 80048 BASIC METABOLIC PNL TOTAL CA: CPT

## 2024-07-11 PROCEDURE — 6370000000 HC RX 637 (ALT 250 FOR IP): Performed by: INTERNAL MEDICINE

## 2024-07-11 PROCEDURE — 99232 SBSQ HOSP IP/OBS MODERATE 35: CPT | Performed by: INTERNAL MEDICINE

## 2024-07-11 PROCEDURE — 83735 ASSAY OF MAGNESIUM: CPT

## 2024-07-11 PROCEDURE — 99238 HOSP IP/OBS DSCHRG MGMT 30/<: CPT | Performed by: INTERNAL MEDICINE

## 2024-07-11 PROCEDURE — 36415 COLL VENOUS BLD VENIPUNCTURE: CPT

## 2024-07-11 PROCEDURE — 99233 SBSQ HOSP IP/OBS HIGH 50: CPT | Performed by: INTERNAL MEDICINE

## 2024-07-11 RX ORDER — INSULIN ASPART 100 [IU]/ML
INJECTION, SOLUTION INTRAVENOUS; SUBCUTANEOUS
Qty: 5 ADJUSTABLE DOSE PRE-FILLED PEN SYRINGE | Refills: 3 | Status: SHIPPED
Start: 2024-07-11

## 2024-07-11 RX ORDER — INSULIN LISPRO 100 [IU]/ML
0-8 INJECTION, SOLUTION INTRAVENOUS; SUBCUTANEOUS
Status: DISCONTINUED | OUTPATIENT
Start: 2024-07-11 | End: 2024-07-11 | Stop reason: HOSPADM

## 2024-07-11 RX ORDER — METOPROLOL SUCCINATE 25 MG/1
25 TABLET, EXTENDED RELEASE ORAL DAILY
Qty: 30 TABLET | Refills: 1 | Status: SHIPPED | OUTPATIENT
Start: 2024-07-12

## 2024-07-11 RX ORDER — TAMSULOSIN HYDROCHLORIDE 0.4 MG/1
0.4 CAPSULE ORAL NIGHTLY
Qty: 30 CAPSULE | Refills: 0 | Status: SHIPPED
Start: 2024-07-11

## 2024-07-11 RX ORDER — SPIRONOLACTONE 25 MG/1
12.5 TABLET ORAL DAILY
Qty: 30 TABLET | Refills: 3 | Status: SHIPPED | OUTPATIENT
Start: 2024-07-12

## 2024-07-11 RX ORDER — PANTOPRAZOLE SODIUM 40 MG/1
40 TABLET, DELAYED RELEASE ORAL
Qty: 30 TABLET | Refills: 1 | Status: SHIPPED | OUTPATIENT
Start: 2024-07-11

## 2024-07-11 RX ORDER — INSULIN ASPART 100 [IU]/ML
8 INJECTION, SOLUTION INTRAVENOUS; SUBCUTANEOUS
Qty: 5 ADJUSTABLE DOSE PRE-FILLED PEN SYRINGE | Refills: 3 | Status: SHIPPED
Start: 2024-07-11 | End: 2024-07-11

## 2024-07-11 RX ORDER — INSULIN LISPRO 100 [IU]/ML
0-4 INJECTION, SOLUTION INTRAVENOUS; SUBCUTANEOUS NIGHTLY
Status: DISCONTINUED | OUTPATIENT
Start: 2024-07-11 | End: 2024-07-11 | Stop reason: HOSPADM

## 2024-07-11 RX ORDER — TAMSULOSIN HYDROCHLORIDE 0.4 MG/1
0.4 CAPSULE ORAL DAILY
Qty: 30 CAPSULE | Refills: 0 | OUTPATIENT
Start: 2024-07-11

## 2024-07-11 RX ADMIN — EMPAGLIFLOZIN 10 MG: 10 TABLET, FILM COATED ORAL at 08:14

## 2024-07-11 RX ADMIN — MUPIROCIN: 20 OINTMENT TOPICAL at 08:21

## 2024-07-11 RX ADMIN — SPIRONOLACTONE 12.5 MG: 25 TABLET ORAL at 08:14

## 2024-07-11 RX ADMIN — ASPIRIN 81 MG: 81 TABLET, CHEWABLE ORAL at 08:19

## 2024-07-11 RX ADMIN — SODIUM CHLORIDE, PRESERVATIVE FREE 10 ML: 5 INJECTION INTRAVENOUS at 08:20

## 2024-07-11 RX ADMIN — METOPROLOL SUCCINATE 25 MG: 25 TABLET, EXTENDED RELEASE ORAL at 08:15

## 2024-07-11 RX ADMIN — ENOXAPARIN SODIUM 30 MG: 100 INJECTION SUBCUTANEOUS at 08:16

## 2024-07-11 RX ADMIN — PANTOPRAZOLE SODIUM 40 MG: 40 TABLET, DELAYED RELEASE ORAL at 06:30

## 2024-07-11 RX ADMIN — SACUBITRIL AND VALSARTAN 1 TABLET: 24; 26 TABLET, FILM COATED ORAL at 12:25

## 2024-07-11 NOTE — FLOWSHEET NOTE
07/10/24 2051   Vital Signs   Temp 98.2 °F (36.8 °C)   Temp Source Oral   Pulse 75   Heart Rate Source Monitor   Respirations 16   /70   MAP (Calculated) 91   BP Method Automatic   Patient Position Semi fowlers   Oxygen Therapy   SpO2 92 %   O2 Device None (Room air)     PM Assessment completed. Scheduled medications given per MAR, On Room Air, A&O X3 person, place, and situation, Vital Signs completed and Charted, Patient denies any further needs at this time. Call light within reach, Reminded patient to call RN if he needs anything.

## 2024-07-11 NOTE — PROGRESS NOTES
Pulmonary & Critical Care Medicine ICU Progress Note    CC: confusion, sob, CP     Events of Last 24 hours:  No CP,  SOB improved  Chronic anemia   No events over the  Invasive Lines: PIV    MV:      / / /FiO2 : 30 %  No results for input(s): \"PHART\", \"KYQ5WYF\", \"PO2ART\" in the last 72 hours.    IV:   sodium chloride 5 mL/hr at 07/10/24 1843    dextrose         Vitals:  BP (!) 146/66   Pulse 74   Temp 98.6 °F (37 °C) (Oral)   Resp 16   Ht 1.702 m (5' 7\")   Wt 99.4 kg (219 lb 1.6 oz) Comment: pt. to weak to stand notifed RN  SpO2 95%   BMI 34.32 kg/m²   on RA    Constitutional:  No acute distress.   Eyes: PERRL. Conjunctivae anicteric.   ENT: Normal nose. Normal tongue.    Neck:  Trachea is midline.   Respiratory: No accessory muscle usage.   Decreased breath sounds. No wheezes. No rales. No Rhonchi.  Cardiovascular: Normal S1S2. No digit clubbing. No digit cyanosis. + LE edema.   Psychiatric: No anxiety or Agitation. Alert and Oriented to person, place and time.     Scheduled Meds:   insulin lispro  0-8 Units SubCUTAneous TID WC    insulin lispro  0-4 Units SubCUTAneous Nightly    magnesium sulfate  2,000 mg IntraVENous Once    spironolactone  12.5 mg Oral Daily    empagliflozin  10 mg Oral Daily    pantoprazole  40 mg Oral QAM AC    sacubitril-valsartan  1 tablet Oral BID    metoprolol succinate  25 mg Oral Daily    mupirocin   Each Nostril BID    insulin glargine  10 Units SubCUTAneous Nightly    aspirin  81 mg Oral Daily    atorvastatin  40 mg Oral Nightly    sodium chloride flush  5-40 mL IntraVENous 2 times per day    enoxaparin  30 mg SubCUTAneous BID     PRN Meds:  perflutren lipid microspheres, sodium chloride flush, sodium chloride, potassium chloride **OR** potassium chloride, magnesium sulfate, ondansetron **OR** ondansetron, polyethylene glycol, acetaminophen **OR** acetaminophen, glucose, dextrose bolus **OR** dextrose bolus, glucagon (rDNA), dextrose    Results:  CBC:   Recent Labs      07/09/24  0350   WBC 6.7   HGB 7.8*   HCT 24.5*   MCV 92.6          BMP:   Recent Labs     07/09/24  0437 07/10/24  0403 07/11/24  0454    137 140   K 3.6 3.3* 4.1    100 104   CO2 26 28 26   BUN 15 13 12   CREATININE 1.0 0.9 0.9       LIVER PROFILE:   No results for input(s): \"AST\", \"ALT\", \"LIPASE\", \"AMYLASE\", \"BILIDIR\", \"BILITOT\", \"ALKPHOS\" in the last 72 hours.    Invalid input(s): \"ALB\"      Cultures:    Procal 0.53 to 0.30     CT ABDOMEN PELVIS W IV CONTRAST Additional Contrast? None   Final Result   1. Worsening  pancreatitis.   2. Gallbladder wall thickening with surrounding fat stranding and gallstones.   This could be related to pancreatitis.   3. Small bilateral pleural effusions.   4. Splenomegaly.   5. Nonobstructing left renal calculi.   6. Anasarca.      CXR: FINDINGS:  A right transjugular central venous catheter terminates in the lower superior  vena cava.  The cardiac silhouette is enlarged but stable.  Pulmonary  vascular congestion with small bilateral pleural effusions and mild bibasilar  opacities.  No pneumothorax.  No acute osseous abnormality.     IMPRESSION:  1. Right transjugular central venous catheter terminates in the lower  superior vena cava. No pneumothorax.  2. Pulmonary vascular congestion with small bilateral pleural effusions and  mild bibasilar opacities likely representing atelectasis.        ASSESSMENT:  SOB  Does not seem to have recurrent pancreatitis and there is not a clear source of infection. I am concerned for a cardiac contribution due to CP and SOB/CP and an elevated BNP and troponin  Pancreatitis with a hospitalization last week. (CT now shows worse fat stranding but lipase is close to normal and last admission started > 2000)  NSTEMI with elevated troponin and BNP  Fever and chills at home - source unclear, have not recurred here  Anasarca  Possible CHF  H/o NHL  DM2  Anemia  Pleural effusion      PLAN:  SOB seems CHF ,improved and he is on room  air  Has bilateral effusion ,doing good with Lasix will check CXR   Off  Zosyn ,procal 0.2   Off  lasix this morning  SSI  Limited TTE  Antibiotic as per IM team  Cardiology following ,improved with Lasix   SSI  Lovenox DVT prophylaxis and PPI for GI prophylaxis

## 2024-07-11 NOTE — CARE COORDINATION
DISCHARGE ORDER  Date/Time 2024 9:48 AM  Completed by: Dasha Mills RN, Case Management    Patient Name: Ari Snider      : 1935  Admitting Diagnosis: Hypomagnesemia [E83.42]  Delirium [R41.0]  Acute pulmonary edema (HCC) [J81.0]  NSTEMI (non-ST elevated myocardial infarction) (HCC) [I21.4]  Acute respiratory failure with hypoxia (HCC) [J96.01]  Septic shock (HCC) [A41.9, R65.21]      Admit order Date and Status:inpt  (verify MD's last order for status of admission)      Noted discharge order.   If applicable PT/OT recommendation at Discharge: SNF  DME recommendation by PT/OT:defer to facility  Confirmed discharge plan  (pt): Yes  with whom________pt's sonJosue_______    Discharge Plan: met with the pt at bedside. Pt requests for writer to discuss home care with his son,Josue. Pt refusing SNF. Pt's son unsure of name of home care and ok with using same home care they have used on previous admission. Per care coordinator notes has used [facility] in the past and referral called to Viridiana with [facility] and she can accept the pt at dc today. No other dc needs identified.    Date of Last IMM Given: 2024    Reviewed chart.  Role of discharge planner explained and patient verbalized understanding. Discharge order is noted.    Has Home O2 in place on admit:  No  Informed of need to bring portable home O2 tank on day of discharge for nursing to connect prior to leaving:   Not Indicated  Verbalized agreement/Understanding:   Not Indicated  Pt is being d/c'd to home today. Pt's O2 sats are 93% on RA.    Discharge timeout done with KseniaRN. All discharge needs and concerns addressed.

## 2024-07-11 NOTE — TELEPHONE ENCOUNTER
You filled out a [facility]s Home form it is a H&P form and this needs to be cosigned by collaborating physician and refaxed to Levi at the 's home.     FAx # [phone]

## 2024-07-11 NOTE — PLAN OF CARE
Problem: Safety - Adult  Goal: Free from fall injury  7/11/2024 0229 by Audra Romero RN  Outcome: Progressing  7/10/2024 2000 by Jordana Mckeon RN  Outcome: Progressing  7/10/2024 1958 by Jordana Mckeon RN  Outcome: Progressing     Problem: Discharge Planning  Goal: Discharge to home or other facility with appropriate resources  7/11/2024 0229 by Audra Romero RN  Outcome: Progressing  7/10/2024 2000 by Jordana Mckeon RN  Outcome: Progressing  7/10/2024 1958 by Jordana Mckeon RN  Outcome: Progressing     Problem: ABCDS Injury Assessment  Goal: Absence of physical injury  7/11/2024 0229 by Audra Romero RN  Outcome: Progressing  7/10/2024 2000 by Jordana Mckeon RN  Outcome: Progressing  7/10/2024 1958 by Jordana Mckeon RN  Outcome: Progressing     Problem: Skin/Tissue Integrity  Goal: Absence of new skin breakdown  Description: 1.  Monitor for areas of redness and/or skin breakdown  2.  Assess vascular access sites hourly  3.  Every 4-6 hours minimum:  Change oxygen saturation probe site  4.  Every 4-6 hours:  If on nasal continuous positive airway pressure, respiratory therapy assess nares and determine need for appliance change or resting period.  7/11/2024 0229 by Adura Romero RN  Outcome: Progressing  7/10/2024 2000 by Jordana Mckeon RN  Outcome: Progressing  7/10/2024 1958 by Jordana Mckeon RN  Outcome: Progressing     Problem: Confusion  Goal: Confusion, delirium, dementia, or psychosis is improved or at baseline  Description: INTERVENTIONS:  1. Assess for possible contributors to thought disturbance, including medications, impaired vision or hearing, underlying metabolic abnormalities, dehydration, psychiatric diagnoses, and notify attending LIP  2. [place] high risk fall precautions, as indicated  3. Provide frequent short contacts to provide reality reorientation, refocusing and direction  4. Decrease environmental stimuli, including noise as appropriate  5. Monitor  and intervene to maintain adequate nutrition, hydration, elimination, sleep and activity  6. If unable to ensure safety without constant attention obtain sitter and review sitter guidelines with assigned personnel  7. Initiate Psychosocial CNS and Spiritual Care consult, as indicated  7/11/2024 0229 by Audra Romero RN  Outcome: Progressing  7/10/2024 2000 by Jordana Mckeon RN  Outcome: Progressing  7/10/2024 1958 by Jordana Mckeon RN  Outcome: Progressing     Problem: Chronic Conditions and Co-morbidities  Goal: Patient's chronic conditions and co-morbidity symptoms are monitored and maintained or improved  7/11/2024 0229 by Audra Romero RN  Outcome: Progressing  7/10/2024 2000 by Jordana Mckeon RN  Outcome: Progressing  7/10/2024 1958 by Jordana Mckeon RN  Outcome: Progressing     Problem: Cardiovascular - Adult  Goal: Maintains optimal cardiac output and hemodynamic stability  7/11/2024 0229 by Audra Romero RN  Outcome: Progressing  7/10/2024 2000 by Jordana Mckeon RN  Outcome: Progressing  7/10/2024 1958 by Jordana Mckeon RN  Outcome: Progressing  Goal: Absence of cardiac dysrhythmias or at baseline  7/11/2024 0229 by Audra Romero RN  Outcome: Progressing  7/10/2024 2000 by Jordana Mckeon RN  Outcome: Progressing  7/10/2024 1958 by Jordana Mckeon RN  Outcome: Progressing     Problem: Nutrition Deficit:  Goal: Optimize nutritional status  7/10/2024 2000 by Jordana Mckeon RN  Outcome: Progressing  7/10/2024 1958 by Jordana Mckeon RN  Outcome: Progressing

## 2024-07-11 NOTE — PLAN OF CARE
Problem: Safety - Adult  Goal: Free from fall injury  7/10/2024 2000 by Jordana Mckeon RN  Outcome: Progressing  7/10/2024 1958 by Jordana Mckeon RN  Outcome: Progressing     Problem: Discharge Planning  Goal: Discharge to home or other facility with appropriate resources  7/10/2024 2000 by Jordana Mckeon RN  Outcome: Progressing  7/10/2024 1958 by Jordana Mckeon RN  Outcome: Progressing     Problem: ABCDS Injury Assessment  Goal: Absence of physical injury  7/10/2024 2000 by Jordana Mckeon RN  Outcome: Progressing  7/10/2024 1958 by Jordana Mckeon RN  Outcome: Progressing     Problem: Skin/Tissue Integrity  Goal: Absence of new skin breakdown  7/10/2024 2000 by Jordana Mckeon RN  Outcome: Progressing  7/10/2024 1958 by Jordana Mckeon RN  Outcome: Progressing     Problem: Confusion  Goal: Confusion, delirium, dementia, or psychosis is improved or at baseline  7/10/2024 2000 by Jordana Mckeon RN  Outcome: Progressing  7/10/2024 1958 by Jordana Mckeon RN  Outcome: Progressing     Problem: Chronic Conditions and Co-morbidities  Goal: Patient's chronic conditions and co-morbidity symptoms are monitored and maintained or improved  7/10/2024 2000 by Jordana Mckeon RN  Outcome: Progressing  7/10/2024 1958 by Jordana Mckeon RN  Outcome: Progressing  HEART FAILURE CARE PLAN:    Comorbidities Reviewed: Yes   Patient has a past medical history of Abscess of toe of right foot, Acute CVA (cerebrovascular accident) ([facility]), Blood transfusion, Cellulitis of right lower extremity, Cushing's syndrome ([facility]), Diabetic ulcer of toe 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 mellitus, with fat layer exposed ([facility]), Greater trochanteric bursitis of right hip, Hypertension, Lumbar spondylosis, Lymphoma ([facility]), MRSA infection, Osteomyelitis of left foot ([facility]), Tinnitus, Tobacco use, Ulcer of other part of foot, Vertigo, benign paroxysmal, and 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[facility]).     Weights Reviewed: Yes   Admission weight: 102.5  kg (226 lb)   Wt Readings from Last 3 Encounters:   07/10/24 100.1 kg (220 lb 11.2 oz)   07/04/24 105.2 kg (232 lb)   06/27/24 101.6 kg (224 lb)     Intake & Output Reviewed: Yes     Intake/Output Summary (Last 24 hours) at 7/10/2024 2001  Last data filed at 7/10/2024 1755  Gross per 24 hour   Intake 711.65 ml   Output 1200 ml   Net -488.35 ml       ECHOCARDIOGRAM Reviewed: Yes   Patient's Ejection Fraction (EF) is greater than 40%     Medications Reviewed: Yes   SCHEDULED HOSPITAL MEDICATIONS:   magnesium sulfate  2,000 mg IntraVENous Once    spironolactone  12.5 mg Oral Daily    empagliflozin  10 mg Oral Daily    [START ON 7/11/2024] pantoprazole  40 mg Oral QAM AC    sacubitril-valsartan  1 tablet Oral BID    metoprolol succinate  25 mg Oral Daily    mupirocin   Each Nostril BID    insulin glargine  10 Units SubCUTAneous Nightly    aspirin  81 mg Oral Daily    atorvastatin  40 mg Oral Nightly    sodium chloride flush  5-40 mL IntraVENous 2 times per day    enoxaparin  30 mg SubCUTAneous BID    insulin lispro  0-16 Units SubCUTAneous TID WC    insulin lispro  0-4 Units SubCUTAneous Nightly     HOME MEDICATIONS:  Prior to Admission medications    Medication Sig Start Date End Date Taking? Authorizing Provider   insulin aspart (NOVOLOG FLEXPEN) 100 UNIT/ML injection pen Inject 8 Units into the skin every 6 hours For blood sugars greater than 250 7/5/24 8/4/24  Wilma Rowley APRN - CNP   insulin glargine (LANTUS SOLOSTAR) 100 UNIT/ML injection pen Inject 10 Units into the skin nightly 7/5/24   Wilma Rowley APRN - CNP   Insulin Pen Needle 32G X 4 MM MISC 1 each by Does not apply route daily 7/5/24   Wilma Rowley APRN - CNP   albuterol sulfate HFA (PROVENTIL HFA) 108 (90 Base) MCG/ACT inhaler Inhale 2 puffs into the lungs every 6 hours as needed for Wheezing  Patient not taking: Reported on 7/6/2024 7/5/24   Wilma Rowley APRN - CNP   tamsulosin (FLOMAX) 0.4 MG capsule Take 1 capsule by mouth daily  6/15/24   Escobar Gastelum MD   Lancets MISC 1 each by Does not apply route 2 times daily 6/6/24   Wilma Rowley APRN - CNP   blood glucose monitor strips Test 2 times a day & as needed for symptoms of irregular blood glucose. 6/5/24   Wilma Rowley APRN - CNP   atorvastatin (LIPITOR) 40 MG tablet TAKE ONE TABLET BY MOUTH DAILY 11/22/23   Wilma Rowley APRN - CNP   pantoprazole (PROTONIX) 40 MG tablet Take 1 tablet by mouth every morning (before breakfast) 7/11/23   Escobar Gastelum MD   metFORMIN (GLUCOPHAGE) 1000 MG tablet TAKE ONE TABLET BY MOUTH TWICE A DAY WITH A MEAL FOR DIABETES 7/10/23   Wilma Rowley APRN - CNP   aspirin 81 MG EC tablet Take 1 tablet by mouth daily  Patient not taking: Reported on 7/6/2024    ProviderMoe MD   Multiple Vitamins-Minerals (MULTIVITAMIN PO) Take 1 tablet by mouth daily.    Provider, MD Moe      Diet Reviewed: Yes   ADULT DIET; Regular; 4 carb choices (60 gm/meal); Low Fat/Low Chol/High Fiber/2 gm Na  ADULT ORAL NUTRITION SUPPLEMENT; Breakfast, Lunch, Dinner; Diabetic Oral Supplement    Goal of Care Reviewed: Yes   Patient and/or Family's stated Goal of Care this Admission: Reduce shortness of breath, increase activity tolerance, better understand heart failure and disease management, be more comfortable, and reduce lower extremity edema prior to discharge.     Electronically signed by Jordana Mckeon RN on 7/10/2024 at 8:01 PM      Problem: Cardiovascular - Adult  Goal: Maintains optimal cardiac output and hemodynamic stability  7/10/2024 2000 by Jordana Mckeon RN  Outcome: Progressing  7/10/2024 1958 by Jordana Mckeon RN  Outcome: Progressing  Goal: Absence of cardiac dysrhythmias or at baseline  7/10/2024 2000 by Jordana Mckeon RN  Outcome: Progressing  7/10/2024 1958 by Jordana Mckeon RN  Outcome: Progressing     Problem: Nutrition Deficit:  Goal: Optimize nutritional status  7/10/2024 2000 by Jordana Mckeon RN  Outcome:

## 2024-07-11 NOTE — FLOWSHEET NOTE
07/10/24 2325   Vital Signs   Temp 98.5 °F (36.9 °C)   Temp Source Oral   Pulse 82   Heart Rate Source Monitor   Respirations 16   /62   MAP (Calculated) 78   BP Method Automatic   Patient Position Semi fowlers   Oxygen Therapy   SpO2 92 %   O2 Device None (Room air)     Reassessment completed, No change to previous assessment, patient resting in bed at this time.

## 2024-07-11 NOTE — CARE COORDINATION
07/11/24 1008   IMM Letter   IMM Letter given to Patient/Family/Significant other/Guardian/POA/by: JUAN Suarez   IMM Letter date given: 07/11/24   IMM Letter time given: 1000      CM delivered 2nd IMM delivered within 4 hours for DC, verbal explanation of patient rights at bedside. Pt voiced understanding of discharge MCR rights and is agreeable to discharge.

## 2024-07-11 NOTE — PROGRESS NOTES
[facility] [place] Daily Progress Note      Admit Date:  7/5/2024    Subjective:  Mr. Snider is being seen today for f/u elevated Evangelina and new undefined cardiomyopathy.  He feels OK today other than feeling warm. No other specific complaints. No issues per nursing. No family at bedside.    Objective:   /73   Pulse 80   Temp 97.7 °F (36.5 °C) (Oral)   Resp 16   Ht 1.702 m (5' 7\")   Wt 99.4 kg (219 lb 1.6 oz) Comment: pt. to weak to stand notifed RN  SpO2 91%   BMI 34.32 kg/m²     Intake/Output Summary (Last 24 hours) at 7/11/2024 0717  Last data filed at 7/11/2024 0654  Gross per 24 hour   Intake 660.59 ml   Output 950 ml   Net -289.41 ml       TELEMETRY: NSR 82     Physical Exam:  General:  Awake, alert, NAD  Skin:  Warm and dry  Neck:  JVD none   Chest:  diminished bs bases  Cardiovascular:  RRR S1S2; +II/VI JULIA  Abdomen:  Soft NT  Extremities:  no edema    Medications:    magnesium sulfate  2,000 mg IntraVENous Once    spironolactone  12.5 mg Oral Daily    empagliflozin  10 mg Oral Daily    pantoprazole  40 mg Oral QAM AC    sacubitril-valsartan  1 tablet Oral BID    metoprolol succinate  25 mg Oral Daily    mupirocin   Each Nostril BID    insulin glargine  10 Units SubCUTAneous Nightly    aspirin  81 mg Oral Daily    atorvastatin  40 mg Oral Nightly    sodium chloride flush  5-40 mL IntraVENous 2 times per day    enoxaparin  30 mg SubCUTAneous BID    insulin lispro  0-16 Units SubCUTAneous TID WC    insulin lispro  0-4 Units SubCUTAneous Nightly      sodium chloride 5 mL/hr at 07/10/24 1843    dextrose       perflutren lipid microspheres, sodium chloride flush, sodium chloride, potassium chloride **OR** potassium chloride, magnesium sulfate, ondansetron **OR** ondansetron, polyethylene glycol, acetaminophen **OR** acetaminophen, glucose, dextrose bolus **OR** dextrose bolus, glucagon (rDNA), dextrose    Lab Data:  CBC:   Recent Labs     07/09/24  0350   WBC 6.7   HGB 7.8*   HCT 24.5*   MCV     Class 1 obesity due to excess calories with serious comorbidity and body mass index (BMI) of 34.0 to 34.9 in adult 03/29/2021    Lymphoma (HCC) 11/24/2020    Type 2 diabetes mellitus without complication, without long-term current use of insulin (HCC) 08/15/2016    Benign essential HTN 08/15/2016    

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
## 2024-07-11 NOTE — PROGRESS NOTES
Patient and family educated on discharge instructions as well as new medications use, dosage, administration and possible side effects.  Patient verified knowledge. IV removed without difficulty and dry dressing in place. Telemetry monitor removed and returned to [facility]. Pt left facility in stable condition to Home with all of their personal belongings.      Dina Romero RN

## 2024-07-11 NOTE — DISCHARGE SUMMARY
Hospital Medicine Discharge Summary    Patient: Ari Snider     Gender: male  : 1935   Age: 88 y.o.  MRN: [id number]    Admitting Physician: Randolph Rodriguez MD  Discharge Physician: Lilian Conner DO    Code Status: Limited     Admit Date: 2024   Discharge Date:  2024     Discharge Diagnoses:    Active Hospital Problems    Diagnosis Date Noted    Aortic valve stenosis [I35.0] 10/17/2022     Priority: Medium    Anemia [D64.9] 2022     Priority: Medium    CAD (coronary artery disease) [I25.10] 2022     Priority: Medium    Acute pulmonary edema (HCC) [J81.0] 07/10/2024    Cardiomyopathy (HCC) [I42.9] 2024    Acute systolic CHF (congestive heart failure) (HCC) [I50.21] 2024    Acute respiratory failure with hypoxia (HCC) [J96.01] 2024    Hypomagnesemia [E83.42] 2024    Hypotension [I95.9] 2024    Acute diastolic CHF (congestive heart failure) (HCC) [I50.31] 2024    Chronic biliary pancreatitis (HCC) [K86.1] 2024    Elevated troponin [R79.89] 2024    Acute on chronic diastolic congestive heart failure (HCC) [I50.33] 2024    Septic shock (HCC) [A41.9, R65.21] 2024    Benign essential HTN [I10] 08/15/2016    Type 2 diabetes mellitus without complication, without long-term current use of insulin (HCC) [E11.9] 08/15/2016       Condition at Discharge: Stable    Can't wait to go home. Doesn't want SNF. No pain. No dyspnea.     Hospital Course:     # Hypotension.  Patient was hypotensive when he came in.   .  Shock may be related to his heart failure.  He was on Levophed and  has been weaned off quickly - sepsis ruled out   .  Blood pressure stable at present.     # NSTEMI.  No chest pain but significantly elevated troponin noted   Started ASA, continued statins, BB  Recent ECHO with normal EF but repeat limited ECHO with low EF of 45 %and planned for ischemic eval with stress test  but stress test was negative  Planned for Entresto,  toprol   Conservative mx per cardiology      # Acute systolic heart failure.     Likely combination of IVF boluses and Low EF    Recent echocardiogram showed normal EF but moderate AS    Fluids have been stopped. Repeat echo with new low EF    Given lasix as needed- see above    Cards managing  Modifying meds towards GDMT, on aldactone, Entresto, BB, asa, statin     # Moderate aortic stenosis.  Not a candidate for surgery at this time given elderly age, frailty per cards     # Recent gallstone pancreatitis.  CT scan showed worsening but clinically he has no abdominal pain and lipase near normal.  Started on a diet. GI consulted and following.  Might need lap aidee at some point.  He has appointment with surgery to follow-up on this when further recovered    #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  #Splenomegaly /cirrhosis   #Normocytic anemia   #Non-hodgkins lymphoma  -received rituximab  in the past  -saw oncology 11/2019 but later followed up with holistic tx  - worsening anemia noted, iron levels low, start on venofer   - might need oncology consult again      # possible dementia   - pt developed confusion during his ;ast stay and family reports sundowners   - stable with no behavior issues this adm     #Dm type 2   #Neuropathy   -resume oral regimen   -SSI   -monitor BG  -Also continues on home nightly lantus      #PVD   -ASA, statin      #HX of right foot osteomyelitis and MRSA bacteremia  -completed course of IV vancomycin in Jan 2024  -follows with podiatry outpatient      #Carotid artery stenosis   -on ASA, statin      #Thyroid nodules   -need outpatient US       Additional findings or notes to primary provider:  None at this time    Discharge Medications:   Current Discharge Medication List        START taking these medications    Details   empagliflozin (JARDIANCE) 10 MG tablet Take 1 tablet by mouth daily  Qty: 30 tablet, Refills: 1      metoprolol succinate (TOPROL XL) 25 MG extended release tablet Take 1 tablet  Reason for exam:->ams Decision Support Exception - unselect if not a suspected or confirmed emergency medical condition->Emergency Medical Condition (MA) Reason for Exam: ams, sob FINDINGS: BRAIN/VENTRICLES: Chronic microvascular ischemic changes in the periventricular white matter. There is no acute intracranial hemorrhage, mass effect or midline shift.  No abnormal extra-axial fluid collection.  The gray-white differentiation is maintained without evidence of an acute infarct.  There is no evidence of hydrocephalus. ORBITS: The visualized portion of the orbits demonstrate no acute abnormality. SINUSES: The visualized paranasal sinuses and mastoid air cells demonstrate no acute abnormality. SOFT TISSUES/SKULL:  No acute abnormality of the visualized skull or soft tissues.     1. No acute intracranial abnormality. 2. Chronic microvascular ischemic changes.     CT ABDOMEN PELVIS W IV CONTRAST Additional Contrast? None    Result Date: 7/6/2024  EXAMINATION: CT OF THE ABDOMEN AND PELVIS WITH CONTRAST 7/5/2024 7:59 pm TECHNIQUE: CT of the abdomen and pelvis was performed with the administration of intravenous contrast. Multiplanar reformatted images are provided for review. Automated exposure control, iterative reconstruction, and/or weight based adjustment of the mA/kV was utilized to reduce the radiation dose to as low as reasonably achievable. COMPARISON: None. HISTORY: ORDERING SYSTEM PROVIDED HISTORY: abd pain, recent pancreatitis, fever TECHNOLOGIST PROVIDED HISTORY: Additional Contrast?->None Reason for exam:->abd pain, recent pancreatitis, fever Decision Support Exception - unselect if not a suspected or confirmed emergency medical condition->Emergency Medical Condition (MA) Reason for Exam: abdo pain, pancreatities, sob FINDINGS: Lower Chest: Small bilateral pleural effusions are not seen. Coronary artery calcification is seen.  Aortic valve calcification is seen. Small hiatal hernia seen. Organs: Calcified  granuloma seen within the spleen.  There is splenomegaly. Adrenal glands unremarkable. No hydronephrosis on left.  A few punctate calcifications are seen in the left kidney measuring 1-2 mm in size. No hydronephrosis on the right.  Rim calcified cyst is seen in the right kidney. Trace perihepatic fluid is seen.  There is gallbladder wall thickening. There is surrounding fat stranding..  Gallstones are seen No pancreatic calcification noted.  There is worsening fat stranding surrounding the pancreas consistent with pancreatitis. GI/Bowel: Scattered fluid-filled loops of both small and large bowel are seen.  Moderate stool load seen in the colon.  No bowel obstruction noted. There is fat stranding and trace  fluid seen in the pericolic gutters. Pelvis: No free fluid is seen within the pelvis.  No pelvic adenopathy. Atherosclerotic change seen in the iliacs. Peritoneum/Retroperitoneum: No aortic aneurysm.  No retroperitoneal adenopathy. Bones/Soft Tissues: Spurring is seen in the spine.  Spurring is seen in the hips..  Subcutaneous fat stranding in the abdominal wall suggesting anasarca is new since the previous study.     1. Worsening  pancreatitis. 2. Gallbladder wall thickening with surrounding fat stranding and gallstones. This could be related to pancreatitis. 3. Small bilateral pleural effusions. 4. Splenomegaly. 5. Nonobstructing left renal calculi. 6. Anasarca.     XR CHEST PORTABLE    Result Date: 7/5/2024  EXAMINATION: ONE XRAY VIEW OF THE CHEST 7/5/2024 8:46 pm COMPARISON: 06/13/2024 HISTORY: Acute shortness of breath and fever. FINDINGS: Patient is mildly rotated.  Cardiomegaly.  Right greater than left pleural effusions with adjacent airspace disease.  Probable mild pulmonary edema.  No pneumothorax.  Osteopenia.     CHF.       The patient was seen and examined on day of discharge and this discharge summary is in conjunction with any daily progress note from day of discharge.Time Spent on discharge is less

## 2024-07-11 NOTE — FLOWSHEET NOTE
07/11/24 0727   Vital Signs   Temp 97 °F (36.1 °C)   Temp Source Oral   Pulse 81   Heart Rate Source Monitor   Respirations 16   BP (!) 109/55   MAP (Calculated) 73   BP Location Right upper arm   BP Method Automatic   Patient Position Semi fowlers   Oxygen Therapy   SpO2 93 %   O2 Device None (Room air)     Morning assessment complete. Pt A&OX4 on RA. Med pass tolerated well. Pt and family updated on DC plan for today. Pt spoke with Cardio-cleared for surgery from a cardio standpoint. Standard precautions in place. Call light within reach. No further needs at this time.    Dina Romero RN

## 2024-07-11 NOTE — PLAN OF CARE
Problem: Safety - Adult  Goal: Free from fall injury  7/11/2024 1048 by Dina Romero RN  Outcome: Adequate for Discharge  7/11/2024 0229 by Audra Romero RN  Outcome: Progressing     Problem: Discharge Planning  Goal: Discharge to home or other facility with appropriate resources  7/11/2024 1048 by Dina Romero RN  Outcome: Adequate for Discharge  7/11/2024 0229 by Audra Romero RN  Outcome: Progressing     Problem: ABCDS Injury Assessment  Goal: Absence of physical injury  7/11/2024 1048 by Dina Romero RN  Outcome: Adequate for Discharge  7/11/2024 0229 by Audra Romero RN  Outcome: Progressing     Problem: Skin/Tissue Integrity  Goal: Absence of new skin breakdown  Description: 1.  Monitor for areas of redness and/or skin breakdown  2.  Assess vascular access sites hourly  3.  Every 4-6 hours minimum:  Change oxygen saturation probe site  4.  Every 4-6 hours:  If on nasal continuous positive airway pressure, respiratory therapy assess nares and determine need for appliance change or resting period.  7/11/2024 1048 by Dina Romero RN  Outcome: Adequate for Discharge  7/11/2024 0229 by Audra Romero RN  Outcome: Progressing     Problem: Confusion  Goal: Confusion, delirium, dementia, or psychosis is improved or at baseline  Description: INTERVENTIONS:  1. Assess for possible contributors to thought disturbance, including medications, impaired vision or hearing, underlying metabolic abnormalities, dehydration, psychiatric diagnoses, and notify attending LIP  2. [place] high risk fall precautions, as indicated  3. Provide frequent short contacts to provide reality reorientation, refocusing and direction  4. Decrease environmental stimuli, including noise as appropriate  5. Monitor and intervene to maintain adequate nutrition, hydration, elimination, sleep and activity  6. If unable to ensure safety without constant attention obtain sitter and review sitter guidelines with assigned  personnel  7. Initiate Psychosocial CNS and Spiritual Care consult, as indicated  7/11/2024 1048 by Dina Romero RN  Outcome: Adequate for Discharge  7/11/2024 0229 by Audra Romero RN  Outcome: Progressing     Problem: Chronic Conditions and Co-morbidities  Goal: Patient's chronic conditions and co-morbidity symptoms are monitored and maintained or improved  7/11/2024 1048 by Dina Romero RN  Outcome: Adequate for Discharge  7/11/2024 0229 by Audra Romero RN  Outcome: Progressing     Problem: Cardiovascular - Adult  Goal: Maintains optimal cardiac output and hemodynamic stability  7/11/2024 1048 by Dina Romero RN  Outcome: Adequate for Discharge  7/11/2024 0229 by Audra Romero RN  Outcome: Progressing  Goal: Absence of cardiac dysrhythmias or at baseline  7/11/2024 1048 by Dina Romero RN  Outcome: Adequate for Discharge  7/11/2024 0229 by Audra Romero RN  Outcome: Progressing     Problem: Nutrition Deficit:  Goal: Optimize nutritional status  Outcome: Adequate for Discharge

## 2024-07-12 ENCOUNTER — CARE COORDINATION (OUTPATIENT)
Dept: CASE MANAGEMENT | Age: 89
End: 2024-07-12

## 2024-07-12 ENCOUNTER — TELEPHONE (OUTPATIENT)
Dept: FAMILY MEDICINE CLINIC | Age: 89
End: 2024-07-12

## 2024-07-12 DIAGNOSIS — K81.0 ACUTE CHOLECYSTITIS: Primary | ICD-10-CM

## 2024-07-12 NOTE — CARE COORDINATION
Care Transitions Note    Initial Call - Call within 2 business days of discharge: Yes    Patient Current Location:  Home: [address]  [place] [address]    Care Transition Nurse contacted the family, massimo Salas Jr (confirmed)  by telephone to perform post hospital discharge assessment, verified name and  as identifiers. Provided introduction to self, and explanation of the Care Transition Nurse role.     Patient: Ari Snider  Patient : 1935   MRN: [id number]    Reason for Admission: 6 day readmission -> hypotension, NSTEMI, acute sCHF (EF 45%), mod aortic stenosis, recent gallstone pancreatitis, mediastinal lymph node 1.2 cm, splenomegaly/cirrhosis, normocytic anemia, non-Hodgkins lymphoma, possible dementia, DM2, neuropathy, PVD, hx R foot osteomyelitis and MRSA bacteremia, carotid artery stenosis, thyroid nodules -> [facility] f/up PCP thyroid US, f/up podiatry, f/up hem/onc, f/up surgery-gallstone pancreatitis, [facility] to follow outpatient  Discharge Date: 24  RURS: Readmission Risk Score: 28.4    Last Discharge Facility       Date Complaint Diagnosis Description Type Department Provider    24 Shortness of Breath NSTEMI (non-ST elevated myocardial infarction) (HCC) ... ED to Hosp-Admission (Discharged) (ADMITTED) [facility] Lilian Conner DO; Yamilet Hernandez ...     Was this an external facility discharge? No    Additional needs identified to be addressed with provider   No needs identified         Method of communication with provider: none.    Patients top risk factors for readmission: functional cognitive ability, functional physical ability, medical condition- , and utilization of services    Interventions to address risk factors:   Review of patient management of conditions/medications:      Care Summary Note:   Massimo Salas returned call. [facility] to get [facility].

## 2024-07-12 NOTE — TELEPHONE ENCOUNTER
Dr. López- I have asked my staff to fax over the form I completed on Ari for admission to the [facility].  Patient increased difficulty living at home due to comorbidites and family is not able to care for him.  We are calling to get the correct fax number.

## 2024-07-12 NOTE — CARE COORDINATION
Care Transitions Note    Initial Call - Call within 2 business days of discharge: Yes    Patient Current Location:  Home: [address]  [place] [address]    Patient: Ari Snider  Patient : 1935   MRN: [id number]    Reason for Admission: 6 day readmission -> hypotension, NSTEMI, acute sCHF (EF 45%), mod aortic stenosis, recent gallstone pancreatitis, mediastinal lymph node 1.2 cm, splenomegaly/cirrhosis, normocytic anemia, non-Hodgkins lymphoma, possible dementia, DM2, neuropathy, PVD, hx R foot osteomyelitis and MRSA bacteremia, carotid artery stenosis, thyroid nodules -> home w/ [facility] PT OT [facility]), f/up PCP thyroid US, f/up podiatry, f/up hem/onc, f/up surgery-gallstone pancreatitis  Discharge Date: 24  RURS: Readmission Risk Score: 28.4    Last Discharge Facility       Date Complaint Diagnosis Description Type Department Provider    24 Shortness of Breath NSTEMI (non-ST elevated myocardial infarction) (HCC) ... ED to Hosp-Admission (Discharged) (ADMITTED) [facility] Lilian Conner DO; Yamilet Hernandez ...     Was this an external facility discharge? No    Per daughter-in-law, HC nurse just arrived. Message left requesting return call this afternoon.     Follow Up Appointment:   Patient has hospital follow up appointment scheduled within 7 days of discharge.     Future Appointments         Provider Specialty Dept Phone    7/15/2024 1:40 PM Wilma Rowley APRN - CNP Family Medicine [phone]    2024 10:45 AM Kush Pressley APRN - CNP Cardiology [phone]    2024 10:15 AM Nick Hunt MD Cardiology [phone]    2024 11:20 AM Wilma Rowley APRN - CNP Family Medicine [phone]          Annika Russell RN

## 2024-07-12 NOTE — TELEPHONE ENCOUNTER
Home care nurse calling for verbal that you will sign home care orders and also wanted you to be aware that she suggested patient do a short inpatient rehab stay

## 2024-07-13 DIAGNOSIS — E11.65 TYPE 2 DIABETES MELLITUS WITH HYPERGLYCEMIA, WITHOUT LONG-TERM CURRENT USE OF INSULIN (HCC): ICD-10-CM

## 2024-07-15 ENCOUNTER — OFFICE VISIT (OUTPATIENT)
Dept: FAMILY MEDICINE CLINIC | Age: 89
End: 2024-07-15

## 2024-07-15 ENCOUNTER — TELEPHONE (OUTPATIENT)
Dept: FAMILY MEDICINE CLINIC | Age: 89
End: 2024-07-15

## 2024-07-15 VITALS
SYSTOLIC BLOOD PRESSURE: 96 MMHG | HEART RATE: 73 BPM | DIASTOLIC BLOOD PRESSURE: 58 MMHG | OXYGEN SATURATION: 94 % | TEMPERATURE: 97.3 F

## 2024-07-15 DIAGNOSIS — K85.90 ACUTE PANCREATITIS, UNSPECIFIED COMPLICATION STATUS, UNSPECIFIED PANCREATITIS TYPE: ICD-10-CM

## 2024-07-15 DIAGNOSIS — E04.1 THYROID NODULE: ICD-10-CM

## 2024-07-15 DIAGNOSIS — Z09 HOSPITAL DISCHARGE FOLLOW-UP: Primary | ICD-10-CM

## 2024-07-15 DIAGNOSIS — E11.9 TYPE 2 DIABETES MELLITUS WITHOUT COMPLICATION, WITHOUT LONG-TERM CURRENT USE OF INSULIN (HCC): ICD-10-CM

## 2024-07-15 DIAGNOSIS — I42.9 CARDIOMYOPATHY, UNSPECIFIED TYPE (HCC): ICD-10-CM

## 2024-07-15 RX ORDER — ORAL SEMAGLUTIDE 7 MG/1
TABLET ORAL
Qty: 30 TABLET | Refills: 0 | OUTPATIENT
Start: 2024-07-15

## 2024-07-15 ASSESSMENT — PATIENT HEALTH QUESTIONNAIRE - PHQ9
SUM OF ALL RESPONSES TO PHQ QUESTIONS 1-9: 0
9. THOUGHTS THAT YOU WOULD BE BETTER OFF DEAD, OR OF HURTING YOURSELF: NOT AT ALL
1. LITTLE INTEREST OR PLEASURE IN DOING THINGS: NOT AT ALL
7. TROUBLE CONCENTRATING ON THINGS, SUCH AS READING THE NEWSPAPER OR WATCHING TELEVISION: NOT AT ALL
3. TROUBLE FALLING OR STAYING ASLEEP: NOT AT ALL
SUM OF ALL RESPONSES TO PHQ QUESTIONS 1-9: 0
10. IF YOU CHECKED OFF ANY PROBLEMS, HOW DIFFICULT HAVE THESE PROBLEMS MADE IT FOR YOU TO DO YOUR WORK, TAKE CARE OF THINGS AT HOME, OR GET ALONG WITH OTHER PEOPLE: NOT DIFFICULT AT ALL
6. FEELING BAD ABOUT YOURSELF - OR THAT YOU ARE A FAILURE OR HAVE LET YOURSELF OR YOUR FAMILY DOWN: NOT AT ALL
8. MOVING OR SPEAKING SO SLOWLY THAT OTHER PEOPLE COULD HAVE NOTICED. OR THE OPPOSITE, BEING SO FIGETY OR RESTLESS THAT YOU HAVE BEEN MOVING AROUND A LOT MORE THAN USUAL: NOT AT ALL
4. FEELING TIRED OR HAVING LITTLE ENERGY: NOT AT ALL
SUM OF ALL RESPONSES TO PHQ QUESTIONS 1-9: 0
SUM OF ALL RESPONSES TO PHQ9 QUESTIONS 1 & 2: 0
5. POOR APPETITE OR OVEREATING: NOT AT ALL
2. FEELING DOWN, DEPRESSED OR HOPELESS: NOT AT ALL
SUM OF ALL RESPONSES TO PHQ QUESTIONS 1-9: 0

## 2024-07-15 NOTE — TELEPHONE ENCOUNTER
[facility] informed    Patient has not been seen since Feb 2023. Overdue for annual checkup with primary care provider. Please help schedule this visit and medication can be filled to that date.    Thanks,  Vincent Corona PA-C on 3/25/2024 at 10:10 AM

## 2024-07-15 NOTE — TELEPHONE ENCOUNTER
Document  FW: Wilma Tavarez, APRN - CNP   Sent: Mon July 15, 2024  9:35 AM   To: [facility]              Message    Is patient taking Entresto and Lisinopril or did the hospital ask for him to stop the Lisinopril?

## 2024-07-15 NOTE — TELEPHONE ENCOUNTER
Spoke with Josue, states that hospital did stop lisinopril, patient is still taking entresto. Has evaluation happening right now with home health to determine transferring him to [facility]. Josue was unsure if patient would be able to make appointment today due to this, didn't know if that transfer would be happening today after the eval or later this week. Will let us know.

## 2024-07-15 NOTE — PATIENT INSTRUCTIONS
Next Steps:  Schedule labs next week  Decrease Metoprolol from 25 to 12.5 mg (1/2)  Stop Jardiance for now (plan for a month)  Keep Cardiology

## 2024-07-15 NOTE — PROGRESS NOTES
without evidence of an acute infarct.  There is no evidence of hydrocephalus. ORBITS: The visualized portion of the orbits demonstrate no acute abnormality. SINUSES: The visualized paranasal sinuses and mastoid air cells demonstrate no acute abnormality. SOFT TISSUES/SKULL:  No acute abnormality of the visualized skull or soft tissues.      1. No acute intracranial abnormality. 2. Chronic microvascular ischemic changes.      CT ABDOMEN PELVIS W IV CONTRAST Additional Contrast? None     Result Date: 7/6/2024  EXAMINATION: CT OF THE ABDOMEN AND PELVIS WITH CONTRAST 7/5/2024 7:59 pm TECHNIQUE: CT of the abdomen and pelvis was performed with the administration of intravenous contrast. Multiplanar reformatted images are provided for review. Automated exposure control, iterative reconstruction, and/or weight based adjustment of the mA/kV was utilized to reduce the radiation dose to as low as reasonably achievable. COMPARISON: None. HISTORY: ORDERING SYSTEM PROVIDED HISTORY: abd pain, recent pancreatitis, fever TECHNOLOGIST PROVIDED HISTORY: Additional Contrast?->None Reason for exam:->abd pain, recent pancreatitis, fever Decision Support Exception - unselect if not a suspected or confirmed emergency medical condition->Emergency Medical Condition (MA) Reason for Exam: abdo pain, pancreatities, sob FINDINGS: Lower Chest: Small bilateral pleural effusions are not seen. Coronary artery calcification is seen.  Aortic valve calcification is seen. Small hiatal hernia seen. Organs: Calcified granuloma seen within the spleen.  There is splenomegaly. Adrenal glands unremarkable. No hydronephrosis on left.  A few punctate calcifications are seen in the left kidney measuring 1-2 mm in size. No hydronephrosis on the right.  Rim calcified cyst is seen in the right kidney. Trace perihepatic fluid is seen.  There is gallbladder wall thickening. There is surrounding fat stranding..  Gallstones are seen No pancreatic calcification noted.

## 2024-07-17 ENCOUNTER — TELEPHONE (OUTPATIENT)
Dept: FAMILY MEDICINE CLINIC | Age: 89
End: 2024-07-17

## 2024-07-17 ENCOUNTER — CARE COORDINATION (OUTPATIENT)
Dept: CASE MANAGEMENT | Age: 89
End: 2024-07-17

## 2024-07-17 NOTE — TELEPHONE ENCOUNTER
HC called for an order for OT 2x weekly x 1 week and 1x weekly x 2 weeks for diagnosis heart failure.   Gave verbal.

## 2024-07-17 NOTE — CARE COORDINATION
Care Transitions Note    Follow Up Call     Patient: Ari Snider                   Patient : 1935   MRN: [id number]                             Reason for Admission: 6 day readmission -> hypotension, NSTEMI, acute sCHF (EF 45%), mod aortic stenosis, recent gallstone pancreatitis, mediastinal lymph node 1.2 cm, splenomegaly/cirrhosis, normocytic anemia, non-Hodgkins lymphoma, possible dementia, DM2, neuropathy, PVD, hx R foot osteomyelitis and MRSA bacteremia, carotid artery stenosis, thyroid nodules -> home w/ [facility] SN PT OT, f/up PCP thyroid US, f/up podiatry, f/up hem/onc, f/up surgery-gallstone pancreatitis, [facility] to follow outpatient  Discharge Date: 24       RURS: Readmission Risk Score: 28.4    Patient Current Location:  Home: [address] [place] [id number]    Care Transition Nurse contacted the family, son Josue Snider (ok per HIPAA)  by telephone. Verified name and  as identifiers.    Additional needs identified to be addressed with provider   No needs identified         Method of communication with provider: none.    Care Summary Note:   Home care PT OT completed evaluations for placement at [facility] [place]. The hospital started the transfer application yesterday. They are working with Alona FULTON at [facility] and waiting for insurance to approve/deny. Said they would know something possibly today but definitely by tomorrow. States he is seeing some small improvements with strength/endurance. Son is paying his bills. He denies needs.     Plan of care updates since last contact:  Review of patient management of conditions/medications:       Advance Care Planning:   Does patient have an Advance Directive: reviewed during previous call, see note. .    Medication Review:  No changes since last call.     Remote Patient Monitoring:  Offered patient enrollment in the Remote Patient Monitoring (RPM) program for in-home monitoring: Deferred at this time because

## 2024-07-21 DIAGNOSIS — E11.65 TYPE 2 DIABETES MELLITUS WITH HYPERGLYCEMIA, WITHOUT LONG-TERM CURRENT USE OF INSULIN (HCC): ICD-10-CM

## 2024-07-22 RX ORDER — BLOOD SUGAR DIAGNOSTIC
STRIP MISCELLANEOUS
Qty: 100 STRIP | Refills: 0 | Status: SHIPPED | OUTPATIENT
Start: 2024-07-22

## 2024-07-24 ENCOUNTER — CARE COORDINATION (OUTPATIENT)
Dept: CASE MANAGEMENT | Age: 89
End: 2024-07-24

## 2024-07-24 NOTE — CARE COORDINATION
Care Transitions Note    Final Call    Patient Current Location:   [facility]    Care Transition Nurse contacted the family, son/SCOTTY Salas (ok per HIPAA)  by telephone. Verified name and  as identifiers.    Additional needs identified to be addressed with provider   No needs identified         Method of communication with provider: none.    Care Summary Note:   Spoke with jose francisco Salas Jr. Confirms his dad was just admitted to [facility] today. States his dad was hesitant to go because of prior experiences at SNFs. Expects him to be there 1-2 weeks for therapy. States the rehab center will handle transportation to upcoming appointments if still admitted to facility.     Future Appointments         Provider Specialty Dept Phone    2024 10:45 AM Kush Pressley APRN - CNP Cardiology [phone]    2024 10:20 AM Wilma Rowley APRN - CNP Family Medicine [phone]    2024 10:15 AM Nick Hunt MD Cardiology [phone]    2024 11:20 AM Wilma Rowley APRN - CNP Family Medicine [phone]          Patient graduated from the Care Transitions program on 24.    Handoff:   Patient was not referred to the ACM team due to  patient admitted to ARU.      Patient has agreed to contact primary care provider and/or specialist for any further questions, concerns, or needs.    Annika Russell RN

## 2024-07-30 ENCOUNTER — TELEPHONE (OUTPATIENT)
Dept: FAMILY MEDICINE CLINIC | Age: 89
End: 2024-07-30

## 2024-07-30 NOTE — TELEPHONE ENCOUNTER
Patient is being discharged from [facility] on 8/1/24.  Asking if Wilma Rowley DNP will be following patieant and sign home care orders

## 2024-08-05 ENCOUNTER — OFFICE VISIT (OUTPATIENT)
Dept: CARDIOLOGY CLINIC | Age: 89
End: 2024-08-05
Payer: MEDICARE

## 2024-08-05 VITALS
BODY MASS INDEX: 31.27 KG/M2 | HEART RATE: 84 BPM | WEIGHT: 199.2 LBS | OXYGEN SATURATION: 97 % | DIASTOLIC BLOOD PRESSURE: 48 MMHG | HEIGHT: 67 IN | SYSTOLIC BLOOD PRESSURE: 110 MMHG

## 2024-08-05 DIAGNOSIS — I25.5 ISCHEMIC CARDIOMYOPATHY: ICD-10-CM

## 2024-08-05 DIAGNOSIS — I50.22 HEART FAILURE WITH MILDLY REDUCED EJECTION FRACTION (HFMREF) (HCC): Primary | ICD-10-CM

## 2024-08-05 PROCEDURE — 1123F ACP DISCUSS/DSCN MKR DOCD: CPT | Performed by: NURSE PRACTITIONER

## 2024-08-05 PROCEDURE — 99214 OFFICE O/P EST MOD 30 MIN: CPT | Performed by: NURSE PRACTITIONER

## 2024-08-05 ASSESSMENT — ENCOUNTER SYMPTOMS
RESPIRATORY NEGATIVE: 1
GASTROINTESTINAL NEGATIVE: 1

## 2024-08-05 NOTE — PATIENT INSTRUCTIONS
Everything looks great today, good job!  Decrease entresto to 1/2 pill twice a day due to lower BP  Continue other medications  Check BP at home and call the office if consistently out of goal range  Follow up as planned with Dr. Hunt 9/2024

## 2024-08-05 NOTE — PROGRESS NOTES
[facility]   Cardiology Note              Date:  August 5, 2024  Patientname: Ari Snider  YOB: 1935    Primary Care physician: Wilma Rowley APRN - CNP    HISTORY OF PRESENT ILLNESS: Ari Snider is a 88 y.o. 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  He was admitted 6/2024 for acute cholecystitis and pancreatitis, medically managed.  Echo showed EF 55-60%, moderate AS.  He was readmitted 7/6/2024 with increasing confusion and treated for sepsis, respiratory failure, acute HFmrEF.  BNP >25,000, troponin 254-244.  Echo showed EF 45%.  Stress test no ischemia, small fixed inferior defect consistent with probable prior infarct. Medical management recommended given age, frailty, anemia.  Discharged on Entresto, Toprol, spironolactone, Jardiance.  Conservative management plan for pancreatitis/cholecystitis.    Today he presents for hospital follow-up for HFmrEF, cardiomyopathy, CAD.  Overall he feels well and denies any chest pain, shortness of breath, palpitations, dizziness, syncope.  Abdominal pain and appetite improved.  Home BPs have been 90s/60s, metoprolol has been held intermittently.  Patient and son state no plan for gallbladder surgery.      Office weight today 8/5/2024: 199 lbs  Hospital weight 7/11/2024: 219 lbs    Cardiologist: Evaluated by Dr. Hunt and Dr. Roland while hospitalized 7/2024; prior saw Dr. Cordoba 1/2023    Past Medical History:   has a past medical history of Abscess of toe of right foot, Acute CVA (cerebrovascular accident) (HCC), Blood transfusion, Cellulitis of right lower extremity, Cushing's syndrome (HCC), Diabetic ulcer of toe 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 mellitus, with fat layer exposed (HCC), Greater trochanteric bursitis of right hip, Hypertension, Lumbar spondylosis, Lymphoma (HCC), MRSA infection, Osteomyelitis of left foot (HCC),

## 2024-08-06 PROBLEM — R79.89 ELEVATED TROPONIN: Status: RESOLVED | Noted: 2024-07-07 | Resolved: 2024-08-06

## 2024-08-07 ENCOUNTER — OFFICE VISIT (OUTPATIENT)
Dept: FAMILY MEDICINE CLINIC | Age: 89
End: 2024-08-07
Payer: MEDICARE

## 2024-08-07 VITALS
HEART RATE: 82 BPM | WEIGHT: 196 LBS | DIASTOLIC BLOOD PRESSURE: 28 MMHG | SYSTOLIC BLOOD PRESSURE: 116 MMHG | BODY MASS INDEX: 30.69 KG/M2 | OXYGEN SATURATION: 97 %

## 2024-08-07 DIAGNOSIS — E11.620 TYPE 2 DIABETES MELLITUS WITH DIABETIC DERMATITIS, WITHOUT LONG-TERM CURRENT USE OF INSULIN (HCC): ICD-10-CM

## 2024-08-07 PROCEDURE — 1123F ACP DISCUSS/DSCN MKR DOCD: CPT | Performed by: NURSE PRACTITIONER

## 2024-08-07 PROCEDURE — 99213 OFFICE O/P EST LOW 20 MIN: CPT | Performed by: NURSE PRACTITIONER

## 2024-08-07 PROCEDURE — 3051F HG A1C>EQUAL 7.0%<8.0%: CPT | Performed by: NURSE PRACTITIONER

## 2024-08-07 RX ORDER — INSULIN GLARGINE 100 [IU]/ML
7 INJECTION, SOLUTION SUBCUTANEOUS NIGHTLY
Qty: 5 ADJUSTABLE DOSE PRE-FILLED PEN SYRINGE | Refills: 0 | Status: SHIPPED | OUTPATIENT
Start: 2024-08-07

## 2024-08-07 RX ORDER — INSULIN ASPART 100 [IU]/ML
INJECTION, SOLUTION INTRAVENOUS; SUBCUTANEOUS
Qty: 5 ADJUSTABLE DOSE PRE-FILLED PEN SYRINGE | Refills: 0 | Status: SHIPPED | OUTPATIENT
Start: 2024-08-07 | End: 2024-08-12 | Stop reason: SDUPTHER

## 2024-08-07 NOTE — PROGRESS NOTES
regular rhythm.      Pulses: Normal pulses.           Carotid pulses are 2+ on the right side and 2+ on the left side.       Dorsalis pedis pulses are 2+ on the right side and 2+ on the left side.        Posterior tibial pulses are 2+ on the right side and 2+ on the left side.      Heart sounds: Murmur heard.      Systolic murmur is present with a grade of 3/6.      No gallop.   Pulmonary:      Effort: Pulmonary effort is normal.      Breath sounds: Normal breath sounds.   Abdominal:      General: Abdomen is flat.      Palpations: Abdomen is soft.   Musculoskeletal:         General: Normal range of motion.      Cervical back: Normal range of motion.      Right lower leg: No edema.      Left lower leg: No edema.   Lymphadenopathy:      Head:      Right side of head: No submental, submandibular, tonsillar, preauricular, posterior auricular or occipital adenopathy.      Left side of head: No submental, submandibular, tonsillar, preauricular, posterior auricular or occipital adenopathy.      Cervical: No cervical adenopathy.   Skin:     General: Skin is warm and dry.      Capillary Refill: Capillary refill takes less than 2 seconds.   Neurological:      General: No focal deficit present.      Mental Status: He is alert and oriented to person, place, and time.   Psychiatric:         Mood and Affect: Mood normal.         Behavior: Behavior normal.           Patient's questions answered and concerns addressed. Patient agrees to plan of care.        Electronically signed by MALATHI Sal CNP on 8/7/2024 at 12:25 PM

## 2024-08-08 RX ORDER — PANTOPRAZOLE SODIUM 40 MG/1
40 TABLET, DELAYED RELEASE ORAL
Qty: 90 TABLET | Refills: 3 | Status: SHIPPED | OUTPATIENT
Start: 2024-08-08

## 2024-08-09 ENCOUNTER — TELEPHONE (OUTPATIENT)
Dept: FAMILY MEDICINE CLINIC | Age: 89
End: 2024-08-09

## 2024-08-09 NOTE — TELEPHONE ENCOUNTER
FYI:Patient had a fall last evening. He got up out of bed to go to the bathroom and his right leg buckled. He fell back on his bed. He has a abrasion on right hand and is covered with a bandage.

## 2024-08-12 ENCOUNTER — TELEPHONE (OUTPATIENT)
Dept: FAMILY MEDICINE CLINIC | Age: 89
End: 2024-08-12

## 2024-08-12 DIAGNOSIS — E11.620 TYPE 2 DIABETES MELLITUS WITH DIABETIC DERMATITIS, WITHOUT LONG-TERM CURRENT USE OF INSULIN (HCC): ICD-10-CM

## 2024-08-12 RX ORDER — INSULIN LISPRO 100 [IU]/ML
INJECTION, SOLUTION INTRAVENOUS; SUBCUTANEOUS
Qty: 5 ADJUSTABLE DOSE PRE-FILLED PEN SYRINGE | Refills: 0 | Status: SHIPPED | OUTPATIENT
Start: 2024-08-12

## 2024-08-12 RX ORDER — INSULIN ASPART 100 [IU]/ML
INJECTION, SOLUTION INTRAVENOUS; SUBCUTANEOUS
Qty: 5 ADJUSTABLE DOSE PRE-FILLED PEN SYRINGE | Refills: 0 | Status: SHIPPED | OUTPATIENT
Start: 2024-08-12 | End: 2024-08-12 | Stop reason: ALTCHOICE

## 2024-08-12 NOTE — TELEPHONE ENCOUNTER
Pharmacy calling for clarification on the Novolog, it is showing the sliding scale but they need to know how many times a day?

## 2024-08-12 NOTE — TELEPHONE ENCOUNTER
Pharmacy calling back now stating ins will not cover the Novolog and wanting to see if you can switch to Humalog

## 2024-08-12 NOTE — TELEPHONE ENCOUNTER
Spoke to patients son and he said he is doing fine, he has not had any other falls and his hand is healing well, patients DIL is changing the bandage and keeping it clean, it is looking  fine

## 2024-08-13 ASSESSMENT — ENCOUNTER SYMPTOMS: SHORTNESS OF BREATH: 0

## 2024-08-13 NOTE — PROGRESS NOTES
Physician Progress Note      PATIENT:               NYA GARAY  CSN #:                  [id number]  :                       1935  ADMIT DATE:       2024 7:59 PM  DISCH DATE:        2024 12:52 PM  RESPONDING  PROVIDER #:        Lilian Conner DO          QUERY TEXT:    Pt admitted with hypotension and CHF and has shock documented.  If possible,   please document in progress notes and discharge summary further specificity   regarding the type of shock:    The medical record reflects the following:  Risk Factors: CKD, HTN, NSTEMI, aortic stenosis  Clinical Indicators: Per discharge summary \"Shock may be related to his heart   failure.  He was on Levophed and  has been weaned off quickly - sepsis ruled   out\".  Treatment: Levophed, serial labs, supportive care, cardiology consult, IV   lasix  Options provided:  -- Cardiogenic Shock  -- Obstructive Shock  -- Hypovolemic Shock  -- Other - I will add my own diagnosis  -- Disagree - Not applicable / Not valid  -- Disagree - Clinically unable to determine / Unknown  -- Refer to Clinical Documentation Reviewer    PROVIDER RESPONSE TEXT:    This patient is in cardiogenic shock.    Query created by: Ann Tracy on 2024 8:28 AM      Electronically signed by:  Lilian Conner DO 2024 9:17 AM

## 2024-08-19 ENCOUNTER — HOSPITAL ENCOUNTER (OUTPATIENT)
Age: 89
Discharge: HOME | End: 2024-08-19
Payer: MEDICARE

## 2024-08-19 ENCOUNTER — HOSPITAL ENCOUNTER (OUTPATIENT)
Dept: GENERAL RADIOLOGY | Age: 89
Discharge: HOME | End: 2024-08-19
Payer: MEDICARE

## 2024-08-19 ENCOUNTER — OFFICE VISIT (OUTPATIENT)
Dept: FAMILY MEDICINE CLINIC | Age: 89
End: 2024-08-19
Payer: MEDICARE

## 2024-08-19 VITALS
WEIGHT: 200.4 LBS | SYSTOLIC BLOOD PRESSURE: 125 MMHG | DIASTOLIC BLOOD PRESSURE: 65 MMHG | OXYGEN SATURATION: 96 % | BODY MASS INDEX: 31.38 KG/M2 | HEART RATE: 84 BPM

## 2024-08-19 DIAGNOSIS — L97.421 DIABETIC ULCER OF LEFT HEEL ASSOCIATED WITH TYPE 2 DIABETES MELLITUS, LIMITED TO BREAKDOWN OF SKIN (HCC): ICD-10-CM

## 2024-08-19 DIAGNOSIS — L97.421 DIABETIC ULCER OF LEFT HEEL ASSOCIATED WITH TYPE 2 DIABETES MELLITUS, LIMITED TO BREAKDOWN OF SKIN (HCC): Primary | ICD-10-CM

## 2024-08-19 DIAGNOSIS — E11.621 DIABETIC ULCER OF LEFT HEEL ASSOCIATED WITH TYPE 2 DIABETES MELLITUS, LIMITED TO BREAKDOWN OF SKIN (HCC): ICD-10-CM

## 2024-08-19 DIAGNOSIS — E11.621 DIABETIC ULCER OF LEFT HEEL ASSOCIATED WITH TYPE 2 DIABETES MELLITUS, LIMITED TO BREAKDOWN OF SKIN (HCC): Primary | ICD-10-CM

## 2024-08-19 DIAGNOSIS — I95.2 HYPOTENSION DUE TO DRUGS: ICD-10-CM

## 2024-08-19 DIAGNOSIS — E11.620 TYPE 2 DIABETES MELLITUS WITH DIABETIC DERMATITIS, WITHOUT LONG-TERM CURRENT USE OF INSULIN (HCC): ICD-10-CM

## 2024-08-19 PROCEDURE — 99214 OFFICE O/P EST MOD 30 MIN: CPT | Performed by: NURSE PRACTITIONER

## 2024-08-19 PROCEDURE — 3051F HG A1C>EQUAL 7.0%<8.0%: CPT | Performed by: NURSE PRACTITIONER

## 2024-08-19 PROCEDURE — 73650 X-RAY EXAM OF HEEL: CPT

## 2024-08-19 PROCEDURE — 36415 COLL VENOUS BLD VENIPUNCTURE: CPT | Performed by: NURSE PRACTITIONER

## 2024-08-19 PROCEDURE — 1123F ACP DISCUSS/DSCN MKR DOCD: CPT | Performed by: NURSE PRACTITIONER

## 2024-08-19 RX ORDER — INSULIN GLARGINE 100 [IU]/ML
9 INJECTION, SOLUTION SUBCUTANEOUS NIGHTLY
Qty: 5 ADJUSTABLE DOSE PRE-FILLED PEN SYRINGE | Refills: 0
Start: 2024-08-19

## 2024-08-19 NOTE — PROGRESS NOTES
8/19/2024    Chief Complaint   Patient presents with    Other     Pt has been having low blood pressure for a couple weeks. His blood sugar has also been recently running high (200+).    Maisha John    Ari Snider is a 88 y.o. male, presents today for:      ASSESSMENT/PLAN:    1. Diabetic ulcer of left heel associated with type 2 diabetes mellitus, limited to breakdown of skin (HCC)  Likely diabetic ulceration secondary to mowing.  Will call [facility] with wound care instructions, Silvadene over center of wound with Adaptic surround rest of wound.  Cover with Mepilex due to serous drainage for padding.  Wrap in Kerlix.  Son will attempt to find Podiatry/ Wound Provider.  Will do labs and Heel Xray to r/o fracture  - [facility]  - CBC with Auto Differential  - Comprehensive Metabolic Panel  - XR CALCANEUS LEFT (MIN 2 VIEWS); Future    2. Type 2 diabetes mellitus with diabetic dermatitis, without long-term current use of insulin ([facility])  Will increase Lantus to 9 units daily in attempt to decrease need for sliding scale insulin at meal time.  Encouraged to call if worsening hypoglycemia  - insulin glargine (LANTUS SOLOSTAR) 100 UNIT/ML injection pen; Inject 9 Units into the skin nightly  Dispense: 5 Adjustable Dose Pre-filled Pen Syringe; Refill: 0    3. Hypotension due to drugs  Discussed to hold Entresto dose if BP <110/90.  Discuss further recommendations with Cardio at next OV 9/2024  New diagnosis of CHF 6/2024  Echo showed EF 45%   Stress test: no ischemia, small fixed inferior defect consistent with probable prior infarct.     Return in about 1 week (around 8/26/2024) for blood sugars.    Presenting today with son regarding low blood pressure and low blood sugars (<100) for the past several weeks after meals  Overall has been asymptomatic with both.  Family has been giving 7 units of Lantus daily and following sliding scale directions.  Son also notes new left

## 2024-08-19 NOTE — PATIENT INSTRUCTIONS
Parameters for blood pressure: Hold medication if BP <110/80 hold dose of Entresto.    Advance Care Planning     Advance Care Planning opens a door to talk about and write down your wishes before a sudden accident or illness.  Make your goals, values, and preferences known.     This puts you in the ’s seat and helps others know what matters most to you so they won’t have to guess.      Where can you learn more?    Go to [email or web address]   to learn how to:    Name someone you trust to make healthcare decisions for you, only if you can’t. (Healthcare Power of )    Document your wishes for care if you were seriously ill and not expected to recover or are approaching end of life. (Advance Directive or Living Will)    The same page can be found using the QR code below.

## 2024-08-20 LAB
ALBUMIN SERPL-MCNC: 3.5 G/DL (ref 3.4–5)
ALBUMIN/GLOB SERPL: 1.2 {RATIO} (ref 1.1–2.2)
ALP SERPL-CCNC: 75 U/L (ref 40–129)
ALT SERPL-CCNC: 10 U/L (ref 10–40)
ANION GAP SERPL CALCULATED.3IONS-SCNC: 10 MMOL/L (ref 3–16)
AST SERPL-CCNC: 14 U/L (ref 15–37)
BASOPHILS # BLD: 0.1 K/UL (ref 0–0.2)
BASOPHILS NFR BLD: 1.2 %
BILIRUB SERPL-MCNC: 0.5 MG/DL (ref 0–1)
BUN SERPL-MCNC: 27 MG/DL (ref 7–20)
CALCIUM SERPL-MCNC: 8.9 MG/DL (ref 8.3–10.6)
CHLORIDE SERPL-SCNC: 102 MMOL/L (ref 99–110)
CO2 SERPL-SCNC: 24 MMOL/L (ref 21–32)
CREAT SERPL-MCNC: 1.4 MG/DL (ref 0.8–1.3)
DEPRECATED RDW RBC AUTO: 18.4 % (ref 12.4–15.4)
EOSINOPHIL # BLD: 0.3 K/UL (ref 0–0.6)
EOSINOPHIL NFR BLD: 4.4 %
GFR SERPLBLD CREATININE-BSD FMLA CKD-EPI: 48 ML/MIN/{1.73_M2}
GLUCOSE SERPL-MCNC: 143 MG/DL (ref 70–99)
HCT VFR BLD AUTO: 29.5 % (ref 40.5–52.5)
HGB BLD-MCNC: 9.6 G/DL (ref 13.5–17.5)
LYMPHOCYTES # BLD: 3.4 K/UL (ref 1–5.1)
LYMPHOCYTES NFR BLD: 49.5 %
MCH RBC QN AUTO: 31.3 PG (ref 26–34)
MCHC RBC AUTO-ENTMCNC: 32.6 G/DL (ref 31–36)
MCV RBC AUTO: 95.9 FL (ref 80–100)
MONOCYTES # BLD: 0.4 K/UL (ref 0–1.3)
MONOCYTES NFR BLD: 5.3 %
NEUTROPHILS # BLD: 2.7 K/UL (ref 1.7–7.7)
NEUTROPHILS NFR BLD: 39.6 %
PLATELET # BLD AUTO: 211 K/UL (ref 135–450)
PMV BLD AUTO: 9 FL (ref 5–10.5)
POTASSIUM SERPL-SCNC: 5 MMOL/L (ref 3.5–5.1)
PROT SERPL-MCNC: 6.4 G/DL (ref 6.4–8.2)
RBC # BLD AUTO: 3.07 M/UL (ref 4.2–5.9)
SODIUM SERPL-SCNC: 136 MMOL/L (ref 136–145)
WBC # BLD AUTO: 6.8 K/UL (ref 4–11)

## 2024-08-20 NOTE — RESULT ENCOUNTER NOTE
Anemia is getting back to baseline.  Normal WBC count-- this is reassuring due to foot wound.  Kidney function slightly low-- keep trying to drink lots of water.      I called [facility] yesterday and gave them the updated wound care orders.  If no improvement over the next several days recommend for [facility] like we discussed.

## 2024-08-22 DIAGNOSIS — E11.65 TYPE 2 DIABETES MELLITUS WITH HYPERGLYCEMIA, WITHOUT LONG-TERM CURRENT USE OF INSULIN (HCC): ICD-10-CM

## 2024-08-22 RX ORDER — BLOOD SUGAR DIAGNOSTIC
STRIP MISCELLANEOUS
Qty: 100 STRIP | Refills: 0 | Status: SHIPPED | OUTPATIENT
Start: 2024-08-22

## 2024-08-22 NOTE — TELEPHONE ENCOUNTER
Future appt scheduled 08/26/2024                    Last appt 08/19/2024      Last Written 07/22/2024      ONETOUCH ULTRA TEST strip   #100  0 RF

## 2024-08-26 ENCOUNTER — OFFICE VISIT (OUTPATIENT)
Dept: FAMILY MEDICINE CLINIC | Age: 89
End: 2024-08-26
Payer: MEDICARE

## 2024-08-26 ENCOUNTER — TELEPHONE (OUTPATIENT)
Dept: FAMILY MEDICINE CLINIC | Age: 89
End: 2024-08-26

## 2024-08-26 VITALS
DIASTOLIC BLOOD PRESSURE: 74 MMHG | OXYGEN SATURATION: 97 % | HEART RATE: 73 BPM | WEIGHT: 202 LBS | SYSTOLIC BLOOD PRESSURE: 124 MMHG | BODY MASS INDEX: 31.63 KG/M2 | TEMPERATURE: 97.8 F

## 2024-08-26 DIAGNOSIS — I42.9 CARDIOMYOPATHY, UNSPECIFIED TYPE (HCC): ICD-10-CM

## 2024-08-26 DIAGNOSIS — I95.2 HYPOTENSION DUE TO DRUGS: ICD-10-CM

## 2024-08-26 DIAGNOSIS — E11.620 TYPE 2 DIABETES MELLITUS WITH DIABETIC DERMATITIS, WITHOUT LONG-TERM CURRENT USE OF INSULIN (HCC): ICD-10-CM

## 2024-08-26 DIAGNOSIS — L97.421 DIABETIC ULCER OF LEFT HEEL ASSOCIATED WITH TYPE 2 DIABETES MELLITUS, LIMITED TO BREAKDOWN OF SKIN (HCC): ICD-10-CM

## 2024-08-26 DIAGNOSIS — E11.65 TYPE 2 DIABETES MELLITUS WITH HYPERGLYCEMIA, WITHOUT LONG-TERM CURRENT USE OF INSULIN (HCC): Primary | ICD-10-CM

## 2024-08-26 DIAGNOSIS — E11.621 DIABETIC ULCER OF LEFT HEEL ASSOCIATED WITH TYPE 2 DIABETES MELLITUS, LIMITED TO BREAKDOWN OF SKIN (HCC): ICD-10-CM

## 2024-08-26 PROCEDURE — 99214 OFFICE O/P EST MOD 30 MIN: CPT | Performed by: NURSE PRACTITIONER

## 2024-08-26 PROCEDURE — 3051F HG A1C>EQUAL 7.0%<8.0%: CPT | Performed by: NURSE PRACTITIONER

## 2024-08-26 PROCEDURE — 1123F ACP DISCUSS/DSCN MKR DOCD: CPT | Performed by: NURSE PRACTITIONER

## 2024-08-26 RX ORDER — LANCETS 30 GAUGE
1 EACH MISCELLANEOUS 4 TIMES DAILY
Qty: 200 EACH | Refills: 11 | Status: SHIPPED | OUTPATIENT
Start: 2024-08-26

## 2024-08-26 RX ORDER — INSULIN LISPRO 100 [IU]/ML
INJECTION, SOLUTION INTRAVENOUS; SUBCUTANEOUS
Qty: 5 ADJUSTABLE DOSE PRE-FILLED PEN SYRINGE | Refills: 0 | Status: SHIPPED | OUTPATIENT
Start: 2024-08-26

## 2024-08-26 RX ORDER — BLOOD SUGAR DIAGNOSTIC
1 STRIP MISCELLANEOUS 4 TIMES DAILY
Qty: 200 STRIP | Refills: 11 | Status: SHIPPED | OUTPATIENT
Start: 2024-08-26

## 2024-08-26 ASSESSMENT — ENCOUNTER SYMPTOMS
CHEST TIGHTNESS: 0
COUGH: 0
DIARRHEA: 0
SHORTNESS OF BREATH: 0
BLOOD IN STOOL: 0
CONSTIPATION: 0

## 2024-08-26 NOTE — TELEPHONE ENCOUNTER
Spoke to [facility] pharmacy and they have it taken care of now and they are ready to be picked up   I let Josue know that they are ready

## 2024-08-26 NOTE — TELEPHONE ENCOUNTER
Future appt scheduled 11/11/2024                    Last appt 0/26/2024      Last Written 07/05/2024      Insulin Pen Needle 32G X 4 MM MISC   #100  3 RF

## 2024-08-26 NOTE — TELEPHONE ENCOUNTER
Patient jose francisco Salas, calling to report that patient received a text from [facility] pharmacy that it is too soon to get test strips and insulin pen needles. Reports that patient is out, what should they do? Please advise.

## 2024-08-26 NOTE — PATIENT INSTRUCTIONS
Sliding scale Insulin Glucose:   No Insulin  200-249   2 Units 250-299   4 Units 300-349 6 Units Over 349   8 Units aldo Dillon

## 2024-08-26 NOTE — PROGRESS NOTES
8/26/2024    Chief Complaint   Patient presents with    Diabetes     Follow up since increasing Lantus     Hypotension       Ari Snider is a 88 y.o. male, presents today for:      ASSESSMENT/PLAN:    1. Diabetic ulcer of left heel associated with type 2 diabetes mellitus, limited to breakdown of skin (HCC)  Better today per son/ Home health RN report.  Dressing not changed today due to lack of supplies.  Asked pt to call if not changed today and we will make arrangements for changing dressing.  Still working on finding Podiatry provider/ consider wound care.    Continue Orthotic shoe use until blister has healed.      2. Type 2 diabetes mellitus with hyperglycemia, without long-term current use of insulin (HCC)  Better controlled with increase of Lantus to 9 units daily.  AM blood sugars 90-low 100s.  Will increase sliding scale today by 1 with expectation of no insulin at breakfast.    - Lancets MISC; 1 each by Does not apply route in the morning, at noon, in the evening, and at bedtime  Dispense: 200 each; Refill: 11  - blood glucose test strips (ONETOUCH ULTRA TEST) strip; 1 each by Does not apply route 4 times daily As needed.  Dispense: 200 strip; Refill: 11    3. Hypotension due to drugs  Continues to have intermittent hypotension per home readings but pt is asymptomatic.  Continue to monitor closely for now.  Will attempt sooner appt with Cardio due to age, cardiomyopathy and hypotension.   New diagnosis of CHF 6/2024  Echo showed EF 45%   Stress test: no ischemia, small fixed inferior defect consistent with probable prior infarct.     4. Cardiomyopathy, unspecified type (HCC)  See above.      Return in about 3 months (around 11/26/2024).      Presenting today with son for follow up on hypotension, hypoglycemia.    Last week Lantus increased to 9 units daily.  No longer having hypoglycemia events.  AM blood sugar around 90-low 100.  Lunch blood sugar 150-300, dinner blood sugar 150-300.  Did not  2+ on the left side.       Dorsalis pedis pulses are 2+ on the right side and 2+ on the left side.        Posterior tibial pulses are 2+ on the right side and 2+ on the left side.      Heart sounds: Murmur heard.      Systolic murmur is present with a grade of 3/6.      No gallop.   Pulmonary:      Effort: Pulmonary effort is normal.      Breath sounds: Normal breath sounds.   Abdominal:      General: Abdomen is flat.      Palpations: Abdomen is soft.   Musculoskeletal:         General: Normal range of motion.      Cervical back: Normal range of motion.      Right lower leg: No edema.      Left lower leg: No edema.   Lymphadenopathy:      Head:      Right side of head: No submental, submandibular, tonsillar, preauricular, posterior auricular or occipital adenopathy.      Left side of head: No submental, submandibular, tonsillar, preauricular, posterior auricular or occipital adenopathy.      Cervical: No cervical adenopathy.   Skin:     General: Skin is warm and dry.      Capillary Refill: Capillary refill takes less than 2 seconds.      Comments: Left heel dressing in place, wearing orthotic shoe    Neurological:      General: No focal deficit present.      Mental Status: He is alert and oriented to person, place, and time.   Psychiatric:         Mood and Affect: Mood normal.         Behavior: Behavior normal.           Patient's questions answered and concerns addressed. Patient agrees to plan of care.    Patient should call the office immediately with new or ongoing signs or symptoms or worsening, or proceed to the emergency room.  No changes in past medical history, past surgical history, social history, or family history were noted during the patient encounter unless specifically listed above.  All updates of past medical history, past surgical history, social history, or family history were reviewed personally by me during the office visit.  All problems listed in the assessment are stable unless noted

## 2024-08-27 NOTE — TELEPHONE ENCOUNTER
Spoke with patient son, Josue, states that [employer] is still not allowing them to  the needles. States that script needs to state something like \" Per sliding scale, injections up to 4 times daily.\"

## 2024-08-31 DIAGNOSIS — E11.620 TYPE 2 DIABETES MELLITUS WITH DIABETIC DERMATITIS, WITHOUT LONG-TERM CURRENT USE OF INSULIN (HCC): ICD-10-CM

## 2024-09-06 ENCOUNTER — TELEPHONE (OUTPATIENT)
Dept: FAMILY MEDICINE CLINIC | Age: 89
End: 2024-09-06

## 2024-09-06 DIAGNOSIS — I42.9 CARDIOMYOPATHY, UNSPECIFIED TYPE (HCC): ICD-10-CM

## 2024-09-06 DIAGNOSIS — M79.89 LEG SWELLING: Primary | ICD-10-CM

## 2024-09-09 ENCOUNTER — PATIENT MESSAGE (OUTPATIENT)
Dept: FAMILY MEDICINE CLINIC | Age: 89
End: 2024-09-09

## 2024-09-09 DIAGNOSIS — N40.1 BENIGN PROSTATIC HYPERPLASIA WITH LOWER URINARY TRACT SYMPTOMS, SYMPTOM DETAILS UNSPECIFIED: Primary | ICD-10-CM

## 2024-09-09 RX ORDER — FUROSEMIDE 20 MG
20 TABLET ORAL PRN
Qty: 30 TABLET | Refills: 1 | Status: SHIPPED | OUTPATIENT
Start: 2024-09-09

## 2024-09-09 RX ORDER — TAMSULOSIN HYDROCHLORIDE 0.4 MG/1
0.4 CAPSULE ORAL DAILY
Qty: 90 CAPSULE | Refills: 1 | Status: SHIPPED | OUTPATIENT
Start: 2024-09-09

## 2024-09-12 DIAGNOSIS — E11.65 TYPE 2 DIABETES MELLITUS WITH HYPERGLYCEMIA, WITHOUT LONG-TERM CURRENT USE OF INSULIN (HCC): ICD-10-CM

## 2024-09-12 RX ORDER — ORAL SEMAGLUTIDE 7 MG/1
TABLET ORAL
Qty: 30 TABLET | Refills: 0 | OUTPATIENT
Start: 2024-09-12

## 2024-09-25 ENCOUNTER — OFFICE VISIT (OUTPATIENT)
Dept: CARDIOLOGY CLINIC | Age: 89
End: 2024-09-25
Payer: MEDICARE

## 2024-09-25 VITALS
DIASTOLIC BLOOD PRESSURE: 58 MMHG | SYSTOLIC BLOOD PRESSURE: 136 MMHG | HEIGHT: 67 IN | HEART RATE: 68 BPM | WEIGHT: 209.2 LBS | BODY MASS INDEX: 32.83 KG/M2 | OXYGEN SATURATION: 96 %

## 2024-09-25 DIAGNOSIS — I25.10 CORONARY ARTERY CALCIFICATION SEEN ON CAT SCAN: Primary | ICD-10-CM

## 2024-09-25 DIAGNOSIS — I35.0 NONRHEUMATIC AORTIC VALVE STENOSIS: ICD-10-CM

## 2024-09-25 DIAGNOSIS — I50.22 HEART FAILURE WITH MILDLY REDUCED EJECTION FRACTION (HFMREF) (HCC): ICD-10-CM

## 2024-09-25 PROCEDURE — 99214 OFFICE O/P EST MOD 30 MIN: CPT | Performed by: INTERNAL MEDICINE

## 2024-09-25 PROCEDURE — 1123F ACP DISCUSS/DSCN MKR DOCD: CPT | Performed by: INTERNAL MEDICINE

## 2024-11-08 ENCOUNTER — PATIENT MESSAGE (OUTPATIENT)
Dept: FAMILY MEDICINE CLINIC | Age: 89
End: 2024-11-08

## 2024-11-20 ENCOUNTER — OFFICE VISIT (OUTPATIENT)
Dept: FAMILY MEDICINE CLINIC | Age: 89
End: 2024-11-20
Payer: MEDICARE

## 2024-11-20 ENCOUNTER — TELEPHONE (OUTPATIENT)
Dept: FAMILY MEDICINE CLINIC | Age: 89
End: 2024-11-20

## 2024-11-20 VITALS
OXYGEN SATURATION: 96 % | BODY MASS INDEX: 33.51 KG/M2 | WEIGHT: 214 LBS | HEART RATE: 67 BPM | DIASTOLIC BLOOD PRESSURE: 68 MMHG | TEMPERATURE: 98 F | SYSTOLIC BLOOD PRESSURE: 120 MMHG

## 2024-11-20 DIAGNOSIS — I25.10 CORONARY ARTERY DISEASE INVOLVING NATIVE CORONARY ARTERY OF NATIVE HEART WITHOUT ANGINA PECTORIS: ICD-10-CM

## 2024-11-20 DIAGNOSIS — D64.9 NORMOCYTIC NORMOCHROMIC ANEMIA: ICD-10-CM

## 2024-11-20 DIAGNOSIS — E11.65 TYPE 2 DIABETES MELLITUS WITH HYPERGLYCEMIA, WITHOUT LONG-TERM CURRENT USE OF INSULIN (HCC): Primary | ICD-10-CM

## 2024-11-20 DIAGNOSIS — C85.90 LYMPHOMA, UNSPECIFIED BODY REGION, UNSPECIFIED LYMPHOMA TYPE (HCC): ICD-10-CM

## 2024-11-20 DIAGNOSIS — I73.9 PVD (PERIPHERAL VASCULAR DISEASE) (HCC): ICD-10-CM

## 2024-11-20 DIAGNOSIS — E11.620 TYPE 2 DIABETES MELLITUS WITH DIABETIC DERMATITIS, WITHOUT LONG-TERM CURRENT USE OF INSULIN (HCC): ICD-10-CM

## 2024-11-20 DIAGNOSIS — E11.649 HYPOGLYCEMIA UNAWARENESS ASSOCIATED WITH TYPE 2 DIABETES MELLITUS (HCC): ICD-10-CM

## 2024-11-20 DIAGNOSIS — J43.2 CENTRILOBULAR EMPHYSEMA (HCC): ICD-10-CM

## 2024-11-20 LAB — HBA1C MFR BLD: 6.7 %

## 2024-11-20 PROCEDURE — 1159F MED LIST DOCD IN RCRD: CPT | Performed by: NURSE PRACTITIONER

## 2024-11-20 PROCEDURE — 1123F ACP DISCUSS/DSCN MKR DOCD: CPT | Performed by: NURSE PRACTITIONER

## 2024-11-20 PROCEDURE — 99214 OFFICE O/P EST MOD 30 MIN: CPT | Performed by: NURSE PRACTITIONER

## 2024-11-20 PROCEDURE — 3044F HG A1C LEVEL LT 7.0%: CPT | Performed by: NURSE PRACTITIONER

## 2024-11-20 PROCEDURE — 83036 HEMOGLOBIN GLYCOSYLATED A1C: CPT | Performed by: NURSE PRACTITIONER

## 2024-11-20 RX ORDER — ACYCLOVIR 400 MG/1
1 TABLET ORAL ONCE
Qty: 1 EACH | Refills: 0 | Status: SHIPPED | OUTPATIENT
Start: 2024-11-20 | End: 2024-11-20

## 2024-11-20 RX ORDER — ACYCLOVIR 400 MG/1
1 TABLET ORAL
Qty: 2 EACH | Refills: 5 | Status: SHIPPED | OUTPATIENT
Start: 2024-11-20

## 2024-11-20 ASSESSMENT — PATIENT HEALTH QUESTIONNAIRE - PHQ9
3. TROUBLE FALLING OR STAYING ASLEEP: NOT AT ALL
SUM OF ALL RESPONSES TO PHQ QUESTIONS 1-9: 0
7. TROUBLE CONCENTRATING ON THINGS, SUCH AS READING THE NEWSPAPER OR WATCHING TELEVISION: NOT AT ALL
8. MOVING OR SPEAKING SO SLOWLY THAT OTHER PEOPLE COULD HAVE NOTICED. OR THE OPPOSITE, BEING SO FIGETY OR RESTLESS THAT YOU HAVE BEEN MOVING AROUND A LOT MORE THAN USUAL: NOT AT ALL
4. FEELING TIRED OR HAVING LITTLE ENERGY: NOT AT ALL
SUM OF ALL RESPONSES TO PHQ QUESTIONS 1-9: 0
10. IF YOU CHECKED OFF ANY PROBLEMS, HOW DIFFICULT HAVE THESE PROBLEMS MADE IT FOR YOU TO DO YOUR WORK, TAKE CARE OF THINGS AT HOME, OR GET ALONG WITH OTHER PEOPLE: NOT DIFFICULT AT ALL
5. POOR APPETITE OR OVEREATING: NOT AT ALL
9. THOUGHTS THAT YOU WOULD BE BETTER OFF DEAD, OR OF HURTING YOURSELF: NOT AT ALL
2. FEELING DOWN, DEPRESSED OR HOPELESS: NOT AT ALL
6. FEELING BAD ABOUT YOURSELF - OR THAT YOU ARE A FAILURE OR HAVE LET YOURSELF OR YOUR FAMILY DOWN: NOT AT ALL
SUM OF ALL RESPONSES TO PHQ9 QUESTIONS 1 & 2: 0
SUM OF ALL RESPONSES TO PHQ QUESTIONS 1-9: 0
SUM OF ALL RESPONSES TO PHQ QUESTIONS 1-9: 0
1. LITTLE INTEREST OR PLEASURE IN DOING THINGS: NOT AT ALL

## 2024-11-20 NOTE — PROGRESS NOTES
appearance.   HENT:      Head: Normocephalic.      Ears:      Comments: Wearing bilateral hearing aids    Neck:      Vascular: No carotid bruit.   Cardiovascular:      Rate and Rhythm: Normal rate and regular rhythm.      Pulses: Normal pulses.           Carotid pulses are 2+ on the right side and 2+ on the left side.       Dorsalis pedis pulses are 2+ on the right side and 2+ on the left side.        Posterior tibial pulses are 2+ on the right side and 2+ on the left side.      Heart sounds: Murmur heard.      Systolic murmur is present with a grade of 3/6.      No gallop.   Pulmonary:      Effort: Pulmonary effort is normal.      Breath sounds: Normal breath sounds.   Abdominal:      General: Abdomen is flat.      Palpations: Abdomen is soft.   Musculoskeletal:         General: Normal range of motion.      Cervical back: Normal range of motion.      Right lower leg: No edema.      Left lower leg: No edema.   Lymphadenopathy:      Head:      Right side of head: No submental, submandibular, tonsillar, preauricular, posterior auricular or occipital adenopathy.      Left side of head: No submental, submandibular, tonsillar, preauricular, posterior auricular or occipital adenopathy.      Cervical: No cervical adenopathy.   Skin:     General: Skin is warm and dry.      Capillary Refill: Capillary refill takes less than 2 seconds.   Neurological:      General: No focal deficit present.      Mental Status: He is alert and oriented to person, place, and time.   Psychiatric:         Mood and Affect: Mood normal.         Behavior: Behavior normal.           Patient's questions answered and concerns addressed. Patient agrees to plan of care.    Patient should call the office immediately with new or ongoing signs or symptoms or worsening, or proceed to the emergency room.  No changes in past medical history, past surgical history, social history, or family history were noted during the patient encounter unless specifically

## 2024-11-21 ENCOUNTER — TELEPHONE (OUTPATIENT)
Dept: ADMINISTRATIVE | Age: 89
End: 2024-11-21

## 2024-11-21 DIAGNOSIS — E11.620 TYPE 2 DIABETES MELLITUS WITH DIABETIC DERMATITIS, WITHOUT LONG-TERM CURRENT USE OF INSULIN (HCC): ICD-10-CM

## 2024-11-21 RX ORDER — ATORVASTATIN CALCIUM 40 MG/1
40 TABLET, FILM COATED ORAL DAILY
Qty: 90 TABLET | Refills: 3 | Status: SHIPPED | OUTPATIENT
Start: 2024-11-21

## 2024-11-21 NOTE — TELEPHONE ENCOUNTER
Submitted PA for Dexcom G7  device    [facility] Key: [id number]  STATUS: NOT SENT     Please place a scrip for the Dexcom G7  device     Last ov note will need to be completed to send with pa request     If this requires a response please respond to the pool ( P MHCX PSC MEDICATION PRE-AUTH).      Thank you please advise patient.

## 2024-11-21 NOTE — TELEPHONE ENCOUNTER
Submitted PA for Dexcom G7 Sensor   [facility] Key: [id number]  STATUS: NOT SENT     Please complete last ov to submit with pa request .    If this requires a response please respond to the pool ( P MHCX PSC MEDICATION PRE-AUTH).      Thank you please advise patient.

## 2024-11-25 NOTE — TELEPHONE ENCOUNTER
Submitted PA for Dexcom G7 Sensor   [facility] Key: [id number] STATUS: approved     .Outcome  Approved today by [employer] 2017  PA Case: [id number], Status: Approved, Coverage Starts on: 8/26/2024 12:00:00 AM, Coverage Ends on: 11/25/2025 12:00:00 AM.  Authorization Expiration Date: 11/24/2025    If this requires a response please respond to the pool ( P MHCX PSC MEDICATION PRE-AUTH).      Thank you please advise patient.

## 2024-11-25 NOTE — TELEPHONE ENCOUNTER
Second request     Please place a scrip for the Dexcom G7  device     If this requires a response please respond to the pool ( P MHCX PSC MEDICATION PRE-AUTH).      Thank you please advise patient.

## 2025-01-30 DIAGNOSIS — M79.89 LEG SWELLING: ICD-10-CM

## 2025-01-30 DIAGNOSIS — I42.9 CARDIOMYOPATHY, UNSPECIFIED TYPE (HCC): ICD-10-CM

## 2025-01-30 RX ORDER — FUROSEMIDE 20 MG/1
TABLET ORAL
Qty: 30 TABLET | Refills: 1 | Status: SHIPPED | OUTPATIENT
Start: 2025-01-30

## 2025-02-21 NOTE — TELEPHONE ENCOUNTER
[facility] CLINICAL PHARMACY REVIEW: Post-Discharge Transitions of Care (STEFANIE)  Subjective/Objective: Jazzy Wang is a 81 y.o. male. Patient was discharged from [facility] on 2/24/21 with a diagnosis of diabetic foot ulcer. Patient outreach to review discharge medications and provide medication review and management. Spoke with wife. No Known Allergies    Discharge Medications (as per discharging medication list found in AVS): There are NEW medications for you. START taking them after you leave the hospital:  · Omnicef - patient completed 7-day course of therapy    You told us you were taking these medications at home, but the amount or how often you take this medication has CHANGED:   glipiZIDE (GLUCOTROL) 5 MG tablet  · Dose decreased from 10 mg BID to 5 mg BID. Pt taking reduced dose - does not test SMBG per wife. Take 1 tablet by mouth 2 times daily (before meals)    lisinopril (PRINIVIL;ZESTRIL) 10 MG tablet  · Dose increased from 5 mg daily to 10 mg daily. Pt taking increased dose. Not issues to report. Take 1 tablet by mouth daily     These are medications you told us you were taking at home, CONTINUE taking them after you leave the hospital:  Medication Sig    gentamicin (GARAMYCIN) 0.1 % cream Apply topically daily wtih dressing changes.  ferrous sulfate (IRON 325) 325 (65 Fe) MG tablet Take 325 mg by mouth daily (with breakfast)    metFORMIN (GLUCOPHAGE) 1000 MG tablet Take 1 tablet by mouth 2 times daily (with meals) For diabetes    Multiple Vitamins-Minerals (MULTIVITAMIN PO) Take 1 tablet by mouth daily. Estimated Creatinine Clearance: 105 mL/min (A) (based on SCr of 0.6 mg/dL (L)). Assessment/Plan:  - Medication reconciliation completed. Patient has filled new medications ordered after this hospital discharge and is taking medications as directed by discharging provider. - Instructions per discharge list provided except per below documentation.  Identified medication discrepancies/issues:     - Identified Potential Medication Interactions: No clinically significant interactions identified via [employer] Interaction Analysis as category D or higher.    - Renal Dosing: No renal adjustments necessary.    - Other medication-related opportunities:   If prescribed ACEi/ARB, adherence: appears adherent per dispense report   If prescribed non-insulin DM medication(s), adherence: appears adherent per dispense report   CMR eligible: No    Amado Jennings, PharmD, [address]  Direct: [phone]  Department, toll free [phone], option 7          For Pharmacy Admin Tracking Only    TCM Call Made?: No  [facility]) Select Patient?: Yes  Total # of Interventions Recommended: 0  Total # Interventions Accepted: 0  Intervention Severity:   - Level 1 Intervention Present?: No   - Level 2 #: 0   - Level 3 #: 0  Outreach Status: Review Complete  Care Coordinator Outreach to Patient?: Yes  Provider Contacted?: No  Time Spent (min): 20 169.9

## 2025-02-28 ENCOUNTER — PATIENT MESSAGE (OUTPATIENT)
Dept: FAMILY MEDICINE CLINIC | Age: 89
End: 2025-02-28

## 2025-02-28 DIAGNOSIS — E11.620 TYPE 2 DIABETES MELLITUS WITH DIABETIC DERMATITIS, WITHOUT LONG-TERM CURRENT USE OF INSULIN (HCC): ICD-10-CM

## 2025-03-03 RX ORDER — INSULIN LISPRO 100 [IU]/ML
INJECTION, SOLUTION INTRAVENOUS; SUBCUTANEOUS
Qty: 12 ML | Refills: 2 | Status: SHIPPED | OUTPATIENT
Start: 2025-03-03

## 2025-03-03 RX ORDER — INSULIN GLARGINE 100 [IU]/ML
10 INJECTION, SOLUTION SUBCUTANEOUS NIGHTLY
Qty: 3 ML | Refills: 5 | Status: SHIPPED | OUTPATIENT
Start: 2025-03-03

## 2025-03-04 SDOH — ECONOMIC STABILITY: INCOME INSECURITY: IN THE LAST 12 MONTHS, WAS THERE A TIME WHEN YOU WERE NOT ABLE TO PAY THE MORTGAGE OR RENT ON TIME?: NO

## 2025-03-04 SDOH — ECONOMIC STABILITY: TRANSPORTATION INSECURITY
IN THE PAST 12 MONTHS, HAS THE LACK OF TRANSPORTATION KEPT YOU FROM MEDICAL APPOINTMENTS OR FROM GETTING MEDICATIONS?: NO

## 2025-03-04 SDOH — HEALTH STABILITY: PHYSICAL HEALTH: ON AVERAGE, HOW MANY DAYS PER WEEK DO YOU ENGAGE IN MODERATE TO STRENUOUS EXERCISE (LIKE A BRISK WALK)?: 0 DAYS

## 2025-03-04 SDOH — ECONOMIC STABILITY: FOOD INSECURITY: WITHIN THE PAST 12 MONTHS, YOU WORRIED THAT YOUR FOOD WOULD RUN OUT BEFORE YOU GOT MONEY TO BUY MORE.: NEVER TRUE

## 2025-03-04 SDOH — ECONOMIC STABILITY: FOOD INSECURITY: WITHIN THE PAST 12 MONTHS, THE FOOD YOU BOUGHT JUST DIDN'T LAST AND YOU DIDN'T HAVE MONEY TO GET MORE.: NEVER TRUE

## 2025-03-04 SDOH — ECONOMIC STABILITY: TRANSPORTATION INSECURITY
IN THE PAST 12 MONTHS, HAS LACK OF TRANSPORTATION KEPT YOU FROM MEETINGS, WORK, OR FROM GETTING THINGS NEEDED FOR DAILY LIVING?: NO

## 2025-03-04 ASSESSMENT — PATIENT HEALTH QUESTIONNAIRE - PHQ9
SUM OF ALL RESPONSES TO PHQ QUESTIONS 1-9: 1
2. FEELING DOWN, DEPRESSED OR HOPELESS: NOT AT ALL
1. LITTLE INTEREST OR PLEASURE IN DOING THINGS: SEVERAL DAYS
SUM OF ALL RESPONSES TO PHQ QUESTIONS 1-9: 1

## 2025-03-04 ASSESSMENT — LIFESTYLE VARIABLES
HOW OFTEN DO YOU HAVE SIX OR MORE DRINKS ON ONE OCCASION: 1
HOW OFTEN DO YOU HAVE A DRINK CONTAINING ALCOHOL: NEVER
HOW MANY STANDARD DRINKS CONTAINING ALCOHOL DO YOU HAVE ON A TYPICAL DAY: PATIENT DOES NOT DRINK
HOW OFTEN DO YOU HAVE A DRINK CONTAINING ALCOHOL: 1

## 2025-03-04 NOTE — PLAN OF CARE
Addended by: TERESA HURTADO on: 3/4/2025 01:45 PM     Modules accepted: Orders     HEART FAILURE CARE PLAN:    Comorbidities Reviewed: Yes   Patient has a past medical history of Abscess of toe of right foot, Acute CVA (cerebrovascular accident) (HCC), Blood transfusion, Cellulitis of right lower extremity, Cushing's syndrome (HCC), Diabetic ulcer of toe 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 mellitus, with fat layer exposed (HCC), Greater trochanteric bursitis of right hip, Hypertension, Lumbar spondylosis, Lymphoma (HCC), MRSA infection, Osteomyelitis of left foot (HCC), Tinnitus, Tobacco use, Ulcer of other part of foot, Vertigo, benign paroxysmal, and 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[facility]).     Weights Reviewed: Yes   Admission weight: 102.5 kg (226 lb)   Wt Readings from Last 3 Encounters:   07/10/24 100.1 kg (220 lb 11.2 oz)   07/04/24 105.2 kg (232 lb)   06/27/24 101.6 kg (224 lb)     Intake & Output Reviewed: Yes     Intake/Output Summary (Last 24 hours) at 7/11/2024 0237  Last data filed at 7/10/2024 1755  Gross per 24 hour   Intake 600 ml   Output 1200 ml   Net -600 ml       ECHOCARDIOGRAM Reviewed: Yes   Patient's Ejection Fraction (EF) is greater than 40%     Medications Reviewed: Yes   SCHEDULED HOSPITAL MEDICATIONS:   magnesium sulfate  2,000 mg IntraVENous Once    spironolactone  12.5 mg Oral Daily    empagliflozin  10 mg Oral Daily    pantoprazole  40 mg Oral QAM AC    sacubitril-valsartan  1 tablet Oral BID    metoprolol succinate  25 mg Oral Daily    mupirocin   Each Nostril BID    insulin glargine  10 Units SubCUTAneous Nightly    aspirin  81 mg Oral Daily    atorvastatin  40 mg Oral Nightly    sodium chloride flush  5-40 mL IntraVENous 2 times per day    enoxaparin  30 mg SubCUTAneous BID    insulin lispro  0-16 Units SubCUTAneous TID WC    insulin lispro  0-4 Units SubCUTAneous Nightly     HOME MEDICATIONS:  Prior to Admission medications    Medication Sig Start Date End Date Taking? Authorizing Provider   insulin aspart (NOVOLOG FLEXPEN) 100 UNIT/ML injection pen Inject 8 Units into the  skin every 6 hours For blood sugars greater than 250 7/5/24 8/4/24  Wilma Rowley APRN - CNP   insulin glargine (LANTUS SOLOSTAR) 100 UNIT/ML injection pen Inject 10 Units into the skin nightly 7/5/24   Wilma Rowley APRN - CNP   Insulin Pen Needle 32G X 4 MM MISC 1 each by Does not apply route daily 7/5/24   Wilma Rowley APRN - CNP   albuterol sulfate HFA (PROVENTIL HFA) 108 (90 Base) MCG/ACT inhaler Inhale 2 puffs into the lungs every 6 hours as needed for Wheezing  Patient not taking: Reported on 7/6/2024 7/5/24   Wilma Rowley APRN - CNP   tamsulosin (FLOMAX) 0.4 MG capsule Take 1 capsule by mouth daily 6/15/24   Escobar Gastelum MD   Lancets MISC 1 each by Does not apply route 2 times daily 6/6/24   Wilma Rwoley APRN - CNP   blood glucose monitor strips Test 2 times a day & as needed for symptoms of irregular blood glucose. 6/5/24   Wilma Rowley APRN - CNP   atorvastatin (LIPITOR) 40 MG tablet TAKE ONE TABLET BY MOUTH DAILY 11/22/23   Wilma Rowley APRN - CNP   pantoprazole (PROTONIX) 40 MG tablet Take 1 tablet by mouth every morning (before breakfast) 7/11/23   Escobar Gastelum MD   metFORMIN (GLUCOPHAGE) 1000 MG tablet TAKE ONE TABLET BY MOUTH TWICE A DAY WITH A MEAL FOR DIABETES 7/10/23   Wilma Rowley APRN - CNP   aspirin 81 MG EC tablet Take 1 tablet by mouth daily  Patient not taking: Reported on 7/6/2024    Moe Beasley MD   Multiple Vitamins-Minerals (MULTIVITAMIN PO) Take 1 tablet by mouth daily.    Moe Beasley MD      Diet Reviewed: Yes   ADULT DIET; Regular; 4 carb choices (60 gm/meal); Low Fat/Low Chol/High Fiber/2 gm Na  ADULT ORAL NUTRITION SUPPLEMENT; Breakfast, Lunch, Dinner; Diabetic Oral Supplement    Goal of Care Reviewed: Yes   Patient and/or Family's stated Goal of Care this Admission: Reduce shortness of breath, increase activity tolerance, better understand heart failure and disease management, be more comfortable, and reduce lower  extremity edema prior to discharge.     Electronically signed by Audra Romero RN on 7/11/2024 at 2:37 AM

## 2025-03-05 ENCOUNTER — OFFICE VISIT (OUTPATIENT)
Dept: FAMILY MEDICINE CLINIC | Age: 89
End: 2025-03-05
Payer: MEDICARE

## 2025-03-05 VITALS
SYSTOLIC BLOOD PRESSURE: 125 MMHG | TEMPERATURE: 97.7 F | WEIGHT: 231 LBS | BODY MASS INDEX: 36.17 KG/M2 | DIASTOLIC BLOOD PRESSURE: 72 MMHG | RESPIRATION RATE: 96 BRPM | HEART RATE: 67 BPM

## 2025-03-05 DIAGNOSIS — K86.1 OTHER CHRONIC PANCREATITIS (HCC): ICD-10-CM

## 2025-03-05 DIAGNOSIS — Z71.89 ACP (ADVANCE CARE PLANNING): ICD-10-CM

## 2025-03-05 DIAGNOSIS — H90.3 SENSORINEURAL HEARING LOSS, BILATERAL: ICD-10-CM

## 2025-03-05 DIAGNOSIS — E11.620 TYPE 2 DIABETES MELLITUS WITH DIABETIC DERMATITIS, WITHOUT LONG-TERM CURRENT USE OF INSULIN (HCC): ICD-10-CM

## 2025-03-05 DIAGNOSIS — I50.22 HEART FAILURE WITH MILDLY REDUCED EJECTION FRACTION (HFMREF) (HCC): ICD-10-CM

## 2025-03-05 DIAGNOSIS — E11.65 TYPE 2 DIABETES MELLITUS WITH HYPERGLYCEMIA, WITHOUT LONG-TERM CURRENT USE OF INSULIN (HCC): ICD-10-CM

## 2025-03-05 DIAGNOSIS — J43.2 CENTRILOBULAR EMPHYSEMA (HCC): ICD-10-CM

## 2025-03-05 DIAGNOSIS — Z00.00 MEDICARE ANNUAL WELLNESS VISIT, SUBSEQUENT: Primary | ICD-10-CM

## 2025-03-05 DIAGNOSIS — D63.8 ANEMIA OF CHRONIC DISEASE: ICD-10-CM

## 2025-03-05 DIAGNOSIS — C85.90 LYMPHOMA, UNSPECIFIED BODY REGION, UNSPECIFIED LYMPHOMA TYPE (HCC): ICD-10-CM

## 2025-03-05 DIAGNOSIS — N18.31 STAGE 3A CHRONIC KIDNEY DISEASE (HCC): ICD-10-CM

## 2025-03-05 DIAGNOSIS — N40.1 BENIGN PROSTATIC HYPERPLASIA WITH LOWER URINARY TRACT SYMPTOMS, SYMPTOM DETAILS UNSPECIFIED: ICD-10-CM

## 2025-03-05 PROBLEM — A41.9 SEPTIC SHOCK (HCC): Status: RESOLVED | Noted: 2024-07-06 | Resolved: 2025-03-05

## 2025-03-05 PROBLEM — R07.9 CHEST PAIN: Status: RESOLVED | Noted: 2024-06-13 | Resolved: 2025-03-05

## 2025-03-05 PROBLEM — I21.4 NSTEMI (NON-ST ELEVATED MYOCARDIAL INFARCTION) (HCC): Status: RESOLVED | Noted: 2024-07-06 | Resolved: 2025-03-05

## 2025-03-05 PROBLEM — E83.42 HYPOMAGNESEMIA: Status: RESOLVED | Noted: 2024-07-08 | Resolved: 2025-03-05

## 2025-03-05 PROBLEM — K85.90 ACUTE PANCREATITIS, UNSPECIFIED COMPLICATION STATUS, UNSPECIFIED PANCREATITIS TYPE: Status: RESOLVED | Noted: 2024-06-26 | Resolved: 2025-03-05

## 2025-03-05 PROBLEM — K82.8 GALLBLADDER SLUDGE: Status: RESOLVED | Noted: 2024-06-13 | Resolved: 2025-03-05

## 2025-03-05 PROBLEM — R65.21 SEPTIC SHOCK (HCC): Status: RESOLVED | Noted: 2024-07-06 | Resolved: 2025-03-05

## 2025-03-05 PROBLEM — R74.01 TRANSAMINITIS: Status: RESOLVED | Noted: 2024-06-26 | Resolved: 2025-03-05

## 2025-03-05 PROBLEM — B95.62 BACTEREMIA DUE TO METHICILLIN RESISTANT STAPHYLOCOCCUS AUREUS: Status: RESOLVED | Noted: 2024-01-12 | Resolved: 2025-03-05

## 2025-03-05 PROBLEM — I95.9 HYPOTENSION: Status: RESOLVED | Noted: 2024-07-07 | Resolved: 2025-03-05

## 2025-03-05 PROBLEM — R10.13 EPIGASTRIC PAIN: Status: RESOLVED | Noted: 2024-06-13 | Resolved: 2025-03-05

## 2025-03-05 PROBLEM — R78.81 BACTEREMIA DUE TO METHICILLIN RESISTANT STAPHYLOCOCCUS AUREUS: Status: RESOLVED | Noted: 2024-01-12 | Resolved: 2025-03-05

## 2025-03-05 PROBLEM — R10.11 RIGHT UPPER QUADRANT ABDOMINAL PAIN: Status: RESOLVED | Noted: 2024-06-14 | Resolved: 2025-03-05

## 2025-03-05 PROBLEM — K81.0 ACUTE CHOLECYSTITIS: Status: RESOLVED | Noted: 2024-06-26 | Resolved: 2025-03-05

## 2025-03-05 PROBLEM — J81.0 ACUTE PULMONARY EDEMA (HCC): Status: RESOLVED | Noted: 2024-07-10 | Resolved: 2025-03-05

## 2025-03-05 PROBLEM — J96.01 ACUTE RESPIRATORY FAILURE WITH HYPOXIA (HCC): Status: RESOLVED | Noted: 2024-07-08 | Resolved: 2025-03-05

## 2025-03-05 PROBLEM — I50.21 ACUTE SYSTOLIC CHF (CONGESTIVE HEART FAILURE) (HCC): Status: RESOLVED | Noted: 2024-07-09 | Resolved: 2025-03-05

## 2025-03-05 PROBLEM — M86.171 ACUTE OSTEOMYELITIS OF METATARSAL BONE OF RIGHT FOOT (HCC): Status: RESOLVED | Noted: 2024-01-12 | Resolved: 2025-03-05

## 2025-03-05 PROBLEM — I50.33 ACUTE ON CHRONIC DIASTOLIC CONGESTIVE HEART FAILURE (HCC): Status: RESOLVED | Noted: 2024-07-07 | Resolved: 2025-03-05

## 2025-03-05 PROBLEM — I50.31 ACUTE DIASTOLIC CHF (CONGESTIVE HEART FAILURE) (HCC): Status: RESOLVED | Noted: 2024-07-07 | Resolved: 2025-03-05

## 2025-03-05 PROCEDURE — 1159F MED LIST DOCD IN RCRD: CPT | Performed by: NURSE PRACTITIONER

## 2025-03-05 PROCEDURE — 1160F RVW MEDS BY RX/DR IN RCRD: CPT | Performed by: NURSE PRACTITIONER

## 2025-03-05 PROCEDURE — G0439 PPPS, SUBSEQ VISIT: HCPCS | Performed by: NURSE PRACTITIONER

## 2025-03-05 PROCEDURE — 1123F ACP DISCUSS/DSCN MKR DOCD: CPT | Performed by: NURSE PRACTITIONER

## 2025-03-05 RX ORDER — TAMSULOSIN HYDROCHLORIDE 0.4 MG/1
0.4 CAPSULE ORAL DAILY
Qty: 90 CAPSULE | Refills: 1 | Status: SHIPPED | OUTPATIENT
Start: 2025-03-05

## 2025-03-05 NOTE — PATIENT INSTRUCTIONS
Mejia Chapman,    Thank you for coming in.  We appreciate your time and effort in helping us with your care.  Here are some follow up items following our discussion:    Thanks for coming in.  Always great to see you.   Please watch your food intake.  Goal Blood sugars <250 ALL THE TIME  Let me know if you need more therapy for your legs.      Please compare this printed medication list with your medications at home to be sure they are the same.  If you have any medications that are different please contact us immediately at [phone].  Or you can send us a [employer] Voice[employer] message.      If you need to schedule imaging or testing you can call [facility] Main Scheduling Line at [phone], option 2    Also review your allergies that we have listed, these may also include medications that you have not been able to tolerate, make sure everything listed is correct. If you have any allergies that are different please contact us immediately at [phone].    You may receive a survey in the mail or by email asking about your experience during your visit today. Please complete and return to us so we know how we are serving you.         Preventing Falls: Care Instructions  Injuries and health problems such as trouble walking or poor eyesight can increase your risk of falling. So can some medicines. But there are things you can do to help prevent falls. You can exercise to get stronger. You can also arrange your home to make it safer.    Talk to your doctor about the medicines you take. Ask if any of them increase the risk of falls and whether they can be changed or stopped.   Try to exercise regularly. It can help improve your strength and balance. This can help lower your risk of falling.         Practice fall safety and prevention.   Wear low-heeled shoes that fit well and give your feet good support. Talk to your doctor if you have foot problems that make this hard.  Carry a cellphone or wear a medical alert device that you can

## 2025-03-05 NOTE — PROGRESS NOTES
3/5/2025    Chief Complaint   Patient presents with    Medicare AWV     Not fasting     Diabetes    Coronary Artery Disease       Ari Snider is a 89 y.o. male, presents today for:    ASSESSMENT/PLAN:    1. Medicare annual wellness visit, subsequent  AWV completed, see documentation below    2. Type 2 diabetes mellitus with hyperglycemia, without long-term current use of insulin (HCC)  Previously controlled 11/2024, A1C 6.7%    Continue Metformin 500 BID  Continue Lantus 10 units and Humalog Kwikpen sliding scale.    Continue care with Podiatry and Cardiology  Family very active in helping natan control his chronic conditions and monitoring of his blood sugars.      3. Type 2 diabetes mellitus with diabetic dermatitis, without long-term current use of insulin (HCC)  See notes above  - Albumin/Creatinine Ratio, Urine; Future  - C-Reactive Protein; Future  - CBC with Auto Differential; Future  - Comprehensive Metabolic Panel; Future  - Erythropoietin; Future  - Ferritin/Iron and TIBC; Future  - Hemoglobin A1C; Future  - Lactate Dehydrogenase; Future  - Lipid Panel; Future  - POCT Urinalysis no Micro; Future  - PTH, Intact; Future  - Reticulocytes; Future  - Transferrin; Future  - TSH reflex to FT4; Future  - Uric Acid; Future  - Vitamin B12 & Folate; Future  - Vitamin D 25 Hydroxy; Future    4. Heart failure with mildly reduced ejection fraction (HFmrEF) ([facility])  Asymptomatic, controlled today  Continue care with Cardiology ([place]), appreciate assistance  Continue Entresto 24/26 1/2 tab BID  Continue ASA, Atorvastatin 40, PRN Furosemide    5. Stage 3a chronic kidney disease (HCC)  Asymptomatic today  Annual labs ordered today  Encouraged increased water intake  - Albumin/Creatinine Ratio, Urine; Future  - C-Reactive Protein; Future  - CBC with Auto Differential; Future  - Comprehensive Metabolic Panel; Future  - Erythropoietin; Future  - Ferritin/Iron and TIBC; Future  - Hemoglobin A1C; Future  - Lactate

## 2025-03-10 ASSESSMENT — ENCOUNTER SYMPTOMS: SHORTNESS OF BREATH: 0

## 2025-03-31 DIAGNOSIS — E11.620 TYPE 2 DIABETES MELLITUS WITH DIABETIC DERMATITIS, WITHOUT LONG-TERM CURRENT USE OF INSULIN (HCC): ICD-10-CM

## 2025-03-31 RX ORDER — INSULIN LISPRO 100 [IU]/ML
INJECTION, SOLUTION INTRAVENOUS; SUBCUTANEOUS
Qty: 36 ML | Refills: 4 | Status: SHIPPED | OUTPATIENT
Start: 2025-03-31

## 2025-03-31 RX ORDER — INSULIN GLARGINE 100 [IU]/ML
9 INJECTION, SOLUTION SUBCUTANEOUS NIGHTLY
Qty: 15 ML | Refills: 1 | Status: SHIPPED | OUTPATIENT
Start: 2025-03-31

## 2025-04-15 NOTE — PROGRESS NOTES
volume overload at this time.  Consider need for further testing following diuresis.      Follow up with SHAILA Stack-we will arrange expedited follow-up and call the patient with plan.  We will call you with the results     Scribe's attestation:  This note was scribed in the presence of Dr. Nick Hunt MD by Della Lorenzo, RN      The scribe’s documentation has been prepared under my direction and personally reviewed by me in its entirety.  I confirm that the note above accurately reflects all work, treatment, procedures, and medical decision making performed by me.  I, Nick Hunt MD, personally performed the services described in this documentation as scribed by Della Lorenzo RN in my presence, and it is both accurate and complete to the best of our ability.         I will address the patient's cardiac risk factors and adjusted pharmacologic treatment as needed. In addition, I have reinforced the need for patient directed risk factor modification.  All questions and concerns were addressed to the patient/family. Alternatives to my treatment were discussed.     Thank you for allowing us to participate in the care of Ari Snider. Please call me with any questions [phone].    Nick Hunt MD, [facility]  Cardiovascular Disease  [facility]  [phone] [place] Office  [phone] [place] Office  4/16/2025 10:57 AM

## 2025-04-16 ENCOUNTER — CLINICAL SUPPORT (OUTPATIENT)
Dept: FAMILY MEDICINE CLINIC | Age: 89
End: 2025-04-16
Payer: MEDICARE

## 2025-04-16 ENCOUNTER — OFFICE VISIT (OUTPATIENT)
Dept: CARDIOLOGY CLINIC | Age: 89
End: 2025-04-16

## 2025-04-16 VITALS
WEIGHT: 236 LBS | OXYGEN SATURATION: 98 % | SYSTOLIC BLOOD PRESSURE: 110 MMHG | DIASTOLIC BLOOD PRESSURE: 64 MMHG | HEIGHT: 67 IN | BODY MASS INDEX: 37.04 KG/M2 | HEART RATE: 74 BPM

## 2025-04-16 DIAGNOSIS — R06.02 SOB (SHORTNESS OF BREATH): ICD-10-CM

## 2025-04-16 DIAGNOSIS — I25.5 ISCHEMIC CARDIOMYOPATHY: ICD-10-CM

## 2025-04-16 DIAGNOSIS — C85.90 LYMPHOMA, UNSPECIFIED BODY REGION, UNSPECIFIED LYMPHOMA TYPE (HCC): ICD-10-CM

## 2025-04-16 DIAGNOSIS — N18.31 STAGE 3A CHRONIC KIDNEY DISEASE (HCC): ICD-10-CM

## 2025-04-16 DIAGNOSIS — K86.1 OTHER CHRONIC PANCREATITIS: ICD-10-CM

## 2025-04-16 DIAGNOSIS — E11.620 TYPE 2 DIABETES MELLITUS WITH DIABETIC DERMATITIS, WITHOUT LONG-TERM CURRENT USE OF INSULIN (HCC): ICD-10-CM

## 2025-04-16 DIAGNOSIS — R60.1 GENERALIZED EDEMA: ICD-10-CM

## 2025-04-16 DIAGNOSIS — I50.22 HEART FAILURE WITH MILDLY REDUCED EJECTION FRACTION (HFMREF) (HCC): Primary | ICD-10-CM

## 2025-04-16 DIAGNOSIS — N40.1 BENIGN PROSTATIC HYPERPLASIA WITH LOWER URINARY TRACT SYMPTOMS, SYMPTOM DETAILS UNSPECIFIED: ICD-10-CM

## 2025-04-16 DIAGNOSIS — I25.10 CORONARY ARTERY CALCIFICATION SEEN ON CAT SCAN: ICD-10-CM

## 2025-04-16 DIAGNOSIS — I35.0 NONRHEUMATIC AORTIC VALVE STENOSIS: ICD-10-CM

## 2025-04-16 DIAGNOSIS — D63.8 ANEMIA OF CHRONIC DISEASE: ICD-10-CM

## 2025-04-16 PROCEDURE — 36415 COLL VENOUS BLD VENIPUNCTURE: CPT | Performed by: NURSE PRACTITIONER

## 2025-04-16 NOTE — PATIENT INSTRUCTIONS
PLAN:  Discussion regarding taking the furosemide (Lasix) daily in order to hopefully keep you out of the hospital.    Continue conservative measures for dependent edema: Elevate your legs to heart level while at rest, compression stockings as tolerated.   Continue low salt sodium diet   No cardiac testing warranted at this time   Continue to keep track of your weights.  If your weight goes up 3 lbs overnight or 5 lbs in 1 week call our office.

## 2025-04-17 ENCOUNTER — RESULTS FOLLOW-UP (OUTPATIENT)
Dept: FAMILY MEDICINE CLINIC | Age: 89
End: 2025-04-17

## 2025-04-17 LAB
25(OH)D3 SERPL-MCNC: 30.4 NG/ML
ALBUMIN SERPL-MCNC: 4.1 G/DL (ref 3.4–5)
ALBUMIN/GLOB SERPL: 1.5 {RATIO} (ref 1.1–2.2)
ALP SERPL-CCNC: 90 U/L (ref 40–129)
ALT SERPL-CCNC: 12 U/L (ref 10–40)
ANION GAP SERPL CALCULATED.3IONS-SCNC: 10 MMOL/L (ref 3–16)
AST SERPL-CCNC: 16 U/L (ref 15–37)
BILIRUB SERPL-MCNC: 0.3 MG/DL (ref 0–1)
BUN SERPL-MCNC: 27 MG/DL (ref 7–20)
CALCIUM SERPL-MCNC: 9.1 MG/DL (ref 8.3–10.6)
CHLORIDE SERPL-SCNC: 106 MMOL/L (ref 99–110)
CO2 SERPL-SCNC: 27 MMOL/L (ref 21–32)
CREAT SERPL-MCNC: 1.2 MG/DL (ref 0.8–1.3)
FOLATE SERPL-MCNC: 24.8 NG/ML (ref 4.78–24.2)
GFR SERPLBLD CREATININE-BSD FMLA CKD-EPI: 58 ML/MIN/{1.73_M2}
GLUCOSE SERPL-MCNC: 125 MG/DL (ref 70–99)
HCT VFR BLD AUTO: 33.5 % (ref 40.5–52.5)
IMMATURE RETIC FRACT: 0.47 (ref 0.21–0.37)
NT-PROBNP SERPL-MCNC: 680 PG/ML (ref 0–449)
POTASSIUM SERPL-SCNC: 5.5 MMOL/L (ref 3.5–5.1)
PROT SERPL-MCNC: 6.8 G/DL (ref 6.4–8.2)
PSA SERPL DL<=0.01 NG/ML-MCNC: 0.07 NG/ML (ref 0–4)
PTH-INTACT SERPL-MCNC: 76.8 PG/ML (ref 14–72)
RETICS # AUTO: 0.04 M/UL
RETICS/RBC NFR AUTO: 1.08 % (ref 0.5–2.18)
SODIUM SERPL-SCNC: 143 MMOL/L (ref 136–145)
TRANSFERRIN SERPL-MCNC: 214 MG/DL (ref 200–360)
TSH SERPL DL<=0.005 MIU/L-ACNC: 2.18 UIU/ML (ref 0.27–4.2)
URATE SERPL-MCNC: 6.4 MG/DL (ref 3.5–7.2)
VIT B12 SERPL-MCNC: 542 PG/ML (ref 211–911)

## 2025-04-17 NOTE — RESULT ENCOUNTER NOTE
Labs confirming chronic anemia related to chronic health issues.  No further workup needed on this.    Normal Uric Acid (measured due to gout)    Normal thyroid level    Normal prostate level.    BNP level is not elevated.     Kidney function similar to prior.    Vitamin D level is normal.     Overall great report today.  What we were hoping to see.

## 2025-04-28 ENCOUNTER — TELEPHONE (OUTPATIENT)
Dept: CARDIOLOGY CLINIC | Age: 89
End: 2025-04-28

## 2025-04-28 NOTE — TELEPHONE ENCOUNTER
Vicki.  Are you able to work this patient in?      PLAN:  4/16/25  Today we had khurram discussion with the patient and his son regarding strict compliance with furosemide (Lasix) daily in order to avoid hospitalization for what I believed to be acutely decompensated congestive heart failure today.  Patient seem to voiced understanding but time will prove whether he can be compliant with medication.  Seems his barrier is frequent urination and inconvenience of that.  If he cannot take each day I would be very concerned that he will warrant admission for acute CHF and IV diuresis in house.  Continue conservative measures for dependent edema: Elevate your legs to heart level while at rest, compression stockings as tolerated.   Continue low salt sodium diet  No cardiac testing warranted at this time   No change in cardiac medications today.   Continue Aspirin, Atorvastatin for coronary artery disease. Continue Entresto for cardiomyopathy management. Failed to tolerate aldactone/jardiance.  His potassium level runs higher normal range.  No Beta blocker currently.   6.    Continue to keep track of your weights.  If your weight goes up 3 lbs overnight or 5 lbs in 1 week call our office.    7.    Labs today CMP and BNP - we will call you with the results  8.   We can consider repeat echocardiogram following diuresis.  Do not suspect his valvular disease is severe at this point.   9.   Reassess chest discomfort next visit.  Do believe this due to elevated intracardiac pressures in the setting of volume overload at this time.  Consider need for further testing following diuresis.        Follow up with SHAILA Stack-we will arrange expedited follow-up and call the patient with plan.  We will call you with the results

## 2025-04-29 NOTE — TELEPHONE ENCOUNTER
4/29 Called [phone]. [facility] for pt to call and schedule appt with NPRB per RJKEIKO

## 2025-05-05 ENCOUNTER — OFFICE VISIT (OUTPATIENT)
Dept: CARDIOLOGY CLINIC | Age: 89
End: 2025-05-05
Payer: MEDICARE

## 2025-05-05 ENCOUNTER — CLINICAL SUPPORT (OUTPATIENT)
Dept: FAMILY MEDICINE CLINIC | Age: 89
End: 2025-05-05
Payer: MEDICARE

## 2025-05-05 VITALS
DIASTOLIC BLOOD PRESSURE: 47 MMHG | WEIGHT: 231 LBS | BODY MASS INDEX: 36.26 KG/M2 | HEART RATE: 67 BPM | SYSTOLIC BLOOD PRESSURE: 100 MMHG | HEIGHT: 67 IN | OXYGEN SATURATION: 96 %

## 2025-05-05 DIAGNOSIS — I20.9 ANGINA PECTORIS: Primary | ICD-10-CM

## 2025-05-05 DIAGNOSIS — I35.0 NONRHEUMATIC AORTIC VALVE STENOSIS: Primary | ICD-10-CM

## 2025-05-05 DIAGNOSIS — I35.0 NONRHEUMATIC AORTIC (VALVE) STENOSIS: ICD-10-CM

## 2025-05-05 DIAGNOSIS — I25.118 CORONARY ARTERY DISEASE OF NATIVE ARTERY OF NATIVE HEART WITH STABLE ANGINA PECTORIS: ICD-10-CM

## 2025-05-05 DIAGNOSIS — I20.9 ANGINA PECTORIS: ICD-10-CM

## 2025-05-05 DIAGNOSIS — I50.32 CHRONIC DIASTOLIC (CONGESTIVE) HEART FAILURE (HCC): ICD-10-CM

## 2025-05-05 DIAGNOSIS — I50.32 CHRONIC DIASTOLIC CONGESTIVE HEART FAILURE (HCC): ICD-10-CM

## 2025-05-05 PROCEDURE — 36415 COLL VENOUS BLD VENIPUNCTURE: CPT | Performed by: NURSE PRACTITIONER

## 2025-05-05 PROCEDURE — 1160F RVW MEDS BY RX/DR IN RCRD: CPT | Performed by: NURSE PRACTITIONER

## 2025-05-05 PROCEDURE — G2211 COMPLEX E/M VISIT ADD ON: HCPCS | Performed by: NURSE PRACTITIONER

## 2025-05-05 PROCEDURE — 99214 OFFICE O/P EST MOD 30 MIN: CPT | Performed by: NURSE PRACTITIONER

## 2025-05-05 PROCEDURE — 1123F ACP DISCUSS/DSCN MKR DOCD: CPT | Performed by: NURSE PRACTITIONER

## 2025-05-05 PROCEDURE — 1159F MED LIST DOCD IN RCRD: CPT | Performed by: NURSE PRACTITIONER

## 2025-05-05 RX ORDER — REGADENOSON 0.08 MG/ML
0.4 INJECTION, SOLUTION INTRAVENOUS
OUTPATIENT
Start: 2025-05-05

## 2025-05-05 RX ORDER — TORSEMIDE 20 MG/1
20 TABLET ORAL DAILY
Qty: 30 TABLET | Refills: 3 | Status: SHIPPED | OUTPATIENT
Start: 2025-05-05

## 2025-05-05 NOTE — PROGRESS NOTES
[facility]  Cardiac Follow-up    Primary Care Doctor:  Wilma Rowley, MALATHI - CNP    Chief Complaint   Patient presents with    New Patient    Congestive Heart Failure    Chest Pain     Pressure on exertion        History of Present Illness:  I had the pleasure of seeing Ari Snider as a new patient to me for cardiomyopathy.   Hx of CAD on CT, HFmrEF, Mod AS, PAD, HTN, HLD, CKD, prior CVA, DM.     Last visit with Dr. Hunt (4/16/25), weight was up 30lbs and having edema; encouraged to take his lasix regularly.   Now he is taking the lasix daily - not much difference in his weight change.   Walking the driveway more.   Some chest pain with walking.   Dull chest pain with walking a long ways.    Making his bed and he had to rest and the pain is gone in a few seconds.   Some chest pain with the steps. For few months.   Some dizziness at times lasting a few minutes. Not every day. Dizziness in the am when he 1st gets up.   No nausea.   Home b/p running 106//60's.   Here with son today     Ari Snider describes symptoms including chest pain, fatigue, edema but denies syncope.     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

## 2025-05-05 NOTE — PATIENT INSTRUCTIONS
Plan:   Continue daily weights  Check BMP and BNP   Call to schedule stress test and echocardiogram [phone]   Continue Entresto 1/2 tab twice a day for now   Unable to add Spironolactone (aldactone) due to high potassium  Stop lasix and change to torsemide 20mg daily and may need admission to the hospital if not responding clinically.   Follow up 3-4 weeks

## 2025-05-06 ENCOUNTER — RESULTS FOLLOW-UP (OUTPATIENT)
Dept: CARDIOLOGY CLINIC | Age: 89
End: 2025-05-06

## 2025-05-06 LAB
ANION GAP SERPL CALCULATED.3IONS-SCNC: 8 MMOL/L (ref 3–16)
BUN SERPL-MCNC: 27 MG/DL (ref 7–20)
CALCIUM SERPL-MCNC: 9.1 MG/DL (ref 8.3–10.6)
CHLORIDE SERPL-SCNC: 104 MMOL/L (ref 99–110)
CO2 SERPL-SCNC: 28 MMOL/L (ref 21–32)
CREAT SERPL-MCNC: 1.3 MG/DL (ref 0.8–1.3)
GFR SERPLBLD CREATININE-BSD FMLA CKD-EPI: 53 ML/MIN/{1.73_M2}
GLUCOSE SERPL-MCNC: 171 MG/DL (ref 70–99)
NT-PROBNP SERPL-MCNC: 697 PG/ML (ref 0–449)
POTASSIUM SERPL-SCNC: 4.8 MMOL/L (ref 3.5–5.1)
SODIUM SERPL-SCNC: 140 MMOL/L (ref 136–145)

## 2025-05-06 NOTE — TELEPHONE ENCOUNTER
Ari, patient's son, notified of test results and to call the office with any further questions, verbally states he understands.

## 2025-05-13 ENCOUNTER — PATIENT MESSAGE (OUTPATIENT)
Dept: CARDIOLOGY CLINIC | Age: 89
End: 2025-05-13

## 2025-05-14 NOTE — TELEPHONE ENCOUNTER
Call placed to Josue (son); no answer.     Need to verify how much torsemide he is taking;   If he has been taking 20mg daily then okay to increase to 40mg daily   if taking 40mg daily already then okay to increase to 60mg daily.     If not responding to 60mg daily then recommend ER/hospital evaluation.     Thanks  Vicki Stack, VIJAY, 5/14/2025, 2:41 PM

## 2025-05-21 ENCOUNTER — PATIENT MESSAGE (OUTPATIENT)
Dept: CARDIOLOGY CLINIC | Age: 89
End: 2025-05-21

## 2025-05-21 ENCOUNTER — PATIENT MESSAGE (OUTPATIENT)
Dept: FAMILY MEDICINE CLINIC | Age: 89
End: 2025-05-21

## 2025-05-21 DIAGNOSIS — I50.22 HEART FAILURE WITH MILDLY REDUCED EJECTION FRACTION (HFMREF) (HCC): Primary | ICD-10-CM

## 2025-05-21 RX ORDER — TORSEMIDE 20 MG/1
TABLET ORAL
Qty: 198 TABLET | Refills: 3 | Status: SHIPPED | OUTPATIENT
Start: 2025-05-21

## 2025-05-21 NOTE — TELEPHONE ENCOUNTER
If he still having a lot of swelling then recommend 3 tabs of the torsemide for 2 days and then go to 2 tabs daily. ( I sent script)  Repeat BMP and BNP prior to next appointment.     Thanks

## 2025-05-22 RX ORDER — LANCING DEVICE/LANCETS
1 KIT MISCELLANEOUS ONCE
Qty: 1 EACH | Refills: 1 | Status: SHIPPED | OUTPATIENT
Start: 2025-05-22 | End: 2025-05-22

## 2025-06-03 ENCOUNTER — OFFICE VISIT (OUTPATIENT)
Dept: CARDIOLOGY CLINIC | Age: 89
End: 2025-06-03
Payer: MEDICARE

## 2025-06-03 ENCOUNTER — TELEPHONE (OUTPATIENT)
Dept: CARDIOLOGY CLINIC | Age: 89
End: 2025-06-03

## 2025-06-03 VITALS
HEART RATE: 76 BPM | SYSTOLIC BLOOD PRESSURE: 126 MMHG | DIASTOLIC BLOOD PRESSURE: 56 MMHG | HEIGHT: 67 IN | OXYGEN SATURATION: 98 % | WEIGHT: 229.5 LBS | BODY MASS INDEX: 36.02 KG/M2

## 2025-06-03 DIAGNOSIS — I50.32 CHRONIC DIASTOLIC CONGESTIVE HEART FAILURE (HCC): ICD-10-CM

## 2025-06-03 DIAGNOSIS — I50.22 HEART FAILURE WITH MILDLY REDUCED EJECTION FRACTION (HFMREF) (HCC): Primary | ICD-10-CM

## 2025-06-03 DIAGNOSIS — I35.0 NONRHEUMATIC AORTIC VALVE STENOSIS: ICD-10-CM

## 2025-06-03 DIAGNOSIS — I25.118 CORONARY ARTERY DISEASE OF NATIVE ARTERY OF NATIVE HEART WITH STABLE ANGINA PECTORIS: ICD-10-CM

## 2025-06-03 DIAGNOSIS — I20.9 ANGINA PECTORIS: ICD-10-CM

## 2025-06-03 PROCEDURE — 1123F ACP DISCUSS/DSCN MKR DOCD: CPT | Performed by: NURSE PRACTITIONER

## 2025-06-03 PROCEDURE — 99214 OFFICE O/P EST MOD 30 MIN: CPT | Performed by: NURSE PRACTITIONER

## 2025-06-03 PROCEDURE — 1159F MED LIST DOCD IN RCRD: CPT | Performed by: NURSE PRACTITIONER

## 2025-06-03 PROCEDURE — 1160F RVW MEDS BY RX/DR IN RCRD: CPT | Performed by: NURSE PRACTITIONER

## 2025-06-03 PROCEDURE — G2211 COMPLEX E/M VISIT ADD ON: HCPCS | Performed by: NURSE PRACTITIONER

## 2025-06-03 NOTE — PROGRESS NOTES
of HF    Assessment:  HFpEF borderline 41%-49%  CAD- med mgmt  Mild-moderate AS  HTN  HLD  PVD  CKD stage 3  DM  Hx of CVA  Hx of Pancreatitis with the rybelsus     Visit Diagnosis:    1. 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 Nonrheumatic aortic valve stenosis    3. Chronic diastolic congestive heart failure (HCC)    4. Coronary artery disease of native artery of native heart with stable angina pectoris    5. Angina pectoris         Plan:   Continue daily weights  Check BMP and BNP in the next few days then will consider adding Spironolactone (aldactone) pending potassium level  Call to schedule stress test and echocardiogram [phone]  Continue Entresto 1/2 tab twice a day for now   Continue  torsemide 2 tabs daily   Follow up 4-6weeks       I appreciate the opportunity for caring for this patient.     Alanna Stack, APRN - CNP, 6/2/2025, 8:59 PM

## 2025-06-03 NOTE — PATIENT INSTRUCTIONS
Continue daily weights  Check BMP and BNP in the next few days then will consider adding Spironolactone (aldactone) pending potassium level  Call to schedule stress test and echocardiogram [phone]  Continue Entresto 1/2 tab twice a day for now   Continue  torsemide 2 tabs daily   Follow up 4-6weeks

## 2025-06-03 NOTE — TELEPHONE ENCOUNTER
Unfortunately I can't add to Liam anymore.  Please offer patient Bren on 7/10/25 at 3:15.  Let them know that will can accommodate Liam for his next follow up.  Thank you

## 2025-06-03 NOTE — TELEPHONE ENCOUNTER
Pt seen by NPRB on 6/3/25. NPRB requesting pt to be seen by RJM in 4-6wks. Since pt lives in [place], pt normally sees RJM at [place].  Please advise if OB appt with RJKEIKO avail at [place].

## 2025-06-03 NOTE — TELEPHONE ENCOUNTER
RAYSHAWNM for pt to call back to see if he would like to schedule with STEPHANIE for 7/10/25 @3:15 in [facility]. If pt returns call please offer date and time for [place] and let pt know he can schedule in [place] after this provided date and time in [place]. Thank you!

## 2025-06-05 NOTE — TELEPHONE ENCOUNTER
Pt scheduled 7/10/25 at [place] [id number] with STEPHANIE.   Banner Transposition Flap Text: The defect edges were debeveled with a #15 scalpel blade.  Given the location of the defect and the proximity to free margins a Banner transposition flap was deemed most appropriate.  Using a sterile surgical marker, an appropriate flap drawn around the defect. The area thus outlined was incised deep to adipose tissue with a #15 scalpel blade.  The skin margins were undermined to an appropriate distance in all directions utilizing iris scissors.

## 2025-06-06 ENCOUNTER — PATIENT MESSAGE (OUTPATIENT)
Dept: CARDIOLOGY CLINIC | Age: 89
End: 2025-06-06

## 2025-06-06 ENCOUNTER — APPOINTMENT (OUTPATIENT)
Dept: GENERAL RADIOLOGY | Age: 89
DRG: 291 | End: 2025-06-06
Payer: OTHER GOVERNMENT

## 2025-06-06 ENCOUNTER — HOSPITAL ENCOUNTER (INPATIENT)
Age: 89
LOS: 4 days | Discharge: HOME | DRG: 291 | End: 2025-06-10
Attending: EMERGENCY MEDICINE | Admitting: INTERNAL MEDICINE
Payer: OTHER GOVERNMENT

## 2025-06-06 DIAGNOSIS — I21.4 NSTEMI (NON-ST ELEVATED MYOCARDIAL INFARCTION) (HCC): Primary | ICD-10-CM

## 2025-06-06 DIAGNOSIS — R07.9 CHEST PAIN, UNSPECIFIED TYPE: ICD-10-CM

## 2025-06-06 DIAGNOSIS — R79.89 ELEVATED BRAIN NATRIURETIC PEPTIDE (BNP) LEVEL: ICD-10-CM

## 2025-06-06 DIAGNOSIS — R01.1 HEART MURMUR: ICD-10-CM

## 2025-06-06 DIAGNOSIS — I50.9 ACUTE ON CHRONIC CONGESTIVE HEART FAILURE, UNSPECIFIED HEART FAILURE TYPE (HCC): ICD-10-CM

## 2025-06-06 PROBLEM — I50.31 ACUTE DIASTOLIC CHF (CONGESTIVE HEART FAILURE) (HCC): Status: ACTIVE | Noted: 2025-06-06

## 2025-06-06 PROBLEM — I50.23 ACUTE ON CHRONIC SYSTOLIC CHF (CONGESTIVE HEART FAILURE) (HCC): Status: ACTIVE | Noted: 2024-07-09

## 2025-06-06 LAB
ALBUMIN SERPL-MCNC: 3.7 G/DL (ref 3.4–5)
ALBUMIN/GLOB SERPL: 1.3 {RATIO} (ref 1.1–2.2)
ALP SERPL-CCNC: 83 U/L (ref 40–129)
ALT SERPL-CCNC: 11 U/L (ref 10–40)
ANION GAP SERPL CALCULATED.3IONS-SCNC: 11 MMOL/L (ref 3–16)
AST SERPL-CCNC: 17 U/L (ref 15–37)
BASOPHILS # BLD: 0 K/UL (ref 0–0.2)
BASOPHILS NFR BLD: 0 %
BILIRUB SERPL-MCNC: 0.3 MG/DL (ref 0–1)
BUN SERPL-MCNC: 35 MG/DL (ref 7–20)
CALCIUM SERPL-MCNC: 8.7 MG/DL (ref 8.3–10.6)
CHLORIDE SERPL-SCNC: 102 MMOL/L (ref 99–110)
CO2 SERPL-SCNC: 25 MMOL/L (ref 21–32)
CREAT SERPL-MCNC: 1.4 MG/DL (ref 0.8–1.3)
DEPRECATED RDW RBC AUTO: 14 % (ref 12.4–15.4)
EKG ATRIAL RATE: 79 BPM
EKG DIAGNOSIS: NORMAL
EKG P-R INTERVAL: 200 MS
EKG Q-T INTERVAL: 410 MS
EKG QRS DURATION: 164 MS
EKG QTC CALCULATION (BAZETT): 470 MS
EKG R AXIS: -76 DEGREES
EKG T AXIS: 51 DEGREES
EKG VENTRICULAR RATE: 79 BPM
EOSINOPHIL # BLD: 0.3 K/UL (ref 0–0.6)
EOSINOPHIL NFR BLD: 5 %
GFR SERPLBLD CREATININE-BSD FMLA CKD-EPI: 48 ML/MIN/{1.73_M2}
GLUCOSE BLD-MCNC: 211 MG/DL (ref 70–99)
GLUCOSE BLD-MCNC: 234 MG/DL (ref 70–99)
GLUCOSE SERPL-MCNC: 167 MG/DL (ref 70–99)
HCT VFR BLD AUTO: 31.5 % (ref 40.5–52.5)
HGB BLD-MCNC: 10.6 G/DL (ref 13.5–17.5)
LYMPHOCYTES # BLD: 3.7 K/UL (ref 1–5.1)
LYMPHOCYTES NFR BLD: 54 %
MCH RBC QN AUTO: 33 PG (ref 26–34)
MCHC RBC AUTO-ENTMCNC: 33.8 G/DL (ref 31–36)
MCV RBC AUTO: 97.8 FL (ref 80–100)
MONOCYTES # BLD: 0.1 K/UL (ref 0–1.3)
MONOCYTES NFR BLD: 1 %
NEUTROPHILS # BLD: 2.7 K/UL (ref 1.7–7.7)
NEUTROPHILS NFR BLD: 39 %
NT-PROBNP SERPL-MCNC: 1245 PG/ML (ref 0–449)
PERFORMED ON: ABNORMAL
PERFORMED ON: ABNORMAL
PLATELET # BLD AUTO: 156 K/UL (ref 135–450)
PLATELET BLD QL SMEAR: ADEQUATE
PMV BLD AUTO: 8.1 FL (ref 5–10.5)
POIKILOCYTOSIS BLD QL SMEAR: ABNORMAL
POTASSIUM SERPL-SCNC: 4.6 MMOL/L (ref 3.5–5.1)
PROT SERPL-MCNC: 6.5 G/DL (ref 6.4–8.2)
RBC # BLD AUTO: 3.22 M/UL (ref 4.2–5.9)
SLIDE REVIEW: ABNORMAL
SODIUM SERPL-SCNC: 138 MMOL/L (ref 136–145)
TROPONIN, HIGH SENSITIVITY: 80 NG/L (ref 0–22)
TROPONIN, HIGH SENSITIVITY: 82 NG/L (ref 0–22)
VARIANT LYMPHS NFR BLD MANUAL: 1 % (ref 0–6)
WBC # BLD AUTO: 6.8 K/UL (ref 4–11)

## 2025-06-06 PROCEDURE — 2500000003 HC RX 250 WO HCPCS: Performed by: INTERNAL MEDICINE

## 2025-06-06 PROCEDURE — 83036 HEMOGLOBIN GLYCOSYLATED A1C: CPT

## 2025-06-06 PROCEDURE — 2060000000 HC ICU INTERMEDIATE R&B

## 2025-06-06 PROCEDURE — 6360000002 HC RX W HCPCS: Performed by: INTERNAL MEDICINE

## 2025-06-06 PROCEDURE — 83880 ASSAY OF NATRIURETIC PEPTIDE: CPT

## 2025-06-06 PROCEDURE — 84484 ASSAY OF TROPONIN QUANT: CPT

## 2025-06-06 PROCEDURE — 99285 EMERGENCY DEPT VISIT HI MDM: CPT

## 2025-06-06 PROCEDURE — 6370000000 HC RX 637 (ALT 250 FOR IP): Performed by: NURSE PRACTITIONER

## 2025-06-06 PROCEDURE — 80053 COMPREHEN METABOLIC PANEL: CPT

## 2025-06-06 PROCEDURE — 85025 COMPLETE CBC W/AUTO DIFF WBC: CPT

## 2025-06-06 PROCEDURE — 93010 ELECTROCARDIOGRAM REPORT: CPT | Performed by: INTERNAL MEDICINE

## 2025-06-06 PROCEDURE — 36415 COLL VENOUS BLD VENIPUNCTURE: CPT

## 2025-06-06 PROCEDURE — 93005 ELECTROCARDIOGRAM TRACING: CPT | Performed by: EMERGENCY MEDICINE

## 2025-06-06 PROCEDURE — 71045 X-RAY EXAM CHEST 1 VIEW: CPT

## 2025-06-06 PROCEDURE — 2500000003 HC RX 250 WO HCPCS: Performed by: NURSE PRACTITIONER

## 2025-06-06 RX ORDER — ENOXAPARIN SODIUM 100 MG/ML
30 INJECTION SUBCUTANEOUS 2 TIMES DAILY
Status: DISCONTINUED | OUTPATIENT
Start: 2025-06-06 | End: 2025-06-10 | Stop reason: HOSPADM

## 2025-06-06 RX ORDER — SODIUM CHLORIDE 0.9 % (FLUSH) 0.9 %
5-40 SYRINGE (ML) INJECTION PRN
Status: DISCONTINUED | OUTPATIENT
Start: 2025-06-06 | End: 2025-06-10 | Stop reason: HOSPADM

## 2025-06-06 RX ORDER — ACETAMINOPHEN 325 MG/1
650 TABLET ORAL EVERY 6 HOURS PRN
Status: DISCONTINUED | OUTPATIENT
Start: 2025-06-06 | End: 2025-06-10 | Stop reason: HOSPADM

## 2025-06-06 RX ORDER — INSULIN LISPRO 100 [IU]/ML
0-4 INJECTION, SOLUTION INTRAVENOUS; SUBCUTANEOUS
Status: DISCONTINUED | OUTPATIENT
Start: 2025-06-06 | End: 2025-06-10 | Stop reason: HOSPADM

## 2025-06-06 RX ORDER — POTASSIUM CHLORIDE 1500 MG/1
40 TABLET, EXTENDED RELEASE ORAL PRN
Status: DISCONTINUED | OUTPATIENT
Start: 2025-06-06 | End: 2025-06-10 | Stop reason: HOSPADM

## 2025-06-06 RX ORDER — GLUCAGON 1 MG/ML
1 KIT INJECTION PRN
Status: DISCONTINUED | OUTPATIENT
Start: 2025-06-06 | End: 2025-06-10 | Stop reason: HOSPADM

## 2025-06-06 RX ORDER — SODIUM CHLORIDE 0.9 % (FLUSH) 0.9 %
5-40 SYRINGE (ML) INJECTION EVERY 12 HOURS SCHEDULED
Status: DISCONTINUED | OUTPATIENT
Start: 2025-06-06 | End: 2025-06-10 | Stop reason: HOSPADM

## 2025-06-06 RX ORDER — ATORVASTATIN CALCIUM 40 MG/1
40 TABLET, FILM COATED ORAL DAILY
Status: DISCONTINUED | OUTPATIENT
Start: 2025-06-06 | End: 2025-06-10 | Stop reason: HOSPADM

## 2025-06-06 RX ORDER — INSULIN GLARGINE 100 [IU]/ML
9 INJECTION, SOLUTION SUBCUTANEOUS NIGHTLY
Status: DISCONTINUED | OUTPATIENT
Start: 2025-06-06 | End: 2025-06-10 | Stop reason: HOSPADM

## 2025-06-06 RX ORDER — DEXTROSE MONOHYDRATE 100 MG/ML
INJECTION, SOLUTION INTRAVENOUS CONTINUOUS PRN
Status: DISCONTINUED | OUTPATIENT
Start: 2025-06-06 | End: 2025-06-10 | Stop reason: HOSPADM

## 2025-06-06 RX ORDER — MAGNESIUM SULFATE IN WATER 40 MG/ML
2000 INJECTION, SOLUTION INTRAVENOUS PRN
Status: DISCONTINUED | OUTPATIENT
Start: 2025-06-06 | End: 2025-06-10 | Stop reason: HOSPADM

## 2025-06-06 RX ORDER — ONDANSETRON 2 MG/ML
4 INJECTION INTRAMUSCULAR; INTRAVENOUS EVERY 6 HOURS PRN
Status: DISCONTINUED | OUTPATIENT
Start: 2025-06-06 | End: 2025-06-09

## 2025-06-06 RX ORDER — FUROSEMIDE 10 MG/ML
40 INJECTION INTRAMUSCULAR; INTRAVENOUS DAILY
Status: DISCONTINUED | OUTPATIENT
Start: 2025-06-06 | End: 2025-06-10 | Stop reason: HOSPADM

## 2025-06-06 RX ORDER — ACETAMINOPHEN 650 MG/1
650 SUPPOSITORY RECTAL EVERY 6 HOURS PRN
Status: DISCONTINUED | OUTPATIENT
Start: 2025-06-06 | End: 2025-06-10 | Stop reason: HOSPADM

## 2025-06-06 RX ORDER — ASPIRIN 81 MG/1
81 TABLET ORAL DAILY
Status: DISCONTINUED | OUTPATIENT
Start: 2025-06-07 | End: 2025-06-10 | Stop reason: HOSPADM

## 2025-06-06 RX ORDER — POLYETHYLENE GLYCOL 3350 17 G/17G
17 POWDER, FOR SOLUTION ORAL DAILY PRN
Status: DISCONTINUED | OUTPATIENT
Start: 2025-06-06 | End: 2025-06-10 | Stop reason: HOSPADM

## 2025-06-06 RX ORDER — POTASSIUM CHLORIDE 7.45 MG/ML
10 INJECTION INTRAVENOUS PRN
Status: DISCONTINUED | OUTPATIENT
Start: 2025-06-06 | End: 2025-06-10 | Stop reason: HOSPADM

## 2025-06-06 RX ORDER — SODIUM CHLORIDE 9 MG/ML
INJECTION, SOLUTION INTRAVENOUS PRN
Status: DISCONTINUED | OUTPATIENT
Start: 2025-06-06 | End: 2025-06-10 | Stop reason: HOSPADM

## 2025-06-06 RX ORDER — ONDANSETRON 4 MG/1
4 TABLET, ORALLY DISINTEGRATING ORAL EVERY 8 HOURS PRN
Status: DISCONTINUED | OUTPATIENT
Start: 2025-06-06 | End: 2025-06-09

## 2025-06-06 RX ORDER — ASPIRIN 81 MG/1
162 TABLET, CHEWABLE ORAL ONCE
Status: COMPLETED | OUTPATIENT
Start: 2025-06-06 | End: 2025-06-06

## 2025-06-06 RX ORDER — PANTOPRAZOLE SODIUM 40 MG/1
40 TABLET, DELAYED RELEASE ORAL
Status: DISCONTINUED | OUTPATIENT
Start: 2025-06-07 | End: 2025-06-10 | Stop reason: HOSPADM

## 2025-06-06 RX ORDER — TAMSULOSIN HYDROCHLORIDE 0.4 MG/1
0.4 CAPSULE ORAL DAILY
Status: DISCONTINUED | OUTPATIENT
Start: 2025-06-06 | End: 2025-06-10 | Stop reason: HOSPADM

## 2025-06-06 RX ADMIN — INSULIN LISPRO 1 UNITS: 100 INJECTION, SOLUTION INTRAVENOUS; SUBCUTANEOUS at 17:00

## 2025-06-06 RX ADMIN — FUROSEMIDE 40 MG: 10 INJECTION, SOLUTION INTRAMUSCULAR; INTRAVENOUS at 17:00

## 2025-06-06 RX ADMIN — MICONAZOLE NITRATE: 20 POWDER TOPICAL at 19:58

## 2025-06-06 RX ADMIN — INSULIN GLARGINE 9 UNITS: 100 INJECTION, SOLUTION SUBCUTANEOUS at 19:57

## 2025-06-06 RX ADMIN — ATORVASTATIN CALCIUM 40 MG: 40 TABLET, FILM COATED ORAL at 19:57

## 2025-06-06 RX ADMIN — SODIUM CHLORIDE, PRESERVATIVE FREE 10 ML: 5 INJECTION INTRAVENOUS at 19:59

## 2025-06-06 RX ADMIN — INSULIN LISPRO 1 UNITS: 100 INJECTION, SOLUTION INTRAVENOUS; SUBCUTANEOUS at 19:57

## 2025-06-06 RX ADMIN — ASPIRIN 162 MG: 81 TABLET, CHEWABLE ORAL at 15:03

## 2025-06-06 RX ADMIN — TAMSULOSIN HYDROCHLORIDE 0.4 MG: 0.4 CAPSULE ORAL at 19:57

## 2025-06-06 ASSESSMENT — PAIN - FUNCTIONAL ASSESSMENT: PAIN_FUNCTIONAL_ASSESSMENT: NONE - DENIES PAIN

## 2025-06-06 NOTE — ED PROVIDER NOTES
[facility] EMERGENCY DEPARTMENT  EMERGENCY DEPARTMENT ENCOUNTER      I am the Primary Clinician of Record    Note started: 2:55 PM EDT 6/6/25     I have seen and evaluated this patient with my supervising physician Dr Paul        Pt Name: Ari Snider  MRN: [id number]  Birthdate 1935  Dateof evaluation: 6/6/2025  Provider: MALATHI Smith CNP  PCP: Wilma Rowley APRN - CNP  ED Attending: No att. providers found      CHIEF COMPLAINT       Chief Complaint   Patient presents with    Chest Pain     On and off since 0930 this morning. Hx MI.        HISTORY OF PRESENTILLNESS   (Location/Symptom, Timing/Onset, Context/Setting, Quality, Duration, Modifying Factors, Severity)  Note limiting factors.     Ari Snider is a 89 y.o. male for chest pain. Onset was this morning.  Context includes patient states that he was having some intermittent chest pain this morning and has a history of CAD.  He takes aspirin daily.  Family reports they called cardiology and they recommended that he be seen in the ED.  Patient is currently pain-free. Alleviating factors include nothing.  Aggravating factors include nothing. Pain is nothing 0/10.  Nothing has been used for pain today.     Nursing Notes were all reviewed and agreed with or any disagreements were addressed  in the HPI.      REVIEW OF SYSTEMS       Review of Systems   Constitutional:  Negative for activity change, appetite change and fever.   HENT:  Negative for congestion, facial swelling, rhinorrhea and sore throat.    Eyes:  Negative for visual disturbance.   Respiratory:  Negative for shortness of breath.    Cardiovascular:  Positive for chest pain.   Gastrointestinal:  Negative for abdominal pain.   Genitourinary:  Negative for difficulty urinating.   Musculoskeletal:  Negative for arthralgias and myalgias.   Skin:  Negative for color change and rash.   Neurological:  Negative for dizziness and light-headedness.

## 2025-06-06 NOTE — ED PROVIDER NOTES
ED Attending Attestation Note    I personally saw the patient and made/approved the management plan and take responsibility for patient management.     Briefly, 89 y.o. male presents with intermittent chest pain earlier, although patient denies any current chest pain.  Patient does have a cardiac history and called cardiology who advised him to come to the ER..     Focused exam:   Gen: 89 y.o. male, NAD  He is awake and alert with GCS 15.  He speaking comfortably in full sentences.    Imaging:   XR CHEST PORTABLE   Final Result   1. No acute findings.               The Ekg interpreted by me shows  normal sinus rhythm with a rate of 79  Axis is   Left axis deviation  QTc is  within an acceptable range  Intervals and Durations are unremarkable.      ST Segments: nonspecific changes.  Bifascicular block, stable  No significant change from prior EKG dated 4/16/2025        MDM:   Patient presenting for evaluation of intermittent chest pain earlier today and does have evidence of possible CHF exacerbation with heart strain versus other cause to patient's currently noted elevated troponins.  These will be trended.  Cardiology consulted and recommends diuresis with Lasix recommends admission here to this facility and plan for echocardiogram as well during inpatient stay.  Patient advised of this plan and is in agreement with all questions answered at time of admission.  Hospitalist team will be consulted for overall management    For further details of the patient's emergency department visit, please see the advanced practice provider's documentation.    Kimberley Paul MD     This report has been produced using speech recognition software and may contain errors related to that system including errors in grammar, punctuation, and spelling, as well as words and phrases that may be inappropriate. If there are any questions or concerns please feel free to contact the dictating provider for clarification.        Kimberley Paul

## 2025-06-06 NOTE — FLOWSHEET NOTE
06/06/25 1630   Vital Signs   Temp 97.2 °F (36.2 °C)   Temp Source Oral   Pulse 61   Heart Rate Source Monitor   Respirations 14   BP (!) 144/66   MAP (Calculated) 92   BP Location Right upper arm   BP Method Automatic   Patient Position Semi libradowlers   Pain Assessment   Pain Assessment None - Denies Pain   Oxygen Therapy   SpO2 95 %   O2 Device None (Room air)   Height and Weight   Height 1.727 m (5' 8\")   Weight - Scale 104.6 kg (230 lb 8 oz)   Weight Method Actual;Standing scale   BSA (Calculated - sq m) 2.24 sq meters   BMI (Calculated) 35.1       Patient admitted to room 311 from ER. Patient oriented to room, call light, bed rails, phone, lights and bathroom. Patient instructed about the schedule of the day including: vital sign frequency, lab draws, possible tests, frequency of MD and staff rounds, daily weights, I &O's and prescribed diet.  Telemetry box in place, patient aware of placement and reason. Bed locked, in lowest position, side rails up 2/4, call light within reach.        Recliner Assessment  Patient is able to demonstrate the ability to move from a reclining position to an upright position within the recliner.

## 2025-06-06 NOTE — ED NOTES
1441: Cardiology consult placed for cp with elevated trop. Dr. Roland is on call at this time.     1539: Dr. Roland to bedside. Viridiana Morgan, APRN-CNP notified.     1540: Consult complete.
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
## 2025-06-07 ENCOUNTER — APPOINTMENT (OUTPATIENT)
Age: 89
DRG: 291 | End: 2025-06-07
Attending: INTERNAL MEDICINE
Payer: OTHER GOVERNMENT

## 2025-06-07 LAB
ANION GAP SERPL CALCULATED.3IONS-SCNC: 11 MMOL/L (ref 3–16)
BASOPHILS # BLD: 0.1 K/UL (ref 0–0.2)
BASOPHILS NFR BLD: 1.3 %
BUN SERPL-MCNC: 32 MG/DL (ref 7–20)
CALCIUM SERPL-MCNC: 8.4 MG/DL (ref 8.3–10.6)
CHLORIDE SERPL-SCNC: 104 MMOL/L (ref 99–110)
CHOLEST SERPL-MCNC: 115 MG/DL (ref 0–199)
CO2 SERPL-SCNC: 27 MMOL/L (ref 21–32)
CREAT SERPL-MCNC: 1.4 MG/DL (ref 0.8–1.3)
DEPRECATED RDW RBC AUTO: 14 % (ref 12.4–15.4)
ECHO AO ASC DIAM: 3.5 CM
ECHO AO ASCENDING AORTA INDEX: 1.61 CM/M2
ECHO AO ROOT DIAM: 3.7 CM
ECHO AO ROOT INDEX: 1.71 CM/M2
ECHO AV AREA PEAK VELOCITY: 1.3 CM2
ECHO AV AREA VTI: 1.5 CM2
ECHO AV AREA/BSA PEAK VELOCITY: 0.6 CM2/M2
ECHO AV AREA/BSA VTI: 0.7 CM2/M2
ECHO AV MEAN GRADIENT: 16 MMHG
ECHO AV MEAN VELOCITY: 1.9 M/S
ECHO AV PEAK GRADIENT: 29 MMHG
ECHO AV PEAK VELOCITY: 2.7 M/S
ECHO AV VELOCITY RATIO: 0.37
ECHO AV VTI: 48.2 CM
ECHO BSA: 2.24 M2
ECHO EST RA PRESSURE: 3 MMHG
ECHO IVC EXP: 2 CM
ECHO IVC INSP: 0.7 CM
ECHO LA AREA 2C: 20.9 CM2
ECHO LA AREA 4C: 21.9 CM2
ECHO LA MAJOR AXIS: 6.1 CM
ECHO LA MINOR AXIS: 6.2 CM
ECHO LA VOL BP: 60 ML (ref 18–58)
ECHO LA VOL MOD A2C: 57 ML (ref 18–58)
ECHO LA VOL MOD A4C: 65 ML (ref 18–58)
ECHO LA VOL/BSA BIPLANE: 28 ML/M2 (ref 16–34)
ECHO LA VOLUME INDEX MOD A2C: 26 ML/M2 (ref 16–34)
ECHO LA VOLUME INDEX MOD A4C: 30 ML/M2 (ref 16–34)
ECHO LV E' LATERAL VELOCITY: 8.05 CM/S
ECHO LV E' SEPTAL VELOCITY: 7.72 CM/S
ECHO LV EDV A2C: 63 ML
ECHO LV EDV A4C: 72 ML
ECHO LV EDV INDEX A4C: 33 ML/M2
ECHO LV EDV NDEX A2C: 29 ML/M2
ECHO LV EF PHYSICIAN: 68 %
ECHO LV EJECTION FRACTION A2C: 67 %
ECHO LV EJECTION FRACTION A4C: 61 %
ECHO LV EJECTION FRACTION BIPLANE: 63 % (ref 55–100)
ECHO LV ESV A2C: 21 ML
ECHO LV ESV A4C: 28 ML
ECHO LV ESV INDEX A2C: 10 ML/M2
ECHO LV ESV INDEX A4C: 13 ML/M2
ECHO LV FRACTIONAL SHORTENING: 32 % (ref 28–44)
ECHO LV INTERNAL DIMENSION DIASTOLE INDEX: 1.43 CM/M2
ECHO LV INTERNAL DIMENSION DIASTOLIC: 3.1 CM (ref 4.2–5.9)
ECHO LV INTERNAL DIMENSION SYSTOLIC INDEX: 0.97 CM/M2
ECHO LV INTERNAL DIMENSION SYSTOLIC: 2.1 CM
ECHO LV IVSD: 1.8 CM (ref 0.6–1)
ECHO LV MASS 2D: 226 G (ref 88–224)
ECHO LV MASS INDEX 2D: 104.2 G/M2 (ref 49–115)
ECHO LV POSTERIOR WALL DIASTOLIC: 1.8 CM (ref 0.6–1)
ECHO LV RELATIVE WALL THICKNESS RATIO: 1.16
ECHO LVOT AREA: 3.5 CM2
ECHO LVOT AV VTI INDEX: 0.43
ECHO LVOT DIAM: 2.1 CM
ECHO LVOT MEAN GRADIENT: 2 MMHG
ECHO LVOT PEAK GRADIENT: 4 MMHG
ECHO LVOT PEAK VELOCITY: 1 M/S
ECHO LVOT STROKE VOLUME INDEX: 33 ML/M2
ECHO LVOT SV: 71.7 ML
ECHO LVOT VTI: 20.7 CM
ECHO MV A VELOCITY: 1.01 M/S
ECHO MV AREA VTI: 2.4 CM2
ECHO MV E DECELERATION TIME (DT): 557 MS
ECHO MV E VELOCITY: 0.63 M/S
ECHO MV E/A RATIO: 0.62
ECHO MV E/E' LATERAL: 7.83
ECHO MV E/E' RATIO (AVERAGED): 7.99
ECHO MV E/E' SEPTAL: 8.16
ECHO MV LVOT VTI INDEX: 1.46
ECHO MV MAX VELOCITY: 1.2 M/S
ECHO MV MEAN GRADIENT: 2 MMHG
ECHO MV MEAN VELOCITY: 0.8 M/S
ECHO MV PEAK GRADIENT: 6 MMHG
ECHO MV VTI: 30.2 CM
ECHO PV MAX VELOCITY: 1.1 M/S
ECHO PV PEAK GRADIENT: 5 MMHG
ECHO RA AREA 4C: 14.4 CM2
ECHO RA END SYSTOLIC VOLUME APICAL 4 CHAMBER INDEX BSA: 12 ML/M2
ECHO RA VOLUME: 27 ML
ECHO RV BASAL DIMENSION: 2.8 CM
ECHO RV FREE WALL PEAK S': 16.5 CM/S
ECHO RV LONGITUDINAL DIMENSION: 6.8 CM
ECHO RV MID DIMENSION: 1.8 CM
ECHO RV TAPSE: 1.6 CM (ref 1.7–?)
EKG DIAGNOSIS: NORMAL
EKG Q-T INTERVAL: 462 MS
EKG QRS DURATION: 152 MS
EKG QTC CALCULATION (BAZETT): 508 MS
EKG R AXIS: -56 DEGREES
EKG T AXIS: 51 DEGREES
EKG VENTRICULAR RATE: 73 BPM
EOSINOPHIL # BLD: 0.3 K/UL (ref 0–0.6)
EOSINOPHIL NFR BLD: 5.3 %
EST. AVERAGE GLUCOSE BLD GHB EST-MCNC: 194.4 MG/DL
GFR SERPLBLD CREATININE-BSD FMLA CKD-EPI: 48 ML/MIN/{1.73_M2}
GLUCOSE BLD-MCNC: 111 MG/DL (ref 70–99)
GLUCOSE BLD-MCNC: 170 MG/DL (ref 70–99)
GLUCOSE BLD-MCNC: 195 MG/DL (ref 70–99)
GLUCOSE BLD-MCNC: 207 MG/DL (ref 70–99)
GLUCOSE BLD-MCNC: 97 MG/DL (ref 70–99)
GLUCOSE SERPL-MCNC: 94 MG/DL (ref 70–99)
HBA1C MFR BLD: 8.4 %
HCT VFR BLD AUTO: 29.4 % (ref 40.5–52.5)
HDLC SERPL-MCNC: 23 MG/DL (ref 40–60)
HGB BLD-MCNC: 9.9 G/DL (ref 13.5–17.5)
LDLC SERPL CALC-MCNC: 59 MG/DL
LYMPHOCYTES # BLD: 2.6 K/UL (ref 1–5.1)
LYMPHOCYTES NFR BLD: 47.5 %
MAGNESIUM SERPL-MCNC: 1.85 MG/DL (ref 1.8–2.4)
MCH RBC QN AUTO: 33 PG (ref 26–34)
MCHC RBC AUTO-ENTMCNC: 33.8 G/DL (ref 31–36)
MCV RBC AUTO: 97.4 FL (ref 80–100)
MONOCYTES # BLD: 0.2 K/UL (ref 0–1.3)
MONOCYTES NFR BLD: 3.9 %
NEUTROPHILS # BLD: 2.3 K/UL (ref 1.7–7.7)
NEUTROPHILS NFR BLD: 42 %
PERFORMED ON: ABNORMAL
PERFORMED ON: NORMAL
PLATELET # BLD AUTO: 145 K/UL (ref 135–450)
PMV BLD AUTO: 7.9 FL (ref 5–10.5)
POTASSIUM SERPL-SCNC: 3.8 MMOL/L (ref 3.5–5.1)
RBC # BLD AUTO: 3.01 M/UL (ref 4.2–5.9)
SODIUM SERPL-SCNC: 142 MMOL/L (ref 136–145)
TRIGL SERPL-MCNC: 167 MG/DL (ref 0–150)
TROPONIN, HIGH SENSITIVITY: 89 NG/L (ref 0–22)
VLDLC SERPL CALC-MCNC: 33 MG/DL
WBC # BLD AUTO: 5.5 K/UL (ref 4–11)

## 2025-06-07 PROCEDURE — 6360000002 HC RX W HCPCS: Performed by: NURSE PRACTITIONER

## 2025-06-07 PROCEDURE — 83735 ASSAY OF MAGNESIUM: CPT

## 2025-06-07 PROCEDURE — 6360000002 HC RX W HCPCS: Performed by: INTERNAL MEDICINE

## 2025-06-07 PROCEDURE — 2060000000 HC ICU INTERMEDIATE R&B

## 2025-06-07 PROCEDURE — 85025 COMPLETE CBC W/AUTO DIFF WBC: CPT

## 2025-06-07 PROCEDURE — 93005 ELECTROCARDIOGRAM TRACING: CPT | Performed by: INTERNAL MEDICINE

## 2025-06-07 PROCEDURE — 2500000003 HC RX 250 WO HCPCS: Performed by: NURSE PRACTITIONER

## 2025-06-07 PROCEDURE — 80061 LIPID PANEL: CPT

## 2025-06-07 PROCEDURE — 84484 ASSAY OF TROPONIN QUANT: CPT

## 2025-06-07 PROCEDURE — 36415 COLL VENOUS BLD VENIPUNCTURE: CPT

## 2025-06-07 PROCEDURE — 93306 TTE W/DOPPLER COMPLETE: CPT

## 2025-06-07 PROCEDURE — 80048 BASIC METABOLIC PNL TOTAL CA: CPT

## 2025-06-07 PROCEDURE — 6370000000 HC RX 637 (ALT 250 FOR IP): Performed by: NURSE PRACTITIONER

## 2025-06-07 PROCEDURE — 99233 SBSQ HOSP IP/OBS HIGH 50: CPT | Performed by: INTERNAL MEDICINE

## 2025-06-07 RX ADMIN — TAMSULOSIN HYDROCHLORIDE 0.4 MG: 0.4 CAPSULE ORAL at 08:25

## 2025-06-07 RX ADMIN — ENOXAPARIN SODIUM 30 MG: 100 INJECTION SUBCUTANEOUS at 22:06

## 2025-06-07 RX ADMIN — SODIUM CHLORIDE, PRESERVATIVE FREE 10 ML: 5 INJECTION INTRAVENOUS at 08:17

## 2025-06-07 RX ADMIN — INSULIN LISPRO 1 UNITS: 100 INJECTION, SOLUTION INTRAVENOUS; SUBCUTANEOUS at 12:13

## 2025-06-07 RX ADMIN — ATORVASTATIN CALCIUM 40 MG: 40 TABLET, FILM COATED ORAL at 08:25

## 2025-06-07 RX ADMIN — MICONAZOLE NITRATE: 20 POWDER TOPICAL at 22:28

## 2025-06-07 RX ADMIN — PANTOPRAZOLE SODIUM 40 MG: 40 TABLET, DELAYED RELEASE ORAL at 08:02

## 2025-06-07 RX ADMIN — INSULIN GLARGINE 9 UNITS: 100 INJECTION, SOLUTION SUBCUTANEOUS at 22:06

## 2025-06-07 RX ADMIN — ASPIRIN 81 MG: 81 TABLET, COATED ORAL at 08:25

## 2025-06-07 RX ADMIN — INSULIN LISPRO 1 UNITS: 100 INJECTION, SOLUTION INTRAVENOUS; SUBCUTANEOUS at 16:21

## 2025-06-07 RX ADMIN — SODIUM CHLORIDE, PRESERVATIVE FREE 10 ML: 5 INJECTION INTRAVENOUS at 22:28

## 2025-06-07 RX ADMIN — MICONAZOLE NITRATE: 20 POWDER TOPICAL at 08:17

## 2025-06-07 RX ADMIN — FUROSEMIDE 40 MG: 10 INJECTION, SOLUTION INTRAMUSCULAR; INTRAVENOUS at 09:20

## 2025-06-07 RX ADMIN — ENOXAPARIN SODIUM 30 MG: 100 INJECTION SUBCUTANEOUS at 09:20

## 2025-06-07 NOTE — FLOWSHEET NOTE
06/06/25 2255   Vital Signs   Temp 97.1 °F (36.2 °C)   Temp Source Oral   Pulse 59   Heart Rate Source Monitor   Respirations 16   BP (!) 118/55   MAP (Calculated) 76   BP Location Right upper arm   BP Method Automatic   Patient Position Semi fowlers   Oxygen Therapy   SpO2 95 %   O2 Device None (Room air)     Pt reassessment complete.  No changes noted.  NPO at this time.  Denies needs.  Call light within reach.

## 2025-06-07 NOTE — PLAN OF CARE
Problem: Chronic Conditions and Co-morbidities  Goal: Patient's chronic conditions and co-morbidity symptoms are monitored and maintained or improved  Outcome: Progressing  Flowsheets (Taken 6/6/2025 1753 by Pippa Luciano, RN)  Care Plan - Patient's Chronic Conditions and Co-Morbidity Symptoms are Monitored and Maintained or Improved: Monitor and assess patient's chronic conditions and comorbid symptoms for stability, deterioration, or improvement     Problem: Discharge Planning  Goal: Discharge to home or other facility with appropriate resources  Outcome: Progressing  Flowsheets (Taken 6/6/2025 1753 by Pippa Luciano, RN)  Discharge to home or other facility with appropriate resources: Identify barriers to discharge with patient and caregiver     Problem: Safety - Adult  Goal: Free from fall injury  Outcome: Progressing

## 2025-06-07 NOTE — FLOWSHEET NOTE
06/06/25 1952   Vital Signs   Temp 97.6 °F (36.4 °C)   Temp Source Oral   Pulse 68   Heart Rate Source Monitor   Respirations 16   /74   MAP (Calculated) 92   BP Location Right upper arm   BP Method Automatic   Patient Position Semi fowlers   Oxygen Therapy   SpO2 95 %   O2 Device None (Room air)     Pt assessment complete.  Pt lying in bed quietly.  No s/s of distress noted.  Lung sounds diminished.  Pt NSR with BBB HR of 68.  Murmur heard with auscultation.  Male purewick in place with clear yellow urine out.  Nightly medications given.  Denies needs.  Call light within reach.  Bed exit alarm on.

## 2025-06-07 NOTE — CARE COORDINATION
Case Management Assessment  Initial Evaluation    Date/Time of Evaluation: 6/7/2025 8:32 AM  Assessment Completed by: Adrienne Gill    If patient is discharged prior to next notation, then this note serves as note for discharge by case management.    Patient Name: Ari Snider                   YOB: 1935  Diagnosis: Heart murmur [R01.1]  NSTEMI (non-ST elevated myocardial infarction) (HCC) [I21.4]  Elevated brain natriuretic peptide (BNP) level [R79.89]  Acute diastolic CHF (congestive heart failure) (HCC) [I50.31]  Chest pain, unspecified type [R07.9]  Acute on chronic congestive heart failure, unspecified heart failure type (HCC) [I50.9]                   Date / Time: 6/6/2025  1:45 PM    Patient Admission Status: Inpatient   Readmission Risk (Low < 19, Mod (19-27), High > 27): Readmission Risk Score: 18.2    Current PCP: Wilma Rowley APRN - CNP  PCP verified by CM? Yes ([place])    Chart Reviewed: Yes      History Provided by: Patient  Patient Orientation: Alert and Oriented    Patient Cognition: Alert    Hospitalization in the last 30 days (Readmission):  No    If yes, Readmission Assessment in CM Navigator will be completed.    Advance Directives:      Code Status: Limited   Patient's Primary Decision Maker is: Named in Scanned ACP Document    Primary Decision Maker: [facility] Child - [phone]    Secondary Decision Maker (Active): Dahlia Moon [address] [place] [address] Child - [phone]    Discharge Planning:    Patient lives with: Children Type of Home: House  Primary Care Giver: Self  Patient Support Systems include: Children   Current Financial resources:  ([facility])  Current community resources: None  Current services prior to admission: Durable Medical Equipment            Current DME: Wheelchair, Cane            Type of Home Care services:  None    ADLS  Prior functional level: Assistance with the following:, Shopping, Mobility, Housework, Cooking, Dressing,

## 2025-06-07 NOTE — FLOWSHEET NOTE
06/07/25 0659   Vital Signs   Temp 97.3 °F (36.3 °C)   Temp Source Oral   Pulse 65   Heart Rate Source Monitor   Respirations 16   BP (!) 150/64   MAP (Calculated) 93   BP Location Right upper arm   BP Method Automatic   Patient Position Semi fowlers   Oxygen Therapy   SpO2 96 %   O2 Device None (Room air)     Pt C/O Chest Pain.  Pt states it just started 5 minutes ago.  Stat EKG ordered.  Clinical made aware.  Doctor tami served.

## 2025-06-08 PROBLEM — I50.9 ACUTE ON CHRONIC CONGESTIVE HEART FAILURE (HCC): Status: ACTIVE | Noted: 2025-06-08

## 2025-06-08 LAB
ANION GAP SERPL CALCULATED.3IONS-SCNC: 12 MMOL/L (ref 3–16)
ANISOCYTOSIS BLD QL SMEAR: ABNORMAL
BASOPHILS # BLD: 0 K/UL (ref 0–0.2)
BASOPHILS NFR BLD: 0 %
BUN SERPL-MCNC: 35 MG/DL (ref 7–20)
CALCIUM SERPL-MCNC: 8.5 MG/DL (ref 8.3–10.6)
CHLORIDE SERPL-SCNC: 102 MMOL/L (ref 99–110)
CO2 SERPL-SCNC: 25 MMOL/L (ref 21–32)
CREAT SERPL-MCNC: 1.4 MG/DL (ref 0.8–1.3)
DEPRECATED RDW RBC AUTO: 14.3 % (ref 12.4–15.4)
EOSINOPHIL # BLD: 0.3 K/UL (ref 0–0.6)
EOSINOPHIL NFR BLD: 6 %
GFR SERPLBLD CREATININE-BSD FMLA CKD-EPI: 48 ML/MIN/{1.73_M2}
GLUCOSE BLD-MCNC: 134 MG/DL (ref 70–99)
GLUCOSE BLD-MCNC: 137 MG/DL (ref 70–99)
GLUCOSE BLD-MCNC: 177 MG/DL (ref 70–99)
GLUCOSE BLD-MCNC: 178 MG/DL (ref 70–99)
GLUCOSE BLD-MCNC: 192 MG/DL (ref 70–99)
GLUCOSE SERPL-MCNC: 147 MG/DL (ref 70–99)
HCT VFR BLD AUTO: 31.3 % (ref 40.5–52.5)
HGB BLD-MCNC: 10.6 G/DL (ref 13.5–17.5)
LYMPHOCYTES # BLD: 2.5 K/UL (ref 1–5.1)
LYMPHOCYTES NFR BLD: 49 %
MAGNESIUM SERPL-MCNC: 2.55 MG/DL (ref 1.8–2.4)
MCH RBC QN AUTO: 33.1 PG (ref 26–34)
MCHC RBC AUTO-ENTMCNC: 33.9 G/DL (ref 31–36)
MCV RBC AUTO: 97.6 FL (ref 80–100)
MONOCYTES # BLD: 0.3 K/UL (ref 0–1.3)
MONOCYTES NFR BLD: 6 %
NEUTROPHILS # BLD: 2 K/UL (ref 1.7–7.7)
NEUTROPHILS NFR BLD: 39 %
NT-PROBNP SERPL-MCNC: 933 PG/ML (ref 0–449)
PATH INTERP BLD-IMP: NO
PERFORMED ON: ABNORMAL
PLATELET # BLD AUTO: 151 K/UL (ref 135–450)
PLATELET BLD QL SMEAR: ADEQUATE
PMV BLD AUTO: 7.9 FL (ref 5–10.5)
POIKILOCYTOSIS BLD QL SMEAR: ABNORMAL
POTASSIUM SERPL-SCNC: 4.1 MMOL/L (ref 3.5–5.1)
RBC # BLD AUTO: 3.21 M/UL (ref 4.2–5.9)
SLIDE REVIEW: ABNORMAL
SODIUM SERPL-SCNC: 139 MMOL/L (ref 136–145)
WBC # BLD AUTO: 5.1 K/UL (ref 4–11)

## 2025-06-08 PROCEDURE — 80048 BASIC METABOLIC PNL TOTAL CA: CPT

## 2025-06-08 PROCEDURE — 6370000000 HC RX 637 (ALT 250 FOR IP): Performed by: NURSE PRACTITIONER

## 2025-06-08 PROCEDURE — 6370000000 HC RX 637 (ALT 250 FOR IP): Performed by: STUDENT IN AN ORGANIZED HEALTH CARE EDUCATION/TRAINING PROGRAM

## 2025-06-08 PROCEDURE — 2500000003 HC RX 250 WO HCPCS: Performed by: NURSE PRACTITIONER

## 2025-06-08 PROCEDURE — 83735 ASSAY OF MAGNESIUM: CPT

## 2025-06-08 PROCEDURE — 6360000002 HC RX W HCPCS: Performed by: NURSE PRACTITIONER

## 2025-06-08 PROCEDURE — 99233 SBSQ HOSP IP/OBS HIGH 50: CPT | Performed by: INTERNAL MEDICINE

## 2025-06-08 PROCEDURE — 36415 COLL VENOUS BLD VENIPUNCTURE: CPT

## 2025-06-08 PROCEDURE — 6360000002 HC RX W HCPCS: Performed by: INTERNAL MEDICINE

## 2025-06-08 PROCEDURE — 2060000000 HC ICU INTERMEDIATE R&B

## 2025-06-08 PROCEDURE — 83880 ASSAY OF NATRIURETIC PEPTIDE: CPT

## 2025-06-08 PROCEDURE — 85025 COMPLETE CBC W/AUTO DIFF WBC: CPT

## 2025-06-08 RX ORDER — QUETIAPINE FUMARATE 25 MG/1
25 TABLET, FILM COATED ORAL 2 TIMES DAILY
Status: DISCONTINUED | OUTPATIENT
Start: 2025-06-08 | End: 2025-06-08

## 2025-06-08 RX ORDER — QUETIAPINE FUMARATE 25 MG/1
25 TABLET, FILM COATED ORAL ONCE
Status: COMPLETED | OUTPATIENT
Start: 2025-06-08 | End: 2025-06-08

## 2025-06-08 RX ADMIN — PANTOPRAZOLE SODIUM 40 MG: 40 TABLET, DELAYED RELEASE ORAL at 06:52

## 2025-06-08 RX ADMIN — MICONAZOLE NITRATE: 20 POWDER TOPICAL at 21:16

## 2025-06-08 RX ADMIN — ENOXAPARIN SODIUM 30 MG: 100 INJECTION SUBCUTANEOUS at 08:26

## 2025-06-08 RX ADMIN — INSULIN LISPRO 1 UNITS: 100 INJECTION, SOLUTION INTRAVENOUS; SUBCUTANEOUS at 16:40

## 2025-06-08 RX ADMIN — SODIUM CHLORIDE, PRESERVATIVE FREE 10 ML: 5 INJECTION INTRAVENOUS at 21:15

## 2025-06-08 RX ADMIN — MICONAZOLE NITRATE: 20 POWDER TOPICAL at 08:27

## 2025-06-08 RX ADMIN — SODIUM CHLORIDE, PRESERVATIVE FREE 10 ML: 5 INJECTION INTRAVENOUS at 08:27

## 2025-06-08 RX ADMIN — FUROSEMIDE 40 MG: 10 INJECTION, SOLUTION INTRAMUSCULAR; INTRAVENOUS at 08:26

## 2025-06-08 RX ADMIN — ATORVASTATIN CALCIUM 40 MG: 40 TABLET, FILM COATED ORAL at 08:26

## 2025-06-08 RX ADMIN — ACETAMINOPHEN 650 MG: 325 TABLET ORAL at 21:06

## 2025-06-08 RX ADMIN — QUETIAPINE FUMARATE 25 MG: 25 TABLET ORAL at 21:06

## 2025-06-08 RX ADMIN — TAMSULOSIN HYDROCHLORIDE 0.4 MG: 0.4 CAPSULE ORAL at 08:26

## 2025-06-08 RX ADMIN — ASPIRIN 81 MG: 81 TABLET, COATED ORAL at 08:26

## 2025-06-08 RX ADMIN — ENOXAPARIN SODIUM 30 MG: 100 INJECTION SUBCUTANEOUS at 21:07

## 2025-06-08 ASSESSMENT — PAIN DESCRIPTION - LOCATION: LOCATION: GENERALIZED

## 2025-06-08 ASSESSMENT — PAIN DESCRIPTION - PAIN TYPE: TYPE: ACUTE PAIN

## 2025-06-08 ASSESSMENT — PAIN SCALES - WONG BAKER: WONGBAKER_NUMERICALRESPONSE: NO HURT

## 2025-06-08 ASSESSMENT — PAIN SCALES - GENERAL: PAINLEVEL_OUTOF10: 3

## 2025-06-08 ASSESSMENT — PAIN DESCRIPTION - DESCRIPTORS: DESCRIPTORS: ACHING

## 2025-06-08 NOTE — FLOWSHEET NOTE
06/08/25 1247   Vital Signs   Temp 97.4 °F (36.3 °C)   Temp Source Oral   Pulse 71   Heart Rate Source Monitor   Respirations 18   /67   MAP (Calculated) 84   BP Location Left upper arm   BP Method Automatic   Patient Position Up in chair   Oxygen Therapy   SpO2 96 %   O2 Device None (Room air)     Patient a/o. Up in chair. Just finished lunch.

## 2025-06-08 NOTE — PLAN OF CARE
Problem: Chronic Conditions and Co-morbidities  Goal: Patient's chronic conditions and co-morbidity symptoms are monitored and maintained or improved  Outcome: Progressing     Problem: Discharge Planning  Goal: Discharge to home or other facility with appropriate resources  Outcome: Progressing     Problem: Safety - Adult  Goal: Free from fall injury  Outcome: Progressing     Problem: Pain  Goal: Verbalizes/displays adequate comfort level or baseline comfort level  6/7/2025 2228 by Vickie Rader, RN  Outcome: Progressing  6/7/2025 1344 by Kathryn Bennett, RN  Outcome: Progressing

## 2025-06-09 LAB
ANION GAP SERPL CALCULATED.3IONS-SCNC: 11 MMOL/L (ref 3–16)
ANISOCYTOSIS BLD QL SMEAR: ABNORMAL
BASOPHILS # BLD: 0.1 K/UL (ref 0–0.2)
BASOPHILS NFR BLD: 1 %
BUN SERPL-MCNC: 38 MG/DL (ref 7–20)
CALCIUM SERPL-MCNC: 8.4 MG/DL (ref 8.3–10.6)
CHLORIDE SERPL-SCNC: 102 MMOL/L (ref 99–110)
CO2 SERPL-SCNC: 28 MMOL/L (ref 21–32)
CREAT SERPL-MCNC: 1.7 MG/DL (ref 0.8–1.3)
DEPRECATED RDW RBC AUTO: 13.5 % (ref 12.4–15.4)
EOSINOPHIL # BLD: 0.1 K/UL (ref 0–0.6)
EOSINOPHIL NFR BLD: 1 %
GFR SERPLBLD CREATININE-BSD FMLA CKD-EPI: 38 ML/MIN/{1.73_M2}
GLUCOSE BLD-MCNC: 136 MG/DL (ref 70–99)
GLUCOSE BLD-MCNC: 151 MG/DL (ref 70–99)
GLUCOSE BLD-MCNC: 171 MG/DL (ref 70–99)
GLUCOSE BLD-MCNC: 271 MG/DL (ref 70–99)
GLUCOSE SERPL-MCNC: 145 MG/DL (ref 70–99)
HCT VFR BLD AUTO: 29.5 % (ref 40.5–52.5)
HGB BLD-MCNC: 10.2 G/DL (ref 13.5–17.5)
LYMPHOCYTES # BLD: 2.7 K/UL (ref 1–5.1)
LYMPHOCYTES NFR BLD: 51 %
MAGNESIUM SERPL-MCNC: 2.01 MG/DL (ref 1.8–2.4)
MCH RBC QN AUTO: 33.1 PG (ref 26–34)
MCHC RBC AUTO-ENTMCNC: 34.5 G/DL (ref 31–36)
MCV RBC AUTO: 96 FL (ref 80–100)
MONOCYTES # BLD: 0.3 K/UL (ref 0–1.3)
MONOCYTES NFR BLD: 6 %
NEUTROPHILS # BLD: 1.9 K/UL (ref 1.7–7.7)
NEUTROPHILS NFR BLD: 38 %
OVALOCYTES BLD QL SMEAR: ABNORMAL
PERFORMED ON: ABNORMAL
PLATELET # BLD AUTO: 141 K/UL (ref 135–450)
PLATELET BLD QL SMEAR: ADEQUATE
PMV BLD AUTO: 8.1 FL (ref 5–10.5)
POIKILOCYTOSIS BLD QL SMEAR: ABNORMAL
POTASSIUM SERPL-SCNC: 3.7 MMOL/L (ref 3.5–5.1)
RBC # BLD AUTO: 3.07 M/UL (ref 4.2–5.9)
SLIDE REVIEW: ABNORMAL
SODIUM SERPL-SCNC: 141 MMOL/L (ref 136–145)
VARIANT LYMPHS NFR BLD MANUAL: 3 % (ref 0–6)
WBC # BLD AUTO: 5 K/UL (ref 4–11)

## 2025-06-09 PROCEDURE — 2060000000 HC ICU INTERMEDIATE R&B

## 2025-06-09 PROCEDURE — 6360000002 HC RX W HCPCS: Performed by: NURSE PRACTITIONER

## 2025-06-09 PROCEDURE — 83735 ASSAY OF MAGNESIUM: CPT

## 2025-06-09 PROCEDURE — 6370000000 HC RX 637 (ALT 250 FOR IP): Performed by: STUDENT IN AN ORGANIZED HEALTH CARE EDUCATION/TRAINING PROGRAM

## 2025-06-09 PROCEDURE — 85025 COMPLETE CBC W/AUTO DIFF WBC: CPT

## 2025-06-09 PROCEDURE — 6370000000 HC RX 637 (ALT 250 FOR IP): Performed by: NURSE PRACTITIONER

## 2025-06-09 PROCEDURE — 99232 SBSQ HOSP IP/OBS MODERATE 35: CPT | Performed by: STUDENT IN AN ORGANIZED HEALTH CARE EDUCATION/TRAINING PROGRAM

## 2025-06-09 PROCEDURE — 99232 SBSQ HOSP IP/OBS MODERATE 35: CPT | Performed by: INTERNAL MEDICINE

## 2025-06-09 PROCEDURE — 80048 BASIC METABOLIC PNL TOTAL CA: CPT

## 2025-06-09 PROCEDURE — 2500000003 HC RX 250 WO HCPCS: Performed by: NURSE PRACTITIONER

## 2025-06-09 PROCEDURE — 36415 COLL VENOUS BLD VENIPUNCTURE: CPT

## 2025-06-09 PROCEDURE — 76937 US GUIDE VASCULAR ACCESS: CPT

## 2025-06-09 RX ORDER — QUETIAPINE FUMARATE 25 MG/1
25 TABLET, FILM COATED ORAL ONCE
Status: COMPLETED | OUTPATIENT
Start: 2025-06-09 | End: 2025-06-09

## 2025-06-09 RX ORDER — PROCHLORPERAZINE EDISYLATE 5 MG/ML
10 INJECTION INTRAMUSCULAR; INTRAVENOUS EVERY 6 HOURS PRN
Status: DISCONTINUED | OUTPATIENT
Start: 2025-06-09 | End: 2025-06-10 | Stop reason: HOSPADM

## 2025-06-09 RX ORDER — PROCHLORPERAZINE MALEATE 10 MG
10 TABLET ORAL EVERY 8 HOURS PRN
Status: DISCONTINUED | OUTPATIENT
Start: 2025-06-09 | End: 2025-06-10 | Stop reason: HOSPADM

## 2025-06-09 RX ADMIN — ATORVASTATIN CALCIUM 40 MG: 40 TABLET, FILM COATED ORAL at 08:45

## 2025-06-09 RX ADMIN — SODIUM CHLORIDE, PRESERVATIVE FREE 10 ML: 5 INJECTION INTRAVENOUS at 08:46

## 2025-06-09 RX ADMIN — QUETIAPINE FUMARATE 25 MG: 25 TABLET ORAL at 22:23

## 2025-06-09 RX ADMIN — INSULIN GLARGINE 9 UNITS: 100 INJECTION, SOLUTION SUBCUTANEOUS at 20:07

## 2025-06-09 RX ADMIN — ENOXAPARIN SODIUM 30 MG: 100 INJECTION SUBCUTANEOUS at 20:08

## 2025-06-09 RX ADMIN — MICONAZOLE NITRATE: 20 POWDER TOPICAL at 22:23

## 2025-06-09 RX ADMIN — SODIUM CHLORIDE, PRESERVATIVE FREE 10 ML: 5 INJECTION INTRAVENOUS at 20:38

## 2025-06-09 RX ADMIN — INSULIN LISPRO 2 UNITS: 100 INJECTION, SOLUTION INTRAVENOUS; SUBCUTANEOUS at 20:07

## 2025-06-09 RX ADMIN — TAMSULOSIN HYDROCHLORIDE 0.4 MG: 0.4 CAPSULE ORAL at 08:45

## 2025-06-09 RX ADMIN — MICONAZOLE NITRATE: 20 POWDER TOPICAL at 08:45

## 2025-06-09 RX ADMIN — ASPIRIN 81 MG: 81 TABLET, COATED ORAL at 08:45

## 2025-06-09 ASSESSMENT — PAIN SCALES - WONG BAKER: WONGBAKER_NUMERICALRESPONSE: NO HURT

## 2025-06-09 ASSESSMENT — PAIN SCALES - GENERAL: PAINLEVEL_OUTOF10: 0

## 2025-06-09 NOTE — PLAN OF CARE
Problem: Chronic Conditions and Co-morbidities  Goal: Patient's chronic conditions and co-morbidity symptoms are monitored and maintained or improved  Outcome: Progressing     Problem: Discharge Planning  Goal: Discharge to home or other facility with appropriate resources  Outcome: Progressing     Problem: Safety - Adult  Goal: Free from fall injury  6/8/2025 2121 by Vickie Rader, RN  Outcome: Progressing  6/8/2025 1414 by Kathryn Bennett, RN  Outcome: Progressing     Problem: Pain  Goal: Verbalizes/displays adequate comfort level or baseline comfort level  Outcome: Progressing

## 2025-06-09 NOTE — FLOWSHEET NOTE
06/09/25 0842   Vital Signs   Temp 97.5 °F (36.4 °C)   Temp Source Oral   Pulse 82   Heart Rate Source Monitor   Respirations 20   /71   MAP (Calculated) 91   BP Location Right upper arm   BP Method Automatic   Patient Position Supine   Oxygen Therapy   SpO2 94 %   O2 Device None (Room air)     Shift assessment completed see flow sheet. Patient in bed alert and oriented x4. Patient on RA, showing no signs of distress. Morning medications given per order.order placed for vascular for IV placement for cta. Patient has no other needs at this time. Standard safety measures in place.

## 2025-06-10 VITALS
BODY MASS INDEX: 32.27 KG/M2 | SYSTOLIC BLOOD PRESSURE: 132 MMHG | HEIGHT: 68 IN | HEART RATE: 68 BPM | RESPIRATION RATE: 16 BRPM | TEMPERATURE: 97.5 F | OXYGEN SATURATION: 95 % | DIASTOLIC BLOOD PRESSURE: 55 MMHG | WEIGHT: 212.9 LBS

## 2025-06-10 PROBLEM — R01.1 HEART MURMUR: Status: ACTIVE | Noted: 2025-06-10

## 2025-06-10 LAB
ANION GAP SERPL CALCULATED.3IONS-SCNC: 12 MMOL/L (ref 3–16)
BUN SERPL-MCNC: 32 MG/DL (ref 7–20)
CALCIUM SERPL-MCNC: 8.2 MG/DL (ref 8.3–10.6)
CHLORIDE SERPL-SCNC: 103 MMOL/L (ref 99–110)
CO2 SERPL-SCNC: 26 MMOL/L (ref 21–32)
CREAT SERPL-MCNC: 1.5 MG/DL (ref 0.8–1.3)
GFR SERPLBLD CREATININE-BSD FMLA CKD-EPI: 44 ML/MIN/{1.73_M2}
GLUCOSE BLD-MCNC: 124 MG/DL (ref 70–99)
GLUCOSE BLD-MCNC: 142 MG/DL (ref 70–99)
GLUCOSE BLD-MCNC: 227 MG/DL (ref 70–99)
GLUCOSE SERPL-MCNC: 115 MG/DL (ref 70–99)
MAGNESIUM SERPL-MCNC: 2.54 MG/DL (ref 1.8–2.4)
NT-PROBNP SERPL-MCNC: 483 PG/ML (ref 0–449)
PERFORMED ON: ABNORMAL
POTASSIUM SERPL-SCNC: 3.8 MMOL/L (ref 3.5–5.1)
SODIUM SERPL-SCNC: 141 MMOL/L (ref 136–145)

## 2025-06-10 PROCEDURE — 99238 HOSP IP/OBS DSCHRG MGMT 30/<: CPT | Performed by: INTERNAL MEDICINE

## 2025-06-10 PROCEDURE — 83880 ASSAY OF NATRIURETIC PEPTIDE: CPT

## 2025-06-10 PROCEDURE — 36415 COLL VENOUS BLD VENIPUNCTURE: CPT

## 2025-06-10 PROCEDURE — 83735 ASSAY OF MAGNESIUM: CPT

## 2025-06-10 PROCEDURE — 6370000000 HC RX 637 (ALT 250 FOR IP): Performed by: NURSE PRACTITIONER

## 2025-06-10 PROCEDURE — 80048 BASIC METABOLIC PNL TOTAL CA: CPT

## 2025-06-10 PROCEDURE — 99232 SBSQ HOSP IP/OBS MODERATE 35: CPT | Performed by: NURSE PRACTITIONER

## 2025-06-10 RX ORDER — METOLAZONE 5 MG/1
TABLET ORAL
Qty: 10 TABLET | Refills: 0 | Status: SHIPPED | OUTPATIENT
Start: 2025-06-10

## 2025-06-10 RX ORDER — TORSEMIDE 20 MG/1
TABLET ORAL
Qty: 1 TABLET | Refills: 0
Start: 2025-06-10

## 2025-06-10 RX ADMIN — PANTOPRAZOLE SODIUM 40 MG: 40 TABLET, DELAYED RELEASE ORAL at 04:45

## 2025-06-10 RX ADMIN — ATORVASTATIN CALCIUM 40 MG: 40 TABLET, FILM COATED ORAL at 10:21

## 2025-06-10 RX ADMIN — TAMSULOSIN HYDROCHLORIDE 0.4 MG: 0.4 CAPSULE ORAL at 10:21

## 2025-06-10 RX ADMIN — MICONAZOLE NITRATE: 20 POWDER TOPICAL at 10:22

## 2025-06-10 RX ADMIN — ASPIRIN 81 MG: 81 TABLET, COATED ORAL at 10:21

## 2025-06-10 ASSESSMENT — ENCOUNTER SYMPTOMS
STRIDOR: 0
COUGH: 0
CHOKING: 0
WHEEZING: 0
SINUS PRESSURE: 0
GASTROINTESTINAL NEGATIVE: 1
SHORTNESS OF BREATH: 0
CHEST TIGHTNESS: 0
RHINORRHEA: 0
EYE DISCHARGE: 0
DIARRHEA: 0
VOMITING: 0
ALLERGIC/IMMUNOLOGIC NEGATIVE: 1
APNEA: 0
BLOOD IN STOOL: 0
EYE PAIN: 0
BACK PAIN: 0
CONSTIPATION: 0
PHOTOPHOBIA: 0
SORE THROAT: 0
ABDOMINAL PAIN: 0
EYE REDNESS: 0
COLOR CHANGE: 0
EYE ITCHING: 0
NAUSEA: 0
RESPIRATORY NEGATIVE: 1
TROUBLE SWALLOWING: 0

## 2025-06-10 NOTE — PLAN OF CARE
Problem: Chronic Conditions and Co-morbidities  Goal: Patient's chronic conditions and co-morbidity symptoms are monitored and maintained or improved  Outcome: Progressing     Problem: Discharge Planning  Goal: Discharge to home or other facility with appropriate resources  Outcome: Progressing     Problem: Safety - Adult  Goal: Free from fall injury  Outcome: Progressing     Problem: Pain  Goal: Verbalizes/displays adequate comfort level or baseline comfort level  6/9/2025 2046 by Gloria Ramirez, RN  Outcome: Progressing  6/9/2025 1239 by Kathryn Bennett, RN  Outcome: Progressing

## 2025-06-10 NOTE — DISCHARGE SUMMARY
tablet  Commonly known as: PROTONIX     sacubitril-valsartan 24-26 MG per tablet  Commonly known as: ENTRESTO               Where to Get Your Medications        These medications were sent to [facility] [id number] - [place], [place] - [address] [facility] - P [phone] - F [phone]  [address], [facility] [address]      Phone: [phone]   metOLazone 5 MG tablet       Information about where to get these medications is not yet available    Ask your nurse or doctor about these medications  torsemide 20 MG tablet           Discharge Condition/Location: Stable    Follow Up:  Follow up with PCP.        ALFA VILLASEÑOR MD 6/10/2025 5:51 PM

## 2025-06-10 NOTE — CONSULTS
[facility]   HEART FAILURE PROGRAM      NAME:  Ari Snider  AGE: 89 y.o.   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, with necrosis of bone ([facility]) 2011    Diabetic ulcer of toe 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 mellitus, with fat layer exposed ([facility])     Greater trochanteric bursitis of right hip 2023    Hypertension     Lumbar spondylosis 2023    Lymphoma ([facility])     MRSA infection 2022    NSTEMI (non-ST elevated myocardial infarction) ([facility]) 2024    Osteomyelitis of left foot ([facility]) 2021    Tinnitus 2012    Tobacco use     Ulcer of other part of foot 2011    Vertigo, benign paroxysmal 08/15/2016    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[facility]) 2021     HOME MEDICATIONS:  Prior to Admission medications    Medication Sig Start Date End Date Taking? Authorizing Provider   metOLazone (ZAROXOLYN) 5 MG tablet Take 1 tablet by mouth as needed up to two times a week for weight gain of 2-3 pounds overnight or 5 pounds 6/10/25  Yes Jammie Allen APRN - CNP   torsemide (DEMADEX) 20 MG tablet 2 tabs daily 6/10/25  Yes Noel Holm MD   Lancet Devices (ONE TOUCH DELICA LANCING DEV) MISC 1 Device by Does not apply route once for 1 dose 5/22/25 6/3/25  Wilma Rowley APRN - CNP   VITAMIN D PO Take by mouth    Moe Beasley MD HAWTHORN BERRY PO Take by mouth    ProviderMoe MD 
Surescript refill history.  The following list of meds may assist in determining home medications taken.  Confirmation/ verification of how or if the patient is taking is needed.  List may not be accurate or complete depending on when or how the patient was instructed to take and where the patient receives their medications from.          Per the nurse communication (Gayle) they are unable to get a list of meds from [employer] because they did not answer? Contact info   [facility] [id number] - [place], [place] - [address] [facility] - P [phone] - F [phone]  The following is a list of recent dispenses per surescript data:  Lipitor 40mg daily  Diabetic testing supplies  Lantus as directed  Humalog insulin as directed  Metformin 500mg BID with meals  Protonix 40mg daily  Entresto 24-26mg Half a tab BID? 60 day supply was 60 tabs, normally a BID med  Flomax 0.4mg daily  Torsemide 20mg BID  Certain dispense information may not be available or accurate in this list, including items that the patient asked not be disclosed due to patient privacy concerns, over-the-counter medications, low cost prescriptions, prescriptions paid for by the patient or non-participating sources, or errors in insurance claims information. The provider should independently   Parth Lopez RPH PharmD 6/6/2025 6:08 PM        
effects.     Further testing including possible non-invasive ischemia w/u pending ECHO results.    Note acute on chronic systolic CHF, CP, AS, CAD increase his morbidity and mortality requiring med tx and testing.     Patient Active Problem List   Diagnosis    Dyslipidemia    Benign essential HTN    Lymphoma (HCC)    Class 1 obesity due to excess calories with serious comorbidity and body mass index (BMI) of 34.0 to 34.9 in adult    Acquired lymphedema of lower extremity    Coronary artery calcification seen on CAT scan    Anemia of chronic disease    Sensorineural hearing loss, bilateral    Aortic valve stenosis    Type 2 diabetes mellitus with diabetic dermatitis, without long-term current use of insulin (HCC)    Chronic pansinusitis    PVD (peripheral vascular disease)    Bilateral carotid artery stenosis    Atherosclerosis of abdominal aorta    Right bundle branch block    Lumbar spondylosis    Type 2 diabetes mellitus with hyperglycemia (HCC)    Other chronic pancreatitis (HCC)    Cardiomyopathy (HCC)    Heart failure with mildly reduced ejection fraction (HFmrEF) (HCC)    Centrilobular emphysema (HCC)    Stage 3a chronic kidney disease (HCC)    Generalized edema       Thank you for allowing to us to participate in the care or Ari Snider. Further evaluation will be based upon the patient's clinical course and testing results.

## 2025-06-10 NOTE — PROGRESS NOTES
4 Eyes Skin Assessment     NAME:  Ari Snider  YOB: 1935  MEDICAL RECORD NUMBER:  [id number]    The patient is being assessed for  Admission    I agree that at least one RN has performed a thorough Head to Toe Skin Assessment on the patient. ALL assessment sites listed below have been assessed.      Areas assessed by both nurses:    Head, Face, Ears, Shoulders, Back, Chest, Arms, Elbows, Hands, Sacrum. Buttock, Coccyx, Ischium, Legs. Feet and Heels, and Under Medical Devices     Right partial foot amputation - healed. Yeast groin.         Does the Patient have a Wound? No noted wound(s)       Javier Prevention initiated by RN: No  Wound Care Orders initiated by RN: No    Pressure Injury (Stage 3,4, Unstageable, DTI, NWPT, and Complex wounds) if present, place Wound referral order by RN under : No    New Ostomies, if present place, Ostomy referral order under : No     Nurse 1 eSignature: Electronically signed by Pippa Luciano RN on 6/6/25 at 6:01 PM EDT    **SHARE this note so that the co-signing nurse can place an eSignature**    Nurse 2 eSignature: Electronically signed by Shirin Noriega RN on 6/6/25 at 6:03 PM EDT    
Bedside report and transfer of care given to JUAN Agarwal. Pt currently resting in bed with the call light within reach. Pt denies any other care needs at this time. Pt stable at this time.    
Bedside report and transfer of care given to JUAN Wilson. Pt currently resting in bed with the call light within reach. Pt denies any other care needs at this time. Pt stable at this time.    
Bedside report and transfer of care given to Nisha Wilson. Pt currently resting in bed with the call light within reach. Pt denies any other care needs at this time. Pt stable at this time.    
Blood pressure (!) 150/64, pulse 65, temperature 97.3 °F (36.3 °C), temperature source Oral, resp. rate 16, height 1.727 m (5' 8\"), weight 104.6 kg (230 lb 8 oz), SpO2 96%.      Shift assessment completed see flow sheet. Patient in bed alert and oriented x4. Patient on RA, showing no signs of distress. Morning medications given per order. Patient has no other needs at this time. Standard safety measures in place.    Stat EKG placed for CP, troponins ordered.  EKG reviewed by MD  
Blood pressure 130/71, pulse 70, temperature 97.2 °F (36.2 °C), temperature source Oral, resp. rate 20, height 1.727 m (5' 8\"), weight 104.3 kg (230 lb), SpO2 92%.  Shift assessment completed see flow sheet. Patient in bed alert and oriented x4. Patient on RA, showing no signs of distress. Morning medications given per order. Patient has no other needs at this time. Standard safety measures in place.    
CAMRON notified pt refusing tele and  Notified.   
Cardiology and MD updated on troponin level.  
Dr. Frankel notified of Pt not sleeping prior and son ok with pt receiving something for sleep. Dr. Frankel gave order for 25mg seroquel once.   
EOS report given to Betina MARTINEZ. New orders received from Dr. Rand transferred at this time.    
Hand off report given to  JUAN Castro.   Patient is stable showing no signs of distress and has no current needs at this time.   Call light is in reach and bed is in lowest position.    Care is transferred at this time.     
Hand off report given to  JUAN Medrano.   Patient is stable showing no signs of distress and has no current needs at this time.   Call light is in reach and bed is in lowest position.    Care is transferred at this time.     
Hand off report given to  JUAN Medrano.   Patient is stable showing no signs of distress and has no current needs at this time.   Call light is in reach and bed is in lowest position.    Care is transferred at this time.     
Handoff report given to JUAN Lozoya. Care transferred.   
Internal Medicine ( Hospitalist ) Progress Note        Date:6/10/2025          Patient Name:Ari Snider     YOB: 1935     Age:89 y.o.    Admission date : 6/6/2025  1:45 PM  Hospital day : 4      Assessment and Plan:        Principal Problem:    Acute diastolic CHF (congestive heart failure) ([facility])  Active Problems:    Coronary artery calcification seen on CAT scan    Anemia of chronic disease    Aortic valve stenosis    Type 2 diabetes mellitus with diabetic dermatitis, without long-term current use of insulin ([facility])    Right bundle branch block    Benign essential HTN    Chest pain    Acute on chronic systolic CHF (congestive heart failure) ([facility])    Stage 3a chronic kidney disease ([facility])    Acute on chronic congestive heart failure ([facility])  Resolved Problems:    * No resolved hospital problems. *      Acute on chronic diastolic CHF - POA - resolving   AS - echo demonstrate that appears to be worsening  -last echo 7/2024 EF 45%, AV sclerosis.  -CXR nonacute.  -BNP 1245.  -I/Os, daily weights.  -IV diuresis via 40mg Lasix daily.  - net negative 900 since admission  -no longer on Entresto 2/2 cost or Jardiance 2/2 SE.  -cardiology consulted and recommended cardiac CT to be done in the morning  CRTN worse today however  Continue to hold lasix   He is a Limited code  Cardiology discussed with family   Medical management   CRTN better today     Chest pain - POA - resolved now   Elevated troponin - mild   CAD on CT.  -likely related to above.  -possible worsening EF or AS severity.  -stress 7/2024 with small fixed defect inferior wall c/w probable infarct prior; no ischemia.   -trop 82 > 80.  -EKG with no acute changes.  -CXR nonacute.  -echo EF 65%  -cardiology consulted per above.  -continue ASA and Lipitor.     CKD stage IIIa.  -Cr 1.4, baseline 0.9-1.3.  -avoid nephrotoxic agents.  -renally dose medications as able.  - likely elevated 2/2 fluid overload   CRTN close to baseline      Insulin-dependent 
Internal Medicine ( Hospitalist ) Progress Note        Date:6/9/2025          Patient Name:Ari Snider     YOB: 1935     Age:89 y.o.    Admission date : 6/6/2025  1:45 PM  Hospital day : 3      Assessment and Plan:        Principal Problem:    Acute diastolic CHF (congestive heart failure) ([facility])  Active Problems:    Coronary artery calcification seen on CAT scan    Anemia of chronic disease    Aortic valve stenosis    Type 2 diabetes mellitus with diabetic dermatitis, without long-term current use of insulin (HCC)    Right bundle branch block    Benign essential HTN    Chest pain    Acute on chronic systolic CHF (congestive heart failure) (HCC)    Stage 3a chronic kidney disease (HCC)    Acute on chronic congestive heart failure (HCC)  Resolved Problems:    * No resolved hospital problems. *      Acute on chronic diastolic CHF - POA - resolving   AS - echo demonstrate that appears to be worsening  -last echo 7/2024 EF 45%, AV sclerosis.  -CXR nonacute.  -BNP 1245.  -I/Os, daily weights.  -IV diuresis via 40mg Lasix daily.  - net negative 900 since admission  -no longer on Entresto 2/2 cost or Jardiance 2/2 SE.  -cardiology consulted and recommended cardiac CT to be done in the morning  CRTN worse today however  Continue to hold lasix   He is a Limited code  Cardiology discussed wit hfamily   Medical management      Chest pain - POA - resolved now   Elevated troponin - mild   CAD on CT.  -likely related to above.  -possible worsening EF or AS severity.  -stress 7/2024 with small fixed defect inferior wall c/w probable infarct prior; no ischemia.   -trop 82 > 80.  -EKG with no acute changes.  -CXR nonacute.  -echo EF 65%  -cardiology consulted per above.  -continue ASA and Lipitor.     CKD stage IIIa.  -Cr 1.4, baseline 0.9-1.3.  -avoid nephrotoxic agents.  -renally dose medications as able.  - likely elevated 2/2 fluid overload      Insulin-dependent type II diabetes mellitus.  -A1C pending.  -SSI 
Max and Ari (POA) whom is at the bedside unaware of medications patient takes. Call placed to [facility] at [phone] - no answer.     Pharmacy consult placed to verify medications.   
[facility]  Ultrasound Guided Peripheral Insertion Procedure Note.     Ari Snider   Admitted- 6/6/2025  1:45 PM  Admission diagnosis- Heart murmur [R01.1]  NSTEMI (non-ST elevated myocardial infarction) ([facility]) [I21.4]  Elevated brain natriuretic peptide (BNP) level [R79.89]  Acute diastolic CHF (congestive heart failure) ([facility]) [I50.31]  Chest pain, unspecified type [R07.9]  Acute on chronic congestive heart failure, unspecified heart failure type ([facility]) [I50.9]      Attending Physician- Lilian Conner DO  Ordering Physician- Dr. Noel Holm  Indication for Insertion: Limited Access          USG PIV placed to right upper arm using sterile technique. Brisk blood return noted, line flushes with ease. Patient tolerated well. Bed returned to lowest position, call light within reach. See US images below.              
PM assessment completed. Scheduled medications given per MAR. VSS room air, A/O x4 with confusion. Patient does not appear in distress and denies any needs at this time. Call light in reach, will monitor,  family at bedside.     
Patient educated on discharge instructions as well as new medications use, dosage, administration and possible side effects.  Patient verified knowledge. IV removed without difficulty and dry dressing in place. Telemetry monitor removed and returned to [facility]. Pt left facility in stable condition to Home with all of their personal belongings.    
Phone meeting with jose francisco Salas and Dr Lainez. Plans for medical manage vs cta.  
Pt pulled of telemetry box and refusing to let staff place back on. Pt also pulled IV out and external catheter off stating he is leaving today. Pt asked orientation questions and refusing to answer stating he is not telling me. Son, Ari Saenz, called and notified. Son states he has seen this before when he is in a different place than home like [facility]. Son also states pt complains about the beds at this hospital and he will come to hospital. Dr. Ortega notified of this and he states as long as the patient is not a danger to himself or others, let it ride and if that changes we can try meds.   
Pt's son and Daughter in Law at bedside at this time. This nurse let them know that if they need anything to let me know.   
Pt's son and daughter in law at bedside and pt ok with placing IV in and placing tele monitor back on.   
[facility]   Progress Note  Cardiology      HPI: Seeing Mr. Snider today for f/u CP and acute on chronic systolic CHF. He did not have any complaints this AM. Tele NSR 74bpm; 1st degree AV block      Physical Examination:    Vitals:    06/08/25 0317   BP: 120/64   Pulse: 75   Resp: 16   Temp: 97.4 °F (36.3 °C)   SpO2: 94%          Constitutional and General Appearance: NAD   Respiratory:  Normal excursion and expansion without use of accessory muscles  Resp Auscultation: Soft bs bases  Cardiovascular:  The apical impulses not displaced  Heart tones are crisp and normal  Cervical veins are not engorged  The carotid upstroke is normal in amplitude and contour without delay or bruit  Normal S1S2, No S3, +I-II/VI JULIA  Peripheral pulses are symmetrical and full  There is no clubbing, cyanosis of the extremities.  No sig edema; s/p toes amputation RLE  Pedal Pulses: 2+ and equal   Abdomen:  No masses or tenderness  Liver/Spleen: No Abnormalities Noted  Neurological/Psychiatric:  Alert and oriented in all spheres  Moves all extremities well  No abnormalities of mood, affect, memory, mentation, or behavior are noted  Skin: warm and dry    Lab Results   Component Value Date    WBC 5.5 06/07/2025    HGB 9.9 (L) 06/07/2025    HCT 29.4 (L) 06/07/2025    MCV 97.4 06/07/2025     06/07/2025     Lab Results   Component Value Date    CREATININE 1.4 (H) 06/07/2025    BUN 32 (H) 06/07/2025     06/07/2025    K 3.8 06/07/2025     06/07/2025    CO2 27 06/07/2025     Lab Results   Component Value Date    INR 1.69 (H) 07/07/2024    PROTIME 20.0 (H) 07/07/2024      ECHO 6/7/25    Left Ventricle: Hyperdynamic left ventricular systolic function with a visually estimated EF of 65 - 70%. Left ventricle is smaller than normal. Severely increased wall thickness. Findings consistent with severe concentric hypertrophy. Normal wall motion. Grade I diastolic dysfunction with normal LAP.    Right Ventricle: Right 
Shift assessment is complete, see flow sheet. Pt denies c/o pain or further needs at this time. Pt A&OX 4 with call light within reach and bed alarm on.   
Shift assessment is complete, see flow sheet. Pt has c/o generalized pain stating the bed is uncomfortable, prn Tylenol given per MAR. Pt A&OX 3 with call light within reach and bed alarm on.   
Telephone report given to jose francisco Chapman.  
[facility]   Progress Note  Cardiology      HPI: Seeing Mr. Snider today for f/u CP and acute on chronic systolic CHF.  Patient had no complaints.  Patient is hard of hearing.  Nursing reports \"sundowning overnight.\"  Called patient's son, Josue, who is  patient's POA.  We discussed further testing including cardiac CTA which I would not recommend given his age and worsening renal function as well as obtaining just a calcium score with attention to the aortic valve and calculated aortic valve calcium score was significantly elevated which would imply severe aortic stenosis.  Per our discussion, patient's son does not want to have his father undergo any invasive procedures and would prefer medical management.  As a result, we will defer further testing as we are not going to be pursuing invasive procedures.  Tele NSR 7-80s bpm; 1st degree AV block      Physical Examination:    Vitals:    06/09/25 1101   BP: 128/73   Pulse: 85   Resp: 16   Temp: 98 °F (36.7 °C)   SpO2: 95%          Constitutional and General Appearance: NAD   Respiratory:  Normal excursion and expansion without use of accessory muscles  Resp Auscultation: CTAB  Cardiovascular:  The apical impulses not displaced  Heart tones are crisp and normal  Cervical veins are not engorged  The carotid upstroke is normal in amplitude and contour without delay or bruit  Normal S1S2, No S3, +I-II/VI JULIA  Peripheral pulses are symmetrical and full  There is no clubbing, cyanosis of the extremities.  No sig edema; s/p toes amputation RLE  Pedal Pulses: 2+ and equal   Abdomen:  No masses or tenderness  Liver/Spleen: No Abnormalities Noted  Neurological/Psychiatric:  Alert and oriented in all spheres  Moves all extremities well  No abnormalities of mood, affect, memory, mentation, or behavior are noted  Skin: warm and dry    Lab Results   Component Value Date    WBC 5.0 06/09/2025    HGB 10.2 (L) 06/09/2025    HCT 29.5 (L) 06/09/2025    MCV 96.0 06/09/2025    
    Patient Active Problem List   Diagnosis    Dyslipidemia    Benign essential HTN    Lymphoma (HCC)    Class 1 obesity due to excess calories with serious comorbidity and body mass index (BMI) of 34.0 to 34.9 in adult    Acquired lymphedema of lower extremity    Coronary artery calcification seen on CAT scan    Anemia of chronic disease    Sensorineural hearing loss, bilateral    Aortic valve stenosis    Type 2 diabetes mellitus with diabetic dermatitis, without long-term current use of insulin (HCC)    Chronic pansinusitis    PVD (peripheral vascular disease)    Bilateral carotid artery stenosis    Atherosclerosis of abdominal aorta    Right bundle branch block    Lumbar spondylosis    Chest pain    Type 2 diabetes mellitus with hyperglycemia (HCC)    Other chronic pancreatitis (HCC)    Cardiomyopathy (HCC)    Acute on chronic systolic CHF (congestive heart failure) (HCC)    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     
oriented to name, place and time.  Cranial nerves II-XII are grossly intact.  Motor is 5 out of 5 bilaterally.  Cerebellar finger to nose, heel to shin intact.  Sensory is intact.  Babinski down going, Romberg negative, and gait is normal.  SKIN:  no bruising or bleeding, normal skin color, texture, turgor, and no redness, warmth, or swelling    Labs/Imaging/Diagnostics   Labs:  CBC:  Recent Labs     06/06/25  1355 06/07/25  0437   WBC 6.8 5.5   RBC 3.22* 3.01*   HGB 10.6* 9.9*   HCT 31.5* 29.4*   MCV 97.8 97.4   RDW 14.0 14.0    145     CHEMISTRIES:  Recent Labs     06/06/25  1355 06/07/25  0437    142   K 4.6 3.8    104   CO2 25 27   BUN 35* 32*   CREATININE 1.4* 1.4*   GLUCOSE 167* 94   MG  --  1.85     PT/INR:No results for input(s): \"PROTIME\", \"INR\" in the last 72 hours.  APTT:No results for input(s): \"APTT\" in the last 72 hours.  LIVER PROFILE:  Recent Labs     06/06/25  1355   AST 17   ALT 11   BILITOT 0.3   ALKPHOS 83     Glucose: No data recorded    Imaging Last 24 Hours:  XR CHEST PORTABLE  Result Date: 6/6/2025  EXAM: 1 VIEW XRAY OF THE CHEST 06/06/2025 02:18:23 PM COMPARISON: 07/06/2024 CLINICAL HISTORY: Chest Pain. FINDINGS: LUNGS AND PLEURA: Improved aeration in the bilateral lower lobes. Stable left lung granulomas. No focal pulmonary opacity. No pulmonary edema. No pleural effusion. No pneumothorax. HEART AND MEDIASTINUM: Stable aortic contour, mediastinal and cardiac structures. No acute abnormality of the cardiac and mediastinal silhouettes. BONES AND SOFT TISSUES: Stable osseous structures. No acute osseous abnormality. LINES AND TUBES: Right IJ central line removed.     1. No acute findings.       Electronically signed by Coleen Neely MD. 6/7/2025 12:01 PM   
line removed.     1. No acute findings.       Electronically signed by Coleen Neely MD. 6/8/2025 9:54 AM   
Torsemide 40 mg daily from home. Entresto held in hospital secondary to hypotension.  May try to restart as outpatient with close observation of kidney function and blood pressure. Patient admits to not taking Jardiance secondary to concern for pancreatitis.  May consider adding on Spironolactone in the future for GDMT for HFpEF pending blood pressure and kidney function.   4. Okay to discharge from cardiology perspective. Patient to see PCP in 7 days and follow up with cardiology in the next 2-4 weeks.      Jammie Allen DNP   [facility] [place]  [phone]

## 2025-06-11 ENCOUNTER — CARE COORDINATION (OUTPATIENT)
Dept: CASE MANAGEMENT | Age: 89
End: 2025-06-11

## 2025-06-11 NOTE — CARE COORDINATION
Care Transitions Note    Initial Call - Call within 2 business days of discharge: Yes    Attempted to reach patient for transitions of care follow up. Unable to reach patient.    Outreach Attempts:   HIPAA compliant voicemail left for patient.     [employer] message sent to patient.    Patient: Ari Snider    Patient : 1935   MRN: [id number]    Reason for Admission: Chest Pain  Discharge Date: 6/10/25  RURS: Readmission Risk Score: 17.2    Last Discharge Facility       Date Complaint Diagnosis Description Type Department Provider    25 Chest Pain NSTEMI (non-ST elevated myocardial infarction) (HCC) ... ED to Hosp-Admission (Discharged) (ADMITTED) [facility] Lilian Conner, DO; Kimberley Paul,...            Was this an external facility discharge? No    Follow Up Appointment:   Patient has hospital follow up appointment scheduled within 7 days of discharge.     Future Appointments         Provider Specialty Dept Phone    2025 11:20 AM Wilma Rowley APRN - CNP Family Medicine [phone]    7/10/2025 3:15 PM Nick Hunt MD Cardiology [phone]            Plan for follow-up on next business day.      Kisha Allen RN BSN  Care Transition Nurse  [phone]

## 2025-06-11 NOTE — CARE COORDINATION
Received [employer] message from Ari's son. 2nd call placed to Ari. Message left. CT team will follow.    Kisha Allen RN BSN  Care Transition Nurse  [phone]

## 2025-06-12 ENCOUNTER — CARE COORDINATION (OUTPATIENT)
Dept: CASE MANAGEMENT | Age: 89
End: 2025-06-12

## 2025-06-12 NOTE — CARE COORDINATION
Care Transitions Note    Initial Call - Call within 2 business days of discharge: Yes    Attempted to reach patient for transitions of care follow up. Unable to reach patient.    Outreach Attempts:   Multiple attempts to contact patient at phone numbers on file.   HIPAA compliant voicemail left for patient.     Patient: Ari Snider    Patient : 1935   MRN: [id number]    Reason for Admission: Chest Pain  Discharge Date: 6/10/25  RURS: Readmission Risk Score: 17.2    Last Discharge Facility       Date Complaint Diagnosis Description Type Department Provider    25 Chest Pain NSTEMI (non-ST elevated myocardial infarction) (HCC) ... ED to Hosp-Admission (Discharged) (ADMITTED) [facility] Lilian Conner DO; Kimberley Paul,...            Was this an external facility discharge? No    Follow Up Appointment:   Patient has hospital follow up appointment scheduled within 7 days of discharge.    Future Appointments         Provider Specialty Dept Phone    2025 11:20 AM Wilma Rowley APRN - CNP Family Medicine [phone]    7/10/2025 3:15 PM Nick Hunt MD Cardiology [phone]            No further follow-up call indicated     Kisha Allen RN BSN  Care Transition Nurse  [phone]

## 2025-06-16 ENCOUNTER — FOLLOWUP TELEPHONE ENCOUNTER (OUTPATIENT)
Dept: ADMINISTRATIVE | Age: 89
End: 2025-06-16

## 2025-06-16 ENCOUNTER — OFFICE VISIT (OUTPATIENT)
Dept: FAMILY MEDICINE CLINIC | Age: 89
End: 2025-06-16

## 2025-06-16 VITALS
SYSTOLIC BLOOD PRESSURE: 102 MMHG | WEIGHT: 228 LBS | TEMPERATURE: 97.5 F | BODY MASS INDEX: 34.67 KG/M2 | HEART RATE: 89 BPM | OXYGEN SATURATION: 96 % | DIASTOLIC BLOOD PRESSURE: 62 MMHG

## 2025-06-16 DIAGNOSIS — I50.9 ACUTE ON CHRONIC CONGESTIVE HEART FAILURE, UNSPECIFIED HEART FAILURE TYPE (HCC): ICD-10-CM

## 2025-06-16 DIAGNOSIS — Z09 HOSPITAL DISCHARGE FOLLOW-UP: Primary | ICD-10-CM

## 2025-06-16 DIAGNOSIS — E11.620 TYPE 2 DIABETES MELLITUS WITH DIABETIC DERMATITIS, WITHOUT LONG-TERM CURRENT USE OF INSULIN (HCC): ICD-10-CM

## 2025-06-16 NOTE — PROGRESS NOTES
Post-Discharge Transitional Care  Follow Up      Ari Snider   YOB: 1935    Date of Office Visit:  6/16/2025  Date of Hospital Admission: 6/6/25  Date of Hospital Discharge: 6/10/25  Risk of hospital readmission (high >=14%. Medium >=10%) :Readmission Risk Score: 17.2      Care management risk score Rising risk (score 2-5) and Complex Care (Scores >=6): No Risk Score On File     Non face to face  following discharge, date last encounter closed (first attempt may have been earlier): 06/12/2025    Call initiated 2 business days of discharge: Yes    ASSESSMENT/PLAN:   Hospital discharge follow-up  Discharge summary reviewed, medication list updated.  Continue care with Cardiology for CHF pEF  -     [place] DISCHARGE MEDS RECONCILED W/ CURRENT OUTPATIENT MED LIST  Acute on chronic congestive heart failure, unspecified heart failure type (HCC)  Now resolved.  Continue care with Cardiology  Continue current medications.    Continue daily weights.   Type 2 diabetes mellitus with diabetic dermatitis, without long-term current use of insulin (HCC)  Patient declining insulin injections due to fatigue with frequent monitoring and injections.  Will attempt to increase Metformin cautiously as he has tolerated in past without developing pancreatitis.  Reviewed with patient and son most diabetic medications can cause pancreatitis.  Asked to call with update in the next 1-2 weeks.    -     metFORMIN (GLUCOPHAGE) 1000 MG tablet; Take 1 tablet by mouth 2 times daily (with meals), Disp-180 tablet, R-0Normal  -     Basic Metabolic Panel; Future    Medical Decision Making: moderate complexity  Return in about 3 months (around 9/16/2025).    On this date 6/16/2025 I have spent 30 minutes reviewing previous notes, test results and face to face with the patient discussing the diagnosis and importance of compliance with the treatment plan as well as documenting on the day of the visit.       Subjective:   HPI:  Follow up of

## 2025-06-20 ENCOUNTER — PATIENT MESSAGE (OUTPATIENT)
Dept: FAMILY MEDICINE CLINIC | Age: 89
End: 2025-06-20

## 2025-07-03 ENCOUNTER — PATIENT MESSAGE (OUTPATIENT)
Dept: FAMILY MEDICINE CLINIC | Age: 89
End: 2025-07-03

## 2025-07-10 ENCOUNTER — OFFICE VISIT (OUTPATIENT)
Dept: CARDIOLOGY CLINIC | Age: 89
End: 2025-07-10
Payer: MEDICARE

## 2025-07-10 VITALS
HEART RATE: 52 BPM | OXYGEN SATURATION: 95 % | SYSTOLIC BLOOD PRESSURE: 108 MMHG | HEIGHT: 68 IN | DIASTOLIC BLOOD PRESSURE: 54 MMHG | WEIGHT: 225 LBS | BODY MASS INDEX: 34.1 KG/M2

## 2025-07-10 DIAGNOSIS — R06.02 SOB (SHORTNESS OF BREATH): ICD-10-CM

## 2025-07-10 DIAGNOSIS — I35.0 NONRHEUMATIC AORTIC VALVE STENOSIS: ICD-10-CM

## 2025-07-10 DIAGNOSIS — I50.33 ACUTE ON CHRONIC DIASTOLIC (CONGESTIVE) HEART FAILURE (HCC): Primary | ICD-10-CM

## 2025-07-10 DIAGNOSIS — I20.89 CHRONIC STABLE ANGINA: ICD-10-CM

## 2025-07-10 PROCEDURE — 1159F MED LIST DOCD IN RCRD: CPT | Performed by: INTERNAL MEDICINE

## 2025-07-10 PROCEDURE — 1123F ACP DISCUSS/DSCN MKR DOCD: CPT | Performed by: INTERNAL MEDICINE

## 2025-07-10 PROCEDURE — 99214 OFFICE O/P EST MOD 30 MIN: CPT | Performed by: INTERNAL MEDICINE

## 2025-07-10 PROCEDURE — G2211 COMPLEX E/M VISIT ADD ON: HCPCS | Performed by: INTERNAL MEDICINE

## 2025-07-10 RX ORDER — TORSEMIDE 20 MG/1
TABLET ORAL
Qty: 180 TABLET | Refills: 1 | Status: SHIPPED | OUTPATIENT
Start: 2025-07-10

## 2025-07-10 NOTE — PATIENT INSTRUCTIONS
PLAN:  Continue conservative measures for dependent edema: Elevate your legs to heart level while at rest, compression stockings as tolerated.   Continue low salt sodium diet  No cardiac testing warranted at this time    Recommend taking the metolazone 5 mg once a week.  Start tomorrow morning.  You will take this every Friday morning.    5.    1/2 hour after taking the Metolazone then take torsemide 40 mg in the morning  and 20 mg in the afternoon. Take the torsemide this way on Friday and Saturday.   On Sunday go back to Torsemide 20 mg in the morning and 20 mg in the afternoon.  Recommend taking the 2nd of dose of torsemide 6 hours after morning dose.  Example: 8 am and 2 pm.  10 am and 4 pm.  Would not take past 4 pm.     6.   LABS next Thursday  -  BMP and BNP    7.   Continue to keep track of your weights.  If your weight goes up 3 lbs overnight or 5 lbs in 1 week call our office.    8 .  Stop taking the Entresto

## 2025-07-15 ENCOUNTER — PATIENT MESSAGE (OUTPATIENT)
Dept: FAMILY MEDICINE CLINIC | Age: 89
End: 2025-07-15

## 2025-07-15 RX ORDER — METOLAZONE 5 MG/1
TABLET ORAL
Qty: 10 TABLET | Refills: 0 | Status: SHIPPED | OUTPATIENT
Start: 2025-07-15

## 2025-07-15 NOTE — TELEPHONE ENCOUNTER
Called [facility] (ph# [phone]) and spoke to Keiry that states this pt picked up 90 day dose of the torsemide 53 days ago, and will not be due for refill until 8/17/2025.     Called Ari Saenz, who states Dr Hunt and Wilma both have this pt taking medication differently when having issues.   Ie.  (Will increase torsemide to 40 mg in the morning and continue 20 mg in the afternoon for the next 48 hours. He will then go back to 20 mg twice daily on Sunday. Instructed to take second dose the day in the afternoon 6 hours from his a.m. dose to avoid nocturnal urination)    Due to taking the Rx differently from time to time, pt is out of medication and will need Rx written differently to get a fill from the pharmacy.     Please advise. Thank You

## 2025-07-15 NOTE — TELEPHONE ENCOUNTER
Pt son Ari SaenzHerminia called back and stated he did find the other bottle of the refill, so no rush, but will not have enough to last until August 17, 2025. RODRIGOI

## 2025-07-17 ENCOUNTER — HOSPITAL ENCOUNTER (OUTPATIENT)
Dept: LAB | Age: 89
Discharge: HOME | End: 2025-07-17
Payer: MEDICARE

## 2025-07-17 DIAGNOSIS — E11.620 TYPE 2 DIABETES MELLITUS WITH DIABETIC DERMATITIS, WITHOUT LONG-TERM CURRENT USE OF INSULIN (HCC): ICD-10-CM

## 2025-07-17 DIAGNOSIS — R06.02 SOB (SHORTNESS OF BREATH): ICD-10-CM

## 2025-07-17 LAB
ANION GAP SERPL CALCULATED.3IONS-SCNC: 17 MMOL/L (ref 3–16)
BUN SERPL-MCNC: 52 MG/DL (ref 7–20)
CALCIUM SERPL-MCNC: 9.7 MG/DL (ref 8.3–10.6)
CHLORIDE SERPL-SCNC: 95 MMOL/L (ref 99–110)
CO2 SERPL-SCNC: 27 MMOL/L (ref 21–32)
CREAT SERPL-MCNC: 1.8 MG/DL (ref 0.8–1.3)
GFR SERPLBLD CREATININE-BSD FMLA CKD-EPI: 36 ML/MIN/{1.73_M2}
GLUCOSE SERPL-MCNC: 314 MG/DL (ref 70–99)
NT-PROBNP SERPL-MCNC: 1901 PG/ML (ref 0–449)
POTASSIUM SERPL-SCNC: 4.3 MMOL/L (ref 3.5–5.1)
SODIUM SERPL-SCNC: 139 MMOL/L (ref 136–145)

## 2025-07-17 PROCEDURE — 36415 COLL VENOUS BLD VENIPUNCTURE: CPT

## 2025-07-17 PROCEDURE — 83880 ASSAY OF NATRIURETIC PEPTIDE: CPT

## 2025-07-17 PROCEDURE — 80048 BASIC METABOLIC PNL TOTAL CA: CPT

## 2025-07-18 ENCOUNTER — PATIENT MESSAGE (OUTPATIENT)
Dept: CARDIOLOGY CLINIC | Age: 89
End: 2025-07-18

## 2025-08-06 ENCOUNTER — PATIENT MESSAGE (OUTPATIENT)
Dept: CARDIOLOGY CLINIC | Age: 89
End: 2025-08-06

## 2025-08-06 ENCOUNTER — CLINICAL SUPPORT (OUTPATIENT)
Dept: FAMILY MEDICINE CLINIC | Age: 89
End: 2025-08-06
Payer: MEDICARE

## 2025-08-06 DIAGNOSIS — I25.118 CORONARY ARTERY DISEASE OF NATIVE ARTERY OF NATIVE HEART WITH STABLE ANGINA PECTORIS: ICD-10-CM

## 2025-08-06 PROCEDURE — 36415 COLL VENOUS BLD VENIPUNCTURE: CPT | Performed by: INTERNAL MEDICINE

## 2025-08-06 RX ORDER — NITROGLYCERIN 0.4 MG/1
0.4 TABLET SUBLINGUAL PRN
Qty: 25 TABLET | Refills: 3 | Status: ON HOLD | OUTPATIENT
Start: 2025-08-06

## 2025-08-07 ENCOUNTER — HOSPITAL ENCOUNTER (INPATIENT)
Age: 89
LOS: 4 days | DRG: 284 | End: 2025-08-11
Attending: INTERNAL MEDICINE
Payer: OTHER GOVERNMENT

## 2025-08-07 ENCOUNTER — APPOINTMENT (OUTPATIENT)
Dept: GENERAL RADIOLOGY | Age: 89
End: 2025-08-07
Payer: OTHER GOVERNMENT

## 2025-08-07 ENCOUNTER — HOSPITAL ENCOUNTER (EMERGENCY)
Age: 89
Discharge: ANOTHER HOSPITAL | End: 2025-08-07
Attending: STUDENT IN AN ORGANIZED HEALTH CARE EDUCATION/TRAINING PROGRAM
Payer: OTHER GOVERNMENT

## 2025-08-07 ENCOUNTER — RESULTS FOLLOW-UP (OUTPATIENT)
Dept: CARDIOLOGY CLINIC | Age: 89
End: 2025-08-07

## 2025-08-07 VITALS
BODY MASS INDEX: 33.85 KG/M2 | OXYGEN SATURATION: 96 % | HEART RATE: 88 BPM | DIASTOLIC BLOOD PRESSURE: 68 MMHG | SYSTOLIC BLOOD PRESSURE: 117 MMHG | TEMPERATURE: 97.4 F | RESPIRATION RATE: 20 BRPM | WEIGHT: 222.6 LBS

## 2025-08-07 DIAGNOSIS — I21.4 NSTEMI (NON-ST ELEVATED MYOCARDIAL INFARCTION) (HCC): Primary | ICD-10-CM

## 2025-08-07 DIAGNOSIS — I24.9 ACUTE CORONARY SYNDROME (HCC): ICD-10-CM

## 2025-08-07 DIAGNOSIS — R79.89 ELEVATED TROPONIN: Primary | ICD-10-CM

## 2025-08-07 LAB
ALBUMIN SERPL-MCNC: 3.6 G/DL (ref 3.4–5)
ALBUMIN/GLOB SERPL: 1 {RATIO} (ref 1.1–2.2)
ALP SERPL-CCNC: 76 U/L (ref 40–129)
ALT SERPL-CCNC: 13 U/L (ref 10–40)
ANION GAP SERPL CALCULATED.3IONS-SCNC: 16 MMOL/L (ref 3–16)
ANION GAP SERPL CALCULATED.3IONS-SCNC: 16 MMOL/L (ref 3–16)
ANION GAP SERPL CALCULATED.3IONS-SCNC: 18 MMOL/L (ref 3–16)
ANTI-XA UNFRAC HEPARIN: 0.19 IU/ML (ref 0.3–0.7)
APTT BLD: 29.6 SEC (ref 22.8–35.8)
AST SERPL-CCNC: 22 U/L (ref 15–37)
BASOPHILS # BLD: 0.1 K/UL (ref 0–0.2)
BASOPHILS NFR BLD: 0.9 %
BILIRUB SERPL-MCNC: 0.4 MG/DL (ref 0–1)
BUN SERPL-MCNC: 53 MG/DL (ref 7–20)
BUN SERPL-MCNC: 56 MG/DL (ref 7–20)
BUN SERPL-MCNC: 57 MG/DL (ref 7–20)
CALCIUM SERPL-MCNC: 8.8 MG/DL (ref 8.3–10.6)
CALCIUM SERPL-MCNC: 9.4 MG/DL (ref 8.3–10.6)
CALCIUM SERPL-MCNC: 9.6 MG/DL (ref 8.3–10.6)
CHLORIDE SERPL-SCNC: 91 MMOL/L (ref 99–110)
CHLORIDE SERPL-SCNC: 92 MMOL/L (ref 99–110)
CHLORIDE SERPL-SCNC: 93 MMOL/L (ref 99–110)
CO2 SERPL-SCNC: 25 MMOL/L (ref 21–32)
CO2 SERPL-SCNC: 28 MMOL/L (ref 21–32)
CO2 SERPL-SCNC: 30 MMOL/L (ref 21–32)
CREAT SERPL-MCNC: 1.9 MG/DL (ref 0.8–1.3)
CREAT SERPL-MCNC: 2 MG/DL (ref 0.8–1.3)
CREAT SERPL-MCNC: 2.2 MG/DL (ref 0.8–1.3)
DEPRECATED RDW RBC AUTO: 14.9 % (ref 12.4–15.4)
EKG ATRIAL RATE: 98 BPM
EKG DIAGNOSIS: NORMAL
EKG P AXIS: 88 DEGREES
EKG P-R INTERVAL: 196 MS
EKG Q-T INTERVAL: 396 MS
EKG QRS DURATION: 164 MS
EKG QTC CALCULATION (BAZETT): 505 MS
EKG R AXIS: -78 DEGREES
EKG T AXIS: 96 DEGREES
EKG VENTRICULAR RATE: 98 BPM
EOSINOPHIL # BLD: 0.1 K/UL (ref 0–0.6)
EOSINOPHIL NFR BLD: 1.4 %
GFR SERPLBLD CREATININE-BSD FMLA CKD-EPI: 28 ML/MIN/{1.73_M2}
GFR SERPLBLD CREATININE-BSD FMLA CKD-EPI: 31 ML/MIN/{1.73_M2}
GFR SERPLBLD CREATININE-BSD FMLA CKD-EPI: 33 ML/MIN/{1.73_M2}
GLUCOSE BLD-MCNC: 227 MG/DL (ref 70–99)
GLUCOSE SERPL-MCNC: 139 MG/DL (ref 70–99)
GLUCOSE SERPL-MCNC: 188 MG/DL (ref 70–99)
GLUCOSE SERPL-MCNC: 197 MG/DL (ref 70–99)
HCT VFR BLD AUTO: 32.3 % (ref 40.5–52.5)
HGB BLD-MCNC: 10.9 G/DL (ref 13.5–17.5)
LYMPHOCYTES # BLD: 3.5 K/UL (ref 1–5.1)
LYMPHOCYTES NFR BLD: 46.4 %
MCH RBC QN AUTO: 33.3 PG (ref 26–34)
MCHC RBC AUTO-ENTMCNC: 33.8 G/DL (ref 31–36)
MCV RBC AUTO: 98.3 FL (ref 80–100)
MONOCYTES # BLD: 0.3 K/UL (ref 0–1.3)
MONOCYTES NFR BLD: 4 %
NEUTROPHILS # BLD: 3.6 K/UL (ref 1.7–7.7)
NEUTROPHILS NFR BLD: 47.3 %
NT-PROBNP SERPL-MCNC: ABNORMAL PG/ML (ref 0–449)
NT-PROBNP SERPL-MCNC: ABNORMAL PG/ML (ref 0–449)
PERFORMED ON: ABNORMAL
PLATELET # BLD AUTO: 196 K/UL (ref 135–450)
PMV BLD AUTO: 8.1 FL (ref 5–10.5)
POTASSIUM SERPL-SCNC: 4 MMOL/L (ref 3.5–5.1)
POTASSIUM SERPL-SCNC: 4.3 MMOL/L (ref 3.5–5.1)
POTASSIUM SERPL-SCNC: 4.9 MMOL/L (ref 3.5–5.1)
PROT SERPL-MCNC: 7.3 G/DL (ref 6.4–8.2)
RBC # BLD AUTO: 3.28 M/UL (ref 4.2–5.9)
SODIUM SERPL-SCNC: 134 MMOL/L (ref 136–145)
SODIUM SERPL-SCNC: 137 MMOL/L (ref 136–145)
SODIUM SERPL-SCNC: 138 MMOL/L (ref 136–145)
TROPONIN, HIGH SENSITIVITY: 308 NG/L (ref 0–22)
TROPONIN, HIGH SENSITIVITY: 320 NG/L (ref 0–22)
WBC # BLD AUTO: 7.5 K/UL (ref 4–11)

## 2025-08-07 PROCEDURE — 2500000003 HC RX 250 WO HCPCS

## 2025-08-07 PROCEDURE — 93005 ELECTROCARDIOGRAM TRACING: CPT | Performed by: STUDENT IN AN ORGANIZED HEALTH CARE EDUCATION/TRAINING PROGRAM

## 2025-08-07 PROCEDURE — 93010 ELECTROCARDIOGRAM REPORT: CPT | Performed by: STUDENT IN AN ORGANIZED HEALTH CARE EDUCATION/TRAINING PROGRAM

## 2025-08-07 PROCEDURE — 85730 THROMBOPLASTIN TIME PARTIAL: CPT

## 2025-08-07 PROCEDURE — 6360000002 HC RX W HCPCS

## 2025-08-07 PROCEDURE — 85520 HEPARIN ASSAY: CPT

## 2025-08-07 PROCEDURE — 6360000002 HC RX W HCPCS: Performed by: STUDENT IN AN ORGANIZED HEALTH CARE EDUCATION/TRAINING PROGRAM

## 2025-08-07 PROCEDURE — 36415 COLL VENOUS BLD VENIPUNCTURE: CPT

## 2025-08-07 PROCEDURE — 96365 THER/PROPH/DIAG IV INF INIT: CPT

## 2025-08-07 PROCEDURE — 96375 TX/PRO/DX INJ NEW DRUG ADDON: CPT

## 2025-08-07 PROCEDURE — 6370000000 HC RX 637 (ALT 250 FOR IP)

## 2025-08-07 PROCEDURE — 6370000000 HC RX 637 (ALT 250 FOR IP): Performed by: STUDENT IN AN ORGANIZED HEALTH CARE EDUCATION/TRAINING PROGRAM

## 2025-08-07 PROCEDURE — 84484 ASSAY OF TROPONIN QUANT: CPT

## 2025-08-07 PROCEDURE — 2500000003 HC RX 250 WO HCPCS: Performed by: NURSE PRACTITIONER

## 2025-08-07 PROCEDURE — 71045 X-RAY EXAM CHEST 1 VIEW: CPT

## 2025-08-07 PROCEDURE — 96366 THER/PROPH/DIAG IV INF ADDON: CPT

## 2025-08-07 PROCEDURE — 2060000000 HC ICU INTERMEDIATE R&B

## 2025-08-07 PROCEDURE — 80048 BASIC METABOLIC PNL TOTAL CA: CPT

## 2025-08-07 PROCEDURE — 83880 ASSAY OF NATRIURETIC PEPTIDE: CPT

## 2025-08-07 PROCEDURE — 85025 COMPLETE CBC W/AUTO DIFF WBC: CPT

## 2025-08-07 PROCEDURE — 80053 COMPREHEN METABOLIC PANEL: CPT

## 2025-08-07 PROCEDURE — 99285 EMERGENCY DEPT VISIT HI MDM: CPT

## 2025-08-07 RX ORDER — ASPIRIN 325 MG
325 TABLET ORAL ONCE
Status: COMPLETED | OUTPATIENT
Start: 2025-08-07 | End: 2025-08-07

## 2025-08-07 RX ORDER — HEPARIN SODIUM 10000 [USP'U]/100ML
5-30 INJECTION, SOLUTION INTRAVENOUS CONTINUOUS
Status: DISCONTINUED | OUTPATIENT
Start: 2025-08-07 | End: 2025-08-09

## 2025-08-07 RX ORDER — SODIUM CHLORIDE 9 MG/ML
INJECTION, SOLUTION INTRAVENOUS PRN
Status: DISCONTINUED | OUTPATIENT
Start: 2025-08-07 | End: 2025-08-11 | Stop reason: HOSPADM

## 2025-08-07 RX ORDER — HEPARIN SODIUM 1000 [USP'U]/ML
40 INJECTION, SOLUTION INTRAVENOUS; SUBCUTANEOUS PRN
Status: DISCONTINUED | OUTPATIENT
Start: 2025-08-07 | End: 2025-08-07

## 2025-08-07 RX ORDER — INSULIN LISPRO 100 [IU]/ML
0-4 INJECTION, SOLUTION INTRAVENOUS; SUBCUTANEOUS
Status: DISCONTINUED | OUTPATIENT
Start: 2025-08-07 | End: 2025-08-10

## 2025-08-07 RX ORDER — PROCHLORPERAZINE EDISYLATE 5 MG/ML
10 INJECTION INTRAMUSCULAR; INTRAVENOUS EVERY 6 HOURS PRN
Status: DISCONTINUED | OUTPATIENT
Start: 2025-08-07 | End: 2025-08-11 | Stop reason: HOSPADM

## 2025-08-07 RX ORDER — FUROSEMIDE 10 MG/ML
40 INJECTION INTRAMUSCULAR; INTRAVENOUS ONCE
Status: COMPLETED | OUTPATIENT
Start: 2025-08-07 | End: 2025-08-07

## 2025-08-07 RX ORDER — DEXTROSE MONOHYDRATE 100 MG/ML
INJECTION, SOLUTION INTRAVENOUS CONTINUOUS PRN
Status: DISCONTINUED | OUTPATIENT
Start: 2025-08-07 | End: 2025-08-11 | Stop reason: HOSPADM

## 2025-08-07 RX ORDER — HEPARIN SODIUM 1000 [USP'U]/ML
4000 INJECTION, SOLUTION INTRAVENOUS; SUBCUTANEOUS PRN
Status: DISCONTINUED | OUTPATIENT
Start: 2025-08-07 | End: 2025-08-11 | Stop reason: HOSPADM

## 2025-08-07 RX ORDER — OXYCODONE HYDROCHLORIDE 5 MG/1
5 TABLET ORAL EVERY 4 HOURS PRN
Refills: 0 | Status: DISCONTINUED | OUTPATIENT
Start: 2025-08-07 | End: 2025-08-11 | Stop reason: HOSPADM

## 2025-08-07 RX ORDER — ACETAMINOPHEN 650 MG/1
650 SUPPOSITORY RECTAL EVERY 6 HOURS PRN
Status: DISCONTINUED | OUTPATIENT
Start: 2025-08-07 | End: 2025-08-11 | Stop reason: HOSPADM

## 2025-08-07 RX ORDER — SODIUM CHLORIDE 0.9 % (FLUSH) 0.9 %
5-40 SYRINGE (ML) INJECTION EVERY 12 HOURS SCHEDULED
Status: DISCONTINUED | OUTPATIENT
Start: 2025-08-07 | End: 2025-08-11 | Stop reason: HOSPADM

## 2025-08-07 RX ORDER — HEPARIN SODIUM 1000 [USP'U]/ML
4000 INJECTION, SOLUTION INTRAVENOUS; SUBCUTANEOUS ONCE
Status: COMPLETED | OUTPATIENT
Start: 2025-08-07 | End: 2025-08-07

## 2025-08-07 RX ORDER — HEPARIN SODIUM 1000 [USP'U]/ML
2000 INJECTION, SOLUTION INTRAVENOUS; SUBCUTANEOUS PRN
Status: DISCONTINUED | OUTPATIENT
Start: 2025-08-07 | End: 2025-08-07 | Stop reason: HOSPADM

## 2025-08-07 RX ORDER — MECOBALAMIN 5000 MCG
5 TABLET,DISINTEGRATING ORAL NIGHTLY
Status: DISCONTINUED | OUTPATIENT
Start: 2025-08-07 | End: 2025-08-11 | Stop reason: HOSPADM

## 2025-08-07 RX ORDER — ACETAMINOPHEN 325 MG/1
650 TABLET ORAL EVERY 6 HOURS PRN
Status: DISCONTINUED | OUTPATIENT
Start: 2025-08-07 | End: 2025-08-11 | Stop reason: HOSPADM

## 2025-08-07 RX ORDER — SODIUM CHLORIDE 0.9 % (FLUSH) 0.9 %
5-40 SYRINGE (ML) INJECTION PRN
Status: DISCONTINUED | OUTPATIENT
Start: 2025-08-07 | End: 2025-08-11 | Stop reason: HOSPADM

## 2025-08-07 RX ORDER — HEPARIN SODIUM 1000 [USP'U]/ML
80 INJECTION, SOLUTION INTRAVENOUS; SUBCUTANEOUS PRN
Status: DISCONTINUED | OUTPATIENT
Start: 2025-08-07 | End: 2025-08-07

## 2025-08-07 RX ORDER — TAMSULOSIN HYDROCHLORIDE 0.4 MG/1
0.4 CAPSULE ORAL DAILY
Status: DISCONTINUED | OUTPATIENT
Start: 2025-08-07 | End: 2025-08-11 | Stop reason: HOSPADM

## 2025-08-07 RX ORDER — ATORVASTATIN CALCIUM 40 MG/1
40 TABLET, FILM COATED ORAL NIGHTLY
Status: DISCONTINUED | OUTPATIENT
Start: 2025-08-07 | End: 2025-08-11 | Stop reason: HOSPADM

## 2025-08-07 RX ORDER — HEPARIN SODIUM 1000 [USP'U]/ML
2000 INJECTION, SOLUTION INTRAVENOUS; SUBCUTANEOUS PRN
Status: DISCONTINUED | OUTPATIENT
Start: 2025-08-07 | End: 2025-08-11 | Stop reason: HOSPADM

## 2025-08-07 RX ORDER — GLUCAGON 1 MG/ML
1 KIT INJECTION PRN
Status: DISCONTINUED | OUTPATIENT
Start: 2025-08-07 | End: 2025-08-11 | Stop reason: HOSPADM

## 2025-08-07 RX ORDER — POLYETHYLENE GLYCOL 3350 17 G/17G
17 POWDER, FOR SOLUTION ORAL DAILY PRN
Status: DISCONTINUED | OUTPATIENT
Start: 2025-08-07 | End: 2025-08-11 | Stop reason: HOSPADM

## 2025-08-07 RX ORDER — ONDANSETRON 4 MG/1
4 TABLET, ORALLY DISINTEGRATING ORAL EVERY 8 HOURS PRN
Status: DISCONTINUED | OUTPATIENT
Start: 2025-08-07 | End: 2025-08-07

## 2025-08-07 RX ORDER — HEPARIN SODIUM 1000 [USP'U]/ML
80 INJECTION, SOLUTION INTRAVENOUS; SUBCUTANEOUS ONCE
Status: DISCONTINUED | OUTPATIENT
Start: 2025-08-07 | End: 2025-08-07

## 2025-08-07 RX ORDER — HEPARIN SODIUM 1000 [USP'U]/ML
4000 INJECTION, SOLUTION INTRAVENOUS; SUBCUTANEOUS PRN
Status: DISCONTINUED | OUTPATIENT
Start: 2025-08-07 | End: 2025-08-07 | Stop reason: HOSPADM

## 2025-08-07 RX ORDER — HEPARIN SODIUM 10000 [USP'U]/100ML
9 INJECTION, SOLUTION INTRAVENOUS CONTINUOUS
Status: DISCONTINUED | OUTPATIENT
Start: 2025-08-07 | End: 2025-08-07 | Stop reason: HOSPADM

## 2025-08-07 RX ORDER — ONDANSETRON 2 MG/ML
4 INJECTION INTRAMUSCULAR; INTRAVENOUS EVERY 6 HOURS PRN
Status: DISCONTINUED | OUTPATIENT
Start: 2025-08-07 | End: 2025-08-07

## 2025-08-07 RX ORDER — HEPARIN SODIUM 10000 [USP'U]/100ML
5-30 INJECTION, SOLUTION INTRAVENOUS CONTINUOUS
Status: DISCONTINUED | OUTPATIENT
Start: 2025-08-07 | End: 2025-08-07

## 2025-08-07 RX ADMIN — HEPARIN SODIUM 4000 UNITS: 1000 INJECTION, SOLUTION INTRAVENOUS; SUBCUTANEOUS at 13:11

## 2025-08-07 RX ADMIN — INSULIN LISPRO 1 UNITS: 100 INJECTION, SOLUTION INTRAVENOUS; SUBCUTANEOUS at 21:15

## 2025-08-07 RX ADMIN — Medication 5 MG: at 21:15

## 2025-08-07 RX ADMIN — OXYCODONE HYDROCHLORIDE 5 MG: 5 TABLET ORAL at 19:23

## 2025-08-07 RX ADMIN — MICONAZOLE NITRATE: 2 POWDER TOPICAL at 21:15

## 2025-08-07 RX ADMIN — SODIUM CHLORIDE, PRESERVATIVE FREE 10 ML: 5 INJECTION INTRAVENOUS at 21:15

## 2025-08-07 RX ADMIN — HEPARIN SODIUM 2000 UNITS: 1000 INJECTION, SOLUTION INTRAVENOUS; SUBCUTANEOUS at 21:43

## 2025-08-07 RX ADMIN — TAMSULOSIN HYDROCHLORIDE 0.4 MG: 0.4 CAPSULE ORAL at 19:23

## 2025-08-07 RX ADMIN — ATORVASTATIN CALCIUM 40 MG: 40 TABLET, FILM COATED ORAL at 21:15

## 2025-08-07 RX ADMIN — ASPIRIN 325 MG: 325 TABLET ORAL at 13:10

## 2025-08-07 RX ADMIN — HEPARIN SODIUM AND DEXTROSE 9 UNITS/KG/HR: 10000; 5 INJECTION INTRAVENOUS at 19:27

## 2025-08-07 RX ADMIN — FUROSEMIDE 40 MG: 10 INJECTION, SOLUTION INTRAMUSCULAR; INTRAVENOUS at 11:39

## 2025-08-07 RX ADMIN — HEPARIN SODIUM AND DEXTROSE 9 UNITS/KG/HR: 10000; 5 INJECTION INTRAVENOUS at 13:15

## 2025-08-07 ASSESSMENT — PAIN SCALES - GENERAL: PAINLEVEL_OUTOF10: 5

## 2025-08-07 ASSESSMENT — PAIN - FUNCTIONAL ASSESSMENT: PAIN_FUNCTIONAL_ASSESSMENT: NONE - DENIES PAIN

## 2025-08-08 PROBLEM — I24.9 ACUTE CORONARY SYNDROME (HCC): Status: ACTIVE | Noted: 2025-08-08

## 2025-08-08 PROBLEM — R79.89 ELEVATED TROPONIN: Status: ACTIVE | Noted: 2025-08-07

## 2025-08-08 LAB
ALBUMIN SERPL-MCNC: 3.9 G/DL (ref 3.4–5)
ALBUMIN/GLOB SERPL: 1.3 {RATIO} (ref 1.1–2.2)
ALP SERPL-CCNC: 79 U/L (ref 40–129)
ALT SERPL-CCNC: 11 U/L (ref 10–40)
ANION GAP SERPL CALCULATED.3IONS-SCNC: 15 MMOL/L (ref 3–16)
ANTI-XA UNFRAC HEPARIN: 0.45 IU/ML (ref 0.3–0.7)
ANTI-XA UNFRAC HEPARIN: 0.48 IU/ML (ref 0.3–0.7)
AST SERPL-CCNC: 22 U/L (ref 15–37)
BASOPHILS # BLD: 0 K/UL (ref 0–0.2)
BASOPHILS NFR BLD: 0.6 %
BILIRUB SERPL-MCNC: 0.4 MG/DL (ref 0–1)
BUN SERPL-MCNC: 52 MG/DL (ref 7–20)
CALCIUM SERPL-MCNC: 9.5 MG/DL (ref 8.3–10.6)
CHLORIDE SERPL-SCNC: 95 MMOL/L (ref 99–110)
CO2 SERPL-SCNC: 29 MMOL/L (ref 21–32)
CREAT SERPL-MCNC: 1.8 MG/DL (ref 0.8–1.3)
DEPRECATED RDW RBC AUTO: 15 % (ref 12.4–15.4)
EKG ATRIAL RATE: 100 BPM
EKG DIAGNOSIS: NORMAL
EKG P AXIS: 91 DEGREES
EKG P-R INTERVAL: 170 MS
EKG Q-T INTERVAL: 388 MS
EKG QRS DURATION: 166 MS
EKG QTC CALCULATION (BAZETT): 500 MS
EKG R AXIS: -82 DEGREES
EKG T AXIS: 80 DEGREES
EKG VENTRICULAR RATE: 100 BPM
EOSINOPHIL # BLD: 0.2 K/UL (ref 0–0.6)
EOSINOPHIL NFR BLD: 2.2 %
GFR SERPLBLD CREATININE-BSD FMLA CKD-EPI: 36 ML/MIN/{1.73_M2}
GLUCOSE BLD-MCNC: 235 MG/DL (ref 70–99)
GLUCOSE BLD-MCNC: 282 MG/DL (ref 70–99)
GLUCOSE BLD-MCNC: 337 MG/DL (ref 70–99)
GLUCOSE SERPL-MCNC: 200 MG/DL (ref 70–99)
HCT VFR BLD AUTO: 31.7 % (ref 40.5–52.5)
HGB BLD-MCNC: 10.7 G/DL (ref 13.5–17.5)
INR PPP: 1.1 (ref 0.86–1.14)
LYMPHOCYTES # BLD: 2.6 K/UL (ref 1–5.1)
LYMPHOCYTES NFR BLD: 33 %
MCH RBC QN AUTO: 33.2 PG (ref 26–34)
MCHC RBC AUTO-ENTMCNC: 33.8 G/DL (ref 31–36)
MCV RBC AUTO: 98.2 FL (ref 80–100)
MONOCYTES # BLD: 0.2 K/UL (ref 0–1.3)
MONOCYTES NFR BLD: 2.9 %
NEUTROPHILS # BLD: 4.9 K/UL (ref 1.7–7.7)
NEUTROPHILS NFR BLD: 61.3 %
PERFORMED ON: ABNORMAL
PLATELET # BLD AUTO: 197 K/UL (ref 135–450)
PMV BLD AUTO: 7.8 FL (ref 5–10.5)
POTASSIUM SERPL-SCNC: 3.6 MMOL/L (ref 3.5–5.1)
PROT SERPL-MCNC: 6.8 G/DL (ref 6.4–8.2)
PROTHROMBIN TIME: 14.5 SEC (ref 12.1–14.9)
RBC # BLD AUTO: 3.23 M/UL (ref 4.2–5.9)
SODIUM SERPL-SCNC: 139 MMOL/L (ref 136–145)
TROPONIN, HIGH SENSITIVITY: 307 NG/L (ref 0–22)
WBC # BLD AUTO: 7.9 K/UL (ref 4–11)

## 2025-08-08 PROCEDURE — 36415 COLL VENOUS BLD VENIPUNCTURE: CPT

## 2025-08-08 PROCEDURE — 85610 PROTHROMBIN TIME: CPT

## 2025-08-08 PROCEDURE — 6360000002 HC RX W HCPCS: Performed by: INTERNAL MEDICINE

## 2025-08-08 PROCEDURE — 84484 ASSAY OF TROPONIN QUANT: CPT

## 2025-08-08 PROCEDURE — 80053 COMPREHEN METABOLIC PANEL: CPT

## 2025-08-08 PROCEDURE — 6370000000 HC RX 637 (ALT 250 FOR IP): Performed by: NURSE PRACTITIONER

## 2025-08-08 PROCEDURE — 2700000000 HC OXYGEN THERAPY PER DAY

## 2025-08-08 PROCEDURE — 93005 ELECTROCARDIOGRAM TRACING: CPT

## 2025-08-08 PROCEDURE — 93010 ELECTROCARDIOGRAM REPORT: CPT | Performed by: INTERNAL MEDICINE

## 2025-08-08 PROCEDURE — 2060000000 HC ICU INTERMEDIATE R&B

## 2025-08-08 PROCEDURE — 6370000000 HC RX 637 (ALT 250 FOR IP)

## 2025-08-08 PROCEDURE — 6370000000 HC RX 637 (ALT 250 FOR IP): Performed by: INTERNAL MEDICINE

## 2025-08-08 PROCEDURE — 99233 SBSQ HOSP IP/OBS HIGH 50: CPT | Performed by: INTERNAL MEDICINE

## 2025-08-08 PROCEDURE — 85520 HEPARIN ASSAY: CPT

## 2025-08-08 PROCEDURE — 94761 N-INVAS EAR/PLS OXIMETRY MLT: CPT

## 2025-08-08 PROCEDURE — 6360000002 HC RX W HCPCS

## 2025-08-08 PROCEDURE — 51798 US URINE CAPACITY MEASURE: CPT

## 2025-08-08 PROCEDURE — 93005 ELECTROCARDIOGRAM TRACING: CPT | Performed by: STUDENT IN AN ORGANIZED HEALTH CARE EDUCATION/TRAINING PROGRAM

## 2025-08-08 PROCEDURE — 85025 COMPLETE CBC W/AUTO DIFF WBC: CPT

## 2025-08-08 RX ORDER — ASPIRIN 81 MG/1
81 TABLET, CHEWABLE ORAL DAILY
Status: DISCONTINUED | OUTPATIENT
Start: 2025-08-08 | End: 2025-08-11 | Stop reason: HOSPADM

## 2025-08-08 RX ORDER — NITROGLYCERIN 0.4 MG/1
0.4 TABLET SUBLINGUAL PRN
Status: DISCONTINUED | OUTPATIENT
Start: 2025-08-08 | End: 2025-08-11 | Stop reason: HOSPADM

## 2025-08-08 RX ORDER — CLOPIDOGREL BISULFATE 75 MG/1
300 TABLET ORAL ONCE
Status: COMPLETED | OUTPATIENT
Start: 2025-08-08 | End: 2025-08-08

## 2025-08-08 RX ORDER — MORPHINE SULFATE 2 MG/ML
2 INJECTION, SOLUTION INTRAMUSCULAR; INTRAVENOUS EVERY 4 HOURS PRN
Refills: 0 | Status: DISCONTINUED | OUTPATIENT
Start: 2025-08-08 | End: 2025-08-11 | Stop reason: HOSPADM

## 2025-08-08 RX ORDER — NITROGLYCERIN 20 MG/100ML
5-200 INJECTION INTRAVENOUS CONTINUOUS
Status: DISCONTINUED | OUTPATIENT
Start: 2025-08-08 | End: 2025-08-10

## 2025-08-08 RX ORDER — TRAZODONE HYDROCHLORIDE 50 MG/1
50 TABLET ORAL NIGHTLY
Status: DISCONTINUED | OUTPATIENT
Start: 2025-08-08 | End: 2025-08-11 | Stop reason: HOSPADM

## 2025-08-08 RX ORDER — ONDANSETRON 2 MG/ML
4 INJECTION INTRAMUSCULAR; INTRAVENOUS EVERY 6 HOURS PRN
Status: DISCONTINUED | OUTPATIENT
Start: 2025-08-08 | End: 2025-08-08 | Stop reason: HOSPADM

## 2025-08-08 RX ORDER — CLOPIDOGREL BISULFATE 75 MG/1
75 TABLET ORAL DAILY
Status: DISCONTINUED | OUTPATIENT
Start: 2025-08-09 | End: 2025-08-11 | Stop reason: HOSPADM

## 2025-08-08 RX ORDER — RANOLAZINE 500 MG/1
500 TABLET, EXTENDED RELEASE ORAL 2 TIMES DAILY
Status: DISCONTINUED | OUTPATIENT
Start: 2025-08-08 | End: 2025-08-11 | Stop reason: HOSPADM

## 2025-08-08 RX ORDER — SODIUM CHLORIDE 9 MG/ML
INJECTION, SOLUTION INTRAVENOUS PRN
Status: DISCONTINUED | OUTPATIENT
Start: 2025-08-08 | End: 2025-08-08 | Stop reason: HOSPADM

## 2025-08-08 RX ORDER — ISOSORBIDE MONONITRATE 30 MG/1
30 TABLET, EXTENDED RELEASE ORAL DAILY
Status: DISCONTINUED | OUTPATIENT
Start: 2025-08-08 | End: 2025-08-11 | Stop reason: HOSPADM

## 2025-08-08 RX ORDER — REGADENOSON 0.08 MG/ML
0.4 INJECTION, SOLUTION INTRAVENOUS
Status: DISCONTINUED | OUTPATIENT
Start: 2025-08-08 | End: 2025-08-11 | Stop reason: HOSPADM

## 2025-08-08 RX ORDER — SODIUM CHLORIDE 0.9 % (FLUSH) 0.9 %
5-40 SYRINGE (ML) INJECTION PRN
Status: DISCONTINUED | OUTPATIENT
Start: 2025-08-08 | End: 2025-08-08 | Stop reason: HOSPADM

## 2025-08-08 RX ORDER — LORAZEPAM 0.5 MG/1
0.5 TABLET ORAL
Status: DISCONTINUED | OUTPATIENT
Start: 2025-08-08 | End: 2025-08-08 | Stop reason: HOSPADM

## 2025-08-08 RX ORDER — SODIUM CHLORIDE 0.9 % (FLUSH) 0.9 %
5-40 SYRINGE (ML) INJECTION EVERY 12 HOURS SCHEDULED
Status: DISCONTINUED | OUTPATIENT
Start: 2025-08-08 | End: 2025-08-08 | Stop reason: HOSPADM

## 2025-08-08 RX ORDER — ASPIRIN 325 MG
325 TABLET ORAL ONCE
Status: DISCONTINUED | OUTPATIENT
Start: 2025-08-08 | End: 2025-08-08 | Stop reason: HOSPADM

## 2025-08-08 RX ADMIN — CLOPIDOGREL BISULFATE 300 MG: 75 TABLET, FILM COATED ORAL at 20:54

## 2025-08-08 RX ADMIN — ASPIRIN 81 MG: 81 TABLET, CHEWABLE ORAL at 21:12

## 2025-08-08 RX ADMIN — MORPHINE SULFATE 2 MG: 2 INJECTION, SOLUTION INTRAMUSCULAR; INTRAVENOUS at 16:20

## 2025-08-08 RX ADMIN — ATORVASTATIN CALCIUM 40 MG: 40 TABLET, FILM COATED ORAL at 21:13

## 2025-08-08 RX ADMIN — HEPARIN SODIUM AND DEXTROSE 11 UNITS/KG/HR: 10000; 5 INJECTION INTRAVENOUS at 17:12

## 2025-08-08 RX ADMIN — PROCHLORPERAZINE EDISYLATE 10 MG: 5 INJECTION INTRAMUSCULAR; INTRAVENOUS at 16:26

## 2025-08-08 RX ADMIN — INSULIN LISPRO 2 UNITS: 100 INJECTION, SOLUTION INTRAVENOUS; SUBCUTANEOUS at 16:26

## 2025-08-08 RX ADMIN — MORPHINE SULFATE 2 MG: 2 INJECTION, SOLUTION INTRAMUSCULAR; INTRAVENOUS at 08:35

## 2025-08-08 RX ADMIN — RANOLAZINE 500 MG: 500 TABLET, FILM COATED, EXTENDED RELEASE ORAL at 20:55

## 2025-08-08 RX ADMIN — NITROGLYCERIN 5 MCG/MIN: 20 INJECTION INTRAVENOUS at 18:33

## 2025-08-08 RX ADMIN — INSULIN LISPRO 3 UNITS: 100 INJECTION, SOLUTION INTRAVENOUS; SUBCUTANEOUS at 21:14

## 2025-08-08 RX ADMIN — NITROGLYCERIN 0.4 MG: 0.4 TABLET SUBLINGUAL at 17:41

## 2025-08-08 RX ADMIN — TRAZODONE HYDROCHLORIDE 50 MG: 50 TABLET ORAL at 23:01

## 2025-08-08 RX ADMIN — ISOSORBIDE MONONITRATE 30 MG: 30 TABLET, EXTENDED RELEASE ORAL at 21:13

## 2025-08-08 RX ADMIN — NITROGLYCERIN 0.4 MG: 0.4 TABLET SUBLINGUAL at 17:49

## 2025-08-08 RX ADMIN — MICONAZOLE NITRATE: 2 POWDER TOPICAL at 08:37

## 2025-08-08 ASSESSMENT — PAIN DESCRIPTION - LOCATION
LOCATION: CHEST

## 2025-08-08 ASSESSMENT — PAIN DESCRIPTION - ORIENTATION: ORIENTATION: LEFT

## 2025-08-08 ASSESSMENT — PAIN SCALES - GENERAL
PAINLEVEL_OUTOF10: 10
PAINLEVEL_OUTOF10: 3

## 2025-08-09 ENCOUNTER — APPOINTMENT (OUTPATIENT)
Age: 89
DRG: 284 | End: 2025-08-09
Attending: INTERNAL MEDICINE
Payer: OTHER GOVERNMENT

## 2025-08-09 LAB
ALBUMIN SERPL-MCNC: 3.6 G/DL (ref 3.4–5)
ALBUMIN/GLOB SERPL: 1.2 {RATIO} (ref 1.1–2.2)
ALP SERPL-CCNC: 81 U/L (ref 40–129)
ALT SERPL-CCNC: 12 U/L (ref 10–40)
ANION GAP SERPL CALCULATED.3IONS-SCNC: 14 MMOL/L (ref 3–16)
ANTI-XA UNFRAC HEPARIN: 0.29 IU/ML (ref 0.3–0.7)
ANTI-XA UNFRAC HEPARIN: 0.29 IU/ML (ref 0.3–0.7)
ANTI-XA UNFRAC HEPARIN: 0.45 IU/ML (ref 0.3–0.7)
AST SERPL-CCNC: 21 U/L (ref 15–37)
BASOPHILS # BLD: 0 K/UL (ref 0–0.2)
BASOPHILS NFR BLD: 0.6 %
BILIRUB SERPL-MCNC: 0.7 MG/DL (ref 0–1)
BUN SERPL-MCNC: 50 MG/DL (ref 7–20)
CALCIUM SERPL-MCNC: 9.3 MG/DL (ref 8.3–10.6)
CHLORIDE SERPL-SCNC: 94 MMOL/L (ref 99–110)
CO2 SERPL-SCNC: 30 MMOL/L (ref 21–32)
CREAT SERPL-MCNC: 2.1 MG/DL (ref 0.8–1.3)
DEPRECATED RDW RBC AUTO: 14.5 % (ref 12.4–15.4)
ECHO AO ROOT DIAM: 2.9 CM
ECHO IVC PROX: 1.7 CM
ECHO LA AREA 2C: 26.6 CM2
ECHO LA AREA 4C: 26.1 CM2
ECHO LA DIAMETER: 4.7 CM
ECHO LA MAJOR AXIS: 6.1 CM
ECHO LA MINOR AXIS: 6.3 CM
ECHO LA TO AORTIC ROOT RATIO: 1.62
ECHO LA VOL BP: 89 ML (ref 18–58)
ECHO LA VOL MOD A2C: 87 ML (ref 18–58)
ECHO LA VOL MOD A4C: 89 ML (ref 18–58)
ECHO LV EDV 3D: 192 ML
ECHO LV EF PHYSICIAN: 23 %
ECHO LV EJECTION FRACTION 3D: 24 %
ECHO LV ESV 3D: 146 ML
ECHO LV FRACTIONAL SHORTENING: 10 % (ref 28–44)
ECHO LV GLOBAL LONGITUDINAL STRAIN (GLS): -4.3 %
ECHO LV GLOBAL LONGITUDINAL STRAIN (GLS): -4.6 %
ECHO LV GLOBAL LONGITUDINAL STRAIN (GLS): -4.6 %
ECHO LV GLOBAL LONGITUDINAL STRAIN (GLS): -5 %
ECHO LV INTERNAL DIMENSION DIASTOLIC: 5.9 CM (ref 4.2–5.9)
ECHO LV INTERNAL DIMENSION SYSTOLIC: 5.3 CM
ECHO LV IVSD: 1 CM (ref 0.6–1)
ECHO LV MASS 2D: 239.9 G (ref 88–224)
ECHO LV MASS 3D: 209 G
ECHO LV POSTERIOR WALL DIASTOLIC: 1 CM (ref 0.6–1)
ECHO LV RELATIVE WALL THICKNESS RATIO: 0.34
ECHO RA AREA 4C: 19.4 CM2
ECHO RA VOLUME: 55 ML
ECHO RV FREE WALL PEAK S': 9.4 CM/S
ECHO RV TAPSE: 1.5 CM (ref 1.7–?)
EOSINOPHIL # BLD: 0.1 K/UL (ref 0–0.6)
EOSINOPHIL NFR BLD: 0.6 %
FERRITIN SERPL IA-MCNC: 159 NG/ML (ref 30–400)
GFR SERPLBLD CREATININE-BSD FMLA CKD-EPI: 30 ML/MIN/{1.73_M2}
GLUCOSE BLD-MCNC: 228 MG/DL (ref 70–99)
GLUCOSE BLD-MCNC: 246 MG/DL (ref 70–99)
GLUCOSE BLD-MCNC: 277 MG/DL (ref 70–99)
GLUCOSE BLD-MCNC: 362 MG/DL (ref 70–99)
GLUCOSE SERPL-MCNC: 263 MG/DL (ref 70–99)
HCT VFR BLD AUTO: 29.1 % (ref 40.5–52.5)
HGB BLD-MCNC: 9.9 G/DL (ref 13.5–17.5)
IRON SATN MFR SERPL: 7 % (ref 20–50)
IRON SERPL-MCNC: 14 UG/DL (ref 59–158)
LYMPHOCYTES # BLD: 2.2 K/UL (ref 1–5.1)
LYMPHOCYTES NFR BLD: 24.9 %
MCH RBC QN AUTO: 33.4 PG (ref 26–34)
MCHC RBC AUTO-ENTMCNC: 34 G/DL (ref 31–36)
MCV RBC AUTO: 98.2 FL (ref 80–100)
MONOCYTES # BLD: 0.5 K/UL (ref 0–1.3)
MONOCYTES NFR BLD: 5.3 %
NEUTROPHILS # BLD: 6 K/UL (ref 1.7–7.7)
NEUTROPHILS NFR BLD: 68.6 %
PERFORMED ON: ABNORMAL
PLATELET # BLD AUTO: 197 K/UL (ref 135–450)
PMV BLD AUTO: 7.9 FL (ref 5–10.5)
POTASSIUM SERPL-SCNC: 4.3 MMOL/L (ref 3.5–5.1)
PROT SERPL-MCNC: 6.5 G/DL (ref 6.4–8.2)
RBC # BLD AUTO: 2.97 M/UL (ref 4.2–5.9)
SODIUM SERPL-SCNC: 138 MMOL/L (ref 136–145)
TIBC SERPL-MCNC: 189 UG/DL (ref 260–445)
WBC # BLD AUTO: 8.8 K/UL (ref 4–11)

## 2025-08-09 PROCEDURE — 6370000000 HC RX 637 (ALT 250 FOR IP): Performed by: INTERNAL MEDICINE

## 2025-08-09 PROCEDURE — 93308 TTE F-UP OR LMTD: CPT | Performed by: STUDENT IN AN ORGANIZED HEALTH CARE EDUCATION/TRAINING PROGRAM

## 2025-08-09 PROCEDURE — 93325 DOPPLER ECHO COLOR FLOW MAPG: CPT | Performed by: STUDENT IN AN ORGANIZED HEALTH CARE EDUCATION/TRAINING PROGRAM

## 2025-08-09 PROCEDURE — 99232 SBSQ HOSP IP/OBS MODERATE 35: CPT | Performed by: NURSE PRACTITIONER

## 2025-08-09 PROCEDURE — 6370000000 HC RX 637 (ALT 250 FOR IP)

## 2025-08-09 PROCEDURE — 87641 MR-STAPH DNA AMP PROBE: CPT

## 2025-08-09 PROCEDURE — 6370000000 HC RX 637 (ALT 250 FOR IP): Performed by: NURSE PRACTITIONER

## 2025-08-09 PROCEDURE — 94761 N-INVAS EAR/PLS OXIMETRY MLT: CPT

## 2025-08-09 PROCEDURE — 6360000002 HC RX W HCPCS: Performed by: STUDENT IN AN ORGANIZED HEALTH CARE EDUCATION/TRAINING PROGRAM

## 2025-08-09 PROCEDURE — 6360000002 HC RX W HCPCS: Performed by: NURSE PRACTITIONER

## 2025-08-09 PROCEDURE — 83550 IRON BINDING TEST: CPT

## 2025-08-09 PROCEDURE — 2500000003 HC RX 250 WO HCPCS: Performed by: NURSE PRACTITIONER

## 2025-08-09 PROCEDURE — 93356 MYOCRD STRAIN IMG SPCKL TRCK: CPT | Performed by: STUDENT IN AN ORGANIZED HEALTH CARE EDUCATION/TRAINING PROGRAM

## 2025-08-09 PROCEDURE — 2500000003 HC RX 250 WO HCPCS

## 2025-08-09 PROCEDURE — 36415 COLL VENOUS BLD VENIPUNCTURE: CPT

## 2025-08-09 PROCEDURE — 2700000000 HC OXYGEN THERAPY PER DAY

## 2025-08-09 PROCEDURE — 85025 COMPLETE CBC W/AUTO DIFF WBC: CPT

## 2025-08-09 PROCEDURE — 83540 ASSAY OF IRON: CPT

## 2025-08-09 PROCEDURE — 80053 COMPREHEN METABOLIC PANEL: CPT

## 2025-08-09 PROCEDURE — 93308 TTE F-UP OR LMTD: CPT

## 2025-08-09 PROCEDURE — 2060000000 HC ICU INTERMEDIATE R&B

## 2025-08-09 PROCEDURE — 6360000002 HC RX W HCPCS

## 2025-08-09 PROCEDURE — 82728 ASSAY OF FERRITIN: CPT

## 2025-08-09 PROCEDURE — 85520 HEPARIN ASSAY: CPT

## 2025-08-09 RX ORDER — HEPARIN SODIUM 10000 [USP'U]/100ML
5-30 INJECTION, SOLUTION INTRAVENOUS CONTINUOUS
Status: ACTIVE | OUTPATIENT
Start: 2025-08-09 | End: 2025-08-09

## 2025-08-09 RX ORDER — FUROSEMIDE 10 MG/ML
60 INJECTION INTRAMUSCULAR; INTRAVENOUS ONCE
Status: COMPLETED | OUTPATIENT
Start: 2025-08-09 | End: 2025-08-09

## 2025-08-09 RX ADMIN — INSULIN LISPRO 1 UNITS: 100 INJECTION, SOLUTION INTRAVENOUS; SUBCUTANEOUS at 12:28

## 2025-08-09 RX ADMIN — ZIPRASIDONE MESYLATE 5 MG: 20 INJECTION, POWDER, LYOPHILIZED, FOR SOLUTION INTRAMUSCULAR at 01:51

## 2025-08-09 RX ADMIN — CLOPIDOGREL BISULFATE 75 MG: 75 TABLET, FILM COATED ORAL at 12:18

## 2025-08-09 RX ADMIN — Medication 5 MG: at 21:55

## 2025-08-09 RX ADMIN — MICONAZOLE NITRATE: 2 POWDER TOPICAL at 21:57

## 2025-08-09 RX ADMIN — SODIUM CHLORIDE, PRESERVATIVE FREE 10 ML: 5 INJECTION INTRAVENOUS at 09:30

## 2025-08-09 RX ADMIN — SODIUM CHLORIDE, PRESERVATIVE FREE 10 ML: 5 INJECTION INTRAVENOUS at 00:22

## 2025-08-09 RX ADMIN — HEPARIN SODIUM AND DEXTROSE 12.87 UNITS/KG/HR: 10000; 5 INJECTION INTRAVENOUS at 12:38

## 2025-08-09 RX ADMIN — INSULIN LISPRO 1 UNITS: 100 INJECTION, SOLUTION INTRAVENOUS; SUBCUTANEOUS at 17:48

## 2025-08-09 RX ADMIN — TRAZODONE HYDROCHLORIDE 50 MG: 50 TABLET ORAL at 21:55

## 2025-08-09 RX ADMIN — RANOLAZINE 500 MG: 500 TABLET, FILM COATED, EXTENDED RELEASE ORAL at 21:55

## 2025-08-09 RX ADMIN — ASPIRIN 81 MG: 81 TABLET, CHEWABLE ORAL at 12:18

## 2025-08-09 RX ADMIN — ATORVASTATIN CALCIUM 40 MG: 40 TABLET, FILM COATED ORAL at 21:55

## 2025-08-09 RX ADMIN — TAMSULOSIN HYDROCHLORIDE 0.4 MG: 0.4 CAPSULE ORAL at 12:18

## 2025-08-09 RX ADMIN — FUROSEMIDE 60 MG: 10 INJECTION, SOLUTION INTRAMUSCULAR; INTRAVENOUS at 10:00

## 2025-08-09 RX ADMIN — HEPARIN SODIUM 2000 UNITS: 1000 INJECTION, SOLUTION INTRAVENOUS; SUBCUTANEOUS at 06:56

## 2025-08-09 RX ADMIN — INSULIN LISPRO 4 UNITS: 100 INJECTION, SOLUTION INTRAVENOUS; SUBCUTANEOUS at 21:56

## 2025-08-09 RX ADMIN — INSULIN LISPRO 2 UNITS: 100 INJECTION, SOLUTION INTRAVENOUS; SUBCUTANEOUS at 10:00

## 2025-08-09 RX ADMIN — MICONAZOLE NITRATE: 2 POWDER TOPICAL at 08:30

## 2025-08-09 RX ADMIN — RANOLAZINE 500 MG: 500 TABLET, FILM COATED, EXTENDED RELEASE ORAL at 12:18

## 2025-08-09 RX ADMIN — MICONAZOLE NITRATE: 2 POWDER TOPICAL at 00:21

## 2025-08-09 RX ADMIN — ISOSORBIDE MONONITRATE 30 MG: 30 TABLET, EXTENDED RELEASE ORAL at 12:18

## 2025-08-09 ASSESSMENT — PAIN SCALES - GENERAL
PAINLEVEL_OUTOF10: 0

## 2025-08-10 LAB
ALBUMIN SERPL-MCNC: 3.5 G/DL (ref 3.4–5)
ALBUMIN/GLOB SERPL: 1.1 {RATIO} (ref 1.1–2.2)
ALP SERPL-CCNC: 87 U/L (ref 40–129)
ALT SERPL-CCNC: 8 U/L (ref 10–40)
ANION GAP SERPL CALCULATED.3IONS-SCNC: 16 MMOL/L (ref 3–16)
AST SERPL-CCNC: 18 U/L (ref 15–37)
BASOPHILS # BLD: 0.1 K/UL (ref 0–0.2)
BASOPHILS NFR BLD: 0.7 %
BILIRUB SERPL-MCNC: 0.8 MG/DL (ref 0–1)
BUN SERPL-MCNC: 60 MG/DL (ref 7–20)
CALCIUM SERPL-MCNC: 8.9 MG/DL (ref 8.3–10.6)
CHLORIDE SERPL-SCNC: 92 MMOL/L (ref 99–110)
CO2 SERPL-SCNC: 27 MMOL/L (ref 21–32)
CREAT SERPL-MCNC: 2.4 MG/DL (ref 0.8–1.3)
DEPRECATED RDW RBC AUTO: 14.9 % (ref 12.4–15.4)
EKG ATRIAL RATE: 110 BPM
EKG DIAGNOSIS: NORMAL
EKG P-R INTERVAL: 208 MS
EKG Q-T INTERVAL: 356 MS
EKG QRS DURATION: 164 MS
EKG QTC CALCULATION (BAZETT): 481 MS
EKG R AXIS: -76 DEGREES
EKG T AXIS: 86 DEGREES
EKG VENTRICULAR RATE: 110 BPM
EOSINOPHIL # BLD: 0.1 K/UL (ref 0–0.6)
EOSINOPHIL NFR BLD: 1.8 %
GFR SERPLBLD CREATININE-BSD FMLA CKD-EPI: 25 ML/MIN/{1.73_M2}
GLUCOSE BLD-MCNC: 246 MG/DL (ref 70–99)
GLUCOSE BLD-MCNC: 267 MG/DL (ref 70–99)
GLUCOSE BLD-MCNC: 354 MG/DL (ref 70–99)
GLUCOSE BLD-MCNC: 358 MG/DL (ref 70–99)
GLUCOSE SERPL-MCNC: 244 MG/DL (ref 70–99)
HCT VFR BLD AUTO: 27.9 % (ref 40.5–52.5)
HGB BLD-MCNC: 9.5 G/DL (ref 13.5–17.5)
LYMPHOCYTES # BLD: 2 K/UL (ref 1–5.1)
LYMPHOCYTES NFR BLD: 23.9 %
MCH RBC QN AUTO: 33.4 PG (ref 26–34)
MCHC RBC AUTO-ENTMCNC: 34 G/DL (ref 31–36)
MCV RBC AUTO: 98.4 FL (ref 80–100)
MONOCYTES # BLD: 0.4 K/UL (ref 0–1.3)
MONOCYTES NFR BLD: 4.3 %
NEUTROPHILS # BLD: 5.9 K/UL (ref 1.7–7.7)
NEUTROPHILS NFR BLD: 69.3 %
PERFORMED ON: ABNORMAL
PLATELET # BLD AUTO: 197 K/UL (ref 135–450)
PMV BLD AUTO: 8.3 FL (ref 5–10.5)
POTASSIUM SERPL-SCNC: 3.7 MMOL/L (ref 3.5–5.1)
PROT SERPL-MCNC: 6.6 G/DL (ref 6.4–8.2)
RBC # BLD AUTO: 2.84 M/UL (ref 4.2–5.9)
SODIUM SERPL-SCNC: 135 MMOL/L (ref 136–145)
WBC # BLD AUTO: 8.5 K/UL (ref 4–11)

## 2025-08-10 PROCEDURE — 97166 OT EVAL MOD COMPLEX 45 MIN: CPT

## 2025-08-10 PROCEDURE — 6370000000 HC RX 637 (ALT 250 FOR IP): Performed by: INTERNAL MEDICINE

## 2025-08-10 PROCEDURE — 6370000000 HC RX 637 (ALT 250 FOR IP)

## 2025-08-10 PROCEDURE — 97110 THERAPEUTIC EXERCISES: CPT

## 2025-08-10 PROCEDURE — 93010 ELECTROCARDIOGRAM REPORT: CPT | Performed by: INTERNAL MEDICINE

## 2025-08-10 PROCEDURE — 97530 THERAPEUTIC ACTIVITIES: CPT

## 2025-08-10 PROCEDURE — 2500000003 HC RX 250 WO HCPCS

## 2025-08-10 PROCEDURE — 97162 PT EVAL MOD COMPLEX 30 MIN: CPT

## 2025-08-10 PROCEDURE — 99233 SBSQ HOSP IP/OBS HIGH 50: CPT | Performed by: NURSE PRACTITIONER

## 2025-08-10 PROCEDURE — 80053 COMPREHEN METABOLIC PANEL: CPT

## 2025-08-10 PROCEDURE — 85025 COMPLETE CBC W/AUTO DIFF WBC: CPT

## 2025-08-10 PROCEDURE — 97535 SELF CARE MNGMENT TRAINING: CPT

## 2025-08-10 PROCEDURE — 6370000000 HC RX 637 (ALT 250 FOR IP): Performed by: NURSE PRACTITIONER

## 2025-08-10 PROCEDURE — 2060000000 HC ICU INTERMEDIATE R&B

## 2025-08-10 RX ORDER — FUROSEMIDE 10 MG/ML
100 INJECTION INTRAMUSCULAR; INTRAVENOUS 2 TIMES DAILY
Status: DISPENSED | OUTPATIENT
Start: 2025-08-10 | End: 2025-08-11

## 2025-08-10 RX ORDER — INSULIN LISPRO 100 [IU]/ML
0-8 INJECTION, SOLUTION INTRAVENOUS; SUBCUTANEOUS
Status: DISCONTINUED | OUTPATIENT
Start: 2025-08-10 | End: 2025-08-11 | Stop reason: HOSPADM

## 2025-08-10 RX ADMIN — ASPIRIN 81 MG: 81 TABLET, CHEWABLE ORAL at 08:21

## 2025-08-10 RX ADMIN — TAMSULOSIN HYDROCHLORIDE 0.4 MG: 0.4 CAPSULE ORAL at 08:21

## 2025-08-10 RX ADMIN — TRAZODONE HYDROCHLORIDE 50 MG: 50 TABLET ORAL at 21:08

## 2025-08-10 RX ADMIN — MICONAZOLE NITRATE: 2 POWDER TOPICAL at 21:09

## 2025-08-10 RX ADMIN — ATORVASTATIN CALCIUM 40 MG: 40 TABLET, FILM COATED ORAL at 21:07

## 2025-08-10 RX ADMIN — RANOLAZINE 500 MG: 500 TABLET, FILM COATED, EXTENDED RELEASE ORAL at 08:21

## 2025-08-10 RX ADMIN — INSULIN LISPRO 4 UNITS: 100 INJECTION, SOLUTION INTRAVENOUS; SUBCUTANEOUS at 12:51

## 2025-08-10 RX ADMIN — RANOLAZINE 500 MG: 500 TABLET, FILM COATED, EXTENDED RELEASE ORAL at 21:07

## 2025-08-10 RX ADMIN — MICONAZOLE NITRATE: 2 POWDER TOPICAL at 08:22

## 2025-08-10 RX ADMIN — SODIUM CHLORIDE, PRESERVATIVE FREE 10 ML: 5 INJECTION INTRAVENOUS at 21:13

## 2025-08-10 RX ADMIN — Medication 5 MG: at 21:07

## 2025-08-10 RX ADMIN — CLOPIDOGREL BISULFATE 75 MG: 75 TABLET, FILM COATED ORAL at 08:21

## 2025-08-10 RX ADMIN — ISOSORBIDE MONONITRATE 30 MG: 30 TABLET, EXTENDED RELEASE ORAL at 08:21

## 2025-08-10 RX ADMIN — INSULIN LISPRO 2 UNITS: 100 INJECTION, SOLUTION INTRAVENOUS; SUBCUTANEOUS at 08:21

## 2025-08-10 RX ADMIN — INSULIN LISPRO 8 UNITS: 100 INJECTION, SOLUTION INTRAVENOUS; SUBCUTANEOUS at 17:04

## 2025-08-10 RX ADMIN — INSULIN LISPRO 2 UNITS: 100 INJECTION, SOLUTION INTRAVENOUS; SUBCUTANEOUS at 21:10

## 2025-08-10 RX ADMIN — OXYCODONE HYDROCHLORIDE 5 MG: 5 TABLET ORAL at 21:07

## 2025-08-10 RX ADMIN — SODIUM CHLORIDE, PRESERVATIVE FREE 10 ML: 5 INJECTION INTRAVENOUS at 08:21

## 2025-08-10 ASSESSMENT — PAIN SCALES - GENERAL
PAINLEVEL_OUTOF10: 0
PAINLEVEL_OUTOF10: 0
PAINLEVEL_OUTOF10: 7
PAINLEVEL_OUTOF10: 0
PAINLEVEL_OUTOF10: 3
PAINLEVEL_OUTOF10: 0
PAINLEVEL_OUTOF10: 7

## 2025-08-10 ASSESSMENT — PAIN - FUNCTIONAL ASSESSMENT
PAIN_FUNCTIONAL_ASSESSMENT: 0-10
PAIN_FUNCTIONAL_ASSESSMENT: 0-10

## 2025-08-10 ASSESSMENT — PAIN DESCRIPTION - LOCATION: LOCATION: BACK

## 2025-08-11 ENCOUNTER — APPOINTMENT (OUTPATIENT)
Dept: GENERAL RADIOLOGY | Age: 89
DRG: 284 | End: 2025-08-11
Attending: INTERNAL MEDICINE
Payer: OTHER GOVERNMENT

## 2025-08-11 VITALS
HEART RATE: 93 BPM | TEMPERATURE: 97.8 F | WEIGHT: 210 LBS | RESPIRATION RATE: 18 BRPM | SYSTOLIC BLOOD PRESSURE: 100 MMHG | BODY MASS INDEX: 31.93 KG/M2 | OXYGEN SATURATION: 82 % | DIASTOLIC BLOOD PRESSURE: 62 MMHG

## 2025-08-11 LAB
ALBUMIN SERPL-MCNC: 3.5 G/DL (ref 3.4–5)
ALBUMIN/GLOB SERPL: 1.1 {RATIO} (ref 1.1–2.2)
ALP SERPL-CCNC: 107 U/L (ref 40–129)
ALT SERPL-CCNC: 18 U/L (ref 10–40)
AMMONIA PLAS-SCNC: 55 UMOL/L (ref 16–60)
ANION GAP SERPL CALCULATED.3IONS-SCNC: 17 MMOL/L (ref 3–16)
ANION GAP SERPL CALCULATED.3IONS-SCNC: 24 MMOL/L (ref 3–16)
AST SERPL-CCNC: 35 U/L (ref 15–37)
BASOPHILS # BLD: 0 K/UL (ref 0–0.2)
BASOPHILS # BLD: 0 K/UL (ref 0–0.2)
BASOPHILS NFR BLD: 0 %
BASOPHILS NFR BLD: 0 %
BILIRUB SERPL-MCNC: 1.1 MG/DL (ref 0–1)
BUN SERPL-MCNC: 69 MG/DL (ref 7–20)
BUN SERPL-MCNC: 73 MG/DL (ref 7–20)
CALCIUM SERPL-MCNC: 9.4 MG/DL (ref 8.3–10.6)
CALCIUM SERPL-MCNC: 9.7 MG/DL (ref 8.3–10.6)
CHLORIDE SERPL-SCNC: 90 MMOL/L (ref 99–110)
CHLORIDE SERPL-SCNC: 92 MMOL/L (ref 99–110)
CO2 SERPL-SCNC: 20 MMOL/L (ref 21–32)
CO2 SERPL-SCNC: 27 MMOL/L (ref 21–32)
CREAT SERPL-MCNC: 2.9 MG/DL (ref 0.8–1.3)
CREAT SERPL-MCNC: 3.4 MG/DL (ref 0.8–1.3)
DEPRECATED RDW RBC AUTO: 14.7 % (ref 12.4–15.4)
DEPRECATED RDW RBC AUTO: 15.2 % (ref 12.4–15.4)
EOSINOPHIL # BLD: 0 K/UL (ref 0–0.6)
EOSINOPHIL # BLD: 0.2 K/UL (ref 0–0.6)
EOSINOPHIL NFR BLD: 0 %
EOSINOPHIL NFR BLD: 2 %
GFR SERPLBLD CREATININE-BSD FMLA CKD-EPI: 17 ML/MIN/{1.73_M2}
GFR SERPLBLD CREATININE-BSD FMLA CKD-EPI: 20 ML/MIN/{1.73_M2}
GLUCOSE BLD-MCNC: 257 MG/DL (ref 70–99)
GLUCOSE SERPL-MCNC: 214 MG/DL (ref 70–99)
GLUCOSE SERPL-MCNC: 331 MG/DL (ref 70–99)
HCT VFR BLD AUTO: 27.2 % (ref 40.5–52.5)
HCT VFR BLD AUTO: 30 % (ref 40.5–52.5)
HGB BLD-MCNC: 9.3 G/DL (ref 13.5–17.5)
HGB BLD-MCNC: 9.9 G/DL (ref 13.5–17.5)
LACTATE BLDV-SCNC: 7 MMOL/L (ref 0.4–2)
LYMPHOCYTES # BLD: 3 K/UL (ref 1–5.1)
LYMPHOCYTES # BLD: 4.4 K/UL (ref 1–5.1)
LYMPHOCYTES NFR BLD: 22 %
LYMPHOCYTES NFR BLD: 33 %
MCH RBC QN AUTO: 33 PG (ref 26–34)
MCH RBC QN AUTO: 33.5 PG (ref 26–34)
MCHC RBC AUTO-ENTMCNC: 33 G/DL (ref 31–36)
MCHC RBC AUTO-ENTMCNC: 34.2 G/DL (ref 31–36)
MCV RBC AUTO: 98.1 FL (ref 80–100)
MCV RBC AUTO: 99.9 FL (ref 80–100)
MONOCYTES # BLD: 0.1 K/UL (ref 0–1.3)
MONOCYTES # BLD: 0.1 K/UL (ref 0–1.3)
MONOCYTES NFR BLD: 1 %
MONOCYTES NFR BLD: 1 %
MRSA DNA SPEC QL NAA+PROBE: ABNORMAL
NEUTROPHILS # BLD: 6.4 K/UL (ref 1.7–7.7)
NEUTROPHILS # BLD: 8.8 K/UL (ref 1.7–7.7)
NEUTROPHILS NFR BLD: 52 %
NEUTROPHILS NFR BLD: 66 %
NEUTS BAND NFR BLD MANUAL: 14 % (ref 0–7)
ORGANISM: ABNORMAL
OVALOCYTES BLD QL SMEAR: ABNORMAL
PERFORMED ON: ABNORMAL
PLATELET # BLD AUTO: 196 K/UL (ref 135–450)
PLATELET # BLD AUTO: 230 K/UL (ref 135–450)
PLATELET BLD QL SMEAR: ADEQUATE
PLATELET BLD QL SMEAR: ADEQUATE
PMV BLD AUTO: 8.3 FL (ref 5–10.5)
PMV BLD AUTO: 8.5 FL (ref 5–10.5)
POIKILOCYTOSIS BLD QL SMEAR: ABNORMAL
POLYCHROMASIA BLD QL SMEAR: ABNORMAL
POTASSIUM SERPL-SCNC: 3.9 MMOL/L (ref 3.5–5.1)
POTASSIUM SERPL-SCNC: 5 MMOL/L (ref 3.5–5.1)
PROT SERPL-MCNC: 6.8 G/DL (ref 6.4–8.2)
RBC # BLD AUTO: 2.77 M/UL (ref 4.2–5.9)
RBC # BLD AUTO: 3 M/UL (ref 4.2–5.9)
RBC MORPH BLD: NORMAL
SLIDE REVIEW: ABNORMAL
SLIDE REVIEW: ABNORMAL
SODIUM SERPL-SCNC: 134 MMOL/L (ref 136–145)
SODIUM SERPL-SCNC: 136 MMOL/L (ref 136–145)
TROPONIN, HIGH SENSITIVITY: 582 NG/L (ref 0–22)
TSH SERPL DL<=0.005 MIU/L-ACNC: 0.82 UIU/ML (ref 0.27–4.2)
VARIANT LYMPHS NFR BLD MANUAL: 9 % (ref 0–6)
WBC # BLD AUTO: 13.3 K/UL (ref 4–11)
WBC # BLD AUTO: 9.7 K/UL (ref 4–11)

## 2025-08-11 PROCEDURE — 84443 ASSAY THYROID STIM HORMONE: CPT

## 2025-08-11 PROCEDURE — 80053 COMPREHEN METABOLIC PANEL: CPT

## 2025-08-11 PROCEDURE — 94761 N-INVAS EAR/PLS OXIMETRY MLT: CPT

## 2025-08-11 PROCEDURE — 82140 ASSAY OF AMMONIA: CPT

## 2025-08-11 PROCEDURE — 51701 INSERT BLADDER CATHETER: CPT

## 2025-08-11 PROCEDURE — 83605 ASSAY OF LACTIC ACID: CPT

## 2025-08-11 PROCEDURE — 85025 COMPLETE CBC W/AUTO DIFF WBC: CPT

## 2025-08-11 PROCEDURE — 71045 X-RAY EXAM CHEST 1 VIEW: CPT

## 2025-08-11 PROCEDURE — 84484 ASSAY OF TROPONIN QUANT: CPT

## 2025-08-11 PROCEDURE — 36415 COLL VENOUS BLD VENIPUNCTURE: CPT

## 2025-08-11 PROCEDURE — APPNB15 APP NON BILLABLE TIME 0-15 MINS: Performed by: NURSE PRACTITIONER

## 2025-08-11 PROCEDURE — 6360000002 HC RX W HCPCS: Performed by: NURSE PRACTITIONER

## 2025-08-11 PROCEDURE — 94640 AIRWAY INHALATION TREATMENT: CPT

## 2025-08-11 PROCEDURE — 2700000000 HC OXYGEN THERAPY PER DAY

## 2025-08-11 PROCEDURE — 6360000002 HC RX W HCPCS

## 2025-08-11 RX ORDER — MORPHINE SULFATE 2 MG/ML
2 INJECTION, SOLUTION INTRAMUSCULAR; INTRAVENOUS ONCE
Status: COMPLETED | OUTPATIENT
Start: 2025-08-11 | End: 2025-08-11

## 2025-08-11 RX ORDER — IPRATROPIUM BROMIDE AND ALBUTEROL SULFATE 2.5; .5 MG/3ML; MG/3ML
1 SOLUTION RESPIRATORY (INHALATION) EVERY 4 HOURS PRN
Status: DISCONTINUED | OUTPATIENT
Start: 2025-08-11 | End: 2025-08-11

## 2025-08-11 RX ORDER — LABETALOL HYDROCHLORIDE 5 MG/ML
5 INJECTION, SOLUTION INTRAVENOUS EVERY 6 HOURS PRN
Status: DISCONTINUED | OUTPATIENT
Start: 2025-08-11 | End: 2025-08-11 | Stop reason: HOSPADM

## 2025-08-11 RX ORDER — IPRATROPIUM BROMIDE AND ALBUTEROL SULFATE 2.5; .5 MG/3ML; MG/3ML
1 SOLUTION RESPIRATORY (INHALATION)
Status: DISCONTINUED | OUTPATIENT
Start: 2025-08-11 | End: 2025-08-11 | Stop reason: HOSPADM

## 2025-08-11 RX ORDER — MORPHINE SULFATE 4 MG/ML
INJECTION, SOLUTION INTRAMUSCULAR; INTRAVENOUS
Status: DISCONTINUED
Start: 2025-08-11 | End: 2025-08-11 | Stop reason: HOSPADM

## 2025-08-11 RX ORDER — BUDESONIDE 0.5 MG/2ML
0.5 INHALANT ORAL
Status: DISCONTINUED | OUTPATIENT
Start: 2025-08-11 | End: 2025-08-11 | Stop reason: HOSPADM

## 2025-08-11 RX ORDER — IPRATROPIUM BROMIDE AND ALBUTEROL SULFATE 2.5; .5 MG/3ML; MG/3ML
1 SOLUTION RESPIRATORY (INHALATION) EVERY 4 HOURS PRN
Status: DISCONTINUED | OUTPATIENT
Start: 2025-08-11 | End: 2025-08-11 | Stop reason: HOSPADM

## 2025-08-11 RX ADMIN — MORPHINE SULFATE 2 MG: 2 INJECTION INTRAVENOUS at 10:12

## 2025-08-11 ASSESSMENT — PAIN SCALES - GENERAL
PAINLEVEL_OUTOF10: 0
PAINLEVEL_OUTOF10: 0

## (undated) DEVICE — BANDAGE ACE 4X5YDNS

## (undated) DEVICE — GOWN SIRUS NONREIN XL W/TWL: Brand: MEDLINE INDUSTRIES, INC.

## (undated) DEVICE — BANDAGE,GAUZE,4.5"X4.1YD,STERILE,LF: Brand: MEDLINE

## (undated) DEVICE — HANDPIECE SET WITH HIGH FLOW TIP AND SUCTION TUBE: Brand: INTERPULSE

## (undated) DEVICE — GAUZE,SPONGE,4"X4",8PLY,STRL,LF,10/TRAY: Brand: MEDLINE

## (undated) DEVICE — PADDING CAST W4INXL4YD NONSTERILE COT RAYON MICROPLEATED

## (undated) DEVICE — BANDAGE,GAUZE,BULKEE II,4.5"X4.1YD,STRL: Brand: MEDLINE

## (undated) DEVICE — PAD,ABDOMINAL,8"X10",ST,LF: Brand: MEDLINE

## (undated) DEVICE — GLOVE ORANGE PI 7 1/2   MSG9075

## (undated) DEVICE — DRESSING,GAUZE,XEROFORM,CURAD,1"X8",ST: Brand: CURAD

## (undated) DEVICE — MAJOR SET UP PK

## (undated) DEVICE — Z INACTIVE USE 2660664 SOLUTION IRRIG 3000ML 0.9% SOD CHL USP UROMATIC PLAS CONT

## (undated) DEVICE — AGENT HEMSTAT W2XL4IN OXIDIZED REGENERATED CELOS ABSRB

## (undated) DEVICE — SHOE, POST-OPERATIVE: Brand: DEROYAL

## (undated) DEVICE — PACK ORTH LO EXT VI

## (undated) DEVICE — SUTURE ETHLN SZ 3-0 L30IN NONABSORBABLE BLK FSL L30MM 3/8 1671H

## (undated) DEVICE — GAUZE,PACKING STRIP,IODOFORM,1/2"X5YD,ST: Brand: CURAD

## (undated) DEVICE — SOLUTION IV IRRIG 500ML 0.9% SODIUM CHL 2F7123

## (undated) DEVICE — APPLICATOR MEDICATED 26 CC SOLUTION HI LT ORNG CHLORAPREP

## (undated) DEVICE — NEEDLE HYPO 25GA L1.5IN BLU POLYPR HUB S STL REG BVL STR

## (undated) DEVICE — INTENDED FOR TISSUE SEPARATION, AND OTHER PROCEDURES THAT REQUIRE A SHARP SURGICAL BLADE TO PUNCTURE OR CUT.: Brand: BARD-PARKER ® STAINLESS STEEL BLADES

## (undated) DEVICE — OPTIFOAM GENTLE,NON-BORDERED,4X4: Brand: MEDLINE

## (undated) DEVICE — SYRINGE MED 10ML LUERLOCK TIP W/O SFTY DISP

## (undated) DEVICE — CANNULA NSL 13FT TUBE AD ETCO2 DIV SAMP M

## (undated) DEVICE — MHCZ EXTREMITY: Brand: MEDLINE INDUSTRIES, INC.

## (undated) DEVICE — SOLUTION IRRIG 3000ML 0.9% SOD CHL USP UROMATIC PLAS CONT

## (undated) DEVICE — 3M™ COBAN™ NL STERILE NON-LATEX SELF-ADHERENT WRAP, 2084S, 4 IN X 5 YD (10 CM X 4,5 M), 18 ROLLS/CASE: Brand: 3M™ COBAN™

## (undated) DEVICE — X-RAY CASSETTE COVER: Brand: CONVERTORS

## (undated) DEVICE — TIP SUCT DIA12FR W STYL CTRL VENT DISPOSABLE FRAZ

## (undated) DEVICE — BANDAGE COMPR W4INXL12FT E DISP ESMARCH EVEN

## (undated) DEVICE — COTTON UNDERCAST PADDING,CRIMPED FINISH: Brand: WEBRIL

## (undated) DEVICE — APPLICATOR PREP 26ML 0.7% IOD POVACRYLEX 74% ISO ALC ST

## (undated) DEVICE — Device